# Patient Record
Sex: MALE | Race: WHITE | Employment: OTHER
[De-identification: names, ages, dates, MRNs, and addresses within clinical notes are randomized per-mention and may not be internally consistent; named-entity substitution may affect disease eponyms.]

---

## 2017-02-01 ENCOUNTER — CARE COORDINATION (OUTPATIENT)
Dept: CARE COORDINATION | Facility: CLINIC | Age: 72
End: 2017-02-01

## 2017-02-07 ENCOUNTER — CARE COORDINATION (OUTPATIENT)
Dept: CARE COORDINATION | Facility: CLINIC | Age: 72
End: 2017-02-07

## 2017-03-22 ENCOUNTER — CARE COORDINATION (OUTPATIENT)
Dept: CARE COORDINATION | Age: 72
End: 2017-03-22

## 2017-05-03 ENCOUNTER — CARE COORDINATION (OUTPATIENT)
Dept: CARE COORDINATION | Age: 72
End: 2017-05-03

## 2017-06-20 ENCOUNTER — HOSPITAL ENCOUNTER (OUTPATIENT)
Age: 72
Setting detail: SPECIMEN
Discharge: HOME | End: 2017-06-20
Payer: MEDICARE

## 2017-06-20 DIAGNOSIS — G47.33 OBSTRUCTIVE SLEEP APNEA SYNDROME: ICD-10-CM

## 2017-06-20 DIAGNOSIS — R73.9 HYPERGLYCEMIA: ICD-10-CM

## 2017-06-20 LAB
ABSOLUTE EOS #: 0.4 K/UL (ref 0–0.4)
ABSOLUTE LYMPH #: 1.5 K/UL (ref 1–4.8)
ABSOLUTE MONO #: 0.5 K/UL (ref 0.1–1.2)
ALBUMIN SERPL-MCNC: 4.3 G/DL (ref 3.5–5.2)
ALBUMIN/GLOBULIN RATIO: 1.8 (ref 1–2.5)
ALP BLD-CCNC: 75 U/L (ref 40–129)
ALT SERPL-CCNC: 52 U/L (ref 5–41)
ANION GAP SERPL CALCULATED.3IONS-SCNC: 15 MMOL/L (ref 9–17)
AST SERPL-CCNC: 45 U/L
BASOPHILS # BLD: 1 %
BASOPHILS ABSOLUTE: 0 K/UL (ref 0–0.2)
BILIRUB SERPL-MCNC: 0.86 MG/DL (ref 0.3–1.2)
BUN BLDV-MCNC: 19 MG/DL (ref 8–23)
BUN/CREAT BLD: ABNORMAL (ref 9–20)
CALCIUM SERPL-MCNC: 10 MG/DL (ref 8.6–10.4)
CHLORIDE BLD-SCNC: 98 MMOL/L (ref 98–107)
CO2: 29 MMOL/L (ref 20–31)
CREAT SERPL-MCNC: 1.12 MG/DL (ref 0.7–1.2)
DIFFERENTIAL TYPE: NORMAL
EOSINOPHILS RELATIVE PERCENT: 6 %
GFR AFRICAN AMERICAN: >60 ML/MIN
GFR NON-AFRICAN AMERICAN: >60 ML/MIN
GFR SERPL CREATININE-BSD FRML MDRD: ABNORMAL ML/MIN/{1.73_M2}
GFR SERPL CREATININE-BSD FRML MDRD: ABNORMAL ML/MIN/{1.73_M2}
GLUCOSE BLD-MCNC: 118 MG/DL (ref 70–99)
HCT VFR BLD CALC: 50.6 % (ref 41–53)
HEMOGLOBIN: 16.9 G/DL (ref 13.5–17.5)
LYMPHOCYTES # BLD: 24 %
MCH RBC QN AUTO: 32.5 PG (ref 26–34)
MCHC RBC AUTO-ENTMCNC: 33.3 G/DL (ref 31–37)
MCV RBC AUTO: 97.5 FL (ref 80–100)
MONOCYTES # BLD: 8 %
PDW BLD-RTO: 14.8 % (ref 12.5–15.4)
PLATELET # BLD: 197 K/UL (ref 140–450)
PLATELET ESTIMATE: NORMAL
PMV BLD AUTO: 8.2 FL (ref 6–12)
POTASSIUM SERPL-SCNC: 4.2 MMOL/L (ref 3.7–5.3)
PROSTATE SPECIFIC ANTIGEN: <0.01 UG/L
RBC # BLD: 5.19 M/UL (ref 4.5–5.9)
RBC # BLD: NORMAL 10*6/UL
SEG NEUTROPHILS: 61 %
SEGMENTED NEUTROPHILS ABSOLUTE COUNT: 3.7 K/UL (ref 1.8–7.7)
SODIUM BLD-SCNC: 142 MMOL/L (ref 135–144)
TOTAL PROTEIN: 6.7 G/DL (ref 6.4–8.3)
WBC # BLD: 6.1 K/UL (ref 3.5–11)
WBC # BLD: NORMAL 10*3/UL

## 2017-07-31 ENCOUNTER — HOSPITAL ENCOUNTER (OUTPATIENT)
Age: 72
Setting detail: OUTPATIENT SURGERY
Discharge: HOME | End: 2017-07-31
Attending: SURGERY | Admitting: SURGERY
Payer: MEDICARE

## 2017-07-31 VITALS
HEART RATE: 58 BPM | RESPIRATION RATE: 16 BRPM | HEIGHT: 70 IN | SYSTOLIC BLOOD PRESSURE: 154 MMHG | DIASTOLIC BLOOD PRESSURE: 71 MMHG | WEIGHT: 315 LBS | TEMPERATURE: 97.5 F | OXYGEN SATURATION: 95 % | BODY MASS INDEX: 45.1 KG/M2

## 2017-07-31 PROCEDURE — 99153 MOD SED SAME PHYS/QHP EA: CPT | Performed by: SURGERY

## 2017-07-31 PROCEDURE — 7100000011 HC PHASE II RECOVERY - ADDTL 15 MIN: Performed by: SURGERY

## 2017-07-31 PROCEDURE — 3609027000 HC COLONOSCOPY: Performed by: SURGERY

## 2017-07-31 PROCEDURE — 7100000010 HC PHASE II RECOVERY - FIRST 15 MIN: Performed by: SURGERY

## 2017-07-31 PROCEDURE — 99152 MOD SED SAME PHYS/QHP 5/>YRS: CPT | Performed by: SURGERY

## 2017-07-31 PROCEDURE — 6360000002 HC RX W HCPCS: Performed by: SURGERY

## 2017-07-31 RX ORDER — MIDAZOLAM HYDROCHLORIDE 1 MG/ML
INJECTION INTRAMUSCULAR; INTRAVENOUS PRN
Status: DISCONTINUED | OUTPATIENT
Start: 2017-07-31 | End: 2017-07-31 | Stop reason: HOSPADM

## 2017-07-31 RX ORDER — SODIUM CHLORIDE, SODIUM LACTATE, POTASSIUM CHLORIDE, CALCIUM CHLORIDE 600; 310; 30; 20 MG/100ML; MG/100ML; MG/100ML; MG/100ML
INJECTION, SOLUTION INTRAVENOUS CONTINUOUS
Status: DISCONTINUED | OUTPATIENT
Start: 2017-07-31 | End: 2017-07-31 | Stop reason: HOSPADM

## 2017-07-31 RX ORDER — FENTANYL CITRATE 50 UG/ML
INJECTION, SOLUTION INTRAMUSCULAR; INTRAVENOUS PRN
Status: DISCONTINUED | OUTPATIENT
Start: 2017-07-31 | End: 2017-07-31 | Stop reason: HOSPADM

## 2017-07-31 RX ORDER — ACETAMINOPHEN 325 MG/1
650 TABLET ORAL EVERY 6 HOURS PRN
COMMUNITY

## 2017-07-31 ASSESSMENT — PAIN SCALES - GENERAL
PAINLEVEL_OUTOF10: 0
PAINLEVEL_OUTOF10: 0

## 2017-07-31 ASSESSMENT — PAIN - FUNCTIONAL ASSESSMENT: PAIN_FUNCTIONAL_ASSESSMENT: 0-10

## 2017-08-02 ENCOUNTER — HOSPITAL ENCOUNTER (OUTPATIENT)
Age: 72
Discharge: HOME | End: 2017-08-02
Payer: MEDICARE

## 2017-08-02 ENCOUNTER — HOSPITAL ENCOUNTER (OUTPATIENT)
Dept: GENERAL RADIOLOGY | Age: 72
Discharge: HOME | End: 2017-08-02
Payer: MEDICARE

## 2017-08-02 DIAGNOSIS — M54.16 LUMBAR RADICULOPATHY, ACUTE: ICD-10-CM

## 2017-08-02 DIAGNOSIS — M54.42 ACUTE LEFT-SIDED LOW BACK PAIN WITH LEFT-SIDED SCIATICA: ICD-10-CM

## 2017-08-02 PROCEDURE — 72100 X-RAY EXAM L-S SPINE 2/3 VWS: CPT

## 2017-08-04 ENCOUNTER — HOSPITAL ENCOUNTER (OUTPATIENT)
Dept: GENERAL RADIOLOGY | Age: 72
Discharge: HOME | End: 2017-08-04
Payer: MEDICARE

## 2017-08-04 ENCOUNTER — HOSPITAL ENCOUNTER (OUTPATIENT)
Age: 72
Discharge: HOME | End: 2017-08-04
Payer: MEDICARE

## 2017-08-04 DIAGNOSIS — M54.16 LUMBAR RADICULOPATHY, ACUTE: ICD-10-CM

## 2017-08-04 DIAGNOSIS — M54.42 ACUTE LEFT-SIDED LOW BACK PAIN WITH LEFT-SIDED SCIATICA: ICD-10-CM

## 2017-08-04 PROCEDURE — 73502 X-RAY EXAM HIP UNI 2-3 VIEWS: CPT

## 2017-08-24 ENCOUNTER — OFFICE VISIT (OUTPATIENT)
Dept: ORTHOPEDIC SURGERY | Age: 72
End: 2017-08-24
Payer: MEDICARE

## 2017-08-24 VITALS — HEIGHT: 70 IN | WEIGHT: 300 LBS | BODY MASS INDEX: 42.95 KG/M2

## 2017-08-24 DIAGNOSIS — M25.552 PAIN OF LEFT HIP JOINT: Primary | ICD-10-CM

## 2017-08-24 PROCEDURE — 99213 OFFICE O/P EST LOW 20 MIN: CPT | Performed by: ORTHOPAEDIC SURGERY

## 2017-08-30 RX ORDER — TRAMADOL HYDROCHLORIDE 50 MG/1
50 TABLET ORAL EVERY 6 HOURS PRN
Qty: 40 TABLET | Refills: 0 | Status: SHIPPED | OUTPATIENT
Start: 2017-08-30 | End: 2017-11-07 | Stop reason: SDUPTHER

## 2017-09-07 ENCOUNTER — HOSPITAL ENCOUNTER (OUTPATIENT)
Dept: INTERVENTIONAL RADIOLOGY/VASCULAR | Age: 72
Discharge: HOME | End: 2017-09-07
Payer: MEDICARE

## 2017-09-07 DIAGNOSIS — M25.552 PAIN OF LEFT HIP JOINT: ICD-10-CM

## 2017-09-07 PROCEDURE — 2500000003 HC RX 250 WO HCPCS: Performed by: RADIOLOGY

## 2017-09-07 PROCEDURE — 2500000003 HC RX 250 WO HCPCS: Performed by: ORTHOPAEDIC SURGERY

## 2017-09-07 PROCEDURE — 77002 NEEDLE LOCALIZATION BY XRAY: CPT | Performed by: RADIOLOGY

## 2017-09-07 PROCEDURE — 20610 DRAIN/INJ JOINT/BURSA W/O US: CPT | Performed by: RADIOLOGY

## 2017-09-07 PROCEDURE — 6360000004 HC RX CONTRAST MEDICATION: Performed by: ORTHOPAEDIC SURGERY

## 2017-09-07 PROCEDURE — 6360000002 HC RX W HCPCS: Performed by: ORTHOPAEDIC SURGERY

## 2017-09-07 RX ORDER — LIDOCAINE HYDROCHLORIDE 10 MG/ML
4 INJECTION, SOLUTION EPIDURAL; INFILTRATION; INTRACAUDAL; PERINEURAL ONCE
Status: COMPLETED | OUTPATIENT
Start: 2017-09-07 | End: 2017-09-07

## 2017-09-07 RX ORDER — METHYLPREDNISOLONE ACETATE 80 MG/ML
80 INJECTION, SUSPENSION INTRA-ARTICULAR; INTRALESIONAL; INTRAMUSCULAR; SOFT TISSUE ONCE
Status: COMPLETED | OUTPATIENT
Start: 2017-09-07 | End: 2017-09-07

## 2017-09-07 RX ORDER — BUPIVACAINE HYDROCHLORIDE 5 MG/ML
4 INJECTION, SOLUTION EPIDURAL; INTRACAUDAL ONCE
Status: COMPLETED | OUTPATIENT
Start: 2017-09-07 | End: 2017-09-07

## 2017-09-07 RX ORDER — LIDOCAINE HYDROCHLORIDE 10 MG/ML
INJECTION, SOLUTION INFILTRATION; PERINEURAL
Status: COMPLETED | OUTPATIENT
Start: 2017-09-07 | End: 2017-09-07

## 2017-09-07 RX ADMIN — LIDOCAINE HYDROCHLORIDE 3 ML: 10 INJECTION, SOLUTION INFILTRATION; PERINEURAL at 15:20

## 2017-09-07 RX ADMIN — IOTHALAMATE MEGLUMINE 50 ML: 600 INJECTION INTRAVASCULAR at 15:35

## 2017-09-07 RX ADMIN — BUPIVACAINE HYDROCHLORIDE 20 MG: 5 INJECTION, SOLUTION EPIDURAL; INTRACAUDAL at 15:29

## 2017-09-07 RX ADMIN — METHYLPREDNISOLONE ACETATE 80 MG: 80 INJECTION, SUSPENSION INTRA-ARTICULAR; INTRALESIONAL; INTRAMUSCULAR; SOFT TISSUE at 15:29

## 2017-09-07 RX ADMIN — LIDOCAINE HYDROCHLORIDE 4 ML: 10 INJECTION, SOLUTION EPIDURAL; INFILTRATION; INTRACAUDAL; PERINEURAL at 15:29

## 2017-10-05 ENCOUNTER — OFFICE VISIT (OUTPATIENT)
Dept: ORTHOPEDIC SURGERY | Age: 72
End: 2017-10-05
Payer: MEDICARE

## 2017-10-05 DIAGNOSIS — M54.5 LOW BACK PAIN, UNSPECIFIED BACK PAIN LATERALITY, UNSPECIFIED CHRONICITY, WITH SCIATICA PRESENCE UNSPECIFIED: Primary | ICD-10-CM

## 2017-10-05 PROCEDURE — 99213 OFFICE O/P EST LOW 20 MIN: CPT | Performed by: ORTHOPAEDIC SURGERY

## 2017-10-05 RX ORDER — TRAMADOL HYDROCHLORIDE 50 MG/1
50 TABLET ORAL EVERY 6 HOURS PRN
Qty: 40 TABLET | Refills: 0 | Status: SHIPPED | OUTPATIENT
Start: 2017-10-05 | End: 2017-10-23 | Stop reason: ALTCHOICE

## 2017-10-05 NOTE — PROGRESS NOTES
Laura Cross M.D.            [address] [facility] [address], [address] [facility]             Dept Phone: [phone]             Dept Fax:  [phone] returns today. He had a interventional radiology injection of his left hip on September 7. He did state that for a few weeks he really felt much better. But is really back to where he was before now worse he's having a lot of symptoms however in his low back as well as radiating down his leg. Difficult to determine whether the injection really differentiate whether his hip is a primary problem for his low back. Examination today notes he does have a fair amount of pain on internal X rotation of left hip limited to the groin. He also however is just positive with sciatic/stretching as well as static pressure with a straight leg raise and exacerbation with this. Logrolling itself is not that bad no trochanteric findings a. No new x-rays taken today    Impression  Moderate DJD left hip  Symptoms compatible with lumbar radiculopathy    Plan  I told the patient his wife that I'm not 131% certain that the hip is his primary pain generator and I like to verify that a significant low back pathology. We will schedule for an MRI to lumbar spine and then proceed from there. Review of Systems   Constitutional: Negative. HENT: Negative. Respiratory: Negative. Cardiovascular: Negative. Musculoskeletal: Negative. Neurological: Negative.          Past Medical History:   Diagnosis Date    Cellulitis of lower extremity     right     CHF (congestive heart failure) (HCC)     Chronic acquired lymphedema     History of blood transfusion 2003 7 2006 [facility] SURGERY    History of CVA (cerebrovascular accident) 54    DEFECIT MILD MEMORY AND SPEECH    Hyperlipidemia     Hypertension     Hypothyroid 09/2015    Osteoarthritis     Sleep apnea     C-PAP

## 2017-10-23 ENCOUNTER — HOSPITAL ENCOUNTER (OUTPATIENT)
Dept: MRI IMAGING | Facility: CLINIC | Age: 72
Discharge: HOME | End: 2017-10-23
Payer: MEDICARE

## 2017-10-23 DIAGNOSIS — M54.5 LOW BACK PAIN, UNSPECIFIED BACK PAIN LATERALITY, UNSPECIFIED CHRONICITY, WITH SCIATICA PRESENCE UNSPECIFIED: ICD-10-CM

## 2017-10-23 PROCEDURE — 72148 MRI LUMBAR SPINE W/O DYE: CPT

## 2017-10-24 ENCOUNTER — TELEPHONE (OUTPATIENT)
Dept: ORTHOPEDIC SURGERY | Age: 72
End: 2017-10-24

## 2017-11-17 ENCOUNTER — HOSPITAL ENCOUNTER (OUTPATIENT)
Dept: CARDIAC CATH/INVASIVE PROCEDURES | Age: 72
Discharge: HOME | End: 2017-11-17
Payer: MEDICARE

## 2017-11-17 VITALS
HEART RATE: 73 BPM | HEIGHT: 71 IN | OXYGEN SATURATION: 92 % | SYSTOLIC BLOOD PRESSURE: 98 MMHG | WEIGHT: 305 LBS | DIASTOLIC BLOOD PRESSURE: 56 MMHG | BODY MASS INDEX: 42.7 KG/M2 | RESPIRATION RATE: 15 BRPM | TEMPERATURE: 97.7 F

## 2017-11-17 LAB
GFR NON-AFRICAN AMERICAN: >60 ML/MIN
GFR SERPL CREATININE-BSD FRML MDRD: >60 ML/MIN
GFR SERPL CREATININE-BSD FRML MDRD: NORMAL ML/MIN/{1.73_M2}
GLUCOSE BLD-MCNC: 99 MG/DL (ref 74–100)
LV EF: 55 %
LVEF MODALITY: NORMAL
POC CHLORIDE: 104 MMOL/L (ref 98–107)
POC CREATININE: 1.14 MG/DL (ref 0.51–1.19)
POC HEMATOCRIT: 50 % (ref 41–53)
POC HEMOGLOBIN: 17 G/DL (ref 13.5–17.5)
POC IONIZED CALCIUM: 1.11 MMOL/L (ref 1.15–1.33)
POC POTASSIUM: 5.8 MMOL/L (ref 3.5–4.5)
POC SODIUM: 140 MMOL/L (ref 138–146)
POTASSIUM SERPL-SCNC: 4.8 MMOL/L (ref 3.7–5.3)

## 2017-11-17 PROCEDURE — 92960 CARDIOVERSION ELECTRIC EXT: CPT

## 2017-11-17 PROCEDURE — 82565 ASSAY OF CREATININE: CPT

## 2017-11-17 PROCEDURE — 93325 DOPPLER ECHO COLOR FLOW MAPG: CPT

## 2017-11-17 PROCEDURE — 7100000011 HC PHASE II RECOVERY - ADDTL 15 MIN

## 2017-11-17 PROCEDURE — 6360000002 HC RX W HCPCS

## 2017-11-17 PROCEDURE — 82435 ASSAY OF BLOOD CHLORIDE: CPT

## 2017-11-17 PROCEDURE — 84132 ASSAY OF SERUM POTASSIUM: CPT

## 2017-11-17 PROCEDURE — 85014 HEMATOCRIT: CPT

## 2017-11-17 PROCEDURE — 84295 ASSAY OF SERUM SODIUM: CPT

## 2017-11-17 PROCEDURE — 93312 ECHO TRANSESOPHAGEAL: CPT

## 2017-11-17 PROCEDURE — 82330 ASSAY OF CALCIUM: CPT

## 2017-11-17 PROCEDURE — 82947 ASSAY GLUCOSE BLOOD QUANT: CPT

## 2017-11-17 PROCEDURE — 93320 DOPPLER ECHO COMPLETE: CPT

## 2017-11-17 PROCEDURE — 7100000010 HC PHASE II RECOVERY - FIRST 15 MIN

## 2017-11-17 PROCEDURE — 93005 ELECTROCARDIOGRAM TRACING: CPT

## 2017-11-17 RX ORDER — DILTIAZEM HYDROCHLORIDE 180 MG/1
180 CAPSULE, EXTENDED RELEASE ORAL DAILY
Status: ON HOLD | COMMUNITY
End: 2018-03-17 | Stop reason: HOSPADM

## 2017-11-17 RX ORDER — SODIUM CHLORIDE 9 MG/ML
INJECTION, SOLUTION INTRAVENOUS CONTINUOUS
Status: DISCONTINUED | OUTPATIENT
Start: 2017-11-17 | End: 2017-11-18 | Stop reason: HOSPADM

## 2017-11-17 RX ORDER — SODIUM CHLORIDE 0.9 % (FLUSH) 0.9 %
10 SYRINGE (ML) INJECTION PRN
Status: DISCONTINUED | OUTPATIENT
Start: 2017-11-17 | End: 2017-11-18 | Stop reason: HOSPADM

## 2017-11-17 RX ORDER — TROSPIUM CHLORIDE 20 MG/1
20 TABLET, FILM COATED ORAL 2 TIMES DAILY
COMMUNITY

## 2017-11-17 RX ORDER — SODIUM CHLORIDE 0.9 % (FLUSH) 0.9 %
10 SYRINGE (ML) INJECTION EVERY 12 HOURS SCHEDULED
Status: DISCONTINUED | OUTPATIENT
Start: 2017-11-17 | End: 2017-11-18 | Stop reason: HOSPADM

## 2017-11-17 RX ADMIN — SODIUM CHLORIDE: 9 INJECTION, SOLUTION INTRAVENOUS at 14:50

## 2017-11-17 NOTE — PROGRESS NOTES
Patient admitted, consent signed and questions answered. Patient ready for procedure. Call light to reach with side rails up 2 of 2. Wife at bedside with patient.

## 2017-11-17 NOTE — PROCEDURES
[facility]  CHRISTIANO / Cardioversion procedure Note         Today's Date: 11/17/2017  Primary/Ordering Cardiologist:   Indication:     Operators:  Primary: Cristela Kilgore (attending physician)  Assistant: Yahaira Cuevas (cardiology fellow)    Patient seen and examined. History and Physical reviewed. Labs reviewed. After informed consent was obtained with explanation of the risks and benefits, the patient was brought to Cath lab. All sedation was administered via the Cardiology department. The oropharynx was pre anesthetisized with cetacaine spray. A single intubation effort was required. CHRISTIANO:    Structures:    LA: Normal in size. QUIN: No clot or thrombus identified. RA: Normal in size. RV: Normal in size and systolic function. LV: Normal in size and systolic function. Estimated LVEF: 55%. LV apex: No LV apical thrombus identified. Aorta: Mild atheromatous disease arch. Pericardium: No pericardial effusion. Septum: Bidirectional shunt seen across the interatrial septum with doppler. Bidirectional atrial shunt via injection of agitated saline. Valves:    Mitral Valve: Structurally normal. mild regurgitation is identified. Aortic Valve: The aortic valve is trileaflet and opens adequately. none regurgitiation is identified. Tricuspid valve: Structurally normal. none regurgitation is identified. Pulmonary valve: Normal. No significant regurgitation. No valvular vegetations or thrombus identified. Summary:     1. A CHRISTIANO was performed without complications. 2. LVEF 55%. 3. No thrombus or valvular vegetation identified. CARDIOVERSION:    After an adequate level of sedation was achieved, 200J in biphasic synchronized delivery was administered. conversion to normal sinus rhythm. The patient awoke without complications. A post procedure 12 L ECG was ordered and reviewed. There were no complications encountered. The patient was discussed with Attending.       Janie Bernal,

## 2017-11-17 NOTE — PROGRESS NOTES
Wife spoke with Dr Alexus Delgado via phone and questions answered. Patient with weak gag but able to tolerate fluids well.   EKG completed at bedside

## 2017-11-17 NOTE — H&P
Patient seen and examined. Please refer to the outpatient note by Dr Albino Montejo from 11/13/17. No major changes since last seen. Risks and benefits of the procedure discussed. Informed consent was obtained. Will proceed with the proposed procedure. Will discuss with Dr Albino Montejo.       Rodrigo Officer  Cardiology Fellow

## 2017-11-19 LAB
EKG ATRIAL RATE: 77 BPM
EKG ATRIAL RATE: 80 BPM
EKG P AXIS: 47 DEGREES
EKG P-R INTERVAL: 246 MS
EKG Q-T INTERVAL: 380 MS
EKG Q-T INTERVAL: 408 MS
EKG QRS DURATION: 102 MS
EKG QRS DURATION: 108 MS
EKG QTC CALCULATION (BAZETT): 446 MS
EKG QTC CALCULATION (BAZETT): 461 MS
EKG R AXIS: -17 DEGREES
EKG R AXIS: -2 DEGREES
EKG T AXIS: 25 DEGREES
EKG T AXIS: 6 DEGREES
EKG VENTRICULAR RATE: 77 BPM
EKG VENTRICULAR RATE: 83 BPM

## 2018-02-19 ENCOUNTER — HOSPITAL ENCOUNTER (OUTPATIENT)
Age: 73
Setting detail: SPECIMEN
Discharge: HOME | End: 2018-02-19
Payer: MEDICARE

## 2018-02-20 LAB
PROSTATE SPECIFIC ANTIGEN FREE: <0.1 UG/L
PROSTATE SPECIFIC ANTIGEN PERCENT FREE: NORMAL %
PROSTATE SPECIFIC ANTIGEN: <0.1 UG/L (ref 0–4)

## 2018-03-12 ENCOUNTER — HOSPITAL ENCOUNTER (OUTPATIENT)
Dept: CT IMAGING | Age: 73
Discharge: HOME | End: 2018-03-14
Payer: MEDICARE

## 2018-03-12 DIAGNOSIS — M54.16 LUMBAR RADICULOPATHY: ICD-10-CM

## 2018-03-15 ENCOUNTER — ANESTHESIA EVENT (OUTPATIENT)
Dept: CARDIAC CATH/INVASIVE PROCEDURES | Age: 73
End: 2018-03-15

## 2018-03-15 ENCOUNTER — HOSPITAL ENCOUNTER (OUTPATIENT)
Dept: INTERVENTIONAL RADIOLOGY/VASCULAR | Age: 73
Discharge: HOME | End: 2018-03-17
Payer: MEDICARE

## 2018-03-15 ENCOUNTER — HOSPITAL ENCOUNTER (OUTPATIENT)
Dept: CT IMAGING | Age: 73
Discharge: HOME | End: 2018-03-17
Payer: MEDICARE

## 2018-03-15 VITALS
DIASTOLIC BLOOD PRESSURE: 83 MMHG | OXYGEN SATURATION: 93 % | WEIGHT: 291 LBS | RESPIRATION RATE: 20 BRPM | BODY MASS INDEX: 40.74 KG/M2 | HEART RATE: 67 BPM | TEMPERATURE: 98.4 F | HEIGHT: 71 IN | SYSTOLIC BLOOD PRESSURE: 138 MMHG

## 2018-03-15 DIAGNOSIS — M54.16 LUMBAR RADICULOPATHY: ICD-10-CM

## 2018-03-15 PROCEDURE — 7100000030 HC ASPR PHASE II RECOVERY - FIRST 15 MIN

## 2018-03-15 PROCEDURE — 62304 MYELOGRAPHY LUMBAR INJECTION: CPT | Performed by: RADIOLOGY

## 2018-03-15 PROCEDURE — 72132 CT LUMBAR SPINE W/DYE: CPT

## 2018-03-15 PROCEDURE — 6360000004 HC RX CONTRAST MEDICATION: Performed by: NEUROLOGICAL SURGERY

## 2018-03-15 PROCEDURE — 7100000031 HC ASPR PHASE II RECOVERY - ADDTL 15 MIN

## 2018-03-15 RX ORDER — ACETAMINOPHEN 325 MG/1
650 TABLET ORAL EVERY 4 HOURS PRN
Status: DISCONTINUED | OUTPATIENT
Start: 2018-03-15 | End: 2018-03-18 | Stop reason: HOSPADM

## 2018-03-15 RX ADMIN — IOPAMIDOL 15 ML: 408 INJECTION, SOLUTION INTRATHECAL at 13:16

## 2018-03-15 ASSESSMENT — PAIN - FUNCTIONAL ASSESSMENT: PAIN_FUNCTIONAL_ASSESSMENT: 0-10

## 2018-03-15 ASSESSMENT — PAIN DESCRIPTION - DESCRIPTORS: DESCRIPTORS: ACHING;SHARP

## 2018-03-16 ENCOUNTER — ANESTHESIA (OUTPATIENT)
Dept: CARDIAC CATH/INVASIVE PROCEDURES | Age: 73
End: 2018-03-16

## 2018-03-16 ENCOUNTER — HOSPITAL ENCOUNTER (OUTPATIENT)
Dept: CARDIAC CATH/INVASIVE PROCEDURES | Age: 73
Discharge: HOME | End: 2018-03-17
Attending: INTERNAL MEDICINE | Admitting: INTERNAL MEDICINE
Payer: MEDICARE

## 2018-03-16 VITALS — OXYGEN SATURATION: 97 % | TEMPERATURE: 96.5 F | SYSTOLIC BLOOD PRESSURE: 106 MMHG | DIASTOLIC BLOOD PRESSURE: 69 MMHG

## 2018-03-16 DIAGNOSIS — Z98.890 S/P ABLATION OF ATRIAL FIBRILLATION: ICD-10-CM

## 2018-03-16 DIAGNOSIS — Z86.79 S/P ABLATION OF ATRIAL FIBRILLATION: ICD-10-CM

## 2018-03-16 LAB
ACTIVATED CLOTTING TIME: 213 SEC (ref 79–149)
ACTIVATED CLOTTING TIME: 221 SEC (ref 79–149)
ACTIVATED CLOTTING TIME: 253 SEC (ref 79–149)
ACTIVATED CLOTTING TIME: 310 SEC (ref 79–149)
EKG ATRIAL RATE: 267 BPM
EKG P AXIS: 23 DEGREES
EKG Q-T INTERVAL: 382 MS
EKG QRS DURATION: 90 MS
EKG QTC CALCULATION (BAZETT): 435 MS
EKG R AXIS: -7 DEGREES
EKG T AXIS: -37 DEGREES
EKG VENTRICULAR RATE: 78 BPM
GFR NON-AFRICAN AMERICAN: >60 ML/MIN
GFR SERPL CREATININE-BSD FRML MDRD: >60 ML/MIN
GFR SERPL CREATININE-BSD FRML MDRD: NORMAL ML/MIN/{1.73_M2}
GLUCOSE BLD-MCNC: 104 MG/DL (ref 74–100)
HCT VFR BLD CALC: 46.3 % (ref 40.7–50.3)
HEMOGLOBIN: 14.8 G/DL (ref 13–17)
INR BLD: 1
LV EF: 55 %
LVEF MODALITY: NORMAL
POC CHLORIDE: 107 MMOL/L (ref 98–107)
POC CREATININE: 0.83 MG/DL (ref 0.51–1.19)
POC HEMATOCRIT: 52 % (ref 41–53)
POC HEMOGLOBIN: 17.7 G/DL (ref 13.5–17.5)
POC POTASSIUM: 4.6 MMOL/L (ref 3.5–4.5)
POC SODIUM: 139 MMOL/L (ref 138–146)
PROTHROMBIN TIME: 10.9 SEC (ref 9–12)

## 2018-03-16 PROCEDURE — 2580000003 HC RX 258: Performed by: INTERNAL MEDICINE

## 2018-03-16 PROCEDURE — C1732 CATH, EP, DIAG/ABL, 3D/VECT: HCPCS

## 2018-03-16 PROCEDURE — 2500000003 HC RX 250 WO HCPCS: Performed by: NURSE ANESTHETIST, CERTIFIED REGISTERED

## 2018-03-16 PROCEDURE — 84295 ASSAY OF SERUM SODIUM: CPT

## 2018-03-16 PROCEDURE — 93613 INTRACARDIAC EPHYS 3D MAPG: CPT

## 2018-03-16 PROCEDURE — 85347 COAGULATION TIME ACTIVATED: CPT

## 2018-03-16 PROCEDURE — 85610 PROTHROMBIN TIME: CPT

## 2018-03-16 PROCEDURE — 85018 HEMOGLOBIN: CPT

## 2018-03-16 PROCEDURE — 82947 ASSAY GLUCOSE BLOOD QUANT: CPT

## 2018-03-16 PROCEDURE — 93656 COMPRE EP EVAL ABLTJ ATR FIB: CPT

## 2018-03-16 PROCEDURE — 85014 HEMATOCRIT: CPT

## 2018-03-16 PROCEDURE — 93662 INTRACARDIAC ECG (ICE): CPT

## 2018-03-16 PROCEDURE — 93005 ELECTROCARDIOGRAM TRACING: CPT

## 2018-03-16 PROCEDURE — 7100000011 HC PHASE II RECOVERY - ADDTL 15 MIN

## 2018-03-16 PROCEDURE — 6370000000 HC RX 637 (ALT 250 FOR IP): Performed by: NURSE ANESTHETIST, CERTIFIED REGISTERED

## 2018-03-16 PROCEDURE — 6360000002 HC RX W HCPCS: Performed by: NURSE ANESTHETIST, CERTIFIED REGISTERED

## 2018-03-16 PROCEDURE — C1731 CATH, EP, 20 OR MORE ELEC: HCPCS

## 2018-03-16 PROCEDURE — 6360000002 HC RX W HCPCS: Performed by: INTERNAL MEDICINE

## 2018-03-16 PROCEDURE — 84132 ASSAY OF SERUM POTASSIUM: CPT

## 2018-03-16 PROCEDURE — 2580000003 HC RX 258: Performed by: NURSE ANESTHETIST, CERTIFIED REGISTERED

## 2018-03-16 PROCEDURE — 82435 ASSAY OF BLOOD CHLORIDE: CPT

## 2018-03-16 PROCEDURE — 93325 DOPPLER ECHO COLOR FLOW MAPG: CPT

## 2018-03-16 PROCEDURE — 36415 COLL VENOUS BLD VENIPUNCTURE: CPT

## 2018-03-16 PROCEDURE — 6360000004 HC RX CONTRAST MEDICATION

## 2018-03-16 PROCEDURE — 6360000002 HC RX W HCPCS

## 2018-03-16 PROCEDURE — 3700000001 HC ADD 15 MINUTES (ANESTHESIA)

## 2018-03-16 PROCEDURE — 93655 ICAR CATH ABLTJ DSCRT ARRHYT: CPT

## 2018-03-16 PROCEDURE — 6360000002 HC RX W HCPCS: Performed by: ANESTHESIOLOGY

## 2018-03-16 PROCEDURE — 93312 ECHO TRANSESOPHAGEAL: CPT

## 2018-03-16 PROCEDURE — C1730 CATH, EP, 19 OR FEW ELECT: HCPCS

## 2018-03-16 PROCEDURE — 7100000010 HC PHASE II RECOVERY - FIRST 15 MIN

## 2018-03-16 PROCEDURE — 3700000000 HC ANESTHESIA ATTENDED CARE

## 2018-03-16 PROCEDURE — 6360000002 HC RX W HCPCS: Performed by: SPECIALIST

## 2018-03-16 PROCEDURE — C1769 GUIDE WIRE: HCPCS

## 2018-03-16 PROCEDURE — C1894 INTRO/SHEATH, NON-LASER: HCPCS

## 2018-03-16 PROCEDURE — 6370000000 HC RX 637 (ALT 250 FOR IP): Performed by: INTERNAL MEDICINE

## 2018-03-16 PROCEDURE — 2720000010 HC SURG SUPPLY STERILE

## 2018-03-16 PROCEDURE — 2500000003 HC RX 250 WO HCPCS

## 2018-03-16 PROCEDURE — C1759 CATH, INTRA ECHOCARDIOGRAPHY: HCPCS

## 2018-03-16 PROCEDURE — C1733 CATH, EP, OTHR THAN COOL-TIP: HCPCS

## 2018-03-16 PROCEDURE — 82565 ASSAY OF CREATININE: CPT

## 2018-03-16 RX ORDER — SODIUM CHLORIDE 9 MG/ML
INJECTION, SOLUTION INTRAVENOUS CONTINUOUS PRN
Status: DISCONTINUED | OUTPATIENT
Start: 2018-03-16 | End: 2018-03-16 | Stop reason: SDUPTHER

## 2018-03-16 RX ORDER — TRAMADOL HYDROCHLORIDE 50 MG/1
50 TABLET ORAL EVERY 6 HOURS PRN
Status: DISCONTINUED | OUTPATIENT
Start: 2018-03-16 | End: 2018-03-17 | Stop reason: HOSPADM

## 2018-03-16 RX ORDER — HEPARIN SODIUM 10000 [USP'U]/100ML
INJECTION, SOLUTION INTRAVENOUS CONTINUOUS PRN
Status: DISCONTINUED | OUTPATIENT
Start: 2018-03-16 | End: 2018-03-16 | Stop reason: SDUPTHER

## 2018-03-16 RX ORDER — FUROSEMIDE 40 MG/1
40 TABLET ORAL DAILY
Status: DISCONTINUED | OUTPATIENT
Start: 2018-03-16 | End: 2018-03-17 | Stop reason: HOSPADM

## 2018-03-16 RX ORDER — ALLOPURINOL 300 MG/1
300 TABLET ORAL 2 TIMES DAILY
Status: DISCONTINUED | OUTPATIENT
Start: 2018-03-16 | End: 2018-03-17 | Stop reason: HOSPADM

## 2018-03-16 RX ORDER — DILTIAZEM HYDROCHLORIDE 180 MG/1
180 CAPSULE, COATED, EXTENDED RELEASE ORAL DAILY
Status: DISCONTINUED | OUTPATIENT
Start: 2018-03-16 | End: 2018-03-17

## 2018-03-16 RX ORDER — FENTANYL CITRATE 50 UG/ML
INJECTION, SOLUTION INTRAMUSCULAR; INTRAVENOUS PRN
Status: DISCONTINUED | OUTPATIENT
Start: 2018-03-16 | End: 2018-03-16 | Stop reason: SDUPTHER

## 2018-03-16 RX ORDER — GLYCOPYRROLATE 0.2 MG/ML
INJECTION INTRAMUSCULAR; INTRAVENOUS PRN
Status: DISCONTINUED | OUTPATIENT
Start: 2018-03-16 | End: 2018-03-16 | Stop reason: SDUPTHER

## 2018-03-16 RX ORDER — ACETAMINOPHEN 325 MG/1
650 TABLET ORAL EVERY 4 HOURS PRN
Status: DISCONTINUED | OUTPATIENT
Start: 2018-03-16 | End: 2018-03-17 | Stop reason: HOSPADM

## 2018-03-16 RX ORDER — LISINOPRIL 5 MG/1
5 TABLET ORAL DAILY
Status: DISCONTINUED | OUTPATIENT
Start: 2018-03-16 | End: 2018-03-17 | Stop reason: HOSPADM

## 2018-03-16 RX ORDER — FLUTICASONE PROPIONATE 50 MCG
1 SPRAY, SUSPENSION (ML) NASAL DAILY
Status: DISCONTINUED | OUTPATIENT
Start: 2018-03-16 | End: 2018-03-17 | Stop reason: HOSPADM

## 2018-03-16 RX ORDER — SODIUM CHLORIDE 0.9 % (FLUSH) 0.9 %
10 SYRINGE (ML) INJECTION EVERY 12 HOURS SCHEDULED
Status: DISCONTINUED | OUTPATIENT
Start: 2018-03-16 | End: 2018-03-17 | Stop reason: HOSPADM

## 2018-03-16 RX ORDER — LEVOTHYROXINE SODIUM 0.03 MG/1
25 TABLET ORAL DAILY
Status: DISCONTINUED | OUTPATIENT
Start: 2018-03-16 | End: 2018-03-17 | Stop reason: HOSPADM

## 2018-03-16 RX ORDER — FENTANYL CITRATE 50 UG/ML
25 INJECTION, SOLUTION INTRAMUSCULAR; INTRAVENOUS EVERY 5 MIN PRN
Status: DISCONTINUED | OUTPATIENT
Start: 2018-03-16 | End: 2018-03-16

## 2018-03-16 RX ORDER — SODIUM CHLORIDE 0.9 % (FLUSH) 0.9 %
10 SYRINGE (ML) INJECTION PRN
Status: DISCONTINUED | OUTPATIENT
Start: 2018-03-16 | End: 2018-03-17 | Stop reason: HOSPADM

## 2018-03-16 RX ORDER — ROCURONIUM BROMIDE 10 MG/ML
INJECTION, SOLUTION INTRAVENOUS PRN
Status: DISCONTINUED | OUTPATIENT
Start: 2018-03-16 | End: 2018-03-16 | Stop reason: SDUPTHER

## 2018-03-16 RX ORDER — DEXAMETHASONE SODIUM PHOSPHATE 10 MG/ML
INJECTION INTRAMUSCULAR; INTRAVENOUS PRN
Status: DISCONTINUED | OUTPATIENT
Start: 2018-03-16 | End: 2018-03-16 | Stop reason: SDUPTHER

## 2018-03-16 RX ORDER — ONDANSETRON 2 MG/ML
INJECTION INTRAMUSCULAR; INTRAVENOUS PRN
Status: DISCONTINUED | OUTPATIENT
Start: 2018-03-16 | End: 2018-03-16 | Stop reason: SDUPTHER

## 2018-03-16 RX ORDER — PROPOFOL 10 MG/ML
INJECTION, EMULSION INTRAVENOUS PRN
Status: DISCONTINUED | OUTPATIENT
Start: 2018-03-16 | End: 2018-03-16 | Stop reason: SDUPTHER

## 2018-03-16 RX ORDER — SODIUM CHLORIDE 9 MG/ML
INJECTION, SOLUTION INTRAVENOUS CONTINUOUS
Status: DISCONTINUED | OUTPATIENT
Start: 2018-03-16 | End: 2018-03-17 | Stop reason: HOSPADM

## 2018-03-16 RX ORDER — TROSPIUM CHLORIDE 20 MG/1
20 TABLET, FILM COATED ORAL NIGHTLY
Status: DISCONTINUED | OUTPATIENT
Start: 2018-03-16 | End: 2018-03-17 | Stop reason: HOSPADM

## 2018-03-16 RX ORDER — PROTAMINE SULFATE 10 MG/ML
INJECTION, SOLUTION INTRAVENOUS PRN
Status: DISCONTINUED | OUTPATIENT
Start: 2018-03-16 | End: 2018-03-16 | Stop reason: SDUPTHER

## 2018-03-16 RX ORDER — FENTANYL CITRATE 50 UG/ML
50 INJECTION, SOLUTION INTRAMUSCULAR; INTRAVENOUS EVERY 5 MIN PRN
Status: DISCONTINUED | OUTPATIENT
Start: 2018-03-16 | End: 2018-03-16

## 2018-03-16 RX ORDER — CHOLECALCIFEROL (VITAMIN D3) 125 MCG
1000 CAPSULE ORAL DAILY
Status: DISCONTINUED | OUTPATIENT
Start: 2018-03-16 | End: 2018-03-17 | Stop reason: HOSPADM

## 2018-03-16 RX ORDER — PENTOXIFYLLINE 400 MG/1
800 TABLET, EXTENDED RELEASE ORAL DAILY
Status: DISCONTINUED | OUTPATIENT
Start: 2018-03-16 | End: 2018-03-17 | Stop reason: HOSPADM

## 2018-03-16 RX ORDER — NEOSTIGMINE METHYLSULFATE 5 MG/5 ML
SYRINGE (ML) INTRAVENOUS PRN
Status: DISCONTINUED | OUTPATIENT
Start: 2018-03-16 | End: 2018-03-16 | Stop reason: SDUPTHER

## 2018-03-16 RX ORDER — ACETAMINOPHEN 325 MG/1
650 TABLET ORAL EVERY 6 HOURS PRN
Status: DISCONTINUED | OUTPATIENT
Start: 2018-03-16 | End: 2018-03-17 | Stop reason: HOSPADM

## 2018-03-16 RX ORDER — LIDOCAINE HYDROCHLORIDE 10 MG/ML
INJECTION, SOLUTION INFILTRATION; PERINEURAL PRN
Status: DISCONTINUED | OUTPATIENT
Start: 2018-03-16 | End: 2018-03-16 | Stop reason: SDUPTHER

## 2018-03-16 RX ORDER — HEPARIN SODIUM 1000 [USP'U]/ML
INJECTION, SOLUTION INTRAVENOUS; SUBCUTANEOUS PRN
Status: DISCONTINUED | OUTPATIENT
Start: 2018-03-16 | End: 2018-03-16 | Stop reason: SDUPTHER

## 2018-03-16 RX ADMIN — FLUTICASONE PROPIONATE 1 SPRAY: 50 SPRAY, METERED NASAL at 17:35

## 2018-03-16 RX ADMIN — HEPARIN SODIUM 10000 UNITS: 1000 INJECTION, SOLUTION INTRAVENOUS; SUBCUTANEOUS at 11:24

## 2018-03-16 RX ADMIN — APIXABAN 5 MG: 5 TABLET, FILM COATED ORAL at 20:37

## 2018-03-16 RX ADMIN — HYDROMORPHONE HYDROCHLORIDE 1 MG: 1 INJECTION, SOLUTION INTRAMUSCULAR; INTRAVENOUS; SUBCUTANEOUS at 17:38

## 2018-03-16 RX ADMIN — SODIUM CHLORIDE: 9 INJECTION, SOLUTION INTRAVENOUS at 17:39

## 2018-03-16 RX ADMIN — ONDANSETRON 4 MG: 2 INJECTION INTRAMUSCULAR; INTRAVENOUS at 13:22

## 2018-03-16 RX ADMIN — SODIUM CHLORIDE: 900 INJECTION INTRAVENOUS at 09:21

## 2018-03-16 RX ADMIN — DEXAMETHASONE SODIUM PHOSPHATE 10 MG: 10 INJECTION INTRAMUSCULAR; INTRAVENOUS at 10:36

## 2018-03-16 RX ADMIN — PSYLLIUM HUSK 1 PACKET: 3.4 POWDER ORAL at 20:37

## 2018-03-16 RX ADMIN — METOPROLOL TARTRATE 25 MG: 25 TABLET ORAL at 20:37

## 2018-03-16 RX ADMIN — HEPARIN SODIUM AND DEXTROSE 2000 UNITS/HR: 10000; 5 INJECTION INTRAVENOUS at 11:28

## 2018-03-16 RX ADMIN — HYDROMORPHONE HYDROCHLORIDE 1 MG: 1 INJECTION, SOLUTION INTRAMUSCULAR; INTRAVENOUS; SUBCUTANEOUS at 23:32

## 2018-03-16 RX ADMIN — TROSPIUM CHLORIDE 20 MG: 20 TABLET ORAL at 20:37

## 2018-03-16 RX ADMIN — GLYCOPYRROLATE 0.4 MG: 0.2 INJECTION INTRAMUSCULAR; INTRAVENOUS at 13:22

## 2018-03-16 RX ADMIN — PROTAMINE SULFATE 25 MG: 10 INJECTION, SOLUTION INTRAVENOUS at 13:23

## 2018-03-16 RX ADMIN — LISINOPRIL 5 MG: 5 TABLET ORAL at 17:35

## 2018-03-16 RX ADMIN — FENTANYL CITRATE 50 MCG: 50 INJECTION INTRAMUSCULAR; INTRAVENOUS at 09:24

## 2018-03-16 RX ADMIN — PHENYLEPHRINE HYDROCHLORIDE 50 MCG: 10 INJECTION INTRAVENOUS at 09:52

## 2018-03-16 RX ADMIN — PHENYLEPHRINE HYDROCHLORIDE 50 MCG: 10 INJECTION INTRAVENOUS at 09:48

## 2018-03-16 RX ADMIN — PROTAMINE SULFATE 25 MG: 10 INJECTION, SOLUTION INTRAVENOUS at 13:20

## 2018-03-16 RX ADMIN — SODIUM CHLORIDE: 900 INJECTION INTRAVENOUS at 09:20

## 2018-03-16 RX ADMIN — LEVOTHYROXINE SODIUM 25 MCG: 25 TABLET ORAL at 17:35

## 2018-03-16 RX ADMIN — SODIUM CHLORIDE: 9 INJECTION, SOLUTION INTRAVENOUS at 09:09

## 2018-03-16 RX ADMIN — Medication 1000 MCG: at 17:40

## 2018-03-16 RX ADMIN — LIDOCAINE HYDROCHLORIDE 50 MG: 10 INJECTION, SOLUTION EPIDURAL; INFILTRATION; INTRACAUDAL; PERINEURAL at 09:25

## 2018-03-16 RX ADMIN — FENTANYL CITRATE 50 MCG: 50 INJECTION INTRAMUSCULAR; INTRAVENOUS at 09:25

## 2018-03-16 RX ADMIN — Medication 2 MG: at 13:22

## 2018-03-16 RX ADMIN — LIDOCAINE HYDROCHLORIDE 2 ML: 20 JELLY TOPICAL at 09:25

## 2018-03-16 RX ADMIN — DILTIAZEM HYDROCHLORIDE 180 MG: 180 CAPSULE, COATED, EXTENDED RELEASE ORAL at 17:35

## 2018-03-16 RX ADMIN — FUROSEMIDE 40 MG: 40 TABLET ORAL at 17:35

## 2018-03-16 RX ADMIN — PROPOFOL 500 MG: 10 INJECTION, EMULSION INTRAVENOUS at 09:25

## 2018-03-16 RX ADMIN — PHENYLEPHRINE HYDROCHLORIDE 50 MCG: 10 INJECTION INTRAVENOUS at 09:38

## 2018-03-16 RX ADMIN — SODIUM CHLORIDE: 900 INJECTION INTRAVENOUS at 11:08

## 2018-03-16 RX ADMIN — FENTANYL CITRATE 50 MCG: 50 INJECTION, SOLUTION INTRAMUSCULAR; INTRAVENOUS at 15:02

## 2018-03-16 RX ADMIN — PHENYLEPHRINE HYDROCHLORIDE 50 MCG: 10 INJECTION INTRAVENOUS at 09:34

## 2018-03-16 RX ADMIN — PHENYLEPHRINE HYDROCHLORIDE 50 MCG: 10 INJECTION INTRAVENOUS at 09:55

## 2018-03-16 RX ADMIN — ALLOPURINOL 300 MG: 300 TABLET ORAL at 20:37

## 2018-03-16 RX ADMIN — PHENYLEPHRINE HYDROCHLORIDE 20 MCG/MIN: 10 INJECTION INTRAVENOUS at 10:00

## 2018-03-16 RX ADMIN — HEPARIN SODIUM 3000 UNITS: 1000 INJECTION, SOLUTION INTRAVENOUS; SUBCUTANEOUS at 12:30

## 2018-03-16 RX ADMIN — ROCURONIUM BROMIDE 50 MG: 10 INJECTION INTRAVENOUS at 09:25

## 2018-03-16 RX ADMIN — HEPARIN SODIUM 3000 UNITS: 1000 INJECTION, SOLUTION INTRAVENOUS; SUBCUTANEOUS at 11:44

## 2018-03-16 RX ADMIN — HEPARIN SODIUM 5000 UNITS: 1000 INJECTION, SOLUTION INTRAVENOUS; SUBCUTANEOUS at 12:08

## 2018-03-16 ASSESSMENT — PULMONARY FUNCTION TESTS
PIF_VALUE: 28
PIF_VALUE: 24
PIF_VALUE: 24
PIF_VALUE: 21
PIF_VALUE: 24
PIF_VALUE: 5
PIF_VALUE: 24
PIF_VALUE: 24
PIF_VALUE: 21
PIF_VALUE: 9
PIF_VALUE: 25
PIF_VALUE: 21
PIF_VALUE: 33
PIF_VALUE: 24
PIF_VALUE: 21
PIF_VALUE: 24
PIF_VALUE: 23
PIF_VALUE: 24
PIF_VALUE: 25
PIF_VALUE: 3
PIF_VALUE: 24
PIF_VALUE: 15
PIF_VALUE: 0
PIF_VALUE: 21
PIF_VALUE: 3
PIF_VALUE: 24
PIF_VALUE: 24
PIF_VALUE: 21
PIF_VALUE: 20
PIF_VALUE: 24
PIF_VALUE: 21
PIF_VALUE: 24
PIF_VALUE: 15
PIF_VALUE: 24
PIF_VALUE: 24
PIF_VALUE: 21
PIF_VALUE: 24
PIF_VALUE: 21
PIF_VALUE: 24
PIF_VALUE: 21
PIF_VALUE: 23
PIF_VALUE: 21
PIF_VALUE: 21
PIF_VALUE: 24
PIF_VALUE: 23
PIF_VALUE: 21
PIF_VALUE: 2
PIF_VALUE: 0
PIF_VALUE: 24
PIF_VALUE: 23
PIF_VALUE: 24
PIF_VALUE: 21
PIF_VALUE: 0
PIF_VALUE: 24
PIF_VALUE: 15
PIF_VALUE: 23
PIF_VALUE: 21
PIF_VALUE: 24
PIF_VALUE: 23
PIF_VALUE: 21
PIF_VALUE: 24
PIF_VALUE: 20
PIF_VALUE: 21
PIF_VALUE: 24
PIF_VALUE: 21
PIF_VALUE: 24
PIF_VALUE: 21
PIF_VALUE: 24
PIF_VALUE: 21
PIF_VALUE: 20
PIF_VALUE: 21
PIF_VALUE: 21
PIF_VALUE: 23
PIF_VALUE: 8
PIF_VALUE: 21
PIF_VALUE: 20
PIF_VALUE: 24
PIF_VALUE: 21
PIF_VALUE: 23
PIF_VALUE: 21
PIF_VALUE: 24
PIF_VALUE: 21
PIF_VALUE: 24
PIF_VALUE: 23
PIF_VALUE: 21
PIF_VALUE: 24
PIF_VALUE: 23
PIF_VALUE: 24
PIF_VALUE: 21
PIF_VALUE: 24
PIF_VALUE: 21
PIF_VALUE: 24
PIF_VALUE: 21
PIF_VALUE: 24
PIF_VALUE: 17
PIF_VALUE: 24
PIF_VALUE: 21
PIF_VALUE: 24
PIF_VALUE: 23
PIF_VALUE: 24
PIF_VALUE: 24
PIF_VALUE: 23
PIF_VALUE: 24
PIF_VALUE: 21
PIF_VALUE: 24
PIF_VALUE: 23
PIF_VALUE: 24
PIF_VALUE: 21
PIF_VALUE: 21
PIF_VALUE: 24
PIF_VALUE: 24
PIF_VALUE: 21
PIF_VALUE: 21
PIF_VALUE: 24
PIF_VALUE: 23
PIF_VALUE: 20
PIF_VALUE: 24
PIF_VALUE: 21
PIF_VALUE: 24
PIF_VALUE: 23
PIF_VALUE: 20
PIF_VALUE: 23
PIF_VALUE: 21
PIF_VALUE: 21
PIF_VALUE: 24
PIF_VALUE: 23
PIF_VALUE: 21
PIF_VALUE: 24
PIF_VALUE: 24
PIF_VALUE: 25
PIF_VALUE: 21
PIF_VALUE: 1
PIF_VALUE: 21
PIF_VALUE: 22
PIF_VALUE: 24
PIF_VALUE: 24
PIF_VALUE: 21
PIF_VALUE: 7
PIF_VALUE: 21
PIF_VALUE: 21
PIF_VALUE: 24
PIF_VALUE: 24
PIF_VALUE: 21
PIF_VALUE: 21
PIF_VALUE: 25
PIF_VALUE: 24
PIF_VALUE: 21
PIF_VALUE: 25
PIF_VALUE: 24
PIF_VALUE: 22
PIF_VALUE: 21
PIF_VALUE: 7
PIF_VALUE: 26
PIF_VALUE: 25
PIF_VALUE: 24
PIF_VALUE: 24
PIF_VALUE: 23
PIF_VALUE: 21
PIF_VALUE: 9
PIF_VALUE: 21
PIF_VALUE: 20
PIF_VALUE: 24
PIF_VALUE: 21
PIF_VALUE: 24
PIF_VALUE: 21
PIF_VALUE: 23
PIF_VALUE: 20
PIF_VALUE: 21
PIF_VALUE: 24
PIF_VALUE: 23
PIF_VALUE: 24
PIF_VALUE: 29
PIF_VALUE: 24
PIF_VALUE: 24
PIF_VALUE: 21
PIF_VALUE: 21
PIF_VALUE: 24
PIF_VALUE: 25
PIF_VALUE: 20
PIF_VALUE: 21
PIF_VALUE: 23
PIF_VALUE: 21
PIF_VALUE: 1
PIF_VALUE: 20
PIF_VALUE: 23
PIF_VALUE: 24
PIF_VALUE: 24
PIF_VALUE: 21
PIF_VALUE: 24
PIF_VALUE: 21
PIF_VALUE: 24
PIF_VALUE: 21
PIF_VALUE: 21
PIF_VALUE: 0
PIF_VALUE: 21
PIF_VALUE: 24
PIF_VALUE: 20
PIF_VALUE: 21
PIF_VALUE: 24
PIF_VALUE: 21
PIF_VALUE: 24
PIF_VALUE: 23
PIF_VALUE: 0
PIF_VALUE: 24
PIF_VALUE: 21
PIF_VALUE: 21
PIF_VALUE: 25
PIF_VALUE: 24
PIF_VALUE: 24
PIF_VALUE: 21
PIF_VALUE: 24
PIF_VALUE: 24
PIF_VALUE: 21
PIF_VALUE: 24
PIF_VALUE: 22
PIF_VALUE: 24
PIF_VALUE: 21
PIF_VALUE: 24
PIF_VALUE: 0
PIF_VALUE: 21

## 2018-03-16 ASSESSMENT — PAIN DESCRIPTION - LOCATION: LOCATION: HIP;LEG

## 2018-03-16 ASSESSMENT — PAIN SCALES - GENERAL
PAINLEVEL_OUTOF10: 8
PAINLEVEL_OUTOF10: 7
PAINLEVEL_OUTOF10: 0
PAINLEVEL_OUTOF10: 5

## 2018-03-16 ASSESSMENT — LIFESTYLE VARIABLES: SMOKING_STATUS: 0

## 2018-03-16 ASSESSMENT — PAIN DESCRIPTION - DESCRIPTORS: DESCRIPTORS: ACHING

## 2018-03-16 ASSESSMENT — PAIN DESCRIPTION - PROGRESSION: CLINICAL_PROGRESSION: NOT CHANGED

## 2018-03-16 ASSESSMENT — PAIN DESCRIPTION - ORIENTATION: ORIENTATION: LEFT

## 2018-03-16 ASSESSMENT — PAIN DESCRIPTION - FREQUENCY: FREQUENCY: CONTINUOUS

## 2018-03-16 ASSESSMENT — PAIN DESCRIPTION - PAIN TYPE: TYPE: CHRONIC PAIN

## 2018-03-16 ASSESSMENT — PAIN DESCRIPTION - ONSET: ONSET: ON-GOING

## 2018-03-16 NOTE — PROGRESS NOTES
PHASE 1 RECOVERY COMPLETED. Patient assessment unchanged from before. Bilateral groins with no hematoma or drainage noted. Patient awake and alert times 3.

## 2018-03-16 NOTE — CARE COORDINATION
Case Management Initial Discharge Plan  Kathryn Odonnell,         Readmission Risk              Readmission Risk:        7.5       Age 72 or Greater:  1    Admitted from SNF or Requires Paid or Family Care:  0    Currently has CHF,COPD,ARF,CRI,or is on dialysis:  0    Takes more than 5 Prescription Medications:  4    Takes Digoxin,Insulin,Anticoagulants,Narcotics or ASA/Plavix:  [address] in Past 12 Months:  0    On Disability:  0    Patient Considers own Health:  2.5            Met with:patient  And his spouse to discuss discharge plans. rec'd permission to speak with wife  Information verified: address, contacts, phone number, , insurance Yes  PCP: Rita Warren MD  Date of last visit:  Last Oct    Insurance Provider:  Luisa Beard    Discharge Planning  Current Residence:   private residence     Living Arrangements:    with spouse  Home has  2 stories/ 1 flight stairs to climb  [place] is upstairs, bathroom on both levels  Support Systems:    spouse  Current Services PTA:    noneSupplier:   Patient able to perform ADL's:Assisted  DME used to aid ambulation prior to admission:  Davonna Franc and walker/during admission still on bedrest  Potential Assistance Needed:       Pharmacy: [facility] or [facility]   Potential Assistance Purchasing Medications:     Does patient want to participate in local refill/ meds to beds program?       Patient agreeable to home care: No  [place] of choice provided:  n/a      Type of Home Care Services:     Patient expects to be discharged to:       Prior SNF/Rehab Placement and Facility:   Agreeable to SNF/Rehab: No  [place] of choice provided: n/a   Evaluation: no    Expected Discharge date:     Follow Up Appointment: Best Day/ Time:      Transportation provider:  spouse  Transportation arrangements needed for discharge: No    Discharge Plan:  Return home with family support. Wife states may need a w/c due to back issues.   Has appt with that Dr barber Deras.   Recommended that she discuss a w/c on that visit        Electronically signed by Edward Cadet RN on 3/16/18 at 5:03 PM

## 2018-03-16 NOTE — H&P
Paroxysmal atrial fibrillation      Medical History   1. Morbid obesity. 2. Hypertension. 3. Peripheral vascular disease. 4. Mild renal insufficiency (creatinine 0.88 in April 2016). 5. Dyslipidemia. 6. Diastolic dysfunction on echocardiogram in 2011 with mild to moderate MR, mild TR and mild pulmonary hypertension. 7. Nuclear stress test in October 2015 showed preserved LVEF of 60%, possible inferobasal ischemia versus artifact. There was no wall motion abnormality. 8. Sleep apnea, on CPAP. 9. Prostate cancer, status post prostatectomy. 10. Chest wall cyst, status post removal in June 2016. Social History   Former smoker    Family History   Father History reported - stroke   Mother History reported - heart disease    Allergies   Doxycycline    Medications   Cardizem  mg capsule,extended release [Take 1 Capsule(s) By Mouth Once Daily]   Eliquis 5 mg tablet [Take 1 Tablet(s) By Mouth Two Times a Day]   Lasix 40 mg Tab [1 Tablet(s) By Mouth Once Daily]   Levothyroxine 25 mcg tablet [Take 1 Once Daily]   Lisinopril 5 mg tablet [Take 1 Tablet(s) By Mouth Once Daily]   Multivitamin tablet [Once Daily]   Potassium 99 mg tablet [Take 1 Once Daily]   Probiotic [Take 1 Once Daily]   Tramadol 50 mg tablet [Take 1 Tablet(s) By Mouth Two Times a Day (PRN)]   Allopurinol 300 mg tablet   B12   CoQ-10 100 mg capsule   Ocuvite   Psyllium Husk Fibre Oral Powder   Vitamin D3 1,000 unit tablet  Vital Signs   Ht: 70 inches, Wt: 294 lbs, BMI: 42.18 kg/m2, BSA: 2.57 m2, see chart    Physical Examination     HEENT: AXOX3, PERRL  Neck: Supple no JVD  Heart: Normal S1, S2, no murmur regular rhythm  Chest: CTA bilateral  Abdo: Soft not tender normal BS  LE: No edema , palpable pulses bilateral  Neuro: Grossly no focal deficit    Decision Making   EKG showed evidence of atrial flutter with ventricular rate of 97. Flutter with morphology suggestive of atypical atrial flutter.       Overall Plan   This is a

## 2018-03-16 NOTE — PLAN OF CARE
Problem: Falls - Risk of  Goal: Absence of falls  Outcome: Met This Shift  PT. REMAINS FREE OF FALLS.

## 2018-03-16 NOTE — ANESTHESIA PRE PROCEDURE
levothyroxine (SYNTHROID) 25 MCG tablet TAKE 1 TABLET DAILY 90 tablet 2    CINNAMON PO Take 2 capsules by mouth daily      Probiotic Product (PROBIOTIC ADVANCED PO) Take by mouth      Multiple Vitamins-Minerals (OCUVITE PO) Take 1 tablet by mouth daily      allopurinol (ZYLOPRIM) 300 MG tablet Take 300 mg by mouth 2 times daily      Elastic Bandages & Supports (JOBST RELIEF 30-40MMHG XL) MISC 4 pair knee high open toe. 4 each 0    vitamin B-12 (CYANOCOBALAMIN) 1000 MCG tablet Take 1,000 mcg by mouth daily.  Cholecalciferol (VITAMIN D-3 PO) Take  by mouth daily. 2000 IU daily       Coenzyme Q10 (CO Q 10 PO) Take 1 tablet by mouth daily        Current Facility-Administered Medications   Medication Dose Route Frequency Provider Last Rate Last Dose    0.9 % sodium chloride infusion   Intravenous Continuous Hossameldin ESTHELA Humphries MD         Facility-Administered Medications Ordered in Other Encounters   Medication Dose Route Frequency Provider Last Rate Last Dose    acetaminophen (TYLENOL) tablet 650 mg  650 mg Oral Q4H PRN Vin Casarez MD           Allergies:     Allergies   Allergen Reactions    Doxycycline Rash       Problem List:    Patient Active Problem List   Diagnosis Code    Hypertension I10    Chronic acquired lymphedema I89.0    Sleep apnea G47.30    History of CVA (cerebrovascular accident) Z80.78    Hyperlipidemia E78.5    CHF (congestive heart failure) ([address] I50.9    Osteoarthritis M19.90    Renal insufficiency N28.9    Anemia D64.9    Hyperglycemia R73.9    Edema R60.9    Kidney stone N20.0    Prostate cancer ([facility] C61    Adenocarcinoma of prostate ([facility] C61    Ileus, postoperative ([address] K91.89, K56.7    Acquired hypothyroidism E03.9       Past Medical History:        Diagnosis Date    Anemia     Cellulitis of lower extremity     right     CHF (congestive heart failure) (HCC)     Chronic acquired lymphedema     History of blood transfusion 2003 7 2006 Hrs         Neuro/Psych:   (+) CVA: no interval change,             GI/Hepatic/Renal:   (+) morbid obesity     (-) GERD       Endo/Other:    (+) hypothyroidism::., malignancy/cancer. Abdominal:   (+) obese,         Vascular:   + PVD, aortic or cerebral, . Anesthesia Plan      general     ASA 3     (HTN, AMBER, CHF)  Induction: intravenous. arterial line    Anesthetic plan and risks discussed with patient. Plan discussed with attending.                   Tavares Mims CRNA   3/16/2018

## 2018-03-17 VITALS
DIASTOLIC BLOOD PRESSURE: 67 MMHG | HEIGHT: 70 IN | BODY MASS INDEX: 42 KG/M2 | RESPIRATION RATE: 14 BRPM | HEART RATE: 76 BPM | SYSTOLIC BLOOD PRESSURE: 137 MMHG | OXYGEN SATURATION: 94 % | WEIGHT: 293.4 LBS | TEMPERATURE: 97.7 F

## 2018-03-17 LAB
EKG ATRIAL RATE: 100 BPM
EKG ATRIAL RATE: 102 BPM
EKG P AXIS: 37 DEGREES
EKG P AXIS: 49 DEGREES
EKG Q-T INTERVAL: 398 MS
EKG Q-T INTERVAL: 400 MS
EKG QRS DURATION: 102 MS
EKG QRS DURATION: 110 MS
EKG QTC CALCULATION (BAZETT): 450 MS
EKG QTC CALCULATION (BAZETT): 456 MS
EKG R AXIS: -13 DEGREES
EKG T AXIS: 10 DEGREES
EKG T AXIS: 7 DEGREES
EKG VENTRICULAR RATE: 76 BPM
EKG VENTRICULAR RATE: 79 BPM

## 2018-03-17 PROCEDURE — 94762 N-INVAS EAR/PLS OXIMTRY CONT: CPT

## 2018-03-17 PROCEDURE — 6370000000 HC RX 637 (ALT 250 FOR IP): Performed by: INTERNAL MEDICINE

## 2018-03-17 PROCEDURE — 6360000002 HC RX W HCPCS: Performed by: INTERNAL MEDICINE

## 2018-03-17 PROCEDURE — 2580000003 HC RX 258: Performed by: INTERNAL MEDICINE

## 2018-03-17 PROCEDURE — 93005 ELECTROCARDIOGRAM TRACING: CPT

## 2018-03-17 RX ADMIN — Medication 10 ML: at 08:17

## 2018-03-17 RX ADMIN — LEVOTHYROXINE SODIUM 25 MCG: 25 TABLET ORAL at 08:17

## 2018-03-17 RX ADMIN — APIXABAN 5 MG: 5 TABLET, FILM COATED ORAL at 08:17

## 2018-03-17 RX ADMIN — HYDROMORPHONE HYDROCHLORIDE 1 MG: 1 INJECTION, SOLUTION INTRAMUSCULAR; INTRAVENOUS; SUBCUTANEOUS at 15:37

## 2018-03-17 RX ADMIN — ALLOPURINOL 300 MG: 300 TABLET ORAL at 08:17

## 2018-03-17 RX ADMIN — HYDROMORPHONE HYDROCHLORIDE 1 MG: 1 INJECTION, SOLUTION INTRAMUSCULAR; INTRAVENOUS; SUBCUTANEOUS at 11:52

## 2018-03-17 RX ADMIN — FLUTICASONE PROPIONATE 1 SPRAY: 50 SPRAY, METERED NASAL at 08:17

## 2018-03-17 RX ADMIN — METOPROLOL TARTRATE 25 MG: 25 TABLET ORAL at 08:17

## 2018-03-17 RX ADMIN — FUROSEMIDE 40 MG: 40 TABLET ORAL at 08:17

## 2018-03-17 RX ADMIN — HYDROMORPHONE HYDROCHLORIDE 1 MG: 1 INJECTION, SOLUTION INTRAMUSCULAR; INTRAVENOUS; SUBCUTANEOUS at 04:05

## 2018-03-17 RX ADMIN — LISINOPRIL 5 MG: 5 TABLET ORAL at 11:46

## 2018-03-17 RX ADMIN — HYDROMORPHONE HYDROCHLORIDE 1 MG: 1 INJECTION, SOLUTION INTRAMUSCULAR; INTRAVENOUS; SUBCUTANEOUS at 08:17

## 2018-03-17 RX ADMIN — DILTIAZEM HYDROCHLORIDE 180 MG: 180 CAPSULE, COATED, EXTENDED RELEASE ORAL at 08:17

## 2018-03-17 RX ADMIN — Medication 1000 MCG: at 08:17

## 2018-03-17 ASSESSMENT — PAIN SCALES - GENERAL
PAINLEVEL_OUTOF10: 7
PAINLEVEL_OUTOF10: 6
PAINLEVEL_OUTOF10: 6
PAINLEVEL_OUTOF10: 8
PAINLEVEL_OUTOF10: 8

## 2018-03-17 ASSESSMENT — PAIN DESCRIPTION - PAIN TYPE: TYPE: CHRONIC PAIN

## 2018-03-17 ASSESSMENT — PAIN DESCRIPTION - LOCATION: LOCATION: BACK

## 2018-03-17 NOTE — DISCHARGE INSTR - DIET

## 2018-03-17 NOTE — DISCHARGE SUMMARY
[facility]  Discharge Note                 Name:  Garland Franco  YOB: 1945  Social Security Number:  [id number]  Medical Record Number:  [id number]    Date of Admission:  3/16/2018  Date of Discharge:  3/17/2018    Admitting physician: Carlos Meredith MD    Discharge Attending: Dr. Jerry Law   Primary Care Physician: Jet Perla MD  Consultants: None  Discharge to [address] [place] [address] LIST:  Atrial fibrillation  Atrial flutter   Patient Active Problem List   Diagnosis    Hypertension    Chronic acquired lymphedema    Sleep apnea    History of CVA (cerebrovascular accident)    Hyperlipidemia    [facility]est[facility]) [facility]    Osteoarthritis    Renal insufficiency    Anemia    Hyperglycemia    Edema    Kidney stone    Prostate cancer ([facility]    Adenocarcinoma of prostate ([facility]    Ileus, postoperative ([facility]    Acquired hypothyroidism    S/P ablation of atrial fibrillation         Procedures:  Right sided atrial flutter and Atrial fibrillation ablation with PVI on 3/16/2018    HOSPITAL COURSE :   The patient was admitted for: A flutter and Fib ablation procedure, tolerated well without any complications, in sinus rhythm now, feeling better and denies any active complaints. Started on Eliquis 5 mg BID. Had brief episode of Wenckebach post ablation and Cardizem was stopped. Medications changes recommendation: As per discharge list     Follow Up Plan: 4 weeks as outpatient with Dr Shalonda Amador       Discharge exam:   Jesus [place]:    03/17/18 0703   BP: (!) 144/82   Pulse: 79   Resp: 11   Temp: 97.7 °F (36.5 °C)   SpO2: 95%       Patient is feeling much better, denies any chest pain, dyspnea, orthopnea or palpitations. Neuro: normal  Chest: Clear to ausculation. No wheezing. Cardiac: Cor RRR  Abdomen/groin: soft, non-tender, without masses or organomegaly  Lower extremity edema: none  Groin: no hematoma. No oozing or bleeding.

## 2018-03-18 LAB
EKG ATRIAL RATE: 77 BPM
EKG P AXIS: 6 DEGREES
EKG P-R INTERVAL: 318 MS
EKG Q-T INTERVAL: 366 MS
EKG QRS DURATION: 98 MS
EKG QTC CALCULATION (BAZETT): 414 MS
EKG R AXIS: 1 DEGREES
EKG T AXIS: 30 DEGREES
EKG VENTRICULAR RATE: 77 BPM

## 2018-03-20 ENCOUNTER — TELEPHONE (OUTPATIENT)
Dept: ORTHOPEDIC SURGERY | Age: 73
End: 2018-03-20

## 2018-04-02 ENCOUNTER — HOSPITAL ENCOUNTER (OUTPATIENT)
Dept: MRI IMAGING | Facility: CLINIC | Age: 73
Discharge: HOME | End: 2018-04-04
Payer: MEDICARE

## 2018-04-02 DIAGNOSIS — M54.16 LUMBAR RADICULOPATHY: ICD-10-CM

## 2018-04-02 DIAGNOSIS — M25.552 HIP PAIN, LEFT: ICD-10-CM

## 2018-04-02 PROCEDURE — 73721 MRI JNT OF LWR EXTRE W/O DYE: CPT

## 2018-04-26 ENCOUNTER — OFFICE VISIT (OUTPATIENT)
Dept: ORTHOPEDIC SURGERY | Age: 73
End: 2018-04-26
Payer: MEDICARE

## 2018-04-26 VITALS — WEIGHT: 285 LBS | BODY MASS INDEX: 39.9 KG/M2 | HEIGHT: 71 IN | HEART RATE: 88 BPM | RESPIRATION RATE: 18 BRPM

## 2018-04-26 DIAGNOSIS — M25.552 HIP PAIN, LEFT: Primary | ICD-10-CM

## 2018-04-26 PROCEDURE — 99213 OFFICE O/P EST LOW 20 MIN: CPT | Performed by: ORTHOPAEDIC SURGERY

## 2018-04-26 ASSESSMENT — PROMIS GLOBAL HEALTH SCALE
WHO IS THE PERSON COMPLETING THE PROMIS V1.1 SURVEY?: 0
SUM OF RESPONSES TO QUESTIONS 2, 4, 5, & 10: 10
IN THE PAST 7 DAYS, HOW WOULD YOU RATE YOUR FATIGUE ON AVERAGE [ON A SCALE FROM 1 (NONE) TO 5 (VERY SEVERE)]?: 2
IN GENERAL, WOULD YOU SAY YOUR QUALITY OF LIFE IS...[ON A SCALE OF 1 (POOR) TO 5 (EXCELLENT)]: 1
IN GENERAL, PLEASE RATE HOW WELL YOU CARRY OUT YOUR USUAL SOCIAL ACTIVITIES (INCLUDES ACTIVITIES AT HOME, AT WORK, AND IN YOUR COMMUNITY, AND RESPONSIBILITIES AS A PARENT, CHILD, SPOUSE, EMPLOYEE, FRIEND, ETC) [ON A SCALE OF 1 (POOR) TO 5 (EXCELLENT)]?: 2
IN GENERAL, HOW WOULD YOU RATE YOUR MENTAL HEALTH, INCLUDING YOUR MOOD AND YOUR ABILITY TO THINK [ON A SCALE OF 1 (POOR) TO 5 (EXCELLENT)]?: 2
IN GENERAL, HOW WOULD YOU RATE YOUR PHYSICAL HEALTH [ON A SCALE OF 1 (POOR) TO 5 (EXCELLENT)]?: 1
IN THE PAST 7 DAYS, HOW WOULD YOU RATE YOUR PAIN ON AVERAGE [ON A SCALE FROM 0 (NO PAIN) TO 10 (WORST IMAGINABLE PAIN)]?: 10
IN THE PAST 7 DAYS, HOW OFTEN HAVE YOU BEEN BOTHERED BY EMOTIONAL PROBLEMS, SUCH AS FEELING ANXIOUS, DEPRESSED, OR IRRITABLE [ON A SCALE FROM 1 (NEVER) TO 5 (ALWAYS)]?: 5
HOW IS THE PROMIS V1.1 BEING ADMINISTERED?: 0
IN GENERAL, HOW WOULD YOU RATE YOUR SATISFACTION WITH YOUR SOCIAL ACTIVITIES AND RELATIONSHIPS [ON A SCALE OF 1 (POOR) TO 5 (EXCELLENT)]?: 2
SUM OF RESPONSES TO QUESTIONS 3, 6, 7, & 8: 14
IN GENERAL, WOULD YOU SAY YOUR HEALTH IS...[ON A SCALE OF 1 (POOR) TO 5 (EXCELLENT)]: 1
TO WHAT EXTENT ARE YOU ABLE TO CARRY OUT YOUR EVERYDAY PHYSICAL ACTIVITIES SUCH AS WALKING, CLIMBING STAIRS, CARRYING GROCERIES, OR MOVING A CHAIR [ON A SCALE OF 1 (NOT AT ALL) TO 5 (COMPLETELY)]?: 1

## 2018-04-26 ASSESSMENT — HOOS JR
BENDING TO THE FLOOR TO PICK UP OBJECT: 4
RISING FROM SITTING: 4
HOOS JR RAW SCORE: 24
LYING IN BED (TURNING OVER, MAINTAINING HIP POSITION): 4
SITTING: 4
WALKING ON UNEVEN SURFACE: 4
GOING UP OR DOWN STAIRS: 4

## 2018-04-29 ENCOUNTER — APPOINTMENT (OUTPATIENT)
Dept: CT IMAGING | Age: 73
End: 2018-04-29
Payer: MEDICARE

## 2018-04-29 ENCOUNTER — HOSPITAL ENCOUNTER (EMERGENCY)
Age: 73
Discharge: HOME | End: 2018-04-29
Attending: EMERGENCY MEDICINE
Payer: MEDICARE

## 2018-04-29 VITALS
HEIGHT: 70 IN | HEART RATE: 69 BPM | WEIGHT: 285 LBS | SYSTOLIC BLOOD PRESSURE: 148 MMHG | TEMPERATURE: 97.5 F | OXYGEN SATURATION: 91 % | DIASTOLIC BLOOD PRESSURE: 64 MMHG | RESPIRATION RATE: 16 BRPM | BODY MASS INDEX: 40.8 KG/M2

## 2018-04-29 DIAGNOSIS — N20.0 RIGHT NEPHROLITHIASIS: Primary | ICD-10-CM

## 2018-04-29 LAB
-: ABNORMAL
ABSOLUTE EOS #: 0.3 K/UL (ref 0–0.4)
ABSOLUTE IMMATURE GRANULOCYTE: ABNORMAL K/UL (ref 0–0.3)
ABSOLUTE LYMPH #: 1.5 K/UL (ref 1–4.8)
ABSOLUTE MONO #: 0.6 K/UL (ref 0.1–1.3)
ALBUMIN SERPL-MCNC: 4.1 G/DL (ref 3.5–5.2)
ALBUMIN/GLOBULIN RATIO: ABNORMAL (ref 1–2.5)
ALP BLD-CCNC: 96 U/L (ref 40–129)
ALT SERPL-CCNC: 10 U/L (ref 5–41)
AMORPHOUS: ABNORMAL
ANION GAP SERPL CALCULATED.3IONS-SCNC: 13 MMOL/L (ref 9–17)
AST SERPL-CCNC: 19 U/L
BACTERIA: ABNORMAL
BASOPHILS # BLD: 1 % (ref 0–2)
BASOPHILS ABSOLUTE: 0.1 K/UL (ref 0–0.2)
BILIRUB SERPL-MCNC: 0.45 MG/DL (ref 0.3–1.2)
BILIRUBIN URINE: NEGATIVE
BUN BLDV-MCNC: 24 MG/DL (ref 8–23)
BUN/CREAT BLD: ABNORMAL (ref 9–20)
CALCIUM SERPL-MCNC: 10 MG/DL (ref 8.6–10.4)
CASTS UA: ABNORMAL /LPF
CHLORIDE BLD-SCNC: 99 MMOL/L (ref 98–107)
CO2: 27 MMOL/L (ref 20–31)
COLOR: YELLOW
COMMENT UA: ABNORMAL
CREAT SERPL-MCNC: 1.19 MG/DL (ref 0.7–1.2)
CRYSTALS, UA: ABNORMAL /HPF
DIFFERENTIAL TYPE: ABNORMAL
EOSINOPHILS RELATIVE PERCENT: 3 % (ref 0–4)
EPITHELIAL CELLS UA: ABNORMAL /HPF
GFR AFRICAN AMERICAN: >60 ML/MIN
GFR NON-AFRICAN AMERICAN: 60 ML/MIN
GFR SERPL CREATININE-BSD FRML MDRD: ABNORMAL ML/MIN/{1.73_M2}
GFR SERPL CREATININE-BSD FRML MDRD: ABNORMAL ML/MIN/{1.73_M2}
GLUCOSE BLD-MCNC: 138 MG/DL (ref 70–99)
GLUCOSE URINE: NEGATIVE
HCT VFR BLD CALC: 50.7 % (ref 41–53)
HEMOGLOBIN: 17 G/DL (ref 13.5–17.5)
IMMATURE GRANULOCYTES: ABNORMAL %
KETONES, URINE: NEGATIVE
LACTIC ACID, WHOLE BLOOD: NORMAL MMOL/L (ref 0.7–2.1)
LACTIC ACID: 1.6 MMOL/L (ref 0.5–2.2)
LEUKOCYTE ESTERASE, URINE: NEGATIVE
LIPASE: 29 U/L (ref 13–60)
LYMPHOCYTES # BLD: 16 % (ref 24–44)
MCH RBC QN AUTO: 32.7 PG (ref 26–34)
MCHC RBC AUTO-ENTMCNC: 33.6 G/DL (ref 31–37)
MCV RBC AUTO: 97.2 FL (ref 80–100)
MONOCYTES # BLD: 7 % (ref 1–7)
MUCUS: ABNORMAL
NITRITE, URINE: NEGATIVE
NRBC AUTOMATED: ABNORMAL PER 100 WBC
OTHER OBSERVATIONS UA: ABNORMAL
PDW BLD-RTO: 15.9 % (ref 11.5–14.9)
PH UA: 5.5 (ref 5–8)
PLATELET # BLD: 263 K/UL (ref 150–450)
PLATELET ESTIMATE: ABNORMAL
PMV BLD AUTO: 8 FL (ref 6–12)
POTASSIUM SERPL-SCNC: 4.3 MMOL/L (ref 3.7–5.3)
PROTEIN UA: ABNORMAL
RBC # BLD: 5.22 M/UL (ref 4.5–5.9)
RBC # BLD: ABNORMAL 10*6/UL
RBC UA: ABNORMAL /HPF
RENAL EPITHELIAL, UA: ABNORMAL /HPF
SEG NEUTROPHILS: 73 % (ref 36–66)
SEGMENTED NEUTROPHILS ABSOLUTE COUNT: 7.3 K/UL (ref 1.3–9.1)
SODIUM BLD-SCNC: 139 MMOL/L (ref 135–144)
SPECIFIC GRAVITY UA: 1.02 (ref 1–1.03)
TOTAL PROTEIN: 7 G/DL (ref 6.4–8.3)
TRICHOMONAS: ABNORMAL
TURBIDITY: ABNORMAL
URINE HGB: ABNORMAL
UROBILINOGEN, URINE: NORMAL
WBC # BLD: 9.7 K/UL (ref 3.5–11)
WBC # BLD: ABNORMAL 10*3/UL
WBC UA: ABNORMAL /HPF
YEAST: ABNORMAL

## 2018-04-29 PROCEDURE — 74176 CT ABD & PELVIS W/O CONTRAST: CPT

## 2018-04-29 PROCEDURE — 81001 URINALYSIS AUTO W/SCOPE: CPT

## 2018-04-29 PROCEDURE — 80053 COMPREHEN METABOLIC PANEL: CPT

## 2018-04-29 PROCEDURE — 99284 EMERGENCY DEPT VISIT MOD MDM: CPT

## 2018-04-29 PROCEDURE — 2580000003 HC RX 258: Performed by: EMERGENCY MEDICINE

## 2018-04-29 PROCEDURE — 85025 COMPLETE CBC W/AUTO DIFF WBC: CPT

## 2018-04-29 PROCEDURE — 96375 TX/PRO/DX INJ NEW DRUG ADDON: CPT

## 2018-04-29 PROCEDURE — 96376 TX/PRO/DX INJ SAME DRUG ADON: CPT

## 2018-04-29 PROCEDURE — 83605 ASSAY OF LACTIC ACID: CPT

## 2018-04-29 PROCEDURE — 83690 ASSAY OF LIPASE: CPT

## 2018-04-29 PROCEDURE — 36415 COLL VENOUS BLD VENIPUNCTURE: CPT

## 2018-04-29 PROCEDURE — 6360000002 HC RX W HCPCS: Performed by: EMERGENCY MEDICINE

## 2018-04-29 PROCEDURE — 96374 THER/PROPH/DIAG INJ IV PUSH: CPT

## 2018-04-29 RX ORDER — IBUPROFEN 800 MG/1
800 TABLET ORAL EVERY 8 HOURS PRN
Qty: 30 TABLET | Refills: 0 | Status: SHIPPED | OUTPATIENT
Start: 2018-04-29 | End: 2018-10-15 | Stop reason: ALTCHOICE

## 2018-04-29 RX ORDER — OXYCODONE HYDROCHLORIDE AND ACETAMINOPHEN 5; 325 MG/1; MG/1
1-2 TABLET ORAL EVERY 6 HOURS PRN
Qty: 15 TABLET | Refills: 0 | Status: SHIPPED | OUTPATIENT
Start: 2018-04-29 | End: 2018-05-02

## 2018-04-29 RX ORDER — 0.9 % SODIUM CHLORIDE 0.9 %
1000 INTRAVENOUS SOLUTION INTRAVENOUS ONCE
Status: COMPLETED | OUTPATIENT
Start: 2018-04-29 | End: 2018-04-29

## 2018-04-29 RX ORDER — FENTANYL CITRATE 50 UG/ML
25 INJECTION, SOLUTION INTRAMUSCULAR; INTRAVENOUS ONCE
Status: COMPLETED | OUTPATIENT
Start: 2018-04-29 | End: 2018-04-29

## 2018-04-29 RX ORDER — TAMSULOSIN HYDROCHLORIDE 0.4 MG/1
0.4 CAPSULE ORAL DAILY
Qty: 5 CAPSULE | Refills: 0 | Status: SHIPPED | OUTPATIENT
Start: 2018-04-29 | End: 2018-05-01 | Stop reason: ALTCHOICE

## 2018-04-29 RX ORDER — ONDANSETRON 2 MG/ML
4 INJECTION INTRAMUSCULAR; INTRAVENOUS ONCE
Status: COMPLETED | OUTPATIENT
Start: 2018-04-29 | End: 2018-04-29

## 2018-04-29 RX ADMIN — FENTANYL CITRATE 25 MCG: 50 INJECTION INTRAMUSCULAR; INTRAVENOUS at 01:16

## 2018-04-29 RX ADMIN — SODIUM CHLORIDE 1000 ML: 9 INJECTION, SOLUTION INTRAVENOUS at 01:19

## 2018-04-29 RX ADMIN — HYDROMORPHONE HYDROCHLORIDE 1 MG: 1 INJECTION, SOLUTION INTRAMUSCULAR; INTRAVENOUS; SUBCUTANEOUS at 01:43

## 2018-04-29 RX ADMIN — ONDANSETRON 4 MG: 2 INJECTION INTRAMUSCULAR; INTRAVENOUS at 01:16

## 2018-04-29 RX ADMIN — HYDROMORPHONE HYDROCHLORIDE 1 MG: 1 INJECTION, SOLUTION INTRAMUSCULAR; INTRAVENOUS; SUBCUTANEOUS at 02:27

## 2018-04-29 ASSESSMENT — ENCOUNTER SYMPTOMS
VOMITING: 1
BLURRED VISION: 0
COUGH: 0
CONSTIPATION: 0
EYE DISCHARGE: 0
DIARRHEA: 0
ABDOMINAL PAIN: 1
WHEEZING: 0
NAUSEA: 0
SHORTNESS OF BREATH: 0
SORE THROAT: 0

## 2018-04-29 ASSESSMENT — PAIN SCALES - GENERAL
PAINLEVEL_OUTOF10: 10
PAINLEVEL_OUTOF10: 9
PAINLEVEL_OUTOF10: 10
PAINLEVEL_OUTOF10: 6
PAINLEVEL_OUTOF10: 9

## 2018-04-29 ASSESSMENT — PAIN DESCRIPTION - PAIN TYPE: TYPE: ACUTE PAIN

## 2018-04-29 ASSESSMENT — PAIN DESCRIPTION - ORIENTATION: ORIENTATION: RIGHT

## 2018-04-29 ASSESSMENT — PAIN DESCRIPTION - LOCATION: LOCATION: ABDOMEN

## 2018-04-29 ASSESSMENT — PAIN DESCRIPTION - DESCRIPTORS: DESCRIPTORS: SHOOTING

## 2018-04-29 ASSESSMENT — PAIN DESCRIPTION - FREQUENCY: FREQUENCY: INTERMITTENT

## 2018-05-01 ENCOUNTER — HOSPITAL ENCOUNTER (OUTPATIENT)
Dept: PREADMISSION TESTING | Age: 73
Discharge: HOME | End: 2018-05-05
Payer: MEDICARE

## 2018-05-01 VITALS
TEMPERATURE: 98 F | HEART RATE: 66 BPM | DIASTOLIC BLOOD PRESSURE: 68 MMHG | WEIGHT: 281 LBS | RESPIRATION RATE: 12 BRPM | BODY MASS INDEX: 39.34 KG/M2 | HEIGHT: 71 IN | SYSTOLIC BLOOD PRESSURE: 152 MMHG | OXYGEN SATURATION: 94 %

## 2018-05-01 LAB
-: ABNORMAL
ABO/RH: NORMAL
ABSOLUTE EOS #: 0.3 K/UL (ref 0–0.4)
ABSOLUTE IMMATURE GRANULOCYTE: ABNORMAL K/UL (ref 0–0.3)
ABSOLUTE LYMPH #: 1.7 K/UL (ref 1–4.8)
ABSOLUTE MONO #: 0.6 K/UL (ref 0.1–1.3)
AMORPHOUS: ABNORMAL
ANION GAP SERPL CALCULATED.3IONS-SCNC: 11 MMOL/L (ref 9–17)
ANTIBODY SCREEN: NEGATIVE
ARM BAND NUMBER: NORMAL
BACTERIA: ABNORMAL
BASOPHILS # BLD: 1 % (ref 0–2)
BASOPHILS ABSOLUTE: 0 K/UL (ref 0–0.2)
BILIRUBIN URINE: NEGATIVE
BUN BLDV-MCNC: 32 MG/DL (ref 8–23)
BUN/CREAT BLD: ABNORMAL (ref 9–20)
CALCIUM SERPL-MCNC: 10.2 MG/DL (ref 8.6–10.4)
CASTS UA: ABNORMAL /LPF
CHLORIDE BLD-SCNC: 102 MMOL/L (ref 98–107)
CO2: 30 MMOL/L (ref 20–31)
COLOR: ABNORMAL
COMMENT UA: ABNORMAL
CREAT SERPL-MCNC: 1.72 MG/DL (ref 0.7–1.2)
CRYSTALS, UA: ABNORMAL /HPF
DIFFERENTIAL TYPE: ABNORMAL
EOSINOPHILS RELATIVE PERCENT: 4 % (ref 0–4)
EPITHELIAL CELLS UA: ABNORMAL /HPF
EXPIRATION DATE: NORMAL
GFR AFRICAN AMERICAN: 47 ML/MIN
GFR NON-AFRICAN AMERICAN: 39 ML/MIN
GFR SERPL CREATININE-BSD FRML MDRD: ABNORMAL ML/MIN/{1.73_M2}
GFR SERPL CREATININE-BSD FRML MDRD: ABNORMAL ML/MIN/{1.73_M2}
GLUCOSE BLD-MCNC: 97 MG/DL (ref 70–99)
GLUCOSE URINE: NEGATIVE
HCT VFR BLD CALC: 49.1 % (ref 41–53)
HEMOGLOBIN: 16.2 G/DL (ref 13.5–17.5)
IMMATURE GRANULOCYTES: ABNORMAL %
KETONES, URINE: NEGATIVE
LEUKOCYTE ESTERASE, URINE: ABNORMAL
LYMPHOCYTES # BLD: 24 % (ref 24–44)
MCH RBC QN AUTO: 32.8 PG (ref 26–34)
MCHC RBC AUTO-ENTMCNC: 32.9 G/DL (ref 31–37)
MCV RBC AUTO: 99.7 FL (ref 80–100)
MONOCYTES # BLD: 8 % (ref 1–7)
MUCUS: ABNORMAL
NITRITE, URINE: NEGATIVE
NRBC AUTOMATED: ABNORMAL PER 100 WBC
OTHER OBSERVATIONS UA: ABNORMAL
PDW BLD-RTO: 15.9 % (ref 11.5–14.9)
PH UA: 5 (ref 5–8)
PLATELET # BLD: 255 K/UL (ref 150–450)
PLATELET ESTIMATE: ABNORMAL
PMV BLD AUTO: 8.3 FL (ref 6–12)
POTASSIUM SERPL-SCNC: 4.8 MMOL/L (ref 3.7–5.3)
PROTEIN UA: ABNORMAL
RBC # BLD: 4.92 M/UL (ref 4.5–5.9)
RBC # BLD: ABNORMAL 10*6/UL
RBC UA: ABNORMAL /HPF
RENAL EPITHELIAL, UA: ABNORMAL /HPF
SEG NEUTROPHILS: 63 % (ref 36–66)
SEGMENTED NEUTROPHILS ABSOLUTE COUNT: 4.4 K/UL (ref 1.3–9.1)
SODIUM BLD-SCNC: 143 MMOL/L (ref 135–144)
SPECIFIC GRAVITY UA: 1.02 (ref 1–1.03)
TRICHOMONAS: ABNORMAL
TURBIDITY: ABNORMAL
URINE HGB: ABNORMAL
UROBILINOGEN, URINE: NORMAL
WBC # BLD: 6.9 K/UL (ref 3.5–11)
WBC # BLD: ABNORMAL 10*3/UL
WBC UA: ABNORMAL /HPF
YEAST: ABNORMAL

## 2018-05-01 PROCEDURE — 87086 URINE CULTURE/COLONY COUNT: CPT

## 2018-05-01 PROCEDURE — 36415 COLL VENOUS BLD VENIPUNCTURE: CPT

## 2018-05-01 PROCEDURE — 86901 BLOOD TYPING SEROLOGIC RH(D): CPT

## 2018-05-01 PROCEDURE — 86850 RBC ANTIBODY SCREEN: CPT

## 2018-05-01 PROCEDURE — 80048 BASIC METABOLIC PNL TOTAL CA: CPT

## 2018-05-01 PROCEDURE — 85025 COMPLETE CBC W/AUTO DIFF WBC: CPT

## 2018-05-01 PROCEDURE — 87641 MR-STAPH DNA AMP PROBE: CPT

## 2018-05-01 PROCEDURE — 86900 BLOOD TYPING SEROLOGIC ABO: CPT

## 2018-05-01 PROCEDURE — 81001 URINALYSIS AUTO W/SCOPE: CPT

## 2018-05-02 ENCOUNTER — ANESTHESIA EVENT (OUTPATIENT)
Dept: OPERATING ROOM | Age: 73
DRG: 470 | End: 2018-05-02
Payer: MEDICARE

## 2018-05-02 LAB
CULTURE: NORMAL
CULTURE: NORMAL
Lab: NORMAL
MRSA, DNA, NASAL: NORMAL
SPECIMEN DESCRIPTION: NORMAL
STATUS: NORMAL

## 2018-05-02 RX ORDER — SODIUM CHLORIDE 0.9 % (FLUSH) 0.9 %
10 SYRINGE (ML) INJECTION EVERY 12 HOURS SCHEDULED
Status: CANCELLED | OUTPATIENT
Start: 2018-05-02

## 2018-05-02 RX ORDER — SODIUM CHLORIDE 0.9 % (FLUSH) 0.9 %
10 SYRINGE (ML) INJECTION PRN
Status: CANCELLED | OUTPATIENT
Start: 2018-05-02

## 2018-05-02 RX ORDER — SODIUM CHLORIDE 9 MG/ML
INJECTION, SOLUTION INTRAVENOUS CONTINUOUS
Status: CANCELLED | OUTPATIENT
Start: 2018-05-02

## 2018-05-02 RX ORDER — LIDOCAINE HYDROCHLORIDE 10 MG/ML
1 INJECTION, SOLUTION EPIDURAL; INFILTRATION; INTRACAUDAL; PERINEURAL
Status: CANCELLED | OUTPATIENT
Start: 2018-05-02 | End: 2018-05-02

## 2018-05-08 ENCOUNTER — TELEPHONE (OUTPATIENT)
Dept: ORTHOPEDIC SURGERY | Age: 73
End: 2018-05-08

## 2018-05-08 PROBLEM — M48.061 SPINAL STENOSIS OF LUMBAR REGION: Status: ACTIVE | Noted: 2017-11-15

## 2018-05-08 PROBLEM — M16.12 ARTHRITIS OF LEFT HIP: Status: ACTIVE | Noted: 2018-05-08

## 2018-05-08 PROBLEM — M54.16 LUMBAR RADICULOPATHY: Status: ACTIVE | Noted: 2017-11-15

## 2018-05-08 PROBLEM — Z96.653 PRESENCE OF BILATERAL TOTAL KNEE JOINT PROSTHESES: Status: ACTIVE | Noted: 2018-05-08

## 2018-05-10 ENCOUNTER — HOSPITAL ENCOUNTER (OUTPATIENT)
Age: 73
Setting detail: SPECIMEN
Discharge: HOME | End: 2018-05-10
Payer: MEDICARE

## 2018-05-10 ENCOUNTER — HOSPITAL ENCOUNTER (OUTPATIENT)
Age: 73
Discharge: HOME | End: 2018-05-10
Payer: MEDICARE

## 2018-05-10 DIAGNOSIS — I10 ESSENTIAL HYPERTENSION: ICD-10-CM

## 2018-05-10 LAB
ANION GAP SERPL CALCULATED.3IONS-SCNC: 11 MMOL/L (ref 9–17)
BUN BLDV-MCNC: 23 MG/DL (ref 8–23)
BUN/CREAT BLD: NORMAL (ref 9–20)
CALCIUM SERPL-MCNC: 9.5 MG/DL (ref 8.6–10.4)
CHLORIDE BLD-SCNC: 100 MMOL/L (ref 98–107)
CO2: 25 MMOL/L (ref 20–31)
CREAT SERPL-MCNC: 0.93 MG/DL (ref 0.7–1.2)
GFR AFRICAN AMERICAN: >60 ML/MIN
GFR NON-AFRICAN AMERICAN: >60 ML/MIN
GFR SERPL CREATININE-BSD FRML MDRD: NORMAL ML/MIN/{1.73_M2}
GFR SERPL CREATININE-BSD FRML MDRD: NORMAL ML/MIN/{1.73_M2}
GLUCOSE BLD-MCNC: 88 MG/DL (ref 70–99)
POTASSIUM SERPL-SCNC: 4.7 MMOL/L (ref 3.7–5.3)
SODIUM BLD-SCNC: 136 MMOL/L (ref 135–144)

## 2018-05-10 PROCEDURE — 82360 CALCULUS ASSAY QUANT: CPT

## 2018-05-10 PROCEDURE — 36415 COLL VENOUS BLD VENIPUNCTURE: CPT

## 2018-05-10 PROCEDURE — 88300 SURGICAL PATH GROSS: CPT

## 2018-05-10 PROCEDURE — 80048 BASIC METABOLIC PNL TOTAL CA: CPT

## 2018-05-15 ENCOUNTER — ANESTHESIA (OUTPATIENT)
Dept: OPERATING ROOM | Age: 73
DRG: 470 | End: 2018-05-15
Payer: MEDICARE

## 2018-05-15 ENCOUNTER — HOSPITAL ENCOUNTER (INPATIENT)
Age: 73
LOS: 5 days | Discharge: HOME | DRG: 470 | End: 2018-05-20
Attending: ORTHOPAEDIC SURGERY | Admitting: ORTHOPAEDIC SURGERY
Payer: MEDICARE

## 2018-05-15 ENCOUNTER — APPOINTMENT (OUTPATIENT)
Dept: GENERAL RADIOLOGY | Age: 73
DRG: 470 | End: 2018-05-15
Attending: ORTHOPAEDIC SURGERY
Payer: MEDICARE

## 2018-05-15 VITALS — OXYGEN SATURATION: 95 % | TEMPERATURE: 97 F | DIASTOLIC BLOOD PRESSURE: 65 MMHG | SYSTOLIC BLOOD PRESSURE: 125 MMHG

## 2018-05-15 DIAGNOSIS — M16.12 PRIMARY OSTEOARTHRITIS OF LEFT HIP: Primary | ICD-10-CM

## 2018-05-15 PROCEDURE — 6370000000 HC RX 637 (ALT 250 FOR IP): Performed by: ORTHOPAEDIC SURGERY

## 2018-05-15 PROCEDURE — 3600000004 HC SURGERY LEVEL 4 BASE: Performed by: ORTHOPAEDIC SURGERY

## 2018-05-15 PROCEDURE — 6360000002 HC RX W HCPCS: Performed by: ORTHOPAEDIC SURGERY

## 2018-05-15 PROCEDURE — 73501 X-RAY EXAM HIP UNI 1 VIEW: CPT

## 2018-05-15 PROCEDURE — 6360000002 HC RX W HCPCS: Performed by: ANESTHESIOLOGY

## 2018-05-15 PROCEDURE — G8978 MOBILITY CURRENT STATUS: HCPCS

## 2018-05-15 PROCEDURE — 2580000003 HC RX 258: Performed by: ORTHOPAEDIC SURGERY

## 2018-05-15 PROCEDURE — 6360000002 HC RX W HCPCS: Performed by: NURSE ANESTHETIST, CERTIFIED REGISTERED

## 2018-05-15 PROCEDURE — 3600000014 HC SURGERY LEVEL 4 ADDTL 15MIN: Performed by: ORTHOPAEDIC SURGERY

## 2018-05-15 PROCEDURE — 97530 THERAPEUTIC ACTIVITIES: CPT

## 2018-05-15 PROCEDURE — 3700000001 HC ADD 15 MINUTES (ANESTHESIA): Performed by: ORTHOPAEDIC SURGERY

## 2018-05-15 PROCEDURE — 1200000000 HC SEMI PRIVATE

## 2018-05-15 PROCEDURE — 2580000003 HC RX 258: Performed by: ANESTHESIOLOGY

## 2018-05-15 PROCEDURE — 0SRB01Z REPLACEMENT OF LEFT HIP JOINT WITH METAL SYNTHETIC SUBSTITUTE, OPEN APPROACH: ICD-10-PCS | Performed by: ORTHOPAEDIC SURGERY

## 2018-05-15 PROCEDURE — 2500000003 HC RX 250 WO HCPCS: Performed by: NURSE ANESTHETIST, CERTIFIED REGISTERED

## 2018-05-15 PROCEDURE — 97162 PT EVAL MOD COMPLEX 30 MIN: CPT

## 2018-05-15 PROCEDURE — 2720000010 HC SURG SUPPLY STERILE: Performed by: ORTHOPAEDIC SURGERY

## 2018-05-15 PROCEDURE — 2500000003 HC RX 250 WO HCPCS: Performed by: ORTHOPAEDIC SURGERY

## 2018-05-15 PROCEDURE — 3209999900 FLUORO FOR SURGICAL PROCEDURES

## 2018-05-15 PROCEDURE — C1776 JOINT DEVICE (IMPLANTABLE): HCPCS | Performed by: ORTHOPAEDIC SURGERY

## 2018-05-15 PROCEDURE — 2780000010 HC IMPLANT OTHER: Performed by: ORTHOPAEDIC SURGERY

## 2018-05-15 PROCEDURE — 3700000000 HC ANESTHESIA ATTENDED CARE: Performed by: ORTHOPAEDIC SURGERY

## 2018-05-15 PROCEDURE — 7100000001 HC PACU RECOVERY - ADDTL 15 MIN: Performed by: ORTHOPAEDIC SURGERY

## 2018-05-15 PROCEDURE — 94664 DEMO&/EVAL PT USE INHALER: CPT

## 2018-05-15 PROCEDURE — 7100000000 HC PACU RECOVERY - FIRST 15 MIN: Performed by: ORTHOPAEDIC SURGERY

## 2018-05-15 PROCEDURE — 27130 TOTAL HIP ARTHROPLASTY: CPT | Performed by: ORTHOPAEDIC SURGERY

## 2018-05-15 PROCEDURE — G8979 MOBILITY GOAL STATUS: HCPCS

## 2018-05-15 PROCEDURE — 97535 SELF CARE MNGMENT TRAINING: CPT

## 2018-05-15 PROCEDURE — 97165 OT EVAL LOW COMPLEX 30 MIN: CPT

## 2018-05-15 DEVICE — IMPLANTABLE DEVICE: Type: IMPLANTABLE DEVICE | Site: HIP | Status: FUNCTIONAL

## 2018-05-15 DEVICE — LINER ACET SZ F DIA40MM NEUT HIP XLPE ARCOMXL G7: Type: IMPLANTABLE DEVICE | Site: HIP | Status: FUNCTIONAL

## 2018-05-15 DEVICE — AGENT HEMOSTATIC SURGIFLOW MATRIX KIT W/THROMBIN: Type: IMPLANTABLE DEVICE | Site: HIP | Status: FUNCTIONAL

## 2018-05-15 DEVICE — G7 FINNED 4 HOLE SHELL 56F: Type: IMPLANTABLE DEVICE | Site: HIP | Status: FUNCTIONAL

## 2018-05-15 DEVICE — STEM FEM SZ 14 L113MM NK L39.6MM 133DEG HI OFFSET HIP TI: Type: IMPLANTABLE DEVICE | Site: HIP | Status: FUNCTIONAL

## 2018-05-15 RX ORDER — OXYCODONE HYDROCHLORIDE AND ACETAMINOPHEN 5; 325 MG/1; MG/1
2 TABLET ORAL PRN
Status: DISCONTINUED | OUTPATIENT
Start: 2018-05-15 | End: 2018-05-15 | Stop reason: HOSPADM

## 2018-05-15 RX ORDER — SODIUM CHLORIDE 0.9 % (FLUSH) 0.9 %
10 SYRINGE (ML) INJECTION PRN
Status: DISCONTINUED | OUTPATIENT
Start: 2018-05-15 | End: 2018-05-15 | Stop reason: HOSPADM

## 2018-05-15 RX ORDER — SODIUM CHLORIDE 0.9 % (FLUSH) 0.9 %
10 SYRINGE (ML) INJECTION EVERY 12 HOURS SCHEDULED
Status: DISCONTINUED | OUTPATIENT
Start: 2018-05-15 | End: 2018-05-20 | Stop reason: HOSPADM

## 2018-05-15 RX ORDER — SODIUM CHLORIDE 0.9 % (FLUSH) 0.9 %
10 SYRINGE (ML) INJECTION PRN
Status: DISCONTINUED | OUTPATIENT
Start: 2018-05-15 | End: 2018-05-20 | Stop reason: HOSPADM

## 2018-05-15 RX ORDER — OXYCODONE HYDROCHLORIDE 5 MG/1
10 TABLET ORAL EVERY 4 HOURS PRN
Status: DISCONTINUED | OUTPATIENT
Start: 2018-05-15 | End: 2018-05-20 | Stop reason: HOSPADM

## 2018-05-15 RX ORDER — FENTANYL CITRATE 50 UG/ML
25 INJECTION, SOLUTION INTRAMUSCULAR; INTRAVENOUS EVERY 5 MIN PRN
Status: COMPLETED | OUTPATIENT
Start: 2018-05-15 | End: 2018-05-15

## 2018-05-15 RX ORDER — DIPHENHYDRAMINE HYDROCHLORIDE 50 MG/ML
12.5 INJECTION INTRAMUSCULAR; INTRAVENOUS
Status: DISCONTINUED | OUTPATIENT
Start: 2018-05-15 | End: 2018-05-15 | Stop reason: HOSPADM

## 2018-05-15 RX ORDER — DEXAMETHASONE SODIUM PHOSPHATE 10 MG/ML
10 INJECTION INTRAMUSCULAR; INTRAVENOUS ONCE
Status: COMPLETED | OUTPATIENT
Start: 2018-05-15 | End: 2018-05-15

## 2018-05-15 RX ORDER — MEPERIDINE HYDROCHLORIDE 50 MG/ML
12.5 INJECTION INTRAMUSCULAR; INTRAVENOUS; SUBCUTANEOUS EVERY 5 MIN PRN
Status: DISCONTINUED | OUTPATIENT
Start: 2018-05-15 | End: 2018-05-15 | Stop reason: HOSPADM

## 2018-05-15 RX ORDER — ACETAMINOPHEN 500 MG
1000 TABLET ORAL ONCE
Status: COMPLETED | OUTPATIENT
Start: 2018-05-15 | End: 2018-05-15

## 2018-05-15 RX ORDER — NEOSTIGMINE METHYLSULFATE 5 MG/5 ML
SYRINGE (ML) INTRAVENOUS PRN
Status: DISCONTINUED | OUTPATIENT
Start: 2018-05-15 | End: 2018-05-15 | Stop reason: SDUPTHER

## 2018-05-15 RX ORDER — HYDROMORPHONE HCL 110MG/55ML
PATIENT CONTROLLED ANALGESIA SYRINGE INTRAVENOUS PRN
Status: DISCONTINUED | OUTPATIENT
Start: 2018-05-15 | End: 2018-05-15 | Stop reason: SDUPTHER

## 2018-05-15 RX ORDER — GLYCOPYRROLATE 1 MG/5 ML
SYRINGE (ML) INTRAVENOUS PRN
Status: DISCONTINUED | OUTPATIENT
Start: 2018-05-15 | End: 2018-05-15 | Stop reason: SDUPTHER

## 2018-05-15 RX ORDER — ROCURONIUM BROMIDE 10 MG/ML
INJECTION, SOLUTION INTRAVENOUS PRN
Status: DISCONTINUED | OUTPATIENT
Start: 2018-05-15 | End: 2018-05-15 | Stop reason: SDUPTHER

## 2018-05-15 RX ORDER — ONDANSETRON 2 MG/ML
4 INJECTION INTRAMUSCULAR; INTRAVENOUS EVERY 6 HOURS PRN
Status: DISCONTINUED | OUTPATIENT
Start: 2018-05-15 | End: 2018-05-20 | Stop reason: HOSPADM

## 2018-05-15 RX ORDER — CALCIUM CHLORIDE 100 MG/ML
INJECTION INTRAVENOUS; INTRAVENTRICULAR PRN
Status: DISCONTINUED | OUTPATIENT
Start: 2018-05-15 | End: 2018-05-15 | Stop reason: HOSPADM

## 2018-05-15 RX ORDER — OXYCODONE HYDROCHLORIDE AND ACETAMINOPHEN 5; 325 MG/1; MG/1
1 TABLET ORAL PRN
Status: DISCONTINUED | OUTPATIENT
Start: 2018-05-15 | End: 2018-05-15 | Stop reason: HOSPADM

## 2018-05-15 RX ORDER — LIDOCAINE HYDROCHLORIDE 10 MG/ML
1 INJECTION, SOLUTION EPIDURAL; INFILTRATION; INTRACAUDAL; PERINEURAL
Status: DISCONTINUED | OUTPATIENT
Start: 2018-05-15 | End: 2018-05-15 | Stop reason: HOSPADM

## 2018-05-15 RX ORDER — MORPHINE SULFATE 2 MG/ML
1 INJECTION, SOLUTION INTRAMUSCULAR; INTRAVENOUS EVERY 5 MIN PRN
Status: DISCONTINUED | OUTPATIENT
Start: 2018-05-15 | End: 2018-05-15 | Stop reason: HOSPADM

## 2018-05-15 RX ORDER — PROPOFOL 10 MG/ML
INJECTION, EMULSION INTRAVENOUS PRN
Status: DISCONTINUED | OUTPATIENT
Start: 2018-05-15 | End: 2018-05-15 | Stop reason: SDUPTHER

## 2018-05-15 RX ORDER — ONDANSETRON 2 MG/ML
INJECTION INTRAMUSCULAR; INTRAVENOUS PRN
Status: DISCONTINUED | OUTPATIENT
Start: 2018-05-15 | End: 2018-05-15 | Stop reason: SDUPTHER

## 2018-05-15 RX ORDER — GABAPENTIN 300 MG/1
300 CAPSULE ORAL ONCE
Status: COMPLETED | OUTPATIENT
Start: 2018-05-15 | End: 2018-05-15

## 2018-05-15 RX ORDER — OXYCODONE HYDROCHLORIDE 5 MG/1
5 TABLET ORAL EVERY 4 HOURS PRN
Status: DISCONTINUED | OUTPATIENT
Start: 2018-05-15 | End: 2018-05-20 | Stop reason: HOSPADM

## 2018-05-15 RX ORDER — MIDAZOLAM HYDROCHLORIDE 1 MG/ML
INJECTION INTRAMUSCULAR; INTRAVENOUS PRN
Status: DISCONTINUED | OUTPATIENT
Start: 2018-05-15 | End: 2018-05-15 | Stop reason: SDUPTHER

## 2018-05-15 RX ORDER — FENTANYL CITRATE 50 UG/ML
INJECTION, SOLUTION INTRAMUSCULAR; INTRAVENOUS PRN
Status: DISCONTINUED | OUTPATIENT
Start: 2018-05-15 | End: 2018-05-15 | Stop reason: SDUPTHER

## 2018-05-15 RX ORDER — SODIUM CHLORIDE 9 MG/ML
INJECTION, SOLUTION INTRAVENOUS CONTINUOUS
Status: DISCONTINUED | OUTPATIENT
Start: 2018-05-15 | End: 2018-05-18

## 2018-05-15 RX ORDER — ACETAMINOPHEN 500 MG
1000 TABLET ORAL EVERY 8 HOURS SCHEDULED
Status: COMPLETED | OUTPATIENT
Start: 2018-05-15 | End: 2018-05-16

## 2018-05-15 RX ORDER — GABAPENTIN 600 MG/1
300 TABLET ORAL ONCE
Status: DISCONTINUED | OUTPATIENT
Start: 2018-05-15 | End: 2018-05-15

## 2018-05-15 RX ORDER — SODIUM CHLORIDE, SODIUM LACTATE, POTASSIUM CHLORIDE, CALCIUM CHLORIDE 600; 310; 30; 20 MG/100ML; MG/100ML; MG/100ML; MG/100ML
INJECTION, SOLUTION INTRAVENOUS CONTINUOUS
Status: DISCONTINUED | OUTPATIENT
Start: 2018-05-15 | End: 2018-05-18

## 2018-05-15 RX ORDER — SCOLOPAMINE TRANSDERMAL SYSTEM 1 MG/1
1 PATCH, EXTENDED RELEASE TRANSDERMAL ONCE
Status: COMPLETED | OUTPATIENT
Start: 2018-05-15 | End: 2018-05-18

## 2018-05-15 RX ORDER — PROMETHAZINE HYDROCHLORIDE 25 MG/ML
6.25 INJECTION, SOLUTION INTRAMUSCULAR; INTRAVENOUS
Status: DISCONTINUED | OUTPATIENT
Start: 2018-05-15 | End: 2018-05-15 | Stop reason: HOSPADM

## 2018-05-15 RX ORDER — DEXAMETHASONE SODIUM PHOSPHATE 4 MG/ML
INJECTION, SOLUTION INTRA-ARTICULAR; INTRALESIONAL; INTRAMUSCULAR; INTRAVENOUS; SOFT TISSUE PRN
Status: DISCONTINUED | OUTPATIENT
Start: 2018-05-15 | End: 2018-05-15 | Stop reason: SDUPTHER

## 2018-05-15 RX ORDER — SODIUM CHLORIDE 0.9 % (FLUSH) 0.9 %
10 SYRINGE (ML) INJECTION EVERY 12 HOURS SCHEDULED
Status: DISCONTINUED | OUTPATIENT
Start: 2018-05-15 | End: 2018-05-15 | Stop reason: HOSPADM

## 2018-05-15 RX ORDER — DOCUSATE SODIUM 100 MG/1
100 CAPSULE, LIQUID FILLED ORAL 2 TIMES DAILY
Status: DISCONTINUED | OUTPATIENT
Start: 2018-05-15 | End: 2018-05-20 | Stop reason: HOSPADM

## 2018-05-15 RX ORDER — EPHEDRINE SULFATE/0.9% NACL/PF 50 MG/5 ML
SYRINGE (ML) INTRAVENOUS PRN
Status: DISCONTINUED | OUTPATIENT
Start: 2018-05-15 | End: 2018-05-15 | Stop reason: SDUPTHER

## 2018-05-15 RX ORDER — KETOROLAC TROMETHAMINE 30 MG/ML
15 INJECTION, SOLUTION INTRAMUSCULAR; INTRAVENOUS EVERY 8 HOURS SCHEDULED
Status: COMPLETED | OUTPATIENT
Start: 2018-05-15 | End: 2018-05-17

## 2018-05-15 RX ADMIN — APIXABAN 5 MG: 5 TABLET, FILM COATED ORAL at 21:29

## 2018-05-15 RX ADMIN — PHENYLEPHRINE HYDROCHLORIDE 100 MCG: 10 INJECTION INTRAVENOUS at 10:27

## 2018-05-15 RX ADMIN — Medication 3 G: at 16:13

## 2018-05-15 RX ADMIN — HYDROMORPHONE HYDROCHLORIDE 0.4 MG: 2 INJECTION, SOLUTION INTRAMUSCULAR; INTRAVENOUS; SUBCUTANEOUS at 11:17

## 2018-05-15 RX ADMIN — SODIUM CHLORIDE: 9 INJECTION, SOLUTION INTRAVENOUS at 10:53

## 2018-05-15 RX ADMIN — Medication 3 G: at 09:10

## 2018-05-15 RX ADMIN — ONDANSETRON 4 MG: 2 INJECTION INTRAMUSCULAR; INTRAVENOUS at 10:48

## 2018-05-15 RX ADMIN — ACETAMINOPHEN 1000 MG: 500 TABLET, FILM COATED ORAL at 14:06

## 2018-05-15 RX ADMIN — HYDROMORPHONE HYDROCHLORIDE 0.4 MG: 2 INJECTION, SOLUTION INTRAMUSCULAR; INTRAVENOUS; SUBCUTANEOUS at 11:21

## 2018-05-15 RX ADMIN — Medication 4 MG: at 11:00

## 2018-05-15 RX ADMIN — PHENYLEPHRINE HYDROCHLORIDE 100 MCG: 10 INJECTION INTRAVENOUS at 09:59

## 2018-05-15 RX ADMIN — GABAPENTIN 300 MG: 300 CAPSULE ORAL at 07:48

## 2018-05-15 RX ADMIN — DEXAMETHASONE SODIUM PHOSPHATE 4 MG: 4 INJECTION, SOLUTION INTRAMUSCULAR; INTRAVENOUS at 09:35

## 2018-05-15 RX ADMIN — FENTANYL CITRATE 50 MCG: 50 INJECTION, SOLUTION INTRAMUSCULAR; INTRAVENOUS at 09:02

## 2018-05-15 RX ADMIN — ACETAMINOPHEN 1000 MG: 500 TABLET, FILM COATED ORAL at 21:30

## 2018-05-15 RX ADMIN — DOCUSATE SODIUM 100 MG: 100 CAPSULE, LIQUID FILLED ORAL at 21:30

## 2018-05-15 RX ADMIN — HYDROMORPHONE HYDROCHLORIDE 0.2 MG: 2 INJECTION, SOLUTION INTRAMUSCULAR; INTRAVENOUS; SUBCUTANEOUS at 10:50

## 2018-05-15 RX ADMIN — FENTANYL CITRATE 25 MCG: 50 INJECTION, SOLUTION INTRAMUSCULAR; INTRAVENOUS at 11:38

## 2018-05-15 RX ADMIN — DEXAMETHASONE SODIUM PHOSPHATE 10 MG: 10 INJECTION INTRAMUSCULAR; INTRAVENOUS at 07:45

## 2018-05-15 RX ADMIN — SODIUM CHLORIDE: 9 INJECTION, SOLUTION INTRAVENOUS at 07:45

## 2018-05-15 RX ADMIN — SODIUM CHLORIDE, POTASSIUM CHLORIDE, SODIUM LACTATE AND CALCIUM CHLORIDE: 600; 310; 30; 20 INJECTION, SOLUTION INTRAVENOUS at 18:58

## 2018-05-15 RX ADMIN — FENTANYL CITRATE 50 MCG: 50 INJECTION, SOLUTION INTRAMUSCULAR; INTRAVENOUS at 09:31

## 2018-05-15 RX ADMIN — KETOROLAC TROMETHAMINE 15 MG: 30 INJECTION, SOLUTION INTRAMUSCULAR; INTRAVENOUS at 21:29

## 2018-05-15 RX ADMIN — SODIUM CHLORIDE, POTASSIUM CHLORIDE, SODIUM LACTATE AND CALCIUM CHLORIDE: 600; 310; 30; 20 INJECTION, SOLUTION INTRAVENOUS at 14:07

## 2018-05-15 RX ADMIN — FENTANYL CITRATE 25 MCG: 50 INJECTION, SOLUTION INTRAMUSCULAR; INTRAVENOUS at 12:17

## 2018-05-15 RX ADMIN — Medication 10 MG: at 09:27

## 2018-05-15 RX ADMIN — Medication 5 MG: at 09:17

## 2018-05-15 RX ADMIN — MIDAZOLAM 2 MG: 1 INJECTION INTRAMUSCULAR; INTRAVENOUS at 08:56

## 2018-05-15 RX ADMIN — FENTANYL CITRATE 25 MCG: 50 INJECTION, SOLUTION INTRAMUSCULAR; INTRAVENOUS at 12:06

## 2018-05-15 RX ADMIN — FENTANYL CITRATE 25 MCG: 50 INJECTION, SOLUTION INTRAMUSCULAR; INTRAVENOUS at 11:55

## 2018-05-15 RX ADMIN — ACETAMINOPHEN 1000 MG: 500 TABLET, FILM COATED ORAL at 07:45

## 2018-05-15 RX ADMIN — PROPOFOL 180 MG: 10 INJECTION, EMULSION INTRAVENOUS at 09:02

## 2018-05-15 RX ADMIN — HYDROMORPHONE HYDROCHLORIDE 0.2 MG: 2 INJECTION, SOLUTION INTRAMUSCULAR; INTRAVENOUS; SUBCUTANEOUS at 11:02

## 2018-05-15 RX ADMIN — PHENYLEPHRINE HYDROCHLORIDE 100 MCG: 10 INJECTION INTRAVENOUS at 10:47

## 2018-05-15 RX ADMIN — HYDROMORPHONE HYDROCHLORIDE 0.2 MG: 2 INJECTION, SOLUTION INTRAMUSCULAR; INTRAVENOUS; SUBCUTANEOUS at 10:45

## 2018-05-15 RX ADMIN — Medication 0.6 MG: at 10:59

## 2018-05-15 RX ADMIN — KETOROLAC TROMETHAMINE 15 MG: 30 INJECTION, SOLUTION INTRAMUSCULAR; INTRAVENOUS at 14:06

## 2018-05-15 RX ADMIN — HYDROMORPHONE HYDROCHLORIDE 0.2 MG: 2 INJECTION, SOLUTION INTRAMUSCULAR; INTRAVENOUS; SUBCUTANEOUS at 10:54

## 2018-05-15 RX ADMIN — Medication 1 MG: at 11:03

## 2018-05-15 RX ADMIN — DOCUSATE SODIUM 100 MG: 100 CAPSULE, LIQUID FILLED ORAL at 14:06

## 2018-05-15 RX ADMIN — ROCURONIUM BROMIDE 50 MG: 10 INJECTION INTRAVENOUS at 09:03

## 2018-05-15 ASSESSMENT — PULMONARY FUNCTION TESTS
PIF_VALUE: 1
PIF_VALUE: 2
PIF_VALUE: 22
PIF_VALUE: 18
PIF_VALUE: 22
PIF_VALUE: 20
PIF_VALUE: 23
PIF_VALUE: 26
PIF_VALUE: 20
PIF_VALUE: 22
PIF_VALUE: 24
PIF_VALUE: 15
PIF_VALUE: 23
PIF_VALUE: 22
PIF_VALUE: 23
PIF_VALUE: 13
PIF_VALUE: 20
PIF_VALUE: 18
PIF_VALUE: 23
PIF_VALUE: 28
PIF_VALUE: 1
PIF_VALUE: 29
PIF_VALUE: 22
PIF_VALUE: 25
PIF_VALUE: 3
PIF_VALUE: 0
PIF_VALUE: 27
PIF_VALUE: 30
PIF_VALUE: 22
PIF_VALUE: 26
PIF_VALUE: 19
PIF_VALUE: 4
PIF_VALUE: 21
PIF_VALUE: 1
PIF_VALUE: 27
PIF_VALUE: 26
PIF_VALUE: 12
PIF_VALUE: 20
PIF_VALUE: 21
PIF_VALUE: 25
PIF_VALUE: 25
PIF_VALUE: 18
PIF_VALUE: 20
PIF_VALUE: 0
PIF_VALUE: 2
PIF_VALUE: 21
PIF_VALUE: 20
PIF_VALUE: 22
PIF_VALUE: 20
PIF_VALUE: 22
PIF_VALUE: 0
PIF_VALUE: 22
PIF_VALUE: 22
PIF_VALUE: 20
PIF_VALUE: 26
PIF_VALUE: 20
PIF_VALUE: 22
PIF_VALUE: 22
PIF_VALUE: 23
PIF_VALUE: 26
PIF_VALUE: 24
PIF_VALUE: 29
PIF_VALUE: 22
PIF_VALUE: 26
PIF_VALUE: 23
PIF_VALUE: 21
PIF_VALUE: 23
PIF_VALUE: 22
PIF_VALUE: 23
PIF_VALUE: 15
PIF_VALUE: 24
PIF_VALUE: 19
PIF_VALUE: 24
PIF_VALUE: 23
PIF_VALUE: 22
PIF_VALUE: 2
PIF_VALUE: 22
PIF_VALUE: 19
PIF_VALUE: 22
PIF_VALUE: 21
PIF_VALUE: 21
PIF_VALUE: 27
PIF_VALUE: 20
PIF_VALUE: 21
PIF_VALUE: 21
PIF_VALUE: 0
PIF_VALUE: 20
PIF_VALUE: 3
PIF_VALUE: 33
PIF_VALUE: 24
PIF_VALUE: 23
PIF_VALUE: 1
PIF_VALUE: 24
PIF_VALUE: 26
PIF_VALUE: 22
PIF_VALUE: 0
PIF_VALUE: 20
PIF_VALUE: 19
PIF_VALUE: 22
PIF_VALUE: 23
PIF_VALUE: 22
PIF_VALUE: 21
PIF_VALUE: 25
PIF_VALUE: 24
PIF_VALUE: 26
PIF_VALUE: 23
PIF_VALUE: 22
PIF_VALUE: 27
PIF_VALUE: 23
PIF_VALUE: 15
PIF_VALUE: 21
PIF_VALUE: 27
PIF_VALUE: 20
PIF_VALUE: 21
PIF_VALUE: 2
PIF_VALUE: 18
PIF_VALUE: 24
PIF_VALUE: 21
PIF_VALUE: 21
PIF_VALUE: 26
PIF_VALUE: 0
PIF_VALUE: 21
PIF_VALUE: 23
PIF_VALUE: 20
PIF_VALUE: 22
PIF_VALUE: 15
PIF_VALUE: 23
PIF_VALUE: 22
PIF_VALUE: 20
PIF_VALUE: 22
PIF_VALUE: 3

## 2018-05-15 ASSESSMENT — PAIN DESCRIPTION - ORIENTATION
ORIENTATION: LEFT
ORIENTATION: LEFT;ANTERIOR
ORIENTATION: LEFT

## 2018-05-15 ASSESSMENT — PAIN DESCRIPTION - FREQUENCY
FREQUENCY: CONTINUOUS
FREQUENCY: INTERMITTENT

## 2018-05-15 ASSESSMENT — PAIN DESCRIPTION - PROGRESSION
CLINICAL_PROGRESSION: NOT CHANGED

## 2018-05-15 ASSESSMENT — PAIN DESCRIPTION - DESCRIPTORS
DESCRIPTORS: ACHING
DESCRIPTORS: DISCOMFORT
DESCRIPTORS: BURNING

## 2018-05-15 ASSESSMENT — PAIN DESCRIPTION - PAIN TYPE
TYPE: SURGICAL PAIN

## 2018-05-15 ASSESSMENT — PAIN SCALES - GENERAL
PAINLEVEL_OUTOF10: 8
PAINLEVEL_OUTOF10: 9
PAINLEVEL_OUTOF10: 8
PAINLEVEL_OUTOF10: 10
PAINLEVEL_OUTOF10: 10
PAINLEVEL_OUTOF10: 4
PAINLEVEL_OUTOF10: 9
PAINLEVEL_OUTOF10: 6
PAINLEVEL_OUTOF10: 1
PAINLEVEL_OUTOF10: 5
PAINLEVEL_OUTOF10: 8
PAINLEVEL_OUTOF10: 8
PAINLEVEL_OUTOF10: 0

## 2018-05-15 ASSESSMENT — PAIN DESCRIPTION - LOCATION
LOCATION: HIP

## 2018-05-15 ASSESSMENT — PAIN - FUNCTIONAL ASSESSMENT: PAIN_FUNCTIONAL_ASSESSMENT: 0-10

## 2018-05-16 ENCOUNTER — HOSPITAL ENCOUNTER (OUTPATIENT)
Age: 73
Setting detail: SPECIMEN
Discharge: HOME | End: 2018-05-16
Payer: MEDICARE

## 2018-05-16 LAB
HCT VFR BLD CALC: 36 % (ref 41–53)
HCT VFR BLD CALC: 37.8 % (ref 41–53)
HEMOGLOBIN: 11.7 G/DL (ref 13.5–17.5)
HEMOGLOBIN: 12.3 G/DL (ref 13.5–17.5)

## 2018-05-16 PROCEDURE — 97535 SELF CARE MNGMENT TRAINING: CPT

## 2018-05-16 PROCEDURE — 6360000002 HC RX W HCPCS: Performed by: ORTHOPAEDIC SURGERY

## 2018-05-16 PROCEDURE — 85014 HEMATOCRIT: CPT

## 2018-05-16 PROCEDURE — 97116 GAIT TRAINING THERAPY: CPT

## 2018-05-16 PROCEDURE — 2580000003 HC RX 258: Performed by: ORTHOPAEDIC SURGERY

## 2018-05-16 PROCEDURE — 2580000003 HC RX 258: Performed by: ANESTHESIOLOGY

## 2018-05-16 PROCEDURE — 36415 COLL VENOUS BLD VENIPUNCTURE: CPT

## 2018-05-16 PROCEDURE — 97110 THERAPEUTIC EXERCISES: CPT

## 2018-05-16 PROCEDURE — 1200000000 HC SEMI PRIVATE

## 2018-05-16 PROCEDURE — 6370000000 HC RX 637 (ALT 250 FOR IP): Performed by: ORTHOPAEDIC SURGERY

## 2018-05-16 PROCEDURE — 99024 POSTOP FOLLOW-UP VISIT: CPT | Performed by: ORTHOPAEDIC SURGERY

## 2018-05-16 PROCEDURE — 99232 SBSQ HOSP IP/OBS MODERATE 35: CPT | Performed by: FAMILY MEDICINE

## 2018-05-16 PROCEDURE — 6370000000 HC RX 637 (ALT 250 FOR IP): Performed by: FAMILY MEDICINE

## 2018-05-16 PROCEDURE — 85018 HEMOGLOBIN: CPT

## 2018-05-16 RX ORDER — OXYCODONE HYDROCHLORIDE 10 MG/1
10 TABLET ORAL EVERY 4 HOURS PRN
Qty: 40 TABLET | Refills: 0 | Status: SHIPPED | OUTPATIENT
Start: 2018-05-16 | End: 2018-05-23

## 2018-05-16 RX ORDER — LISINOPRIL 5 MG/1
5 TABLET ORAL DAILY
Status: DISCONTINUED | OUTPATIENT
Start: 2018-05-16 | End: 2018-05-18

## 2018-05-16 RX ADMIN — APIXABAN 5 MG: 5 TABLET, FILM COATED ORAL at 20:47

## 2018-05-16 RX ADMIN — KETOROLAC TROMETHAMINE 15 MG: 30 INJECTION, SOLUTION INTRAMUSCULAR; INTRAVENOUS at 12:46

## 2018-05-16 RX ADMIN — DOCUSATE SODIUM 100 MG: 100 CAPSULE, LIQUID FILLED ORAL at 20:47

## 2018-05-16 RX ADMIN — ACETAMINOPHEN 1000 MG: 500 TABLET, FILM COATED ORAL at 05:36

## 2018-05-16 RX ADMIN — METOPROLOL TARTRATE 25 MG: 25 TABLET ORAL at 12:46

## 2018-05-16 RX ADMIN — OXYCODONE HYDROCHLORIDE 10 MG: 5 TABLET ORAL at 08:05

## 2018-05-16 RX ADMIN — Medication 10 ML: at 08:07

## 2018-05-16 RX ADMIN — ACETAMINOPHEN 1000 MG: 500 TABLET, FILM COATED ORAL at 12:46

## 2018-05-16 RX ADMIN — LISINOPRIL 5 MG: 5 TABLET ORAL at 12:46

## 2018-05-16 RX ADMIN — OXYCODONE HYDROCHLORIDE 10 MG: 5 TABLET ORAL at 00:14

## 2018-05-16 RX ADMIN — KETOROLAC TROMETHAMINE 15 MG: 30 INJECTION, SOLUTION INTRAMUSCULAR; INTRAVENOUS at 05:36

## 2018-05-16 RX ADMIN — Medication 3 G: at 00:10

## 2018-05-16 RX ADMIN — SODIUM CHLORIDE: 9 INJECTION, SOLUTION INTRAVENOUS at 23:47

## 2018-05-16 RX ADMIN — ACETAMINOPHEN 1000 MG: 500 TABLET, FILM COATED ORAL at 20:47

## 2018-05-16 RX ADMIN — DOCUSATE SODIUM 100 MG: 100 CAPSULE, LIQUID FILLED ORAL at 08:05

## 2018-05-16 RX ADMIN — KETOROLAC TROMETHAMINE 15 MG: 30 INJECTION, SOLUTION INTRAMUSCULAR; INTRAVENOUS at 20:47

## 2018-05-16 RX ADMIN — APIXABAN 5 MG: 5 TABLET, FILM COATED ORAL at 08:05

## 2018-05-16 RX ADMIN — OXYCODONE HYDROCHLORIDE 10 MG: 5 TABLET ORAL at 12:46

## 2018-05-16 ASSESSMENT — PAIN SCALES - GENERAL
PAINLEVEL_OUTOF10: 0
PAINLEVEL_OUTOF10: 4
PAINLEVEL_OUTOF10: 3
PAINLEVEL_OUTOF10: 2
PAINLEVEL_OUTOF10: 5
PAINLEVEL_OUTOF10: 1
PAINLEVEL_OUTOF10: 2
PAINLEVEL_OUTOF10: 8
PAINLEVEL_OUTOF10: 2
PAINLEVEL_OUTOF10: 1

## 2018-05-16 ASSESSMENT — PAIN DESCRIPTION - LOCATION
LOCATION_2: BACK
LOCATION: HIP
LOCATION: HIP
LOCATION: BACK
LOCATION: HIP
LOCATION: HIP

## 2018-05-16 ASSESSMENT — PAIN DESCRIPTION - DURATION: DURATION_2: INTERMITTENT

## 2018-05-16 ASSESSMENT — PAIN DESCRIPTION - PAIN TYPE
TYPE: CHRONIC PAIN
TYPE: ACUTE PAIN;SURGICAL PAIN
TYPE_2: ACUTE PAIN
TYPE: SURGICAL PAIN

## 2018-05-16 ASSESSMENT — PAIN DESCRIPTION - DESCRIPTORS
DESCRIPTORS_2: SPASM;DISCOMFORT
DESCRIPTORS: ACHING;DISCOMFORT
DESCRIPTORS: ACHING;DISCOMFORT

## 2018-05-16 ASSESSMENT — PAIN DESCRIPTION - ORIENTATION
ORIENTATION: LEFT
ORIENTATION: LEFT
ORIENTATION_2: UPPER
ORIENTATION: LEFT
ORIENTATION: LEFT

## 2018-05-16 ASSESSMENT — PAIN DESCRIPTION - INTENSITY: RATING_2: 7

## 2018-05-16 ASSESSMENT — PAIN DESCRIPTION - FREQUENCY: FREQUENCY: INTERMITTENT

## 2018-05-16 ASSESSMENT — PAIN DESCRIPTION - PROGRESSION
CLINICAL_PROGRESSION_2: GRADUALLY WORSENING
CLINICAL_PROGRESSION: NOT CHANGED

## 2018-05-17 LAB
ABSOLUTE EOS #: 0.3 K/UL (ref 0–0.4)
ABSOLUTE IMMATURE GRANULOCYTE: ABNORMAL K/UL (ref 0–0.3)
ABSOLUTE LYMPH #: 1.2 K/UL (ref 1–4.8)
ABSOLUTE MONO #: 0.6 K/UL (ref 0.1–1.3)
ANION GAP SERPL CALCULATED.3IONS-SCNC: 10 MMOL/L (ref 9–17)
BASOPHILS # BLD: 0 % (ref 0–2)
BASOPHILS ABSOLUTE: 0 K/UL (ref 0–0.2)
BUN BLDV-MCNC: 46 MG/DL (ref 8–23)
BUN/CREAT BLD: ABNORMAL (ref 9–20)
CALCIUM SERPL-MCNC: 8.5 MG/DL (ref 8.6–10.4)
CHLORIDE BLD-SCNC: 105 MMOL/L (ref 98–107)
CO2: 24 MMOL/L (ref 20–31)
CREAT SERPL-MCNC: 1.33 MG/DL (ref 0.7–1.2)
DIFFERENTIAL TYPE: ABNORMAL
EKG ATRIAL RATE: 104 BPM
EKG P AXIS: 76 DEGREES
EKG P-R INTERVAL: 222 MS
EKG Q-T INTERVAL: 332 MS
EKG QRS DURATION: 92 MS
EKG QTC CALCULATION (BAZETT): 436 MS
EKG R AXIS: -7 DEGREES
EKG T AXIS: 49 DEGREES
EKG VENTRICULAR RATE: 104 BPM
EOSINOPHILS RELATIVE PERCENT: 4 % (ref 0–4)
GFR AFRICAN AMERICAN: >60 ML/MIN
GFR NON-AFRICAN AMERICAN: 52 ML/MIN
GFR SERPL CREATININE-BSD FRML MDRD: ABNORMAL ML/MIN/{1.73_M2}
GFR SERPL CREATININE-BSD FRML MDRD: ABNORMAL ML/MIN/{1.73_M2}
GLUCOSE BLD-MCNC: 129 MG/DL (ref 70–99)
HCT VFR BLD CALC: 33.6 % (ref 41–53)
HEMOGLOBIN: 11 G/DL (ref 13.5–17.5)
IMMATURE GRANULOCYTES: ABNORMAL %
LYMPHOCYTES # BLD: 14 % (ref 24–44)
MCH RBC QN AUTO: 33.4 PG (ref 26–34)
MCHC RBC AUTO-ENTMCNC: 32.8 G/DL (ref 31–37)
MCV RBC AUTO: 101.8 FL (ref 80–100)
MONOCYTES # BLD: 7 % (ref 1–7)
NRBC AUTOMATED: ABNORMAL PER 100 WBC
PDW BLD-RTO: 15.4 % (ref 11.5–14.9)
PLATELET # BLD: 170 K/UL (ref 150–450)
PLATELET ESTIMATE: ABNORMAL
PMV BLD AUTO: 8.2 FL (ref 6–12)
POTASSIUM SERPL-SCNC: 4.4 MMOL/L (ref 3.7–5.3)
RBC # BLD: 3.3 M/UL (ref 4.5–5.9)
RBC # BLD: ABNORMAL 10*6/UL
SEG NEUTROPHILS: 75 % (ref 36–66)
SEGMENTED NEUTROPHILS ABSOLUTE COUNT: 6.5 K/UL (ref 1.3–9.1)
SODIUM BLD-SCNC: 139 MMOL/L (ref 135–144)
SURGICAL PATHOLOGY REPORT: NORMAL
TROPONIN INTERP: NORMAL
TROPONIN INTERP: NORMAL
TROPONIN T: <0.03 NG/ML
TROPONIN T: <0.03 NG/ML
WBC # BLD: 8.5 K/UL (ref 3.5–11)
WBC # BLD: ABNORMAL 10*3/UL

## 2018-05-17 PROCEDURE — 6360000002 HC RX W HCPCS: Performed by: ORTHOPAEDIC SURGERY

## 2018-05-17 PROCEDURE — 80048 BASIC METABOLIC PNL TOTAL CA: CPT

## 2018-05-17 PROCEDURE — 97530 THERAPEUTIC ACTIVITIES: CPT

## 2018-05-17 PROCEDURE — 36415 COLL VENOUS BLD VENIPUNCTURE: CPT

## 2018-05-17 PROCEDURE — 99232 SBSQ HOSP IP/OBS MODERATE 35: CPT | Performed by: FAMILY MEDICINE

## 2018-05-17 PROCEDURE — 85025 COMPLETE CBC W/AUTO DIFF WBC: CPT

## 2018-05-17 PROCEDURE — 97110 THERAPEUTIC EXERCISES: CPT

## 2018-05-17 PROCEDURE — 2580000003 HC RX 258: Performed by: ANESTHESIOLOGY

## 2018-05-17 PROCEDURE — C8923 2D TTE W OR W/O FOL W/CON,CO: HCPCS

## 2018-05-17 PROCEDURE — 6370000000 HC RX 637 (ALT 250 FOR IP): Performed by: ORTHOPAEDIC SURGERY

## 2018-05-17 PROCEDURE — 6370000000 HC RX 637 (ALT 250 FOR IP): Performed by: FAMILY MEDICINE

## 2018-05-17 PROCEDURE — 84484 ASSAY OF TROPONIN QUANT: CPT

## 2018-05-17 PROCEDURE — 6360000004 HC RX CONTRAST MEDICATION: Performed by: INTERNAL MEDICINE

## 2018-05-17 PROCEDURE — 97535 SELF CARE MNGMENT TRAINING: CPT

## 2018-05-17 PROCEDURE — 1200000000 HC SEMI PRIVATE

## 2018-05-17 PROCEDURE — 93005 ELECTROCARDIOGRAM TRACING: CPT

## 2018-05-17 RX ORDER — NITROGLYCERIN 0.4 MG/1
0.4 TABLET SUBLINGUAL ONCE
Status: COMPLETED | OUTPATIENT
Start: 2018-05-17 | End: 2018-05-17

## 2018-05-17 RX ORDER — NITROGLYCERIN 0.4 MG/1
TABLET SUBLINGUAL
Status: DISPENSED
Start: 2018-05-17 | End: 2018-05-17

## 2018-05-17 RX ADMIN — OXYCODONE HYDROCHLORIDE 10 MG: 5 TABLET ORAL at 10:58

## 2018-05-17 RX ADMIN — DOCUSATE SODIUM 100 MG: 100 CAPSULE, LIQUID FILLED ORAL at 20:45

## 2018-05-17 RX ADMIN — APIXABAN 5 MG: 5 TABLET, FILM COATED ORAL at 08:59

## 2018-05-17 RX ADMIN — NITROGLYCERIN 0.4 MG: 0.4 TABLET SUBLINGUAL at 11:13

## 2018-05-17 RX ADMIN — METOPROLOL TARTRATE 25 MG: 25 TABLET ORAL at 20:45

## 2018-05-17 RX ADMIN — DOCUSATE SODIUM 100 MG: 100 CAPSULE, LIQUID FILLED ORAL at 08:59

## 2018-05-17 RX ADMIN — OXYCODONE HYDROCHLORIDE 10 MG: 5 TABLET ORAL at 17:49

## 2018-05-17 RX ADMIN — APIXABAN 5 MG: 5 TABLET, FILM COATED ORAL at 20:45

## 2018-05-17 RX ADMIN — SODIUM CHLORIDE: 9 INJECTION, SOLUTION INTRAVENOUS at 05:41

## 2018-05-17 RX ADMIN — OXYCODONE HYDROCHLORIDE 10 MG: 5 TABLET ORAL at 05:41

## 2018-05-17 RX ADMIN — KETOROLAC TROMETHAMINE 15 MG: 30 INJECTION, SOLUTION INTRAMUSCULAR; INTRAVENOUS at 05:41

## 2018-05-17 RX ADMIN — PERFLUTREN 2.2 MG: 6.52 INJECTION, SUSPENSION INTRAVENOUS at 14:41

## 2018-05-17 ASSESSMENT — PAIN DESCRIPTION - LOCATION
LOCATION: CHEST
LOCATION: HIP
LOCATION: HIP
LOCATION: ARM;FOOT
LOCATION: HIP

## 2018-05-17 ASSESSMENT — PAIN DESCRIPTION - ORIENTATION
ORIENTATION: LEFT
ORIENTATION: RIGHT;LEFT
ORIENTATION: LEFT
ORIENTATION: LEFT

## 2018-05-17 ASSESSMENT — PAIN SCALES - GENERAL
PAINLEVEL_OUTOF10: 4
PAINLEVEL_OUTOF10: 6
PAINLEVEL_OUTOF10: 4
PAINLEVEL_OUTOF10: 4
PAINLEVEL_OUTOF10: 5
PAINLEVEL_OUTOF10: 4
PAINLEVEL_OUTOF10: 5
PAINLEVEL_OUTOF10: 6

## 2018-05-17 ASSESSMENT — PAIN DESCRIPTION - PAIN TYPE
TYPE: ACUTE PAIN
TYPE: SURGICAL PAIN
TYPE: SURGICAL PAIN
TYPE: ACUTE PAIN
TYPE: SURGICAL PAIN

## 2018-05-17 ASSESSMENT — PAIN DESCRIPTION - PROGRESSION: CLINICAL_PROGRESSION: NOT CHANGED

## 2018-05-18 ENCOUNTER — APPOINTMENT (OUTPATIENT)
Dept: NUCLEAR MEDICINE | Age: 73
DRG: 470 | End: 2018-05-18
Attending: ORTHOPAEDIC SURGERY
Payer: MEDICARE

## 2018-05-18 LAB
ANION GAP SERPL CALCULATED.3IONS-SCNC: 13 MMOL/L (ref 9–17)
BUN BLDV-MCNC: 26 MG/DL (ref 8–23)
BUN/CREAT BLD: ABNORMAL (ref 9–20)
CALCIUM SERPL-MCNC: 8.5 MG/DL (ref 8.6–10.4)
CHLORIDE BLD-SCNC: 106 MMOL/L (ref 98–107)
CO2: 22 MMOL/L (ref 20–31)
CREAT SERPL-MCNC: 0.72 MG/DL (ref 0.7–1.2)
GFR AFRICAN AMERICAN: >60 ML/MIN
GFR NON-AFRICAN AMERICAN: >60 ML/MIN
GFR SERPL CREATININE-BSD FRML MDRD: ABNORMAL ML/MIN/{1.73_M2}
GFR SERPL CREATININE-BSD FRML MDRD: ABNORMAL ML/MIN/{1.73_M2}
GLUCOSE BLD-MCNC: 119 MG/DL (ref 70–99)
POTASSIUM SERPL-SCNC: 4.7 MMOL/L (ref 3.7–5.3)
SODIUM BLD-SCNC: 141 MMOL/L (ref 135–144)
TROPONIN INTERP: NORMAL
TROPONIN T: <0.03 NG/ML

## 2018-05-18 PROCEDURE — 2580000003 HC RX 258: Performed by: INTERNAL MEDICINE

## 2018-05-18 PROCEDURE — 78452 HT MUSCLE IMAGE SPECT MULT: CPT

## 2018-05-18 PROCEDURE — 97116 GAIT TRAINING THERAPY: CPT

## 2018-05-18 PROCEDURE — A9500 TC99M SESTAMIBI: HCPCS | Performed by: INTERNAL MEDICINE

## 2018-05-18 PROCEDURE — 97110 THERAPEUTIC EXERCISES: CPT

## 2018-05-18 PROCEDURE — 97530 THERAPEUTIC ACTIVITIES: CPT

## 2018-05-18 PROCEDURE — 6370000000 HC RX 637 (ALT 250 FOR IP): Performed by: FAMILY MEDICINE

## 2018-05-18 PROCEDURE — 6370000000 HC RX 637 (ALT 250 FOR IP): Performed by: INTERNAL MEDICINE

## 2018-05-18 PROCEDURE — 1200000000 HC SEMI PRIVATE

## 2018-05-18 PROCEDURE — 36415 COLL VENOUS BLD VENIPUNCTURE: CPT

## 2018-05-18 PROCEDURE — 3430000000 HC RX DIAGNOSTIC RADIOPHARMACEUTICAL: Performed by: INTERNAL MEDICINE

## 2018-05-18 PROCEDURE — 2580000003 HC RX 258: Performed by: ANESTHESIOLOGY

## 2018-05-18 PROCEDURE — 99232 SBSQ HOSP IP/OBS MODERATE 35: CPT | Performed by: FAMILY MEDICINE

## 2018-05-18 PROCEDURE — 6370000000 HC RX 637 (ALT 250 FOR IP): Performed by: ORTHOPAEDIC SURGERY

## 2018-05-18 PROCEDURE — 84484 ASSAY OF TROPONIN QUANT: CPT

## 2018-05-18 PROCEDURE — 80048 BASIC METABOLIC PNL TOTAL CA: CPT

## 2018-05-18 RX ORDER — SODIUM CHLORIDE 0.9 % (FLUSH) 0.9 %
10 SYRINGE (ML) INJECTION PRN
Status: DISCONTINUED | OUTPATIENT
Start: 2018-05-18 | End: 2018-05-20 | Stop reason: HOSPADM

## 2018-05-18 RX ORDER — METOPROLOL TARTRATE 50 MG/1
50 TABLET, FILM COATED ORAL 2 TIMES DAILY
Status: DISCONTINUED | OUTPATIENT
Start: 2018-05-18 | End: 2018-05-20 | Stop reason: HOSPADM

## 2018-05-18 RX ORDER — LISINOPRIL 5 MG/1
2.5 TABLET ORAL DAILY
Status: DISCONTINUED | OUTPATIENT
Start: 2018-05-19 | End: 2018-05-20 | Stop reason: HOSPADM

## 2018-05-18 RX ADMIN — METOPROLOL TARTRATE 25 MG: 25 TABLET ORAL at 07:55

## 2018-05-18 RX ADMIN — METOPROLOL TARTRATE 25 MG: 25 TABLET ORAL at 11:34

## 2018-05-18 RX ADMIN — SODIUM CHLORIDE: 9 INJECTION, SOLUTION INTRAVENOUS at 00:56

## 2018-05-18 RX ADMIN — APIXABAN 5 MG: 5 TABLET, FILM COATED ORAL at 21:45

## 2018-05-18 RX ADMIN — APIXABAN 5 MG: 5 TABLET, FILM COATED ORAL at 07:55

## 2018-05-18 RX ADMIN — Medication 10 ML: at 11:56

## 2018-05-18 RX ADMIN — DOCUSATE SODIUM 100 MG: 100 CAPSULE, LIQUID FILLED ORAL at 21:44

## 2018-05-18 RX ADMIN — OXYCODONE HYDROCHLORIDE 10 MG: 5 TABLET ORAL at 05:45

## 2018-05-18 RX ADMIN — OXYCODONE HYDROCHLORIDE 10 MG: 5 TABLET ORAL at 14:20

## 2018-05-18 RX ADMIN — OXYCODONE HYDROCHLORIDE 10 MG: 5 TABLET ORAL at 21:44

## 2018-05-18 RX ADMIN — TETRAKIS(2-METHOXYISOBUTYLISOCYANIDE)COPPER(I) TETRAFLUOROBORATE 31 MILLICURIE: 1 INJECTION, POWDER, LYOPHILIZED, FOR SOLUTION INTRAVENOUS at 11:55

## 2018-05-18 RX ADMIN — OXYCODONE HYDROCHLORIDE 10 MG: 5 TABLET ORAL at 09:37

## 2018-05-18 RX ADMIN — METOPROLOL TARTRATE 50 MG: 50 TABLET ORAL at 21:45

## 2018-05-18 RX ADMIN — Medication 10 ML: at 11:58

## 2018-05-18 RX ADMIN — DOCUSATE SODIUM 100 MG: 100 CAPSULE, LIQUID FILLED ORAL at 07:56

## 2018-05-18 ASSESSMENT — PAIN SCALES - GENERAL
PAINLEVEL_OUTOF10: 4
PAINLEVEL_OUTOF10: 2
PAINLEVEL_OUTOF10: 5
PAINLEVEL_OUTOF10: 2
PAINLEVEL_OUTOF10: 2
PAINLEVEL_OUTOF10: 1
PAINLEVEL_OUTOF10: 2
PAINLEVEL_OUTOF10: 6
PAINLEVEL_OUTOF10: 4

## 2018-05-18 ASSESSMENT — PAIN DESCRIPTION - PAIN TYPE
TYPE: SURGICAL PAIN

## 2018-05-18 ASSESSMENT — PAIN DESCRIPTION - LOCATION
LOCATION: HIP

## 2018-05-18 ASSESSMENT — PAIN DESCRIPTION - FREQUENCY: FREQUENCY: INTERMITTENT

## 2018-05-18 ASSESSMENT — PAIN DESCRIPTION - ORIENTATION
ORIENTATION: LEFT

## 2018-05-18 ASSESSMENT — PAIN DESCRIPTION - DESCRIPTORS: DESCRIPTORS: ACHING

## 2018-05-19 LAB
LV EF: 50 %
LVEF MODALITY: NORMAL

## 2018-05-19 PROCEDURE — 1200000000 HC SEMI PRIVATE

## 2018-05-19 PROCEDURE — 3430000000 HC RX DIAGNOSTIC RADIOPHARMACEUTICAL: Performed by: INTERNAL MEDICINE

## 2018-05-19 PROCEDURE — A9500 TC99M SESTAMIBI: HCPCS | Performed by: INTERNAL MEDICINE

## 2018-05-19 PROCEDURE — 6370000000 HC RX 637 (ALT 250 FOR IP): Performed by: INTERNAL MEDICINE

## 2018-05-19 PROCEDURE — 6360000002 HC RX W HCPCS: Performed by: INTERNAL MEDICINE

## 2018-05-19 PROCEDURE — 93017 CV STRESS TEST TRACING ONLY: CPT

## 2018-05-19 PROCEDURE — 6370000000 HC RX 637 (ALT 250 FOR IP): Performed by: ORTHOPAEDIC SURGERY

## 2018-05-19 PROCEDURE — 2580000003 HC RX 258: Performed by: ORTHOPAEDIC SURGERY

## 2018-05-19 PROCEDURE — 97110 THERAPEUTIC EXERCISES: CPT

## 2018-05-19 PROCEDURE — 97530 THERAPEUTIC ACTIVITIES: CPT

## 2018-05-19 PROCEDURE — 2580000003 HC RX 258: Performed by: INTERNAL MEDICINE

## 2018-05-19 RX ADMIN — LISINOPRIL 2.5 MG: 5 TABLET ORAL at 11:08

## 2018-05-19 RX ADMIN — Medication 10 ML: at 11:11

## 2018-05-19 RX ADMIN — REGADENOSON 0.4 MG: 0.08 INJECTION, SOLUTION INTRAVENOUS at 09:28

## 2018-05-19 RX ADMIN — Medication 10 ML: at 09:29

## 2018-05-19 RX ADMIN — TETRAKIS(2-METHOXYISOBUTYLISOCYANIDE)COPPER(I) TETRAFLUOROBORATE 38 MILLICURIE: 1 INJECTION, POWDER, LYOPHILIZED, FOR SOLUTION INTRAVENOUS at 09:29

## 2018-05-19 RX ADMIN — APIXABAN 5 MG: 5 TABLET, FILM COATED ORAL at 19:46

## 2018-05-19 RX ADMIN — METOPROLOL TARTRATE 50 MG: 50 TABLET ORAL at 19:47

## 2018-05-19 RX ADMIN — Medication 10 ML: at 19:47

## 2018-05-19 RX ADMIN — METOPROLOL TARTRATE 50 MG: 50 TABLET ORAL at 11:07

## 2018-05-19 RX ADMIN — DOCUSATE SODIUM 100 MG: 100 CAPSULE, LIQUID FILLED ORAL at 19:47

## 2018-05-19 RX ADMIN — APIXABAN 5 MG: 5 TABLET, FILM COATED ORAL at 11:08

## 2018-05-19 RX ADMIN — OXYCODONE HYDROCHLORIDE 5 MG: 5 TABLET ORAL at 19:47

## 2018-05-19 RX ADMIN — DOCUSATE SODIUM 100 MG: 100 CAPSULE, LIQUID FILLED ORAL at 11:07

## 2018-05-19 ASSESSMENT — PAIN DESCRIPTION - PAIN TYPE
TYPE: SURGICAL PAIN
TYPE: SURGICAL PAIN;ACUTE PAIN
TYPE: SURGICAL PAIN

## 2018-05-19 ASSESSMENT — PAIN DESCRIPTION - ORIENTATION
ORIENTATION: LEFT

## 2018-05-19 ASSESSMENT — PAIN DESCRIPTION - DESCRIPTORS: DESCRIPTORS: ACHING

## 2018-05-19 ASSESSMENT — PAIN SCALES - GENERAL
PAINLEVEL_OUTOF10: 4
PAINLEVEL_OUTOF10: 2
PAINLEVEL_OUTOF10: 5

## 2018-05-19 ASSESSMENT — PAIN DESCRIPTION - LOCATION
LOCATION: HIP

## 2018-05-19 ASSESSMENT — PAIN DESCRIPTION - PROGRESSION: CLINICAL_PROGRESSION: GRADUALLY IMPROVING

## 2018-05-20 VITALS
HEART RATE: 71 BPM | OXYGEN SATURATION: 98 % | WEIGHT: 315 LBS | SYSTOLIC BLOOD PRESSURE: 114 MMHG | RESPIRATION RATE: 18 BRPM | HEIGHT: 70 IN | BODY MASS INDEX: 45.1 KG/M2 | DIASTOLIC BLOOD PRESSURE: 47 MMHG | TEMPERATURE: 98.1 F

## 2018-05-20 PROCEDURE — 6370000000 HC RX 637 (ALT 250 FOR IP): Performed by: INTERNAL MEDICINE

## 2018-05-20 PROCEDURE — 99024 POSTOP FOLLOW-UP VISIT: CPT | Performed by: ORTHOPAEDIC SURGERY

## 2018-05-20 PROCEDURE — 6370000000 HC RX 637 (ALT 250 FOR IP): Performed by: ORTHOPAEDIC SURGERY

## 2018-05-20 PROCEDURE — 2580000003 HC RX 258: Performed by: ORTHOPAEDIC SURGERY

## 2018-05-20 PROCEDURE — 99232 SBSQ HOSP IP/OBS MODERATE 35: CPT | Performed by: FAMILY MEDICINE

## 2018-05-20 PROCEDURE — 97116 GAIT TRAINING THERAPY: CPT

## 2018-05-20 RX ORDER — ASPIRIN 81 MG/1
81 TABLET, CHEWABLE ORAL DAILY
Status: DISCONTINUED | OUTPATIENT
Start: 2018-05-20 | End: 2018-05-20 | Stop reason: HOSPADM

## 2018-05-20 RX ORDER — ATORVASTATIN CALCIUM 40 MG/1
40 TABLET, FILM COATED ORAL NIGHTLY
Status: DISCONTINUED | OUTPATIENT
Start: 2018-05-20 | End: 2018-05-20 | Stop reason: HOSPADM

## 2018-05-20 RX ADMIN — OXYCODONE HYDROCHLORIDE 10 MG: 5 TABLET ORAL at 13:43

## 2018-05-20 RX ADMIN — APIXABAN 5 MG: 5 TABLET, FILM COATED ORAL at 08:11

## 2018-05-20 RX ADMIN — OXYCODONE HYDROCHLORIDE 10 MG: 5 TABLET ORAL at 08:10

## 2018-05-20 RX ADMIN — Medication 10 ML: at 08:13

## 2018-05-20 RX ADMIN — ASPIRIN 81 MG 81 MG: 81 TABLET ORAL at 13:43

## 2018-05-20 RX ADMIN — LISINOPRIL 2.5 MG: 5 TABLET ORAL at 08:11

## 2018-05-20 RX ADMIN — DOCUSATE SODIUM 100 MG: 100 CAPSULE, LIQUID FILLED ORAL at 08:10

## 2018-05-20 RX ADMIN — METOPROLOL TARTRATE 50 MG: 50 TABLET ORAL at 08:11

## 2018-05-20 RX ADMIN — OXYCODONE HYDROCHLORIDE 10 MG: 5 TABLET ORAL at 02:49

## 2018-05-20 ASSESSMENT — PAIN SCALES - GENERAL
PAINLEVEL_OUTOF10: 3
PAINLEVEL_OUTOF10: 6
PAINLEVEL_OUTOF10: 7
PAINLEVEL_OUTOF10: 7
PAINLEVEL_OUTOF10: 8
PAINLEVEL_OUTOF10: 2

## 2018-05-20 ASSESSMENT — PAIN DESCRIPTION - LOCATION
LOCATION: HIP
LOCATION: HIP

## 2018-05-20 ASSESSMENT — PAIN DESCRIPTION - PAIN TYPE
TYPE: SURGICAL PAIN
TYPE: SURGICAL PAIN

## 2018-05-20 ASSESSMENT — PAIN DESCRIPTION - ORIENTATION
ORIENTATION: LEFT
ORIENTATION: LEFT

## 2018-05-22 ENCOUNTER — CARE COORDINATION (OUTPATIENT)
Dept: CASE MANAGEMENT | Age: 73
End: 2018-05-22

## 2018-05-30 ENCOUNTER — OFFICE VISIT (OUTPATIENT)
Dept: ORTHOPEDIC SURGERY | Age: 73
End: 2018-05-30

## 2018-05-30 DIAGNOSIS — Z96.642 HISTORY OF LEFT HIP REPLACEMENT: Primary | ICD-10-CM

## 2018-05-30 PROCEDURE — 99024 POSTOP FOLLOW-UP VISIT: CPT | Performed by: ORTHOPAEDIC SURGERY

## 2018-06-01 LAB — SURGICAL PATHOLOGY REPORT: NORMAL

## 2018-06-15 DIAGNOSIS — M16.12 PRIMARY OSTEOARTHRITIS OF LEFT HIP: Primary | ICD-10-CM

## 2018-06-15 DIAGNOSIS — Z96.653 PRESENCE OF BILATERAL TOTAL KNEE JOINT PROSTHESES: ICD-10-CM

## 2018-06-19 ENCOUNTER — TELEPHONE (OUTPATIENT)
Dept: PHARMACY | Facility: CLINIC | Age: 73
End: 2018-06-19

## 2018-06-20 ENCOUNTER — CARE COORDINATION (OUTPATIENT)
Dept: CASE MANAGEMENT | Age: 73
End: 2018-06-20

## 2018-06-20 ENCOUNTER — TELEPHONE (OUTPATIENT)
Dept: ORTHOPEDIC SURGERY | Age: 73
End: 2018-06-20

## 2018-06-21 ENCOUNTER — CARE COORDINATION (OUTPATIENT)
Dept: CASE MANAGEMENT | Age: 73
End: 2018-06-21

## 2018-06-21 ENCOUNTER — OFFICE VISIT (OUTPATIENT)
Dept: ORTHOPEDIC SURGERY | Age: 73
End: 2018-06-21

## 2018-06-21 DIAGNOSIS — M16.12 PRIMARY OSTEOARTHRITIS OF LEFT HIP: Primary | ICD-10-CM

## 2018-06-21 DIAGNOSIS — Z96.642 H/O TOTAL HIP ARTHROPLASTY, LEFT: Primary | ICD-10-CM

## 2018-06-21 PROCEDURE — 99024 POSTOP FOLLOW-UP VISIT: CPT | Performed by: ORTHOPAEDIC SURGERY

## 2018-06-21 PROCEDURE — 1111F DSCHRG MED/CURRENT MED MERGE: CPT

## 2018-06-21 RX ORDER — CEFDINIR 300 MG/1
300 CAPSULE ORAL 2 TIMES DAILY
Qty: 20 CAPSULE | Refills: 0 | Status: SHIPPED | OUTPATIENT
Start: 2018-06-21 | End: 2018-07-01

## 2018-07-02 ENCOUNTER — TELEPHONE (OUTPATIENT)
Dept: ORTHOPEDIC SURGERY | Age: 73
End: 2018-07-02

## 2018-07-02 DIAGNOSIS — Z96.642 H/O TOTAL HIP ARTHROPLASTY, LEFT: Primary | ICD-10-CM

## 2018-07-02 NOTE — TELEPHONE ENCOUNTER
Occupational Therapy Daily Treatment      Visit Count: 11  Initial: 5/2/2017 Through: 7/28/17  Insurance Information: occupational therapy cap of $1980/$3700 per calendar year  Next Referring Provider Visit: 7/18/17     Referred by: Dr.Steven Duran  Medical Diagnosis (from order):  Left shoulder dislocation with brachial plexopathy with loss of finger/ thumb extension.   Insurance: 1. [employer]  2. [employer]    Date of Onset: new onset; 11/23/16   Diagnosis Precautions: CARDIAC, DIZZINESS  Relevant co-morbidities and medications: None    Relevant Tests: X-Ray     SUBJECTIVE   Pain in back vs. Shoulder. Clothespins exacerbated numbness/ tingling in digits 1-3. Reports going to exercise room daily and walking a mile every other day.   Current Pain: 1/10.    Functional Change: \"I can shuffle cards now.\" \"I can open bottles and cans now.\"     OBJECTIVE   Patient brought in dorsal blocking splint. Therapist recommends use of volar based splint due to     Range of Motion (degrees):Wrist Active Range of Motion  [] All motions within functional/normal limits except those noted.   [x] Only those motions that were assessed are noted.   [] Uninvolved motion within functional/normal limits.    Norm Left Right Left  Left    Date 5/2/17   Initial Initial 5/24/17 6/21/17   Wrist Flexion 80°  50 WFL 50  55   Wrist Extension 70° 50   23  45   Radial Deviation 20°     7  15   Ulnar Deviation 45°     20  35   Forearm Supination 90° 75   85  88   Forearm Pronation 90° 80   90  90   [standard testing positions unless otherwise noted]  Comments:; denotes pain     Range of Motion (degrees): Digit Active Range of Motion  [] All motions within functional/normal limits except those noted.   [x] Only those motions that were assessed are noted.   [] Uninvolved motion within functional/normal limits.    Left Right Left  Left    Date: 5/9/17 Initial Initial  5/24/17 6/21/17   Index Finger          MCP  Was here on 6- and had a small portion of his wound in the hip that had a \"cheesy\" material.     PT called back and stated that the incision is still open and there is a white color to the incision now, not pink any longer? Want to see?   Antibiotic stops tomorrow   [facility] Ext/flex [phone] 87 0/74  -(40)/80   PIP Ext/Flex 0/82 NT -(5)/83 -(5)/88   DIP Ext/Flex 0/37 NT 0/53 0/50   DPC distance          Middle Finger          MCP Ext/Flex -(27)75 0/91 0/ 75 -(30)/85   PIP Ext/Flex -(10)82 0/96 -(10)84  -(5)/88   DIP Ext/Flex 0/27 0/75 0/42  -(5)/37   DPC distance          Ring Finger          MCP Ext/Flex 0/75 0/91 0/ 75 -(22)/80   PIP Ext/Flex -(17)/85 0/100 -(15)/87  -(17)/92   DIP Ext/Flex 0/5 0/66 0/45  0/20   DPC distance          Small Finger          MCP Ext/Flex 0/80 0/94 0/80  -(17)/85   PIP Ext/Flex 0/85 0/87 -(5)/ 88 0/80   DIP Ext/Flex 0/20 0/58 0/ 57 0/30   DPC distance          Thumb          MCP Ext/Flex 52 65   53   IP Ext/Flex 65 65   54   Radial ABDuction 0/10 0/55   0/30   Palmar ABDuction 0/20 0/55   0/32   Tip Opposition Touches  Touches    Touches    Opposition to   Base of 5th digit 3.5 cm difference  Touches    Touches    [standard testing positions unless otherwise noted; Ext=extension, Flex=flexion, SF=small finger, MCP=metacarpophalangeal joint, PIP=proximal interphalangeal joint, DIP=distal interphalangeal joints, DPC=distal palmar crease, IP=interphalangeal joint]   Comments: Right second digit amputated at PIP ; *denotes pain    Coordination: (seconds)    Left Result Right Result Left  Result Left 6/21/17   Date: 5/9/17 Initial    Initial    5/24/17      9 Hole Peg  54.48 Impaired 39.80 Impaired 1 minute 1 second   1 minute one second    Comments:   Norms: 9 hole: Age: 75+: male: left 26.4 +/-4.8, right 22.9 +/-4.0; female: left 24.6 +/-4.3, right 21.5 +/-2.9;     Strength: /Pinch (measured in pounds of force)  [] Gross muscle strength within functional/normal limits except as noted.  [x] Only muscle strength that was assessed are noted.  [] Uninvolved muscle strength within functional/normal limits.    Left Right Left  Left  Right    Date: 5/9/17 Initial Initial 5/24/17 6/20/17 6/21/17    15 41 22  15 20   Lateral Pinch 5 15.5  8 10.5 10   3 Point  Pinch 7 NT 6   8   Tip Pinch 7 NT 7   7   [standard testing positions unless otherwise noted]  Comments: Right second digit amputated at PIP *denotes pain  Norms:  : Age: 70+: male: left 46.7-72, right 53.8-86.7; female: left 31.3-46.5, right 37.9-53.6  Key/lateral pinch: Age: 75+: male: left 19.1+/-3.0, right 20.5+/-4.6; female: left 11.4+/-2.6, right 12.6+/-2.3  Palmar/3 point pinch: Age: 75+: male: left 18.3+/-3.8, right 18.7+/-4.2; female: left 11.5+/-2.6, right 12.0+/-2.6  Tip pinch: Age: 75+: male: left 13.9+/-3.7, right 14.0+/-3.4; female: left 9.3+/-2.4, right 9.6+/-2.8     Treatment     Therapeutic Exercise:   Exercise Repetitions Sets Position/Cues   Clothespins: #2-8    Completed in stance with vertical bar to decrease upper trapezius compensation    Shoulder rung: for both shoulder adduction/ abduction   Completed in stance with vertical bar to decrease upper trapezius compensation    Median nerve glides  5 1 Able to achieve positions 1-3 before symptoms exacerbated    Mr. Infante with 1.5# cuff weight  8 1    Red flex bar: for resisted wrist flexion and extension as well as resisted pronation and supination  10 2    Foam board set at 90 degrees of shoulder flexion (pegs for fine motor coordination)       Comments:        Home Exercise Program:  Exercise: Date issued Date DC Comments   Cane HEP (shoulder flexion, A/AROM for shoulder abduction and external rotation (bandleader), horizontal abduction/ adduction  EVAL       Codman's for passive shoulder ROM, all directions EVAL       Digit abduction/ adduction, thenar composite flexion, passive digit IP flexion, hand pumps, table slides for shoulder flexion stretch, shoulder rolls, thenar IP active flexion blocking, wrist extension stretch in stance on tabletop, isometric wrist extension and flexion, Isometric forearm supination and pronation, scapular retraction with thera-band     [facility]ued [facility] at [facility]. Pt. Reports completing HEP 3x/  daily. Therapy to keep program as all remain an appropriate challenge at time of initial evaluation.    Scapular retraction and depression    Sitting posture awareness  5/9/17      Lateral, 3-point and tip pinch with level 3 sponge  Belso's (all three hand positions)         5/17/17       Putty HEP: Gross hand and wrist movement, gross finger flexion, isolated opposition, gross opposition, thumb flexion, isolated finger flexion, finger adduction, finger abduction, wrist flexion, wrist extension.    5/19/17  Level one putty   5/24/17: discussed strategies to use putty and sensory awareness and how to self progress in home environment with sensory awareness.    Biceps stretch in the doorway  5/19/17     anterior chest wall stretch off of bed  6/09/17     Isolated digit flexion at DIP (digits 2-5).  Finger MP flexion   Isolated MP extension   Knuckle straightening 6/21/17  Encouraged right side to assist due to poor coordination and concentration to task    Median nerve glides  6/27/17       ASSESSMENT   Patient brought in previously issued dorsal blocking splint. Recommend volar based splint given moderate digit flexion contracture. Patient to bring in for therapist to assess splint to ensure appropriate fit. Continued compensatory movement patterns with upper trapezius despite verbal cues and modifying positions to have patient in stance to decrease excess reaching.   Updated goals to suit patient's current level of deficit with hand/ digit weakness, sensation and coordination deficits. Extended plan of care dates to reflect current level of need and slowed progress with treatment.     Pain after treatment: 0/10  Result of above outlined education: Needs reinforcement     Goals:  To be obtained by end of this plan of care:  1. Patient independent with modified and progressed home exercise program. NOT MET, LS 6/21/17  2. Decrease involved left shoulder, elbow, wrist, digit pain to 2/10 pain (at worst) for resumption  of self-cares with affected hand and eliminate need for pain medication use.  3. Patient will increase involved shoulder active range of motion to abduction 110°, flexion 110°, internal rotation 60°, external rotation 80° to aid in dressing. NOT ASSESSED, LS 6/21/17   4. Patient will increase involved shoulder strength to 3+/5 to aid in completion of household tasks for independent living. NOT ASSESSED LS 6/21/17  5. Patient will be able to reach behind back with minimal pain/difficulty to improve function in dressing.   6. Patient will be able to reach over head with minimal pain/difficulty to improve function in cooking, reaching into cupboard, grooming. MET,  6/21/17  7. Patient will demonstrate minimally impaired involved scapulohumeral rhythm to aid in lifting 10# grocery bag from floor to chest height and be able to ambulate 10 feet. NOT ASSESSED, LS 6/21/17   8. Patient will increase involved wrist strength to 4/5 to aid in tolerating 30 minutes of upper extremity activity to cook/eat a meal. NOT ASSESSED,  6/21/17   9. Achieve active fingertip to palm composite flexion for resumption of small object grasp and hold, ½ inch diameter tool use (eating utensil, toothbrush) and wring out wash cloth. MET 6/21/2017   10. Achieve active finger extension sufficient to reach into pants pocket, wipe flat surface or face. NOT MET, LS 6/21/17  11. Achieve pinch / dexterity sufficient to  coins, button, tie shoes, pull zipper, type.NOT MET, LS 6/21/17  12. Achieve  strength of 25 pounds for resumption of light grocery bag lift,  steering wheel, perform light home/yard maintenance tasks.NOT MET, LS 6/21/17  13. Achieve pinch strength of 10 pounds for resumption of functional writing, cap/lid removal. NOT MET, LS 6/21/17  14. Improve coordination speed for resumption of work related tasks, leisure pursuits, playing of musical instrument, writing, typing.NOT MET,  6/21/17    PLAN   · Issue sensory  awareness HEP  · Posture awareness and exercise     Frequency/Duration: 2 times per week for 5 weeks with tapering as the patient progresses  Skilled training and instruction for the following interventions:  Activities of Daily Living/Self Care (62355)  Manual Therapy (69636)  Neuromuscular Re-Education (56509)  Therapeutic Activity (83828)  Therapeutic Exercise (33994)  Electrical Stimulation (51914//74793)   Fluidotherapy/Whirlpool (32212/29119)  Ultrasound/Phonophoresis (60836)     The plan of care and goals were established with the patient who concurs. Patient has been given attendance policy at time of initial evaluation.    THERAPY DAILY BILLING   Primary Insurance: [employer]  Secondary Insurance: [employer]    Evaluation Procedures:  No evaluation codes were used on this date of service    Timed Procedures:  KX modifier applied  Therapeutic Exercise, 40 minutes    Untimed Procedures:  No untimed codes were used on this date of service    Total Treatment Time: 40 minutes    G-Code:  G-Code Score ABN form  reporting not required this treatment session  Modifier based on outcome measure(s)/functional testing/clinical judgement as listed above    Mailed/faxed for Physician Signature: ______________________________ Date: _________ Time: _______

## 2018-07-11 ENCOUNTER — HOSPITAL ENCOUNTER (OUTPATIENT)
Dept: WOUND CARE | Age: 73
Discharge: HOME | End: 2018-07-11
Payer: MEDICARE

## 2018-07-11 ENCOUNTER — HOSPITAL ENCOUNTER (OUTPATIENT)
Age: 73
Setting detail: SPECIMEN
Discharge: HOME | End: 2018-07-11
Payer: MEDICARE

## 2018-07-11 VITALS
RESPIRATION RATE: 18 BRPM | BODY MASS INDEX: 41.37 KG/M2 | HEART RATE: 80 BPM | HEIGHT: 70 IN | WEIGHT: 289 LBS | SYSTOLIC BLOOD PRESSURE: 147 MMHG | DIASTOLIC BLOOD PRESSURE: 83 MMHG | TEMPERATURE: 98.2 F

## 2018-07-11 DIAGNOSIS — T81.89XA NON-HEALING SURGICAL WOUND, INITIAL ENCOUNTER: ICD-10-CM

## 2018-07-11 PROCEDURE — 99202 OFFICE O/P NEW SF 15 MIN: CPT | Performed by: NURSE PRACTITIONER

## 2018-07-11 PROCEDURE — 11042 DBRDMT SUBQ TIS 1ST 20SQCM/<: CPT

## 2018-07-11 PROCEDURE — 87176 TISSUE HOMOGENIZATION CULTR: CPT

## 2018-07-11 PROCEDURE — 99213 OFFICE O/P EST LOW 20 MIN: CPT

## 2018-07-11 PROCEDURE — 87205 SMEAR GRAM STAIN: CPT

## 2018-07-11 PROCEDURE — 87075 CULTR BACTERIA EXCEPT BLOOD: CPT

## 2018-07-11 PROCEDURE — 6370000000 HC RX 637 (ALT 250 FOR IP): Performed by: NURSE PRACTITIONER

## 2018-07-11 PROCEDURE — 11042 DBRDMT SUBQ TIS 1ST 20SQCM/<: CPT | Performed by: NURSE PRACTITIONER

## 2018-07-11 PROCEDURE — 87070 CULTURE OTHR SPECIMN AEROBIC: CPT

## 2018-07-11 RX ORDER — LIDOCAINE HYDROCHLORIDE 40 MG/ML
SOLUTION TOPICAL ONCE
Status: COMPLETED | OUTPATIENT
Start: 2018-07-11 | End: 2018-07-11

## 2018-07-11 RX ADMIN — LIDOCAINE HYDROCHLORIDE 5 ML: 40 SOLUTION TOPICAL at 10:42

## 2018-07-11 NOTE — PROGRESS NOTES
[facility]   Progress Note and Procedure Note      Thomas Oritz  MEDICAL RECORD NUMBER:  [id number]  AGE: 74 y.o. GENDER: male  : 1945  EPISODE DATE:  2018    Subjective:     Chief Complaint   Patient presents with    Wound Check     left anterior hip         HISTORY of PRESENT ILLNESS HPI     Thomas Ortiz is a 74 y.o. male who presents today for wound/ulcer evaluation. History of Wound Context: 74year old male, s/p left total hip ASI on 5/15 by Dr Ifrah Griffin. There is an area at the middle of the incision line that is non healing. He was sent to wound care clinic for further evaluation. He was placed on Omnicef at the last visit with Dr Ifrah Griffin on  and has completed the antibiotics. Wound/Ulcer Pain Timing/Severity: none  Quality of pain: N/A  Severity:  0 / 10   Modifying Factors: None  Associated Signs/Symptoms: drainage    Ulcer Identification:  Ulcer Type: non-healing surgical  Contributing Factors: decreased mobility, obesity and anticoagulation therapy          PAST MEDICAL HISTORY        Diagnosis Date    Anemia     Cellulitis of lower extremity     right     Cerebral artery occlusion with cerebral infarction ([address]         CHF (congestive heart failure) (HCC)     Chronic acquired lymphedema     Hernia, abdominal     History of blood transfusion [facility] SURGERY    History of CVA (cerebrovascular accident) 54    DEFECIT MILD MEMORY AND SPEECH    Hyperlipidemia     Hypertension     Hypothyroid 2015    Ileus, postoperative (HCC)     Mild renal insufficiency     Morbid obesity ([address]     Non-healing wound of lower extremity     HISTORY OF, WAS SEEN IN WOUND CLINIC FOR COUPLE YRS.    Osteoarthritis     Phlebitis     HX. OF     Prolonged emergence from general anesthesia     x1 had to be put back on ventolator, after prostate surgery, had to be hospitalized x12 days.      Prostate CA ([address]     PVD (peripheral vascular disease) ([address]     Sleep apnea     C-PAP NIGHTLY-PT INSTRUCTED TO BRING    SVT (supraventricular tachycardia) (HCC)     Uric acid kidney stone 12/2014    HAD 6 PASSSED ONE ON OWN, OTHER 5 IS BEING MONITORED    Wears glasses        PAST SURGICAL HISTORY    Past Surgical History:   Procedure Laterality Date    ABLATION OF DYSRHYTHMIC FOCUS  03/16/2018    afib ablation    CARDIOVERSION  11/17/2017    COLONOSCOPY  2002, 2007    COLONOSCOPY  07/11/2016    polyp,bx    COLONOSCOPY  07/31/2017    CYST REMOVAL Left 08/05/2016    excision cyst anterior chest    HAMMER TOE SURGERY Bilateral     JOINT REPLACEMENT Bilateral     TKA    KNEE SURGERY Left 1985    PA COLON CA SCRN NOT  [address] IND N/A 7/31/2017    COLONOSCOPY performed by Lauro Muniz MD [facility] 5/15/2018    HIP TOTAL ARTHROPLASTY MINIMALLY INVASIVE ASI WITH GPS performed by Kunal Monroy MD at [address] [place] [address]  10/21/2015    robotic    STUDY POSS ABLATION  4/2/2018         TOTAL KNEE ARTHROPLASTY Bilateral LT-2003, RT-2006    TRANSESOPHAGEAL ECHOCARDIOGRAM  11/17/2017    TUMOR EXCISION      Throat/nose D/T benign tumor    UPPP UVULOPALATOPHARYGOPLASTY  90'S    VARICOSE VEIN SURGERY Bilateral 80's    VASCULAR SURGERY      VEINS STRIPPED X2 TO YULY. LEG. FAMILY HISTORY    Family History   Problem Relation Age of Onset    Diabetes Mother     Hypertension Mother     Heart Attack Mother     Colon Cancer Father     Heart Attack Father     Stroke Father     Psoriasis Brother 54        ALCOHOL RELATED    Dementia Maternal Grandmother     Diabetes Maternal Grandmother     Colon Cancer Paternal Grandmother         ?     Hypertension Paternal Grandfather     Cancer Maternal Grandfather         UTERINE       SOCIAL HISTORY    Social History   Substance Use Topics    Smoking status: Former Smoker     Packs/day: 2.00     Years: 10.00     Quit date: 12/13/1970    Smokeless tobacco: Never Used   [facility] Alcohol use Yes      Comment: 1 beer a day/or 1 glass of wine a day       ALLERGIES    Allergies   Allergen Reactions    Doxycycline Rash       MEDICATIONS    Current Outpatient Prescriptions on File Prior to Encounter   Medication Sig Dispense Refill    ibuprofen (ADVIL;MOTRIN) 800 MG tablet Take 1 tablet by mouth every 8 hours as needed for Pain 30 tablet 0    lisinopril (PRINIVIL;ZESTRIL) 5 MG tablet TAKE 1 TABLET DAILY 90 tablet 2    levothyroxine (SYNTHROID) 25 MCG tablet TAKE 1 TABLET DAILY 90 tablet 2    metoprolol tartrate (LOPRESSOR) 25 MG tablet Take 25 mg by mouth 2 times daily      psyllium (KONSYL) 28.3 % PACK Take 1 packet by mouth nightly      Bisacodyl (DULCOLAX PO) Take 1 tablet by mouth nightly      Aspirin-Acetaminophen-Caffeine (EXCEDRIN PO) Take by mouth      apixaban (ELIQUIS) 5 MG TABS tablet Take 5 mg by mouth 2 times daily      trospium (SANCTURA) 20 MG tablet Take 20 mg by mouth 2 times daily      Potassium 99 MG TABS Take by mouth      furosemide (LASIX) 40 MG tablet TAKE 1 TABLET DAILY 90 tablet 3    fluticasone (FLONASE) 50 MCG/ACT nasal spray 1 spray by Nasal route daily 1 Bottle 3    acetaminophen (TYLENOL) 325 MG tablet Take 650 mg by mouth every 6 hours as needed for Pain      CINNAMON PO Take 2 capsules by mouth daily      Probiotic Product (PROBIOTIC ADVANCED PO) Take by mouth      Multiple Vitamins-Minerals (OCUVITE PO) Take 1 tablet by mouth daily      allopurinol (ZYLOPRIM) 300 MG tablet Take 300 mg by mouth 2 times daily      Elastic Bandages & Supports (JOBST RELIEF 30-40MMHG XL) MISC 4 pair knee high open toe. 4 each 0    vitamin B-12 (CYANOCOBALAMIN) 1000 MCG tablet Take 1,000 mcg by mouth daily.  Cholecalciferol (VITAMIN D-3 PO) Take  by mouth daily. 2000 IU daily       Coenzyme Q10 (CO Q 10 PO) Take 1 tablet by mouth daily        No current facility-administered medications on file prior to encounter.         REVIEW OF SYSTEMS:    CONSTITUTIONAL: CVA (cerebrovascular accident) Z80.78    Hyperlipidemia E78.5    CHF (congestive heart failure) (HCC) I50.9    Osteoarthritis M19.90    Renal insufficiency N28.9    Anemia D64.9    Hyperglycemia R73.9    Edema R60.9    Kidney stone N20.0    Prostate cancer ([address] C61    Adenocarcinoma of prostate ([facility] C61    Ileus, postoperative (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 Performed by: DONALDO Gonzalez CNP    Consent obtained:  Yes    Time out taken:  Yes    Pain Control: Anesthetic  Anesthetic: 4% Lidocaine Liquid Topical       Debridement:Excisional Debridement    Using curette the wound(s)/ulcer(s) was/were sharply debrided down through and including the removal of subcutaneous tissue. Devitalized Tissue Debrided:  fibrin, biofilm, slough and exudate    Pre Debridement Measurements:  Are located in the [facility]  Documentation Flow Sheet    Wound/Ulcer #: 1    Post Debridement Measurements:  Wound/Ulcer Descriptions are Pre Debridement except measurements:    Wound 07/11/18 Other (Comment) Hip Left; Anterior #1 surgical, 5-15-18 (Active)   Wound Image   7/11/2018 10:39 AM   Wound Type Wound 7/11/2018 10:39 AM   Dressing Status Clean;Dry; Intact 7/11/2018 10:39 AM   Dressing Changed Changed/New 7/11/2018 10:39 AM   Wound Cleansed Rinsed/Irrigated with saline 7/11/2018 10:39 AM   Wound Length (cm) 0.3 cm 7/11/2018 11:17 AM   Wound Width (cm) 0.8 cm 7/11/2018 11:17 AM   Wound Depth (cm)  1.0 7/11/2018 11:17 AM   Calculated Wound Size a 24 hour notice. Thank you.)    If you experience any of the following, please call the [address] during business hours:  [phone]    * Increase in Pain  * Temperature over 101  * Increase in drainage from your wound  * Drainage with a foul odor  * Bleeding  * Increase in swelling  * Need for compression bandage changes due to slippage, breakthrough drainage. If you need medical attention outside of the business hours of the [email or web address] please contact your PCP or go to the nearest emergency room.     AVS REVIEWED    YES [x]    Patient Signature:__________________________________________________Date:_______  Electronically signed by Domi Leung RN on 7/11/2018 at 11:18 AM     Electronically signed by DONALDO Atkins CNP on 7/11/2018 at 11:27 AM               Electronically signed by DONALDO Atkins CNP on 7/11/2018 at 12:03 PM

## 2018-07-12 ENCOUNTER — OFFICE VISIT (OUTPATIENT)
Dept: ORTHOPEDIC SURGERY | Age: 73
End: 2018-07-12

## 2018-07-12 DIAGNOSIS — Z96.642 H/O TOTAL HIP ARTHROPLASTY, LEFT: Primary | ICD-10-CM

## 2018-07-12 DIAGNOSIS — S71.002D OPEN WOUND OF LEFT HIP AND THIGH, SUBSEQUENT ENCOUNTER: ICD-10-CM

## 2018-07-12 DIAGNOSIS — S71.102D OPEN WOUND OF LEFT HIP AND THIGH, SUBSEQUENT ENCOUNTER: ICD-10-CM

## 2018-07-12 PROCEDURE — 99024 POSTOP FOLLOW-UP VISIT: CPT | Performed by: ORTHOPAEDIC SURGERY

## 2018-07-12 NOTE — PROGRESS NOTES
Colon Cancer Paternal Grandmother         ?  Hypertension Paternal Grandfather     Cancer Maternal Grandfather         UTERINE         No orders of the defined types were placed in this encounter. 1. H/O total hip arthroplasty, left    2. Open wound of left hip and thigh, subsequent encounter        Geovanni Keyes MD    Please note that this chart was generated using voice recognition Dragon dictation software. Although every effort was made to ensure the accuracy of this automated transcription, some errors in transcription may have occurred.

## 2018-07-14 ENCOUNTER — HOSPITAL ENCOUNTER (INPATIENT)
Age: 73
LOS: 3 days | Discharge: HOME | DRG: 300 | End: 2018-07-17
Attending: EMERGENCY MEDICINE | Admitting: FAMILY MEDICINE
Payer: MEDICARE

## 2018-07-14 ENCOUNTER — APPOINTMENT (OUTPATIENT)
Dept: GENERAL RADIOLOGY | Age: 73
DRG: 300 | End: 2018-07-14
Payer: MEDICARE

## 2018-07-14 DIAGNOSIS — I82.402 ACUTE DEEP VEIN THROMBOSIS (DVT) OF LEFT LOWER EXTREMITY, UNSPECIFIED VEIN (HCC): Primary | ICD-10-CM

## 2018-07-14 PROBLEM — I82.4Y2 DVT, LOWER EXTREMITY, PROXIMAL, ACUTE, LEFT (HCC): Status: ACTIVE | Noted: 2018-07-14

## 2018-07-14 LAB
ABSOLUTE EOS #: 0.1 K/UL (ref 0–0.4)
ABSOLUTE IMMATURE GRANULOCYTE: ABNORMAL K/UL (ref 0–0.3)
ABSOLUTE LYMPH #: 0.7 K/UL (ref 1–4.8)
ABSOLUTE MONO #: 0.6 K/UL (ref 0.1–1.3)
ANION GAP SERPL CALCULATED.3IONS-SCNC: 12 MMOL/L (ref 9–17)
BASOPHILS # BLD: 0 % (ref 0–2)
BASOPHILS ABSOLUTE: 0 K/UL (ref 0–0.2)
BUN BLDV-MCNC: 25 MG/DL (ref 8–23)
BUN/CREAT BLD: ABNORMAL (ref 9–20)
CALCIUM SERPL-MCNC: 9.7 MG/DL (ref 8.6–10.4)
CHLORIDE BLD-SCNC: 101 MMOL/L (ref 98–107)
CO2: 28 MMOL/L (ref 20–31)
CREAT SERPL-MCNC: 1.22 MG/DL (ref 0.7–1.2)
DIFFERENTIAL TYPE: ABNORMAL
EOSINOPHILS RELATIVE PERCENT: 1 % (ref 0–4)
GFR AFRICAN AMERICAN: >60 ML/MIN
GFR NON-AFRICAN AMERICAN: 58 ML/MIN
GFR SERPL CREATININE-BSD FRML MDRD: ABNORMAL ML/MIN/{1.73_M2}
GFR SERPL CREATININE-BSD FRML MDRD: ABNORMAL ML/MIN/{1.73_M2}
GLUCOSE BLD-MCNC: 89 MG/DL (ref 70–99)
HCT VFR BLD CALC: 43.9 % (ref 41–53)
HEMOGLOBIN: 14.4 G/DL (ref 13.5–17.5)
IMMATURE GRANULOCYTES: ABNORMAL %
LACTIC ACID, WHOLE BLOOD: NORMAL MMOL/L (ref 0.7–2.1)
LACTIC ACID: 1.6 MMOL/L (ref 0.5–2.2)
LYMPHOCYTES # BLD: 7 % (ref 24–44)
MCH RBC QN AUTO: 32.4 PG (ref 26–34)
MCHC RBC AUTO-ENTMCNC: 32.8 G/DL (ref 31–37)
MCV RBC AUTO: 98.9 FL (ref 80–100)
MONOCYTES # BLD: 6 % (ref 1–7)
NRBC AUTOMATED: ABNORMAL PER 100 WBC
PARTIAL THROMBOPLASTIN TIME: 26.4 SEC (ref 23–31)
PDW BLD-RTO: 15.4 % (ref 11.5–14.9)
PLATELET # BLD: 234 K/UL (ref 150–450)
PLATELET ESTIMATE: ABNORMAL
PMV BLD AUTO: 7.6 FL (ref 6–12)
POTASSIUM SERPL-SCNC: 4.7 MMOL/L (ref 3.7–5.3)
RBC # BLD: 4.44 M/UL (ref 4.5–5.9)
RBC # BLD: ABNORMAL 10*6/UL
SEG NEUTROPHILS: 86 % (ref 36–66)
SEGMENTED NEUTROPHILS ABSOLUTE COUNT: 9.2 K/UL (ref 1.3–9.1)
SODIUM BLD-SCNC: 141 MMOL/L (ref 135–144)
WBC # BLD: 10.6 K/UL (ref 3.5–11)
WBC # BLD: ABNORMAL 10*3/UL

## 2018-07-14 PROCEDURE — 2580000003 HC RX 258: Performed by: EMERGENCY MEDICINE

## 2018-07-14 PROCEDURE — 6370000000 HC RX 637 (ALT 250 FOR IP): Performed by: EMERGENCY MEDICINE

## 2018-07-14 PROCEDURE — 80048 BASIC METABOLIC PNL TOTAL CA: CPT

## 2018-07-14 PROCEDURE — 2060000000 HC ICU INTERMEDIATE R&B

## 2018-07-14 PROCEDURE — 93971 EXTREMITY STUDY: CPT

## 2018-07-14 PROCEDURE — 85025 COMPLETE CBC W/AUTO DIFF WBC: CPT

## 2018-07-14 PROCEDURE — 85730 THROMBOPLASTIN TIME PARTIAL: CPT

## 2018-07-14 PROCEDURE — 83605 ASSAY OF LACTIC ACID: CPT

## 2018-07-14 PROCEDURE — 73502 X-RAY EXAM HIP UNI 2-3 VIEWS: CPT

## 2018-07-14 PROCEDURE — 2500000003 HC RX 250 WO HCPCS: Performed by: EMERGENCY MEDICINE

## 2018-07-14 PROCEDURE — 99285 EMERGENCY DEPT VISIT HI MDM: CPT

## 2018-07-14 PROCEDURE — 36415 COLL VENOUS BLD VENIPUNCTURE: CPT

## 2018-07-14 PROCEDURE — 6360000002 HC RX W HCPCS: Performed by: EMERGENCY MEDICINE

## 2018-07-14 RX ORDER — ACETAMINOPHEN 500 MG
1000 TABLET ORAL ONCE
Status: COMPLETED | OUTPATIENT
Start: 2018-07-14 | End: 2018-07-14

## 2018-07-14 RX ORDER — SODIUM CHLORIDE 0.9 % (FLUSH) 0.9 %
10 SYRINGE (ML) INJECTION PRN
Status: DISCONTINUED | OUTPATIENT
Start: 2018-07-14 | End: 2018-07-17 | Stop reason: HOSPADM

## 2018-07-14 RX ORDER — LISINOPRIL 5 MG/1
5 TABLET ORAL NIGHTLY
COMMUNITY
End: 2018-12-11 | Stop reason: SDUPTHER

## 2018-07-14 RX ORDER — ACETAMINOPHEN 500 MG
500 TABLET ORAL EVERY 4 HOURS PRN
Status: DISCONTINUED | OUTPATIENT
Start: 2018-07-14 | End: 2018-07-15 | Stop reason: SDUPTHER

## 2018-07-14 RX ORDER — CLINDAMYCIN HYDROCHLORIDE 150 MG/1
300 CAPSULE ORAL 3 TIMES DAILY
Status: DISCONTINUED | OUTPATIENT
Start: 2018-07-15 | End: 2018-07-17 | Stop reason: HOSPADM

## 2018-07-14 RX ORDER — SODIUM CHLORIDE 0.9 % (FLUSH) 0.9 %
10 SYRINGE (ML) INJECTION EVERY 12 HOURS SCHEDULED
Status: DISCONTINUED | OUTPATIENT
Start: 2018-07-14 | End: 2018-07-17 | Stop reason: HOSPADM

## 2018-07-14 RX ORDER — HEPARIN SODIUM 1000 [USP'U]/ML
80 INJECTION, SOLUTION INTRAVENOUS; SUBCUTANEOUS PRN
Status: DISCONTINUED | OUTPATIENT
Start: 2018-07-14 | End: 2018-07-15

## 2018-07-14 RX ORDER — HEPARIN SODIUM 1000 [USP'U]/ML
40 INJECTION, SOLUTION INTRAVENOUS; SUBCUTANEOUS PRN
Status: DISCONTINUED | OUTPATIENT
Start: 2018-07-14 | End: 2018-07-15

## 2018-07-14 RX ORDER — ONDANSETRON 2 MG/ML
4 INJECTION INTRAMUSCULAR; INTRAVENOUS EVERY 6 HOURS PRN
Status: DISCONTINUED | OUTPATIENT
Start: 2018-07-14 | End: 2018-07-17 | Stop reason: HOSPADM

## 2018-07-14 RX ORDER — HEPARIN SODIUM 10000 [USP'U]/100ML
2100 INJECTION, SOLUTION INTRAVENOUS CONTINUOUS
Status: DISCONTINUED | OUTPATIENT
Start: 2018-07-14 | End: 2018-07-15

## 2018-07-14 RX ORDER — 0.9 % SODIUM CHLORIDE 0.9 %
1000 INTRAVENOUS SOLUTION INTRAVENOUS ONCE
Status: COMPLETED | OUTPATIENT
Start: 2018-07-14 | End: 2018-07-15

## 2018-07-14 RX ORDER — HEPARIN SODIUM 1000 [USP'U]/ML
80 INJECTION, SOLUTION INTRAVENOUS; SUBCUTANEOUS ONCE
Status: COMPLETED | OUTPATIENT
Start: 2018-07-14 | End: 2018-07-14

## 2018-07-14 RX ADMIN — CLINDAMYCIN PHOSPHATE 600 MG: 150 INJECTION, SOLUTION, CONCENTRATE INTRAVENOUS at 22:42

## 2018-07-14 RX ADMIN — HEPARIN SODIUM AND DEXTROSE 2100 UNITS/HR: 10000; 5 INJECTION INTRAVENOUS at 22:33

## 2018-07-14 RX ADMIN — HEPARIN SODIUM 10560 UNITS: 1000 INJECTION, SOLUTION INTRAVENOUS; SUBCUTANEOUS at 22:33

## 2018-07-14 RX ADMIN — SODIUM CHLORIDE 1000 ML: 9 INJECTION, SOLUTION INTRAVENOUS at 21:25

## 2018-07-14 RX ADMIN — ACETAMINOPHEN 1000 MG: 500 TABLET, FILM COATED ORAL at 22:55

## 2018-07-14 ASSESSMENT — PAIN DESCRIPTION - FREQUENCY: FREQUENCY: CONTINUOUS

## 2018-07-14 ASSESSMENT — ENCOUNTER SYMPTOMS
SORE THROAT: 0
ABDOMINAL PAIN: 0
COUGH: 0
BLURRED VISION: 0
VOMITING: 0
SHORTNESS OF BREATH: 0
NAUSEA: 0
DIARRHEA: 0
EYE DISCHARGE: 0
WHEEZING: 0
CONSTIPATION: 0

## 2018-07-14 ASSESSMENT — PAIN SCALES - GENERAL
PAINLEVEL_OUTOF10: 5
PAINLEVEL_OUTOF10: 5

## 2018-07-14 ASSESSMENT — PAIN DESCRIPTION - DESCRIPTORS: DESCRIPTORS: ACHING

## 2018-07-15 PROBLEM — I82.402 ACUTE DEEP VEIN THROMBOSIS (DVT) OF LEFT LOWER EXTREMITY (HCC): Status: ACTIVE | Noted: 2018-07-14

## 2018-07-15 LAB
ABSOLUTE EOS #: 0 K/UL (ref 0–0.4)
ABSOLUTE IMMATURE GRANULOCYTE: ABNORMAL K/UL (ref 0–0.3)
ABSOLUTE LYMPH #: 1.1 K/UL (ref 1–4.8)
ABSOLUTE MONO #: 0.6 K/UL (ref 0.1–1.3)
ANION GAP SERPL CALCULATED.3IONS-SCNC: 14 MMOL/L (ref 9–17)
BASOPHILS # BLD: 1 % (ref 0–2)
BASOPHILS ABSOLUTE: 0 K/UL (ref 0–0.2)
BUN BLDV-MCNC: 22 MG/DL (ref 8–23)
BUN/CREAT BLD: ABNORMAL (ref 9–20)
CALCIUM SERPL-MCNC: 9.1 MG/DL (ref 8.6–10.4)
CHLORIDE BLD-SCNC: 102 MMOL/L (ref 98–107)
CO2: 23 MMOL/L (ref 20–31)
CREAT SERPL-MCNC: 1.05 MG/DL (ref 0.7–1.2)
DIFFERENTIAL TYPE: ABNORMAL
EOSINOPHILS RELATIVE PERCENT: 0 % (ref 0–4)
GFR AFRICAN AMERICAN: >60 ML/MIN
GFR NON-AFRICAN AMERICAN: >60 ML/MIN
GFR SERPL CREATININE-BSD FRML MDRD: ABNORMAL ML/MIN/{1.73_M2}
GFR SERPL CREATININE-BSD FRML MDRD: ABNORMAL ML/MIN/{1.73_M2}
GLUCOSE BLD-MCNC: 111 MG/DL (ref 70–99)
HCT VFR BLD CALC: 42 % (ref 41–53)
HEMOGLOBIN: 13.8 G/DL (ref 13.5–17.5)
IMMATURE GRANULOCYTES: ABNORMAL %
LYMPHOCYTES # BLD: 13 % (ref 24–44)
MCH RBC QN AUTO: 32.8 PG (ref 26–34)
MCHC RBC AUTO-ENTMCNC: 32.8 G/DL (ref 31–37)
MCV RBC AUTO: 100.1 FL (ref 80–100)
MONOCYTES # BLD: 7 % (ref 1–7)
NRBC AUTOMATED: ABNORMAL PER 100 WBC
PARTIAL THROMBOPLASTIN TIME: 80.7 SEC (ref 23–31)
PDW BLD-RTO: 15 % (ref 11.5–14.9)
PLATELET # BLD: 184 K/UL (ref 150–450)
PLATELET ESTIMATE: ABNORMAL
PMV BLD AUTO: 8.1 FL (ref 6–12)
POTASSIUM SERPL-SCNC: 4.3 MMOL/L (ref 3.7–5.3)
RBC # BLD: 4.19 M/UL (ref 4.5–5.9)
RBC # BLD: ABNORMAL 10*6/UL
SEG NEUTROPHILS: 79 % (ref 36–66)
SEGMENTED NEUTROPHILS ABSOLUTE COUNT: 7.4 K/UL (ref 1.3–9.1)
SODIUM BLD-SCNC: 139 MMOL/L (ref 135–144)
WBC # BLD: 9.2 K/UL (ref 3.5–11)
WBC # BLD: ABNORMAL 10*3/UL

## 2018-07-15 PROCEDURE — 36415 COLL VENOUS BLD VENIPUNCTURE: CPT

## 2018-07-15 PROCEDURE — 2580000003 HC RX 258: Performed by: FAMILY MEDICINE

## 2018-07-15 PROCEDURE — 85025 COMPLETE CBC W/AUTO DIFF WBC: CPT

## 2018-07-15 PROCEDURE — 6360000002 HC RX W HCPCS: Performed by: EMERGENCY MEDICINE

## 2018-07-15 PROCEDURE — 94660 CPAP INITIATION&MGMT: CPT

## 2018-07-15 PROCEDURE — 85730 THROMBOPLASTIN TIME PARTIAL: CPT

## 2018-07-15 PROCEDURE — 99221 1ST HOSP IP/OBS SF/LOW 40: CPT | Performed by: SURGERY

## 2018-07-15 PROCEDURE — 6370000000 HC RX 637 (ALT 250 FOR IP): Performed by: FAMILY MEDICINE

## 2018-07-15 PROCEDURE — 99222 1ST HOSP IP/OBS MODERATE 55: CPT | Performed by: FAMILY MEDICINE

## 2018-07-15 PROCEDURE — 6370000000 HC RX 637 (ALT 250 FOR IP): Performed by: SURGERY

## 2018-07-15 PROCEDURE — 2060000000 HC ICU INTERMEDIATE R&B

## 2018-07-15 PROCEDURE — 80048 BASIC METABOLIC PNL TOTAL CA: CPT

## 2018-07-15 RX ORDER — LANOLIN ALCOHOL/MO/W.PET/CERES
1000 CREAM (GRAM) TOPICAL DAILY
Status: DISCONTINUED | OUTPATIENT
Start: 2018-07-15 | End: 2018-07-17 | Stop reason: HOSPADM

## 2018-07-15 RX ORDER — FLUTICASONE PROPIONATE 50 MCG
1 SPRAY, SUSPENSION (ML) NASAL DAILY
Status: DISCONTINUED | OUTPATIENT
Start: 2018-07-15 | End: 2018-07-17 | Stop reason: HOSPADM

## 2018-07-15 RX ORDER — LEVOTHYROXINE SODIUM 0.03 MG/1
1 TABLET ORAL DAILY
Status: DISCONTINUED | OUTPATIENT
Start: 2018-07-15 | End: 2018-07-15 | Stop reason: CLARIF

## 2018-07-15 RX ORDER — LISINOPRIL 5 MG/1
5 TABLET ORAL NIGHTLY
Status: DISCONTINUED | OUTPATIENT
Start: 2018-07-15 | End: 2018-07-15 | Stop reason: SDUPTHER

## 2018-07-15 RX ORDER — TROSPIUM CHLORIDE 20 MG/1
20 TABLET, FILM COATED ORAL
Status: DISCONTINUED | OUTPATIENT
Start: 2018-07-15 | End: 2018-07-17 | Stop reason: HOSPADM

## 2018-07-15 RX ORDER — LISINOPRIL 5 MG/1
5 TABLET ORAL NIGHTLY
Status: DISCONTINUED | OUTPATIENT
Start: 2018-07-15 | End: 2018-07-17 | Stop reason: HOSPADM

## 2018-07-15 RX ORDER — ALLOPURINOL 300 MG/1
300 TABLET ORAL 2 TIMES DAILY
Status: DISCONTINUED | OUTPATIENT
Start: 2018-07-15 | End: 2018-07-17 | Stop reason: HOSPADM

## 2018-07-15 RX ORDER — IBUPROFEN 800 MG/1
800 TABLET ORAL EVERY 8 HOURS PRN
Status: DISCONTINUED | OUTPATIENT
Start: 2018-07-15 | End: 2018-07-17 | Stop reason: HOSPADM

## 2018-07-15 RX ORDER — ACETAMINOPHEN 325 MG/1
650 TABLET ORAL EVERY 6 HOURS PRN
Status: DISCONTINUED | OUTPATIENT
Start: 2018-07-15 | End: 2018-07-17 | Stop reason: HOSPADM

## 2018-07-15 RX ORDER — LEVOTHYROXINE SODIUM 0.03 MG/1
25 TABLET ORAL DAILY
Status: DISCONTINUED | OUTPATIENT
Start: 2018-07-15 | End: 2018-07-17 | Stop reason: HOSPADM

## 2018-07-15 RX ORDER — HEPARIN SODIUM 10000 [USP'U]/100ML
15.9 INJECTION, SOLUTION INTRAVENOUS CONTINUOUS
Status: DISCONTINUED | OUTPATIENT
Start: 2018-07-15 | End: 2018-07-15

## 2018-07-15 RX ORDER — LISINOPRIL 5 MG/1
5 TABLET ORAL DAILY
Status: DISCONTINUED | OUTPATIENT
Start: 2018-07-15 | End: 2018-07-17 | Stop reason: HOSPADM

## 2018-07-15 RX ORDER — FUROSEMIDE 40 MG/1
40 TABLET ORAL DAILY
Status: DISCONTINUED | OUTPATIENT
Start: 2018-07-15 | End: 2018-07-17 | Stop reason: HOSPADM

## 2018-07-15 RX ADMIN — LISINOPRIL 5 MG: 5 TABLET ORAL at 00:26

## 2018-07-15 RX ADMIN — LEVOTHYROXINE SODIUM 25 MCG: 25 TABLET ORAL at 09:24

## 2018-07-15 RX ADMIN — CLINDAMYCIN HYDROCHLORIDE 300 MG: 150 CAPSULE ORAL at 14:20

## 2018-07-15 RX ADMIN — Medication 10 ML: at 21:33

## 2018-07-15 RX ADMIN — TROSPIUM CHLORIDE 20 MG: 20 TABLET ORAL at 09:24

## 2018-07-15 RX ADMIN — TROSPIUM CHLORIDE 20 MG: 20 TABLET ORAL at 16:31

## 2018-07-15 RX ADMIN — METOPROLOL TARTRATE 25 MG: 25 TABLET ORAL at 21:23

## 2018-07-15 RX ADMIN — FUROSEMIDE 40 MG: 40 TABLET ORAL at 08:24

## 2018-07-15 RX ADMIN — ALLOPURINOL 300 MG: 300 TABLET ORAL at 08:24

## 2018-07-15 RX ADMIN — ALLOPURINOL 300 MG: 300 TABLET ORAL at 21:24

## 2018-07-15 RX ADMIN — METOPROLOL TARTRATE 25 MG: 25 TABLET ORAL at 08:24

## 2018-07-15 RX ADMIN — CYANOCOBALAMIN TAB 1000 MCG 1000 MCG: 1000 TAB at 08:24

## 2018-07-15 RX ADMIN — APIXABAN 5 MG: 5 TABLET, FILM COATED ORAL at 21:23

## 2018-07-15 RX ADMIN — BISACODYL 10 MG: 5 TABLET, COATED ORAL at 21:23

## 2018-07-15 RX ADMIN — APIXABAN 5 MG: 5 TABLET, FILM COATED ORAL at 14:19

## 2018-07-15 RX ADMIN — CLINDAMYCIN HYDROCHLORIDE 300 MG: 150 CAPSULE ORAL at 21:24

## 2018-07-15 RX ADMIN — HEPARIN SODIUM AND DEXTROSE 15.9 UNITS/KG/HR: 10000; 5 INJECTION INTRAVENOUS at 09:24

## 2018-07-15 RX ADMIN — LISINOPRIL 5 MG: 5 TABLET ORAL at 21:22

## 2018-07-15 RX ADMIN — CLINDAMYCIN HYDROCHLORIDE 300 MG: 150 CAPSULE ORAL at 08:24

## 2018-07-15 RX ADMIN — PSYLLIUM HUSK 1 PACKET: 3.4 POWDER ORAL at 21:23

## 2018-07-15 RX ADMIN — FLUTICASONE PROPIONATE 1 SPRAY: 50 SPRAY, METERED NASAL at 09:24

## 2018-07-15 ASSESSMENT — PAIN DESCRIPTION - PAIN TYPE: TYPE: ACUTE PAIN

## 2018-07-15 ASSESSMENT — PAIN DESCRIPTION - ORIENTATION: ORIENTATION: LEFT

## 2018-07-15 ASSESSMENT — ENCOUNTER SYMPTOMS
ALLERGIC/IMMUNOLOGIC NEGATIVE: 1
COLOR CHANGE: 1
EYES NEGATIVE: 1
BACK PAIN: 1
GASTROINTESTINAL NEGATIVE: 1
CHEST TIGHTNESS: 0
SHORTNESS OF BREATH: 0

## 2018-07-15 ASSESSMENT — PAIN DESCRIPTION - ONSET: ONSET: PROGRESSIVE

## 2018-07-15 ASSESSMENT — PAIN DESCRIPTION - DESCRIPTORS: DESCRIPTORS: ACHING

## 2018-07-15 ASSESSMENT — PAIN DESCRIPTION - LOCATION: LOCATION: LEG

## 2018-07-15 ASSESSMENT — PAIN SCALES - GENERAL
PAINLEVEL_OUTOF10: 5
PAINLEVEL_OUTOF10: 0

## 2018-07-15 ASSESSMENT — PAIN DESCRIPTION - FREQUENCY: FREQUENCY: CONTINUOUS

## 2018-07-15 NOTE — FLOWSHEET NOTE
Patient indicated it's been a rough couple of months since hip replacement. He appreciated visit and prayer. 07/15/18 1050   Encounter Summary   Services provided to: Patient   Referral/Consult From: [facility]   Support System Spouse   Continue Visiting (7-15-18)   Complexity of Encounter Moderate   Length of Encounter 15 minutes   Spiritual Assessment Completed Yes   Routine   Type Initial   Spiritual/Evangelical   Type Spiritual support   Assessment Approachable; Anxious   Intervention Active listening;Explored feelings, thoughts, concerns;Nurtured hope;Prayer;Sustaining presence/ Ministry of presence; Discussed illness/injury and it's impact   Outcome Expressed gratitude;Engaged in conversation;Expressed feelings/needs/concerns

## 2018-07-15 NOTE — PROGRESS NOTES
Patient updated regarding Dr. Allison Schmitz orders. The patient stated that he has JOHAN hose at home, however they are only \"knee high. \" No other questions or concerns at this time. Will continue to monitor closely.

## 2018-07-15 NOTE — ED PROVIDER NOTES
(congestive heart failure) ([facility] [address] Chronic acquired lymphedema; Hernia, abdominal; History of blood transfusion; History of CVA (cerebrovascular accident); Hyperlipidemia; Hypertension; Hypothyroid; Ileus, postoperative ([facility] [address] Mild renal insufficiency; Morbid obesity ([facility] [address] Non-healing wound of lower extremity; Osteoarthritis; Phlebitis; Prolonged emergence from general anesthesia; Prostate CA ([facility] [address] PVD (peripheral vascular disease) ([facility] [address] Sleep apnea; SVT (supraventricular tachycardia) ([facility] [address] Uric acid kidney stone; and Wears glasses. SURGICAL HISTORY      has a past surgical history that includes tumor excision; knee surgery (Left, 1985); Hammer toe surgery (Bilateral); UPPP (90'S); Prostatectomy (10/21/2015); cyst removal (Left, 08/05/2016); Total knee arthroplasty (Bilateral, LT-2003, RT-2006); Varicose vein surgery (Bilateral, 80's); pr colon ca scrn not hi rsk ind (N/A, 7/31/2017); Cardioversion (11/17/2017); transesophageal echocardiogram (11/17/2017); ablation of dysrhythmic focus (03/16/2018); Study possible ablation (4/2/2018); joint replacement (Bilateral); Colonoscopy (2002, 2007); Colonoscopy (07/11/2016); Colonoscopy (07/31/2017); vascular surgery; and pr total hip arthroplasty (Left, 5/15/2018). CURRENT MEDICATIONS       Previous Medications    ACETAMINOPHEN (TYLENOL) 325 MG TABLET    Take 650 mg by mouth every 6 hours as needed for Pain    ALLOPURINOL (ZYLOPRIM) 300 MG TABLET    Take 300 mg by mouth 2 times daily    APIXABAN (ELIQUIS) 5 MG TABS TABLET    Take 5 mg by mouth 2 times daily    ASPIRIN-ACETAMINOPHEN-CAFFEINE (EXCEDRIN PO)    Take by mouth    BISACODYL (DULCOLAX PO)    Take 1 tablet by mouth nightly    CHOLECALCIFEROL (VITAMIN D-3 PO)    Take  by mouth daily. 2000 IU daily     CINNAMON PO    Take 2 capsules by mouth daily    COENZYME Q10 (CO Q 10 PO)    Take 1 tablet by mouth daily     ELASTIC BANDAGES & SUPPORTS (JOBST RELIEF 30-40MMHG XL) MISC    4 pair knee high open toe. FLUTICASONE (FLONASE) 50 MCG/ACT NASAL SPRAY    1 spray by Nasal route daily    FUROSEMIDE (LASIX) 40 MG TABLET    TAKE 1 TABLET DAILY    IBUPROFEN (ADVIL;MOTRIN) 800 MG TABLET    Take 1 tablet by mouth every 8 hours as needed for Pain    LEVOTHYROXINE (SYNTHROID) 25 MCG TABLET    TAKE 1 TABLET DAILY    LISINOPRIL (PRINIVIL;ZESTRIL) 5 MG TABLET    TAKE 1 TABLET DAILY    METOPROLOL TARTRATE (LOPRESSOR) 25 MG TABLET    Take 25 mg by mouth 2 times daily    MULTIPLE VITAMINS-MINERALS (OCUVITE PO)    Take 1 tablet by mouth daily    POTASSIUM 99 MG TABS    Take by mouth    PROBIOTIC PRODUCT (PROBIOTIC ADVANCED PO)    Take by mouth    PSYLLIUM (KONSYL) 28.3 % PACK    Take 1 packet by mouth nightly    TROSPIUM (SANCTURA) 20 MG TABLET    Take 20 mg by mouth 2 times daily    VITAMIN B-12 (CYANOCOBALAMIN) 1000 MCG TABLET    Take 1,000 mcg by mouth daily. ALLERGIES     is allergic to doxycycline. FAMILY HISTORY     indicated that his mother is . He indicated that his father is . He indicated that his sister is alive. He indicated that only one of his two brothers is alive. He indicated that the status of his maternal grandmother is unknown. He indicated that the status of his maternal grandfather is unknown. He indicated that the status of his paternal grandmother is unknown. He indicated that the status of his paternal grandfather is unknown.      family history includes Cancer in his maternal grandfather; Colon Cancer in his father and paternal grandmother; Dementia in his maternal grandmother; Diabetes in his maternal grandmother and mother; Heart Attack in his father and mother; Hypertension in his mother and paternal grandfather; Psoriasis (age of onset: 54) in his brother; Stroke in his father. SOCIAL HISTORY      reports that he quit smoking about 47 years ago. He has a 20.00 pack-year smoking history. He has never used smokeless tobacco. He reports that he drinks alcohol.  He reports that he does not use drugs. PHYSICAL EXAM     INITIAL VITALS:  height is 5' 10\" (1.778 m) and weight is 291 lb (132 kg). His oral temperature is 99.1 °F (37.3 °C). His blood pressure is 148/69 (abnormal) and his pulse is 88. His respiration is 18 and oxygen saturation is 94%. Physical Exam   Constitutional: He is oriented to person, place, and time and well-developed, well-nourished, and in no distress. No distress. HENT:   Head: Normocephalic and atraumatic. Mouth/Throat: Oropharynx is clear and moist.   Eyes: Conjunctivae are normal. Pupils are equal, round, and reactive to light. Neck: Neck supple. Cardiovascular: Normal rate, regular rhythm, normal heart sounds and intact distal pulses. No murmur heard. Pulmonary/Chest: Effort normal and breath sounds normal. No respiratory distress. Abdominal: Soft. Bowel sounds are normal. He exhibits no distension. There is no tenderness. Musculoskeletal: He exhibits no edema or tenderness. Lymphadenopathy:     He has no cervical adenopathy. Neurological: He is alert and oriented to person, place, and time. GCS score is 15. Skin: Skin is warm and dry. No rash noted. There is erythema (Streaking on the lateral aspect of the left leg from below the knee to above the knee). Left hip incision is clean, dry with clean dressing. No surrounding erythema around the incision. Psychiatric: Affect and judgment normal.   Nursing note and vitals reviewed. DIFFERENTIAL DIAGNOSIS/MDM:   57-year-old male presents with left leg erythema and pain. He is afebrile and nontoxic in appearance. Vital signs are within normal limits artery does complain of subjective fevers over the past several days. Suspected DVT versus cellulitis. Lower suspicion for an infection of the hip joint itself as the erythema is a significant distance away from the incision however we'll get an x-ray of the hip.   We'll also get Doppler ultrasound, blood work and REFERRED TO:  Karen Alonzo MD  [address], [address]  [place] [phone]            DISCHARGE MEDICATIONS:  New Prescriptions    No medications on file       (Please note that portions of this note were completed with a voice recognition program.  Efforts were made to edit the dictations but occasionally words are mis-transcribed.)    Jayleen Winn DO  Attending Emergency Physician          Jayleen Winn DO  07/14/18 [id number]

## 2018-07-15 NOTE — FLOWSHEET NOTE
07/15/18 0722   Provider Notification   Reason for Communication New orders   Provider Name Dr. Audrey Be   Provider Notification Physician   Method of Communication Face to face   Response See orders   Notification Time [phone]     Dr. Audrey Be rounding, orders received.

## 2018-07-15 NOTE — CONSULTS
Diabetes Maternal Grandmother     Colon Cancer Paternal Grandmother         ?     Hypertension Paternal Grandfather     Cancer Maternal Grandfather         UTERINE       Allergies:       Doxycycline    Medications:      Current Facility-Administered Medications   Medication Dose Route Frequency Provider Last Rate Last Dose    acetaminophen (TYLENOL) tablet 650 mg  650 mg Oral Q6H PRN Swapnil Campos MD        allopurinol (ZYLOPRIM) tablet 300 mg  300 mg Oral BID Swapnil Campos MD   300 mg at 07/15/18 0824    bisacodyl (DULCOLAX) EC tablet 10 mg  10 mg Oral Nightly Swapnil Campos MD        fluticasone Soheila Essie) 50 MCG/ACT nasal spray 1 spray  1 spray Nasal Daily Swapnil Campos MD   1 spray at 07/15/18 0710    furosemide (LASIX) tablet 40 mg  40 mg Oral Daily Swapnil Campos MD   40 mg at 07/15/18 0645    ibuprofen (ADVIL;MOTRIN) tablet 800 mg  800 mg Oral Q8H PRN Swapnil Campos MD        lisinopril (PRINIVIL;ZESTRIL) tablet 5 mg  5 mg Oral Daily Swapinl Campos MD        lisinopril (PRINIVIL;ZESTRIL) tablet 5 mg  5 mg Oral Nightly Swapnil Campos MD        metoprolol tartrate (LOPRESSOR) tablet 25 mg  25 mg Oral BID Swapnil Campos MD   25 mg at 07/15/18 0614    trospium (SANCTURA) tablet 20 mg  20 mg Oral BID AC Swapnil Campos MD   20 mg at 07/15/18 2044    vitamin B-12 (CYANOCOBALAMIN) tablet 1,000 mcg  1,000 mcg Oral Daily Swapnil Campos MD   1,000 mcg at 07/15/18 3044    levothyroxine (SYNTHROID) tablet 25 mcg  25 mcg Oral Daily Swapnil Campos MD   25 mcg at 07/15/18 0924    psyllium (METAMUCIL) 58.12 % packet 1 packet  1 packet Oral Nightly Swapnil Campos MD        heparin (porcine) injection 10,560 Units  80 Units/kg Intravenous PRN Concord Blacksmith, DO        heparin (porcine) injection 5,280 Units  40 Units/kg Intravenous PRN Kwaku Blacksmith, DO        sodium chloride flush 0.9 % injection 10 mL

## 2018-07-15 NOTE — PLAN OF CARE
Problem: Falls - Risk of:  Goal: Will remain free from falls  Will remain free from falls   Outcome: Met This Shift  No falls this shift. Problem: Breathing Pattern - Ineffective:  Goal: Ability to achieve and maintain a regular respiratory rate will improve  Ability to achieve and maintain a regular respiratory rate will improve   Outcome: Ongoing      Problem: Pain:  Goal: Pain level will decrease  Pain level will decrease   Outcome: Ongoing      Problem: Venous Thromboembolism:  Goal: Will show no signs or symptoms of venous thromboembolism  Will show no signs or symptoms of venous thromboembolism   Outcome: Ongoing      Problem: Risk for Impaired Skin Integrity  Goal: Tissue integrity - skin and mucous membranes  Structural intactness and normal physiological function of skin and  mucous membranes.    Outcome: Met This Shift

## 2018-07-15 NOTE — PROGRESS NOTES
Pt transferred to [facility]87 on cart from ER. No complications. Pt hooked on monitor, A/O to the room, educated on call light and to not get up out of bed. Call light within reach. Page out to Dr. Tad Dominguez

## 2018-07-15 NOTE — ED NOTES
Pt presents to ED c/o fever/chills and red/hot area to left thigh. Pt reports his symptoms started at 1500 today. Pt states he has felt cold all day and his leg has been hot and red. Pt states he had a hip replacement on the left hip 2 months ago. Pt denies any complications. Pt is A&O x4, PWD, eupneic, and no distress noted.       Charline Mkceon RN  07/15/18 [id number]

## 2018-07-15 NOTE — PROGRESS NOTES
Progress Note  7/15/2018 7:29 AM  Subjective:   Admit Date: 7/14/2018  PCP: Madeline Ferrell MD  Interval History: pt sleeping soundly. No new issues per nursing. Developed DVT in spite of eliquis 5 mg bid. Swelling and pain in leg. Diet: DIET GENERAL;  Medications:   Scheduled Meds:   lisinopril  5 mg Oral Nightly    allopurinol  300 mg Oral BID    bisacodyl  10 mg Oral Nightly    fluticasone  1 spray Nasal Daily    furosemide  40 mg Oral Daily    levothyroxine  1 tablet Oral Daily    lisinopril  5 mg Oral Daily    lisinopril  5 mg Oral Nightly    metoprolol tartrate  25 mg Oral BID    psyllium  1 packet Oral Nightly    trospium  20 mg Oral BID AC    vitamin B-12  1,000 mcg Oral Daily    sodium chloride flush  10 mL Intravenous 2 times per day    clindamycin  300 mg Oral TID     Continuous Infusions:   heparin (porcine)       CBC:   Recent Labs      07/14/18   2123  07/15/18   0454   WBC  10.6  9.2   HGB  14.4  13.8   PLT  234  184     BMP:    Recent Labs      07/14/18   2123  07/15/18   0454   NA  141  139   K  4.7  4.3   CL  101  102   CO2  28  23   BUN  25*  22   CREATININE  1.22*  1.05   GLUCOSE  89  111*     Hepatic: No results for input(s): AST, ALT, ALB, BILITOT, ALKPHOS in the last 72 hours. Troponin: No results for input(s): TROPONINI in the last 72 hours. BNP: No results for input(s): BNP in the last 72 hours. Lipids: No results for input(s): CHOL, HDL in the last 72 hours. Invalid input(s): LDLCALCU  INR: No results for input(s): INR in the last 72 hours.     Objective:   Vitals: BP (!) 118/59   Pulse 88   Temp 100.6 °F (38.1 °C) (Axillary)   Resp 16   Ht 5' 10\" (1.778 m)   Wt 291 lb (132 kg)   SpO2 94%   BMI 41.75 kg/m²   General appearance: alert and cooperative with exam  Neck: supple, symmetrical, trachea midline  Lungs: clear to auscultation bilaterally  Heart: regular rate and rhythm, S1, S2 normal, no murmur, click, rub or gallop  Abdomen: soft, non-tender; bowel sounds normal; no masses,  no organomegaly  Extremities: extremities normal, atraumatic, no cyanosis or edema  Neurologic: Mental status: Alert, oriented, thought content appropriate    Assessment and Plan:   1. DVT- on Heparin drip. Will consult vascular for opinion on treatment  2. Recent hip replacement-   3.  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

## 2018-07-15 NOTE — PLAN OF CARE
Problem: Falls - Risk of:  Goal: Will remain free from falls  Will remain free from falls   Outcome: Met This Shift  The patient remained free from falls this shift. Standby assistance to the bathroom with this writer several times; patient is very steady on his feet. No numbness or tingling in bilateral lower extremities. Problem: Breathing Pattern - Ineffective:  Goal: Ability to achieve and maintain a regular respiratory rate will improve  Ability to achieve and maintain a regular respiratory rate will improve   Outcome: Ongoing  The patient has slight dyspnea upon exertion, but d/t obesity rather than lung impairment. Lung sounds clear in uppers and decreased in lowers. Problem: Pain:  Goal: Pain level will decrease  Pain level will decrease   Outcome: Met This Shift  The patient has had no complaints of pain throughout the entire shift. Redness in upper left leg seems to have dissipated. Will continue to monitor closely. Problem: Venous Thromboembolism:  Goal: Will show no signs or symptoms of venous thromboembolism  Will show no signs or symptoms of venous thromboembolism   Outcome: Ongoing  Per Dr. Vinod Wright the patient's DVT is \"old\" and therefore he is fine with the patient continuing on oral Eliquis at 5 mg b.i.d., discontinuing heparin gtt, and implementing thigh-high JOHAN hose prior to discharge. Will continue to monitor closely. Problem: Risk for Impaired Skin Integrity  Goal: Tissue integrity - skin and mucous membranes  Structural intactness and normal physiological function of skin and  mucous membranes. Outcome: Ongoing  The patient's skin remains dry and intact. Assist patient with turning Q2 hours and PRN.

## 2018-07-16 LAB
ABSOLUTE EOS #: 0.3 K/UL (ref 0–0.4)
ABSOLUTE IMMATURE GRANULOCYTE: ABNORMAL K/UL (ref 0–0.3)
ABSOLUTE LYMPH #: 1 K/UL (ref 1–4.8)
ABSOLUTE MONO #: 0.5 K/UL (ref 0.1–1.3)
ANION GAP SERPL CALCULATED.3IONS-SCNC: 12 MMOL/L (ref 9–17)
BASOPHILS # BLD: 1 % (ref 0–2)
BASOPHILS ABSOLUTE: 0 K/UL (ref 0–0.2)
BUN BLDV-MCNC: 25 MG/DL (ref 8–23)
BUN/CREAT BLD: ABNORMAL (ref 9–20)
CALCIUM SERPL-MCNC: 8.8 MG/DL (ref 8.6–10.4)
CHLORIDE BLD-SCNC: 102 MMOL/L (ref 98–107)
CO2: 24 MMOL/L (ref 20–31)
CREAT SERPL-MCNC: 0.93 MG/DL (ref 0.7–1.2)
CULTURE: ABNORMAL
DIFFERENTIAL TYPE: ABNORMAL
DIRECT EXAM: ABNORMAL
DIRECT EXAM: ABNORMAL
EOSINOPHILS RELATIVE PERCENT: 6 % (ref 0–4)
GFR AFRICAN AMERICAN: >60 ML/MIN
GFR NON-AFRICAN AMERICAN: >60 ML/MIN
GFR SERPL CREATININE-BSD FRML MDRD: ABNORMAL ML/MIN/{1.73_M2}
GFR SERPL CREATININE-BSD FRML MDRD: ABNORMAL ML/MIN/{1.73_M2}
GLUCOSE BLD-MCNC: 116 MG/DL (ref 70–99)
HCT VFR BLD CALC: 38.8 % (ref 41–53)
HEMOGLOBIN: 12.7 G/DL (ref 13.5–17.5)
IMMATURE GRANULOCYTES: ABNORMAL %
LYMPHOCYTES # BLD: 20 % (ref 24–44)
Lab: ABNORMAL
MCH RBC QN AUTO: 32.3 PG (ref 26–34)
MCHC RBC AUTO-ENTMCNC: 32.7 G/DL (ref 31–37)
MCV RBC AUTO: 98.8 FL (ref 80–100)
MONOCYTES # BLD: 11 % (ref 1–7)
NRBC AUTOMATED: ABNORMAL PER 100 WBC
PDW BLD-RTO: 15.2 % (ref 11.5–14.9)
PLATELET # BLD: 180 K/UL (ref 150–450)
PLATELET ESTIMATE: ABNORMAL
PMV BLD AUTO: 7.6 FL (ref 6–12)
POTASSIUM SERPL-SCNC: 4.2 MMOL/L (ref 3.7–5.3)
RBC # BLD: 3.93 M/UL (ref 4.5–5.9)
RBC # BLD: ABNORMAL 10*6/UL
SEG NEUTROPHILS: 62 % (ref 36–66)
SEGMENTED NEUTROPHILS ABSOLUTE COUNT: 3 K/UL (ref 1.3–9.1)
SODIUM BLD-SCNC: 138 MMOL/L (ref 135–144)
SPECIMEN DESCRIPTION: ABNORMAL
STATUS: ABNORMAL
WBC # BLD: 4.8 K/UL (ref 3.5–11)
WBC # BLD: ABNORMAL 10*3/UL

## 2018-07-16 PROCEDURE — 36415 COLL VENOUS BLD VENIPUNCTURE: CPT

## 2018-07-16 PROCEDURE — 99232 SBSQ HOSP IP/OBS MODERATE 35: CPT | Performed by: FAMILY MEDICINE

## 2018-07-16 PROCEDURE — 6360000002 HC RX W HCPCS: Performed by: FAMILY MEDICINE

## 2018-07-16 PROCEDURE — 85025 COMPLETE CBC W/AUTO DIFF WBC: CPT

## 2018-07-16 PROCEDURE — 6370000000 HC RX 637 (ALT 250 FOR IP): Performed by: SURGERY

## 2018-07-16 PROCEDURE — 2060000000 HC ICU INTERMEDIATE R&B

## 2018-07-16 PROCEDURE — 2580000003 HC RX 258: Performed by: FAMILY MEDICINE

## 2018-07-16 PROCEDURE — 6370000000 HC RX 637 (ALT 250 FOR IP): Performed by: FAMILY MEDICINE

## 2018-07-16 PROCEDURE — 87040 BLOOD CULTURE FOR BACTERIA: CPT

## 2018-07-16 PROCEDURE — 80048 BASIC METABOLIC PNL TOTAL CA: CPT

## 2018-07-16 RX ADMIN — BISACODYL 10 MG: 5 TABLET, COATED ORAL at 21:05

## 2018-07-16 RX ADMIN — ALLOPURINOL 300 MG: 300 TABLET ORAL at 09:42

## 2018-07-16 RX ADMIN — APIXABAN 5 MG: 5 TABLET, FILM COATED ORAL at 09:41

## 2018-07-16 RX ADMIN — METOPROLOL TARTRATE 25 MG: 25 TABLET ORAL at 09:42

## 2018-07-16 RX ADMIN — APIXABAN 5 MG: 5 TABLET, FILM COATED ORAL at 21:04

## 2018-07-16 RX ADMIN — PSYLLIUM HUSK 1 PACKET: 3.4 POWDER ORAL at 21:04

## 2018-07-16 RX ADMIN — LISINOPRIL 5 MG: 5 TABLET ORAL at 21:05

## 2018-07-16 RX ADMIN — FUROSEMIDE 40 MG: 40 TABLET ORAL at 09:42

## 2018-07-16 RX ADMIN — TROSPIUM CHLORIDE 20 MG: 20 TABLET ORAL at 07:30

## 2018-07-16 RX ADMIN — CLINDAMYCIN HYDROCHLORIDE 300 MG: 150 CAPSULE ORAL at 09:42

## 2018-07-16 RX ADMIN — VANCOMYCIN HYDROCHLORIDE 1250 MG: 10 INJECTION, POWDER, LYOPHILIZED, FOR SOLUTION INTRAVENOUS at 21:17

## 2018-07-16 RX ADMIN — TROSPIUM CHLORIDE 20 MG: 20 TABLET ORAL at 16:38

## 2018-07-16 RX ADMIN — CLINDAMYCIN HYDROCHLORIDE 300 MG: 150 CAPSULE ORAL at 21:05

## 2018-07-16 RX ADMIN — CLINDAMYCIN HYDROCHLORIDE 300 MG: 150 CAPSULE ORAL at 16:32

## 2018-07-16 RX ADMIN — FLUTICASONE PROPIONATE 1 SPRAY: 50 SPRAY, METERED NASAL at 09:41

## 2018-07-16 RX ADMIN — Medication 10 ML: at 09:41

## 2018-07-16 RX ADMIN — LEVOTHYROXINE SODIUM 25 MCG: 25 TABLET ORAL at 07:30

## 2018-07-16 RX ADMIN — IBUPROFEN 800 MG: 800 TABLET ORAL at 10:38

## 2018-07-16 RX ADMIN — Medication 10 ML: at 21:17

## 2018-07-16 RX ADMIN — CYANOCOBALAMIN TAB 1000 MCG 1000 MCG: 1000 TAB at 09:42

## 2018-07-16 RX ADMIN — ALLOPURINOL 300 MG: 300 TABLET ORAL at 21:05

## 2018-07-16 RX ADMIN — METOPROLOL TARTRATE 25 MG: 25 TABLET ORAL at 21:05

## 2018-07-16 RX ADMIN — VANCOMYCIN HYDROCHLORIDE 1250 MG: 10 INJECTION, POWDER, LYOPHILIZED, FOR SOLUTION INTRAVENOUS at 09:46

## 2018-07-16 ASSESSMENT — PAIN SCALES - GENERAL: PAINLEVEL_OUTOF10: 2

## 2018-07-16 NOTE — PROGRESS NOTES
non-tender; bowel sounds normal; no masses,  no organomegaly  Extremities: chronic edema and color changes of calves, left thigh with eryth near groin, slightly warmer than rest of leg  Neurologic: Mental status: Alert, oriented, thought content appropriate    Assessment and Plan:   1. Cellulitis/ phlebitis-   2. DVT   3. Superficial clot- greater saphenous    Continue clindamycin. Will give a couple of doses of Vanco.  Continue Eliquis. Stockings today. F/u with vascular for mayur doppler in 3 months.  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

## 2018-07-16 NOTE — PROGRESS NOTES
5' 10.5\" (1.791 m)   Wt 294 lb 8.6 oz (133.6 kg)   SpO2 96%   BMI 41.66 kg/m²     LABS:  WBC:    Lab Results   Component Value Date    WBC 4.8 07/16/2018     H/H:    Lab Results   Component Value Date    HGB 12.7 07/16/2018    HCT 38.8 07/16/2018     PTT:    Lab Results   Component Value Date    APTT 80.7 07/15/2018   [APTT}  PT/INR:    Lab Results   Component Value Date    PROTIME 10.9 03/16/2018    INR 1.0 03/16/2018     HgBA1c:    Lab Results   Component Value Date    LABA1C 5.2 04/13/2017       Assessment   Myles Risk Score: Myles Scale Score: 20    Patient Active Problem List   Diagnosis Code    Hypertension I10    Chronic acquired lymphedema I89.0    Sleep apnea G47.30    History of CVA (cerebrovascular accident) Z80.78    Hyperlipidemia E78.5    CHF (congestive heart failure) (HCC) I50.9    Osteoarthritis M19.90    Renal insufficiency N28.9    Anemia D64.9    Hyperglycemia R73.9    Edema R60.9    Kidney stone N20.0    Prostate cancer ([address] C61    Adenocarcinoma of prostate ([address] C61    Ileus, postoperative ([address] K91.89, K56.7    Acquired hypothyroidism E03.9    S/P ablation of atrial fibrillation Z98.890, Z86.79    Arthritis of left hip M16.12    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 Z96.659    Lumbar radiculopathy M54.16    Spinal stenosis of lumbar region M48.061    Primary osteoarthritis of left hip M16.12    Surgical wound, non healing T81.89XA    Acute deep vein thrombosis (DVT) of left lower extremity ([address] I82.402       Measurements:  Wound 07/11/18 Other (Comment) Hip Left; Anterior #1 surgical, 5-15-18 (Active)   Wound Image   7/11/2018 10:39 AM   Wound Type Wound 7/16/2018  4:28 PM   Wound Other 7/15/2018  8:12 AM   Dressing Status Clean;Dry; Intact 7/16/2018  3:00 PM   Dressing Changed Changed/New 7/16/2018  4:28 PM   Dressing/Treatment Dry dressing 7/16/2018  4:28 PM   Wound Cleansed Rinsed/Irrigated with saline 7/16/2018  4:28 PM   Dressing Change Due

## 2018-07-16 NOTE — PLAN OF CARE
Problem: Falls - Risk of:  Goal: Will remain free from falls  Will remain free from falls   Outcome: Ongoing  No falls noted    Problem: Breathing Pattern - Ineffective:  Goal: Ability to achieve and maintain a regular respiratory rate will improve  Ability to achieve and maintain a regular respiratory rate will improve   Outcome: Ongoing      Problem: Pain:  Goal: Pain level will decrease  Pain level will decrease   Outcome: Ongoing  No pain at this time. 0/10 pain scale    Problem: Risk for Impaired Skin Integrity  Goal: Tissue integrity - skin and mucous membranes  Structural intactness and normal physiological function of skin and  mucous membranes. Outcome: Ongoing  Redness noted to LLE. Afebrile this shift. Blood cultures were taken today.

## 2018-07-16 NOTE — H&P
HISTORY and [address]         NAME:  Balbir Anna  MRN: [id number]   YOB: 1945   Date: 7/16/2018   Age: 74 y.o. Gender: male       COMPLAINT AND PRESENT HISTORY:      Balbir Anna is 74 y.o.,  male, here for leg problems. Patient comes in with erythema and swelling to his left leg, that he has noticed for approximately 4 days ago. Patient reports that his upper thigh has had numbness and warmth with the erythema. Pt denies any pain. Pt complained of chills and vomiting. No  Swelling in the calf. No sob, cough or chest pain. Pt has a hx of  Left total hip done,  2 months ago with some healing issues. Pt has a wound to the left hip that was being seen by wound management. Wound is healing and no drainage is reported currently. Pt has a hx of venous insuffiencey and has had x2  saphenous vein ablations in the past.     Venous doppler of the left leg done and revealing chronic superficial venous thrombosis identified in the great saphenous   vein. Patient was previously on anticoagulant ( Eliquist) and wearing antiembolism stockings for recent hip surgery. Vascular consulted and recommended to stay on Eliquist, ambulate with Luke hose and repeat duplex in 3 months. No current fever or chills. No bone or joint pains.  No deformities      DIAGNOSTIC RESULTS   Radiology:     [facility]   Vascular Lower Extremities DVT Study Procedure      Patient Name     Micaela Osborne       Date of Study             07/14/2018                     MOLINA MCCLAIN      Date of Birth     1945   Gender                    Male      Age               74 year(s)   Race                            Room Number       2087      Corporate ID #    [id number]      Patient Acct #   [de-identified]      MR #              [id number]       Sonographer               Hilario Ross      Accession #       [id number]    Interpreting Physician   Susie Eduardo      Referring Nurse                Referring Physician       ER MD *   Practitioner     Procedure  Type of Study:      Veins: Lower Extremities DVT Study, Venous Scan Lower Left.     Indications for Study:Pain.     Patient Status:Out Patient. Technical Quality:Limited visualization.   Limitation reason:Body habitus and swelling.      Conclusions      Summary      RIGHT:   The common femoral vein is patent without evidence of thrombosis      LEFT:   Age indeterminate deep vein thrombosis of the left leg involving the   popliteal veins.   Chronic superficial venous thrombosis identified in the great saphenous   vein.   The small saphenous vein was not visualized    Labs:  CBC:   Recent Labs      07/14/18   2123  07/15/18   0454  07/16/18   0608   WBC  10.6  9.2  4.8   HGB  14.4  13.8  12.7*   PLT  234  184  180     BMP:    Recent Labs      07/14/18   2123  07/15/18   0454  07/16/18   0608   NA  141  139  138   K  4.7  4.3  4.2   CL  101  102  102   CO2  28  23  24   BUN  25*  22  25*   CREATININE  1.22*  1.05  0.93   GLUCOSE  89  111*  116*       U/A:  Lab Results   Component Value Date    COLORU RED 05/01/2018    WBCUA 5 TO 10 05/01/2018    RBCUA TOO NUMEROUS TO COUNT 05/01/2018    MUCUS 1+ 05/01/2018    BACTERIA MODERATE 05/01/2018    SPECGRAV 1.021 05/01/2018    LEUKOCYTESUR SMALL 05/01/2018    GLUCOSEU NEGATIVE 05/01/2018    AMORPHOUS 1+ 05/01/2018       PAST MEDICAL HISTORY     Past Medical History:   Diagnosis Date    Anemia     Cellulitis of lower extremity     right     Cerebral artery occlusion with [facility] infarction ([facility]     1996    CHF (congestive heart failure) ([facility]     Chronic acquired lymphedema     Hernia, abdominal     History of blood transfusion 2003 7 2006 [facility] SURGERY    History of CVA (cerebrovascular accident) 54    DEFECIT MILD MEMORY AND SPEECH    Hyperlipidemia     Hypertension     Hypothyroid 09/2015    Ileus, postoperative (HCC)     Mild renal insufficiency     Morbid obesity Attack Mother     Colon Cancer Father     Heart Attack Father     Stroke Father     Psoriasis Brother 54        ALCOHOL RELATED    Dementia Maternal Grandmother     Diabetes Maternal Grandmother     Colon Cancer Paternal Grandmother         ?  Hypertension Paternal Grandfather     Cancer Maternal Grandfather         UTERINE       SOCIAL HISTORY       Social History     Social History    Marital status:      Spouse name: N/A    Number of children: N/A    Years of education: N/A     Social History Main Topics    Smoking status: Former Smoker     Packs/day: 2.00     Years: 10.00     Quit date: 12/13/1970    Smokeless tobacco: Never Used    Alcohol use Yes      Comment: 1 beer a day/or 1 glass of wine a day    Drug use: No    Sexual activity: Not Asked     Other Topics Concern    None     Social History Narrative    None           REVIEW OF SYSTEMS      Allergies   Allergen Reactions    Doxycycline Rash       No current facility-administered medications on file prior to encounter. Current Outpatient Prescriptions on File Prior to Encounter   Medication Sig Dispense Refill    lisinopril (PRINIVIL;ZESTRIL) 5 MG tablet TAKE 1 TABLET DAILY 90 tablet 2    levothyroxine (SYNTHROID) 25 MCG tablet TAKE 1 TABLET DAILY 90 tablet 2    metoprolol tartrate (LOPRESSOR) 25 MG tablet Take 25 mg by mouth 2 times daily      apixaban (ELIQUIS) 5 MG TABS tablet Take 5 mg by mouth 2 times daily      trospium (SANCTURA) 20 MG tablet Take 20 mg by mouth 2 times daily      Potassium 99 MG TABS Take by mouth      furosemide (LASIX) 40 MG tablet TAKE 1 TABLET DAILY 90 tablet 3    fluticasone (FLONASE) 50 MCG/ACT nasal spray 1 spray by Nasal route daily 1 Bottle 3    Multiple Vitamins-Minerals (OCUVITE PO) Take 1 tablet by mouth daily      allopurinol (ZYLOPRIM) 300 MG tablet Take 300 mg by mouth 2 times daily      vitamin B-12 (CYANOCOBALAMIN) 1000 MCG tablet Take 1,000 mcg by mouth daily.         Cholecalciferol (VITAMIN D-3 PO) Take  by mouth daily. 2000 IU daily       Coenzyme Q10 (CO Q 10 PO) Take 1 tablet by mouth daily       ibuprofen (ADVIL;MOTRIN) 800 MG tablet Take 1 tablet by mouth every 8 hours as needed for Pain 30 tablet 0    psyllium (KONSYL) 28.3 % PACK Take 1 packet by mouth nightly      Bisacodyl (DULCOLAX PO) Take 1 tablet by mouth nightly      Aspirin-Acetaminophen-Caffeine (EXCEDRIN PO) Take by mouth      acetaminophen (TYLENOL) 325 MG tablet Take 650 mg by mouth every 6 hours as needed for Pain      CINNAMON PO Take 2 capsules by mouth daily      Probiotic Product (PROBIOTIC ADVANCED PO) Take by mouth      Elastic Bandages & Supports (JOBST RELIEF 30-40MMHG XL) MISC 4 pair knee high open toe. 4 each 0                      General health:  Fairly good. No fever or chills. Skin:  No itching, redness or rash. Head, eyes, ears, nose, throat:  No headache, epistaxis, rhinorrhea hearing loss or sore throat. Neck:  No pain, stiffness or masses. Cardiovascular/Respiratory system:  No chest pain, palpitation, shortness of breath, coughing or expectoration. Gastrointestinal tract: No abdominal pain, nausea, vomiting, diarrhea or constipation. Genitourinary:  No burning on micturition. No hesitancy, urgency, frequency or discoloration of urine. Locomotor: Percell Brakeman See HPI. Neuropsychiatric:  No referable complaints. GENERAL PHYSICAL EXAM:         Vitals: /67   Pulse 66   Temp 97 °F (36.1 °C) (Oral)   Resp 17   Ht 5' 10.5\" (1.791 m)   Wt 294 lb 8.6 oz (133.6 kg)   SpO2 96%   BMI 41.66 kg/m²  Body mass index is 41.66 kg/m². GENERAL APPEARANCE:  Zhou Valles is 74 y.o.,  male, moderately obese, nourished, conscious, alert. Does not appear to be distress or pain at this time. SKIN:  Warm, dry, no cyanosis or jaundice. Vascular changes noted to ernesto LE. Erythema to left upper thigh.     HEAD:

## 2018-07-17 VITALS
TEMPERATURE: 97.9 F | WEIGHT: 294.53 LBS | HEIGHT: 71 IN | OXYGEN SATURATION: 95 % | DIASTOLIC BLOOD PRESSURE: 55 MMHG | RESPIRATION RATE: 12 BRPM | SYSTOLIC BLOOD PRESSURE: 126 MMHG | BODY MASS INDEX: 41.23 KG/M2 | HEART RATE: 66 BPM

## 2018-07-17 PROBLEM — L03.116 CELLULITIS OF LEFT LOWER EXTREMITY: Status: ACTIVE | Noted: 2018-07-17

## 2018-07-17 LAB
ABSOLUTE EOS #: 0.4 K/UL (ref 0–0.4)
ABSOLUTE IMMATURE GRANULOCYTE: ABNORMAL K/UL (ref 0–0.3)
ABSOLUTE LYMPH #: 1.2 K/UL (ref 1–4.8)
ABSOLUTE MONO #: 0.5 K/UL (ref 0.1–1.3)
ANION GAP SERPL CALCULATED.3IONS-SCNC: 12 MMOL/L (ref 9–17)
BASOPHILS # BLD: 1 % (ref 0–2)
BASOPHILS ABSOLUTE: 0 K/UL (ref 0–0.2)
BUN BLDV-MCNC: 27 MG/DL (ref 8–23)
BUN/CREAT BLD: ABNORMAL (ref 9–20)
CALCIUM SERPL-MCNC: 9.4 MG/DL (ref 8.6–10.4)
CHLORIDE BLD-SCNC: 101 MMOL/L (ref 98–107)
CO2: 27 MMOL/L (ref 20–31)
CREAT SERPL-MCNC: 0.99 MG/DL (ref 0.7–1.2)
DIFFERENTIAL TYPE: ABNORMAL
EOSINOPHILS RELATIVE PERCENT: 8 % (ref 0–4)
GFR AFRICAN AMERICAN: >60 ML/MIN
GFR NON-AFRICAN AMERICAN: >60 ML/MIN
GFR SERPL CREATININE-BSD FRML MDRD: ABNORMAL ML/MIN/{1.73_M2}
GFR SERPL CREATININE-BSD FRML MDRD: ABNORMAL ML/MIN/{1.73_M2}
GLUCOSE BLD-MCNC: 118 MG/DL (ref 70–99)
HCT VFR BLD CALC: 40.1 % (ref 41–53)
HEMOGLOBIN: 13.1 G/DL (ref 13.5–17.5)
IMMATURE GRANULOCYTES: ABNORMAL %
LYMPHOCYTES # BLD: 25 % (ref 24–44)
MCH RBC QN AUTO: 32.1 PG (ref 26–34)
MCHC RBC AUTO-ENTMCNC: 32.8 G/DL (ref 31–37)
MCV RBC AUTO: 98 FL (ref 80–100)
MONOCYTES # BLD: 9 % (ref 1–7)
NRBC AUTOMATED: ABNORMAL PER 100 WBC
PDW BLD-RTO: 15 % (ref 11.5–14.9)
PLATELET # BLD: 212 K/UL (ref 150–450)
PLATELET ESTIMATE: ABNORMAL
PMV BLD AUTO: 7.6 FL (ref 6–12)
POTASSIUM SERPL-SCNC: 4.5 MMOL/L (ref 3.7–5.3)
RBC # BLD: 4.09 M/UL (ref 4.5–5.9)
RBC # BLD: ABNORMAL 10*6/UL
SEG NEUTROPHILS: 57 % (ref 36–66)
SEGMENTED NEUTROPHILS ABSOLUTE COUNT: 2.8 K/UL (ref 1.3–9.1)
SODIUM BLD-SCNC: 140 MMOL/L (ref 135–144)
WBC # BLD: 4.9 K/UL (ref 3.5–11)
WBC # BLD: ABNORMAL 10*3/UL

## 2018-07-17 PROCEDURE — 6370000000 HC RX 637 (ALT 250 FOR IP): Performed by: FAMILY MEDICINE

## 2018-07-17 PROCEDURE — 85025 COMPLETE CBC W/AUTO DIFF WBC: CPT

## 2018-07-17 PROCEDURE — 2580000003 HC RX 258: Performed by: FAMILY MEDICINE

## 2018-07-17 PROCEDURE — 6370000000 HC RX 637 (ALT 250 FOR IP): Performed by: SURGERY

## 2018-07-17 PROCEDURE — 36415 COLL VENOUS BLD VENIPUNCTURE: CPT

## 2018-07-17 PROCEDURE — 6360000002 HC RX W HCPCS: Performed by: FAMILY MEDICINE

## 2018-07-17 PROCEDURE — 80048 BASIC METABOLIC PNL TOTAL CA: CPT

## 2018-07-17 PROCEDURE — 99239 HOSP IP/OBS DSCHRG MGMT >30: CPT | Performed by: FAMILY MEDICINE

## 2018-07-17 RX ORDER — CLINDAMYCIN HYDROCHLORIDE 300 MG/1
300 CAPSULE ORAL 3 TIMES DAILY
Qty: 30 CAPSULE | Refills: 0 | Status: SHIPPED | OUTPATIENT
Start: 2018-07-17 | End: 2018-07-27

## 2018-07-17 RX ADMIN — METOPROLOL TARTRATE 25 MG: 25 TABLET ORAL at 09:27

## 2018-07-17 RX ADMIN — VANCOMYCIN HYDROCHLORIDE 1250 MG: 10 INJECTION, POWDER, LYOPHILIZED, FOR SOLUTION INTRAVENOUS at 09:26

## 2018-07-17 RX ADMIN — TROSPIUM CHLORIDE 20 MG: 20 TABLET ORAL at 09:26

## 2018-07-17 RX ADMIN — APIXABAN 5 MG: 5 TABLET, FILM COATED ORAL at 09:26

## 2018-07-17 RX ADMIN — FLUTICASONE PROPIONATE 1 SPRAY: 50 SPRAY, METERED NASAL at 09:26

## 2018-07-17 RX ADMIN — CLINDAMYCIN HYDROCHLORIDE 300 MG: 150 CAPSULE ORAL at 14:09

## 2018-07-17 RX ADMIN — LEVOTHYROXINE SODIUM 25 MCG: 25 TABLET ORAL at 06:24

## 2018-07-17 RX ADMIN — CLINDAMYCIN HYDROCHLORIDE 300 MG: 150 CAPSULE ORAL at 09:26

## 2018-07-17 RX ADMIN — IBUPROFEN 800 MG: 800 TABLET ORAL at 09:27

## 2018-07-17 RX ADMIN — FUROSEMIDE 40 MG: 40 TABLET ORAL at 09:27

## 2018-07-17 RX ADMIN — Medication 10 ML: at 09:26

## 2018-07-17 RX ADMIN — ALLOPURINOL 300 MG: 300 TABLET ORAL at 09:27

## 2018-07-17 RX ADMIN — CYANOCOBALAMIN TAB 1000 MCG 1000 MCG: 1000 TAB at 09:27

## 2018-07-17 ASSESSMENT — PAIN SCALES - GENERAL: PAINLEVEL_OUTOF10: 6

## 2018-07-17 NOTE — PLAN OF CARE
Problem: Risk for Impaired Skin Integrity  Goal: Tissue integrity - skin and mucous membranes  Structural intactness and normal physiological function of skin and  mucous membranes. Outcome: Ongoing  Skin intact. Able to turn and reposition self in bed.

## 2018-07-17 NOTE — PLAN OF CARE
Problem: Falls - Risk of:  Goal: Will remain free from falls  Will remain free from falls   Outcome: Ongoing  Patient alert and oriented. Up per self with steady gait. Instructed to call nurse if assistance needed.

## 2018-07-17 NOTE — CARE COORDINATION
ONGOING DISCHARGE PLAN:    Discharge plan for the patient is home with spouse and to resume outpatient physical therapy. The patient was already on eliquis prior to admission. Active orders for IV vanco and oral clindamycin. Will continue to follow for additional discharge needs.     Electronically signed by Boogie Verma RN on 7/16/2018 at 3:00 PM
Reviewed new antibiotic prescription for patient at discharge. Information added to discharge instructions    - reviewed possible and common side effects. Especially monitoring for diarrhea due to cleocin   antibiotic use. -reviewed directions for when to take antibiotic, and dietary restrictions. - emphasized importance of completing antibiotic therapy. - reviewed when to call physician. Cleocin called in to [facility] [address] in [address] [place] [address].
Independent  Ability handle personal hygiene needs (bathing/dressing/grooming): Independent  Ability to manage medications: Independent  Ability to prepare food:  Independent  Ability to maintain home (clean home, laundry): Independent  Ability to do shopping:  Independent  Ability to manage finances: Independent  Hearing and Vision  Visual Impairment:  Visual impairment (Glasses/contacts)  Care Transitions Interventions     Other Services:  Completed (Comment: [id number])          Follow Up  Future Appointments  Date Time Provider [facility]   7/18/2018 10:15 AM DO NISREEN Scott WND CAR [place]   8/22/2018 2:10 PM Kennth Mcbrides, MD [facility]   9/20/2018 1:20 PM [facility]       Health Maintenance  There are no preventive care reminders to display for this patient.     Mira Mcgrath RN

## 2018-07-17 NOTE — PROGRESS NOTES
[facility] Nurse  Consult Note       NAME:  Amanda Zelaya  MEDICAL RECORD NUMBER:  [id number]  AGE: 74 y.o. GENDER: male  : 1945  TODAY'S DATE:  2018    Subjective   Reason for WOCN Evaluation and Assessment: 74year old male, s/p left total hip ASI on 5/15 by Dr Kathy Mata. There is an area at the middle of the incision line that is non healing. He was sent to wound care clinic for further evaluation. He was placed on Omnicef at the last visit with Dr Kathy Mata on  and has completed the antibiotics. Using silver alginate for wound care with healing noted. Dressing plan changed to fibracol yesterday. Amanda Zelaya is a 74 y.o. male referred by:   [] Physician  [] Nursing  [] Other:     Wound Identification:  Wound Type: non-healing surgical  Contributing Factors: decreased mobility, obesity and anticoagulation therapy    Current Wound Care Treatment:   fibracol to wound, cover with dry dressing    Patient Goal of Care:  [x] Wound Healing  [] Odor Control  [] Palliative Care  [] Pain Control   [] Other:         PAST MEDICAL HISTORY        Diagnosis Date    Anemia     Cellulitis of lower extremity     right     [facility]         CHF (congestive heart failure) (HCC)     Chronic acquired lymphedema     Hernia, abdominal     History of blood transfusion [facility] SURGERY    History of CVA (cerebrovascular accident) 54    DEFECIT MILD MEMORY AND SPEECH    Hyperlipidemia     Hypertension     Hypothyroid 2015    Ileus, postoperative [facility]     Mild renal insufficiency     Morbid obesity ([facility]     Non-healing wound of lower extremity     HISTORY OF, WAS SEEN IN WOUND CLINIC FOR COUPLE YRS.    Osteoarthritis     Phlebitis     HX. OF     Prolonged emergence from general anesthesia     x1 had to be put back on ventolator, after prostate surgery, had to be hospitalized x12 days.      date: 12/13/1970    Smokeless tobacco: Never Used    Alcohol use Yes      Comment: 1 beer a day/or 1 glass of wine a day       ALLERGIES    Allergies   Allergen Reactions    Doxycycline Rash       MEDICATIONS    No current facility-administered medications on file prior to encounter. Current Outpatient Prescriptions on File Prior to Encounter   Medication Sig Dispense Refill    lisinopril (PRINIVIL;ZESTRIL) 5 MG tablet TAKE 1 TABLET DAILY 90 tablet 2    levothyroxine (SYNTHROID) 25 MCG tablet TAKE 1 TABLET DAILY 90 tablet 2    metoprolol tartrate (LOPRESSOR) 25 MG tablet Take 25 mg by mouth 2 times daily      apixaban (ELIQUIS) 5 MG TABS tablet Take 5 mg by mouth 2 times daily      trospium (SANCTURA) 20 MG tablet Take 20 mg by mouth 2 times daily      Potassium 99 MG TABS Take by mouth      furosemide (LASIX) 40 MG tablet TAKE 1 TABLET DAILY 90 tablet 3    fluticasone (FLONASE) 50 MCG/ACT nasal spray 1 spray by Nasal route daily 1 Bottle 3    Multiple Vitamins-Minerals (OCUVITE PO) Take 1 tablet by mouth daily      allopurinol (ZYLOPRIM) 300 MG tablet Take 300 mg by mouth 2 times daily      vitamin B-12 (CYANOCOBALAMIN) 1000 MCG tablet Take 1,000 mcg by mouth daily.  Cholecalciferol (VITAMIN D-3 PO) Take  by mouth daily.  2000 IU daily       Coenzyme Q10 (CO Q 10 PO) Take 1 tablet by mouth daily       ibuprofen (ADVIL;MOTRIN) 800 MG tablet Take 1 tablet by mouth every 8 hours as needed for Pain 30 tablet 0    psyllium (KONSYL) 28.3 % PACK Take 1 packet by mouth nightly      Bisacodyl (DULCOLAX PO) Take 1 tablet by mouth nightly      Aspirin-Acetaminophen-Caffeine (EXCEDRIN PO) Take by mouth      acetaminophen (TYLENOL) 325 MG tablet Take 650 mg by mouth every 6 hours as needed for Pain      CINNAMON PO Take 2 capsules by mouth daily      Probiotic Product (PROBIOTIC ADVANCED PO) Take by mouth      Elastic Bandages & Supports (JOBST RELIEF 30-40MMHG XL) MISC 4 pair knee high open Type Wound 7/17/2018  3:34 PM   Wound Other 7/16/2018  8:00 PM   Dressing Status Clean;Dry; Intact 7/17/2018  9:27 AM   Dressing Changed Changed/New 7/17/2018  3:34 PM   Dressing/Treatment Dry dressing 7/17/2018  3:34 PM   Wound Cleansed Rinsed/Irrigated with saline 7/16/2018  4:28 PM   Dressing Change Due 07/18/18 7/17/2018  3:34 PM   Wound Length (cm) 0.2 cm 7/16/2018  4:28 PM   Wound Width (cm) 0.2 cm 7/16/2018  4:28 PM   Wound Depth (cm)  0.3 7/16/2018  4:28 PM   Calculated Wound Size (cm^2) (l*w) 0.04 cm^2 7/16/2018  4:28 PM   Change in Wound Size % (l*w) 75 7/16/2018  4:28 PM   Wound Assessment Clean;Pink 7/17/2018  3:34 PM   Drainage Amount Scant 7/17/2018  3:34 PM   Drainage Description Serous 7/17/2018  3:34 PM   Odor None 7/17/2018  3:34 PM   Margins Attached edges 7/11/2018 10:39 AM   Naatsha-wound Assessment Intact 7/16/2018  4:28 PM   Yellow%Wound Bed 100 7/11/2018 10:39 AM   Number of days: 6       Incision 05/15/18 Hip Left (Active)   Number of days: 63       Response to treatment:  Well tolerated by patient. Plan   Plan of Care: Wound 07/11/18 Other (Comment) Hip Left; Anterior #1 surgical, 5-15-18-Dressing/Treatment: Dry dressing (fibracol into wound bed)    Specialty Bed Required : No   [] Low Air Loss   [] Pressure Redistribution  [] Fluid Immersion  [] Bariatric  [] Total Pressure Relief  [] Other:     Current Diet: DIET GENERAL;  Dietician consult:  No    Discharge Plan:  Placement for patient upon discharge: independent living    Patient appropriate for Outpatient [address]: Yes- in 1 week- on follow up instructions    Referrals:  []   [] [facility]  [] Supplies  [] Other    Patient/Caregiver Teaching:  Level of patient/caregiver understanding able to:   [] Indicates understanding       [] Needs reinforcement  [] Unsuccessful      [x] Verbal Understanding  [] Demonstrated understanding       [] No evidence of learning  [] Refused teaching         [] N/A Electronically signed by DONALDO Toribio - CNP, CWOCN on 7/17/2018 at 3:37 PM

## 2018-07-18 ENCOUNTER — CARE COORDINATION (OUTPATIENT)
Dept: CASE MANAGEMENT | Age: 73
End: 2018-07-18

## 2018-07-18 DIAGNOSIS — L03.116 CELLULITIS OF LEFT LOWER EXTREMITY: Primary | ICD-10-CM

## 2018-07-18 PROCEDURE — 1111F DSCHRG MED/CURRENT MED MERGE: CPT

## 2018-07-18 NOTE — CARE COORDINATION
[facility] Transitions Initial Follow Up Call    Call within 2 business days of discharge: Yes    Patient: Sharyle Busman Patient : 1945   MRN: [id number]  Reason for Admission: There are no discharge diagnoses documented for the most recent discharge. Discharge Date: 18 RARS: Readmission Risk Score: 12     Spoke with: [address]: [address]    Non-face-to-face services provided:  Obtained and reviewed discharge summary and/or continuity of care documents  Writer spoke to patient, he stated he is well, no needs or concerns, has all his medications, reviewed discharge instructions and medications, 1111F order completed, refused writer's offer to schedule TCM visit with pcp stated he would do himself, stated he starts back at [facility] outpatient therapy on Monday, agreed to care transitions, will follow//JU  Care Transitions 24 Hour Call    Schedule Follow Up Appointment with PCP:  Declined  Do you have any ongoing symptoms?:  No  Do you have a copy of your discharge instructions?:  Yes  Do you have all of your prescriptions and are they filled?:  Yes  Have you been contacted by a [facility] Pharmacist?:  No  Have you scheduled your follow up appointment?:  No (Comment: refused writer's offer to assist in scheduling TCM visit, patient will schedule per self)  Were you discharged with any Home Care or Post Acute Services:  Yes  Post Acute Services: Other (Comment: [facility]  outpatient therapy)  Patient DME:  Themarcio Alvarado cane, Walker, Shower chair, Other  Other Patient DME:  grab bars, reacher  Do you have support at home?:  Partner/Spouse/SO  Do you feel like you have everything you need to keep you well at home?:  Yes  Care Transitions Interventions     Other Services:  Completed (Comment:  [id number] [facility] [address])          Follow Up  Future Appointments  Date Time Provider [place]   2018 2:00 PM DONALDO Landry - CNP [facility] WND [facility] [place] [place]   2018 2:10 PM Shalini Carrera MD [facility]

## 2018-07-19 DIAGNOSIS — I82.492 ACUTE DEEP VEIN THROMBOSIS (DVT) OF OTHER SPECIFIED VEIN OF LEFT LOWER EXTREMITY (HCC): Primary | ICD-10-CM

## 2018-07-22 LAB
CULTURE: NORMAL
CULTURE: NORMAL
Lab: NORMAL
Lab: NORMAL
SPECIMEN DESCRIPTION: NORMAL
SPECIMEN DESCRIPTION: NORMAL
STATUS: NORMAL
STATUS: NORMAL

## 2018-07-25 ENCOUNTER — HOSPITAL ENCOUNTER (OUTPATIENT)
Dept: WOUND CARE | Age: 73
Discharge: HOME | End: 2018-07-25
Payer: MEDICARE

## 2018-08-29 ENCOUNTER — OFFICE VISIT (OUTPATIENT)
Dept: ORTHOPEDIC SURGERY | Age: 73
End: 2018-08-29
Payer: MEDICARE

## 2018-08-29 DIAGNOSIS — Z96.642 H/O TOTAL HIP ARTHROPLASTY, LEFT: Primary | ICD-10-CM

## 2018-08-29 PROCEDURE — 99212 OFFICE O/P EST SF 10 MIN: CPT | Performed by: ORTHOPAEDIC SURGERY

## 2018-08-29 NOTE — PROGRESS NOTES
after prostate surgery, had to be hospitalized x12 days.  [facility] CA [facility]     PVD (peripheral vascular disease) ([facility]     Sleep apnea     C-PAP NIGHTLY-PT INSTRUCTED TO BRING    SVT (supraventricular tachycardia) (HCC)     Uric acid kidney stone 12/2014    HAD 6 PASSSED ONE ON OWN, OTHER 5 IS BEING MONITORED    Wears glasses      Past Surgical History:   Procedure Laterality Date    ABLATION OF DYSRHYTHMIC FOCUS  03/16/2018    afib ablation    CARDIOVERSION  11/17/2017    COLONOSCOPY  2002, 2007    COLONOSCOPY  07/11/2016    polyp,bx    COLONOSCOPY  07/31/2017    CYST REMOVAL Left 08/05/2016    excision cyst anterior chest    HAMMER TOE SURGERY Bilateral     JOINT REPLACEMENT Bilateral     TKA    KNEE SURGERY Left 1985    UT COLON CA SCRN NOT  [address] IND N/A 7/31/2017    COLONOSCOPY performed by Lincoln Shook MD at [facility] Left 5/15/2018    HIP TOTAL ARTHROPLASTY MINIMALLY INVASIVE ASI WITH GPS performed by Geovanni Keyes MD at [address] [place] [address]  10/21/2015    robotic    STUDY POSS ABLATION  4/2/2018         TOTAL KNEE ARTHROPLASTY Bilateral LT-2003, RT-2006    TRANSESOPHAGEAL ECHOCARDIOGRAM  11/17/2017    TUMOR EXCISION      Throat/nose D/T benign tumor    UPPP UVULOPALATOPHARYGOPLASTY  90'S    VARICOSE VEIN SURGERY Bilateral 80's    VASCULAR SURGERY      VEINS STRIPPED X2 TO YULY. LEG. Family History   Problem Relation Age of Onset    Diabetes Mother     Hypertension Mother     Heart Attack Mother     Colon Cancer Father     Heart Attack Father     Stroke Father     Psoriasis Brother 54        ALCOHOL RELATED    Dementia Maternal Grandmother     Diabetes Maternal Grandmother     Colon Cancer Paternal Grandmother         ?  Hypertension Paternal Grandfather     Cancer Maternal Grandfather         UTERINE         No orders of the defined types were placed in this encounter.       1. H/O total hip arthroplasty, left        Ijeoma Wright

## 2018-09-18 ENCOUNTER — HOSPITAL ENCOUNTER (OUTPATIENT)
Age: 73
Setting detail: SPECIMEN
Discharge: HOME | End: 2018-09-18
Payer: MEDICARE

## 2018-09-19 LAB — PROSTATE SPECIFIC ANTIGEN: <0.01 UG/L

## 2018-10-01 ENCOUNTER — HOSPITAL ENCOUNTER (OUTPATIENT)
Dept: VASCULAR LAB | Age: 73
Discharge: HOME | End: 2018-10-01
Payer: MEDICARE

## 2018-10-01 DIAGNOSIS — I82.492 ACUTE DEEP VEIN THROMBOSIS (DVT) OF OTHER SPECIFIED VEIN OF LEFT LOWER EXTREMITY (HCC): ICD-10-CM

## 2018-10-01 PROCEDURE — 93970 EXTREMITY STUDY: CPT

## 2018-10-15 ENCOUNTER — INITIAL CONSULT (OUTPATIENT)
Dept: VASCULAR SURGERY | Age: 73
End: 2018-10-15
Payer: MEDICARE

## 2018-10-15 VITALS
BODY MASS INDEX: 42.7 KG/M2 | DIASTOLIC BLOOD PRESSURE: 78 MMHG | WEIGHT: 305 LBS | HEIGHT: 71 IN | SYSTOLIC BLOOD PRESSURE: 136 MMHG | RESPIRATION RATE: 18 BRPM

## 2018-10-15 DIAGNOSIS — I87.099 CHRONIC VENOUS HYPERTENSION DUE TO DEEP VEIN THROMBOSIS (DVT): Primary | ICD-10-CM

## 2018-10-15 PROCEDURE — 99214 OFFICE O/P EST MOD 30 MIN: CPT | Performed by: SURGERY

## 2018-10-15 ASSESSMENT — ENCOUNTER SYMPTOMS
COLOR CHANGE: 1
ABDOMINAL PAIN: 0
BACK PAIN: 1
ALLERGIC/IMMUNOLOGIC NEGATIVE: 1
SHORTNESS OF BREATH: 0

## 2018-10-15 NOTE — PROGRESS NOTES
tablet Take 5 mg by mouth nightly      levothyroxine (SYNTHROID) 25 MCG tablet TAKE 1 TABLET DAILY 90 tablet 2    metoprolol tartrate (LOPRESSOR) 25 MG tablet Take 25 mg by mouth 2 times daily      psyllium (KONSYL) 28.3 % PACK Take 1 packet by mouth nightly      Bisacodyl (DULCOLAX PO) Take 1 tablet by mouth nightly      Aspirin-Acetaminophen-Caffeine (EXCEDRIN PO) Take by mouth      apixaban (ELIQUIS) 5 MG TABS tablet Take 5 mg by mouth 2 times daily      trospium (SANCTURA) 20 MG tablet Take 20 mg by mouth 2 times daily      Potassium 99 MG TABS Take by mouth      fluticasone (FLONASE) 50 MCG/ACT nasal spray 1 spray by Nasal route daily 1 Bottle 3    acetaminophen (TYLENOL) 325 MG tablet Take 650 mg by mouth every 6 hours as needed for Pain      CINNAMON PO Take 2 capsules by mouth daily      Probiotic Product (PROBIOTIC ADVANCED PO) Take by mouth      Multiple Vitamins-Minerals (OCUVITE PO) Take 1 tablet by mouth daily      allopurinol (ZYLOPRIM) 300 MG tablet Take 300 mg by mouth 2 times daily      Elastic Bandages & Supports (JOBST RELIEF 30-40MMHG XL) MISC 4 pair knee high open toe. 4 each 0    Cholecalciferol (VITAMIN D-3 PO) Take  by mouth daily. 2000 IU daily       Coenzyme Q10 (CO Q 10 PO) Take 1 tablet by mouth daily        No current facility-administered medications for this visit. Social History:     Tobacco:    reports that he quit smoking about 47 years ago. He has a 20.00 pack-year smoking history. He has never used smokeless tobacco.  Alcohol:      reports that he drinks alcohol. Drug Use:  reports that he does not use drugs. Occupation:  retired    Review of Systems:     Review of Systems   Constitutional: Negative for chills and fever. HENT: Negative. Eyes: Negative for visual disturbance. Respiratory: Negative for shortness of breath. Cardiovascular: Positive for leg swelling. Negative for chest pain. Gastrointestinal: Negative for abdominal pain. Endocrine: Negative. Genitourinary: Negative. Musculoskeletal: Positive for arthralgias, back pain and gait problem. Skin: Positive for color change. Allergic/Immunologic: Negative. Neurological: Negative for weakness and numbness. Hematological: Negative. Psychiatric/Behavioral: Negative. Physical Exam:     Vitals:  /78 (Site: Right Upper Arm, Position: Sitting, Cuff Size: Large Adult)   Resp 18   Ht 5' 11\" (1.803 m) Comment: per pt  Wt (!) 305 lb (138.3 kg)   BMI 42.54 kg/m²     Physical Exam   Constitutional: He is oriented to person, place, and time. He appears well-developed and well-nourished. Eyes: Conjunctivae and EOM are normal.   Neck: Carotid bruit is not present. Cardiovascular: Normal rate, regular rhythm and normal heart sounds. Pulses:       Radial pulses are 2+ on the right side, and 2+ on the left side. Dorsalis pedis pulses are 2+ on the right side, and 2+ on the left side. Posterior tibial pulses are 1+ on the right side, and 1+ on the left side. Pulmonary/Chest: Effort normal. No respiratory distress. Abdominal: Soft. Normal appearance. Feet:   Right Foot:   Skin Integrity: Negative for ulcer or skin breakdown. Left Foot:   Skin Integrity: Negative for ulcer or skin breakdown. Lymphadenopathy:   Moderate swelling bilateral lower extremities   Neurological: He is alert and oriented to person, place, and time. He has normal strength. No sensory deficit. GCS eye subscore is 4. GCS verbal subscore is 5. GCS motor subscore is 6. Skin: Skin is warm and intact. Capillary refill takes less than 2 seconds. Hemosiderin deposits bilateral shin   Reticular and spider veins throughout feet and ankles  Scarring from prior ulcer over right shin/ankle   Psychiatric: He has a normal mood and affect.  His speech is normal and behavior is normal.       Imaging/Labs:     Venous duplex from 10/1/18 shows chronic left popliteal DVT    Assessment

## 2018-12-04 ENCOUNTER — HOSPITAL ENCOUNTER (OUTPATIENT)
Age: 73
Setting detail: SPECIMEN
Discharge: HOME | End: 2018-12-04
Payer: MEDICARE

## 2018-12-04 DIAGNOSIS — E03.9 ACQUIRED HYPOTHYROIDISM: ICD-10-CM

## 2018-12-04 DIAGNOSIS — I10 ESSENTIAL HYPERTENSION: ICD-10-CM

## 2018-12-04 DIAGNOSIS — E78.5 HYPERLIPIDEMIA, UNSPECIFIED HYPERLIPIDEMIA TYPE: ICD-10-CM

## 2018-12-04 DIAGNOSIS — M79.10 MYALGIA: ICD-10-CM

## 2018-12-04 LAB
ALBUMIN SERPL-MCNC: 4.6 G/DL (ref 3.5–5.2)
ALBUMIN/GLOBULIN RATIO: 2 (ref 1–2.5)
ALP BLD-CCNC: 89 U/L (ref 40–129)
ALT SERPL-CCNC: 18 U/L (ref 5–41)
AST SERPL-CCNC: 23 U/L
BILIRUB SERPL-MCNC: 0.6 MG/DL (ref 0.3–1.2)
BILIRUBIN DIRECT: 0.14 MG/DL
BILIRUBIN, INDIRECT: 0.46 MG/DL (ref 0–1)
CHOLESTEROL/HDL RATIO: 4.3
CHOLESTEROL: 158 MG/DL
GLOBULIN: NORMAL G/DL (ref 1.5–3.8)
HDLC SERPL-MCNC: 37 MG/DL
LDL CHOLESTEROL: 72 MG/DL (ref 0–130)
TOTAL PROTEIN: 6.9 G/DL (ref 6.4–8.3)
TRIGL SERPL-MCNC: 244 MG/DL
TSH SERPL DL<=0.05 MIU/L-ACNC: 3.6 MIU/L (ref 0.3–5)
VLDLC SERPL CALC-MCNC: ABNORMAL MG/DL (ref 1–30)

## 2018-12-12 RX ORDER — LISINOPRIL 5 MG/1
5 TABLET ORAL NIGHTLY
Qty: 90 TABLET | Refills: 2 | Status: SHIPPED | OUTPATIENT
Start: 2018-12-12 | End: 2019-09-09 | Stop reason: SDUPTHER

## 2018-12-12 RX ORDER — LEVOTHYROXINE SODIUM 0.03 MG/1
TABLET ORAL
Qty: 90 TABLET | Refills: 2 | Status: SHIPPED | OUTPATIENT
Start: 2018-12-12 | End: 2019-09-09 | Stop reason: SDUPTHER

## 2018-12-21 ENCOUNTER — CARE COORDINATION (OUTPATIENT)
Dept: CARE COORDINATION | Age: 73
End: 2018-12-21

## 2018-12-21 ENCOUNTER — OFFICE VISIT (OUTPATIENT)
Dept: PRIMARY CARE CLINIC | Age: 73
End: 2018-12-21
Payer: MEDICARE

## 2018-12-21 VITALS
DIASTOLIC BLOOD PRESSURE: 72 MMHG | BODY MASS INDEX: 44.74 KG/M2 | OXYGEN SATURATION: 95 % | SYSTOLIC BLOOD PRESSURE: 132 MMHG | WEIGHT: 315 LBS | HEART RATE: 60 BPM

## 2018-12-21 DIAGNOSIS — G47.33 OBSTRUCTIVE SLEEP APNEA SYNDROME: ICD-10-CM

## 2018-12-21 DIAGNOSIS — I50.32 CHRONIC DIASTOLIC CONGESTIVE HEART FAILURE (HCC): Primary | Chronic | ICD-10-CM

## 2018-12-21 DIAGNOSIS — E03.9 ACQUIRED HYPOTHYROIDISM: ICD-10-CM

## 2018-12-21 DIAGNOSIS — I10 ESSENTIAL HYPERTENSION: ICD-10-CM

## 2018-12-21 PROCEDURE — 99214 OFFICE O/P EST MOD 30 MIN: CPT | Performed by: FAMILY MEDICINE

## 2018-12-21 ASSESSMENT — ENCOUNTER SYMPTOMS
COUGH: 0
EYE DISCHARGE: 0
SHORTNESS OF BREATH: 0
DIARRHEA: 0
ABDOMINAL PAIN: 0
EYE REDNESS: 0
VOMITING: 0
NAUSEA: 0
SORE THROAT: 0
WHEEZING: 0
RHINORRHEA: 0

## 2018-12-21 ASSESSMENT — PATIENT HEALTH QUESTIONNAIRE - PHQ9
2. FEELING DOWN, DEPRESSED OR HOPELESS: 0
SUM OF ALL RESPONSES TO PHQ QUESTIONS 1-9: 1
SUM OF ALL RESPONSES TO PHQ QUESTIONS 1-9: 0
SUM OF ALL RESPONSES TO PHQ QUESTIONS 1-9: 0
SUM OF ALL RESPONSES TO PHQ9 QUESTIONS 1 & 2: 0
1. LITTLE INTEREST OR PLEASURE IN DOING THINGS: 0

## 2018-12-21 NOTE — PROGRESS NOTES
(SYNTHROID) 25 MCG tablet TAKE 1 TABLET DAILY 90 tablet 2    atorvastatin (LIPITOR) 10 MG tablet Take 1 tablet by mouth daily 90 tablet 3    furosemide (LASIX) 40 MG tablet TAKE 1 TABLET DAILY 90 tablet 3    metoprolol tartrate (LOPRESSOR) 25 MG tablet Take 25 mg by mouth 2 times daily      Aspirin-Acetaminophen-Caffeine (EXCEDRIN PO) Take by mouth      apixaban (ELIQUIS) 5 MG TABS tablet Take 5 mg by mouth 2 times daily      Potassium 99 MG TABS Take by mouth      fluticasone (FLONASE) 50 MCG/ACT nasal spray 1 spray by Nasal route daily 1 Bottle 3    acetaminophen (TYLENOL) 325 MG tablet Take 650 mg by mouth every 6 hours as needed for Pain      CINNAMON PO Take 2 capsules by mouth daily      Probiotic Product (PROBIOTIC ADVANCED PO) Take by mouth      Multiple Vitamins-Minerals (OCUVITE PO) Take 1 tablet by mouth daily      allopurinol (ZYLOPRIM) 300 MG tablet Take 300 mg by mouth 2 times daily      Elastic Bandages & Supports (JOBST RELIEF 30-40MMHG XL) MISC 4 pair knee high open toe. 4 each 0    Cholecalciferol (VITAMIN D-3 PO) Take  by mouth daily. 2000 IU daily       Coenzyme Q10 (CO Q 10 PO) Take 1 tablet by mouth daily       psyllium (KONSYL) 28.3 % PACK Take 1 packet by mouth nightly      Bisacodyl (DULCOLAX PO) Take 1 tablet by mouth nightly      trospium (SANCTURA) 20 MG tablet Take 20 mg by mouth 2 times daily       No current facility-administered medications for this visit.       Allergies   Allergen Reactions    Doxycycline Rash       Health Maintenance   Topic Date Due    DTaP/Tdap/Td vaccine (1 - Tdap) 01/03/1962    Shingles Vaccine (1 of 2 - 2 Dose Series) 01/03/1993    Flu vaccine (1) 09/01/2018    Potassium monitoring  07/17/2019    Creatinine monitoring  07/17/2019    TSH testing  12/04/2019    Lipid screen  12/04/2023    Colon cancer screen colonoscopy  07/31/2027    Pneumococcal low/med risk  Completed    AAA screen  Completed       Subjective:      Review of

## 2018-12-26 ENCOUNTER — OFFICE VISIT (OUTPATIENT)
Dept: PRIMARY CARE CLINIC | Age: 73
End: 2018-12-26
Payer: MEDICARE

## 2018-12-26 ENCOUNTER — CARE COORDINATION (OUTPATIENT)
Dept: CARE COORDINATION | Age: 73
End: 2018-12-26

## 2018-12-26 VITALS
WEIGHT: 315 LBS | DIASTOLIC BLOOD PRESSURE: 82 MMHG | OXYGEN SATURATION: 97 % | BODY MASS INDEX: 45.1 KG/M2 | HEIGHT: 70 IN | HEART RATE: 69 BPM | SYSTOLIC BLOOD PRESSURE: 144 MMHG

## 2018-12-26 DIAGNOSIS — Z00.00 ROUTINE GENERAL MEDICAL EXAMINATION AT A HEALTH CARE FACILITY: ICD-10-CM

## 2018-12-26 DIAGNOSIS — Z00.00 MEDICARE ANNUAL WELLNESS VISIT, SUBSEQUENT: Primary | ICD-10-CM

## 2018-12-26 PROBLEM — T81.89XA SURGICAL WOUND, NON HEALING: Status: RESOLVED | Noted: 2018-07-11 | Resolved: 2018-12-26

## 2018-12-26 PROBLEM — I82.402 ACUTE DEEP VEIN THROMBOSIS (DVT) OF LEFT LOWER EXTREMITY (HCC): Status: RESOLVED | Noted: 2018-07-14 | Resolved: 2018-12-26

## 2018-12-26 PROBLEM — L03.116 CELLULITIS OF LEFT LOWER EXTREMITY: Status: RESOLVED | Noted: 2018-07-17 | Resolved: 2018-12-26

## 2018-12-26 PROCEDURE — G0438 PPPS, INITIAL VISIT: HCPCS | Performed by: FAMILY MEDICINE

## 2018-12-26 ASSESSMENT — PATIENT HEALTH QUESTIONNAIRE - PHQ9
SUM OF ALL RESPONSES TO PHQ QUESTIONS 1-9: 0
1. LITTLE INTEREST OR PLEASURE IN DOING THINGS: 0
SUM OF ALL RESPONSES TO PHQ QUESTIONS 1-9: 0
2. FEELING DOWN, DEPRESSED OR HOPELESS: 0
SUM OF ALL RESPONSES TO PHQ9 QUESTIONS 1 & 2: 0

## 2018-12-26 ASSESSMENT — LIFESTYLE VARIABLES
HOW OFTEN DURING THE LAST YEAR HAVE YOU BEEN UNABLE TO REMEMBER WHAT HAPPENED THE NIGHT BEFORE BECAUSE YOU HAD BEEN DRINKING: 0
HOW OFTEN DURING THE LAST YEAR HAVE YOU HAD A FEELING OF GUILT OR REMORSE AFTER DRINKING: 0
HOW OFTEN DURING THE LAST YEAR HAVE YOU NEEDED AN ALCOHOLIC DRINK FIRST THING IN THE MORNING TO GET YOURSELF GOING AFTER A NIGHT OF HEAVY DRINKING: 0
HOW OFTEN DO YOU HAVE SIX OR MORE DRINKS ON ONE OCCASION: 0
HOW MANY STANDARD DRINKS CONTAINING ALCOHOL DO YOU HAVE ON A TYPICAL DAY: 0
AUDIT-C TOTAL SCORE: 4
HAS A RELATIVE, FRIEND, DOCTOR, OR ANOTHER HEALTH PROFESSIONAL EXPRESSED CONCERN ABOUT YOUR DRINKING OR SUGGESTED YOU CUT DOWN: 0
HOW OFTEN DO YOU HAVE A DRINK CONTAINING ALCOHOL: 4
HOW OFTEN DURING THE LAST YEAR HAVE YOU FOUND THAT YOU WERE NOT ABLE TO STOP DRINKING ONCE YOU HAD STARTED: 0
HAVE YOU OR SOMEONE ELSE BEEN INJURED AS A RESULT OF YOUR DRINKING: 0

## 2018-12-26 NOTE — PROGRESS NOTES
Medicare Annual Wellness Visit  Name: Yue Vallecillo Date: 2018   MRN: [id number] Sex: Male   Age: 74 y.o. Ethnicity: Non-/Non    : 1945 Race: Cammie Hernandez is here for Medicare AWV (Pt here for medicare wellness exam. Patient had a colonscopy in 2016. )    Screenings for behavioral, psychosocial and functional/safety risks, and cognitive dysfunction are all negative except as indicated below. These results, as well as other patient data from the [address] form, are documented in Flowsheets linked to this Encounter. Allergies   Allergen Reactions    Doxycycline Rash       Prior to Visit Medications    Medication Sig Taking?  Authorizing Provider   lisinopril (PRINIVIL;ZESTRIL) 5 MG tablet Take 1 tablet by mouth nightly Yes Iram Plan, MD   levothyroxine (SYNTHROID) 25 MCG tablet TAKE 1 TABLET DAILY Yes Riam Plan, MD   atorvastatin (LIPITOR) 10 MG tablet Take 1 tablet by mouth daily Yes Iram Plan, MD   furosemide (LASIX) 40 MG tablet TAKE 1 TABLET DAILY Yes Iram Plan, MD   metoprolol tartrate (LOPRESSOR) 25 MG tablet Take 25 mg by mouth 2 times daily Yes Historical Provider, MD   psyllium (KONSYL) 28.3 % PACK Take 1 packet by mouth nightly Yes Historical Provider, MD   Bisacodyl (DULCOLAX PO) Take 1 tablet by mouth nightly Yes Historical Provider, MD   Aspirin-Acetaminophen-Caffeine (EXCEDRIN PO) Take by mouth Yes Historical Provider, MD   apixaban (ELIQUIS) 5 MG TABS tablet Take 5 mg by mouth 2 times daily Yes Historical Provider, MD   trospium (SANCTURA) 20 MG tablet Take 20 mg by mouth 2 times daily Yes Historical Provider, MD   Potassium 99 MG TABS Take by mouth Yes Historical Provider, MD   fluticasone (FLONASE) 50 MCG/ACT nasal spray 1 spray by Nasal route daily Yes Iram Plan, MD   acetaminophen (TYLENOL) 325 MG tablet Take 650 mg by mouth every 6 hours as needed for Pain Yes Historical

## 2019-01-21 ENCOUNTER — CARE COORDINATION (OUTPATIENT)
Dept: CARE COORDINATION | Age: 74
End: 2019-01-21

## 2019-01-29 ENCOUNTER — CARE COORDINATION (OUTPATIENT)
Dept: CARE COORDINATION | Age: 74
End: 2019-01-29

## 2019-02-28 ENCOUNTER — CARE COORDINATION (OUTPATIENT)
Dept: CARE COORDINATION | Age: 74
End: 2019-02-28

## 2019-03-28 ENCOUNTER — OFFICE VISIT (OUTPATIENT)
Dept: PRIMARY CARE CLINIC | Age: 74
End: 2019-03-28
Payer: MEDICARE

## 2019-03-28 VITALS
SYSTOLIC BLOOD PRESSURE: 124 MMHG | BODY MASS INDEX: 47.41 KG/M2 | HEART RATE: 59 BPM | WEIGHT: 315 LBS | OXYGEN SATURATION: 95 % | DIASTOLIC BLOOD PRESSURE: 82 MMHG

## 2019-03-28 DIAGNOSIS — M54.16 LUMBAR RADICULOPATHY: ICD-10-CM

## 2019-03-28 DIAGNOSIS — Z98.890 S/P ABLATION OF ATRIAL FIBRILLATION: ICD-10-CM

## 2019-03-28 DIAGNOSIS — E03.9 ACQUIRED HYPOTHYROIDISM: ICD-10-CM

## 2019-03-28 DIAGNOSIS — I10 ESSENTIAL HYPERTENSION: ICD-10-CM

## 2019-03-28 DIAGNOSIS — Z86.79 S/P ABLATION OF ATRIAL FIBRILLATION: ICD-10-CM

## 2019-03-28 DIAGNOSIS — I50.32 CHRONIC DIASTOLIC CONGESTIVE HEART FAILURE (HCC): Primary | Chronic | ICD-10-CM

## 2019-03-28 DIAGNOSIS — Z86.73 HISTORY OF CVA (CEREBROVASCULAR ACCIDENT): ICD-10-CM

## 2019-03-28 PROCEDURE — 99213 OFFICE O/P EST LOW 20 MIN: CPT | Performed by: FAMILY MEDICINE

## 2019-03-28 ASSESSMENT — ENCOUNTER SYMPTOMS
SORE THROAT: 0
SHORTNESS OF BREATH: 0
COUGH: 0
WHEEZING: 0
ABDOMINAL PAIN: 0
DIARRHEA: 0
NAUSEA: 0
VOMITING: 0
RHINORRHEA: 0
EYE REDNESS: 0
EYE DISCHARGE: 0

## 2019-04-04 ENCOUNTER — CARE COORDINATION (OUTPATIENT)
Dept: CARE COORDINATION | Age: 74
End: 2019-04-04

## 2019-04-04 NOTE — CARE COORDINATION
Ambulatory Care Coordination Note  4/4/2019  CM Risk Score: 9  [place] Mortality Risk Score: 20    ACC: Shantel Berrios, ESTEFANIA    Summary Note: CHF is controlled, no leg swelling or weight gain. He has been walking more, has some left leg discomfort, feels like a pulled muscle in back of leg. Will try to get out and exercise more, be more active. Admits he is sedentary in the winter, watches a lot of TV. He saw cardiologist recently, no medications changed, sees Dr. Tuan Ray in a few weeks. Difficulty getting his CPAP supplies from [facility] [address], has to call them several times before can get them. He cleans his tubing and mask daily so it stays good longer. He sees Dr. Katelyn Torres once a year for follow up, last appt in October. Encouraged him to call that office if can't get supplies. CC Plan:   -All medications reviewed, continuing with CHF education review. Follow in one month, will assess for CC graduation.   Congestive Heart Failure Assessment    Are you currently restricting fluids?:  No Restriction  Do you understand a low sodium diet?:  Yes  Do you understand how to read food labels?:  Yes  How many restaurant meals do you eat per week?:  0  Do you salt your food before tasting it?:  No         Symptoms:   CHF associated dyspnea on exertion: Pos      Symptom course:  stable  Weight trend:  stable  Salt intake watch compared to last visit:  stable      and   General Assessment    Do you have any symptoms that are causing concern?:  Yes  Progression since Onset:  Intermittent - Waxing/Waning  Reported Symptoms:  Pain (Comment: left leg)               Care Coordination Interventions    Program Enrollment:  Complex Care  Referral from Primary Care Provider:  No  Suggested Interventions and Community Resources  Medi Set or Pill Pack:  Declined  Registered Dietician:  Declined         Goals Addressed                 This Visit's Progress     Conditions and Symptoms   Improving     I will schedule office mouth daily   Yes Historical Provider, MD   Probiotic Product (PROBIOTIC ADVANCED PO) Take by mouth   Yes Historical Provider, MD   Multiple Vitamins-Minerals (OCUVITE PO) Take 1 tablet by mouth daily   Yes Historical Provider, MD   allopurinol (ZYLOPRIM) 300 MG tablet Take 300 mg by mouth 2 times daily   Yes Historical Provider, MD   Cholecalciferol (VITAMIN D-3 PO) Take  by mouth daily.  2000 IU daily    Yes Historical Provider, MD   Coenzyme Q10 (CO Q 10 PO) Take 1 tablet by mouth daily    Yes Historical Provider, MD   Elastic Bandages & Supports (JOBST RELIEF 30-40MMHG XL) MISC 4 pair knee high open toe. 6/18/14   Mary Slade MD       Future Appointments   Date Time Provider Woodrow Johnsoni   6/10/2019  1:00 PM Rozina Pretty MD [facility]   9/26/2019  2:15 PM MD MINA Jones

## 2019-04-08 ENCOUNTER — HOSPITAL ENCOUNTER (OUTPATIENT)
Age: 74
Setting detail: SPECIMEN
Discharge: HOME | End: 2019-04-08
Payer: MEDICARE

## 2019-04-08 DIAGNOSIS — Z86.73 HISTORY OF CVA (CEREBROVASCULAR ACCIDENT): ICD-10-CM

## 2019-04-08 DIAGNOSIS — E03.9 ACQUIRED HYPOTHYROIDISM: ICD-10-CM

## 2019-04-08 DIAGNOSIS — I10 ESSENTIAL HYPERTENSION: ICD-10-CM

## 2019-04-08 LAB
ABSOLUTE EOS #: 0.34 K/UL (ref 0–0.44)
ABSOLUTE IMMATURE GRANULOCYTE: 0.03 K/UL (ref 0–0.3)
ABSOLUTE LYMPH #: 1.5 K/UL (ref 1.1–3.7)
ABSOLUTE MONO #: 0.49 K/UL (ref 0.1–1.2)
ALBUMIN SERPL-MCNC: 4.3 G/DL (ref 3.5–5.2)
ALBUMIN/GLOBULIN RATIO: 1.6 (ref 1–2.5)
ALP BLD-CCNC: 74 U/L (ref 40–129)
ALT SERPL-CCNC: 16 U/L (ref 5–41)
ANION GAP SERPL CALCULATED.3IONS-SCNC: 13 MMOL/L (ref 9–17)
AST SERPL-CCNC: 20 U/L
BASOPHILS # BLD: 1 % (ref 0–2)
BASOPHILS ABSOLUTE: 0.04 K/UL (ref 0–0.2)
BILIRUB SERPL-MCNC: 0.5 MG/DL (ref 0.3–1.2)
BUN BLDV-MCNC: 24 MG/DL (ref 8–23)
BUN/CREAT BLD: ABNORMAL (ref 9–20)
CALCIUM SERPL-MCNC: 10.2 MG/DL (ref 8.6–10.4)
CHLORIDE BLD-SCNC: 104 MMOL/L (ref 98–107)
CHOLESTEROL/HDL RATIO: 4
CHOLESTEROL: 143 MG/DL
CO2: 28 MMOL/L (ref 20–31)
CREAT SERPL-MCNC: 1.06 MG/DL (ref 0.7–1.2)
DIFFERENTIAL TYPE: ABNORMAL
EOSINOPHILS RELATIVE PERCENT: 5 % (ref 1–4)
GFR AFRICAN AMERICAN: >60 ML/MIN
GFR NON-AFRICAN AMERICAN: >60 ML/MIN
GFR SERPL CREATININE-BSD FRML MDRD: ABNORMAL ML/MIN/{1.73_M2}
GFR SERPL CREATININE-BSD FRML MDRD: ABNORMAL ML/MIN/{1.73_M2}
GLUCOSE BLD-MCNC: 103 MG/DL (ref 70–99)
HCT VFR BLD CALC: 50.7 % (ref 40.7–50.3)
HDLC SERPL-MCNC: 36 MG/DL
HEMOGLOBIN: 15.6 G/DL (ref 13–17)
IMMATURE GRANULOCYTES: 0 %
LDL CHOLESTEROL: 67 MG/DL (ref 0–130)
LYMPHOCYTES # BLD: 22 % (ref 24–43)
MCH RBC QN AUTO: 32.4 PG (ref 25.2–33.5)
MCHC RBC AUTO-ENTMCNC: 30.8 G/DL (ref 28.4–34.8)
MCV RBC AUTO: 105.2 FL (ref 82.6–102.9)
MONOCYTES # BLD: 7 % (ref 3–12)
NRBC AUTOMATED: 0 PER 100 WBC
PDW BLD-RTO: 14.8 % (ref 11.8–14.4)
PLATELET # BLD: 182 K/UL (ref 138–453)
PLATELET ESTIMATE: ABNORMAL
PMV BLD AUTO: 10.2 FL (ref 8.1–13.5)
POTASSIUM SERPL-SCNC: 5.2 MMOL/L (ref 3.7–5.3)
PROSTATE SPECIFIC ANTIGEN: <0.01 UG/L
RBC # BLD: 4.82 M/UL (ref 4.21–5.77)
RBC # BLD: ABNORMAL 10*6/UL
SEG NEUTROPHILS: 65 % (ref 36–65)
SEGMENTED NEUTROPHILS ABSOLUTE COUNT: 4.32 K/UL (ref 1.5–8.1)
SODIUM BLD-SCNC: 145 MMOL/L (ref 135–144)
TOTAL PROTEIN: 7 G/DL (ref 6.4–8.3)
TRIGL SERPL-MCNC: 198 MG/DL
TSH SERPL DL<=0.05 MIU/L-ACNC: 2.6 MIU/L (ref 0.3–5)
VLDLC SERPL CALC-MCNC: ABNORMAL MG/DL (ref 1–30)
WBC # BLD: 6.7 K/UL (ref 3.5–11.3)
WBC # BLD: ABNORMAL 10*3/UL

## 2019-04-21 ASSESSMENT — HOOS JR
BENDING TO THE FLOOR TO PICK UP OBJECT: 0
SITTING: 0
WALKING ON UNEVEN SURFACE: 2
GOING UP OR DOWN STAIRS: 2
RISING FROM SITTING: 0
RISING FROM SITTING: 2
LYING IN BED (TURNING OVER, MAINTAINING HIP POSITION): 0
GOING UP OR DOWN STAIRS: 0
WALKING ON UNEVEN SURFACE: 0

## 2019-04-21 ASSESSMENT — PROMIS GLOBAL HEALTH SCALE
IN THE PAST 7 DAYS, HOW WOULD YOU RATE YOUR FATIGUE ON AVERAGE [ON A SCALE FROM 1 (NONE) TO 5 (VERY SEVERE)]?: 4
IN GENERAL, WOULD YOU SAY YOUR HEALTH IS...[ON A SCALE OF 1 (POOR) TO 5 (EXCELLENT)]: 2
IN GENERAL, HOW WOULD YOU RATE YOUR SATISFACTION WITH YOUR SOCIAL ACTIVITIES AND RELATIONSHIPS [ON A SCALE OF 1 (POOR) TO 5 (EXCELLENT)]?: 3
WHO IS THE PERSON COMPLETING THE PROMIS V1.1 SURVEY?: 0
IN GENERAL, HOW WOULD YOU RATE YOUR PHYSICAL HEALTH [ON A SCALE OF 1 (POOR) TO 5 (EXCELLENT)]?: 2
IN THE PAST 7 DAYS, HOW OFTEN HAVE YOU BEEN BOTHERED BY EMOTIONAL PROBLEMS, SUCH AS FEELING ANXIOUS, DEPRESSED, OR IRRITABLE [ON A SCALE FROM 1 (NEVER) TO 5 (ALWAYS)]?: 1
TO WHAT EXTENT ARE YOU ABLE TO CARRY OUT YOUR EVERYDAY PHYSICAL ACTIVITIES SUCH AS WALKING, CLIMBING STAIRS, CARRYING GROCERIES, OR MOVING A CHAIR [ON A SCALE OF 1 (NOT AT ALL) TO 5 (COMPLETELY)]?: 3
IN GENERAL, HOW WOULD YOU RATE YOUR MENTAL HEALTH, INCLUDING YOUR MOOD AND YOUR ABILITY TO THINK [ON A SCALE OF 1 (POOR) TO 5 (EXCELLENT)]?: 2
IN GENERAL, PLEASE RATE HOW WELL YOU CARRY OUT YOUR USUAL SOCIAL ACTIVITIES (INCLUDES ACTIVITIES AT HOME, AT WORK, AND IN YOUR COMMUNITY, AND RESPONSIBILITIES AS A PARENT, CHILD, SPOUSE, EMPLOYEE, FRIEND, ETC) [ON A SCALE OF 1 (POOR) TO 5 (EXCELLENT)]?: 2
IN THE PAST 7 DAYS, HOW WOULD YOU RATE YOUR PAIN ON AVERAGE [ON A SCALE FROM 0 (NO PAIN) TO 10 (WORST IMAGINABLE PAIN)]?: 3
IN GENERAL, WOULD YOU SAY YOUR QUALITY OF LIFE IS...[ON A SCALE OF 1 (POOR) TO 5 (EXCELLENT)]: 3
HOW IS THE PROMIS V1.1 BEING ADMINISTERED?: 2

## 2019-05-06 ENCOUNTER — CARE COORDINATION (OUTPATIENT)
Dept: CARE COORDINATION | Age: 74
End: 2019-05-06

## 2019-05-06 NOTE — CARE COORDINATION
Ambulatory Care Coordination Note  5/6/2019  CM Risk Score: 9  [place] Mortality Risk Score: 20    ACC: Neel Mahmood, RN    Summary Note: His breathing has been stable. Gets tired easily, he thinks partly from metoprolol. No worsening leg swelling. He has had arm and hand cramping for a few days but has been cutting down a dead tree. Last lytes WNL. Encouraged to keep water intake up since on Lasix, do some stretching exercises and be more active throughout the week instead of doing a lot of activity on one day. Encouraged him to call office and make appt if symptoms don't get better. Had nausea and diarrhea last week but feels ok now. CC Plan:   -Follow in one month to complete CHF education, assess for graduation from care coordination. Congestive Heart Failure Assessment    Are you currently restricting fluids?:  No Restriction  Do you understand a low sodium diet?:  Yes  Do you understand how to read food labels?:  Yes  How many restaurant meals do you eat per week?:  0  Do you salt your food before tasting it?:  No         Symptoms:   CHF associated dyspnea on exertion: Pos, CHF associated fatigue: Pos      Symptom course:  stable  Weight trend:  stable  Salt intake watch compared to last visit:  stable      and   General Assessment    Do you have any symptoms that are causing concern?:  Yes  Progression since Onset:  Intermittent - Waxing/Waning  Reported Symptoms:  Other (Comment: muscle cramps in hands and arms)               Care Coordination Interventions    Program Enrollment:  Complex Care  Referral from Primary Care Provider:  No  Suggested Interventions and Community Resources  Medi Set or Pill Pack:  Declined  Registered Dietician:  Declined         Goals Addressed                 This Visit's Progress     Conditions and Symptoms   On track     I will schedule office visits, as directed by my provider. I will keep my appointment or reschedule if I have to cancel.   I will notify my provider of 1 tablet by mouth daily    Historical Provider, MD   allopurinol (ZYLOPRIM) 300 MG tablet Take 300 mg by mouth 2 times daily    Historical Provider, MD   Elastic Bandages & Supports (JOBST RELIEF 30-40MMHG XL) MISC 4 pair knee high open toe. 6/18/14   Steven Tong MD   Cholecalciferol (VITAMIN D-3 PO) Take  by mouth daily.  2000 IU daily     Historical Provider, MD   Coenzyme Q10 (CO Q 10 PO) Take 1 tablet by mouth daily     Historical Provider, MD       Future Appointments   Date Time Provider Woodrow Hall   6/10/2019  1:00 PM Jina Davis MD [facility]   9/26/2019  2:15 PM [facility]

## 2019-06-05 ENCOUNTER — CARE COORDINATION (OUTPATIENT)
Dept: CARE COORDINATION | Age: 74
End: 2019-06-05

## 2019-06-05 NOTE — CARE COORDINATION
Per wife, he is still sleeping. I will call back later in the afternoon.     Future Appointments   Date Time Provider Woodrow Hall   6/10/2019  1:00 PM [facility]   9/26/2019  2:15 PM Nahid Moreno MD [facility] [address] [facility]

## 2019-06-06 ENCOUNTER — CARE COORDINATION (OUTPATIENT)
Dept: CARE COORDINATION | Age: 74
End: 2019-06-06

## 2019-06-06 NOTE — CARE COORDINATION
Left VM message asking patient to call me back at [phone] for care coordination call. Will call again next week.     Future Appointments   Date Time Provider Woodrow Hall   6/10/2019  1:00 PM Dori Bernard MD [facility]   9/26/2019  2:15 PM Antoine Diaz MD [facility] [address] [facility]

## 2019-06-10 ENCOUNTER — OFFICE VISIT (OUTPATIENT)
Dept: ORTHOPEDIC SURGERY | Age: 74
End: 2019-06-10
Payer: MEDICARE

## 2019-06-10 DIAGNOSIS — M25.551 RIGHT HIP PAIN: ICD-10-CM

## 2019-06-10 DIAGNOSIS — Z96.642 HISTORY OF TOTAL LEFT HIP REPLACEMENT: Primary | ICD-10-CM

## 2019-06-10 PROCEDURE — 99213 OFFICE O/P EST LOW 20 MIN: CPT | Performed by: ORTHOPAEDIC SURGERY

## 2019-06-12 ENCOUNTER — CARE COORDINATION (OUTPATIENT)
Dept: CARE COORDINATION | Age: 74
End: 2019-06-12

## 2019-06-12 NOTE — CARE COORDINATION
Left VM asking patient to call me back at [phone] for care coordination call. Will attempt to call again next week.     Future Appointments   Date Time Provider Woodrow Isabel   6/26/2019  1:00 PM [facility] IR  [facility] Radiolog   9/11/2019  2:10 PM Ember Coffman MD [facility]   9/26/2019  2:15 PM Cristy Bolton MD [facility]

## 2019-06-12 NOTE — PROGRESS NOTES
Jay Real M.D.            [address] [place] [address]., [address] [facility], [address] [facility]             Dept Phone: [phone]             Dept Fax:  [phone]  She returns today he is status post left total hip arthroplasty. He is doing very well with this. This was done in May 2018. His right hip is starting to bother him    Physical examination notes there is no pain whatsoever when I motion his hip in flexion internal and external rotation. He does have some moderate discomfort in the right side although not severe. No limitation however. Leg lengths are essentially equal although difficult to assess to the patient size as well as knee deformities    XR taken today:  Xr Pelvis (1-2 Views)    Result Date: 6/11/2019  X-rays taken today including AP of the pelvis. Patient is previous left total hip arthroplasty. He has moderate to early significant DJD of the right hip. No evidence for avascular necrosis. Impression  Status post left total hip doing well 1 year  DJD right hip mild to moderate    Plan  Patient be scheduled for have interventional radiology inject his right hip. We will see him back here otherwise in 6 months or call the problems prior to that time    Review of Systems   Constitutional: Negative. HENT: Negative. Respiratory: Negative. Cardiovascular: Negative. Neurological: Negative. Musculoskeletal:   No chief complaint on file.           Current Outpatient Medications:     lisinopril (PRINIVIL;ZESTRIL) 5 MG tablet, Take 1 tablet by mouth nightly, Disp: 90 tablet, Rfl: 2    levothyroxine (SYNTHROID) 25 MCG tablet, TAKE 1 TABLET DAILY, Disp: 90 tablet, Rfl: 2    atorvastatin (LIPITOR) 10 MG tablet, Take 1 tablet by mouth daily, Disp: 90 tablet, Rfl: 3    furosemide (LASIX) 40 MG tablet, TAKE 1 TABLET DAILY, Disp: 90 tablet, Rfl: 3    metoprolol tartrate (LOPRESSOR) 25 MG tablet, Take 25 mg by mouth 2 times daily, Disp: , Rfl:     psyllium (KONSYL) 28.3 % PACK, Take 1 packet by mouth nightly, Disp: , Rfl:     Bisacodyl (DULCOLAX PO), Take 1 tablet by mouth as needed , Disp: , Rfl:     Aspirin-Acetaminophen-Caffeine (EXCEDRIN PO), Take by mouth, Disp: , Rfl:     apixaban (ELIQUIS) 5 MG TABS tablet, Take 5 mg by mouth 2 times daily, Disp: , Rfl:     trospium (SANCTURA) 20 MG tablet, Take 20 mg by mouth 2 times daily Prescribed by Dr. Saurabh Carrillo, Disp: , Rfl:     Potassium 99 MG TABS, Take by mouth, Disp: , Rfl:     fluticasone (FLONASE) 50 MCG/ACT nasal spray, 1 spray by Nasal route daily, Disp: 1 Bottle, Rfl: 3    acetaminophen (TYLENOL) 325 MG tablet, Take 650 mg by mouth every 6 hours as needed for Pain, Disp: , Rfl:     CINNAMON PO, Take 2 capsules by mouth daily, Disp: , Rfl:     Probiotic Product (PROBIOTIC ADVANCED PO), Take by mouth, Disp: , Rfl:     Multiple Vitamins-Minerals (OCUVITE PO), Take 1 tablet by mouth daily, Disp: , Rfl:     allopurinol (ZYLOPRIM) 300 MG tablet, Take 300 mg by mouth 2 times daily, Disp: , Rfl:     Elastic Bandages & Supports (JOBST RELIEF 30-40MMHG XL) MISC, 4 pair knee high open toe., Disp: 4 each, Rfl: 0    Cholecalciferol (VITAMIN D-3 PO), Take  by mouth daily.  2000 IU daily , Disp: , Rfl:     Coenzyme Q10 (CO Q 10 PO), Take 1 tablet by mouth daily , Disp: , Rfl:     Allergies   Allergen Reactions    Doxycycline Rash       Social History     Socioeconomic History    Marital status:      Spouse name: Not on file    Number of children: Not on file    Years of education: Not on file    Highest education level: Not on file   Occupational History    Not on file   Social Needs    Financial resource strain: Not on file    Food insecurity:     Worry: Not on file     Inability: Not on file    Transportation needs:     Medical: Not on file     Non-medical: Not on file   Tobacco Use    Smoking status: Former Smoker     Packs/day: 2.00     Years: 10.00     Pack years: 20.00     Last attempt to quit: 1970     Years since quittin.5    Smokeless tobacco: Never Used   Substance and Sexual Activity    Alcohol use: Yes     Comment: 1 beer a day/or 1 glass of wine a day    Drug use: No    Sexual activity: Not on file   Lifestyle    Physical activity:     Days per week: Not on file     Minutes per session: Not on file    Stress: Not on file   Relationships    Social connections:     Talks on phone: Not on file     Gets together: Not on file     Attends [employer] service: Not on file     Active member of club or organization: Not on file     Attends meetings of clubs or organizations: Not on file     Relationship status: Not on file    Intimate partner violence:     Fear of current or ex partner: Not on file     Emotionally abused: Not on file     Physically abused: Not on file     Forced sexual activity: Not on file   Other Topics Concern    Not on file   Social History Narrative    Not on file       Past Medical History:   Diagnosis Date    Anemia     Cellulitis of lower extremity     right     [facility] artery [facility]         CHF (congestive heart failure) [facility]     Chronic acquired lymphedema     Hernia, abdominal     History of blood transfusion 2003    [facility] History of CVA (cerebrovascular accident) 54    DEFECIT MILD MEMORY AND SPEECH    Hyperlipidemia     Hypertension     Hypothyroid 2015    Ileus, postoperative ([address]     Mild renal insufficiency     Morbid obesity ([address]     Non-healing wound of lower extremity     HISTORY OF, WAS SEEN IN WOUND CLINIC FOR COUPLE YRS.    Osteoarthritis     Phlebitis     HX. OF     Prolonged emergence from general anesthesia     x1 had to be put back on ventolator, after prostate surgery, had to be hospitalized x12 days.      Prostate CA ([address]     PVD (peripheral vascular disease) (HCC)     Sleep apnea     C-PAP NIGHTLY-PT INSTRUCTED TO BRING    SVT (supraventricular tachycardia) (HCC)     Uric acid kidney stone 12/2014    HAD 6 PASSSED ONE ON OWN, OTHER 5 IS BEING MONITORED    Wears glasses      Past Surgical History:   Procedure Laterality Date    ABLATION OF DYSRHYTHMIC FOCUS  03/16/2018    afib ablation    CARDIOVERSION  11/17/2017    COLONOSCOPY  2002, 2007    COLONOSCOPY  07/11/2016    polyp,bx    COLONOSCOPY  07/31/2017    CYST REMOVAL Left 08/05/2016    excision cyst anterior chest    HAMMER TOE SURGERY Bilateral     KNEE SURGERY Left 1985    [place] COLON CA SCRN NOT  [address] IND N/A 7/31/2017    COLONOSCOPY performed by Mica Meléndez MD at [facility] 5/15/2018    HIP TOTAL ARTHROPLASTY MINIMALLY INVASIVE ASI WITH GPS performed by Andre Posada MD at [address] [place] [address]  10/21/2015    robotic    STUDY POSS ABLATION  4/2/2018         TOTAL KNEE ARTHROPLASTY Bilateral LT-2003, RT-2006    TRANSESOPHAGEAL ECHOCARDIOGRAM  11/17/2017    TUMOR EXCISION      Throat/nose D/T benign tumor    UPPP UVULOPALATOPHARYGOPLASTY  90'S    VARICOSE VEIN SURGERY Bilateral 80's    VASCULAR SURGERY      VEINS STRIPPED X2 TO YULY. LEG. Family History   Problem Relation Age of Onset    Diabetes Mother     Hypertension Mother     Heart Attack Mother     Colon Cancer Father     Heart Attack Father     Stroke Father     Cirrhosis Brother 54        ALCOHOL RELATED    Dementia Maternal Grandmother     Diabetes Maternal Grandmother     Colon Cancer Paternal Grandmother         ?     Hypertension Paternal Grandfather     Cancer Maternal Grandfather         UTERINE           Orders Placed This Encounter   Procedures    XR PELVIS (1-2 VIEWS)     Standing Status:   Future     Number of Occurrences:   1     Standing Expiration Date:   6/10/2020    IR INJ ARTHROGRAM HIP RIGHT     INTRA ARTICULAR INJECTION PREFERRED PROTOCOL       FOR  __right hip________   INJECTION:      4 cc Xylocaine, 1% plain  4 cc Marcaine, 0.5%  1 cc Depomedrol 80 mgm/cc OR          Celestone 6 mgm/cc     Standing Status:   Future     Standing Expiration Date:   6/10/2020     Scheduling Instructions:      [facility]     Order Specific Question:   Reason for exam:     Answer:   rgiht hip  pain       1. History of total left hip replacement    2. Right hip pain            Kaden Ashraf MD    Please note that this chart was generated using voice recognition Dragon dictation software. Although every effort was made to ensure the accuracy of this automated transcription, some errors in transcription may have occurred.

## 2019-06-19 ENCOUNTER — CARE COORDINATION (OUTPATIENT)
Dept: CARE COORDINATION | Age: 74
End: 2019-06-19

## 2019-06-19 NOTE — CARE COORDINATION
Ambulatory Care Coordination Note  6/19/2019  CM Risk Score: 5  [place] Mortality Risk Score: 12    ACC: Susan Griffin RN    Summary Note: CHF symptoms stable. No leg swelling or worse dyspnea. Having more right hip pain, seeing Dr. Rodrigo Miller, will probably get injections. Has lost a couple of pounds lately, been busy at home and more active. Tires easily, has to take frequent breaks. Wearing his CPAP at HS. Denied any needs or concerns. Graduated from care coordination as meets criteria. Gave him my contact phone number for any future needs or concerns. Congestive Heart Failure Assessment    Are you currently restricting fluids?:  No Restriction  Do you understand a low sodium diet?:  Yes  Do you understand how to read food labels?:  Yes  How many restaurant meals do you eat per week?:  0  Do you salt your food before tasting it?:  No         Symptoms:   CHF associated dyspnea on exertion: Pos      Symptom course:  stable  Weight trend:  stable  Salt intake watch compared to last visit:  stable      and   General Assessment    Do you have any symptoms that are causing concern?:  Yes  Progression since Onset:  Intermittent - Waxing/Waning  Reported Symptoms:  Pain (Comment: hip pain)               Care Coordination Interventions    Program Enrollment:  Complex Care  Referral from Primary Care Provider:  No  Suggested Interventions and Community Resources  Medi Set or Pill Pack:  Declined  Registered Dietician:  Declined         Goals Addressed                 This Visit's Progress     Conditions and Symptoms   On track     I will schedule office visits, as directed by my provider. I will keep my appointment or reschedule if I have to cancel. I will notify my provider of any barriers to my plan of care. I will follow my Zone Management tool to seek urgent or emergent care. I will notify my provider of any symptoms that indicate a worsening of my condition.  CHF    Barriers: lack of motivation  Plan for overcoming my barriers: Education, [facility] calls  Confidence: 7/10  Anticipated Goal Completion Date: 4/21/2019              Prior to Admission medications    Medication Sig Start Date End Date Taking? Authorizing Provider   lisinopril (PRINIVIL;ZESTRIL) 5 MG tablet Take 1 tablet by mouth nightly 12/12/18   Nilam Duenas MD   levothyroxine (SYNTHROID) 25 MCG tablet TAKE 1 TABLET DAILY 12/12/18   Nilam Duenas MD   atorvastatin (LIPITOR) 10 MG tablet Take 1 tablet by mouth daily 9/20/18   Nilam Duenas MD   furosemide (LASIX) 40 MG tablet TAKE 1 TABLET DAILY 9/5/18   Nilam Duenas MD   metoprolol tartrate (LOPRESSOR) 25 MG tablet Take 25 mg by mouth 2 times daily    Historical Provider, MD   psyllium (KONSYL) 28.3 % PACK Take 1 packet by mouth nightly    Historical Provider, MD   Bisacodyl (DULCOLAX PO) Take 1 tablet by mouth as needed     Historical Provider, MD   Aspirin-Acetaminophen-Caffeine (EXCEDRIN PO) Take by mouth    Historical Provider, MD   apixaban (ELIQUIS) 5 MG TABS tablet Take 5 mg by mouth 2 times daily    Historical Provider, MD   trospium (SANCTURA) 20 MG tablet Take 20 mg by mouth 2 times daily Prescribed by Dr. Surjit Monroe Provider, MD   Potassium 99 MG TABS Take by mouth    Historical Provider, MD   fluticasone (FLONASE) 50 MCG/ACT nasal spray 1 spray by Nasal route daily 8/2/17   Nilam Duenas MD   acetaminophen (TYLENOL) 325 MG tablet Take 650 mg by mouth every 6 hours as needed for Pain    Historical Provider, MD   CINNAMON PO Take 2 capsules by mouth daily    Historical Provider, MD   Probiotic Product (PROBIOTIC ADVANCED PO) Take by mouth    Historical Provider, MD   Multiple Vitamins-Minerals (OCUVITE PO) Take 1 tablet by mouth daily    Historical Provider, MD   allopurinol (ZYLOPRIM) 300 MG tablet Take 300 mg by mouth 2 times daily    Historical Provider, MD   Elastic Bandages & Supports (JOBST RELIEF 30-40MMHG XL) MISC 4 pair knee high open toe. 6/18/14   Mary Slade MD   Cholecalciferol (VITAMIN D-3 PO) Take  by mouth daily.  2000 IU daily     Historical Provider, MD   Coenzyme Q10 (CO Q 10 PO) Take 1 tablet by mouth daily     Historical Provider, MD       Future Appointments   Date Time Provider Woodrow Isabel   6/28/2019  3:00 PM [facility] IR  [facility] SPECIAL [facility] Radiolog   9/11/2019  2:10 PM Rozina Pretty MD [facility]   9/26/2019  2:15 PM Elvira Davis MD [facility] [address] [facility]

## 2019-06-21 RX ORDER — BUPIVACAINE HYDROCHLORIDE 5 MG/ML
4 INJECTION, SOLUTION EPIDURAL; INTRACAUDAL ONCE
Status: CANCELLED | OUTPATIENT
Start: 2019-06-28

## 2019-06-21 RX ORDER — METHYLPREDNISOLONE ACETATE 40 MG/ML
80 INJECTION, SUSPENSION INTRA-ARTICULAR; INTRALESIONAL; INTRAMUSCULAR; SOFT TISSUE ONCE
Status: CANCELLED | OUTPATIENT
Start: 2019-06-28

## 2019-06-21 RX ORDER — LIDOCAINE HYDROCHLORIDE 10 MG/ML
4 INJECTION, SOLUTION EPIDURAL; INFILTRATION; INTRACAUDAL; PERINEURAL ONCE
Status: CANCELLED | OUTPATIENT
Start: 2019-06-28

## 2019-06-24 ENCOUNTER — HOSPITAL ENCOUNTER (OUTPATIENT)
Age: 74
Setting detail: SPECIMEN
Discharge: HOME | End: 2019-06-24
Payer: MEDICARE

## 2019-06-24 LAB
T3 TOTAL: 96 NG/DL (ref 80–200)
T4 TOTAL: 7.1 UG/DL (ref 4.5–12)
TSH SERPL DL<=0.05 MIU/L-ACNC: 2.91 MIU/L (ref 0.3–5)

## 2019-06-28 ENCOUNTER — HOSPITAL ENCOUNTER (OUTPATIENT)
Dept: INTERVENTIONAL RADIOLOGY/VASCULAR | Age: 74
Discharge: HOME | End: 2019-06-30
Payer: MEDICARE

## 2019-06-28 DIAGNOSIS — M25.551 RIGHT HIP PAIN: ICD-10-CM

## 2019-06-28 PROCEDURE — 2500000003 HC RX 250 WO HCPCS: Performed by: ORTHOPAEDIC SURGERY

## 2019-06-28 PROCEDURE — 2709999900 IR INJ ARTHROGRAM HIP RIGHT

## 2019-06-28 PROCEDURE — 6360000002 HC RX W HCPCS: Performed by: ORTHOPAEDIC SURGERY

## 2019-06-28 PROCEDURE — 20610 DRAIN/INJ JOINT/BURSA W/O US: CPT | Performed by: RADIOLOGY

## 2019-06-28 PROCEDURE — 6360000004 HC RX CONTRAST MEDICATION: Performed by: ORTHOPAEDIC SURGERY

## 2019-06-28 PROCEDURE — 77002 NEEDLE LOCALIZATION BY XRAY: CPT | Performed by: RADIOLOGY

## 2019-06-28 RX ORDER — METHYLPREDNISOLONE ACETATE 80 MG/ML
80 INJECTION, SUSPENSION INTRA-ARTICULAR; INTRALESIONAL; INTRAMUSCULAR; SOFT TISSUE ONCE
Status: COMPLETED | OUTPATIENT
Start: 2019-06-28 | End: 2019-06-28

## 2019-06-28 RX ORDER — LIDOCAINE HYDROCHLORIDE 10 MG/ML
4 INJECTION, SOLUTION EPIDURAL; INFILTRATION; INTRACAUDAL; PERINEURAL ONCE
Status: COMPLETED | OUTPATIENT
Start: 2019-06-28 | End: 2019-06-28

## 2019-06-28 RX ORDER — BUPIVACAINE HYDROCHLORIDE 5 MG/ML
4 INJECTION, SOLUTION EPIDURAL; INTRACAUDAL ONCE
Status: COMPLETED | OUTPATIENT
Start: 2019-06-28 | End: 2019-06-28

## 2019-06-28 RX ADMIN — METHYLPREDNISOLONE ACETATE 80 MG: 80 INJECTION, SUSPENSION INTRA-ARTICULAR; INTRALESIONAL; INTRAMUSCULAR; SOFT TISSUE at 14:46

## 2019-06-28 RX ADMIN — IOHEXOL 50 ML: 240 INJECTION, SOLUTION INTRATHECAL; INTRAVASCULAR; INTRAVENOUS; ORAL at 14:57

## 2019-06-28 RX ADMIN — LIDOCAINE HYDROCHLORIDE 4 ML: 10 INJECTION, SOLUTION EPIDURAL; INFILTRATION; INTRACAUDAL; PERINEURAL at 14:47

## 2019-06-28 RX ADMIN — BUPIVACAINE HYDROCHLORIDE 20 MG: 5 INJECTION, SOLUTION EPIDURAL; INTRACAUDAL; PERINEURAL at 14:46

## 2019-06-28 ASSESSMENT — PAIN SCALES - GENERAL: PAINLEVEL_OUTOF10: 0

## 2019-07-12 DIAGNOSIS — M25.511 ACUTE PAIN OF RIGHT SHOULDER: Primary | ICD-10-CM

## 2019-07-16 ENCOUNTER — OFFICE VISIT (OUTPATIENT)
Dept: ORTHOPEDIC SURGERY | Age: 74
End: 2019-07-16
Payer: MEDICARE

## 2019-07-16 DIAGNOSIS — G89.29 CHRONIC PAIN OF BOTH SHOULDERS: Primary | ICD-10-CM

## 2019-07-16 DIAGNOSIS — M19.012 OSTEOARTHRITIS OF LEFT GLENOHUMERAL JOINT: ICD-10-CM

## 2019-07-16 DIAGNOSIS — M19.011 OSTEOARTHRITIS OF RIGHT GLENOHUMERAL JOINT: ICD-10-CM

## 2019-07-16 DIAGNOSIS — M25.511 CHRONIC PAIN OF BOTH SHOULDERS: Primary | ICD-10-CM

## 2019-07-16 DIAGNOSIS — M25.512 CHRONIC PAIN OF BOTH SHOULDERS: Primary | ICD-10-CM

## 2019-07-16 PROCEDURE — 99213 OFFICE O/P EST LOW 20 MIN: CPT | Performed by: ORTHOPAEDIC SURGERY

## 2019-07-20 PROBLEM — M19.011 OSTEOARTHRITIS OF RIGHT GLENOHUMERAL JOINT: Status: ACTIVE | Noted: 2019-07-20

## 2019-07-20 PROBLEM — M19.012 OSTEOARTHRITIS OF LEFT GLENOHUMERAL JOINT: Status: ACTIVE | Noted: 2019-07-20

## 2019-07-20 NOTE — PROGRESS NOTES
Orthopedic Shoulder Encounter Note     Chief complaint: Bilateral shoulder pain    HPI: López Garcia is a 66 y.o.  right-hand dominant male who presents for evaluation of both his shoulders. They've been hurting for about a year now without precipitating trauma or injury. They hurt equally. He has pain when he raises his arms and with activity. It interrupts his sleep. He reports stiffness but denies any weakness. He's currently taking excedrin for pain. Previous treatment:    NSAIDs: None    Physical Therapy:  No    Injections: None    Surgeries: None    Review of Systems:     Constitution: no fever or chills   Pain level: 8/10  Musculoskeletal: As noted in the HPI   Neurologic: no neurologic symptoms    Past Medical History  Raquel Villasenor  has a past medical history of Anemia, Cellulitis of lower extremity, Cerebral artery occlusion with cerebral infarction ([address] CHF (congestive heart failure) ([address] Chronic acquired lymphedema, Hernia, abdominal, History of blood transfusion, History of CVA (cerebrovascular accident), Hyperlipidemia, Hypertension, Hypothyroid, Ileus, postoperative ([address] Mild renal insufficiency, Morbid obesity ([address] Non-healing wound of lower extremity, Osteoarthritis, Phlebitis, Prolonged emergence from general anesthesia, Prostate CA ([address] PVD (peripheral vascular disease) ([address] Sleep apnea, SVT (supraventricular tachycardia) ([address] Uric acid kidney stone, and Wears glasses. Past Surgical History  Raquel Villasenor  has a past surgical history that includes tumor excision; knee surgery (Left, 1985); Hammer toe surgery (Bilateral); UPPP (90'S); Prostatectomy (10/21/2015); cyst removal (Left, 08/05/2016); Total knee arthroplasty (Bilateral, LT-2003, RT-2006); Varicose vein surgery (Bilateral, 80's); pr colon ca scrn not hi rsk ind (N/A, 7/31/2017); Cardioversion (11/17/2017); transesophageal echocardiogram (11/17/2017); ablation of dysrhythmic focus (03/16/2018);  Study possible ablation (4/2/2018); Colonoscopy (2002, 2007); Colonoscopy (07/11/2016); Colonoscopy (07/31/2017); vascular surgery; and pr total hip arthroplasty (Left, 5/15/2018). Current Medications  Current Outpatient Medications   Medication Sig Dispense Refill    lisinopril (PRINIVIL;ZESTRIL) 5 MG tablet Take 1 tablet by mouth nightly 90 tablet 2    levothyroxine (SYNTHROID) 25 MCG tablet TAKE 1 TABLET DAILY 90 tablet 2    atorvastatin (LIPITOR) 10 MG tablet Take 1 tablet by mouth daily 90 tablet 3    furosemide (LASIX) 40 MG tablet TAKE 1 TABLET DAILY 90 tablet 3    metoprolol tartrate (LOPRESSOR) 25 MG tablet Take 25 mg by mouth 2 times daily      psyllium (KONSYL) 28.3 % PACK Take 1 packet by mouth nightly      Bisacodyl (DULCOLAX PO) Take 1 tablet by mouth as needed       Aspirin-Acetaminophen-Caffeine (EXCEDRIN PO) Take by mouth      apixaban (ELIQUIS) 5 MG TABS tablet Take 5 mg by mouth 2 times daily      trospium (SANCTURA) 20 MG tablet Take 20 mg by mouth 2 times daily Prescribed by Dr. Aly Wright Potassium 99 MG TABS Take by mouth      fluticasone (FLONASE) 50 MCG/ACT nasal spray 1 spray by Nasal route daily 1 Bottle 3    acetaminophen (TYLENOL) 325 MG tablet Take 650 mg by mouth every 6 hours as needed for Pain      CINNAMON PO Take 2 capsules by mouth daily      Probiotic Product (PROBIOTIC ADVANCED PO) Take by mouth      Multiple Vitamins-Minerals (OCUVITE PO) Take 1 tablet by mouth daily      allopurinol (ZYLOPRIM) 300 MG tablet Take 300 mg by mouth 2 times daily      Elastic Bandages & Supports (JOBST RELIEF 30-40MMHG XL) MISC 4 pair knee high open toe. 4 each 0    Cholecalciferol (VITAMIN D-3 PO) Take  by mouth daily. 2000 IU daily       Coenzyme Q10 (CO Q 10 PO) Take 1 tablet by mouth daily        No current facility-administered medications for this visit. Allergies  Allergies have been reviewed. Corrie Babin is allergic to doxycycline.     Social History  Corrie Babin  reports that he quit smoking about 48 years ago. He has a 20.00 pack-year smoking history. He has never used smokeless tobacco. He reports that he drinks alcohol. He reports that he does not use drugs. Family History  Taqueria's family history includes Cancer in his maternal grandfather; Cirrhosis (age of onset: 54) in his brother; Emery Cleverly in his father and paternal grandmother; Dementia in his maternal grandmother; Diabetes in his maternal grandmother and mother; Heart Attack in his father and mother; Hypertension in his mother and paternal grandfather; Stroke in his father. Physical Exam:     There were no vitals taken for this visit.    General Appearance: alert, well appearing, and in no distress  Mental Status: alert, oriented to person, place, and time  Gait: normal    Shoulder:    Skin: warm and dry, no rash or erythema; no swelling or obvious muscular atrophy  Vasculature: 2+ radial pulses bilaterally  Neuro: Sensation grossly intact to light touch diffusely  Tenderness: Tender to palpation anterior shoulders    ROM: (Degrees)    Right   A P   Left   A P    Elevation  80 90   Elevation  85 105  Abduction  70 80   Abduction  70  115  ER   30 30   ER   15 15  IR   Buttock   IR   Buttock   90 abd/ER      90 abd/ER     90 abd/IR      90 abd/IR     Crepitation  Yes    Crepitation Yes  Dyskenesia  No    Dyskenesia No      Muscle strength:    Right       Left    Deltoid   5    Deltoid   5  Supraspinatus  5    Supraspinatus  5  ER   5    ER   5  IR   5    IR   5    Special tests    Right   Rotator Cuff    Left    y   Painful arc    y   y   Pain with ER    y    y   Neer's     y    y   Hawkin's    y    n   Drop Arm    n  n   Lift off/Belly Press   n  n   ER Lag    n          TRISR [facility] Joint  n   AC tenderness   n  y   Cross-chest adduction  y       Labrum/biceps    y (equivocal)  [place]'s    y (equivocal)   y   Biceps sheer    y      y   Speed's/Yergason's   y    y   Tenderness Biceps Groove  y    n   Renato's    n         Instability  n   Ant

## 2019-07-30 ENCOUNTER — OFFICE VISIT (OUTPATIENT)
Dept: ORTHOPEDIC SURGERY | Age: 74
End: 2019-07-30
Payer: MEDICARE

## 2019-07-30 DIAGNOSIS — M19.012 OSTEOARTHRITIS OF LEFT GLENOHUMERAL JOINT: Primary | ICD-10-CM

## 2019-07-30 PROCEDURE — 20610 DRAIN/INJ JOINT/BURSA W/O US: CPT | Performed by: ORTHOPAEDIC SURGERY

## 2019-07-30 RX ORDER — LIDOCAINE HYDROCHLORIDE 10 MG/ML
5 INJECTION, SOLUTION INFILTRATION; PERINEURAL ONCE
Status: COMPLETED | OUTPATIENT
Start: 2019-07-30 | End: 2019-07-30

## 2019-07-30 RX ORDER — TRIAMCINOLONE ACETONIDE 40 MG/ML
40 INJECTION, SUSPENSION INTRA-ARTICULAR; INTRAMUSCULAR ONCE
Status: COMPLETED | OUTPATIENT
Start: 2019-07-30 | End: 2019-07-30

## 2019-07-30 RX ADMIN — LIDOCAINE HYDROCHLORIDE 5 ML: 10 INJECTION, SOLUTION INFILTRATION; PERINEURAL at 13:23

## 2019-07-30 RX ADMIN — TRIAMCINOLONE ACETONIDE 40 MG: 40 INJECTION, SUSPENSION INTRA-ARTICULAR; INTRAMUSCULAR at 13:24

## 2019-08-22 ENCOUNTER — ANESTHESIA EVENT (OUTPATIENT)
Dept: CARDIAC CATH/INVASIVE PROCEDURES | Age: 74
End: 2019-08-22
Payer: MEDICARE

## 2019-08-22 ENCOUNTER — ANESTHESIA (OUTPATIENT)
Dept: CARDIAC CATH/INVASIVE PROCEDURES | Age: 74
End: 2019-08-22
Payer: MEDICARE

## 2019-08-22 ENCOUNTER — HOSPITAL ENCOUNTER (OUTPATIENT)
Dept: CARDIAC CATH/INVASIVE PROCEDURES | Age: 74
Discharge: HOME | End: 2019-08-23
Attending: INTERNAL MEDICINE | Admitting: INTERNAL MEDICINE
Payer: MEDICARE

## 2019-08-22 VITALS — OXYGEN SATURATION: 97 % | TEMPERATURE: 96 F | SYSTOLIC BLOOD PRESSURE: 122 MMHG | DIASTOLIC BLOOD PRESSURE: 90 MMHG

## 2019-08-22 DIAGNOSIS — Z86.79 S/P ABLATION OF ATRIAL FIBRILLATION: ICD-10-CM

## 2019-08-22 DIAGNOSIS — Z98.890 S/P ABLATION OF ATRIAL FIBRILLATION: ICD-10-CM

## 2019-08-22 LAB
ACTIVATED CLOTTING TIME: 219 SEC (ref 79–149)
ACTIVATED CLOTTING TIME: 259 SEC (ref 79–149)
ACTIVATED CLOTTING TIME: 308 SEC (ref 79–149)
ACTIVATED CLOTTING TIME: 312 SEC (ref 79–149)
ACTIVATED CLOTTING TIME: 344 SEC (ref 79–149)
ACTIVATED CLOTTING TIME: 352 SEC (ref 79–149)
ACTIVATED CLOTTING TIME: 352 SEC (ref 79–149)
ACTIVATED CLOTTING TIME: 384 SEC (ref 79–149)
GFR NON-AFRICAN AMERICAN: 42 ML/MIN
GFR SERPL CREATININE-BSD FRML MDRD: 50 ML/MIN
GFR SERPL CREATININE-BSD FRML MDRD: ABNORMAL ML/MIN/{1.73_M2}
GLUCOSE BLD-MCNC: 129 MG/DL (ref 74–100)
LV EF: 55 %
LVEF MODALITY: NORMAL
POC CHLORIDE: 104 MMOL/L (ref 98–107)
POC CREATININE: 1.62 MG/DL (ref 0.51–1.19)
POC HEMATOCRIT: 57 % (ref 41–53)
POC HEMOGLOBIN: 19.3 G/DL (ref 13.5–17.5)
POC POTASSIUM: 4.5 MMOL/L (ref 3.5–4.5)
POC SODIUM: 141 MMOL/L (ref 138–146)

## 2019-08-22 PROCEDURE — C1769 GUIDE WIRE: HCPCS

## 2019-08-22 PROCEDURE — 2580000003 HC RX 258: Performed by: INTERNAL MEDICINE

## 2019-08-22 PROCEDURE — 6360000004 HC RX CONTRAST MEDICATION

## 2019-08-22 PROCEDURE — 2709999900 HC NON-CHARGEABLE SUPPLY

## 2019-08-22 PROCEDURE — 6370000000 HC RX 637 (ALT 250 FOR IP): Performed by: STUDENT IN AN ORGANIZED HEALTH CARE EDUCATION/TRAINING PROGRAM

## 2019-08-22 PROCEDURE — 84132 ASSAY OF SERUM POTASSIUM: CPT

## 2019-08-22 PROCEDURE — 2720000010 HC SURG SUPPLY STERILE

## 2019-08-22 PROCEDURE — 93613 INTRACARDIAC EPHYS 3D MAPG: CPT | Performed by: INTERNAL MEDICINE

## 2019-08-22 PROCEDURE — 85014 HEMATOCRIT: CPT

## 2019-08-22 PROCEDURE — C1894 INTRO/SHEATH, NON-LASER: HCPCS

## 2019-08-22 PROCEDURE — 93312 ECHO TRANSESOPHAGEAL: CPT

## 2019-08-22 PROCEDURE — 85347 COAGULATION TIME ACTIVATED: CPT

## 2019-08-22 PROCEDURE — C1730 CATH, EP, 19 OR FEW ELECT: HCPCS

## 2019-08-22 PROCEDURE — 93662 INTRACARDIAC ECG (ICE): CPT | Performed by: INTERNAL MEDICINE

## 2019-08-22 PROCEDURE — 2580000003 HC RX 258: Performed by: NURSE ANESTHETIST, CERTIFIED REGISTERED

## 2019-08-22 PROCEDURE — C1732 CATH, EP, DIAG/ABL, 3D/VECT: HCPCS

## 2019-08-22 PROCEDURE — 2500000003 HC RX 250 WO HCPCS: Performed by: NURSE ANESTHETIST, CERTIFIED REGISTERED

## 2019-08-22 PROCEDURE — 93005 ELECTROCARDIOGRAM TRACING: CPT | Performed by: INTERNAL MEDICINE

## 2019-08-22 PROCEDURE — 3700000001 HC ADD 15 MINUTES (ANESTHESIA)

## 2019-08-22 PROCEDURE — 6370000000 HC RX 637 (ALT 250 FOR IP): Performed by: INTERNAL MEDICINE

## 2019-08-22 PROCEDURE — 7100000010 HC PHASE II RECOVERY - FIRST 15 MIN

## 2019-08-22 PROCEDURE — 93656 COMPRE EP EVAL ABLTJ ATR FIB: CPT | Performed by: INTERNAL MEDICINE

## 2019-08-22 PROCEDURE — 6360000002 HC RX W HCPCS: Performed by: NURSE ANESTHETIST, CERTIFIED REGISTERED

## 2019-08-22 PROCEDURE — 6360000002 HC RX W HCPCS

## 2019-08-22 PROCEDURE — 82435 ASSAY OF BLOOD CHLORIDE: CPT

## 2019-08-22 PROCEDURE — 82565 ASSAY OF CREATININE: CPT

## 2019-08-22 PROCEDURE — 7100000011 HC PHASE II RECOVERY - ADDTL 15 MIN

## 2019-08-22 PROCEDURE — 93325 DOPPLER ECHO COLOR FLOW MAPG: CPT

## 2019-08-22 PROCEDURE — 93315 ECHO TRANSESOPHAGEAL: CPT | Performed by: INTERNAL MEDICINE

## 2019-08-22 PROCEDURE — C1731 CATH, EP, 20 OR MORE ELEC: HCPCS

## 2019-08-22 PROCEDURE — 93655 ICAR CATH ABLTJ DSCRT ARRHYT: CPT | Performed by: INTERNAL MEDICINE

## 2019-08-22 PROCEDURE — 3700000000 HC ANESTHESIA ATTENDED CARE

## 2019-08-22 PROCEDURE — C1759 CATH, INTRA ECHOCARDIOGRAPHY: HCPCS

## 2019-08-22 PROCEDURE — 2500000003 HC RX 250 WO HCPCS

## 2019-08-22 PROCEDURE — 84295 ASSAY OF SERUM SODIUM: CPT

## 2019-08-22 PROCEDURE — 93325 DOPPLER ECHO COLOR FLOW MAPG: CPT | Performed by: INTERNAL MEDICINE

## 2019-08-22 PROCEDURE — 82947 ASSAY GLUCOSE BLOOD QUANT: CPT

## 2019-08-22 RX ORDER — ONDANSETRON 2 MG/ML
INJECTION INTRAMUSCULAR; INTRAVENOUS PRN
Status: DISCONTINUED | OUTPATIENT
Start: 2019-08-22 | End: 2019-08-22 | Stop reason: SDUPTHER

## 2019-08-22 RX ORDER — PROTAMINE SULFATE 10 MG/ML
INJECTION, SOLUTION INTRAVENOUS PRN
Status: DISCONTINUED | OUTPATIENT
Start: 2019-08-22 | End: 2019-08-22 | Stop reason: SDUPTHER

## 2019-08-22 RX ORDER — FLUTICASONE PROPIONATE 50 MCG
1 SPRAY, SUSPENSION (ML) NASAL DAILY
Status: DISCONTINUED | OUTPATIENT
Start: 2019-08-23 | End: 2019-08-23 | Stop reason: HOSPADM

## 2019-08-22 RX ORDER — GLYCOPYRROLATE 1 MG/5 ML
SYRINGE (ML) INTRAVENOUS PRN
Status: DISCONTINUED | OUTPATIENT
Start: 2019-08-22 | End: 2019-08-22 | Stop reason: SDUPTHER

## 2019-08-22 RX ORDER — HEPARIN SODIUM 10000 [USP'U]/100ML
INJECTION, SOLUTION INTRAVENOUS CONTINUOUS PRN
Status: DISCONTINUED | OUTPATIENT
Start: 2019-08-22 | End: 2019-08-22 | Stop reason: SDUPTHER

## 2019-08-22 RX ORDER — NEOSTIGMINE METHYLSULFATE 5 MG/5 ML
SYRINGE (ML) INTRAVENOUS PRN
Status: DISCONTINUED | OUTPATIENT
Start: 2019-08-22 | End: 2019-08-22 | Stop reason: SDUPTHER

## 2019-08-22 RX ORDER — OXYCODONE HYDROCHLORIDE AND ACETAMINOPHEN 5; 325 MG/1; MG/1
1 TABLET ORAL EVERY 4 HOURS PRN
Status: DISCONTINUED | OUTPATIENT
Start: 2019-08-22 | End: 2019-08-23 | Stop reason: HOSPADM

## 2019-08-22 RX ORDER — ACETAMINOPHEN 325 MG/1
650 TABLET ORAL EVERY 4 HOURS PRN
Status: DISCONTINUED | OUTPATIENT
Start: 2019-08-22 | End: 2019-08-23 | Stop reason: HOSPADM

## 2019-08-22 RX ORDER — HEPARIN SODIUM 1000 [USP'U]/ML
INJECTION, SOLUTION INTRAVENOUS; SUBCUTANEOUS PRN
Status: DISCONTINUED | OUTPATIENT
Start: 2019-08-22 | End: 2019-08-22 | Stop reason: SDUPTHER

## 2019-08-22 RX ORDER — ATORVASTATIN CALCIUM 10 MG/1
10 TABLET, FILM COATED ORAL DAILY
Status: DISCONTINUED | OUTPATIENT
Start: 2019-08-23 | End: 2019-08-23 | Stop reason: HOSPADM

## 2019-08-22 RX ORDER — LEVOTHYROXINE SODIUM 0.03 MG/1
12.5 TABLET ORAL DAILY
Status: DISCONTINUED | OUTPATIENT
Start: 2019-08-23 | End: 2019-08-23 | Stop reason: HOSPADM

## 2019-08-22 RX ORDER — SODIUM CHLORIDE 0.9 % (FLUSH) 0.9 %
10 SYRINGE (ML) INJECTION PRN
Status: DISCONTINUED | OUTPATIENT
Start: 2019-08-22 | End: 2019-08-23 | Stop reason: HOSPADM

## 2019-08-22 RX ORDER — PHENYLEPHRINE HCL IN 0.9% NACL 0.5 MG/5ML
SYRINGE (ML) INTRAVENOUS PRN
Status: DISCONTINUED | OUTPATIENT
Start: 2019-08-22 | End: 2019-08-22 | Stop reason: SDUPTHER

## 2019-08-22 RX ORDER — TROSPIUM CHLORIDE 20 MG/1
20 TABLET, FILM COATED ORAL
Status: DISCONTINUED | OUTPATIENT
Start: 2019-08-22 | End: 2019-08-23 | Stop reason: HOSPADM

## 2019-08-22 RX ORDER — FENTANYL CITRATE 50 UG/ML
INJECTION, SOLUTION INTRAMUSCULAR; INTRAVENOUS PRN
Status: DISCONTINUED | OUTPATIENT
Start: 2019-08-22 | End: 2019-08-22 | Stop reason: SDUPTHER

## 2019-08-22 RX ORDER — SODIUM CHLORIDE 9 MG/ML
INJECTION, SOLUTION INTRAVENOUS CONTINUOUS
Status: DISCONTINUED | OUTPATIENT
Start: 2019-08-22 | End: 2019-08-23 | Stop reason: HOSPADM

## 2019-08-22 RX ORDER — LIDOCAINE HYDROCHLORIDE 10 MG/ML
INJECTION, SOLUTION EPIDURAL; INFILTRATION; INTRACAUDAL; PERINEURAL PRN
Status: DISCONTINUED | OUTPATIENT
Start: 2019-08-22 | End: 2019-08-22 | Stop reason: SDUPTHER

## 2019-08-22 RX ORDER — ALLOPURINOL 300 MG/1
300 TABLET ORAL 2 TIMES DAILY
Status: DISCONTINUED | OUTPATIENT
Start: 2019-08-22 | End: 2019-08-23 | Stop reason: HOSPADM

## 2019-08-22 RX ORDER — SODIUM CHLORIDE, SODIUM LACTATE, POTASSIUM CHLORIDE, CALCIUM CHLORIDE 600; 310; 30; 20 MG/100ML; MG/100ML; MG/100ML; MG/100ML
INJECTION, SOLUTION INTRAVENOUS CONTINUOUS PRN
Status: DISCONTINUED | OUTPATIENT
Start: 2019-08-22 | End: 2019-08-22 | Stop reason: SDUPTHER

## 2019-08-22 RX ORDER — LISINOPRIL 5 MG/1
5 TABLET ORAL NIGHTLY
Status: DISCONTINUED | OUTPATIENT
Start: 2019-08-22 | End: 2019-08-23 | Stop reason: HOSPADM

## 2019-08-22 RX ORDER — SODIUM CHLORIDE 0.9 % (FLUSH) 0.9 %
10 SYRINGE (ML) INJECTION EVERY 12 HOURS SCHEDULED
Status: DISCONTINUED | OUTPATIENT
Start: 2019-08-22 | End: 2019-08-23 | Stop reason: HOSPADM

## 2019-08-22 RX ORDER — ROCURONIUM BROMIDE 10 MG/ML
INJECTION, SOLUTION INTRAVENOUS PRN
Status: DISCONTINUED | OUTPATIENT
Start: 2019-08-22 | End: 2019-08-22 | Stop reason: SDUPTHER

## 2019-08-22 RX ORDER — PROPOFOL 10 MG/ML
INJECTION, EMULSION INTRAVENOUS PRN
Status: DISCONTINUED | OUTPATIENT
Start: 2019-08-22 | End: 2019-08-22 | Stop reason: SDUPTHER

## 2019-08-22 RX ADMIN — Medication 0.2 MG: at 13:44

## 2019-08-22 RX ADMIN — Medication 2 MG: at 13:44

## 2019-08-22 RX ADMIN — Medication 200 MCG: at 09:22

## 2019-08-22 RX ADMIN — ONDANSETRON 4 MG: 2 INJECTION, SOLUTION INTRAMUSCULAR; INTRAVENOUS at 13:44

## 2019-08-22 RX ADMIN — HEPARIN SODIUM 5000 UNITS: 1000 INJECTION INTRAVENOUS; SUBCUTANEOUS at 10:33

## 2019-08-22 RX ADMIN — Medication 0.2 MG: at 09:12

## 2019-08-22 RX ADMIN — Medication 200 MCG: at 09:19

## 2019-08-22 RX ADMIN — SODIUM CHLORIDE: 9 INJECTION, SOLUTION INTRAVENOUS at 18:16

## 2019-08-22 RX ADMIN — HEPARIN SODIUM 12000 UNITS: 1000 INJECTION INTRAVENOUS; SUBCUTANEOUS at 10:04

## 2019-08-22 RX ADMIN — OXYCODONE HYDROCHLORIDE AND ACETAMINOPHEN 1 TABLET: 5; 325 TABLET ORAL at 23:59

## 2019-08-22 RX ADMIN — APIXABAN 5 MG: 5 TABLET, FILM COATED ORAL at 21:24

## 2019-08-22 RX ADMIN — TROSPIUM CHLORIDE 20 MG: 20 TABLET, FILM COATED ORAL at 18:55

## 2019-08-22 RX ADMIN — SODIUM CHLORIDE: 9 INJECTION, SOLUTION INTRAVENOUS at 08:59

## 2019-08-22 RX ADMIN — Medication 100 MCG: at 09:36

## 2019-08-22 RX ADMIN — Medication 200 MCG: at 09:15

## 2019-08-22 RX ADMIN — ROCURONIUM BROMIDE 50 MG: 10 INJECTION INTRAVENOUS at 09:08

## 2019-08-22 RX ADMIN — LISINOPRIL 5 MG: 5 TABLET ORAL at 21:23

## 2019-08-22 RX ADMIN — Medication 200 MCG: at 09:12

## 2019-08-22 RX ADMIN — SODIUM CHLORIDE, POTASSIUM CHLORIDE, SODIUM LACTATE AND CALCIUM CHLORIDE: 600; 310; 30; 20 INJECTION, SOLUTION INTRAVENOUS at 09:14

## 2019-08-22 RX ADMIN — HEPARIN SODIUM AND DEXTROSE 2000 UNITS/HR: 10000; 5 INJECTION INTRAVENOUS at 10:04

## 2019-08-22 RX ADMIN — PROTAMINE SULFATE 25 MG: 10 INJECTION, SOLUTION INTRAVENOUS at 13:42

## 2019-08-22 RX ADMIN — OXYCODONE HYDROCHLORIDE AND ACETAMINOPHEN 1 TABLET: 5; 325 TABLET ORAL at 19:56

## 2019-08-22 RX ADMIN — FENTANYL CITRATE 100 MCG: 50 INJECTION INTRAMUSCULAR; INTRAVENOUS at 09:08

## 2019-08-22 RX ADMIN — PROPOFOL 150 MG: 10 INJECTION, EMULSION INTRAVENOUS at 09:08

## 2019-08-22 RX ADMIN — SODIUM CHLORIDE, PRESERVATIVE FREE 10 ML: 5 INJECTION INTRAVENOUS at 22:15

## 2019-08-22 RX ADMIN — ACETAMINOPHEN 650 MG: 325 TABLET ORAL at 18:54

## 2019-08-22 RX ADMIN — Medication 0.2 MG: at 09:16

## 2019-08-22 RX ADMIN — METOPROLOL TARTRATE 25 MG: 25 TABLET ORAL at 21:24

## 2019-08-22 RX ADMIN — HEPARIN SODIUM 1000 UNITS: 1000 INJECTION INTRAVENOUS; SUBCUTANEOUS at 12:39

## 2019-08-22 RX ADMIN — PHENYLEPHRINE HYDROCHLORIDE 125 MCG/MIN: 10 INJECTION INTRAVENOUS at 09:19

## 2019-08-22 RX ADMIN — SODIUM CHLORIDE: 9 INJECTION, SOLUTION INTRAVENOUS at 09:42

## 2019-08-22 RX ADMIN — ALLOPURINOL 300 MG: 300 TABLET ORAL at 21:24

## 2019-08-22 RX ADMIN — PROTAMINE SULFATE 25 MG: 10 INJECTION, SOLUTION INTRAVENOUS at 13:44

## 2019-08-22 RX ADMIN — HEPARIN SODIUM 5000 UNITS: 1000 INJECTION INTRAVENOUS; SUBCUTANEOUS at 10:15

## 2019-08-22 RX ADMIN — Medication 100 MCG: at 13:08

## 2019-08-22 RX ADMIN — LIDOCAINE HYDROCHLORIDE 50 MG: 10 INJECTION, SOLUTION EPIDURAL; INFILTRATION; INTRACAUDAL at 09:08

## 2019-08-22 ASSESSMENT — PULMONARY FUNCTION TESTS
PIF_VALUE: 26
PIF_VALUE: 27
PIF_VALUE: 26
PIF_VALUE: 26
PIF_VALUE: 27
PIF_VALUE: 26
PIF_VALUE: 22
PIF_VALUE: 26
PIF_VALUE: 23
PIF_VALUE: 27
PIF_VALUE: 26
PIF_VALUE: 27
PIF_VALUE: 26
PIF_VALUE: 27
PIF_VALUE: 1
PIF_VALUE: 26
PIF_VALUE: 26
PIF_VALUE: 1
PIF_VALUE: 27
PIF_VALUE: 9
PIF_VALUE: 26
PIF_VALUE: 1
PIF_VALUE: 30
PIF_VALUE: 26
PIF_VALUE: 27
PIF_VALUE: 25
PIF_VALUE: 26
PIF_VALUE: 24
PIF_VALUE: 26
PIF_VALUE: 26
PIF_VALUE: 28
PIF_VALUE: 26
PIF_VALUE: 27
PIF_VALUE: 27
PIF_VALUE: 26
PIF_VALUE: 26
PIF_VALUE: 27
PIF_VALUE: 26
PIF_VALUE: 27
PIF_VALUE: 26
PIF_VALUE: 27
PIF_VALUE: 26
PIF_VALUE: 27
PIF_VALUE: 27
PIF_VALUE: 26
PIF_VALUE: 23
PIF_VALUE: 27
PIF_VALUE: 26
PIF_VALUE: 26
PIF_VALUE: 27
PIF_VALUE: 19
PIF_VALUE: 26
PIF_VALUE: 19
PIF_VALUE: 26
PIF_VALUE: 3
PIF_VALUE: 26
PIF_VALUE: 27
PIF_VALUE: 27
PIF_VALUE: 26
PIF_VALUE: 24
PIF_VALUE: 26
PIF_VALUE: 26
PIF_VALUE: 30
PIF_VALUE: 26
PIF_VALUE: 27
PIF_VALUE: 26
PIF_VALUE: 27
PIF_VALUE: 27
PIF_VALUE: 26
PIF_VALUE: 27
PIF_VALUE: 26
PIF_VALUE: 27
PIF_VALUE: 26
PIF_VALUE: 28
PIF_VALUE: 26
PIF_VALUE: 27
PIF_VALUE: 27
PIF_VALUE: 26
PIF_VALUE: 27
PIF_VALUE: 3
PIF_VALUE: 26
PIF_VALUE: 26
PIF_VALUE: 19
PIF_VALUE: 26
PIF_VALUE: 26
PIF_VALUE: 1
PIF_VALUE: 26
PIF_VALUE: 3
PIF_VALUE: 26
PIF_VALUE: 26
PIF_VALUE: 1
PIF_VALUE: 26
PIF_VALUE: 26
PIF_VALUE: 23
PIF_VALUE: 27
PIF_VALUE: 26
PIF_VALUE: 1
PIF_VALUE: 27
PIF_VALUE: 27
PIF_VALUE: 26
PIF_VALUE: 1
PIF_VALUE: 26
PIF_VALUE: 26
PIF_VALUE: 27
PIF_VALUE: 26
PIF_VALUE: 23
PIF_VALUE: 26
PIF_VALUE: 27
PIF_VALUE: 26
PIF_VALUE: 27
PIF_VALUE: 26
PIF_VALUE: 27
PIF_VALUE: 27
PIF_VALUE: 26
PIF_VALUE: 26
PIF_VALUE: 27
PIF_VALUE: 26
PIF_VALUE: 27
PIF_VALUE: 24
PIF_VALUE: 26
PIF_VALUE: 1
PIF_VALUE: 26
PIF_VALUE: 19
PIF_VALUE: 26
PIF_VALUE: 1
PIF_VALUE: 26
PIF_VALUE: 27
PIF_VALUE: 6
PIF_VALUE: 26
PIF_VALUE: 3
PIF_VALUE: 27
PIF_VALUE: 26
PIF_VALUE: 26
PIF_VALUE: 27
PIF_VALUE: 27
PIF_VALUE: 26
PIF_VALUE: 24
PIF_VALUE: 26
PIF_VALUE: 23
PIF_VALUE: 27
PIF_VALUE: 26
PIF_VALUE: 27
PIF_VALUE: 26
PIF_VALUE: 27
PIF_VALUE: 26
PIF_VALUE: 26
PIF_VALUE: 27
PIF_VALUE: 23
PIF_VALUE: 26
PIF_VALUE: 27
PIF_VALUE: 26
PIF_VALUE: 2
PIF_VALUE: 26
PIF_VALUE: 27
PIF_VALUE: 26
PIF_VALUE: 2
PIF_VALUE: 26
PIF_VALUE: 27
PIF_VALUE: 4
PIF_VALUE: 27
PIF_VALUE: 26
PIF_VALUE: 27
PIF_VALUE: 23
PIF_VALUE: 26
PIF_VALUE: 15
PIF_VALUE: 26
PIF_VALUE: 27
PIF_VALUE: 26
PIF_VALUE: 27
PIF_VALUE: 26
PIF_VALUE: 27
PIF_VALUE: 26
PIF_VALUE: 27
PIF_VALUE: 26
PIF_VALUE: 27
PIF_VALUE: 26
PIF_VALUE: 1
PIF_VALUE: 27
PIF_VALUE: 26
PIF_VALUE: 24
PIF_VALUE: 26
PIF_VALUE: 26
PIF_VALUE: 27
PIF_VALUE: 26
PIF_VALUE: 27
PIF_VALUE: 1
PIF_VALUE: 26
PIF_VALUE: 24
PIF_VALUE: 26

## 2019-08-22 ASSESSMENT — PAIN SCALES - GENERAL
PAINLEVEL_OUTOF10: 10
PAINLEVEL_OUTOF10: 10
PAINLEVEL_OUTOF10: 8
PAINLEVEL_OUTOF10: 6

## 2019-08-22 ASSESSMENT — PAIN DESCRIPTION - LOCATION
LOCATION: LEG
LOCATION: HIP

## 2019-08-22 ASSESSMENT — PAIN DESCRIPTION - ORIENTATION
ORIENTATION: RIGHT
ORIENTATION: RIGHT

## 2019-08-22 ASSESSMENT — PAIN DESCRIPTION - PAIN TYPE: TYPE: ACUTE PAIN

## 2019-08-22 NOTE — BRIEF OP NOTE
Brief Postoperative Note  ______________________________________________________________    Patient: Stacie Jones  YOB: 1945  MRN: [id number]  Date of Procedure: 8/22/2019    Pre-Op Diagnosis: Afib/Aflutter     Post-Op Diagnosis: Same       Procedure: AFlutter/Afib ablation     Anesthesia: GA     Estimated Blood Loss: 20 CC     Complications: None     Procedure summary:  Under GA the patient underwent AFlutter and Afib ablation, four veins were identified. Three venous sheaths were placed on the right femoral vein. A figure 8 suture was placed in the right groin post-procedure. Single trans-septal technique was used. Two flutters were ablated, sylvester-mitral and roof, PVI of the right and left wad redone due to reconnection of the LSPV and RIPV.       Plan:  -- Admit to CCU or step down for overnight stay  -- Remove figure 8 sutures in 4 hours after sheath were pulled  -- Start Eliquis after suture removal  -- Start protonix to 40 mg qd     Tylor Ponce MD  Date: 8/22/2019  Time: 5:18 PM

## 2019-08-22 NOTE — H&P
[facility]  Pre- Procedure History and Physical/Update          Patient Name:  Oz Bolden  MRN:    [id number]  YOB: 1945  Date of evaluation:  8/22/2019       Please refer to the consult note / H&P completed by Dr. Jorge Rodriguez on 8/12/19 in the medical record and note that:       [x] I have examined the patient and reviewed the H&P/Consult and there are no changes to be made to the assessment or plan. [] I have examined the patient and reviewed the H&P/Consult and have noted the following changes:        Past Medical History:   Diagnosis Date    Anemia     Cellulitis of lower extremity     right     Cerebral artery occlusion with cerebral infarction ([address]     1996    CHF (congestive heart failure) (HCC)     Chronic acquired lymphedema     Hernia, abdominal     History of blood transfusion 2003 7 2006 [facility]    History of CVA (cerebrovascular accident) 54    DEFECIT MILD MEMORY AND SPEECH    Hyperlipidemia     Hypertension     Hypothyroid 09/2015    Ileus, postoperative (HCC)     Mild renal insufficiency     Morbid obesity ([address]     Non-healing wound of lower extremity     HISTORY OF, WAS SEEN IN WOUND CLINIC FOR COUPLE YRS.    Osteoarthritis     Phlebitis     HX. OF     Prolonged emergence from general anesthesia     x1 had to be put back on ventolator, after prostate surgery, had to be hospitalized x12 days.      Prostate CA ([address]     PVD (peripheral vascular disease) ([address]     Sleep apnea     C-PAP NIGHTLY-PT INSTRUCTED TO BRING    SVT (supraventricular tachycardia) (HCC)     Uric acid kidney stone 12/2014    HAD 6 PASSSED ONE ON OWN, OTHER 5 IS BEING MONITORED    Wears glasses        Past Surgical History:   Procedure Laterality Date    ABLATION OF DYSRHYTHMIC FOCUS  03/16/2018    afib ablation    ABLATION OF DYSRHYTHMIC FOCUS  08/22/2019    ATRIAL FIB  /  DR Armando Clinton    CARDIOVERSION  11/17/2017    COLONOSCOPY  2002, 2007

## 2019-08-22 NOTE — PLAN OF CARE
Problem: Pain:  Goal: Pain level will decrease  Description  Pain level will decrease  Outcome: Ongoing  Goal: Control of acute pain  Description  Control of acute pain  Outcome: Ongoing  Goal: Control of chronic pain  Description  Control of chronic pain  Outcome: Ongoing    Electronically signed by Nilda Gaston RN on 8/22/2019 at 5:53 PM

## 2019-08-22 NOTE — PROGRESS NOTES
Patient admitted, consent signed and questions answered. Patient ready for procedure. Call light to reach with side rails up 2 of 2. Bilat groin hair and chest hairs clipped. family at bedside with patient. History and physical needing update.

## 2019-08-23 VITALS
SYSTOLIC BLOOD PRESSURE: 118 MMHG | HEIGHT: 71 IN | TEMPERATURE: 98 F | OXYGEN SATURATION: 94 % | HEART RATE: 75 BPM | BODY MASS INDEX: 42.84 KG/M2 | WEIGHT: 306 LBS | DIASTOLIC BLOOD PRESSURE: 62 MMHG | RESPIRATION RATE: 14 BRPM

## 2019-08-23 LAB
EKG ATRIAL RATE: 111 BPM
EKG Q-T INTERVAL: 378 MS
EKG QRS DURATION: 100 MS
EKG QTC CALCULATION (BAZETT): 475 MS
EKG R AXIS: -23 DEGREES
EKG T AXIS: 6 DEGREES
EKG VENTRICULAR RATE: 95 BPM

## 2019-08-23 PROCEDURE — 93010 ELECTROCARDIOGRAM REPORT: CPT | Performed by: INTERNAL MEDICINE

## 2019-08-23 PROCEDURE — 94760 N-INVAS EAR/PLS OXIMETRY 1: CPT

## 2019-08-23 PROCEDURE — 6370000000 HC RX 637 (ALT 250 FOR IP): Performed by: STUDENT IN AN ORGANIZED HEALTH CARE EDUCATION/TRAINING PROGRAM

## 2019-08-23 PROCEDURE — 2580000003 HC RX 258: Performed by: INTERNAL MEDICINE

## 2019-08-23 PROCEDURE — 6370000000 HC RX 637 (ALT 250 FOR IP): Performed by: INTERNAL MEDICINE

## 2019-08-23 RX ORDER — PANTOPRAZOLE SODIUM 40 MG/1
40 TABLET, DELAYED RELEASE ORAL
Qty: 90 TABLET | Refills: 1 | Status: SHIPPED | OUTPATIENT
Start: 2019-08-23

## 2019-08-23 RX ORDER — FENTANYL CITRATE 50 UG/ML
25 INJECTION, SOLUTION INTRAMUSCULAR; INTRAVENOUS ONCE
Status: DISCONTINUED | OUTPATIENT
Start: 2019-08-23 | End: 2019-08-23 | Stop reason: HOSPADM

## 2019-08-23 RX ADMIN — FLUTICASONE PROPIONATE 1 SPRAY: 50 SPRAY, METERED NASAL at 09:01

## 2019-08-23 RX ADMIN — LEVOTHYROXINE SODIUM 12.5 MCG: 25 TABLET ORAL at 09:01

## 2019-08-23 RX ADMIN — SODIUM CHLORIDE, PRESERVATIVE FREE 10 ML: 5 INJECTION INTRAVENOUS at 09:01

## 2019-08-23 RX ADMIN — ALLOPURINOL 300 MG: 300 TABLET ORAL at 09:01

## 2019-08-23 RX ADMIN — OXYCODONE HYDROCHLORIDE AND ACETAMINOPHEN 1 TABLET: 5; 325 TABLET ORAL at 09:01

## 2019-08-23 RX ADMIN — TROSPIUM CHLORIDE 20 MG: 20 TABLET, FILM COATED ORAL at 09:00

## 2019-08-23 RX ADMIN — METOPROLOL TARTRATE 25 MG: 25 TABLET ORAL at 09:00

## 2019-08-23 RX ADMIN — OXYCODONE HYDROCHLORIDE AND ACETAMINOPHEN 1 TABLET: 5; 325 TABLET ORAL at 04:50

## 2019-08-23 RX ADMIN — ATORVASTATIN CALCIUM 10 MG: 10 TABLET, FILM COATED ORAL at 09:01

## 2019-08-23 RX ADMIN — APIXABAN 5 MG: 5 TABLET, FILM COATED ORAL at 09:01

## 2019-08-23 ASSESSMENT — PAIN SCALES - GENERAL
PAINLEVEL_OUTOF10: 5
PAINLEVEL_OUTOF10: 2
PAINLEVEL_OUTOF10: 5
PAINLEVEL_OUTOF10: 5

## 2019-08-23 ASSESSMENT — PAIN DESCRIPTION - ORIENTATION
ORIENTATION: RIGHT;OTHER (COMMENT)
ORIENTATION: RIGHT;OTHER (COMMENT)

## 2019-08-23 ASSESSMENT — PAIN DESCRIPTION - LOCATION
LOCATION: HIP;GROIN
LOCATION: HIP;GROIN

## 2019-08-23 ASSESSMENT — PAIN DESCRIPTION - PAIN TYPE
TYPE: ACUTE PAIN
TYPE: ACUTE PAIN

## 2019-08-23 NOTE — PROCEDURES
Heparin bolus and continuous IV infusion were given prior transseptal puncture/ catheterization. The sheath in the right femoral vein was exchanged over a wire. A transseptal access was performed using a SL0/Agilis sheath [employer] needle assembly. Once the first transseptal access was performed, a multi-electrode catheter was advanced through the long sheath and positioned in the left superior pulmonary vein. The heparin infusion rate was adjusted to maintain an ACT between 350-400 sec throughout the procedure. 7. Electrophysiologic testing was performed. Measurements of basic intervals were obtained. Stimuli were delivered at coronary sinus sites. 8. 3-D electro-anatomic mapping. The pulmonary veins and left atrium sites were mapped, making use of Carto device. 9. RF ablation was performed. Follow-up testing was performed 30 minutes post ablation. 10. Sheaths were sutured in place. The site was compressed. Adequate hemostasis was obtained. Vascular access and catheter properties:  Entry site  Locations  Catheter    R femoral vein Mid RA Intra-cardiac Echo Accuson 8F   R femoral vein Coronary sinus SJM Decapolar CS catheter 6F   R femoral vein Trans-septal Multielectrode BS PentaRay Catheter D curve   R femoral vein Trans-sept alablation catheter ThermoCool Smart Touch BiDirectional D-F     Administered medications: The procedure was performed under GA. ·   0.9% Normal Saline. ·   OXYGEN, Nasal cannula. Contrast 10 cc   ·   Bupivicaine, subcutaneously       Arrhythmia:     ·    the patient was in atrial flutter at the start of the procedure, the flutter cycle length was noted to be 200 millisecond. Entrainment mapping clearly revealed a left-sided atrial flutter. Transseptal access to the left atrium was done as described above. The left atrium was mapped using the PentaRay catheter.   Activation mapping was suggestive of a roof dependent atrial flutter this was confirmed by entrainment from both anterior and posterior wall. Following completion of the ablation line, the flutter cycle length has lengthened to 270 millisecond with minimal change on the CS activation sequence. This CS activation sequence was distal to proximal.  Pacing on both the lateral and septal mitral isthmus was suggestive of a sylvester mitral atrial flutter. Activation mapping was again done which showed typical propagation of clockwise sylvester mitral flutter. Ablation was then it started at the mitral isthmus starting from the left inferior pulmonary vein. The sylvester mitral flutter terminated with the 2nd ablation lesion in the mitral isthmus area. The ablation line was then carried all way down to the mitral isthmus. Mitral isthmus block was confirmed with differential pacing. The pulmonary veins were then checked there was evidence of reconnection of the right pulmonary vein. A WACA was then done around the right and left pulmonary vein along with ablation of the satish. Entrance block was confirmed in the right and left  pulmonary veins. The esophageal temperature was monitored closely during ablation. Rapid atrial pacing induced no flutter down to  ms. Following ablation and EP study was performed, WY= 300 ms, RR= 750 ms, ZZJ=104 ms, HG=246 ms:  AH= 144 ms, HV=56 ms. AVNERP/FPERP= 600/410 ms, SPERP 600/400 ms,  PXPCM353 ms. There is evidence of dual AV node physiology. No arrhythmia is inducible. Lesions:  Radiofrequency lesion summary:   Multiple applications of variable duration were delivered. Complications:  No complications. Discharge and follow-up:  The patient left the EP laboratory in stable condition. The patient will remain in the hospital overnight for observation and rest, with anticipated discharge on the following day. The patient has been instructed accordingly.     Summary:  1) Successful ablation of a roof dependent atrial flutter  2) Successful ablation

## 2019-08-26 ENCOUNTER — TELEPHONE (OUTPATIENT)
Dept: PRIMARY CARE CLINIC | Age: 74
End: 2019-08-26

## 2019-08-26 NOTE — TELEPHONE ENCOUNTER
[facility] Transitions Initial Follow Up Call    Outreach made within 2 business days of discharge: Yes    Patient: Jac White Patient : 1945   MRN: [id number]  Reason for Admission: There are no discharge diagnoses documented for the most recent discharge. Discharge Date: 19       Spoke with: MOLINA    Discharge department/facility: [facility] Interactive Patient Contact:  Was patient able to fill all prescriptions: Yes  Was patient instructed to bring all medications to the follow-up visit: Yes  Is patient taking all medications as directed in the discharge summary? Yes  Does patient understand their discharge instructions: Yes  Does patient have questions or concerns that need addressed prior to 7-14 day follow up office visit: no    PATIENT HAS A APPT ON . HE DID NOT WANT TO SCHEDULE ANOTHER ONE.      Scheduled appointment with PCP within 7-14 days    Follow Up  Future Appointments   Date Time Provider Woodrow Hall   2019  2:10 PM Zack Castro MD [facility]   2019  2:15 PM MD MINA Corado  Aileen Chow [address] [place] [address]

## 2019-09-09 RX ORDER — LISINOPRIL 5 MG/1
TABLET ORAL
Qty: 90 TABLET | Refills: 3 | Status: SHIPPED | OUTPATIENT
Start: 2019-09-09 | End: 2020-09-03

## 2019-09-09 RX ORDER — LEVOTHYROXINE SODIUM 0.03 MG/1
TABLET ORAL
Qty: 90 TABLET | Refills: 3 | Status: SHIPPED | OUTPATIENT
Start: 2019-09-09 | End: 2020-09-03

## 2019-09-11 ENCOUNTER — OFFICE VISIT (OUTPATIENT)
Dept: ORTHOPEDIC SURGERY | Age: 74
End: 2019-09-11
Payer: MEDICARE

## 2019-09-11 VITALS — HEIGHT: 71 IN | BODY MASS INDEX: 42.98 KG/M2 | WEIGHT: 307 LBS

## 2019-09-11 DIAGNOSIS — M25.551 RIGHT HIP PAIN: Primary | ICD-10-CM

## 2019-09-11 PROCEDURE — 99213 OFFICE O/P EST LOW 20 MIN: CPT | Performed by: ORTHOPAEDIC SURGERY

## 2019-09-11 RX ORDER — VIT C/B6/B5/MAGNESIUM/HERB 173 50-5-6-5MG
1000 CAPSULE ORAL DAILY
COMMUNITY
End: 2022-04-04

## 2019-09-11 RX ORDER — TRAMADOL HYDROCHLORIDE 50 MG/1
50-100 TABLET ORAL EVERY 6 HOURS PRN
Qty: 40 TABLET | Refills: 0 | Status: SHIPPED | OUTPATIENT
Start: 2019-09-11 | End: 2019-09-26 | Stop reason: SDUPTHER

## 2019-09-11 NOTE — PROGRESS NOTES
Betty Pacheco M.D.            [address] [facility], [address] [facility]           Dept Phone: [phone]           Dept Fax:  [address], [place]          Dept Phone: [phone]           Dept Fax:  [phone] returns today. He status post left total hip. He says his left hip has no pain whatsoever. His right hip is become quite painful for him. He had injections to his right hip in the past which really helped him out. Patient has other significant medical comorbidities as well as significant lumbar disease as well. Examination patient's left hip notes no pain whatsoever on internal or external rotation. Examination the right hip shows he has some mild pain with internal and external rotation certainly nothing too severe. No true trochanteric findings like appreciate he does have a mild as half inch leg length discrepancy left greater than right. XR taken today:  Xr Pelvis (1-2 Views)    Result Date: 9/11/2019  X-rays taken today reviewed by me show standing AP of the pelvis. This is somewhat obscured by overlying soft tissue. He is status post left total hip. Components appear to be in excellent position without interval change. Patient does have some moderate degenerative joint disease of the right hip but certainly nothing anywhere near what his left hip was prior to surgery. Pression  Status post left total hip  Mild to moderate DJD right hip but nowhere near severe as his left hip was prior to his initial total hip arthroplasty on the left    Plan  I discussed with the patient as well as wife that his hip is nowhere near as bad and I think as long as he gets relief with his right interventional radiology injection he should continue with this. We will get him set up for this accordingly.

## 2019-09-13 ENCOUNTER — TELEPHONE (OUTPATIENT)
Dept: ORTHOPEDIC SURGERY | Age: 74
End: 2019-09-13

## 2019-09-20 RX ORDER — LIDOCAINE HYDROCHLORIDE 10 MG/ML
4 INJECTION, SOLUTION EPIDURAL; INFILTRATION; INTRACAUDAL; PERINEURAL ONCE
Status: CANCELLED | OUTPATIENT
Start: 2019-09-24

## 2019-09-20 RX ORDER — METHYLPREDNISOLONE ACETATE 80 MG/ML
80 INJECTION, SUSPENSION INTRA-ARTICULAR; INTRALESIONAL; INTRAMUSCULAR; SOFT TISSUE ONCE
Status: CANCELLED | OUTPATIENT
Start: 2019-09-20

## 2019-09-20 RX ORDER — BUPIVACAINE HYDROCHLORIDE 5 MG/ML
4 INJECTION, SOLUTION EPIDURAL; INTRACAUDAL ONCE
Status: CANCELLED | OUTPATIENT
Start: 2019-09-24

## 2019-09-24 ENCOUNTER — HOSPITAL ENCOUNTER (OUTPATIENT)
Dept: INTERVENTIONAL RADIOLOGY/VASCULAR | Age: 74
Discharge: HOME | End: 2019-09-26
Payer: MEDICARE

## 2019-09-24 DIAGNOSIS — M25.551 RIGHT HIP PAIN: ICD-10-CM

## 2019-09-24 PROCEDURE — 2500000003 HC RX 250 WO HCPCS: Performed by: ORTHOPAEDIC SURGERY

## 2019-09-24 PROCEDURE — 77002 NEEDLE LOCALIZATION BY XRAY: CPT | Performed by: RADIOLOGY

## 2019-09-24 PROCEDURE — 20610 DRAIN/INJ JOINT/BURSA W/O US: CPT | Performed by: RADIOLOGY

## 2019-09-24 PROCEDURE — 6360000002 HC RX W HCPCS: Performed by: ORTHOPAEDIC SURGERY

## 2019-09-24 PROCEDURE — 2500000003 HC RX 250 WO HCPCS: Performed by: RADIOLOGY

## 2019-09-24 PROCEDURE — 6360000004 HC RX CONTRAST MEDICATION: Performed by: RADIOLOGY

## 2019-09-24 PROCEDURE — 2709999900 IR ARTHR/ASP/INJ MAJOR JT/BURSA RIGHT WO US

## 2019-09-24 RX ORDER — BUPIVACAINE HYDROCHLORIDE 5 MG/ML
4 INJECTION, SOLUTION EPIDURAL; INTRACAUDAL ONCE
Status: COMPLETED | OUTPATIENT
Start: 2019-09-24 | End: 2019-09-24

## 2019-09-24 RX ORDER — LIDOCAINE HYDROCHLORIDE 10 MG/ML
4 INJECTION, SOLUTION EPIDURAL; INFILTRATION; INTRACAUDAL; PERINEURAL ONCE
Status: COMPLETED | OUTPATIENT
Start: 2019-09-24 | End: 2019-09-24

## 2019-09-24 RX ORDER — METHYLPREDNISOLONE ACETATE 80 MG/ML
80 INJECTION, SUSPENSION INTRA-ARTICULAR; INTRALESIONAL; INTRAMUSCULAR; SOFT TISSUE ONCE
Status: COMPLETED | OUTPATIENT
Start: 2019-09-24 | End: 2019-09-24

## 2019-09-24 RX ORDER — LIDOCAINE HYDROCHLORIDE 10 MG/ML
INJECTION, SOLUTION INFILTRATION; PERINEURAL
Status: COMPLETED | OUTPATIENT
Start: 2019-09-24 | End: 2019-09-24

## 2019-09-24 RX ADMIN — BUPIVACAINE HYDROCHLORIDE 20 MG: 5 INJECTION, SOLUTION EPIDURAL; INTRACAUDAL; PERINEURAL at 14:20

## 2019-09-24 RX ADMIN — METHYLPREDNISOLONE ACETATE 80 MG: 80 INJECTION, SUSPENSION INTRA-ARTICULAR; INTRALESIONAL; INTRAMUSCULAR; SOFT TISSUE at 14:20

## 2019-09-24 RX ADMIN — LIDOCAINE HYDROCHLORIDE 5 ML: 10 INJECTION, SOLUTION INFILTRATION; PERINEURAL at 14:18

## 2019-09-24 RX ADMIN — LIDOCAINE HYDROCHLORIDE 4 ML: 10 INJECTION, SOLUTION EPIDURAL; INFILTRATION; INTRACAUDAL; PERINEURAL at 14:20

## 2019-09-24 RX ADMIN — IOHEXOL 10 ML: 240 INJECTION, SOLUTION INTRATHECAL; INTRAVASCULAR; INTRAVENOUS; ORAL at 14:12

## 2019-09-24 ASSESSMENT — PAIN DESCRIPTION - PAIN TYPE: TYPE: CHRONIC PAIN

## 2019-09-24 ASSESSMENT — PAIN SCALES - GENERAL
PAINLEVEL_OUTOF10: 3
PAINLEVEL_OUTOF10: 3

## 2019-09-24 ASSESSMENT — PAIN DESCRIPTION - LOCATION: LOCATION: HIP

## 2019-09-24 ASSESSMENT — PAIN DESCRIPTION - ORIENTATION: ORIENTATION: RIGHT

## 2019-09-26 ENCOUNTER — OFFICE VISIT (OUTPATIENT)
Dept: PRIMARY CARE CLINIC | Age: 74
End: 2019-09-26
Payer: MEDICARE

## 2019-09-26 VITALS
HEART RATE: 58 BPM | WEIGHT: 313.2 LBS | DIASTOLIC BLOOD PRESSURE: 84 MMHG | BODY MASS INDEX: 44.3 KG/M2 | SYSTOLIC BLOOD PRESSURE: 132 MMHG | OXYGEN SATURATION: 96 %

## 2019-09-26 DIAGNOSIS — I50.32 CHRONIC DIASTOLIC CONGESTIVE HEART FAILURE (HCC): Primary | Chronic | ICD-10-CM

## 2019-09-26 DIAGNOSIS — M25.551 RIGHT HIP PAIN: ICD-10-CM

## 2019-09-26 DIAGNOSIS — E66.01 MORBID OBESITY WITH BMI OF 40.0-44.9, ADULT (HCC): ICD-10-CM

## 2019-09-26 PROCEDURE — 99213 OFFICE O/P EST LOW 20 MIN: CPT | Performed by: FAMILY MEDICINE

## 2019-09-26 RX ORDER — TRAMADOL HYDROCHLORIDE 50 MG/1
50-100 TABLET ORAL EVERY 6 HOURS PRN
Qty: 100 TABLET | Refills: 0 | Status: SHIPPED | OUTPATIENT
Start: 2019-09-26 | End: 2019-10-24 | Stop reason: SDUPTHER

## 2019-09-26 ASSESSMENT — PATIENT HEALTH QUESTIONNAIRE - PHQ9
SUM OF ALL RESPONSES TO PHQ QUESTIONS 1-9: 0
1. LITTLE INTEREST OR PLEASURE IN DOING THINGS: 0
SUM OF ALL RESPONSES TO PHQ9 QUESTIONS 1 & 2: 0
SUM OF ALL RESPONSES TO PHQ QUESTIONS 1-9: 0
2. FEELING DOWN, DEPRESSED OR HOPELESS: 0

## 2019-09-26 ASSESSMENT — ENCOUNTER SYMPTOMS
COUGH: 0
SHORTNESS OF BREATH: 1

## 2019-09-26 NOTE — PROGRESS NOTES
[address] [facility] PRIMARY CARE  [address] [address] [facility] [address]  Dept: 69 Rosette Pepper is a 66 y.o. male who presents today for his medical conditions/complaintsas noted below. Chief Complaint   Patient presents with    6 Month Follow-Up     Medication check. Pt wants to discuss getting a refill of tramadol. HPI:     HPI-pt having trouble losing weight and has to before they will do his surgery. Needs refill on tramadol    LDL Cholesterol (mg/dL)   Date Value   04/08/2019 67   12/04/2018 72   04/12/2016 116       (goal LDL is <100)   AST (U/L)   Date Value   04/08/2019 20     ALT (U/L)   Date Value   04/08/2019 16     BUN (mg/dL)   Date Value   04/08/2019 24 (H)     BP Readings from Last 3 Encounters:   09/26/19 132/84   08/23/19 118/62   08/22/19 (!) 122/90          (goal 120/80)    Past Medical History:   Diagnosis Date    Anemia     Cellulitis of lower extremity     right     Cerebral artery occlusion with cerebral infarction ([address]     1996    CHF (congestive heart failure) ([address]     Chronic acquired lymphedema     Hernia, abdominal     [facility] 2003 7 [facility]    History of CVA (cerebrovascular accident) 54    DEFECIT MILD MEMORY AND SPEECH    Hyperlipidemia     Hypertension     Hypothyroid 09/2015    Ileus, postoperative (HCC)     Mild renal insufficiency     Morbid obesity ([address]     Non-healing wound of lower extremity     HISTORY OF, WAS SEEN IN WOUND CLINIC FOR COUPLE YRS.    Osteoarthritis     Phlebitis     HX. OF     Prolonged emergence from general anesthesia     x1 had to be put back on ventolator, after prostate surgery, had to be hospitalized x12 days.      Prostate CA ([address]     PVD (peripheral vascular disease) (HCC)     Sleep apnea     C-PAP NIGHTLY-PT INSTRUCTED TO BRING    SVT (supraventricular tachycardia) (HCC)     Uric acid kidney stone 12/2014    HAD Current Outpatient Medications   Medication Sig Dispense Refill    traMADol (ULTRAM) 50 MG tablet Take 1-2 tablets by mouth every 6 hours as needed for Pain. 100 tablet 0    Turmeric 500 MG CAPS Take 1,000 mg by mouth daily      lisinopril (PRINIVIL;ZESTRIL) 5 MG tablet TAKE 1 TABLET NIGHTLY 90 tablet 3    levothyroxine (SYNTHROID) 25 MCG tablet TAKE 1 TABLET DAILY 90 tablet 3    furosemide (LASIX) 40 MG tablet TAKE 1 TABLET DAILY 90 tablet 3    atorvastatin (LIPITOR) 10 MG tablet TAKE 1 TABLET DAILY 90 tablet 3    pantoprazole (PROTONIX) 40 MG tablet Take 1 tablet by mouth every morning (before breakfast) 90 tablet 1    metoprolol tartrate (LOPRESSOR) 25 MG tablet Take 25 mg by mouth 2 times daily      psyllium (KONSYL) 28.3 % PACK Take 1 packet by mouth nightly      Aspirin-Acetaminophen-Caffeine (EXCEDRIN PO) Take by mouth Indications: PRN       apixaban (ELIQUIS) 5 MG TABS tablet Take 5 mg by mouth 2 times daily      trospium (SANCTURA) 20 MG tablet Take 20 mg by mouth 2 times daily Prescribed by Dr. Will Champion Potassium 99 MG TABS Take by mouth      fluticasone (FLONASE) 50 MCG/ACT nasal spray 1 spray by Nasal route daily 1 Bottle 3    acetaminophen (TYLENOL) 325 MG tablet Take 650 mg by mouth every 6 hours as needed for Pain      CINNAMON PO Take 2 capsules by mouth daily      Probiotic Product (PROBIOTIC ADVANCED PO) Take by mouth      Multiple Vitamins-Minerals (OCUVITE PO) Take 1 tablet by mouth daily      allopurinol (ZYLOPRIM) 300 MG tablet Take 300 mg by mouth 2 times daily      Elastic Bandages & Supports (JOBST RELIEF 30-40MMHG XL) MISC 4 pair knee high open toe. 4 each 0    Cholecalciferol (VITAMIN D-3 PO) Take  by mouth daily. 2000 IU daily       Coenzyme Q10 (CO Q 10 PO) Take 1 tablet by mouth daily       Bisacodyl (DULCOLAX PO) Take 1 tablet by mouth as needed        No current facility-administered medications for this visit.       Allergies   Allergen Reactions   

## 2019-10-24 DIAGNOSIS — M25.551 RIGHT HIP PAIN: ICD-10-CM

## 2019-10-24 RX ORDER — TRAMADOL HYDROCHLORIDE 50 MG/1
50-100 TABLET ORAL EVERY 6 HOURS PRN
Qty: 100 TABLET | Refills: 0 | Status: SHIPPED | OUTPATIENT
Start: 2019-10-24 | End: 2019-11-04 | Stop reason: SDUPTHER

## 2019-11-04 DIAGNOSIS — M25.551 RIGHT HIP PAIN: ICD-10-CM

## 2019-11-04 RX ORDER — TRAMADOL HYDROCHLORIDE 50 MG/1
50-100 TABLET ORAL EVERY 6 HOURS PRN
Qty: 100 TABLET | Refills: 0 | Status: SHIPPED | OUTPATIENT
Start: 2019-11-04 | End: 2019-12-27 | Stop reason: SDUPTHER

## 2019-11-13 ENCOUNTER — HOSPITAL ENCOUNTER (OUTPATIENT)
Age: 74
Setting detail: SPECIMEN
Discharge: HOME | End: 2019-11-13
Payer: MEDICARE

## 2019-11-13 LAB — PROSTATE SPECIFIC ANTIGEN: <0.01 UG/L

## 2019-12-02 ENCOUNTER — HOSPITAL ENCOUNTER (OUTPATIENT)
Age: 74
Discharge: HOME | End: 2019-12-02
Payer: MEDICARE

## 2019-12-02 LAB
ANION GAP SERPL CALCULATED.3IONS-SCNC: 13 MMOL/L (ref 9–17)
BUN BLDV-MCNC: 23 MG/DL (ref 8–23)
BUN/CREAT BLD: ABNORMAL (ref 9–20)
CALCIUM SERPL-MCNC: 9.7 MG/DL (ref 8.6–10.4)
CHLORIDE BLD-SCNC: 104 MMOL/L (ref 98–107)
CO2: 30 MMOL/L (ref 20–31)
CREAT SERPL-MCNC: 1.53 MG/DL (ref 0.7–1.2)
GFR AFRICAN AMERICAN: 54 ML/MIN
GFR NON-AFRICAN AMERICAN: 44 ML/MIN
GFR SERPL CREATININE-BSD FRML MDRD: ABNORMAL ML/MIN/{1.73_M2}
GFR SERPL CREATININE-BSD FRML MDRD: ABNORMAL ML/MIN/{1.73_M2}
GLUCOSE BLD-MCNC: 79 MG/DL (ref 70–99)
POTASSIUM SERPL-SCNC: 4.2 MMOL/L (ref 3.7–5.3)
SODIUM BLD-SCNC: 147 MMOL/L (ref 135–144)

## 2019-12-02 PROCEDURE — 36415 COLL VENOUS BLD VENIPUNCTURE: CPT

## 2019-12-02 PROCEDURE — 80048 BASIC METABOLIC PNL TOTAL CA: CPT

## 2019-12-26 DIAGNOSIS — M25.551 RIGHT HIP PAIN: ICD-10-CM

## 2019-12-27 ENCOUNTER — OFFICE VISIT (OUTPATIENT)
Dept: PRIMARY CARE CLINIC | Age: 74
End: 2019-12-27
Payer: MEDICARE

## 2019-12-27 VITALS
SYSTOLIC BLOOD PRESSURE: 130 MMHG | HEART RATE: 62 BPM | BODY MASS INDEX: 42.66 KG/M2 | WEIGHT: 298 LBS | HEIGHT: 70 IN | DIASTOLIC BLOOD PRESSURE: 86 MMHG | OXYGEN SATURATION: 94 %

## 2019-12-27 DIAGNOSIS — Z00.00 ROUTINE GENERAL MEDICAL EXAMINATION AT A HEALTH CARE FACILITY: Primary | ICD-10-CM

## 2019-12-27 DIAGNOSIS — N18.30 CHRONIC KIDNEY DISEASE, STAGE 3 (MODERATE): ICD-10-CM

## 2019-12-27 PROCEDURE — G0439 PPPS, SUBSEQ VISIT: HCPCS | Performed by: FAMILY MEDICINE

## 2019-12-27 RX ORDER — TRAMADOL HYDROCHLORIDE 50 MG/1
50-100 TABLET ORAL EVERY 6 HOURS PRN
Qty: 100 TABLET | Refills: 0 | Status: SHIPPED | OUTPATIENT
Start: 2019-12-27 | End: 2020-01-06 | Stop reason: SDUPTHER

## 2019-12-27 SDOH — HEALTH STABILITY: MENTAL HEALTH: HOW OFTEN DO YOU HAVE A DRINK CONTAINING ALCOHOL?: 4 OR MORE TIMES A WEEK

## 2019-12-27 ASSESSMENT — PATIENT HEALTH QUESTIONNAIRE - PHQ9
SUM OF ALL RESPONSES TO PHQ QUESTIONS 1-9: 0
SUM OF ALL RESPONSES TO PHQ QUESTIONS 1-9: 0

## 2020-01-06 ENCOUNTER — OFFICE VISIT (OUTPATIENT)
Dept: PRIMARY CARE CLINIC | Age: 75
End: 2020-01-06
Payer: MEDICARE

## 2020-01-06 VITALS
OXYGEN SATURATION: 93 % | BODY MASS INDEX: 42.64 KG/M2 | DIASTOLIC BLOOD PRESSURE: 88 MMHG | WEIGHT: 297.2 LBS | SYSTOLIC BLOOD PRESSURE: 132 MMHG | HEART RATE: 60 BPM

## 2020-01-06 PROCEDURE — 99213 OFFICE O/P EST LOW 20 MIN: CPT | Performed by: FAMILY MEDICINE

## 2020-01-06 RX ORDER — TRAMADOL HYDROCHLORIDE 50 MG/1
50-100 TABLET ORAL EVERY 6 HOURS PRN
Qty: 360 TABLET | Refills: 0 | Status: SHIPPED | OUTPATIENT
Start: 2020-01-06 | End: 2020-03-20 | Stop reason: SDUPTHER

## 2020-01-06 ASSESSMENT — PATIENT HEALTH QUESTIONNAIRE - PHQ9
SUM OF ALL RESPONSES TO PHQ9 QUESTIONS 1 & 2: 0
2. FEELING DOWN, DEPRESSED OR HOPELESS: 0
SUM OF ALL RESPONSES TO PHQ QUESTIONS 1-9: 0
SUM OF ALL RESPONSES TO PHQ QUESTIONS 1-9: 0
1. LITTLE INTEREST OR PLEASURE IN DOING THINGS: 0

## 2020-01-06 ASSESSMENT — ENCOUNTER SYMPTOMS
COUGH: 0
DIARRHEA: 1

## 2020-01-06 NOTE — PROGRESS NOTES
[address] [facility] PRIMARY CARE  [address] [facility] [address]  Dept: 69 Rosette Pepper is a 66 y.o. male who presents today for his medical conditions/complaintsas noted below. Paco Samuel is c/o of   Chief Complaint   Patient presents with    Medication Check    Medication Refill     Pending       HPI:     HPI  Was having diarrhea and is better now. Not sure if change in his meds- not taking stool softener at all now. Can't take the excedrin due to the kidney doctor and he has been having to take more tramadol. Hemoglobin A1C (%)   Date Value   04/13/2017 5.2   09/25/2013 5.8   06/18/2012 6.2 (H)             ( goal A1C is < 7)   No results found for: LABMICR  LDL Cholesterol (mg/dL)   Date Value   04/08/2019 67   12/04/2018 72   04/12/2016 116       (goal LDL is <100)   AST (U/L)   Date Value   04/08/2019 20     ALT (U/L)   Date Value   04/08/2019 16     BUN (mg/dL)   Date Value   12/02/2019 23     BP Readings from Last 3 Encounters:   01/06/20 132/88   12/27/19 130/86   09/26/19 132/84          (goal 140/90)    Past Medical History:   Diagnosis Date    Anemia     Cellulitis of lower extremity     right     Cerebral artery occlusion with cerebral infarction ([address]     1996    CHF (congestive heart failure) (HCC)     Chronic acquired lymphedema     Hernia, abdominal     History of blood transfusion 2003 7 2006 [address] [facility] SUR[facility]    History of CVA (cerebrovascular accident) 54    DEFECIT MILD MEMORY AND SPEECH    Hyperlipidemia     Hypertension     Hypothyroid 09/2015    Ileus, postoperative (HCC)     Mild renal insufficiency     Morbid obesity ([address]     Non-healing wound of lower extremity     HISTORY OF, WAS SEEN IN WOUND CLINIC FOR COUPLE YRS.    Osteoarthritis     Phlebitis     HX.  OF     Prolonged emergence from general anesthesia     x1 had to be put back on ventolator, after prostate surgery, had to be  Probiotic Product (PROBIOTIC ADVANCED PO) Take by mouth      Multiple Vitamins-Minerals (OCUVITE PO) Take 1 tablet by mouth daily      allopurinol (ZYLOPRIM) 300 MG tablet Take 300 mg by mouth 2 times daily      Elastic Bandages & Supports (JOBST RELIEF 30-40MMHG XL) MISC 4 pair knee high open toe. 4 each 0    Cholecalciferol (VITAMIN D-3 PO) Take  by mouth daily. 2000 IU daily       Coenzyme Q10 (CO Q 10 PO) Take 1 tablet by mouth daily       Aspirin-Acetaminophen-Caffeine (EXCEDRIN PO) Take by mouth Indications: PRN        No current facility-administered medications for this visit. Allergies   Allergen Reactions    Doxycycline Rash       Health Maintenance   Topic Date Due    DTaP/Tdap/Td vaccine (1 - Tdap) 01/03/1956    Shingles Vaccine (1 of 2) 01/03/1993    Lipid screen  04/08/2020    TSH testing  06/24/2020    Potassium monitoring  12/02/2020    Creatinine monitoring  12/02/2020    Annual Wellness Visit (AWV)  12/26/2020    Flu vaccine  Completed    Pneumococcal 65+ years Vaccine  Completed       Subjective:     Review of Systems   Constitutional: Positive for chills. Negative for fever. HENT: Negative for congestion. Respiratory: Negative for cough. Cardiovascular: Negative for chest pain and palpitations. Gastrointestinal: Positive for diarrhea. Musculoskeletal: Positive for arthralgias. Neurological: Negative for dizziness. Objective:     /88   Pulse 60   Wt 297 lb 3.2 oz (134.8 kg)   SpO2 93%   BMI 42.64 kg/m²   Physical Exam  Vitals signs and nursing note reviewed. Constitutional:       General: He is not in acute distress. Appearance: He is well-developed. HENT:      Head: Normocephalic and atraumatic. Eyes:      General: No scleral icterus. Right eye: No discharge. Left eye: No discharge. Conjunctiva/sclera: Conjunctivae normal.      Pupils: Pupils are equal, round, and reactive to light.    Neck:      Thyroid: No thyromegaly. Trachea: No tracheal deviation. Cardiovascular:      Rate and Rhythm: Normal rate and regular rhythm. Heart sounds: Normal heart sounds. Comments: No carotid bruits  Pulmonary:      Effort: Pulmonary effort is normal. No respiratory distress. Breath sounds: Normal breath sounds. No wheezing. Lymphadenopathy:      Cervical: No cervical adenopathy. Skin:     General: Skin is warm. Findings: No rash. Neurological:      Mental Status: He is alert and oriented to person, place, and time. Psychiatric:         Behavior: Behavior normal.         Thought Content: Thought content normal.         Assessment:       Diagnosis Orders   1. Essential hypertension     2. Right hip pain  traMADol (ULTRAM) 50 MG tablet   3. Spinal stenosis of lumbar region with neurogenic claudication     4. Chronic diastolic congestive heart failure ([address]     5. Prostate cancer [facility])          Plan:      Return in about 3 months (around 4/6/2020) for medication f/u. No orders of the defined types were placed in this encounter. Orders Placed This Encounter   Medications    traMADol (ULTRAM) 50 MG tablet     Sig: Take 1-2 tablets by mouth every 6 hours as needed for Pain. Dispense:  360 tablet     Refill:  0     Reduce doses taken as pain becomes manageable       Patient given educationalmaterials - see patient instructions. Discussed use, benefit, and side effectsof prescribed medications. All patient questions answered. Pt voiced understanding. Reviewed health maintenance. Instructed to continue current medications, diet andexercise. Patient agreed with treatment plan. Follow up as directed.      Electronicallysigned by Bradley Minor MD on 1/6/2020 at 2:49 PM

## 2020-01-15 ENCOUNTER — HOSPITAL ENCOUNTER (OUTPATIENT)
Age: 75
Discharge: HOME | End: 2020-01-15
Payer: MEDICARE

## 2020-01-15 ENCOUNTER — HOSPITAL ENCOUNTER (OUTPATIENT)
Dept: ULTRASOUND IMAGING | Age: 75
Discharge: HOME | End: 2020-01-17
Payer: MEDICARE

## 2020-01-15 LAB
-: ABNORMAL
AMORPHOUS: ABNORMAL
ANION GAP SERPL CALCULATED.3IONS-SCNC: 13 MMOL/L (ref 9–17)
BACTERIA: ABNORMAL
BILIRUBIN URINE: ABNORMAL
BUN BLDV-MCNC: 27 MG/DL (ref 8–23)
BUN/CREAT BLD: ABNORMAL (ref 9–20)
CALCIUM SERPL-MCNC: 10 MG/DL (ref 8.6–10.4)
CASTS UA: ABNORMAL /LPF
CHLORIDE BLD-SCNC: 93 MMOL/L (ref 98–107)
CO2: 33 MMOL/L (ref 20–31)
COLOR: YELLOW
COMMENT UA: ABNORMAL
CREAT SERPL-MCNC: 1.29 MG/DL (ref 0.7–1.2)
CREATININE URINE: 219.3 MG/DL (ref 39–259)
CRYSTALS, UA: ABNORMAL /HPF
EPITHELIAL CELLS UA: ABNORMAL /HPF
GFR AFRICAN AMERICAN: >60 ML/MIN
GFR NON-AFRICAN AMERICAN: 54 ML/MIN
GFR SERPL CREATININE-BSD FRML MDRD: ABNORMAL ML/MIN/{1.73_M2}
GFR SERPL CREATININE-BSD FRML MDRD: ABNORMAL ML/MIN/{1.73_M2}
GLUCOSE BLD-MCNC: 87 MG/DL (ref 70–99)
GLUCOSE URINE: NEGATIVE
KETONES, URINE: NEGATIVE
LEUKOCYTE ESTERASE, URINE: NEGATIVE
MUCUS: ABNORMAL
NITRITE, URINE: NEGATIVE
OTHER OBSERVATIONS UA: ABNORMAL
PH UA: 5 (ref 5–8)
POTASSIUM SERPL-SCNC: 3.8 MMOL/L (ref 3.7–5.3)
PROTEIN UA: NEGATIVE
RBC UA: ABNORMAL /HPF
RENAL EPITHELIAL, UA: ABNORMAL /HPF
SODIUM BLD-SCNC: 139 MMOL/L (ref 135–144)
SPECIFIC GRAVITY UA: 1.02 (ref 1–1.03)
TOTAL PROTEIN, URINE: 23 MG/DL
TRICHOMONAS: ABNORMAL
TURBIDITY: CLEAR
URINE HGB: NEGATIVE
UROBILINOGEN, URINE: NORMAL
WBC UA: ABNORMAL /HPF
YEAST: ABNORMAL

## 2020-01-15 PROCEDURE — 80048 BASIC METABOLIC PNL TOTAL CA: CPT

## 2020-01-15 PROCEDURE — 76770 US EXAM ABDO BACK WALL COMP: CPT

## 2020-01-15 PROCEDURE — 81001 URINALYSIS AUTO W/SCOPE: CPT

## 2020-01-15 PROCEDURE — 84156 ASSAY OF PROTEIN URINE: CPT

## 2020-01-15 PROCEDURE — 36415 COLL VENOUS BLD VENIPUNCTURE: CPT

## 2020-01-15 PROCEDURE — 82570 ASSAY OF URINE CREATININE: CPT

## 2020-01-30 ENCOUNTER — HOSPITAL ENCOUNTER (OUTPATIENT)
Age: 75
Setting detail: SPECIMEN
Discharge: HOME | End: 2020-01-30
Payer: MEDICARE

## 2020-01-31 LAB
ALBUMIN SERPL-MCNC: 4.3 G/DL (ref 3.5–5.2)
ALBUMIN/GLOBULIN RATIO: 2 (ref 1–2.5)
ALP BLD-CCNC: 65 U/L (ref 40–129)
ALT SERPL-CCNC: 12 U/L (ref 5–41)
AST SERPL-CCNC: 16 U/L
BILIRUB SERPL-MCNC: 0.63 MG/DL (ref 0.3–1.2)
BILIRUBIN DIRECT: 0.17 MG/DL
BILIRUBIN, INDIRECT: 0.46 MG/DL (ref 0–1)
GLOBULIN: NORMAL G/DL (ref 1.5–3.8)
TOTAL PROTEIN: 6.4 G/DL (ref 6.4–8.3)
URIC ACID: 2.7 MG/DL (ref 3.4–7)

## 2020-02-12 ENCOUNTER — OFFICE VISIT (OUTPATIENT)
Dept: ORTHOPEDIC SURGERY | Age: 75
End: 2020-02-12
Payer: MEDICARE

## 2020-02-12 PROCEDURE — 20610 DRAIN/INJ JOINT/BURSA W/O US: CPT | Performed by: ORTHOPAEDIC SURGERY

## 2020-02-12 PROCEDURE — 99213 OFFICE O/P EST LOW 20 MIN: CPT | Performed by: ORTHOPAEDIC SURGERY

## 2020-02-12 RX ORDER — BUPIVACAINE HYDROCHLORIDE 5 MG/ML
2 INJECTION, SOLUTION PERINEURAL ONCE
Status: COMPLETED | OUTPATIENT
Start: 2020-02-12 | End: 2020-02-12

## 2020-02-12 RX ORDER — BETAMETHASONE SODIUM PHOSPHATE AND BETAMETHASONE ACETATE 3; 3 MG/ML; MG/ML
12 INJECTION, SUSPENSION INTRA-ARTICULAR; INTRALESIONAL; INTRAMUSCULAR; SOFT TISSUE ONCE
Status: COMPLETED | OUTPATIENT
Start: 2020-02-12 | End: 2020-02-12

## 2020-02-12 RX ORDER — LIDOCAINE HYDROCHLORIDE 10 MG/ML
2 INJECTION, SOLUTION EPIDURAL; INFILTRATION; INTRACAUDAL; PERINEURAL ONCE
Status: COMPLETED | OUTPATIENT
Start: 2020-02-12 | End: 2020-02-12

## 2020-02-12 RX ADMIN — BETAMETHASONE SODIUM PHOSPHATE AND BETAMETHASONE ACETATE 12 MG: 3; 3 INJECTION, SUSPENSION INTRA-ARTICULAR; INTRALESIONAL; INTRAMUSCULAR; SOFT TISSUE at 16:20

## 2020-02-12 RX ADMIN — BUPIVACAINE HYDROCHLORIDE 10 MG: 5 INJECTION, SOLUTION PERINEURAL at 16:21

## 2020-02-12 RX ADMIN — LIDOCAINE HYDROCHLORIDE 2 ML: 10 INJECTION, SOLUTION EPIDURAL; INFILTRATION; INTRACAUDAL; PERINEURAL at 16:21

## 2020-02-12 NOTE — PROGRESS NOTES
Chandni Michael M.D.            [address] [place] [address], [address] [facility], [address] [facility]           Dept Phone: [phone]           Dept Fax:  [address]           [employer]          Dept Phone: [phone]           Dept Fax:  [phone]      Chief Compliant:  Chief Complaint   Patient presents with    Follow-up     right hip         History of Present Illness:  Stephanie Sarkar returns today. This is a 66 y.o. male who presents to the clinic today for follow up of right hip. He status post left total hip. He had injections to his right hip in the past which really helped him out with his last in September 2019. He reports that is last one day. Patient has other significant medical comorbidities as well as significant lumbar disease as well. He has pain over the greater trochanter and pain down his IT band. After sitting in a car for a long period of time he has pain radiating down his right leg. Review of Systems   Constitutional: Negative for fever, chills, sweats, recent illness, or recent injury. Neurological: Negative for headaches, numbness, or weakness. Integumentary: Negative for rash, itching, ecchymosis, abrasions, or laceration. Musculoskeletal: Positive for Follow-up (right hip )       Physical Exam:  Constitutional: Patient is oriented to person, place, and time. Patient appears well-developed and well nourished. HENT: Negative otherwise noted  Head: Normocephalic and Atraumatic  Nose: Normal  Eyes: Conjunctivae and EOM are normal  Neck: Normal range of motion Neck supple. Respiratory/Cardio: Effort normal. No respiratory distress. Musculoskeletal:  Right greater trochanter tender. ROM intact. Neurological: Patient is alert and oriented to person, place, and time. Normal strenght. No sensory deficit.   Skin: Skin is warm and

## 2020-03-20 RX ORDER — TRAMADOL HYDROCHLORIDE 50 MG/1
50-100 TABLET ORAL EVERY 6 HOURS PRN
Qty: 360 TABLET | Refills: 0 | Status: SHIPPED | OUTPATIENT
Start: 2020-03-20 | End: 2020-06-23 | Stop reason: SDUPTHER

## 2020-03-26 PROBLEM — Z85.46 HISTORY OF PROSTATE CANCER: Status: ACTIVE | Noted: 2020-03-26

## 2020-06-03 ENCOUNTER — OFFICE VISIT (OUTPATIENT)
Dept: PRIMARY CARE CLINIC | Age: 75
End: 2020-06-03
Payer: MEDICARE

## 2020-06-03 VITALS
HEART RATE: 76 BPM | DIASTOLIC BLOOD PRESSURE: 80 MMHG | SYSTOLIC BLOOD PRESSURE: 142 MMHG | TEMPERATURE: 97.8 F | WEIGHT: 310.2 LBS | BODY MASS INDEX: 43.88 KG/M2

## 2020-06-03 PROBLEM — I48.92 ATRIAL FLUTTER (HCC): Status: ACTIVE | Noted: 2020-06-03

## 2020-06-03 PROBLEM — E66.01 MORBID OBESITY WITH BMI OF 40.0-44.9, ADULT (HCC): Status: ACTIVE | Noted: 2020-06-03

## 2020-06-03 PROBLEM — L98.492 NON-PRESSURE CHRONIC ULCER OF SKIN OF OTHER SITES WITH FAT LAYER EXPOSED (HCC): Status: ACTIVE | Noted: 2020-06-03

## 2020-06-03 PROCEDURE — 99213 OFFICE O/P EST LOW 20 MIN: CPT | Performed by: FAMILY MEDICINE

## 2020-06-03 RX ORDER — ALLOPURINOL 300 MG/1
300 TABLET ORAL 2 TIMES DAILY
Qty: 180 TABLET | Refills: 3 | Status: SHIPPED | OUTPATIENT
Start: 2020-06-03 | End: 2021-06-02

## 2020-06-03 RX ORDER — ATORVASTATIN CALCIUM 10 MG/1
10 TABLET, FILM COATED ORAL DAILY
Qty: 90 TABLET | Refills: 3 | Status: SHIPPED | OUTPATIENT
Start: 2020-06-03 | End: 2021-08-02

## 2020-06-03 ASSESSMENT — ENCOUNTER SYMPTOMS
RHINORRHEA: 0
WHEEZING: 0
NAUSEA: 0
EYE DISCHARGE: 0
COUGH: 0
VOMITING: 0
ABDOMINAL PAIN: 0
EYE REDNESS: 0
SORE THROAT: 0
DIARRHEA: 0
SHORTNESS OF BREATH: 0

## 2020-06-03 NOTE — PROGRESS NOTES
[address] [facility] PRIMARY CARE  [address] [address]  [facility] [address]  Dept: 69 Rosette Pepper is a 66 y.o. male who presents today for his medical conditions/complaintsas noted below. Chief Complaint   Patient presents with    Medication Check    Hypertension     Patient doesn't check bp at home    Medication Refill     Pending        HPI:     HPI-pt supposed to have cath and then shoulder surgery but Covid is a concern. LDL Cholesterol (mg/dL)   Date Value   04/08/2019 67   12/04/2018 72   04/12/2016 116       (goal LDL is <100)   AST (U/L)   Date Value   01/30/2020 16     ALT (U/L)   Date Value   01/30/2020 12     BUN (mg/dL)   Date Value   01/15/2020 27 (H)     BP Readings from Last 3 Encounters:   06/03/20 (!) 142/80   03/05/20 (!) 140/80   01/08/20 128/78          (goal 120/80)    Past Medical History:   Diagnosis Date    Adenocarcinoma of prostate ([address] 10/30/2015    Anemia     Cellulitis of lower extremity     right     Cerebral artery occlusion with cerebral infarction ([address]     1996    CHF (congestive heart failure) (HCC)     Chronic acquired lymphedema     Hernia, abdominal     History of blood transfusion 2003 7 2006 [address] [facility]GERY    History of CVA (cerebrovascular accident) 54    DEFECIT MILD MEMORY AND SPEECH    Hyperlipidemia     Hypertension     Hypothyroid 09/2015    Ileus, postoperative (HCC)     Mild renal insufficiency     Morbid obesity ([address]     Non-healing wound of lower extremity     HISTORY OF, WAS SEEN IN WOUND CLINIC FOR COUPLE YRS.    Osteoarthritis     Phlebitis     HX. OF     Prolonged emergence from general anesthesia     x1 had to be put back on ventolator, after prostate surgery, had to be hospitalized x12 days.      Prostate CA ([address]     Prostate cancer ([address] 10/21/2015    PVD (peripheral vascular disease) ([address]     Sleep apnea     C-PAP NIGHTLY-PT INSTRUCTED TO BRING    SVT Cardiovascular:      Rate and Rhythm: Normal rate and regular rhythm. Heart sounds: Normal heart sounds. Comments: No carotid bruits  Pulmonary:      Effort: Pulmonary effort is normal. No respiratory distress. Breath sounds: Normal breath sounds. No wheezing. Lymphadenopathy:      Cervical: No cervical adenopathy. Skin:     General: Skin is warm. Findings: No rash. Neurological:      Mental Status: He is alert and oriented to person, place, and time. Psychiatric:         Behavior: Behavior normal.         Thought Content: Thought content normal.         Assessment:       Diagnosis Orders   1. Non-pressure chronic ulcer of skin of other sites with fat layer exposed ([facility]     2. Atrial flutter, unspecified type ([address]     3. Morbid obesity with BMI of 40.0-44.9, adult ([facility]          Plan:    pt advised if he wants to have shoulder done then should call and have nephrology send clearance to cardiology for cath. Return in about 3 months (around 9/3/2020) for medication f/u. No orders of the defined types were placed in this encounter. Orders Placed This Encounter   Medications    allopurinol (ZYLOPRIM) 300 MG tablet     Sig: Take 1 tablet by mouth 2 times daily     Dispense:  180 tablet     Refill:  3    atorvastatin (LIPITOR) 10 MG tablet     Sig: Take 1 tablet by mouth daily     Dispense:  90 tablet     Refill:  3    Elastic Bandages & Supports (JOBST FOR MEN KNEE HIGH/LG) MISC     Sig: Use daily     Dispense:  2 each     Refill:  1       Patient given educationalmaterials - see patient instructions. Discussed use, benefit, and side effectsof prescribed medications. All patient questions answered. Pt voiced understanding. Reviewed health maintenance. Instructed to continue current medications, diet andexercise. Patient agreed with treatment plan. Follow up as directed.      Electronicallysigned by Barbara Pennington MD on 6/3/2020 at 2:22 PM

## 2020-06-23 RX ORDER — TRAMADOL HYDROCHLORIDE 50 MG/1
50-100 TABLET ORAL EVERY 6 HOURS PRN
Qty: 360 TABLET | Refills: 0 | Status: SHIPPED | OUTPATIENT
Start: 2020-06-23 | End: 2020-08-26 | Stop reason: SDUPTHER

## 2020-07-01 ENCOUNTER — HOSPITAL ENCOUNTER (OUTPATIENT)
Age: 75
Setting detail: SPECIMEN
Discharge: HOME | End: 2020-07-01
Payer: MEDICARE

## 2020-07-01 LAB — PROSTATE SPECIFIC ANTIGEN: <0.01 UG/L

## 2020-07-06 ENCOUNTER — HOSPITAL ENCOUNTER (OUTPATIENT)
Age: 75
Discharge: HOME | End: 2020-07-06
Payer: MEDICARE

## 2020-07-06 LAB
BUN BLDV-MCNC: 31 MG/DL (ref 8–23)
CREAT SERPL-MCNC: 1.3 MG/DL (ref 0.7–1.2)
GFR AFRICAN AMERICAN: >60 ML/MIN
GFR NON-AFRICAN AMERICAN: 54 ML/MIN
GFR SERPL CREATININE-BSD FRML MDRD: ABNORMAL ML/MIN/{1.73_M2}
GFR SERPL CREATININE-BSD FRML MDRD: ABNORMAL ML/MIN/{1.73_M2}

## 2020-07-06 PROCEDURE — 36415 COLL VENOUS BLD VENIPUNCTURE: CPT

## 2020-07-06 PROCEDURE — 82565 ASSAY OF CREATININE: CPT

## 2020-07-06 PROCEDURE — 84520 ASSAY OF UREA NITROGEN: CPT

## 2020-07-27 ENCOUNTER — HOSPITAL ENCOUNTER (OUTPATIENT)
Dept: PREADMISSION TESTING | Age: 75
Discharge: HOME | End: 2020-07-31
Payer: MEDICARE

## 2020-07-27 PROCEDURE — U0003 INFECTIOUS AGENT DETECTION BY NUCLEIC ACID (DNA OR RNA); SEVERE ACUTE RESPIRATORY SYNDROME CORONAVIRUS 2 (SARS-COV-2) (CORONAVIRUS DISEASE [COVID-19]), AMPLIFIED PROBE TECHNIQUE, MAKING USE OF HIGH THROUGHPUT TECHNOLOGIES AS DESCRIBED BY CMS-2020-01-R: HCPCS

## 2020-07-29 LAB — SARS-COV-2, NAA: NOT DETECTED

## 2020-07-30 ENCOUNTER — HOSPITAL ENCOUNTER (OUTPATIENT)
Dept: CARDIAC CATH/INVASIVE PROCEDURES | Age: 75
Discharge: HOME | End: 2020-07-30
Payer: MEDICARE

## 2020-07-30 VITALS
DIASTOLIC BLOOD PRESSURE: 73 MMHG | HEART RATE: 72 BPM | RESPIRATION RATE: 18 BRPM | TEMPERATURE: 96.9 F | SYSTOLIC BLOOD PRESSURE: 141 MMHG | HEIGHT: 70 IN | WEIGHT: 297 LBS | BODY MASS INDEX: 42.52 KG/M2 | OXYGEN SATURATION: 98 %

## 2020-07-30 LAB
BUN BLDV-MCNC: 30 MG/DL (ref 8–23)
GFR NON-AFRICAN AMERICAN: 55 ML/MIN
GFR SERPL CREATININE-BSD FRML MDRD: >60 ML/MIN
GFR SERPL CREATININE-BSD FRML MDRD: ABNORMAL ML/MIN/{1.73_M2}
GLUCOSE BLD-MCNC: 116 MG/DL (ref 74–100)
PLATELET # BLD: 183 K/UL (ref 138–453)
POC CHLORIDE: 102 MMOL/L (ref 98–107)
POC CREATININE: 1.26 MG/DL (ref 0.51–1.19)
POC HEMATOCRIT: 46 % (ref 41–53)
POC HEMOGLOBIN: 15.8 G/DL (ref 13.5–17.5)
POC POTASSIUM: 4.2 MMOL/L (ref 3.5–4.5)
POC SODIUM: 142 MMOL/L (ref 138–146)

## 2020-07-30 PROCEDURE — 7100000000 HC PACU RECOVERY - FIRST 15 MIN

## 2020-07-30 PROCEDURE — 82947 ASSAY GLUCOSE BLOOD QUANT: CPT

## 2020-07-30 PROCEDURE — 6360000002 HC RX W HCPCS

## 2020-07-30 PROCEDURE — 85014 HEMATOCRIT: CPT

## 2020-07-30 PROCEDURE — C1725 CATH, TRANSLUMIN NON-LASER: HCPCS

## 2020-07-30 PROCEDURE — 93454 CORONARY ARTERY ANGIO S&I: CPT | Performed by: INTERNAL MEDICINE

## 2020-07-30 PROCEDURE — 93005 ELECTROCARDIOGRAM TRACING: CPT | Performed by: INTERNAL MEDICINE

## 2020-07-30 PROCEDURE — 93458 L HRT ARTERY/VENTRICLE ANGIO: CPT | Performed by: INTERNAL MEDICINE

## 2020-07-30 PROCEDURE — 6360000004 HC RX CONTRAST MEDICATION

## 2020-07-30 PROCEDURE — 82435 ASSAY OF BLOOD CHLORIDE: CPT

## 2020-07-30 PROCEDURE — 84520 ASSAY OF UREA NITROGEN: CPT

## 2020-07-30 PROCEDURE — 7100000001 HC PACU RECOVERY - ADDTL 15 MIN

## 2020-07-30 PROCEDURE — C1894 INTRO/SHEATH, NON-LASER: HCPCS

## 2020-07-30 PROCEDURE — C1760 CLOSURE DEV, VASC: HCPCS

## 2020-07-30 PROCEDURE — 84132 ASSAY OF SERUM POTASSIUM: CPT

## 2020-07-30 PROCEDURE — 2500000003 HC RX 250 WO HCPCS

## 2020-07-30 PROCEDURE — 85049 AUTOMATED PLATELET COUNT: CPT

## 2020-07-30 PROCEDURE — 2709999900 HC NON-CHARGEABLE SUPPLY

## 2020-07-30 PROCEDURE — 82565 ASSAY OF CREATININE: CPT

## 2020-07-30 PROCEDURE — C1769 GUIDE WIRE: HCPCS

## 2020-07-30 PROCEDURE — 84295 ASSAY OF SERUM SODIUM: CPT

## 2020-07-30 RX ORDER — SODIUM CHLORIDE 0.9 % (FLUSH) 0.9 %
10 SYRINGE (ML) INJECTION EVERY 12 HOURS SCHEDULED
Status: DISCONTINUED | OUTPATIENT
Start: 2020-07-30 | End: 2020-07-31 | Stop reason: HOSPADM

## 2020-07-30 RX ORDER — SODIUM CHLORIDE 9 MG/ML
INJECTION, SOLUTION INTRAVENOUS CONTINUOUS
Status: DISCONTINUED | OUTPATIENT
Start: 2020-07-30 | End: 2020-07-31 | Stop reason: HOSPADM

## 2020-07-30 RX ORDER — SODIUM CHLORIDE 0.9 % (FLUSH) 0.9 %
10 SYRINGE (ML) INJECTION PRN
Status: DISCONTINUED | OUTPATIENT
Start: 2020-07-30 | End: 2020-07-31 | Stop reason: HOSPADM

## 2020-07-30 RX ORDER — ACETAMINOPHEN 325 MG/1
650 TABLET ORAL EVERY 4 HOURS PRN
Status: DISCONTINUED | OUTPATIENT
Start: 2020-07-30 | End: 2020-07-31 | Stop reason: HOSPADM

## 2020-07-30 RX ADMIN — SODIUM CHLORIDE: 9 INJECTION, SOLUTION INTRAVENOUS at 08:59

## 2020-07-30 ASSESSMENT — PAIN SCALES - GENERAL: PAINLEVEL_OUTOF10: 3

## 2020-07-30 ASSESSMENT — PAIN DESCRIPTION - DESCRIPTORS: DESCRIPTORS: SHARP

## 2020-07-30 ASSESSMENT — PAIN DESCRIPTION - FREQUENCY: FREQUENCY: CONTINUOUS

## 2020-07-30 ASSESSMENT — PAIN DESCRIPTION - LOCATION: LOCATION: SHOULDER

## 2020-07-30 ASSESSMENT — PAIN DESCRIPTION - ORIENTATION: ORIENTATION: RIGHT;LEFT

## 2020-07-30 ASSESSMENT — PAIN DESCRIPTION - ONSET: ONSET: ON-GOING

## 2020-07-30 ASSESSMENT — PAIN DESCRIPTION - PROGRESSION: CLINICAL_PROGRESSION: NOT CHANGED

## 2020-07-30 NOTE — PROGRESS NOTES
Remainder of air removed from TR band in  2 mL increments until all air removed. No bleeding or hematoma noted. Pressure dressing and armboard applied, radial pulse palpable. Ambulated to bathroom and in halls. Gait steady. . Right groin puncture site and right pedal pulse assessment unchanged.

## 2020-07-30 NOTE — H&P
[facility]  Pre- Procedure History and Physical/Update          Patient Name:  Ad Day  MRN:    [id number]  YOB: 1945  Date of evaluation:  7/30/2020       Please refer to the consult note / H&P completed by Sonam Otero NP  on 7/13/20 in the medical record and note that:       [] I have examined the patient and reviewed the H&P/Consult and there are no changes to be made to the assessment or plan. [x] I have examined the patient and reviewed the H&P/Consult and have noted the following changes:        Past Medical History:   Diagnosis Date    Adenocarcinoma of prostate ([address] 10/30/2015    Anemia     Cellulitis of lower extremity     right     Cerebral artery occlusion with cerebral infarction ([address]     1996    CHF (congestive heart failure) ([address]     Chronic acquired lymphedema     Hernia, abdominal     History of blood transfusion 2003 7 2006 [facility] SURGERY    History of CVA (cerebrovascular accident) 54    DEFECIT MILD MEMORY AND SPEECH    Hyperlipidemia     Hypertension     Hypothyroid 09/2015    Ileus, postoperative ([address]     Mild renal insufficiency     Morbid obesity ([address]     Non-healing wound of lower extremity     HISTORY OF, WAS SEEN IN WOUND CLINIC FOR COUPLE YRS.    Osteoarthritis     Phlebitis     HX. OF     Prolonged emergence from general anesthesia     x1 had to be put back on ventolator, after prostate surgery, had to be hospitalized x12 days.      Prostate CA ([address]     Prostate cancer ([address] 10/21/2015    PVD (peripheral vascular disease) ([address]     Sleep apnea     C-PAP NIGHTLY-PT INSTRUCTED TO BRING    SVT (supraventricular tachycardia) (HCC)     Uric acid kidney stone 12/2014    HAD 6 PASSSED ONE ON OWN, OTHER 5 IS BEING MONITORED    Wears glasses        Past Surgical History:   Procedure Laterality Date    ABLATION OF DYSRHYTHMIC FOCUS  03/16/2018    afib ablation    ABLATION OF DYSRHYTHMIC FOCUS levothyroxine (SYNTHROID) 25 MCG tablet TAKE 1 TABLET DAILY 9/9/19   Kan Park MD   furosemide (LASIX) 40 MG tablet TAKE 1 TABLET DAILY 9/4/19   Kan Park MD   pantoprazole (PROTONIX) 40 MG tablet Take 1 tablet by mouth every morning (before breakfast) 8/23/19   Cindia Fothergill, APRN - CNP   metoprolol tartrate (LOPRESSOR) 25 MG tablet Take 25 mg by mouth 2 times daily    Historical Provider, MD   psyllium (KONSYL) 28.3 % PACK Take 1 packet by mouth nightly    Historical Provider, MD   Bisacodyl (DULCOLAX PO) Take 1 tablet by mouth as needed     Historical Provider, MD   apixaban (ELIQUIS) 5 MG TABS tablet Take 5 mg by mouth 2 times daily    Historical Provider, MD   trospium (SANCTURA) 20 MG tablet Take 20 mg by mouth 2 times daily Prescribed by Dr. Marquez Ham Provider, MD   Potassium 99 MG TABS Take by mouth    Historical Provider, MD   fluticasone (FLONASE) 50 MCG/ACT nasal spray 1 spray by Nasal route daily 8/2/17   Kan Park MD   acetaminophen (TYLENOL) 325 MG tablet Take 650 mg by mouth every 6 hours as needed for Pain    Historical Provider, MD   CINNAMON PO Take 2 capsules by mouth daily    Historical Provider, MD   Probiotic Product (PROBIOTIC ADVANCED PO) Take by mouth    Historical Provider, MD   Multiple Vitamins-Minerals (OCUVITE PO) Take 1 tablet by mouth daily    Historical Provider, MD   Elastic Bandages & Supports (JOBST RELIEF 30-40MMHG XL) MISC 4 pair knee high open toe. 6/18/14   Bhargavi Parada MD   Cholecalciferol (VITAMIN D-3 PO) Take  by mouth daily. 2000 IU daily     Historical Provider, MD   Coenzyme Q10 (CO Q 10 PO) Take 1 tablet by mouth daily     Historical Provider, MD       Allergies   Allergen Reactions    Doxycycline Rash         REVIEW OF SYSTEMS:     A detailed review of system was performed as already noted and is otherwise as above. PHYSICAL EXAM:     There were no vitals filed for this visit.      Constitutional and General Appearance: alert, cooperative, no distress and appears stated age  [de-identified]: PERRL, no cervical lymphadenopathy. No masses palpable. Normal oral mucosa  Respiratory:  · Normal excursion and expansion without use of accessory muscles  · Resp Auscultation: Good respiratory effort. No for increased work of breathing. On auscultation: clear to auscultation bilaterally  Cardiovascular:  · The apical impulse is not displaced  · Heart tones are crisp and normal. regular S1 and S2.  · Jugular venous pulsation Normal  · The carotid upstroke is normal in amplitude and contour without delay or bruit  · Peripheral pulses are symmetrical and full  Abdomen:  · No masses or tenderness  · Bowel sounds present  Extremities:  ·  No Cyanosis or Clubbing  ·  Lower extremity edema: No  · Skin: Warm and dry  Neurological:  · Alert and oriented. · Moves all extremities well  · No abnormalities of mood, affect, memory, mentation, or behavior are noted      Active Problems:    * No active hospital problems. *  Resolved Problems:    * No resolved hospital problems. *      Assessment:  1. Abnormal stress test in 2018 and now needs surgical clearance for shoulder surgery -  2. PAfib on Eliquis  3. CKD stage III. 4.   Morbid obesity BMI > 45.   5.    PVD  6. A flutter ablation 8/22/19  7. Hx of chronic DVT left popliteal vein  8. Nuclear stress 5/2018: mild reversible inferior wall ischemia, EF 50%  9. HTN     Plan:  1. Proceed with planned cardiac cath. 2. Further orders to follow. The risks, benefits, and alternatives of the prcoedure were discussed in detail with the patient. The patient agrees to proceed with the procedure, verbalizes understanding and signed consent. Paco Rivera MD  Fellow, Cardiovascular Diseases    [address] [facility]        Attending Physician Statement  I have discussed the case of Kieran Brush including pertinent history and exam findings with the resident.  I have seen and examined the patient and the key elements of the encounter have been performed by me. I agree with the assessment, plan and orders as documented by the resident With changes made to the note.      Electronically signed by Charlotte Padilla MD on 7/30/2020 at 3:10 PM.    [facility]      [phone]

## 2020-07-30 NOTE — PROGRESS NOTES
Patient admitted, consent signed, all questions answered. Pt ready for procedure. Call light to reach with side rails up 2 of 2. Bilat groin areas clipped. Wife at bedside with patient.

## 2020-07-30 NOTE — PROGRESS NOTES
All discharge instructions reviewed, questions answered, paper signed and given copy. Patient discharged per wheelchair with wife and belongings.

## 2020-07-30 NOTE — OP NOTE
[facility]        Date:   7/30/2020  Patient name:  Brent Aldridge  Date of admission:  7/30/2020  8:00 AM  MRN:   [id number]  YOB: 1945    CARDIAC CATHETERIZATION    Operators:  Rosemary Leyden, MD    CV Fellow: Dannie Rodarte M.D. Procedure performed:       [x] Left Heart Catheterization. [] Graft Angiography. [x] Left Ventriculography. [] Right Heart Catheterization. [x] Coronary Angiography. [] Aortic Valve Studies. [] PCI:      [] Other:       Pre Procedure Conscious Sedation Data:    ASA Class:    [] I [] II [x] III [] IV    Mallampati Class:  [] I [] II [x] III [] IV      Indication:  [] STEMI      [x] + Stress test  [] ACS      [] + EKG Changes  [] Non Q MI       [] Significant Risk Factors  [] Recurrent Angina             [] Diabetes Mellitus    [] New LBBB      [] Uncontrolled HTN. [] CHF / Low EF changes     [] Abnormal CTA / Ca Score  [] Other:     Procedure:  Access:  [x] Femoral artery  [x] Radial  artery       [x] Right   [] Left    Procedure: After informed consent was obtained with explanation of the risks and benefits, patient was brought to the cath lab. The access area was prepped and draped in sterile fashion. 1% lidocaine was used for local block. The artery was cannulated with 6  Fr sheath with brisk arterial blood return. The side port was frequently flushed and aspirated with normal saline. Estimated blood loss: 10 ml    Findings:    LMCA: Normal 0% stenosis.     LAD: Normal 0% stenosis.     LCx: Normal 0% stenosis.     RCA: Normal 0% stenosis.      Coronary Tree      Dominance: Right     Ventriculography Findings:  LV was not done.       Conclusions:  Normal coronaries. LV was not done. Recommendations:  Medical therapy as needed. Risk factor modification.          History and Risk Factors    [x] Hypertension     [] Family history of CAD  [] Hyperlipidemia     [] Cerebrovascular Disease   [] Prior MI       [] Peripheral Vascular disease   [] Prior PCI              [] Diabetes Mellitus    [] Left Main PCI. [] Currently on Dialysis. [] Prior CABG. [] Currently smoker. [] Cardiac Arrest outside of healthcare facility. [] Yes    [x] No        Witnessed     [] Yes   [] No     Arrest after arrival of EMS  [] Yes   [] No     [] Cardiac Arrest at other Facility. [] Yes   [x] No    Pre-Procedure Information. Heart Failure       [] Yes    [x] No        Class  [] I      [] II  [] III    [] IV. New Diagnosis    [] Yes  [] No    HF Type      [] Systolic   [] Diastolic          [] Unknown. Diagnostic Test:   EKG       [x] Normal   [] Abnormal    New antiarrhythmia medications:    [] Yes   [] No   New onset atrial fibrillation / Flutter     [] Yes   [] No   ECG Abnormalities:      [] V. Fib   [] Daniella V. Tach           [] NS V. T   [] New LBBB           [] T. Inv  []  ST dev > 0.5 mm         [] PVC's freq  [] PVC's infrequent    Stress Test Performed:      [x] Yes    [] No     Type:     [] Stress Echo   [] Exercise Stress Test (no imaging)      [] Stress Nuclear  [x] Stress Imaging     Results   [] Negative   [x] Positive        [] Indeterminate  [] Unavailable     If Positive/ Risk / Extent of Ischemia:       [] Low  [x] Intermediate         [] High  [] Unavailable      Cardiac CTA Performed:     [] Yes    [x] No      Results   [] CAD   [] Non obstructive CAD      [] No CAD   [] Uncertain      [] Unknown   [] Structural Disease. Pre Procedure Medications:   [x] Yes    [] No         [] ASA  [x] Beta Blockers      [] Nitrate  [] Ca Channel Blockers      [] Ranolazine  [x] Statin       [] Plavix/Others antiplatelets          Red Galeas MD  Fellow, [id number] [facility]            Attending Physician Statement  I have discussed the case of Marina Reddy including pertinent history and exam findings with the resident.  I have seen and examined the patient and the key elements of the

## 2020-07-30 NOTE — PROGRESS NOTES
Received post procedure to [facility] to room 5. Assessment obtained. Restrictions reviewed with patient. Post procedure pathway initiated. Rt site soft , band aid dry and intact. No hematoma noted. Family at side. Patient without complaints. Head of bed flat with Rt leg straight. Rt Wrist puncture site has TR band (12ml). No drng, ecchymosis or hematoma.

## 2020-08-01 LAB
EKG ATRIAL RATE: 70 BPM
EKG P AXIS: 52 DEGREES
EKG P-R INTERVAL: 222 MS
EKG Q-T INTERVAL: 422 MS
EKG QRS DURATION: 102 MS
EKG QTC CALCULATION (BAZETT): 455 MS
EKG R AXIS: -25 DEGREES
EKG T AXIS: 30 DEGREES
EKG VENTRICULAR RATE: 70 BPM

## 2020-08-06 ENCOUNTER — OFFICE VISIT (OUTPATIENT)
Dept: ORTHOPEDIC SURGERY | Age: 75
End: 2020-08-06
Payer: MEDICARE

## 2020-08-06 VITALS
BODY MASS INDEX: 43.09 KG/M2 | HEART RATE: 60 BPM | RESPIRATION RATE: 93 BRPM | WEIGHT: 307.8 LBS | DIASTOLIC BLOOD PRESSURE: 62 MMHG | SYSTOLIC BLOOD PRESSURE: 105 MMHG | HEIGHT: 71 IN

## 2020-08-06 PROCEDURE — 20610 DRAIN/INJ JOINT/BURSA W/O US: CPT | Performed by: PHYSICIAN ASSISTANT

## 2020-08-06 PROCEDURE — 99213 OFFICE O/P EST LOW 20 MIN: CPT | Performed by: PHYSICIAN ASSISTANT

## 2020-08-06 RX ORDER — BUPIVACAINE HYDROCHLORIDE 2.5 MG/ML
4 INJECTION, SOLUTION INFILTRATION; PERINEURAL ONCE
Status: DISCONTINUED | OUTPATIENT
Start: 2020-08-06 | End: 2022-04-18

## 2020-08-06 RX ORDER — BETAMETHASONE SODIUM PHOSPHATE AND BETAMETHASONE ACETATE 3; 3 MG/ML; MG/ML
2 INJECTION, SUSPENSION INTRA-ARTICULAR; INTRALESIONAL; INTRAMUSCULAR; SOFT TISSUE ONCE
Status: DISCONTINUED | OUTPATIENT
Start: 2020-08-06 | End: 2022-04-18

## 2020-08-06 RX ORDER — LIDOCAINE HYDROCHLORIDE 10 MG/ML
4 INJECTION, SOLUTION INFILTRATION; PERINEURAL ONCE
Status: COMPLETED | OUTPATIENT
Start: 2020-08-06 | End: 2020-08-06

## 2020-08-06 RX ORDER — BETAMETHASONE SODIUM PHOSPHATE AND BETAMETHASONE ACETATE 3; 3 MG/ML; MG/ML
12 INJECTION, SUSPENSION INTRA-ARTICULAR; INTRALESIONAL; INTRAMUSCULAR; SOFT TISSUE ONCE
Status: COMPLETED | OUTPATIENT
Start: 2020-08-06 | End: 2020-08-06

## 2020-08-06 RX ADMIN — LIDOCAINE HYDROCHLORIDE 4 ML: 10 INJECTION, SOLUTION INFILTRATION; PERINEURAL at 15:32

## 2020-08-06 RX ADMIN — BETAMETHASONE SODIUM PHOSPHATE AND BETAMETHASONE ACETATE 12 MG: 3; 3 INJECTION, SUSPENSION INTRA-ARTICULAR; INTRALESIONAL; INTRAMUSCULAR; SOFT TISSUE at 15:31

## 2020-08-06 ASSESSMENT — ENCOUNTER SYMPTOMS
COLOR CHANGE: 0
VOMITING: 0
NAUSEA: 0

## 2020-08-06 NOTE — PROGRESS NOTES
Patient ID: Chikis Madsen is a 66 y.o. male. Chief Complaint   Patient presents with    Follow-up     Right hip pain, req inj. HPI  Mr. Tyra James is a 23-year-old gentleman returns today for reevaluation of right hip pain. He has a history of trochanteric bursitis on his right side has seen Dr. Elsie Robert several times in the past including on 2/12/2020 where he underwent a right-sided trochanteric bursal injection. He states these do provide significant relief of his pain and would like a repeat injection today. Pain is in the same spot most severe over the lateral aspect of the right hip and is aggravated by extended periods of walking, stairs as well as laying on this right side. Patient denies any recent injury or trauma that would have aggravated this. Past Medical History:   Diagnosis Date    Adenocarcinoma of prostate ([address] 10/30/2015    Anemia     Cellulitis of lower extremity     right     Cerebral artery occlusion with cerebral infarction ([address]     1996    CHF (congestive heart failure) (HCC)     Chronic acquired lymphedema     Hernia, abdominal     History of blood transfusion 2003 7 2006 [address] SURGERY    History of CVA (cerebrovascular accident) 54    DEFECIT MILD MEMORY AND SPEECH    Hyperlipidemia     Hypertension     Hypothyroid 09/2015    Ileus, postoperative (HCC)     Mild renal insufficiency     Morbid obesity ([address]     Non-healing wound of lower extremity     HISTORY OF, WAS SEEN IN WOUND CLINIC FOR COUPLE YRS.    Osteoarthritis     Phlebitis     HX. OF     Prolonged emergence from general anesthesia     x1 had to be put back on ventolator, after prostate surgery, had to be hospitalized x12 days.      Prostate CA ([address]     Prostate cancer ([address] 10/21/2015    PVD (peripheral vascular disease) ([address]     Sleep apnea     C-PAP NIGHTLY-PT INSTRUCTED TO BRING    SVT (supraventricular tachycardia) (HCC)     Uric acid kidney stone 12/2014 HAD 6 PASSSED ONE ON OWN, OTHER 5 IS BEING MONITORED    Wears glasses      Past Surgical History:   Procedure Laterality Date    ABLATION OF DYSRHYTHMIC FOCUS  2018    afib ablation    ABLATION OF DYSRHYTHMIC FOCUS  2019    ATRIAL FIB  /  DR Teagan Livingston    CARDIAC CATHETERIZATION  2020    Dr. Kan Dunn, [address]  2017    COLONOSCOPY  ,     COLONOSCOPY  2016    polyp,bx    COLONOSCOPY  2017    CYST REMOVAL Left 2016    excision cyst anterior chest    HAMMER TOE SURGERY Bilateral     KNEE SURGERY Left 1985    repair of torn ligaments    [place] COLON CA SCRN NOT HI RSK IND N/A 2017    COLONOSCOPY performed by Dieudonne Chandler MD at [facility] 5/15/2018    HIP TOTAL ARTHROPLASTY MINIMALLY INVASIVE ASI WITH GPS performed by Costa Keller MD at [address] [place] [address]  10/21/2015    robotic    STUDY POSS ABLATION  2018         TOTAL KNEE ARTHROPLASTY Bilateral LT-, RT-    TRANSESOPHAGEAL ECHOCARDIOGRAM  2017    TRANSESOPHAGEAL ECHOCARDIOGRAM  2019    TUMOR EXCISION      Throat/nose D/T benign tumor    UPPP UVULOPALATOPHARYGOPLASTY  90'S    VARICOSE VEIN SURGERY Bilateral 80's    VASCULAR SURGERY      VEINS STRIPPED X2 TO YULY. LEG.      Family History   Problem Relation Age of Onset    Diabetes Mother     Hypertension Mother     Heart Attack Mother     Colon Cancer Father     Heart Attack Father     Stroke Father     Cirrhosis Brother 54        ALCOHOL RELATED    Dementia Maternal Grandmother     Diabetes Maternal Grandmother     Cancer Paternal Grandmother         uterine    Hypertension Paternal Grandfather      Social History     Occupational History    Not on file   Tobacco Use    Smoking status: Former Smoker     Packs/day: 2.00     Years: 10.00     Pack years: 20.00     Last attempt to quit: 1970     Years since quittin.6    Smokeless tobacco: see him back my clinic on an as needed basis but he was encouraged to return or call earlier with questions and/or concerns. Procedure: right trochanteric bursal injection  An informed verbal consent for the procedure was obtained and risks including, but not limited to: allergy to medications, injection, bleeding, stiffness of joint, recurrence of symptoms, loss of function, swelling, drainage, irrigation, need for surgery and pseudo-septic inflammation, were explained to the patient. Also, discussed was the potential for further injections, irrigation and debridement and surgery. Alternate means of treatment have also been discussed with the patient. Under sterile conditions the right greater trochanter is prepped with Betadine and alcohol. Using a 25-gauge needle 4 cc of 1% lidocaine without epinephrine is injected superficially. Subsequently using a 21-gauge needle 2 cc of 6mg/ml Celestone is injected into the right trochanteric bursa. A bandage is applied to the injection site. Patient tolerated procedure well. Orders Placed This Encounter   Procedures    20610 - DRAIN/INJECT LARGE JOINT BURSA        Nav Garvey      Please note that this chart was generated using voicerecognition Dragon dictation software. Although every effort was made to ensurethe accuracy of this automated transcription, some errors in transcription may haveoccurred.

## 2020-08-24 ENCOUNTER — HOSPITAL ENCOUNTER (OUTPATIENT)
Age: 75
Discharge: HOME | End: 2020-08-24
Payer: MEDICARE

## 2020-08-24 LAB
ALBUMIN SERPL-MCNC: 4 G/DL (ref 3.5–5.2)
ANION GAP SERPL CALCULATED.3IONS-SCNC: 12 MMOL/L (ref 9–17)
BUN BLDV-MCNC: 25 MG/DL (ref 8–23)
BUN/CREAT BLD: ABNORMAL (ref 9–20)
CALCIUM SERPL-MCNC: 9.9 MG/DL (ref 8.6–10.4)
CHLORIDE BLD-SCNC: 100 MMOL/L (ref 98–107)
CO2: 30 MMOL/L (ref 20–31)
CREAT SERPL-MCNC: 1.28 MG/DL (ref 0.7–1.2)
CREATININE URINE: 92.3 MG/DL (ref 39–259)
GFR AFRICAN AMERICAN: >60 ML/MIN
GFR NON-AFRICAN AMERICAN: 55 ML/MIN
GFR SERPL CREATININE-BSD FRML MDRD: ABNORMAL ML/MIN/{1.73_M2}
GFR SERPL CREATININE-BSD FRML MDRD: ABNORMAL ML/MIN/{1.73_M2}
GLUCOSE BLD-MCNC: 97 MG/DL (ref 70–99)
HCT VFR BLD CALC: 52.6 % (ref 40.7–50.3)
HEMOGLOBIN: 16.2 G/DL (ref 13–17)
MCH RBC QN AUTO: 32.7 PG (ref 25.2–33.5)
MCHC RBC AUTO-ENTMCNC: 30.8 G/DL (ref 28.4–34.8)
MCV RBC AUTO: 106 FL (ref 82.6–102.9)
NRBC AUTOMATED: 0 PER 100 WBC
PDW BLD-RTO: 14.5 % (ref 11.8–14.4)
PHOSPHORUS: 2.9 MG/DL (ref 2.5–4.5)
PLATELET # BLD: 171 K/UL (ref 138–453)
PMV BLD AUTO: 10.2 FL (ref 8.1–13.5)
POTASSIUM SERPL-SCNC: 5.7 MMOL/L (ref 3.7–5.3)
PTH INTACT: 91.82 PG/ML (ref 15–65)
RBC # BLD: 4.96 M/UL (ref 4.21–5.77)
SODIUM BLD-SCNC: 142 MMOL/L (ref 135–144)
TOTAL PROTEIN, URINE: 9 MG/DL
URIC ACID: 3.7 MG/DL (ref 3.4–7)
WBC # BLD: 6 K/UL (ref 3.5–11.3)

## 2020-08-24 PROCEDURE — 83970 ASSAY OF PARATHORMONE: CPT

## 2020-08-24 PROCEDURE — 82570 ASSAY OF URINE CREATININE: CPT

## 2020-08-24 PROCEDURE — 36415 COLL VENOUS BLD VENIPUNCTURE: CPT

## 2020-08-24 PROCEDURE — 80048 BASIC METABOLIC PNL TOTAL CA: CPT

## 2020-08-24 PROCEDURE — 82040 ASSAY OF SERUM ALBUMIN: CPT

## 2020-08-24 PROCEDURE — 84550 ASSAY OF BLOOD/URIC ACID: CPT

## 2020-08-24 PROCEDURE — 84156 ASSAY OF PROTEIN URINE: CPT

## 2020-08-24 PROCEDURE — 82306 VITAMIN D 25 HYDROXY: CPT

## 2020-08-24 PROCEDURE — 84100 ASSAY OF PHOSPHORUS: CPT

## 2020-08-24 PROCEDURE — 85027 COMPLETE CBC AUTOMATED: CPT

## 2020-08-25 LAB — VITAMIN D 25-HYDROXY: 44 NG/ML (ref 30–100)

## 2020-08-26 RX ORDER — TRAMADOL HYDROCHLORIDE 50 MG/1
50-100 TABLET ORAL EVERY 6 HOURS PRN
Qty: 360 TABLET | Refills: 0 | Status: SHIPPED | OUTPATIENT
Start: 2020-08-26 | End: 2020-12-07 | Stop reason: SDUPTHER

## 2020-08-26 RX ORDER — FUROSEMIDE 40 MG/1
40 TABLET ORAL DAILY
Qty: 90 TABLET | Refills: 3 | Status: SHIPPED | OUTPATIENT
Start: 2020-08-26 | End: 2022-08-11 | Stop reason: SDUPTHER

## 2020-08-31 ENCOUNTER — HOSPITAL ENCOUNTER (OUTPATIENT)
Age: 75
Discharge: HOME | End: 2020-08-31
Payer: MEDICARE

## 2020-08-31 LAB — POTASSIUM SERPL-SCNC: 4.2 MMOL/L (ref 3.7–5.3)

## 2020-08-31 PROCEDURE — 36415 COLL VENOUS BLD VENIPUNCTURE: CPT

## 2020-08-31 PROCEDURE — 84132 ASSAY OF SERUM POTASSIUM: CPT

## 2020-09-03 RX ORDER — LISINOPRIL 5 MG/1
TABLET ORAL
Qty: 90 TABLET | Refills: 3 | Status: SHIPPED | OUTPATIENT
Start: 2020-09-03 | End: 2021-08-06

## 2020-09-03 RX ORDER — LEVOTHYROXINE SODIUM 0.03 MG/1
TABLET ORAL
Qty: 90 TABLET | Refills: 3 | Status: SHIPPED | OUTPATIENT
Start: 2020-09-03 | End: 2021-08-06

## 2020-09-09 ENCOUNTER — OFFICE VISIT (OUTPATIENT)
Dept: PRIMARY CARE CLINIC | Age: 75
End: 2020-09-09
Payer: MEDICARE

## 2020-09-09 VITALS
TEMPERATURE: 97.2 F | SYSTOLIC BLOOD PRESSURE: 120 MMHG | HEIGHT: 70 IN | HEART RATE: 62 BPM | WEIGHT: 302.8 LBS | BODY MASS INDEX: 43.35 KG/M2 | OXYGEN SATURATION: 95 % | DIASTOLIC BLOOD PRESSURE: 80 MMHG

## 2020-09-09 PROCEDURE — 99213 OFFICE O/P EST LOW 20 MIN: CPT | Performed by: FAMILY MEDICINE

## 2020-09-09 ASSESSMENT — ENCOUNTER SYMPTOMS
ABDOMINAL PAIN: 0
COUGH: 0
SORE THROAT: 0
VOMITING: 0
WHEEZING: 0
RHINORRHEA: 0
EYE REDNESS: 0
DIARRHEA: 0
SHORTNESS OF BREATH: 1
EYE DISCHARGE: 0
NAUSEA: 0

## 2020-09-09 NOTE — PROGRESS NOTES
[facility])     Prostate cancer ([facility] 10/21/2015    PVD (peripheral vascular disease) ([facility]     Sleep apnea     C-PAP NIGHTLY-PT INSTRUCTED TO BRING    SVT (supraventricular tachycardia) (HCC)     Uric acid kidney stone 12/2014    HAD 6 PASSSED ONE ON OWN, OTHER 5 IS BEING MONITORED    Wears glasses       Past Surgical History:   Procedure Laterality Date    ABLATION OF DYSRHYTHMIC FOCUS  03/16/2018    afib ablation    ABLATION OF DYSRHYTHMIC FOCUS  08/22/2019    ATRIAL FIB  /  [address] [place] [address]  07/30/2020    Dr. Ashley Brownlee, [address]  11/17/2017    COLONOSCOPY  2002, 2007    COLONOSCOPY  07/11/2016    polyp,bx    COLONOSCOPY  07/31/2017    CYST REMOVAL Left 08/05/2016    excision cyst anterior chest    HAMMER TOE SURGERY Bilateral     KNEE SURGERY Left 1985    repair of torn ligaments    [place] COLON CA SCRN NOT HI RSK IND N/A 7/31/2017    COLONOSCOPY performed by Ngozi Hackett MD at [facility] 5/15/2018    HIP TOTAL ARTHROPLASTY MINIMALLY INVASIVE ASI WITH GPS performed by Amanda Rooney MD at [address] [place] [address]  10/21/2015    robotic    STUDY POSS ABLATION  4/2/2018         TOTAL KNEE ARTHROPLASTY Bilateral LT-2003, RT-2006    TRANSESOPHAGEAL ECHOCARDIOGRAM  11/17/2017    TRANSESOPHAGEAL ECHOCARDIOGRAM  08/22/2019    TUMOR EXCISION      Throat/nose D/T benign tumor    UPPP UVULOPALATOPHARYGOPLASTY  90'S    VARICOSE VEIN SURGERY Bilateral 80's    VASCULAR SURGERY      VEINS STRIPPED X2 TO YULY. LEG.        Family History   Problem Relation Age of Onset    Diabetes Mother     Hypertension Mother     Heart Attack Mother     Colon Cancer Father     Heart Attack Father     Stroke Father     Cirrhosis Brother 54        ALCOHOL RELATED    Dementia Maternal Grandmother     Diabetes Maternal Grandmother     Cancer Paternal Grandmother         uterine    Hypertension Paternal Grandfather        Social History     Tobacco Use    Smoking status: Former Smoker     Packs/day: 2.00     Years: 10.00     Pack years: 20.00     Last attempt to quit: 1970     Years since quittin.7    Smokeless tobacco: Never Used   Substance Use Topics    Alcohol use: Yes     Frequency: 4 or more times a week     Comment: 1-2 beer a day      Current Outpatient Medications   Medication Sig Dispense Refill    lisinopril (PRINIVIL;ZESTRIL) 5 MG tablet TAKE 1 TABLET NIGHTLY 90 tablet 3    levothyroxine (SYNTHROID) 25 MCG tablet TAKE 1 TABLET DAILY 90 tablet 3    traMADol (ULTRAM) 50 MG tablet Take 1-2 tablets by mouth every 6 hours as needed for Pain. 360 tablet 0    furosemide (LASIX) 40 MG tablet Take 1 tablet by mouth daily 90 tablet 3    allopurinol (ZYLOPRIM) 300 MG tablet Take 1 tablet by mouth 2 times daily 180 tablet 3    atorvastatin (LIPITOR) 10 MG tablet Take 1 tablet by mouth daily 90 tablet 3    Elastic Bandages & Supports (JOBST FOR MEN KNEE HIGH/LG) MISC Use daily 2 each 1    Turmeric 500 MG CAPS Take 1,000 mg by mouth daily      pantoprazole (PROTONIX) 40 MG tablet Take 1 tablet by mouth every morning (before breakfast) 90 tablet 1    metoprolol tartrate (LOPRESSOR) 25 MG tablet Take 25 mg by mouth 2 times daily      psyllium (KONSYL) 28.3 % PACK Take 1 packet by mouth nightly      apixaban (ELIQUIS) 5 MG TABS tablet Take 5 mg by mouth 2 times daily      trospium (SANCTURA) 20 MG tablet Take 20 mg by mouth 2 times daily Prescribed by Dr. Lisa Calhoun      fluticasone (FLONASE) 50 MCG/ACT nasal spray 1 spray by Nasal route daily 1 Bottle 3    acetaminophen (TYLENOL) 325 MG tablet Take 650 mg by mouth every 6 hours as needed for Pain      CINNAMON PO Take 2 capsules by mouth daily      Probiotic Product (PROBIOTIC ADVANCED PO) Take by mouth      Multiple Vitamins-Minerals (OCUVITE PO) Take 1 tablet by mouth daily      Elastic Bandages & Supports (JOBST RELIEF 30-40MMHG XL) MISC 4 pair knee high open toe.  4 each 0    Cholecalciferol (VITAMIN D-3 PO) Take  by mouth daily. 2000 IU daily       Coenzyme Q10 (CO Q 10 PO) Take 1 tablet by mouth daily       Bisacodyl (DULCOLAX PO) Take 1 tablet by mouth as needed       Potassium 99 MG TABS Take by mouth       Current Facility-Administered Medications   Medication Dose Route Frequency Provider Last Rate Last Dose    betamethasone acetate-betamethasone sodium phosphate (CELESTONE) injection 1.98 mg  1.98 mg Intramuscular Once Nav Muhammad        bupivacaine (MARCAINE) 0.25 % injection 10 mg  4 mL Intradermal Once Nav Muhammad         Allergies   Allergen Reactions    Adhesive Tape Itching and Rash    Doxycycline Rash       Health Maintenance   Topic Date Due    DTaP/Tdap/Td vaccine (1 - Tdap) 01/03/1962    Shingles Vaccine (1 of 2) 01/03/1993    Lipid screen  04/08/2020    TSH testing  06/24/2020    Flu vaccine (1) 09/09/2021 (Originally 9/1/2020)    Annual Wellness Visit (AWV)  12/27/2020    PSA counseling  07/01/2021    Creatinine monitoring  08/24/2021    Potassium monitoring  08/31/2021    Pneumococcal 65+ years Vaccine  Completed    Hepatitis A vaccine  Aged Out    Hepatitis B vaccine  Aged Out    Hib vaccine  Aged Out    Meningococcal (ACWY) vaccine  Aged Out       Subjective:      Review of Systems   Constitutional: Negative for chills and fever. HENT: Negative for rhinorrhea and sore throat. Eyes: Negative for discharge and redness. Respiratory: Positive for shortness of breath (when going up steps). Negative for cough and wheezing. Cardiovascular: Negative for chest pain and palpitations. Gastrointestinal: Negative for abdominal pain, diarrhea, nausea and vomiting. Genitourinary: Negative for dysuria and frequency. Musculoskeletal: Positive for arthralgias. Negative for myalgias. Neurological: Negative for dizziness, light-headedness and headaches. Psychiatric/Behavioral: Negative for sleep disturbance.        Objective:     BP 120/80   Pulse 62   Temp 97.2 °F (36.2 °C)   Ht 5' 10\" (1.778 m)   Wt (!) 302 lb 12.8 oz (137.3 kg)   SpO2 95%   BMI 43.45 kg/m²   Physical Exam  Vitals signs and nursing note reviewed. Constitutional:       General: He is not in acute distress. Appearance: He is well-developed. He is not ill-appearing. HENT:      Head: Normocephalic and atraumatic. Right Ear: External ear normal.      Left Ear: External ear normal.   Eyes:      General: No scleral icterus. Right eye: No discharge. Left eye: No discharge. Conjunctiva/sclera: Conjunctivae normal.      Pupils: Pupils are equal, round, and reactive to light. Neck:      Thyroid: No thyromegaly. Trachea: No tracheal deviation. Cardiovascular:      Rate and Rhythm: Normal rate and regular rhythm. Heart sounds: Normal heart sounds. Pulmonary:      Effort: Pulmonary effort is normal. No respiratory distress. Breath sounds: Normal breath sounds. No wheezing. Lymphadenopathy:      Cervical: No cervical adenopathy. Skin:     General: Skin is warm. Findings: No rash. Neurological:      Mental Status: He is alert and oriented to person, place, and time. Psychiatric:         Mood and Affect: Mood normal.         Behavior: Behavior normal.         Thought Content: Thought content normal.         Assessment:       Diagnosis Orders   1. Osteoarthritis of left glenohumeral joint     2. Osteoarthritis of right glenohumeral joint     3. Renal insufficiency     4. Essential hypertension     5. Chronic diastolic congestive heart failure ([address]     6. Chronic acquired lymphedema     7. Iron deficiency anemia, unspecified iron deficiency anemia type          Plan:      Return in about 3 months (around 12/9/2020) for HTN f/u. No orders of the defined types were placed in this encounter. No orders of the defined types were placed in this encounter. Patient given educationalmaterials - see patient instructions. Discussed use, benefit, and side effectsof prescribed medications. All patient questions answered. Pt voiced understanding. Reviewed health maintenance. Instructed to continue current medications, diet andexercise. Patient agreed with treatment plan. Follow up as directed.      Electronicallysigned by Mekhi Teixeira MD on 9/9/2020 at 2:32 PM

## 2020-10-19 ENCOUNTER — HOSPITAL ENCOUNTER (OUTPATIENT)
Age: 75
Discharge: HOME | End: 2020-10-19
Payer: MEDICARE

## 2020-10-19 LAB
ANION GAP SERPL CALCULATED.3IONS-SCNC: 12 MMOL/L (ref 9–17)
BUN BLDV-MCNC: 24 MG/DL (ref 8–23)
BUN/CREAT BLD: ABNORMAL (ref 9–20)
CALCIUM SERPL-MCNC: 10 MG/DL (ref 8.6–10.4)
CHLORIDE BLD-SCNC: 99 MMOL/L (ref 98–107)
CO2: 29 MMOL/L (ref 20–31)
CREAT SERPL-MCNC: 1.17 MG/DL (ref 0.7–1.2)
GFR AFRICAN AMERICAN: >60 ML/MIN
GFR NON-AFRICAN AMERICAN: >60 ML/MIN
GFR SERPL CREATININE-BSD FRML MDRD: ABNORMAL ML/MIN/{1.73_M2}
GFR SERPL CREATININE-BSD FRML MDRD: ABNORMAL ML/MIN/{1.73_M2}
GLUCOSE BLD-MCNC: 110 MG/DL (ref 70–99)
POTASSIUM SERPL-SCNC: 4.5 MMOL/L (ref 3.7–5.3)
SODIUM BLD-SCNC: 140 MMOL/L (ref 135–144)

## 2020-10-19 PROCEDURE — 80048 BASIC METABOLIC PNL TOTAL CA: CPT

## 2020-10-19 PROCEDURE — 36415 COLL VENOUS BLD VENIPUNCTURE: CPT

## 2020-12-02 NOTE — TELEPHONE ENCOUNTER
LOV 09/09/20-  Last rx 08/26/20-  [facility]- Listed       Pt is asking to allow this to be taken 5x daily if approved please change the RX.

## 2020-12-07 RX ORDER — TRAMADOL HYDROCHLORIDE 50 MG/1
50-100 TABLET ORAL EVERY 6 HOURS PRN
Qty: 360 TABLET | Refills: 0 | Status: SHIPPED | OUTPATIENT
Start: 2020-12-07 | End: 2020-12-09 | Stop reason: SDUPTHER

## 2020-12-07 NOTE — TELEPHONE ENCOUNTER
Spoke with the pt and he states that 1 tablet every 6 hours and then sometimes a extra one at night. Pt requesting to have this 5x per day. Please advise.

## 2020-12-09 RX ORDER — TRAMADOL HYDROCHLORIDE 50 MG/1
50-100 TABLET ORAL EVERY 6 HOURS PRN
Qty: 450 TABLET | Refills: 0 | Status: SHIPPED | OUTPATIENT
Start: 2020-12-09 | End: 2020-12-30 | Stop reason: SDUPTHER

## 2020-12-09 NOTE — TELEPHONE ENCOUNTER
Pt states that he would need 450 Quantity, since taking 5 per day. Asking if you would send it in to [address] listed.  (Tramadol)

## 2020-12-28 ENCOUNTER — HOSPITAL ENCOUNTER (OUTPATIENT)
Age: 75
Setting detail: SPECIMEN
Discharge: HOME | End: 2020-12-28
Payer: MEDICARE

## 2020-12-28 ENCOUNTER — OFFICE VISIT (OUTPATIENT)
Dept: PRIMARY CARE CLINIC | Age: 75
End: 2020-12-28
Payer: MEDICARE

## 2020-12-28 VITALS
OXYGEN SATURATION: 96 % | BODY MASS INDEX: 43.78 KG/M2 | HEIGHT: 70 IN | DIASTOLIC BLOOD PRESSURE: 70 MMHG | TEMPERATURE: 97.3 F | SYSTOLIC BLOOD PRESSURE: 128 MMHG | HEART RATE: 57 BPM | WEIGHT: 305.8 LBS

## 2020-12-28 PROBLEM — Z98.890 S/P ABLATION OF ATRIAL FIBRILLATION: Status: RESOLVED | Noted: 2018-03-16 | Resolved: 2020-12-28

## 2020-12-28 PROBLEM — Z96.653 PRESENCE OF BILATERAL TOTAL KNEE JOINT PROSTHESES: Status: RESOLVED | Noted: 2018-05-08 | Resolved: 2020-12-28

## 2020-12-28 PROBLEM — Z86.79 S/P ABLATION OF ATRIAL FIBRILLATION: Status: RESOLVED | Noted: 2018-03-16 | Resolved: 2020-12-28

## 2020-12-28 LAB
ANION GAP SERPL CALCULATED.3IONS-SCNC: 10 MMOL/L (ref 9–17)
BUN BLDV-MCNC: 21 MG/DL (ref 8–23)
BUN/CREAT BLD: NORMAL (ref 9–20)
CALCIUM SERPL-MCNC: 9.8 MG/DL (ref 8.6–10.4)
CHLORIDE BLD-SCNC: 101 MMOL/L (ref 98–107)
CO2: 31 MMOL/L (ref 20–31)
CREAT SERPL-MCNC: 1.11 MG/DL (ref 0.7–1.2)
GFR AFRICAN AMERICAN: >60 ML/MIN
GFR NON-AFRICAN AMERICAN: >60 ML/MIN
GFR SERPL CREATININE-BSD FRML MDRD: NORMAL ML/MIN/{1.73_M2}
GFR SERPL CREATININE-BSD FRML MDRD: NORMAL ML/MIN/{1.73_M2}
GLUCOSE BLD-MCNC: 99 MG/DL (ref 70–99)
POTASSIUM SERPL-SCNC: 5 MMOL/L (ref 3.7–5.3)
SODIUM BLD-SCNC: 142 MMOL/L (ref 135–144)
TSH SERPL DL<=0.05 MIU/L-ACNC: 2.84 MIU/L (ref 0.3–5)

## 2020-12-28 PROCEDURE — G0439 PPPS, SUBSEQ VISIT: HCPCS | Performed by: FAMILY MEDICINE

## 2020-12-28 ASSESSMENT — PATIENT HEALTH QUESTIONNAIRE - PHQ9
SUM OF ALL RESPONSES TO PHQ QUESTIONS 1-9: 0
1. LITTLE INTEREST OR PLEASURE IN DOING THINGS: 0
SUM OF ALL RESPONSES TO PHQ9 QUESTIONS 1 & 2: 0
SUM OF ALL RESPONSES TO PHQ QUESTIONS 1-9: 0
2. FEELING DOWN, DEPRESSED OR HOPELESS: 0
SUM OF ALL RESPONSES TO PHQ QUESTIONS 1-9: 0

## 2020-12-28 NOTE — PROGRESS NOTES
Medicare Annual Wellness Visit  Name: Judith Galindo Date: 2020   MRN: [id number] Sex: Male   Age: 66 y.o. Ethnicity: Non-/Non    : 1945 Race: Jennifer Russian is here for Medicare AWV (Patient was given the words apple, angel and watch to remember. He was given the time of 2:45 to draw. )    Screenings for behavioral, psychosocial and functional/safety risks, and cognitive dysfunction are all negative except as indicated below. These results, as well as other patient data from the [address] form, are documented in Flowsheets linked to this Encounter. Allergies   Allergen Reactions    Adhesive Tape Itching and Rash    Doxycycline Rash         Prior to Visit Medications    Medication Sig Taking? Authorizing Provider   traMADol (ULTRAM) 50 MG tablet Take 1-2 tablets by mouth every 6 hours as needed for Pain.  Yes Pebbles Art MD   lisinopril (PRINIVIL;ZESTRIL) 5 MG tablet TAKE 1 TABLET NIGHTLY Yes Pebbles Art MD   levothyroxine (SYNTHROID) 25 MCG tablet TAKE 1 TABLET DAILY Yes Pebbles Art MD   furosemide (LASIX) 40 MG tablet Take 1 tablet by mouth daily Yes Pebbles Art MD   allopurinol (ZYLOPRIM) 300 MG tablet Take 1 tablet by mouth 2 times daily Yes Pebbles Art MD   atorvastatin (LIPITOR) 10 MG tablet Take 1 tablet by mouth daily Yes Pebbles Art MD   Elastic Bandages & Supports (JOBST FOR MEN KNEE HIGH/LG) MISC Use daily Yes Pebbles Art MD   Turmeric 500 MG CAPS Take 1,000 mg by mouth daily Yes Historical Provider, MD   pantoprazole (PROTONIX) 40 MG tablet Take 1 tablet by mouth every morning (before breakfast) Yes Shaun Paul APRN - CNP   metoprolol tartrate (LOPRESSOR) 25 MG tablet Take 25 mg by mouth 2 times daily Yes Historical Provider, MD   psyllium (KONSYL) 28.3 % PACK Take 1 packet by mouth nightly Yes Historical Provider, MD Bisacodyl (DULCOLAX PO) Take 1 tablet by mouth as needed  Yes Historical Provider, MD   apixaban (ELIQUIS) 5 MG TABS tablet Take 5 mg by mouth 2 times daily Yes Historical Provider, MD   trospium (SANCTURA) 20 MG tablet Take 20 mg by mouth 2 times daily Prescribed by Dr. Domi Prince Yes Historical Provider, MD   Potassium 99 MG TABS Take by mouth Yes Historical Provider, MD   fluticasone (FLONASE) 50 MCG/ACT nasal spray 1 spray by Nasal route daily Yes Sarita Corcoran MD   acetaminophen (TYLENOL) 325 MG tablet Take 650 mg by mouth every 6 hours as needed for Pain Yes Historical Provider, MD   CINNAMON PO Take 2 capsules by mouth daily Yes Historical Provider, MD   Probiotic Product (PROBIOTIC ADVANCED PO) Take by mouth Yes Historical Provider, MD   Multiple Vitamins-Minerals (OCUVITE PO) Take 1 tablet by mouth daily Yes Historical Provider, MD   Elastic Bandages & Supports (JOBST RELIEF 30-40MMHG XL) MISC 4 pair knee high open toe. Yes Ximena Price MD   Cholecalciferol (VITAMIN D-3 PO) Take  by mouth daily. 2000 IU daily  Yes Historical Provider, MD   Coenzyme Q10 (CO Q 10 PO) Take 1 tablet by mouth daily  Yes Historical Provider, MD         Past Medical History:   Diagnosis Date    Adenocarcinoma of prostate ([facility] 10/30/2015    Anemia     Cellulitis of lower extremity     right     Cerebral artery occlusion with cerebral infarction ([facility]     1996    CHF (congestive heart failure) (HCC)     Chronic acquired lymphedema     Hernia, abdominal     History of blood transfusion 2003 7 2006 [facility] SURGERY    History of CVA (cerebrovascular accident) 54    DEFECIT MILD MEMORY AND SPEECH    Hyperlipidemia     Hypertension     Hypothyroid 09/2015    Ileus, postoperative (HCC)     Mild renal insufficiency     Morbid obesity ([facility]     Non-healing wound of lower extremity     HISTORY OF, WAS SEEN IN WOUND CLINIC FOR COUPLE YRS.    Osteoarthritis     Phlebitis     HX.  OF  Prolonged emergence from general anesthesia     x1 had to be put back on ventolator, after prostate surgery, had to be hospitalized x12 days.      Prostate CA ([facility]     Prostate cancer ([facility] 10/21/2015    PVD (peripheral vascular disease) ([facility]     Sleep apnea     C-PAP NIGHTLY-PT INSTRUCTED TO BRING    SVT (supraventricular tachycardia) (HCC)     Uric acid kidney stone 12/2014    HAD 6 PASSSED ONE ON OWN, OTHER 5 IS BEING MONITORED    Wears glasses        Past Surgical History:   Procedure Laterality Date    ABLATION OF DYSRHYTHMIC FOCUS  03/16/2018    afib ablation    ABLATION OF DYSRHYTHMIC FOCUS  08/22/2019    ATRIAL FIB  /  [address] [facility]  07/30/2020    Dr. Dilan Camarillo, [address] [facility]  11/17/2017    COLONOSCOPY  2002, 2007    COLONOSCOPY  07/11/2016    polyp,bx    CYST REMOVAL Left 08/05/2016    excision cyst anterior chest    HAMMER TOE SURGERY Bilateral     KNEE SURGERY Left 1985    repair of torn ligaments    [facility] COLON CA SCRN NOT HI RSK IND N/A 07/31/2017    COL[facility] 05/15/2018    HIP TOTAL ARTHROPLASTY MINIMALLY INVASIVE ASI WITH GPS performed by Christian Moss MD at [address] [place] [address]  10/21/2015    robotic    STUDY POSS ABLATION  04/02/2018         TOTAL KNEE ARTHROPLASTY Bilateral LT-2003, RT-2006    TRANSESOPHAGEAL ECHOCARDIOGRAM  11/17/2017    TRANSESOPHAGEAL ECHOCARDIOGRAM  08/22/2019    TUMOR EXCISION      Throat/nose D/T benign tumor    UPPP UVULOPALATOPHARYGOPLASTY  90'S    VARICOSE VEIN SURGERY Bilateral [de-identified]    x2         Family History   Problem Relation Age of Onset    Diabetes Mother     Hypertension Mother     Heart Attack Mother     Colon Cancer Father     Heart Attack Father     Stroke Father     Cirrhosis Brother 54        ALCOHOL RELATED    Dementia Maternal Grandmother     Diabetes Maternal Grandmother  Cancer Paternal Grandmother         uterine    Hypertension Paternal Grandfather        CareTeam (Including outside providers/suppliers regularly involved in providing care):   Patient Care Team:  Soledad Michael MD as PCP - General  Soledad iMchael MD as PCP - [facility]    Wt Readings from Last 3 Encounters:   12/28/20 (!) 305 lb 12.8 oz (138.7 kg)   09/09/20 (!) 302 lb 12.8 oz (137.3 kg)   09/03/20 (!) 310 lb (140.6 kg)     Vitals:    12/28/20 1042   BP: (!) 142/84   Pulse: 57   Temp: 97.3 °F (36.3 °C)   SpO2: 96%   Weight: (!) 305 lb 12.8 oz (138.7 kg)   Height: 5' 10\" (1.778 m)     Body mass index is 43.88 kg/m². Based upon direct observation of the patient, evaluation of cognition reveals sometimes has issues with remembering but usually will come to him. Physical Exam  Vitals signs and nursing note reviewed. Constitutional:       General: He is not in acute distress. Appearance: He is well-developed. He is not ill-appearing. HENT:      Head: Normocephalic and atraumatic. Right Ear: External ear normal.      Left Ear: External ear normal.   Eyes:      General: No scleral icterus. Right eye: No discharge. Left eye: No discharge. Conjunctiva/sclera: Conjunctivae normal.      Pupils: Pupils are equal, round, and reactive to light. Neck:      Thyroid: No thyromegaly. Trachea: No tracheal deviation. Cardiovascular:      Rate and Rhythm: Normal rate and regular rhythm. Heart sounds: Normal heart sounds. Pulmonary:      Effort: Pulmonary effort is normal. No respiratory distress. Breath sounds: Normal breath sounds. No wheezing. Lymphadenopathy:      Cervical: No cervical adenopathy. Skin:     General: Skin is warm. Findings: No rash. Neurological:      Mental Status: He is alert and oriented to person, place, and time.    Psychiatric:         Mood and Affect: Mood normal.         Behavior: Behavior normal. Thought Content: Thought content normal.         Patient's complete Health Risk Assessment and screening values have been reviewed and are found in Flowsheets. The following problems were reviewed today and where indicated follow up appointments were made and/or referrals ordered. Positive Risk Factor Screenings with Interventions:            General Health and ACP:  General  In general, how would you say your health is?: Fair  In the past 7 days, have you experienced any of the following? New or Increased Pain, New or Increased Fatigue, Loneliness, Social Isolation, Stress or Anger?: None of These  Do you get the social and emotional support that you need?: Yes  Do you have a Living Will?: Yes  Advance Directives     Power of [facility] Will ACP-Advance Directive ACP-Power of     Not on File Not on File Not on File Not on File      General Health Risk Interventions:  · wife is [facility].   · Wants full code unless long term.      Health Habits/Nutrition:  Health Habits/Nutrition  Do you exercise for at least 20 minutes 2-3 times per week?: (!) No  Have you lost any weight without trying in the past 3 months?: No  Do you eat fewer than 2 meals per day?: (!) Yes  Have you seen a dentist within the past year?: Yes  Body mass index: (!) 43.87  Health Habits/Nutrition Interventions:  · apples or some fruit other than the 1 meal.         Personalized Preventive Plan   Current Health Maintenance Status  Immunization History   Administered Date(s) Administered    Influenza Vaccine, unspecified formulation 10/09/2015, 10/02/2018    Influenza, High Dose (Fluzone 65 yrs and older) 10/02/2017, 10/02/2019, 10/01/2020    Pneumococcal Conjugate 13-valent ([id number]) 10/13/2014    Pneumococcal Polysaccharide ([id number]) 10/13/2015        Health Maintenance   Topic Date Due    DTaP/Tdap/Td vaccine (1 - Tdap) 01/03/1962    Shingles Vaccine (1 of 2) 01/03/1993    Annual Wellness Visit (AWV)  05/29/2019  Lipid screen  04/08/2020    TSH testing  06/24/2020    PSA counseling  07/01/2021    Potassium monitoring  10/19/2021    Creatinine monitoring  10/19/2021    Flu vaccine  Completed    Pneumococcal 65+ years Vaccine  Completed    Hepatitis C screen  Completed    Hepatitis A vaccine  Aged Out    Hepatitis B vaccine  Aged Out    Hib vaccine  Aged Out    Meningococcal (ACWY) vaccine  Aged Out     Recommendations for [employer] Due: see orders and patient instructions/AVS.  . Recommended screening schedule for the next 5-10 years is provided to the patient in written form: see Patient Instructions/AVS.    There are no diagnoses linked to this encounter.

## 2020-12-28 NOTE — PATIENT INSTRUCTIONS
Check with insurance about coverage for Shingrix (shingles vaccine). Check with insurance about coverage for Adacel or Boostrix (Tetanus and whooping cough vaccine). Personalized Preventive Plan for Deric Hernandez - 12/28/2020  Medicare offers a range of preventive health benefits. Some of the tests and screenings are paid in full while other may be subject to a deductible, co-insurance, and/or copay. Some of these benefits include a comprehensive review of your medical history including lifestyle, illnesses that may run in your family, and various assessments and screenings as appropriate. After reviewing your medical record and screening and assessments performed today your provider may have ordered immunizations, labs, imaging, and/or referrals for you. A list of these orders (if applicable) as well as your Preventive Care list are included within your After Visit Summary for your review. Other Preventive Recommendations:    · A preventive eye exam performed by an eye specialist is recommended every 1-2 years to screen for glaucoma; cataracts, macular degeneration, and other eye disorders. · A preventive dental visit is recommended every 6 months. · Try to get at least 150 minutes of exercise per week or 10,000 steps per day on a pedometer . · Order or download the FREE \"Exercise & Physical Activity: Your Everyday Guide\" from [employer]. Call 6-297.539.6989 or search [employer] online. · You need 5067-0221 mg of calcium and 2647-0474 IU of vitamin D per day. It is possible to meet your calcium requirement with diet alone, but a vitamin D supplement is usually necessary to meet this goal.  · When exposed to the sun, use a sunscreen that protects against both UVA and UVB radiation with an SPF of 30 or greater. Reapply every 2 to 3 hours or after sweating, drying off with a towel, or swimming. · Always wear a seat belt when traveling in a car. Always wear a helmet when riding a bicycle or motorcycle.

## 2020-12-30 RX ORDER — TRAMADOL HYDROCHLORIDE 50 MG/1
50-100 TABLET ORAL EVERY 6 HOURS PRN
Qty: 100 TABLET | Refills: 0 | Status: SHIPPED | OUTPATIENT
Start: 2020-12-30 | End: 2021-03-05 | Stop reason: SDUPTHER

## 2020-12-30 NOTE — TELEPHONE ENCOUNTER
Pt asking for short supply until his rx arrives [facility]. Rica Salazar in [address] [facility] listed.

## 2021-01-11 NOTE — TELEPHONE ENCOUNTER
Ashley Liz with [facility] calling and states that the order for the traMADol (ULTRAM) 50 MG tablet was not filled in the 14 day time period and she needs to have a verbal to have this filled. When she out me on hold for the pharmacist the line was disconnected.

## 2021-03-05 DIAGNOSIS — M25.551 RIGHT HIP PAIN: ICD-10-CM

## 2021-03-05 RX ORDER — TRAMADOL HYDROCHLORIDE 50 MG/1
50-100 TABLET ORAL EVERY 6 HOURS PRN
Qty: 360 TABLET | Refills: 0 | Status: SHIPPED | OUTPATIENT
Start: 2021-03-05 | End: 2021-05-14 | Stop reason: SDUPTHER

## 2021-03-29 ENCOUNTER — OFFICE VISIT (OUTPATIENT)
Dept: PRIMARY CARE CLINIC | Age: 76
End: 2021-03-29
Payer: MEDICARE

## 2021-03-29 VITALS
DIASTOLIC BLOOD PRESSURE: 68 MMHG | WEIGHT: 296.8 LBS | OXYGEN SATURATION: 94 % | SYSTOLIC BLOOD PRESSURE: 132 MMHG | TEMPERATURE: 97.8 F | HEART RATE: 58 BPM | BODY MASS INDEX: 42.59 KG/M2

## 2021-03-29 DIAGNOSIS — I48.92 ATRIAL FLUTTER, UNSPECIFIED TYPE (HCC): ICD-10-CM

## 2021-03-29 DIAGNOSIS — I50.32 CHRONIC DIASTOLIC CONGESTIVE HEART FAILURE (HCC): Chronic | ICD-10-CM

## 2021-03-29 DIAGNOSIS — M19.011 OSTEOARTHRITIS OF RIGHT GLENOHUMERAL JOINT: Primary | ICD-10-CM

## 2021-03-29 DIAGNOSIS — M19.012 OSTEOARTHRITIS OF LEFT GLENOHUMERAL JOINT: ICD-10-CM

## 2021-03-29 DIAGNOSIS — N25.81 SECONDARY HYPERPARATHYROIDISM OF RENAL ORIGIN (HCC): ICD-10-CM

## 2021-03-29 DIAGNOSIS — E66.01 MORBID OBESITY WITH BMI OF 40.0-44.9, ADULT (HCC): ICD-10-CM

## 2021-03-29 DIAGNOSIS — I10 ESSENTIAL HYPERTENSION: ICD-10-CM

## 2021-03-29 PROBLEM — L98.492 NON-PRESSURE CHRONIC ULCER OF SKIN OF OTHER SITES WITH FAT LAYER EXPOSED (HCC): Status: RESOLVED | Noted: 2020-06-03 | Resolved: 2021-03-29

## 2021-03-29 PROCEDURE — 99214 OFFICE O/P EST MOD 30 MIN: CPT | Performed by: FAMILY MEDICINE

## 2021-03-29 ASSESSMENT — PATIENT HEALTH QUESTIONNAIRE - PHQ9
2. FEELING DOWN, DEPRESSED OR HOPELESS: 0
SUM OF ALL RESPONSES TO PHQ QUESTIONS 1-9: 0

## 2021-03-29 ASSESSMENT — ENCOUNTER SYMPTOMS
ABDOMINAL PAIN: 0
DIARRHEA: 0
NAUSEA: 0
SORE THROAT: 0
VOMITING: 0
EYE DISCHARGE: 0
SHORTNESS OF BREATH: 0
RHINORRHEA: 0
COUGH: 0
EYE REDNESS: 0
WHEEZING: 0

## 2021-03-29 NOTE — PROGRESS NOTES
[facility]  [address] [facility] [address]  Dept: 69 Rosette Pepper is a 64 y.o. male Established patient, who presents today for his medical conditions/complaints as noted below. Chief Complaint   Patient presents with    Hypertension     Patient doesn't check blood pressure at home     Medication Check       HPI:     HPI- pt doing okay. Is looking at getting shoulder done but wife concerned about not qualifying for rehab because not admission to hospital.  Long discussion with her about what qualifies, no room at home for hospital bed, no lift chair, can't get up steps. Dr. Roslyn Mosquera increased lasix, but didn't give him a rx.   .      Reviewed prior notes None  Reviewed previous Labs    LDL Cholesterol (mg/dL)   Date Value   04/08/2019 67   12/04/2018 72   04/12/2016 116       (goal LDL is <100)   AST (U/L)   Date Value   01/30/2020 16     ALT (U/L)   Date Value   01/30/2020 12     BUN (mg/dL)   Date Value   12/28/2020 21     Hemoglobin A1C (%)   Date Value   04/13/2017 5.2     TSH (mIU/L)   Date Value   12/28/2020 2.84     BP Readings from Last 3 Encounters:   03/29/21 132/68   12/28/20 128/70   09/09/20 120/80          (goal 120/80)    Past Medical History:   Diagnosis Date    Adenocarcinoma of prostate ([address] 10/30/2015    Anemia     Cellulitis of lower extremity     right     Cerebral artery occlusion with cerebral infarction ([address]     1996    CHF (congestive heart failure) (HCC)     Chronic acquired lymphedema     Hernia, abdominal     History of blood transfusion 2003 7 2006 [facility] [facility] SURGERY    History of CVA (cerebrovascular accident) 54    DEFECIT MILD MEMORY AND SPEECH    Hyperlipidemia     Hypertension     Hypothyroid 09/2015    Ileus, postoperative (HCC)     Mild renal insufficiency     Morbid obesity ([address]     Non-healing wound of lower extremity     HISTORY OF, WAS SEEN IN WOUND CLINIC FOR COUPLE YRS.    Osteoarthritis     Phlebitis     HX. OF     Prolonged emergence from general anesthesia     x1 had to be put back on ventolator, after prostate surgery, had to be hospitalized x12 days.      [facility]     Prostate cancer ([facility] 10/21/2015    PVD (peripheral vascular disease) ([facility]     Sleep apnea     C-PAP NIGHTLY-PT INSTRUCTED TO BRING    SVT (supraventricular tachycardia) (HCC)     Uric acid kidney stone 12/2014    HAD 6 PASSSED ONE ON OWN, OTHER 5 IS BEING MONITORED    Wears glasses       Past Surgical History:   Procedure Laterality Date    ABLATION OF DYSRHYTHMIC FOCUS  03/16/2018    afib ablation    ABLATION OF DYSRHYTHMIC FOCUS  08/22/2019    ATRIAL FIB  /  [facility]  07/30/2020    Dr. Shelbi Lazcano, [address]  11/17/2017    COLONOSCOPY  2002, 2007    COLONOSCOPY  07/11/2016    polyp,bx    CYST REMOVAL Left 08/05/2016    excision cyst anterior chest    HAMMER TOE SURGERY Bilateral     KNEE SURGERY Left 1985    repair of torn ligaments    [facility] COLON CA SCRN NOT HI RSK IND N/A 07/31/2017    COLONOSCOPY performed by Carlita Segura MD at [facility] 05/15/2018    HIP TOTAL ARTHROPLASTY MINIMALLY INVASIVE ASI WITH GPS performed by Victoria Martinez MD at [address] [place] [address]  10/21/2015    robotic    STUDY POSS ABLATION  04/02/2018         TOTAL KNEE ARTHROPLASTY Bilateral LT-2003, RT-2006    TRANSESOPHAGEAL ECHOCARDIOGRAM  11/17/2017    TRANSESOPHAGEAL ECHOCARDIOGRAM  08/22/2019    TUMOR EXCISION      Throat/nose D/T benign tumor    UPPP UVULOPALATOPHARYGOPLASTY  90'S    VARICOSE VEIN SURGERY Bilateral [de-identified]    x2       Family History   Problem Relation Age of Onset    Diabetes Mother     Hypertension Mother     Heart Attack Mother     Colon Cancer Father     Heart Attack Father     Stroke Father     Cirrhosis Brother 54        ALCOHOL RELATED    Dementia Maternal Grandmother     Diabetes Maternal Grandmother     Cancer Paternal Grandmother         uterine    Hypertension Paternal Grandfather        Social History     Tobacco Use    Smoking status: Former Smoker     Packs/day: 2.00     Years: 10.00     Pack years: 20.00     Quit date: 1970     Years since quittin.3    Smokeless tobacco: Never Used   Substance Use Topics    Alcohol use: Yes     Frequency: 4 or more times a week     Comment: 1-2 beer a day      Current Outpatient Medications   Medication Sig Dispense Refill    zinc 50 MG CAPS Take 50 mg by mouth daily 30 capsule 3    traMADol (ULTRAM) 50 MG tablet Take 1-2 tablets by mouth every 6 hours as needed for Pain.  360 tablet 0    lisinopril (PRINIVIL;ZESTRIL) 5 MG tablet TAKE 1 TABLET NIGHTLY 90 tablet 3    levothyroxine (SYNTHROID) 25 MCG tablet TAKE 1 TABLET DAILY 90 tablet 3    furosemide (LASIX) 40 MG tablet Take 1 tablet by mouth daily 90 tablet 3    allopurinol (ZYLOPRIM) 300 MG tablet Take 1 tablet by mouth 2 times daily 180 tablet 3    atorvastatin (LIPITOR) 10 MG tablet Take 1 tablet by mouth daily 90 tablet 3    Elastic Bandages & Supports (JOBST FOR MEN KNEE HIGH/LG) MISC Use daily 2 each 1    Turmeric 500 MG CAPS Take 1,000 mg by mouth daily      pantoprazole (PROTONIX) 40 MG tablet Take 1 tablet by mouth every morning (before breakfast) 90 tablet 1    metoprolol tartrate (LOPRESSOR) 25 MG tablet Take 25 mg by mouth 2 times daily      psyllium (KONSYL) 28.3 % PACK Take 1 packet by mouth nightly      Bisacodyl (DULCOLAX PO) Take 1 tablet by mouth as needed       apixaban (ELIQUIS) 5 MG TABS tablet Take 5 mg by mouth 2 times daily      trospium (SANCTURA) 20 MG tablet Take 20 mg by mouth 2 times daily Prescribed by Dr. Apolinar Shook      fluticasone (FLONASE) 50 MCG/ACT nasal spray 1 spray by Nasal route daily 1 Bottle 3    acetaminophen (TYLENOL) 325 MG tablet Take 650 mg by mouth every 6 hours as needed for Pain      CINNAMON PO Take 2 capsules by mouth daily      Probiotic Product (PROBIOTIC ADVANCED PO) Take by mouth      Multiple Vitamins-Minerals (OCUVITE PO) Take 1 tablet by mouth daily      Elastic Bandages & Supports (JOBST RELIEF 30-40MMHG XL) MISC 4 pair knee high open toe. 4 each 0    Cholecalciferol (VITAMIN D-3 PO) Take  by mouth daily. 2000 IU daily       Coenzyme Q10 (CO Q 10 PO) Take 1 tablet by mouth daily       Potassium 99 MG TABS Take by mouth       Current Facility-Administered Medications   Medication Dose Route Frequency Provider Last Rate Last Admin    betamethasone acetate-betamethasone sodium phosphate (CELESTONE) injection 1.98 mg  1.98 mg Intramuscular Once Marnell Kayser, [place]        bupivacaine (MARCAINE) 0.25 % injection 10 mg  4 mL Intradermal Once Marnell Kayser, [place]         Allergies   Allergen Reactions    Adhesive Tape Itching and Rash    Doxycycline Rash       Health Maintenance   Topic Date Due    DTaP/Tdap/Td vaccine (1 - Tdap) Never done    Shingles Vaccine (1 of 2) Never done    Lipid screen  04/08/2020    PSA counseling  07/01/2021    TSH testing  12/28/2021    Potassium monitoring  12/28/2021    Creatinine monitoring  12/28/2021    Annual Wellness Visit (AWV)  12/29/2021    Flu vaccine  Completed    Pneumococcal 65+ years Vaccine  Completed    COVID-19 Vaccine  Completed    Hepatitis C screen  Completed    Hepatitis A vaccine  Aged Out    Hepatitis B vaccine  Aged Out    Hib vaccine  Aged Out    Meningococcal (ACWY) vaccine  Aged Out       Subjective:      Review of Systems   Constitutional: Negative for chills and fever. HENT: Negative for rhinorrhea and sore throat. Eyes: Negative for discharge and redness. Respiratory: Negative for cough, shortness of breath and wheezing. Cardiovascular: Negative for chest pain and palpitations. Gastrointestinal: Negative for abdominal pain, diarrhea, nausea and vomiting. Genitourinary: Negative for dysuria and frequency.    Musculoskeletal: Negative for arthralgias and myalgias. Neurological: Negative for dizziness, light-headedness and headaches. Psychiatric/Behavioral: Negative for sleep disturbance. Objective:     /68   Pulse 58   Temp 97.8 °F (36.6 °C)   Wt 296 lb 12.8 oz (134.6 kg)   SpO2 94%   BMI 42.59 kg/m²   Physical Exam  Vitals signs and nursing note reviewed. Constitutional:       General: He is not in acute distress. Appearance: He is well-developed. He is not ill-appearing. HENT:      Head: Normocephalic and atraumatic. Right Ear: External ear normal.      Left Ear: External ear normal.   Eyes:      General: No scleral icterus. Right eye: No discharge. Left eye: No discharge. Conjunctiva/sclera: Conjunctivae normal.      Pupils: Pupils are equal, round, and reactive to light. Neck:      Thyroid: No thyromegaly. Trachea: No tracheal deviation. Cardiovascular:      Rate and Rhythm: Normal rate and regular rhythm. Heart sounds: Normal heart sounds. Pulmonary:      Effort: Pulmonary effort is normal. No respiratory distress. Breath sounds: Normal breath sounds. No wheezing. Lymphadenopathy:      Cervical: No cervical adenopathy. Skin:     General: Skin is warm. Findings: No rash. Neurological:      Mental Status: He is alert and oriented to person, place, and time. Psychiatric:         Mood and Affect: Mood normal.         Behavior: Behavior normal.         Thought Content: Thought content normal.         Assessment:       Diagnosis Orders   1. Osteoarthritis of right glenohumeral joint     2. Osteoarthritis of left glenohumeral joint     3. Morbid obesity with BMI of 40.0-44.9, adult ([facility]     4. Essential hypertension     5. Chronic diastolic congestive heart failure ([address]     6. Atrial flutter, unspecified type ([address]     7. Secondary hyperparathyroidism of renal origin [facility])          Plan:    follows with cardiology.   Kidney doctor says he does not need to see him anymore    Return in about 3 months (around 6/29/2021) for HTN f/u. No orders of the defined types were placed in this encounter. Orders Placed This Encounter   Medications    zinc 50 MG CAPS     Sig: Take 50 mg by mouth daily     Dispense:  30 capsule     Refill:  3       Patient given educational materials - see patient instructions. Discussed use, benefit, and side effects of prescribed medications. All patient questions answered. Pt voiced understanding. Reviewed health maintenance. Instructed to continue current medications, diet and exercise. Patient agreed with treatment plan. Follow up as directed.      Electronically signed by Albino Dee MD on 3/29/2021 at 1:30 PM

## 2021-04-07 ENCOUNTER — TELEPHONE (OUTPATIENT)
Dept: PRIMARY CARE CLINIC | Age: 76
End: 2021-04-07

## 2021-05-14 DIAGNOSIS — M25.551 RIGHT HIP PAIN: ICD-10-CM

## 2021-05-14 RX ORDER — TRAMADOL HYDROCHLORIDE 50 MG/1
50-100 TABLET ORAL EVERY 6 HOURS PRN
Qty: 360 TABLET | Refills: 0 | Status: SHIPPED | OUTPATIENT
Start: 2021-05-14 | End: 2021-07-16 | Stop reason: SDUPTHER

## 2021-06-02 RX ORDER — ALLOPURINOL 300 MG/1
TABLET ORAL
Qty: 180 TABLET | Refills: 3 | Status: SHIPPED | OUTPATIENT
Start: 2021-06-02 | End: 2022-05-24

## 2021-07-12 ENCOUNTER — HOSPITAL ENCOUNTER (OUTPATIENT)
Age: 76
Discharge: HOME | End: 2021-07-12
Payer: MEDICARE

## 2021-07-12 PROCEDURE — 84153 ASSAY OF PSA TOTAL: CPT

## 2021-07-12 PROCEDURE — 36415 COLL VENOUS BLD VENIPUNCTURE: CPT

## 2021-07-13 ENCOUNTER — TELEPHONE (OUTPATIENT)
Dept: PRIMARY CARE CLINIC | Age: 76
End: 2021-07-13

## 2021-07-13 LAB
AVERAGE GLUCOSE: 121
CHOLESTEROL, TOTAL: 164 MG/DL
CHOLESTEROL/HDL RATIO: 4.8
HBA1C MFR BLD: 5.8 %
HDLC SERPL-MCNC: 34 MG/DL (ref 35–70)
LDL CHOLESTEROL CALCULATED: 73 MG/DL (ref 0–160)
NONHDLC SERPL-MCNC: ABNORMAL MG/DL
PROSTATE SPECIFIC ANTIGEN: <0.02 UG/L
SARS-COV-2: NORMAL
TRIGL SERPL-MCNC: 284 MG/DL
VLDLC SERPL CALC-MCNC: 57 MG/DL

## 2021-07-13 NOTE — TELEPHONE ENCOUNTER
----- Message from Rajendraleon Bradly sent at 7/13/2021 11:01 AM EDT -----  Subject: Appointment Request    Reason for Call: Routine Pre-Op    QUESTIONS  Type of Appointment? Established Patient  Reason for appointment request? No appointments available during search  Additional Information for Provider? Monalisa from [address] [place] [address] is   requesting a pre-op appt for patient and it needs to be after July 19th   and before august 2nd. No appt available in that time frame. Please   advise. Thanks  ---------------------------------------------------------------------------  --------------  Erick ROE  What is the best way for the office to contact you? OK to leave message on   voicemail  Preferred Call Back Phone Number? [phone]  ---------------------------------------------------------------------------  --------------  SCRIPT ANSWERS  Relationship to Patient? Third Party  Representative Name? Monalisa  Appointment reason? Symptomatic  Select script based on patient symptoms? Adult Pre-Op  (Is the patient requesting to see the provider for a procedure?)? Yes  Do you have questions for your provider that need to be answered prior to   scheduling your pre-op appointment? No  Have you been diagnosed with, awaiting test results for, or told that you   are suspected of having COVID-19 (Coronavirus)? (If patient has tested   negative or was tested as a requirement for work, school, or travel and   not based on symptoms, answer no)? No  Do you currently have flu-like symptoms including fever or chills, cough,   shortness of breath, difficulty breathing, or new loss of taste or smell? No  Have you had close contact with someone with COVID-19 in the last 14 days? No  (Service Expert  click yes below to proceed with [employer] As Usual   Scheduling)?  Yes

## 2021-07-14 NOTE — TELEPHONE ENCOUNTER
Routing to Alberto Shaikh so she can see were she can get him in at.  Patient is scheduled 8/2/21 but it needs to be before that per the message

## 2021-07-16 ENCOUNTER — OFFICE VISIT (OUTPATIENT)
Dept: PRIMARY CARE CLINIC | Age: 76
End: 2021-07-16
Payer: MEDICARE

## 2021-07-16 VITALS
DIASTOLIC BLOOD PRESSURE: 82 MMHG | BODY MASS INDEX: 43.1 KG/M2 | SYSTOLIC BLOOD PRESSURE: 134 MMHG | WEIGHT: 300.4 LBS | HEART RATE: 51 BPM | OXYGEN SATURATION: 98 %

## 2021-07-16 DIAGNOSIS — G89.29 CHRONIC RIGHT SHOULDER PAIN: ICD-10-CM

## 2021-07-16 DIAGNOSIS — M25.511 CHRONIC RIGHT SHOULDER PAIN: ICD-10-CM

## 2021-07-16 DIAGNOSIS — I50.32 CHRONIC DIASTOLIC CONGESTIVE HEART FAILURE (HCC): Primary | ICD-10-CM

## 2021-07-16 DIAGNOSIS — M25.551 RIGHT HIP PAIN: ICD-10-CM

## 2021-07-16 PROCEDURE — 99213 OFFICE O/P EST LOW 20 MIN: CPT | Performed by: FAMILY MEDICINE

## 2021-07-16 RX ORDER — TRAMADOL HYDROCHLORIDE 50 MG/1
100 TABLET ORAL EVERY 6 HOURS PRN
Qty: 360 TABLET | Refills: 0 | Status: SHIPPED | OUTPATIENT
Start: 2021-07-16 | End: 2021-09-03 | Stop reason: SDUPTHER

## 2021-07-16 SDOH — ECONOMIC STABILITY: FOOD INSECURITY: WITHIN THE PAST 12 MONTHS, THE FOOD YOU BOUGHT JUST DIDN'T LAST AND YOU DIDN'T HAVE MONEY TO GET MORE.: NEVER TRUE

## 2021-07-16 SDOH — ECONOMIC STABILITY: FOOD INSECURITY: WITHIN THE PAST 12 MONTHS, YOU WORRIED THAT YOUR FOOD WOULD RUN OUT BEFORE YOU GOT MONEY TO BUY MORE.: NEVER TRUE

## 2021-07-16 ASSESSMENT — ENCOUNTER SYMPTOMS
COUGH: 0
EYE DISCHARGE: 0
WHEEZING: 0
SHORTNESS OF BREATH: 1
RHINORRHEA: 0
VOMITING: 0
ABDOMINAL PAIN: 0
EYE REDNESS: 0
DIARRHEA: 0
NAUSEA: 0
SORE THROAT: 0

## 2021-07-16 ASSESSMENT — SOCIAL DETERMINANTS OF HEALTH (SDOH)
HOW HARD IS IT FOR YOU TO PAY FOR THE VERY BASICS LIKE FOOD, HOUSING, MEDICAL CARE, AND HEATING?: NOT HARD AT ALL
HOW HARD IS IT FOR YOU TO PAY FOR THE VERY BASICS LIKE FOOD, HOUSING, MEDICAL CARE, AND HEATING?: NOT HARD AT ALL

## 2021-07-16 NOTE — PROGRESS NOTES
[facility] PRIMARY CARE  [address] [facility] [address]  Dept: 69 Rosette Pepper is a 64 y.o. male Established patient, who presents today for his medical conditions/complaints as noted below. Chief Complaint   Patient presents with    Other     surgery clearance needed for total right shoulder arthroplast [facility] 8/2 with Dr. Noemi Robles; doing pre-op testing July 19       HPI:     HPI- here for surgery clearance. Has had to take tramadol 6 per day due to the pain. Needs new Rx. Reviewed prior notes Orthopedics  Reviewed previous Labs    LDL Cholesterol (mg/dL)   Date Value   04/08/2019 67   12/04/2018 72   04/12/2016 116       (goal LDL is <100)   AST (U/L)   Date Value   01/30/2020 16     ALT (U/L)   Date Value   01/30/2020 12     BUN (mg/dL)   Date Value   12/28/2020 21     Hemoglobin A1C (%)   Date Value   04/13/2017 5.2     TSH (mIU/L)   Date Value   12/28/2020 2.84     BP Readings from Last 3 Encounters:   07/16/21 134/82   03/29/21 132/68   12/28/20 128/70          (goal 120/80)    Past Medical History:   Diagnosis Date    Adenocarcinoma of prostate ([address] 10/30/2015    Anemia     Cellulitis of lower extremity     right     Cerebral artery occlusion with cerebral infarction ([address]     1996    CHF (congestive heart failure) (HCC)     Chronic acquired lymphedema     Hernia, abdominal     History of blood transfusion 2003 7 2006 [facility] SURGERY    History of CVA (cerebrovascular accident) 54    DEFECIT MILD MEMORY AND SPEECH    Hyperlipidemia     Hypertension     Hypothyroid 09/2015    Ileus, postoperative (HCC)     Mild renal insufficiency     Morbid obesity ([address]     Non-healing wound of lower extremity     HISTORY OF, WAS SEEN IN WOUND CLINIC FOR COUPLE YRS.    Osteoarthritis     Phlebitis     HX.  OF     Prolonged emergence from general anesthesia     x1 had to be put back on ventolator, after prostate surgery, had to be hospitalized x12 days.      [facility]     Prostate cancer ([facility] 10/21/2015    PVD (peripheral vascular disease) ([facility]     Sleep apnea     C-PAP NIGHTLY-PT INSTRUCTED TO BRING    SVT (supraventricular tachycardia) (HCC)     Uric acid kidney stone 12/2014    HAD 6 PASSSED ONE ON OWN, OTHER 5 IS BEING MONITORED    Wears glasses       Past Surgical History:   Procedure Laterality Date    ABLATION OF DYSRHYTHMIC FOCUS  03/16/2018    afib ablation    ABLATION OF DYSRHYTHMIC FOCUS  08/22/2019    ATRIAL FIB  /  DR [facility]  07/30/2020    Dr. Mekhi Membreno, [address]  11/17/2017    COLONOSCOPY  2002, 2007    COLONOSCOPY  07/11/2016    polyp,bx    CYST REMOVAL Left 08/05/2016    excision cyst anterior chest    HAMMER TOE SURGERY Bilateral     KNEE SURGERY Left 1985    repair of torn ligaments    CT COLON CA SCRN NOT HI RSK IND N/A 07/31/2017    COLONOSCOPY performed by Dereck Bergeron MD at [facility] 05/15/2018    HIP TOTAL ARTHROPLASTY MINIMALLY INVASIVE ASI WITH GPS performed by Waldemar Gloria MD at [address] [place] [address]  10/21/2015    robotic    STUDY POSS ABLATION  04/02/2018         TOTAL KNEE ARTHROPLASTY Bilateral LT-2003, RT-2006    TRANSESOPHAGEAL ECHOCARDIOGRAM  11/17/2017    TRANSESOPHAGEAL ECHOCARDIOGRAM  08/22/2019    TUMOR EXCISION      Throat/nose D/T benign tumor    UPPP UVULOPALATOPHARYGOPLASTY  90'S    VARICOSE VEIN SURGERY Bilateral [de-identified]    x2       Family History   Problem Relation Age of Onset    Diabetes Mother     Hypertension Mother     Heart Attack Mother     Colon Cancer Father     Heart Attack Father     Stroke Father     Cirrhosis Brother 54        ALCOHOL RELATED    Dementia Maternal Grandmother     Diabetes Maternal Grandmother     Cancer Paternal Grandmother         uterine    Hypertension Paternal Grandfather        Social History     Tobacco Use  Smoking status: Former Smoker     Packs/day: 2.00     Years: 10.00     Pack years: 20.00     Quit date: 1970     Years since quittin.6    Smokeless tobacco: Never Used   Substance Use Topics    Alcohol use: Yes     Comment: 1-2 beer a day      Current Outpatient Medications   Medication Sig Dispense Refill    traMADol (ULTRAM) 50 MG tablet Take 2 tablets by mouth every 6 hours as needed for Pain for up to 60 days.  360 tablet 0    allopurinol (ZYLOPRIM) 300 MG tablet TAKE 1 TABLET TWICE A  tablet 3    zinc 50 MG CAPS Take 50 mg by mouth daily 30 capsule 3    lisinopril (PRINIVIL;ZESTRIL) 5 MG tablet TAKE 1 TABLET NIGHTLY 90 tablet 3    levothyroxine (SYNTHROID) 25 MCG tablet TAKE 1 TABLET DAILY 90 tablet 3    furosemide (LASIX) 40 MG tablet Take 1 tablet by mouth daily 90 tablet 3    atorvastatin (LIPITOR) 10 MG tablet Take 1 tablet by mouth daily 90 tablet 3    Elastic Bandages & Supports (JOBST FOR MEN KNEE HIGH/LG) MISC Use daily 2 each 1    pantoprazole (PROTONIX) 40 MG tablet Take 1 tablet by mouth every morning (before breakfast) 90 tablet 1    metoprolol tartrate (LOPRESSOR) 25 MG tablet Take 25 mg by mouth 2 times daily      psyllium (KONSYL) 28.3 % PACK Take 1 packet by mouth nightly      Bisacodyl (DULCOLAX PO) Take 1 tablet by mouth as needed       apixaban (ELIQUIS) 5 MG TABS tablet Take 5 mg by mouth 2 times daily      trospium (SANCTURA) 20 MG tablet Take 20 mg by mouth 2 times daily Prescribed by Dr. Trejo Spar Potassium 99 MG TABS Take by mouth      fluticasone (FLONASE) 50 MCG/ACT nasal spray 1 spray by Nasal route daily 1 Bottle 3    acetaminophen (TYLENOL) 325 MG tablet Take 650 mg by mouth every 6 hours as needed for Pain      CINNAMON PO Take 2 capsules by mouth daily      Probiotic Product (PROBIOTIC ADVANCED PO) Take by mouth      Multiple Vitamins-Minerals (OCUVITE PO) Take 1 tablet by mouth daily      Elastic Bandages & Supports (JOBST RELIEF 30-40MMHG XL) MISC 4 pair knee high open toe. 4 each 0    Cholecalciferol (VITAMIN D-3 PO) Take  by mouth daily. 2000 IU daily       Coenzyme Q10 (CO Q 10 PO) Take 1 tablet by mouth daily       Turmeric 500 MG CAPS Take 1,000 mg by mouth daily (Patient not taking: Reported on 7/16/2021)       Current Facility-Administered Medications   Medication Dose Route Frequency Provider Last Rate Last Admin    betamethasone acetate-betamethasone sodium phosphate (CELESTONE) injection 1.98 mg  1.98 mg Intramuscular Once Magali Hockey, [place]        bupivacaine (MARCAINE) 0.25 % injection 10 mg  4 mL Intradermal Once Magali Hockey, [place]         Allergies   Allergen Reactions    Adhesive Tape Itching and Rash    Doxycycline Rash       Health Maintenance   Topic Date Due    DTaP/Tdap/Td vaccine (1 - Tdap) Never done    Shingles Vaccine (1 of 2) Never done    Lipid screen  04/08/2020    Flu vaccine (1) 09/01/2021    TSH testing  12/28/2021    Potassium monitoring  12/28/2021    Creatinine monitoring  12/28/2021    Annual Wellness Visit (AWV)  12/29/2021    PSA counseling  07/12/2022    Pneumococcal 65+ years Vaccine  Completed    COVID-19 Vaccine  Completed    Hepatitis C screen  Completed    Hepatitis A vaccine  Aged Out    Hepatitis B vaccine  Aged Out    Hib vaccine  Aged Out    Meningococcal (ACWY) vaccine  Aged Out       Subjective:      Review of Systems   Constitutional: Negative for chills and fever. HENT: Negative for rhinorrhea and sore throat. Eyes: Negative for discharge and redness. Respiratory: Positive for shortness of breath (if up and down the steps. ). Negative for cough and wheezing. Cardiovascular: Negative for chest pain and palpitations. Gastrointestinal: Negative for abdominal pain, diarrhea, nausea and vomiting. Genitourinary: Negative for dysuria, frequency and hematuria. Musculoskeletal: Negative for arthralgias and myalgias.    Neurological: Negative for dizziness, light-headedness and headaches. Psychiatric/Behavioral: Positive for sleep disturbance (pain in shoulder). Objective:     /82   Pulse 51   Wt (!) 300 lb 6.4 oz (136.3 kg)   SpO2 98%   BMI 43.10 kg/m²   Physical Exam  Vitals and nursing note reviewed. Constitutional:       General: He is not in acute distress. Appearance: He is well-developed. He is not ill-appearing. HENT:      Head: Normocephalic and atraumatic. Right Ear: External ear normal.      Left Ear: External ear normal.   Eyes:      General: No scleral icterus. Right eye: No discharge. Left eye: No discharge. Conjunctiva/sclera: Conjunctivae normal.      Pupils: Pupils are equal, round, and reactive to light. Neck:      Thyroid: No thyromegaly. Trachea: No tracheal deviation. Cardiovascular:      Rate and Rhythm: Normal rate and regular rhythm. Heart sounds: Normal heart sounds. Pulmonary:      Effort: Pulmonary effort is normal. No respiratory distress. Breath sounds: Normal breath sounds. No wheezing. Lymphadenopathy:      Cervical: No cervical adenopathy. Skin:     General: Skin is warm. Findings: No rash. Neurological:      Mental Status: He is alert and oriented to person, place, and time. Psychiatric:         Mood and Affect: Mood normal.         Behavior: Behavior normal.         Thought Content: Thought content normal.         Assessment/Plan:   1. Chronic diastolic congestive heart failure (HCC)  -     Handicap placard  2. Right hip pain  -     traMADol (ULTRAM) 50 MG tablet; Take 2 tablets by mouth every 6 hours as needed for Pain for up to 60 days. , Disp-360 tablet, R-0Normal  3. Chronic right shoulder pain  Assessment & Plan:   Uncontrolled, continue current medications and surgery scheduled       Return in about 3 months (around 10/16/2021).     Orders Placed This Encounter   Procedures    Handicap placard     Scheduling Instructions:      Exp:  7-16-25 Dx:  CHF     Orders Placed This Encounter   Medications    traMADol (ULTRAM) 50 MG tablet     Sig: Take 2 tablets by mouth every 6 hours as needed for Pain for up to 60 days. Dispense:  360 tablet     Refill:  0     Reduce doses taken as pain becomes manageable       Patient given educational materials - see patient instructions. Discussed use, benefit, and side effects of prescribed medications. All patient questions answered. Pt voiced understanding. Reviewed health maintenance. Instructed to continue current medications, diet and exercise. Patient agreed with treatment plan. Follow up as directed.      Electronically signed by Mick Baltazar MD on 7/16/2021 at 3:39 PM

## 2021-08-02 RX ORDER — ATORVASTATIN CALCIUM 10 MG/1
TABLET, FILM COATED ORAL
Qty: 90 TABLET | Refills: 3 | Status: SHIPPED | OUTPATIENT
Start: 2021-08-02 | End: 2022-07-27

## 2021-08-06 RX ORDER — LEVOTHYROXINE SODIUM 0.03 MG/1
TABLET ORAL
Qty: 90 TABLET | Refills: 3 | Status: SHIPPED | OUTPATIENT
Start: 2021-08-06 | End: 2022-08-01

## 2021-08-06 RX ORDER — LISINOPRIL 5 MG/1
TABLET ORAL
Qty: 90 TABLET | Refills: 3 | Status: SHIPPED | OUTPATIENT
Start: 2021-08-06 | End: 2022-08-01

## 2021-08-18 ENCOUNTER — TELEPHONE (OUTPATIENT)
Dept: PRIMARY CARE CLINIC | Age: 76
End: 2021-08-18

## 2021-08-18 NOTE — TELEPHONE ENCOUNTER
----- Message from Vola Koyanagi sent at 8/17/2021  5:19 PM EDT -----  Subject: Message to Provider    QUESTIONS  Information for Provider? Pt needs a discharge order, he was told he can   go home by ortho dr. Mauricio Perez want it faxed or it can be verbal [phone].  ---------------------------------------------------------------------------  --------------  CALL BACK INFO  What is the best way for the office to contact you? OK to leave message on   voicemail  Preferred Call Back Phone Number? [phone]  ---------------------------------------------------------------------------  --------------  SCRIPT ANSWERS  Relationship to Patient? Third Party  Representative Name?  [employer]

## 2021-08-18 NOTE — TELEPHONE ENCOUNTER
Pt was there on respite in [place] so he can discharge home whenever he wants.   Spoke to Curtis Fernandez and she said she didn't think we needed that

## 2021-09-03 DIAGNOSIS — M25.551 RIGHT HIP PAIN: ICD-10-CM

## 2021-09-03 RX ORDER — TRAMADOL HYDROCHLORIDE 50 MG/1
100 TABLET ORAL EVERY 6 HOURS PRN
Qty: 360 TABLET | Refills: 0 | Status: SHIPPED | OUTPATIENT
Start: 2021-09-03 | End: 2021-11-10 | Stop reason: SDUPTHER

## 2021-09-09 LAB
CHOLESTEROL, TOTAL: 162 MG/DL
CHOLESTEROL/HDL RATIO: 5.1
HDLC SERPL-MCNC: 32 MG/DL (ref 35–70)
LDL CHOLESTEROL CALCULATED: 76 MG/DL (ref 0–160)
NONHDLC SERPL-MCNC: ABNORMAL MG/DL
TRIGL SERPL-MCNC: 273 MG/DL
VLDLC SERPL CALC-MCNC: 55 MG/DL

## 2021-10-12 ENCOUNTER — HOSPITAL ENCOUNTER (OUTPATIENT)
Age: 76
Discharge: HOME | End: 2021-10-12
Payer: MEDICARE

## 2021-10-12 PROCEDURE — 36415 COLL VENOUS BLD VENIPUNCTURE: CPT

## 2021-10-12 PROCEDURE — 80048 BASIC METABOLIC PNL TOTAL CA: CPT

## 2021-10-13 LAB
ANION GAP SERPL CALCULATED.3IONS-SCNC: 12 MMOL/L (ref 9–17)
BUN BLDV-MCNC: 25 MG/DL (ref 8–23)
BUN/CREAT BLD: ABNORMAL (ref 9–20)
CALCIUM SERPL-MCNC: 9.6 MG/DL (ref 8.6–10.4)
CHLORIDE BLD-SCNC: 102 MMOL/L (ref 98–107)
CO2: 26 MMOL/L (ref 20–31)
CREAT SERPL-MCNC: 1.13 MG/DL (ref 0.7–1.2)
GFR AFRICAN AMERICAN: >60 ML/MIN
GFR NON-AFRICAN AMERICAN: >60 ML/MIN
GFR SERPL CREATININE-BSD FRML MDRD: ABNORMAL ML/MIN/{1.73_M2}
GFR SERPL CREATININE-BSD FRML MDRD: ABNORMAL ML/MIN/{1.73_M2}
GLUCOSE BLD-MCNC: 98 MG/DL (ref 70–99)
POTASSIUM SERPL-SCNC: 4.8 MMOL/L (ref 3.7–5.3)
SODIUM BLD-SCNC: 140 MMOL/L (ref 135–144)

## 2021-10-21 ENCOUNTER — OFFICE VISIT (OUTPATIENT)
Dept: PRIMARY CARE CLINIC | Age: 76
End: 2021-10-21
Payer: MEDICARE

## 2021-10-21 VITALS
DIASTOLIC BLOOD PRESSURE: 70 MMHG | HEART RATE: 63 BPM | SYSTOLIC BLOOD PRESSURE: 134 MMHG | OXYGEN SATURATION: 94 % | WEIGHT: 302.4 LBS | BODY MASS INDEX: 43.39 KG/M2

## 2021-10-21 DIAGNOSIS — I10 PRIMARY HYPERTENSION: ICD-10-CM

## 2021-10-21 DIAGNOSIS — I50.32 CHRONIC DIASTOLIC CONGESTIVE HEART FAILURE (HCC): Chronic | ICD-10-CM

## 2021-10-21 DIAGNOSIS — I48.92 ATRIAL FLUTTER, UNSPECIFIED TYPE (HCC): ICD-10-CM

## 2021-10-21 DIAGNOSIS — M19.012 OSTEOARTHRITIS OF LEFT GLENOHUMERAL JOINT: ICD-10-CM

## 2021-10-21 PROCEDURE — 99213 OFFICE O/P EST LOW 20 MIN: CPT | Performed by: FAMILY MEDICINE

## 2021-10-21 ASSESSMENT — ENCOUNTER SYMPTOMS
SHORTNESS OF BREATH: 0
WHEEZING: 0
ABDOMINAL PAIN: 0
NAUSEA: 0
DIARRHEA: 0
SORE THROAT: 0
COUGH: 0
VOMITING: 0
EYE REDNESS: 0
RHINORRHEA: 0
EYE DISCHARGE: 0

## 2021-10-21 NOTE — PROGRESS NOTES
[address] [facility] PRIMARY CARE  [address] [place] [id number]  Dept: 69 Rosette Pepper is a 64 y.o. male Established patient, who presents today for his medical conditions/complaints as noted below. Chief Complaint   Patient presents with    Other     Patient is having left total shoulder on November 1 with Dr Presley Carlos. HPI:     HPI- pt here for pre op clearance. Had appt with cardiology and they did EKG and cleared him for his shoulder. Also had labs in Sept.   Had right arm done Sept and is now having left. Reviewed prior notes None  Reviewed previous Labs and Imaging    LDL Cholesterol (mg/dL)   Date Value   04/08/2019 67   12/04/2018 72   04/12/2016 116       (goal LDL is <100)   AST (U/L)   Date Value   01/30/2020 16     ALT (U/L)   Date Value   01/30/2020 12     BUN (mg/dL)   Date Value   10/12/2021 25 (H)     Hemoglobin A1C (%)   Date Value   04/13/2017 5.2     TSH (mIU/L)   Date Value   12/28/2020 2.84     BP Readings from Last 3 Encounters:   10/21/21 134/70   07/16/21 134/82   03/29/21 132/68          (goal 120/80)    Past Medical History:   Diagnosis Date    Adenocarcinoma of prostate ([address] 10/30/2015    Anemia     Cellulitis of lower extremity     right     Cerebral artery occlusion with cerebral infarction ([address]     1996    CHF (congestive heart failure) (HCC)     Chronic acquired lymphedema     Hernia, abdominal     History of blood transfusion 2003 7 2006 [facility] [facility] SURGERY    History of CVA (cerebrovascular accident) 54    DEFECIT MILD MEMORY AND SPEECH    Hyperlipidemia     Hypertension     Hypothyroid 09/2015    Ileus, postoperative (HCC)     Mild renal insufficiency     Morbid obesity ([address]     Non-healing wound of lower extremity     HISTORY OF, WAS SEEN IN WOUND CLINIC FOR COUPLE YRS.    Osteoarthritis     Phlebitis     HX.  OF     Prolonged emergence from general anesthesia     x1 had to be put back on ventolator, after prostate surgery, had to be hospitalized x12 days.      [facility]     Prostate cancer ([facility] 10/21/2015    PVD (peripheral vascular disease) ([facility]     Sleep apnea     C-PAP NIGHTLY-PT INSTRUCTED TO BRING    SVT (supraventricular tachycardia) (HCC)     Uric acid kidney stone 12/2014    HAD 6 PASSSED ONE ON OWN, OTHER 5 IS BEING MONITORED    Wears glasses       Past Surgical History:   Procedure Laterality Date    ABLATION OF DYSRHYTHMIC FOCUS  03/16/2018    afib ablation    ABLATION OF DYSRHYTHMIC FOCUS  08/22/2019    ATRIAL FIB  /  DR [facility]  07/30/2020    Dr. Therese Rodriguez, [address]  11/17/2017    COLONOSCOPY  2002, 2007    COLONOSCOPY  07/11/2016    polyp,bx    CYST REMOVAL Left 08/05/2016    excision cyst anterior chest    HAMMER TOE SURGERY Bilateral     KNEE SURGERY Left 1985    repair of torn ligaments    [place] COLON [place] SCRN NOT HI RSK IND N/A 07/31/2017    COLONOSCOPY performed by Jorje Peters MD at [facility] 05/15/2018    HIP TOTAL ARTHROPLASTY MINIMALLY INVASIVE ASI WITH GPS performed by Joe Peters MD at [address] [place] [address]  10/21/2015    robotic    STUDY POSS ABLATION  04/02/2018         TOTAL KNEE ARTHROPLASTY Bilateral LT-2003, RT-2006    TRANSESOPHAGEAL ECHOCARDIOGRAM  11/17/2017    TRANSESOPHAGEAL ECHOCARDIOGRAM  08/22/2019    TUMOR EXCISION      Throat/nose D/T benign tumor    UPPP UVULOPALATOPHARYGOPLASTY  90'S    VARICOSE VEIN SURGERY Bilateral [de-identified]    x2       Family History   Problem Relation Age of Onset    Diabetes Mother     Hypertension Mother     Heart Attack Mother     Colon Cancer Father     Heart Attack Father     Stroke Father     Cirrhosis Brother 54        ALCOHOL RELATED    Dementia Maternal Grandmother     Diabetes Maternal Grandmother     Cancer Paternal Grandmother         uterine    Hypertension Paternal Grandfather Take 1 tablet by mouth daily      Elastic Bandages & Supports (JOBST RELIEF 30-40MMHG XL) MISC 4 pair knee high open toe. 4 each 0    Cholecalciferol (VITAMIN D-3 PO) Take  by mouth daily. 2000 IU daily       Coenzyme Q10 (CO Q 10 PO) Take 1 tablet by mouth daily       Turmeric 500 MG CAPS Take 1,000 mg by mouth daily (Patient not taking: Reported on 7/16/2021)      Potassium 99 MG TABS Take by mouth (Patient not taking: Reported on 10/21/2021)       Current Facility-Administered Medications   Medication Dose Route Frequency Provider Last Rate Last Admin    betamethasone acetate-betamethasone sodium phosphate (CELESTONE) injection 1.98 mg  1.98 mg IntraMUSCular Once Lorrine Antonella Echolsma        bupivacaine (MARCAINE) 0.25 % injection 10 mg  4 mL IntraDERmal Once [facility]         Allergies   Allergen Reactions    Adhesive Tape Itching and Rash    Doxycycline Rash       Health Maintenance   Topic Date Due    DTaP/Tdap/Td vaccine (1 - Tdap) Never done    Shingles Vaccine (1 of 2) Never done    Lipid screen  04/08/2020    TSH testing  12/28/2021    Annual Wellness Visit (AWV)  12/29/2021    PSA counseling  07/12/2022    Potassium monitoring  10/12/2022    Creatinine monitoring  10/12/2022    Flu vaccine  Completed    Pneumococcal 65+ years Vaccine  Completed    COVID-19 Vaccine  Completed    Hepatitis C screen  Completed    Hepatitis A vaccine  Aged Out    Hepatitis B vaccine  Aged Out    Hib vaccine  Aged Out    Meningococcal (ACWY) vaccine  Aged Out       Subjective:      Review of Systems   Constitutional: Negative for chills and fever. HENT: Negative for congestion, rhinorrhea and sore throat. Eyes: Negative for discharge and redness. Respiratory: Negative for cough, shortness of breath and wheezing. Cardiovascular: Negative for chest pain and palpitations. Gastrointestinal: Negative for abdominal pain, diarrhea, nausea and vomiting.    Genitourinary: Negative for dysuria and Plan:   Monitored by specialist- no acute findings meriting change in the plan       Return in about 3 months (around 1/21/2022). No orders of the defined types were placed in this encounter. No orders of the defined types were placed in this encounter. Patient given educational materials - see patient instructions. Discussed use, benefit, and side effects of prescribed medications. All patient questions answered. Pt voiced understanding. Reviewed health maintenance. Instructed to continue current medications, diet and exercise. Patient agreed with treatment plan. Follow up as directed.      Electronically signed by Jerre Epley, MD on 10/21/2021 at 2:56 PM

## 2021-11-09 DIAGNOSIS — M25.551 RIGHT HIP PAIN: ICD-10-CM

## 2021-11-10 RX ORDER — TRAMADOL HYDROCHLORIDE 50 MG/1
100 TABLET ORAL EVERY 6 HOURS PRN
Qty: 360 TABLET | Refills: 0 | Status: SHIPPED | OUTPATIENT
Start: 2021-11-10 | End: 2022-01-13 | Stop reason: SDUPTHER

## 2021-12-02 ENCOUNTER — TELEPHONE (OUTPATIENT)
Dept: PRIMARY CARE CLINIC | Age: 76
End: 2021-12-02

## 2021-12-02 NOTE — TELEPHONE ENCOUNTER
----- Message from Jeremy Chacon sent at 11/29/2021 10:30 AM EST -----  Subject: Appointment Request    Reason for Call: Routine Medicare AWV    QUESTIONS  Type of Appointment? Established Patient  Reason for appointment request? No appointments available during search  Additional Information for Provider? Pt would like to schedule an AWV but   there are no appt available   ---------------------------------------------------------------------------  --------------  CALL BACK INFO  What is the best way for the office to contact you? OK to leave message on   voicemail  Preferred Call Back Phone Number? [phone]  ---------------------------------------------------------------------------  --------------  SCRIPT ANSWERS  Relationship to Patient? Other  Representative Name? Rui Machuca   Additional information verified (besides Name and Date of Birth)? Address  (If the patient has Medicare as their primary insurance coverage ask this   question) Are you requesting a Medicare Annual Wellness Visit? Yes   (Is the patient requesting a pap smear with their physical exam?)? No  (Is the patient requesting their annual physical and does not need PAP or   AWV per above?)? No  Have you been diagnosed with, awaiting test results for, or told that you   are suspected of having COVID-19 (Coronavirus)? (If patient has tested   negative or was tested as a requirement for work, school, or travel and   not based on symptoms, answer no)? No  Within the past two weeks have you developed any of the following symptoms   (answer no if symptoms have been present longer than 2 weeks or began   more than 2 weeks ago)? Fever or Chills, Cough, Shortness of breath or   difficulty breathing, Loss of taste or smell, Sore throat, Nasal   congestion, Sneezing or runny nose, Fatigue or generalized body aches   (answer no if pain is specific to a body part e.g. back pain), Diarrhea,   Headache?  Yes

## 2021-12-29 ENCOUNTER — OFFICE VISIT (OUTPATIENT)
Dept: PRIMARY CARE CLINIC | Age: 76
End: 2021-12-29
Payer: MEDICARE

## 2021-12-29 VITALS
BODY MASS INDEX: 44.15 KG/M2 | SYSTOLIC BLOOD PRESSURE: 136 MMHG | WEIGHT: 308.4 LBS | OXYGEN SATURATION: 97 % | DIASTOLIC BLOOD PRESSURE: 78 MMHG | HEIGHT: 70 IN | HEART RATE: 71 BPM

## 2021-12-29 DIAGNOSIS — I48.92 ATRIAL FLUTTER, UNSPECIFIED TYPE (HCC): ICD-10-CM

## 2021-12-29 DIAGNOSIS — N18.31 STAGE 3A CHRONIC KIDNEY DISEASE (HCC): Primary | ICD-10-CM

## 2021-12-29 DIAGNOSIS — N25.81 SECONDARY HYPERPARATHYROIDISM OF RENAL ORIGIN (HCC): ICD-10-CM

## 2021-12-29 DIAGNOSIS — I50.32 CHRONIC DIASTOLIC CONGESTIVE HEART FAILURE (HCC): Chronic | ICD-10-CM

## 2021-12-29 DIAGNOSIS — Z00.00 ROUTINE GENERAL MEDICAL EXAMINATION AT A HEALTH CARE FACILITY: ICD-10-CM

## 2021-12-29 PROCEDURE — G0439 PPPS, SUBSEQ VISIT: HCPCS | Performed by: PHYSICIAN ASSISTANT

## 2021-12-29 ASSESSMENT — PATIENT HEALTH QUESTIONNAIRE - PHQ9
2. FEELING DOWN, DEPRESSED OR HOPELESS: 1
SUM OF ALL RESPONSES TO PHQ9 QUESTIONS 1 & 2: 2
1. LITTLE INTEREST OR PLEASURE IN DOING THINGS: 1
SUM OF ALL RESPONSES TO PHQ QUESTIONS 1-9: 2

## 2021-12-29 NOTE — PATIENT INSTRUCTIONS
Personalized Preventive Plan for Juanjo Parra - 12/29/2021  Medicare offers a range of preventive health benefits. Some of the tests and screenings are paid in full while other may be subject to a deductible, co-insurance, and/or copay. Some of these benefits include a comprehensive review of your medical history including lifestyle, illnesses that may run in your family, and various assessments and screenings as appropriate. After reviewing your medical record and screening and assessments performed today your provider may have ordered immunizations, labs, imaging, and/or referrals for you. A list of these orders (if applicable) as well as your Preventive Care list are included within your After Visit Summary for your review. Other Preventive Recommendations:    · A preventive eye exam performed by an eye specialist is recommended every 1-2 years to screen for glaucoma; cataracts, macular degeneration, and other eye disorders. · A preventive dental visit is recommended every 6 months. · Try to get at least 150 minutes of exercise per week or 10,000 steps per day on a pedometer . · Order or download the FREE \"Exercise & Physical Activity: Your Everyday Guide\" from [employer]. Call 6-771.761.6245 or search [employer] online. · You need 5342-2506 mg of calcium and 0430-0181 IU of vitamin D per day. It is possible to meet your calcium requirement with diet alone, but a vitamin D supplement is usually necessary to meet this goal.  · When exposed to the sun, use a sunscreen that protects against both UVA and UVB radiation with an SPF of 30 or greater. Reapply every 2 to 3 hours or after sweating, drying off with a towel, or swimming. · Always wear a seat belt when traveling in a car. Always wear a helmet when riding a bicycle or motorcycle.

## 2021-12-29 NOTE — PROGRESS NOTES
Medicare Annual Wellness Visit  Name: Venice Rooney Date: 2021   MRN: [id number] Sex: Male   Age: 64 y.o. Ethnicity: Non- / Non    : 1945 Race: White (non-)      Vale Schilder is here for Medicare AWV    Screenings for behavioral, psychosocial and functional/safety risks, and cognitive dysfunction are all negative except as indicated below. These results, as well as other patient data from the [address] form, are documented in Flowsheets linked to this Encounter. Has had shoulders replaced and going through therapy. Allergies   Allergen Reactions    Adhesive Tape Itching and Rash    Doxycycline Rash         Prior to Visit Medications    Medication Sig Taking? Authorizing Provider   traMADol (ULTRAM) 50 MG tablet Take 2 tablets by mouth every 6 hours as needed for Pain for up to 60 days.  Yes Rose Weiss MD   lisinopril (PRINIVIL;ZESTRIL) 5 MG tablet TAKE 1 TABLET NIGHTLY Yes Rose Weiss MD   levothyroxine (SYNTHROID) 25 MCG tablet TAKE 1 TABLET DAILY Yes Rose Weiss MD   atorvastatin (LIPITOR) 10 MG tablet TAKE 1 TABLET DAILY Yes Rose Weiss MD   allopurinol (ZYLOPRIM) 300 MG tablet TAKE 1 TABLET TWICE A DAY Yes Rose Weiss MD   zinc 50 MG CAPS Take 50 mg by mouth daily Yes Rose Weiss MD   furosemide (LASIX) 40 MG tablet Take 1 tablet by mouth daily  Patient taking differently: Take 40 mg by mouth daily Patient is taking 1.5 tab daily (60mg) Yes Rose Weiss MD   Elastic Bandages & Supports (JOBST FOR MEN KNEE HIGH/LG) MISC Use daily Yes Rose Weiss MD   pantoprazole (PROTONIX) 40 MG tablet Take 1 tablet by mouth every morning (before breakfast) Yes DONALDO Ruano - CNP   metoprolol tartrate (LOPRESSOR) 25 MG tablet Take 25 mg by mouth 2 times daily Yes Historical Provider, MD   psyllium (KONSYL) 28.3 % PACK Take 1 packet by mouth nightly Yes Historical Provider, MD   Bisacodyl (DULCOLAX PO) Take 1 tablet by mouth as needed  Yes Historical Provider, MD   apixaban (ELIQUIS) 5 MG TABS tablet Take 5 mg by mouth 2 times daily Yes Historical Provider, MD   trospium (SANCTURA) 20 MG tablet Take 20 mg by mouth 2 times daily Prescribed by Dr. India Cleary Yes Historical Provider, MD   fluticasone (FLONASE) 50 MCG/ACT nasal spray 1 spray by Nasal route daily Yes Susu Cameron MD   acetaminophen (TYLENOL) 325 MG tablet Take 650 mg by mouth every 6 hours as needed for Pain Yes Historical Provider, MD   CINNAMON PO Take 2 capsules by mouth daily Yes Historical Provider, MD   Probiotic Product (PROBIOTIC ADVANCED PO) Take by mouth Yes Historical Provider, MD   Multiple Vitamins-Minerals (OCUVITE PO) Take 1 tablet by mouth daily Yes Historical Provider, MD   Elastic Bandages & Supports (JOBST RELIEF 30-40MMHG XL) MISC 4 pair knee high open toe. Yes Kristine Morales MD   Cholecalciferol (VITAMIN D-3 PO) Take  by mouth daily.  2000 IU daily  Yes Historical Provider, MD   Coenzyme Q10 (CO Q 10 PO) Take 1 tablet by mouth daily  Yes Historical Provider, MD   Turmeric 500 MG CAPS Take 1,000 mg by mouth daily  Patient not taking: Reported on 7/16/2021  Historical Provider, MD   Potassium 99 MG TABS Take by mouth  Patient not taking: Reported on 10/21/2021  Historical Provider, MD         Past Medical History:   Diagnosis Date    Adenocarcinoma of prostate [facility] 10/30/2015    Anemia     Cellulitis of lower extremity     right     Cerebral artery occlusion with cerebral infarction ([facility] [address] [facility]     1996    CHF (congestive heart failure) ([facility] [facility]     Chronic acquired lymphedema     Hernia, abdominal     History of blood transfusion 2003 7 2006 [address] [facility] SURGERY    History of CVA (cerebrovascular accident) [address]    DEFECIT MILD MEMORY AND SPEECH    Hyperlipidemia     Hypertension     Hypothyroid 09/2015    Ileus, postoperative (HCC)     Mild renal insufficiency     Morbid obesity ([facility]     Non-healing wound of lower extremity     HISTORY OF, WAS SEEN IN WOUND CLINIC FOR COUPLE YRS.    Osteoarthritis     Phlebitis     HX. OF     Prolonged emergence from general anesthesia     x1 had to be put back on ventolator, after prostate surgery, had to be hospitalized x12 days.      Prostate CA ([facility]     Prostate cancer ([facility] [address] [facility] 10/21/2015    PVD (peripheral vascular disease) ([facility]     Sleep apnea     C-PAP NIGHTLY-PT INSTRUCTED TO BRING    SVT (supraventricular tachycardia) (HCC)     Uric acid kidney stone 12/2014    HAD 6 PASSSED ONE ON OWN, OTHER 5 IS BEING MONITORED    Wears glasses        Past Surgical History:   Procedure Laterality Date    ABLATION OF DYSRHYTHMIC FOCUS  03/16/2018    afib ablation    ABLATION OF DYSRHYTHMIC FOCUS  08/22/2019    ATRIAL FIB  /  [facility] [address] [facility]  07/30/2020    Dr. Yony Roland, [address]  11/17/2017    COLONOSCOPY  2002, 2007    COLONOSCOPY  07/11/2016    polyp,bx    CYST REMOVAL Left 08/05/2016    excision cyst anterior chest    HAMMER TOE SURGERY Bilateral     KNEE SURGERY Left 1985    repair of torn ligaments    MA COLON CA SCRN NOT HI RSK IND N/A 07/31/2017    COLONOSCOPY performed by Tiffanie Landry MD at [facility] 05/15/2018    HIP TOTAL ARTHROPLASTY MINIMALLY INVASIVE ASI WITH GPS performed by Jennifer David MD at [address] [place] [address]  10/21/2015    robotic    STUDY POSS ABLATION  04/02/2018         TOTAL KNEE ARTHROPLASTY Bilateral LT-2003, RT-2006    TRANSESOPHAGEAL ECHOCARDIOGRAM  11/17/2017    TRANSESOPHAGEAL ECHOCARDIOGRAM  08/22/2019    TUMOR EXCISION      Throat/nose D/T benign tumor    UPPP UVULOPALATOPHARYGOPLASTY  90'S    VARICOSE VEIN SURGERY Bilateral [de-identified]    x2         Family History   Problem Relation Age of Onset    Diabetes Mother     Hypertension Mother     Heart Attack Mother     Colon Cancer Father     Heart Attack Father  Stroke Father     Cirrhosis Brother 54        ALCOHOL RELATED    Dementia Maternal Grandmother     Diabetes Maternal Grandmother     Cancer Paternal Grandmother         uterine    Hypertension Paternal Grandfather        CareTeam (Including outside providers/suppliers regularly involved in providing care):   Patient Care Team:  Alex Quinn MD as PCP - General  Alex Quinn MD as PCP - [facility]    Wt Readings from Last 3 Encounters:   12/29/21 (!) 308 lb 6.4 oz (139.9 kg)   10/21/21 (!) 302 lb 6.4 oz (137.2 kg)   07/16/21 (!) 300 lb 6.4 oz (136.3 kg)     Vitals:    12/29/21 1413   BP: 136/78   Pulse: 71   SpO2: 97%   Weight: (!) 308 lb 6.4 oz (139.9 kg)   Height: 5' 10\" (1.778 m)     Body mass index is 44.25 kg/m². Based upon direct observation of the patient, evaluation of cognition reveals recent and remote memory intact.     General Appearance: alert and oriented to person, place and time, well developed and well- nourished, in no acute distress  Skin: warm and dry, no rash or erythema  Head: normocephalic and atraumatic  Eyes: pupils equal, round, and reactive to light, extraocular eye movements intact, conjunctivae normal  ENT: tympanic membrane, external ear and ear canal normal bilaterally, nose without deformity, nasal mucosa and turbinates normal without polyps  Neck: supple and non-tender without mass, no thyromegaly or thyroid nodules, no cervical lymphadenopathy  Pulmonary/Chest: clear to auscultation bilaterally- no wheezes, rales or rhonchi, normal air movement, no respiratory distress  Cardiovascular: normal rate, regular rhythm, normal S1 and S2, no murmurs, rubs, clicks, or gallops, distal pulses intact, no carotid bruits  Abdomen: soft, non-tender, non-distended, normal bowel sounds, no masses or organomegaly  Extremities: no cyanosis, clubbing or edema  Musculoskeletal: normal range of motion, no joint swelling, deformity or tenderness  Neurologic: reflexes normal and symmetric, no cranial nerve deficit, gait, coordination and speech normal    Patient's complete Health Risk Assessment and screening values have been reviewed and are found in Flowsheets. The following problems were reviewed today and where indicated follow up appointments were made and/or referrals ordered. Positive Risk Factor Screenings with Interventions:             Opioid Risk: (Low risk score <55)  Opioid risk score: 10    Patient is low risk for opioid use disorder or overdose. Click here to Document Controlled Substance Monitoring. Last PDMP Ashlyn Sanders as Reviewed:  Review User Review Instant Review Result   MARSHALL SOTO 12/29/2021  2:26 PM Reviewed PDMP [1]           General Health and ACP:  General  In general, how would you say your health is?: Good  In the past 7 days, have you experienced any of the following?  New or Increased Pain, New or Increased Fatigue, Loneliness, Social Isolation, Stress or Anger?: None of These  Do you get the social and emotional support that you need?: Yes  Do you have a Living Will?: Yes  Advance Directives     Power of [facility] Will ACP-Advance Directive ACP-Power of     Not on File Not on File Not on File Not on File      General Health Risk Interventions:  · no concerns    Health Habits/Nutrition:  Health Habits/Nutrition  Do you exercise for at least 20 minutes 2-3 times per week?: (!) No  Have you lost any weight without trying in the past 3 months?: No  Do you eat only one meal per day?: (!) Yes  Have you seen the dentist within the past year?: Yes  Body mass index: (!) 44.25  Health Habits/Nutrition Interventions:  · Nutritional issues:  educational materials for healthy, well-balanced diet provided    Hearing/Vision:  No exam data present  Hearing/Vision  Do you or your family notice any trouble with your hearing that hasn't been managed with hearing aids?: No  Do you have difficulty driving, watching TV, or doing any of your daily activities because of your eyesight?: No  Have you had an eye exam within the past year?: (!) No  Hearing/Vision Interventions:  · Vision concerns:  patient encouraged to make appointment with his/her eye specialist        Personalized Preventive Plan   Current Health Maintenance Status  Immunization History   Administered Date(s) Administered    COVID-19, John Casas, Primary or Immunocompromised, PF, 100mcg/0.5mL 02/25/2021, 03/25/2021, 12/21/2021    Influenza Vaccine, unspecified formulation 10/09/2015, 10/02/2018    Influenza Virus Vaccine 10/02/2018    Influenza, High Dose (Fluzone 65 yrs and older) 10/02/2017, 10/02/2019, 10/01/2020, 10/05/2021    Pneumococcal Conjugate 13-valent ([id number]) 10/13/2014    Pneumococcal Polysaccharide ([id number]) 10/13/2015        Health Maintenance   Topic Date Due    DTaP/Tdap/Td vaccine (1 - Tdap) Never done    Shingles Vaccine (1 of 2) Never done    TSH testing  12/28/2021    Annual Wellness Visit (AWV)  12/29/2021    PSA counseling  07/12/2022    Lipid screen  09/09/2022    Potassium monitoring  10/12/2022    Creatinine monitoring  10/12/2022    Flu vaccine  Completed    Pneumococcal 65+ years Vaccine  Completed    COVID-19 Vaccine  Completed    Hepatitis C screen  Completed    Hepatitis A vaccine  Aged Out    Hepatitis B vaccine  Aged Out    Hib vaccine  Aged Out    Meningococcal (ACWY) vaccine  Aged Out     Recommendations for Pict Due: see orders and patient instructions/AVS.  . Recommended screening schedule for the next 5-10 years is provided to the patient in written form: see Patient Katerina More was seen today for medicare awv.     Diagnoses and all orders for this visit:    Stage 3a chronic kidney disease ([address]    Routine general medical examination at a health care facility    Chronic diastolic congestive heart failure (HCC)    Atrial flutter, unspecified type ([address]    Secondary hyperparathyroidism of renal origin [facility])              The patien's kidneys are doing much better since not needing any more NSAIDS with new shoulders.

## 2022-01-13 DIAGNOSIS — M25.551 RIGHT HIP PAIN: ICD-10-CM

## 2022-01-13 RX ORDER — TRAMADOL HYDROCHLORIDE 50 MG/1
100 TABLET ORAL EVERY 6 HOURS PRN
Qty: 360 TABLET | Refills: 0 | Status: SHIPPED | OUTPATIENT
Start: 2022-01-13 | End: 2022-04-01 | Stop reason: SDUPTHER

## 2022-01-13 NOTE — TELEPHONE ENCOUNTER
----- Message from Dacia Bonilla sent at 1/13/2022  2:43 PM EST -----  Subject: Refill Request    QUESTIONS  Name of Medication? traMADol (ULTRAM) 50 MG tablet  Patient-reported dosage and instructions? 1 TABLET BY MOUTH EVERY 4 HRS AS   NEEDED FOR PAIN  How many days do you have left? 3  Preferred Pharmacy? [address]  phone number (if available)? [phone]  Additional Information for Provider? Requesting 60 day supply   ---------------------------------------------------------------------------  --------------  CALL BACK INFO  What is the best way for the office to contact you? OK to leave message on   voicemail  Preferred Call Back Phone Number?  [phone]

## 2022-04-01 ENCOUNTER — OFFICE VISIT (OUTPATIENT)
Dept: PRIMARY CARE CLINIC | Age: 77
End: 2022-04-01
Payer: MEDICARE

## 2022-04-01 VITALS
DIASTOLIC BLOOD PRESSURE: 62 MMHG | BODY MASS INDEX: 44.14 KG/M2 | HEART RATE: 62 BPM | WEIGHT: 307.6 LBS | OXYGEN SATURATION: 97 % | SYSTOLIC BLOOD PRESSURE: 124 MMHG

## 2022-04-01 DIAGNOSIS — Z23 NEED FOR VACCINATION: ICD-10-CM

## 2022-04-01 DIAGNOSIS — N25.81 SECONDARY HYPERPARATHYROIDISM OF RENAL ORIGIN (HCC): ICD-10-CM

## 2022-04-01 DIAGNOSIS — G89.29 CHRONIC RIGHT SHOULDER PAIN: Primary | ICD-10-CM

## 2022-04-01 DIAGNOSIS — R60.0 LOCALIZED EDEMA: ICD-10-CM

## 2022-04-01 DIAGNOSIS — M25.511 CHRONIC RIGHT SHOULDER PAIN: Primary | ICD-10-CM

## 2022-04-01 DIAGNOSIS — I50.32 CHRONIC DIASTOLIC CONGESTIVE HEART FAILURE (HCC): ICD-10-CM

## 2022-04-01 DIAGNOSIS — I48.92 ATRIAL FLUTTER, UNSPECIFIED TYPE (HCC): ICD-10-CM

## 2022-04-01 DIAGNOSIS — M25.512 CHRONIC LEFT SHOULDER PAIN: ICD-10-CM

## 2022-04-01 DIAGNOSIS — M25.551 RIGHT HIP PAIN: ICD-10-CM

## 2022-04-01 DIAGNOSIS — E66.01 OBESITY, CLASS III, BMI 40-49.9 (MORBID OBESITY) (HCC): ICD-10-CM

## 2022-04-01 DIAGNOSIS — N18.31 STAGE 3A CHRONIC KIDNEY DISEASE (HCC): ICD-10-CM

## 2022-04-01 DIAGNOSIS — G89.29 CHRONIC LEFT SHOULDER PAIN: ICD-10-CM

## 2022-04-01 PROCEDURE — 99213 OFFICE O/P EST LOW 20 MIN: CPT | Performed by: FAMILY MEDICINE

## 2022-04-01 RX ORDER — TRAMADOL HYDROCHLORIDE 50 MG/1
100 TABLET ORAL EVERY 6 HOURS PRN
Qty: 180 TABLET | Refills: 0 | Status: SHIPPED | OUTPATIENT
Start: 2022-04-01 | End: 2022-05-13 | Stop reason: SDUPTHER

## 2022-04-01 RX ORDER — PHENOL 1.4 %
1 AEROSOL, SPRAY (ML) MUCOUS MEMBRANE DAILY
COMMUNITY

## 2022-04-01 RX ORDER — TRAMADOL HYDROCHLORIDE 50 MG/1
100 TABLET ORAL EVERY 6 HOURS PRN
Qty: 360 TABLET | Refills: 0 | Status: CANCELLED | OUTPATIENT
Start: 2022-04-01 | End: 2022-05-31

## 2022-04-01 ASSESSMENT — ENCOUNTER SYMPTOMS
COUGH: 0
SHORTNESS OF BREATH: 0
SORE THROAT: 0

## 2022-04-01 NOTE — PROGRESS NOTES
[facility] PRIMARY CARE  [address] [facility] [address]  Dept: 69 Rosette Pepper is a 76 y.o. male Established patient, who presents today for his medical conditions/complaints as noted below. Chief Complaint   Patient presents with    Medication Check    Hypertension     Patient has not been checking B/P at home       HPI:     HPI- pt c/o flu like symptoms on and off for the past month. Some body aches. Hip and mostly shoulders- worse at night- keeps him up at night grazyna left side. Having a lot of edema of legs. Pt is agreeable to use compression socks.       Reviewed prior notes None  Reviewed previous Labs    LDL Cholesterol (mg/dL)   Date Value   04/08/2019 67   12/04/2018 72   04/12/2016 116     LDL Calculated (mg/dL)   Date Value   09/09/2021 76   07/13/2021 73       (goal LDL is <100)   AST (U/L)   Date Value   01/30/2020 16     ALT (U/L)   Date Value   01/30/2020 12     BUN (mg/dL)   Date Value   10/12/2021 25 (H)     Hemoglobin A1C (%)   Date Value   07/13/2021 5.8     TSH (mIU/L)   Date Value   12/28/2020 2.84     BP Readings from Last 3 Encounters:   04/01/22 124/62   12/29/21 136/78   10/21/21 134/70          (goal 120/80)    Past Medical History:   Diagnosis Date    Adenocarcinoma of prostate ([address] 10/30/2015    Anemia     Cellulitis of lower extremity     right     Cerebral artery occlusion with cerebral infarction ([address]     1996    CHF (congestive heart failure) (HCC)     Chronic acquired lymphedema     Hernia, abdominal     History of blood transfusion 2003 7 2006 [address] [facility] [place]    History of CVA (cerebrovascular accident) [address]    DEFECIT MILD MEMORY AND SPEECH    Hyperlipidemia     Hypertension     Hypothyroid 09/2015    Ileus, postoperative (HCC)     Mild renal insufficiency     Morbid obesity ([address]     Non-healing wound of lower extremity     HISTORY OF, WAS SEEN IN WOUND CLINIC FOR COUPLE YRS.    Osteoarthritis     Phlebitis     HX. OF     Prolonged emergence from general anesthesia     x1 had to be put back on ventolator, after prostate surgery, had to be hospitalized x12 days.      [facility]     Prostate cancer ([facility] 10/21/2015    PVD (peripheral vascular disease) ([facility]     Sleep apnea     C-PAP NIGHTLY-PT INSTRUCTED TO BRING    SVT (supraventricular tachycardia) (HCC)     Uric acid kidney stone 12/2014    HAD 6 PASSSED ONE ON OWN, OTHER 5 IS BEING MONITORED    Wears glasses       Past Surgical History:   Procedure Laterality Date    ABLATION OF DYSRHYTHMIC FOCUS  03/16/2018    afib ablation    ABLATION OF DYSRHYTHMIC FOCUS  08/22/2019    ATRIAL FIB  /  DR [facility]  07/30/2020    Dr. [facility], [address]  11/17/2017    COLONOSCOPY  2002, 2007    COLONOSCOPY  07/11/2016    polyp,bx    CYST REMOVAL Left 08/05/2016    excision cyst anterior chest    HAMMER TOE SURGERY Bilateral     KNEE SURGERY Left 1985    repair of torn ligaments    NM COLON CA SCRN NOT HI RSK IND N/A 07/31/2017    COLONOSCOPY performed by Christen Alexander MD at [facility] 05/15/2018    HIP TOTAL ARTHROPLASTY MINIMALLY INVASIVE ASI WITH GPS performed by Flakita Castaneda MD at [address] [place] [address]  10/21/2015    robotic    STUDY POSS ABLATION  04/02/2018         TOTAL KNEE ARTHROPLASTY Bilateral LT-2003, RT-2006    TRANSESOPHAGEAL ECHOCARDIOGRAM  11/17/2017    TRANSESOPHAGEAL ECHOCARDIOGRAM  08/22/2019    TUMOR EXCISION      Throat/nose D/T benign tumor    UPPP UVULOPALATOPHARYGOPLASTY  90'S    VARICOSE VEIN SURGERY Bilateral [de-identified]    x2       Family History   Problem Relation Age of Onset    Diabetes Mother     Hypertension Mother     Heart Attack Mother     Colon Cancer Father     Heart Attack Father     Stroke Father     Cirrhosis Brother 54        ALCOHOL RELATED    Dementia Maternal Grandmother     Diabetes Maternal Grandmother     Cancer Paternal Grandmother         uterine    Hypertension Paternal Grandfather        Social History     Tobacco Use    Smoking status: Former Smoker     Packs/day: 2.00     Years: 10.00     Pack years: 20.00     Quit date: 1970     Years since quittin.3    Smokeless tobacco: Never Used   Substance Use Topics    Alcohol use: Yes     Comment: 1-2 beer a day      Current Outpatient Medications   Medication Sig Dispense Refill    Melatonin 10 MG TABS Take by mouth      traMADol (ULTRAM) 50 MG tablet Take 2 tablets by mouth every 6 hours as needed for Pain for up to 30 days.  180 tablet 0    Elastic Bandages & Supports (JOBST KNEE HIGH COMPRESSION SM) MISC 1 each by Does not apply route daily Knee high, open toe- 20-30 mm Hg- open toe, size Lg 4 each 0    lisinopril (PRINIVIL;ZESTRIL) 5 MG tablet TAKE 1 TABLET NIGHTLY 90 tablet 3    levothyroxine (SYNTHROID) 25 MCG tablet TAKE 1 TABLET DAILY 90 tablet 3    atorvastatin (LIPITOR) 10 MG tablet TAKE 1 TABLET DAILY 90 tablet 3    allopurinol (ZYLOPRIM) 300 MG tablet TAKE 1 TABLET TWICE A  tablet 3    zinc 50 MG CAPS Take 50 mg by mouth daily 30 capsule 3    furosemide (LASIX) 40 MG tablet Take 1 tablet by mouth daily (Patient taking differently: Take 40 mg by mouth daily Patient is taking 1.5 tab daily (60mg)) 90 tablet 3    Elastic Bandages & Supports (JOBST FOR MEN KNEE HIGH/LG) MISC Use daily 2 each 1    pantoprazole (PROTONIX) 40 MG tablet Take 1 tablet by mouth every morning (before breakfast) 90 tablet 1    metoprolol tartrate (LOPRESSOR) 25 MG tablet Take 25 mg by mouth 2 times daily      psyllium (KONSYL) 28.3 % PACK Take 1 packet by mouth nightly      Bisacodyl (DULCOLAX PO) Take 1 tablet by mouth as needed       apixaban (ELIQUIS) 5 MG TABS tablet Take 5 mg by mouth 2 times daily      trospium (SANCTURA) 20 MG tablet Take 20 mg by mouth 2 times daily Prescribed by Dr. Alee Oneill      fluticasone (FLONASE) 50 MCG/ACT nasal spray 1 spray by Nasal route daily 1 Bottle 3    acetaminophen (TYLENOL) 325 MG tablet Take 650 mg by mouth every 6 hours as needed for Pain      CINNAMON PO Take 2 capsules by mouth daily      Probiotic Product (PROBIOTIC ADVANCED PO) Take by mouth      Multiple Vitamins-Minerals (OCUVITE PO) Take 1 tablet by mouth daily      Elastic Bandages & Supports (JOBST RELIEF 30-40MMHG XL) MISC 4 pair knee high open toe. 4 each 0    Cholecalciferol (VITAMIN D-3 PO) Take  by mouth daily. 2000 IU daily       Coenzyme Q10 (CO Q 10 PO) Take 1 tablet by mouth daily       Potassium 99 MG TABS Take by mouth (Patient not taking: Reported on 10/21/2021)       Current Facility-Administered Medications   Medication Dose Route Frequency Provider Last Rate Last Admin    betamethasone acetate-betamethasone sodium phosphate (CELESTONE) injection 1.98 mg  1.98 mg IntraMUSCular Once Nav Evans        bupivacaine (MARCAINE) 0.25 % injection 10 mg  4 mL IntraDERmal Once Nav Evans         Allergies   Allergen Reactions    Adhesive Tape Itching and Rash    Doxycycline Rash       Health Maintenance   Topic Date Due    Shingles Vaccine (1 of 2) Never done    TSH testing  12/28/2021    PSA counseling  07/12/2022    Lipid screen  09/09/2022    Potassium monitoring  10/12/2022    Creatinine monitoring  10/12/2022    Depression Screen  12/29/2022    Annual Wellness Visit (AWV)  12/30/2022    DTaP/Tdap/Td vaccine (2 - Td or Tdap) 04/01/2032    Flu vaccine  Completed    Pneumococcal 65+ years Vaccine  Completed    COVID-19 Vaccine  Completed    Hepatitis C screen  Completed    Hepatitis A vaccine  Aged Out    Hepatitis B vaccine  Aged Out    Hib vaccine  Aged Out    Meningococcal (ACWY) vaccine  Aged Out       Subjective:      Review of Systems   Constitutional: Positive for chills. Negative for fever. HENT: Positive for congestion (sinus) and postnasal drip.  Negative for ear pain and sore throat. Respiratory: Negative for cough and shortness of breath. Cardiovascular: Positive for leg swelling. Negative for chest pain and palpitations. Neurological: Negative for dizziness and light-headedness. Objective:     /62   Pulse 62   Wt (!) 307 lb 9.6 oz (139.5 kg)   SpO2 97%   BMI 44.14 kg/m²   Physical Exam  Vitals and nursing note reviewed. Constitutional:       General: He is not in acute distress. Appearance: He is well-developed. He is not ill-appearing. HENT:      Head: Normocephalic and atraumatic. Right Ear: External ear normal.      Left Ear: External ear normal.   Eyes:      General: No scleral icterus. Right eye: No discharge. Left eye: No discharge. Conjunctiva/sclera: Conjunctivae normal.      Pupils: Pupils are equal, round, and reactive to light. Neck:      Thyroid: No thyromegaly. Trachea: No tracheal deviation. Cardiovascular:      Rate and Rhythm: Normal rate and regular rhythm. Heart sounds: Normal heart sounds. Pulmonary:      Effort: Pulmonary effort is normal. No respiratory distress. Breath sounds: Normal breath sounds. No wheezing. Musculoskeletal:      Right lower leg: Edema present. Left lower leg: Edema present. Lymphadenopathy:      Cervical: No cervical adenopathy. Skin:     General: Skin is warm. Findings: No rash. Neurological:      Mental Status: He is alert and oriented to person, place, and time. Psychiatric:         Mood and Affect: Mood normal.         Behavior: Behavior normal.         Thought Content: Thought content normal.         Assessment/Plan:   1. Chronic right shoulder pain  -     External Referral To Physical Therapy  2. Right hip pain  -     traMADol (ULTRAM) 50 MG tablet; Take 2 tablets by mouth every 6 hours as needed for Pain for up to 30 days. , Disp-180 tablet, R-0Normal  3.  Need for vaccination  -     Tetanus-Diphth-Acell Pertussis ([address]) injection 0.5 mL; 0.5 mL, IntraMUSCular, ONCE, 1 dose, On 22 at 1345  4. Chronic diastolic congestive heart failure ([facility]  Assessment & Plan:   Asymptomatic, continue current medications    5. Atrial flutter, unspecified type [facility])  Assessment & Plan:   Asymptomatic, continue current medications    6. Secondary hyperparathyroidism of renal origin [facility])  Assessment & Plan:   Monitored by specialist- no acute findings meriting change in the plan    7. Stage 3a chronic kidney disease ([facility]  Assessment & Plan:   Well-controlled, continue current medications    8. Chronic left shoulder pain  -     External Referral To Physical Therapy  9. Obesity, Class III, BMI 40-49.9 (morbid obesity) ([facility]  10. Localized edema  Assessment & Plan:   Rx for Jobst stockings/ support socks       Return for medication f/u. Orders Placed This Encounter   Procedures    External Referral To Physical Therapy     Referral Priority:   Routine     Referral Type:   Eval and Treat     Referral Reason:   Specialty Services Required     Requested Specialty:   Physical Therapy     Number of Visits Requested:   1     Orders Placed This Encounter   Medications    Tetanus-Diphth-Acell Pertussis (BOOSTRIX) injection 0.5 mL    traMADol (ULTRAM) 50 MG tablet     Sig: Take 2 tablets by mouth every 6 hours as needed for Pain for up to 30 days. Dispense:  180 tablet     Refill:  0     Reduce doses taken as pain becomes manageable    Elastic Bandages & Supports (JOBST KNEE HIGH COMPRESSION SM) MISC     Si each by Does not apply route daily Knee high, open toe- 20-30 mm Hg- open toe, size Lg     Dispense:  4 each     Refill:  0       Patient given educational materials - see patient instructions. Discussed use, benefit, and side effects of prescribed medications. All patient questions answered. Pt voiced understanding. Reviewed health maintenance. Instructed to continue current medications, diet and exercise.   Patient agreed with treatment plan. Follow up as directed.      Electronically signed by Kwesi Main MD on 4/18/2022 at 1:02 PM

## 2022-04-18 ENCOUNTER — HOSPITAL ENCOUNTER (OUTPATIENT)
Dept: PREADMISSION TESTING | Age: 77
Discharge: HOME | End: 2022-04-22

## 2022-04-18 ENCOUNTER — TELEPHONE (OUTPATIENT)
Dept: PRIMARY CARE CLINIC | Age: 77
End: 2022-04-18

## 2022-04-18 VITALS — HEIGHT: 71 IN | WEIGHT: 298 LBS | BODY MASS INDEX: 41.72 KG/M2

## 2022-04-18 PROBLEM — R60.0 LOCALIZED EDEMA: Status: ACTIVE | Noted: 2022-04-18

## 2022-04-18 NOTE — TELEPHONE ENCOUNTER
[facility] medical called asking for clinic notes stating med necessity for compression stockings. The last note 4/1/22 does not mention need and benefit etc, please addend. Fax to [phone] I asked where this company is located and she states that his wife uses them for supplies and wants to use them for pt's stockings.

## 2022-04-18 NOTE — PROGRESS NOTES
Pre-op Instructions For Out-Patient Endoscopy Surgery    Medication Instructions:  · Please stop herbs and any supplements now (includes vitamins and minerals). · Please contact your surgeon and prescribing physician for pre-op instructions for any blood thinners. · If you have inhalers/aerosol treatments at home, please use them the morning of your surgery and bring the inhalers with you to the hospital.    · Please take the following medications the morning of your surgery with a sip of water:  Lisinopril, Metoprolol, Synthroid. Surgery Instructions:  1. After midnight before surgery:  Do not eat or drink anything, including water, mints, gum, and hard candy. You may brush your teeth without swallowing. No smoking, chewing tobacco, or street drugs. 2. Please shower or bathe before surgery. 3. Please do not wear any cologne, lotion, powder, jewelry, piercings, perfume, makeup, nail polish, hair accessories, or hair spray on the day of surgery. Wear loose comfortable clothing. 4. Leave your valuables at home. Bring a storage case for any glasses/contacts. 5. An adult who is responsible for you MUST drive you home and should be with you for the first 24 hours after surgery. The Day of Surgery:  · Arrive at [facility] AT [facility] Surgery Entrance at the time directed by your surgeon and check in at the desk. · If you have a living will or healthcare power of , please bring a copy. · You will be taken to the pre-op holding area where you will be prepared for surgery. A physical assessment will be performed by a nurse practitioner or house officer. Your IV will be started and you will meet your anesthesiologist.    · When you go to surgery, your family will be directed to the surgical waiting room, where the doctor should speak with them after your surgery.     · After surgery, you will be taken to the recovery room then when you are awake and stable you will go to the short stay unit for preparation to be discharged. Instructions read to [facility] and understanding verbalized.

## 2022-04-29 ENCOUNTER — ANESTHESIA EVENT (OUTPATIENT)
Dept: ENDOSCOPY | Age: 77
End: 2022-04-29
Payer: MEDICARE

## 2022-05-02 ENCOUNTER — ANESTHESIA (OUTPATIENT)
Dept: ENDOSCOPY | Age: 77
End: 2022-05-02
Payer: MEDICARE

## 2022-05-02 ENCOUNTER — HOSPITAL ENCOUNTER (OUTPATIENT)
Age: 77
Setting detail: OBSERVATION
Discharge: HOSPICE | End: 2022-05-04
Attending: SURGERY | Admitting: SURGERY
Payer: MEDICARE

## 2022-05-02 VITALS — SYSTOLIC BLOOD PRESSURE: 142 MMHG | DIASTOLIC BLOOD PRESSURE: 69 MMHG | TEMPERATURE: 96.8 F | OXYGEN SATURATION: 100 %

## 2022-05-02 PROBLEM — K52.9 COLITIS: Status: ACTIVE | Noted: 2022-05-02

## 2022-05-02 LAB
CREAT SERPL-MCNC: 1.22 MG/DL (ref 0.7–1.2)
GFR AFRICAN AMERICAN: >60 ML/MIN
GFR NON-AFRICAN AMERICAN: 57 ML/MIN
GFR SERPL CREATININE-BSD FRML MDRD: ABNORMAL ML/MIN/{1.73_M2}

## 2022-05-02 PROCEDURE — 2580000003 HC RX 258: Performed by: SURGERY

## 2022-05-02 PROCEDURE — G0378 HOSPITAL OBSERVATION PER HR: HCPCS

## 2022-05-02 PROCEDURE — 3700000001 HC ADD 15 MINUTES (ANESTHESIA): Performed by: SURGERY

## 2022-05-02 PROCEDURE — 7100000001 HC PACU RECOVERY - ADDTL 15 MIN: Performed by: SURGERY

## 2022-05-02 PROCEDURE — 2500000003 HC RX 250 WO HCPCS: Performed by: NURSE ANESTHETIST, CERTIFIED REGISTERED

## 2022-05-02 PROCEDURE — 3609010300 HC COLONOSCOPY W/BIOPSY SINGLE/MULTIPLE: Performed by: SURGERY

## 2022-05-02 PROCEDURE — 2500000003 HC RX 250 WO HCPCS: Performed by: SURGERY

## 2022-05-02 PROCEDURE — 2720000010 HC SURG SUPPLY STERILE: Performed by: SURGERY

## 2022-05-02 PROCEDURE — A4216 STERILE WATER/SALINE, 10 ML: HCPCS | Performed by: SURGERY

## 2022-05-02 PROCEDURE — 82565 ASSAY OF CREATININE: CPT

## 2022-05-02 PROCEDURE — 36415 COLL VENOUS BLD VENIPUNCTURE: CPT

## 2022-05-02 PROCEDURE — 88305 TISSUE EXAM BY PATHOLOGIST: CPT

## 2022-05-02 PROCEDURE — 7100000000 HC PACU RECOVERY - FIRST 15 MIN: Performed by: SURGERY

## 2022-05-02 PROCEDURE — 6360000002 HC RX W HCPCS: Performed by: SURGERY

## 2022-05-02 PROCEDURE — 3700000000 HC ANESTHESIA ATTENDED CARE: Performed by: SURGERY

## 2022-05-02 PROCEDURE — 2709999900 HC NON-CHARGEABLE SUPPLY: Performed by: SURGERY

## 2022-05-02 PROCEDURE — 2580000003 HC RX 258: Performed by: ANESTHESIOLOGY

## 2022-05-02 PROCEDURE — 6360000002 HC RX W HCPCS: Performed by: NURSE ANESTHETIST, CERTIFIED REGISTERED

## 2022-05-02 PROCEDURE — 99213 OFFICE O/P EST LOW 20 MIN: CPT | Performed by: INTERNAL MEDICINE

## 2022-05-02 RX ORDER — ONDANSETRON 2 MG/ML
4 INJECTION INTRAMUSCULAR; INTRAVENOUS EVERY 6 HOURS PRN
Status: DISCONTINUED | OUTPATIENT
Start: 2022-05-02 | End: 2022-05-04 | Stop reason: HOSPADM

## 2022-05-02 RX ORDER — LIDOCAINE HYDROCHLORIDE 10 MG/ML
1 INJECTION, SOLUTION EPIDURAL; INFILTRATION; INTRACAUDAL; PERINEURAL
Status: DISCONTINUED | OUTPATIENT
Start: 2022-05-02 | End: 2022-05-02 | Stop reason: HOSPADM

## 2022-05-02 RX ORDER — LANOLIN ALCOHOL/MO/W.PET/CERES
3 CREAM (GRAM) TOPICAL NIGHTLY
Status: DISCONTINUED | OUTPATIENT
Start: 2022-05-02 | End: 2022-05-04 | Stop reason: HOSPADM

## 2022-05-02 RX ORDER — LIDOCAINE HYDROCHLORIDE 20 MG/ML
INJECTION, SOLUTION EPIDURAL; INFILTRATION; INTRACAUDAL; PERINEURAL PRN
Status: DISCONTINUED | OUTPATIENT
Start: 2022-05-02 | End: 2022-05-02 | Stop reason: SDUPTHER

## 2022-05-02 RX ORDER — SODIUM CHLORIDE 9 MG/ML
INJECTION, SOLUTION INTRAVENOUS PRN
Status: DISCONTINUED | OUTPATIENT
Start: 2022-05-02 | End: 2022-05-04 | Stop reason: HOSPADM

## 2022-05-02 RX ORDER — ONDANSETRON 2 MG/ML
INJECTION INTRAMUSCULAR; INTRAVENOUS PRN
Status: DISCONTINUED | OUTPATIENT
Start: 2022-05-02 | End: 2022-05-02 | Stop reason: SDUPTHER

## 2022-05-02 RX ORDER — SODIUM CHLORIDE 9 MG/ML
INJECTION, SOLUTION INTRAVENOUS PRN
Status: DISCONTINUED | OUTPATIENT
Start: 2022-05-02 | End: 2022-05-02 | Stop reason: HOSPADM

## 2022-05-02 RX ORDER — FENTANYL CITRATE 50 UG/ML
100 INJECTION, SOLUTION INTRAMUSCULAR; INTRAVENOUS
Status: DISCONTINUED | OUTPATIENT
Start: 2022-05-02 | End: 2022-05-04 | Stop reason: HOSPADM

## 2022-05-02 RX ORDER — SODIUM CHLORIDE 0.9 % (FLUSH) 0.9 %
5-40 SYRINGE (ML) INJECTION EVERY 12 HOURS SCHEDULED
Status: DISCONTINUED | OUTPATIENT
Start: 2022-05-02 | End: 2022-05-02 | Stop reason: HOSPADM

## 2022-05-02 RX ORDER — SODIUM CHLORIDE 9 MG/ML
INJECTION, SOLUTION INTRAVENOUS CONTINUOUS
Status: DISCONTINUED | OUTPATIENT
Start: 2022-05-02 | End: 2022-05-04

## 2022-05-02 RX ORDER — PHENYLEPHRINE HYDROCHLORIDE 10 MG/ML
INJECTION INTRAVENOUS PRN
Status: DISCONTINUED | OUTPATIENT
Start: 2022-05-02 | End: 2022-05-02 | Stop reason: SDUPTHER

## 2022-05-02 RX ORDER — TROSPIUM CHLORIDE 20 MG/1
20 TABLET, FILM COATED ORAL 2 TIMES DAILY
Status: DISCONTINUED | OUTPATIENT
Start: 2022-05-02 | End: 2022-05-04 | Stop reason: HOSPADM

## 2022-05-02 RX ORDER — LEVOTHYROXINE SODIUM 0.03 MG/1
25 TABLET ORAL DAILY
Status: DISCONTINUED | OUTPATIENT
Start: 2022-05-02 | End: 2022-05-04 | Stop reason: HOSPADM

## 2022-05-02 RX ORDER — FENTANYL CITRATE 50 UG/ML
50 INJECTION, SOLUTION INTRAMUSCULAR; INTRAVENOUS
Status: DISCONTINUED | OUTPATIENT
Start: 2022-05-02 | End: 2022-05-04 | Stop reason: HOSPADM

## 2022-05-02 RX ORDER — MAGNESIUM SULFATE 1 G/100ML
1000 INJECTION INTRAVENOUS PRN
Status: DISCONTINUED | OUTPATIENT
Start: 2022-05-02 | End: 2022-05-04 | Stop reason: HOSPADM

## 2022-05-02 RX ORDER — SODIUM CHLORIDE 0.9 % (FLUSH) 0.9 %
5-40 SYRINGE (ML) INJECTION EVERY 12 HOURS SCHEDULED
Status: DISCONTINUED | OUTPATIENT
Start: 2022-05-02 | End: 2022-05-04 | Stop reason: HOSPADM

## 2022-05-02 RX ORDER — LISINOPRIL 5 MG/1
5 TABLET ORAL DAILY
Status: DISCONTINUED | OUTPATIENT
Start: 2022-05-02 | End: 2022-05-02

## 2022-05-02 RX ORDER — LISINOPRIL 5 MG/1
5 TABLET ORAL NIGHTLY
Status: DISCONTINUED | OUTPATIENT
Start: 2022-05-02 | End: 2022-05-04 | Stop reason: HOSPADM

## 2022-05-02 RX ORDER — SODIUM CHLORIDE 0.9 % (FLUSH) 0.9 %
5-40 SYRINGE (ML) INJECTION PRN
Status: DISCONTINUED | OUTPATIENT
Start: 2022-05-02 | End: 2022-05-02 | Stop reason: HOSPADM

## 2022-05-02 RX ORDER — ALLOPURINOL 300 MG/1
300 TABLET ORAL 2 TIMES DAILY
Status: DISCONTINUED | OUTPATIENT
Start: 2022-05-02 | End: 2022-05-04 | Stop reason: HOSPADM

## 2022-05-02 RX ORDER — ACETAMINOPHEN 325 MG/1
650 TABLET ORAL EVERY 6 HOURS PRN
Status: DISCONTINUED | OUTPATIENT
Start: 2022-05-02 | End: 2022-05-04 | Stop reason: HOSPADM

## 2022-05-02 RX ORDER — VITAMIN B COMPLEX
1000 TABLET ORAL DAILY
Status: DISCONTINUED | OUTPATIENT
Start: 2022-05-02 | End: 2022-05-04 | Stop reason: HOSPADM

## 2022-05-02 RX ORDER — SODIUM CHLORIDE, SODIUM LACTATE, POTASSIUM CHLORIDE, CALCIUM CHLORIDE 600; 310; 30; 20 MG/100ML; MG/100ML; MG/100ML; MG/100ML
INJECTION, SOLUTION INTRAVENOUS CONTINUOUS
Status: DISCONTINUED | OUTPATIENT
Start: 2022-05-02 | End: 2022-05-02 | Stop reason: HOSPADM

## 2022-05-02 RX ORDER — EPHEDRINE SULFATE/0.9% NACL/PF 50 MG/5 ML
SYRINGE (ML) INTRAVENOUS PRN
Status: DISCONTINUED | OUTPATIENT
Start: 2022-05-02 | End: 2022-05-02 | Stop reason: SDUPTHER

## 2022-05-02 RX ORDER — ATORVASTATIN CALCIUM 10 MG/1
10 TABLET, FILM COATED ORAL DAILY
Status: DISCONTINUED | OUTPATIENT
Start: 2022-05-02 | End: 2022-05-04 | Stop reason: HOSPADM

## 2022-05-02 RX ORDER — PROPOFOL 10 MG/ML
INJECTION, EMULSION INTRAVENOUS PRN
Status: DISCONTINUED | OUTPATIENT
Start: 2022-05-02 | End: 2022-05-02 | Stop reason: SDUPTHER

## 2022-05-02 RX ORDER — DEXAMETHASONE SODIUM PHOSPHATE 4 MG/ML
INJECTION, SOLUTION INTRA-ARTICULAR; INTRALESIONAL; INTRAMUSCULAR; INTRAVENOUS; SOFT TISSUE PRN
Status: DISCONTINUED | OUTPATIENT
Start: 2022-05-02 | End: 2022-05-02 | Stop reason: SDUPTHER

## 2022-05-02 RX ORDER — SODIUM CHLORIDE 0.9 % (FLUSH) 0.9 %
5-40 SYRINGE (ML) INJECTION PRN
Status: DISCONTINUED | OUTPATIENT
Start: 2022-05-02 | End: 2022-05-04 | Stop reason: HOSPADM

## 2022-05-02 RX ORDER — ACETAMINOPHEN 325 MG/1
650 TABLET ORAL EVERY 4 HOURS PRN
Status: DISCONTINUED | OUTPATIENT
Start: 2022-05-02 | End: 2022-05-04 | Stop reason: HOSPADM

## 2022-05-02 RX ORDER — LANOLIN ALCOHOL/MO/W.PET/CERES
3 CREAM (GRAM) TOPICAL DAILY
Status: DISCONTINUED | OUTPATIENT
Start: 2022-05-02 | End: 2022-05-02

## 2022-05-02 RX ORDER — POTASSIUM CHLORIDE 7.45 MG/ML
10 INJECTION INTRAVENOUS PRN
Status: DISCONTINUED | OUTPATIENT
Start: 2022-05-02 | End: 2022-05-04 | Stop reason: HOSPADM

## 2022-05-02 RX ADMIN — PROPOFOL 50 MG: 10 INJECTION, EMULSION INTRAVENOUS at 11:03

## 2022-05-02 RX ADMIN — PHENYLEPHRINE HYDROCHLORIDE 200 MCG: 10 INJECTION INTRAVENOUS at 11:44

## 2022-05-02 RX ADMIN — Medication 15 MG: at 11:10

## 2022-05-02 RX ADMIN — ONDANSETRON 4 MG: 2 INJECTION INTRAMUSCULAR; INTRAVENOUS at 11:09

## 2022-05-02 RX ADMIN — PIPERACILLIN AND TAZOBACTAM 3375 MG: 3; .375 INJECTION, POWDER, FOR SOLUTION INTRAVENOUS at 22:07

## 2022-05-02 RX ADMIN — PHENYLEPHRINE HYDROCHLORIDE 200 MCG: 10 INJECTION INTRAVENOUS at 11:23

## 2022-05-02 RX ADMIN — PHENYLEPHRINE HYDROCHLORIDE 200 MCG: 10 INJECTION INTRAVENOUS at 11:48

## 2022-05-02 RX ADMIN — PHENYLEPHRINE HYDROCHLORIDE 200 MCG: 10 INJECTION INTRAVENOUS at 11:30

## 2022-05-02 RX ADMIN — SODIUM CHLORIDE, POTASSIUM CHLORIDE, SODIUM LACTATE AND CALCIUM CHLORIDE: 600; 310; 30; 20 INJECTION, SOLUTION INTRAVENOUS at 09:43

## 2022-05-02 RX ADMIN — LIDOCAINE HYDROCHLORIDE 80 MG: 20 INJECTION, SOLUTION EPIDURAL; INFILTRATION; INTRACAUDAL; PERINEURAL at 11:02

## 2022-05-02 RX ADMIN — SODIUM CHLORIDE: 9 INJECTION, SOLUTION INTRAVENOUS at 22:10

## 2022-05-02 RX ADMIN — PROPOFOL 150 MG: 10 INJECTION, EMULSION INTRAVENOUS at 11:02

## 2022-05-02 RX ADMIN — FAMOTIDINE 20 MG: 10 INJECTION, SOLUTION INTRAVENOUS at 21:59

## 2022-05-02 RX ADMIN — FAMOTIDINE 20 MG: 10 INJECTION, SOLUTION INTRAVENOUS at 14:39

## 2022-05-02 RX ADMIN — SODIUM CHLORIDE: 9 INJECTION, SOLUTION INTRAVENOUS at 14:00

## 2022-05-02 RX ADMIN — PHENYLEPHRINE HYDROCHLORIDE 200 MCG: 10 INJECTION INTRAVENOUS at 11:19

## 2022-05-02 RX ADMIN — ALLOPURINOL 300 MG: 300 TABLET ORAL at 21:44

## 2022-05-02 RX ADMIN — Medication 15 MG: at 11:06

## 2022-05-02 RX ADMIN — LISINOPRIL 5 MG: 5 TABLET ORAL at 21:53

## 2022-05-02 RX ADMIN — TROSPIUM CHLORIDE 20 MG: 20 TABLET, FILM COATED ORAL at 21:45

## 2022-05-02 RX ADMIN — Medication 20 MG: at 11:14

## 2022-05-02 RX ADMIN — PHENYLEPHRINE HYDROCHLORIDE 200 MCG: 10 INJECTION INTRAVENOUS at 11:38

## 2022-05-02 RX ADMIN — DEXAMETHASONE SODIUM PHOSPHATE 4 MG: 4 INJECTION, SOLUTION INTRAMUSCULAR; INTRAVENOUS at 11:07

## 2022-05-02 RX ADMIN — PIPERACILLIN AND TAZOBACTAM 4500 MG: 4; .5 INJECTION, POWDER, LYOPHILIZED, FOR SOLUTION INTRAVENOUS; PARENTERAL at 14:31

## 2022-05-02 ASSESSMENT — PULMONARY FUNCTION TESTS
PIF_VALUE: 19
PIF_VALUE: 21
PIF_VALUE: 19
PIF_VALUE: 21
PIF_VALUE: 21
PIF_VALUE: 0
PIF_VALUE: 21
PIF_VALUE: 21
PIF_VALUE: 0
PIF_VALUE: 21
PIF_VALUE: 4
PIF_VALUE: 0
PIF_VALUE: 21
PIF_VALUE: 19
PIF_VALUE: 0
PIF_VALUE: 21
PIF_VALUE: 19
PIF_VALUE: 21
PIF_VALUE: 19
PIF_VALUE: 19
PIF_VALUE: 1
PIF_VALUE: 21
PIF_VALUE: 0
PIF_VALUE: 21
PIF_VALUE: 21
PIF_VALUE: 20
PIF_VALUE: 21
PIF_VALUE: 1
PIF_VALUE: 1
PIF_VALUE: 21
PIF_VALUE: 19
PIF_VALUE: 19
PIF_VALUE: 21
PIF_VALUE: 1
PIF_VALUE: 17
PIF_VALUE: 21
PIF_VALUE: 1
PIF_VALUE: 21
PIF_VALUE: 21
PIF_VALUE: 19
PIF_VALUE: 21
PIF_VALUE: 19
PIF_VALUE: 0
PIF_VALUE: 21
PIF_VALUE: 1
PIF_VALUE: 21
PIF_VALUE: 22

## 2022-05-02 ASSESSMENT — ENCOUNTER SYMPTOMS
SHORTNESS OF BREATH: 1
GASTROINTESTINAL NEGATIVE: 1

## 2022-05-02 ASSESSMENT — PAIN - FUNCTIONAL ASSESSMENT: PAIN_FUNCTIONAL_ASSESSMENT: NONE - DENIES PAIN

## 2022-05-02 NOTE — ANESTHESIA POSTPROCEDURE EVALUATION
POST- ANESTHESIA EVALUATION       Pt Name: Moe Goodwin  MRN: [id number]  YOB: 1945  Date of evaluation: 5/2/2022  Time:  2:25 PM      BP (!) 141/69   Pulse 74   Temp 97.4 °F (36.3 °C) (Oral)   Resp 18   Ht 5' 10.5\" (1.791 m)   Wt 298 lb (135.2 kg)   SpO2 92%   BMI 42.15 kg/m²      Consciousness Level  Awake  Cardiopulmonary Status  Stable  Pain Adequately Treated YES  Nausea / Vomiting  NO  Adequate Hydration  YES  Anesthesia Related Complications NONE      Electronically signed by Ambrosio Harvey MD on 5/2/2022 at 2:25 PM       Department of Anesthesiology  Postprocedure Note    Patient: Moe Goodwin  MRN: [id number]  YOB: 1945  Date of evaluation: 5/2/2022  Time:  2:25 PM     Procedure Summary     Date: 05/02/22 Room / Location: [address] [place] [address] 04 / 250 [facility] ENDO    Anesthesia Start: 8905 Anesthesia Stop: 5450    Procedure: COLONOSCOPY WITH RANDOM COLON BIOPSIES AND CLIPPING AT DISTAL TRANSVERSE COLON (N/A Anus) Diagnosis:       (FAMILY HISTORY OF COLON CANCER)      (PT VACCINATED)    Surgeons: Dora Jarrett MD Responsible Provider: Ambrosio Harvey MD    Anesthesia Type: general ASA Status: 3          Anesthesia Type: general    Flo Phase I: Flo Score: 10    Flo Phase II:      Last vitals: Reviewed and per EMR flowsheets.        Anesthesia Post Evaluation

## 2022-05-02 NOTE — CONSULTS
[facility]    CONSULTATION    / FOLLOW UP VISIT       Date:   5/2/2022  Patient name:  Marcell Fitzgerald  Date of admission:  5/2/2022  8:41 AM  MRN:   [id number]  Account:  [de-identified]  YOB: 1945  PCP:    Kyra Aguirre MD  Room:   2121/2121-01  Code Status:    Full Code    Physician Requesting Consult: Cherelle Cuevas MD    History of Present Illness:      C/C ;  Medical comorbidity management     REASON FOR CONSULT;  Medical comorbidity and medication management ;                                                 *Principal Problem:    Colitis  Resolved Problems:    * No resolved hospital problems. *           HPI;    ADMITTED POST PROCEDURE , COLONOSCOPY FOR DIARRHEA 6 WEEKS HX    COLONOSCOPY SHOWS  Patient was seen in the recovery room. Findings discussed with patient's wife. Patient is having diarrhea for the last 6 weeks. Patient was given antibiotics for his dental treatment couple weeks ago. Diarrhea started before the antibiotic. Colonoscopy revealed extremely friable inflamed edematous mucosa. Bleeds easily requiring multiple resolution clips   ON ADMISSION   Patient has no hx of Colon Polyps. Pt has hx of Diverticulosis. Patient has positive FH of Colon Cancer in father. Patient reports changes in bowel habits . He has alternative between constipation and diarrhea started one year ago, he was on pain medication for his shoulder. Pt states once he start to take Probiotic  his BM is okay now. No GI /Rectal bleeding, experiencing red/ black/ BRBPR stools. Patient has no  history of abd. Pain, no nausea or vomiting, no abdominal bloating or weight loss. Patient denies any Dysphagia. Pt has hx of GERD and he is on Protonix.         Past Medical History:   Diagnosis Date    Adenocarcinoma of prostate ([facility] 10/30/2015    Anemia     Cellulitis of lower extremity     right     Cerebral artery occlusion [facility])         CHF (congestive heart failure) [facility]     Chronic acquired lymphedema     Clavicle fracture     in high school    Hernia, abdominal     [facility] of blood transfusion [facility]    History of CVA (cerebrovascular accident) 56    DEFECIT MILD MEMORY AND SPEECH    Hyperlipidemia     Hypertension     Hypothyroid 2015    Ileus, postoperative (HCC)     Mild renal insufficiency     Morbid obesity ([address]     Non-healing wound of lower extremity     HISTORY OF, WAS SEEN IN WOUND CLINIC FOR COUPLE YRS.    Osteoarthritis     Phlebitis     HX. OF     Prolonged emergence from general anesthesia     x1 had to be put back on ventolator, after prostate surgery, had to be hospitalized x12 days.  Prostate CA ([facility]     Prostate cancer ([facility] 10/21/2015    PVD (peripheral vascular disease) (HCC)     Sleep apnea     C-PAP NIGHTLY-PT INSTRUCTED TO BRING    SVT (supraventricular tachycardia) (HCC)     Uric acid kidney stone 2014    HAD 6 PASSSED ONE ON OWN, OTHER 5 IS BEING MONITORED    Wears glasses     Wrist fracture rt,     Social History     Tobacco Use    Smoking status: Former Smoker     Packs/day: 2.00     Years: 10.00     Pack years: 20.00     Quit date: 1970     Years since quittin.4    Smokeless tobacco: Never Used   Vaping Use    Vaping Use: Never used   Substance Use Topics    Alcohol use: Yes     Comment: 1-2 beer a day    Drug use: No      Social History:     Tobacco:    reports that he quit smoking about 51 years ago. He has a 20.00 pack-year smoking history. He has never used smokeless tobacco.  Alcohol:      reports current alcohol use. Drug Use:  reports no history of drug use.     Review of Systems:     POSITIVE AND NEGATIVES AS DESCRIBED IN HISTORY OF PRESENT ILLNESS ;  IN ADDITION ;  Review of Systems          All other systems negative                Physical Exam:     Physical Exam   Vitals:    22 1230 05/02/22 1240 05/02/22 1250 05/02/22 1300   BP: (!) 131/56 119/64 127/65 (!) 141/69   Pulse: 75 76 74 74   Resp: 11 15 11 18   Temp:   98 °F (36.7 °C) 97.4 °F (36.3 °C)   TempSrc:    Oral   SpO2: (!) 87% 90% 95% 92%   Weight:       Height:                       Body mass index is 42.15 kg/m². General Appearance:   -, CO-OPERATIVE ,                                                        Pulmonary/Chest:        Clear to auscultation bilaterally . No wheezes, rales or rhonchi . Cardiovascular:            Normal rate, regular rhythm,                                          No murmur or  Gallop . Abdomen:                       Soft, non-tender                                                                                    Extremities:                    No Edema . Neuromuskuloskeletal    .. Pamalee Cedrick[facility] ;                 No focal motor deficit ,                 No focal sensory deficit ,    Musculo-skeletal ;                  No  gait abnormality                  No significant joint abnormality,                   Psych:   ---                     Data:     Significant last 24 hr data reviewed ;   Vitals:    05/02/22 1230 05/02/22 1240 05/02/22 1250 05/02/22 1300   BP: (!) 131/56 119/64 127/65 (!) 141/69   Pulse: 75 76 74 74   Resp: 11 15 11 18   Temp:   98 °F (36.7 °C) 97.4 °F (36.3 °C)   TempSrc:    Oral   SpO2: (!) 87% 90% 95% 92%   Weight:       Height:          No results found for this or any previous visit (from the past 24 hour(s)). No results for input(s): POCGLU in the last 72 hours. No results found. Radiology:         Medications: Allergies:     Allergies   Allergen Reactions    Adhesive Tape Itching and Rash    Doxycycline Rash       Current Meds:   Scheduled Meds:    sodium chloride flush  5-40 mL IntraVENous 2 times per day    piperacillin-tazobactam (ZOSYN) 3375 mg in dextrose 5% IVPB extended infusion (mini-bag)  3,375 mg IntraVENous Q8H    famotidine (PEPCID) injection  20 mg IntraVENous BID    piperacillin-tazobactam  4,500 mg IntraVENous Once     Continuous Infusions:    sodium chloride 125 mL/hr at 05/02/22 1400    sodium chloride       PRN Meds: sodium chloride flush, sodium chloride, potassium chloride, magnesium sulfate, ondansetron, fentanNYL, fentanNYL, acetaminophen        Assessment :       Assessment Dx  Principal Problem:    Colitis  Resolved Problems:    * No resolved hospital problems. *              Plan:     MOST LIKELY INFLAMMATORY BOWEL DISEASE . WILL OBSERVE    HOME MEDS RECONCILED . HOLD ELIQUIS       Medications: Allergies: Allergies   Allergen Reactions    Adhesive Tape Itching and Rash    Doxycycline Rash       Current Meds:   Scheduled Meds:    sodium chloride flush  5-40 mL IntraVENous 2 times per day    piperacillin-tazobactam (ZOSYN) 3375 mg in dextrose 5% IVPB extended infusion (mini-bag)  3,375 mg IntraVENous Q8H    famotidine (PEPCID) injection  20 mg IntraVENous BID    piperacillin-tazobactam  4,500 mg IntraVENous Once    allopurinol  300 mg Oral BID    atorvastatin  1 tablet Oral Daily    vitamin D-3  1,000 Units Oral Daily    levothyroxine  1 tablet Oral Daily    lisinopril  5 mg Oral Daily    Melatonin  1 tablet Oral Daily    trospium  20 mg Oral BID     Continuous Infusions:    sodium chloride 125 mL/hr at 05/02/22 1400    sodium chloride       PRN Meds: sodium chloride flush, sodium chloride, potassium chloride, magnesium sulfate, ondansetron, fentanNYL, fentanNYL, acetaminophen, acetaminophen       Thanks for consulting us . Will monitor vitals and clinical course , and  Optimize therapy  as needed . Gregory Gtz MD    Copy sent to Dr. Az Gonzalez MD    Pleasenote that this chart was generated using voice recognition Dragon dictation software.   Although every effort was made to ensure the accuracy of this automated transcription, some errors in transcription may have occurred.

## 2022-05-02 NOTE — PROGRESS NOTES
Patient was seen in the recovery room. Findings discussed with patient's wife. Patient is having diarrhea for the last 6 weeks. Patient was given antibiotics for his dental treatment couple weeks ago. Diarrhea started before the antibiotic. Colonoscopy revealed extremely friable inflamed edematous mucosa. Bleeds easily requiring multiple resolution clips. At this point given his history and the colonoscopy findings after discussion with patient's wife I decided to admit the patient overnight for observation. Discussed with patient in the recovery room who understands and agrees with the current management plan. Orders placed in the computer.

## 2022-05-02 NOTE — ANESTHESIA PRE PROCEDURE
Department of Anesthesiology  Preprocedure Note       Name:  Kip Cortez   Age:  76 y.o.  :  1945                                          MRN:  [id number]         Date:  2022      Surgeon: Vannesa De Paz):  Pascual Jonas MD    Procedure: Procedure(s):  COLONOSCOPY DIAGNOSTIC    Medications prior to admission:   Prior to Admission medications    Medication Sig Start Date End Date Taking?  Authorizing Provider   Melatonin 10 MG TABS Take 1 tablet by mouth daily     Historical Provider, MD   lisinopril (PRINIVIL;ZESTRIL) 5 MG tablet TAKE 1 TABLET NIGHTLY 21   Odell Adam MD   levothyroxine (SYNTHROID) 25 MCG tablet TAKE 1 TABLET DAILY 21   Odell Adam MD   atorvastatin (LIPITOR) 10 MG tablet TAKE 1 TABLET DAILY 21   Odell Adam MD   allopurinol (ZYLOPRIM) 300 MG tablet TAKE 1 TABLET TWICE A DAY  Patient taking differently: 300 mg 2 times daily  21   Odell Adam MD   zinc 50 MG CAPS Take 50 mg by mouth daily 3/29/21   Odell Adam MD   furosemide (LASIX) 40 MG tablet Take 1 tablet by mouth daily  Patient taking differently: Take 40 mg by mouth daily Patient is taking 1.5 tab daily (60mg) 20   Odell Adam MD   pantoprazole (PROTONIX) 40 MG tablet Take 1 tablet by mouth every morning (before breakfast) 19   Cassy Blacno, APRN - CNP   metoprolol tartrate (LOPRESSOR) 25 MG tablet Take 25 mg by mouth 2 times daily Takes 1 1/2 tablets 2xdaily    Historical Provider, MD   Bisacodyl (DULCOLAX PO) Take 1 tablet by mouth as needed     Historical Provider, MD   apixaban (ELIQUIS) 5 MG TABS tablet Take 5 mg by mouth 2 times daily    Historical Provider, MD   trospium (SANCTURA) 20 MG tablet Take 20 mg by mouth 2 times daily Prescribed by Dr. Celina Lubin Provider, MD   fluticasone [facility]) 50 MCG/ACT nasal spray 1 spray by Nasal route daily 17   Odell Adam MD   acetaminophen (TYLENOL) 325 MG tablet Take 650 mg by mouth every 6 hours as needed for Pain    Historical Provider, MD   CINNAMON PO Take 2 capsules by mouth daily    Historical Provider, MD   Probiotic Product (PROBIOTIC ADVANCED PO) Take by mouth    Historical Provider, MD   Multiple Vitamins-Minerals (OCUVITE PO) Take 1 tablet by mouth daily    Historical Provider, MD   Cholecalciferol (VITAMIN D-3 PO) Take  by mouth daily. 2000 IU daily     Historical Provider, MD   Coenzyme Q10 (CO Q 10 PO) Take 1 tablet by mouth daily     Historical Provider, MD       Current medications:    Current Facility-Administered Medications   Medication Dose Route Frequency Provider Last Rate Last Admin    lidocaine PF 1 % injection 1 mL  1 mL IntraDERmal Once PRN Patria Muniz MD        lactated ringers infusion   IntraVENous Continuous Patria BlMD bg        sodium chloride flush 0.9 % injection 5-40 mL  5-40 mL IntraVENous 2 times per day Patria Muniz MD        sodium chloride flush 0.9 % injection 5-40 mL  5-40 mL IntraVENous PRN Patria Muniz MD        0.9 % sodium chloride infusion   IntraVENous PRN Patria Muniz MD           Allergies:     Allergies   Allergen Reactions    Adhesive Tape Itching and Rash    Doxycycline Rash       Problem List:    Patient Active Problem List   Diagnosis Code    Hypertension I10    Chronic acquired lymphedema I89.0    Sleep apnea G47.30    History of CVA (cerebrovascular accident) Z80.78    Hyperlipidemia E78.5    Chronic diastolic congestive heart failure (HCC) I50.32    Osteoarthritis M19.90    Renal insufficiency N28.9    Anemia D64.9    Hyperglycemia R73.9    Acquired hypothyroidism E03.9    Arthritis of left hip M16.12    Lumbar radiculopathy M54.16    Spinal stenosis of lumbar region M48.061    Primary osteoarthritis of left hip M16.12    Class 3 obesity due to excess calories with serious comorbidity and body mass index (BMI) of 40.0 to 44.9 in adult [id number]    Osteoarthritis of right glenohumeral joint M19.011    Osteoarthritis of left glenohumeral joint M19.012    History of prostate cancer Z85.46    Atrial flutter (HCC) I48.92    Morbid obesity with BMI of 40.0-44.9, adult (HCC) E66.01, Z68.41    Secondary hyperparathyroidism of renal origin ([facility] N25.81    Chronic right shoulder pain M25.511, G89.29    Stage 3a chronic kidney disease (HCC) N18.31    Localized edema R60.0       Past Medical History:        Diagnosis Date    Adenocarcinoma of prostate ([address] 10/30/2015    Anemia     Cellulitis of lower extremity     right     Cerebral artery occlusion with cerebral infarction ([address]     1996    CHF (congestive heart failure) (HCC)     Chronic acquired lymphedema     Clavicle fracture     in high school    Hernia, abdominal     History of blood transfusion 2003 7 2006    [address]    History of CVA (cerebrovascular accident) 54    DEFECIT MILD MEMORY AND SPEECH    Hyperlipidemia     Hypertension     Hypothyroid 09/2015    Ileus, postoperative (HCC)     Mild renal insufficiency     Morbid obesity (HCC)     Non-healing wound of lower extremity     HISTORY OF, WAS SEEN IN WOUND CLINIC FOR COUPLE YRS.    Osteoarthritis     Phlebitis     HX. OF     Prolonged emergence from general anesthesia     x1 had to be put back on ventolator, after prostate surgery, had to be hospitalized x12 days.      Prostate CA ([facility]     Prostate cancer ([facility] 10/21/2015    PVD (peripheral vascular disease) ([facility]     Sleep apnea     C-PAP NIGHTLY-PT INSTRUCTED TO BRING    SVT (supraventricular tachycardia) (HCC)     Uric acid kidney stone 12/2014    HAD 6 PASSSED ONE ON OWN, OTHER 5 IS BEING MONITORED    Wears glasses     Wrist fracture rt,       Past Surgical History:        Procedure Laterality Date    ABLATION OF DYSRHYTHMIC FOCUS  03/16/2018    afib ablation    ABLATION OF DYSRHYTHMIC FOCUS  08/22/2019    ATRIAL FIB  /   [facility]    Dr. Janna Tom, [address]  2017    COLONOSCOPY  ,     COLONOSCOPY  2016    polyp,bx    CYST REMOVAL Left 2016    excision cyst anterior chest    HAMMER TOE SURGERY Bilateral     JOINT REPLACEMENT      KNEE SURGERY Left     repair of torn ligaments    [place] COLON CA SCRN NOT HI RSK IND N/A 2017    COLONOSCOPY performed by Samson Scott MD at [facility] 05/15/2018    HIP TOTAL ARTHROPLASTY MINIMALLY INVASIVE ASI WITH GPS performed by Cameron Fuchs MD at [address] [place] [address]  10/21/2015    robotic    SHOULDER ARTHROPLASTY Bilateral     STUDY POSS ABLATION  2018         TONSILLECTOMY      TOTAL KNEE ARTHROPLASTY Bilateral LT-, RT-    TRANSESOPHAGEAL ECHOCARDIOGRAM  2017    TRANSESOPHAGEAL ECHOCARDIOGRAM  2019    TUMOR EXCISION      Throat/nose D/T benign tumor    UPPP UVULOPALATOPHARYGOPLASTY  90'S    VARICOSE VEIN SURGERY Bilateral 80's    x2       Social History:    Social History     Tobacco Use    Smoking status: Former Smoker     Packs/day: 2.00     Years: 10.00     Pack years: 20.00     Quit date: 1970     Years since quittin.4    Smokeless tobacco: Never Used   Substance Use Topics    Alcohol use: Yes     Comment: 1-2 beer a day                                Counseling given: Not Answered      Vital Signs (Current): There were no vitals filed for this visit.                                            BP Readings from Last 3 Encounters:   22 124/62   21 136/78   10/21/21 134/70       NPO Status:                                                                                 BMI:   Wt Readings from Last 3 Encounters:   22 298 lb (135.2 kg)   22 (!) 307 lb 9.6 oz (139.5 kg)   21 (!) 308 lb 6.4 oz (139.9 kg)     There is no height or weight on file to calculate BMI.    CBC:   Lab Results   Component Value Date    WBC 6.0 08/24/2020    RBC 4.96 08/24/2020    RBC 4.74 03/08/2012    HGB 16.2 08/24/2020    HCT 52.6 08/24/2020    .0 08/24/2020    RDW 14.5 08/24/2020     08/24/2020     03/08/2012       CMP:   Lab Results   Component Value Date     10/12/2021    K 4.8 10/12/2021     10/12/2021    CO2 26 10/12/2021    BUN 25 10/12/2021    CREATININE 1.13 10/12/2021    GFRAA >60 10/12/2021    LABGLOM >60 10/12/2021    GLUCOSE 98 10/12/2021    GLUCOSE 100 04/19/2012    PROT 6.4 01/30/2020    PROT 7.4 01/28/2013    CALCIUM 9.6 10/12/2021    BILITOT 0.63 01/30/2020    ALKPHOS 65 01/30/2020    AST 16 01/30/2020    ALT 12 01/30/2020       POC Tests: No results for input(s): POCGLU, POCNA, POCK, POCCL, POCBUN, POCHEMO, POCHCT in the last 72 hours.     Coags:   Lab Results   Component Value Date    PROTIME 10.9 03/16/2018    INR 1.0 03/16/2018    APTT 80.7 07/15/2018       HCG (If Applicable): No results found for: PREGTESTUR, PREGSERUM, HCG, HCGQUANT     ABGs:   Lab Results   Component Value Date    PHART 7.281 10/21/2015    PO2ART 88.3 10/21/2015    RJO8IZH 61.6 10/21/2015    HUX6TSR 28.1 10/21/2015    V4SEELYX 96.1 10/21/2015        Type & Screen (If Applicable):  No results found for: LABABO, [address]    Drug/Infectious Status (If Applicable):  Lab Results   Component Value Date    HEPCAB NONREACTIVE 01/28/2013       COVID-19 Screening (If Applicable):   Lab Results   Component Value Date    COVID19 Not Detected 07/27/2020           Anesthesia Evaluation  Patient summary reviewed and Nursing notes reviewed history of anesthetic complications:   Airway: Mallampati: III  TM distance: >3 FB   Neck ROM: full  Mouth opening: > = 3 FB Dental: normal exam         Pulmonary: breath sounds clear to auscultation  (+) sleep apnea: on CPAP,                             Cardiovascular:    (+) hypertension: no interval change, CHF: diastolic,       ECG reviewed  Rhythm: regular  Rate: normal  Echocardiogram reviewed ROS comment: Echo: 2019  Normal left ventricular chamber dimension and function. Estimated left ventricular ejection fraction 55 %  PFO with left to right shunt     Neuro/Psych:   (+) CVA: residual symptoms, neuromuscular disease:,              ROS comment: DEFECIT MILD MEMORY AND SPEECH GI/Hepatic/Renal:   (+) bowel prep, morbid obesity          Endo/Other:    (+) hypothyroidism: arthritis: OA and no interval change. , . Abdominal:   (+) obese,           Vascular: negative vascular ROS. Other Findings:           Anesthesia Plan      general     ASA 3       Induction: intravenous. Anesthetic plan and risks discussed with patient. Plan discussed with CRNA.                   Ari Cordova MD   5/2/2022

## 2022-05-02 NOTE — H&P
HISTORY and [facility] [address]       NAME:  Abdoul Esparza  MRN: [id number]   YOB: 1945   Date: 5/2/2022   Age: 76 y.o. Gender: male       COMPLAINT AND PRESENT HISTORY:     Abdoul Esparza is 76 y.o.,   male, having a Screening Colonoscopy. Last colonoscopy done five years ago. Pre diagnosis: FAMILY HISTORY OF COLON CANCER  HPI:  Patient has no hx of Colon Polyps. Pt has hx of Diverticulosis. Patient has positive FH of Colon Cancer in father. Patient reports changes in bowel habits . He has alternative between constipation and diarrhea started one year ago, he was on pain medication for his shoulder. Pt states once he start to take Probiotic  his BM is okay now. No GI /Rectal bleeding, experiencing red/ black/ BRBPR stools. Patient has no  history of abd. Pain, no nausea or vomiting, no abdominal bloating or weight loss. Patient denies any Dysphagia. Pt has hx of GERD and he is on Protonix. Review of additional significant medical hx:  CHF, HLD, HTN, SVT  Current medication r/t condition: LISINOPRIL , LIPITOR, LASIX, LOPRESSOR,     BP Readings from Last 3 Encounters:   04/01/22 124/62   12/29/21 136/78   10/21/21 134/70     Cardiac Catheterization 7/30/2020  Angiographic Findings   Cardiac Arteries and Lesion Findings  LMCA: Normal 0% stenosis. LAD: Normal 0% stenosis. LCx: Normal 0% stenosis. RCA: Normal 0% stenosis.    Coronary Vito   Dominance: Right  Ventriculography Findings:  LV was not done.   Procedure Data  Procedure Start Time: 07/30/2020 10:35. Procedure End Time: 07/30/2020  11:18.       EKG 12 Lead  Narrative & Impression  Sinus rhythm with 1st degree A-V block  Inferior infarct (cited on or before 16-MAR-2018)  Abnormal ECG  When compared with ECG of 22-AUG-2019 07:48,  Previous ECG has undetermined rhythm, needs review      CVA 1996 HE HAS DEFECIT MILD MEMORY AND SPEECH  CURRENT MEDICATION R/T CONDITION : ELIQUIS   SLEEP APNEA HYPOTHYROID   Current medication r/t condition:SYNTHROID     NPO status: pt NPO since the past midnight  Medications taken TODAY (with sip of water):none  Anticoagulation status: Eliquis, stopped since last Firday   Prep fully completed: YES. Pt reports his BM is clear liquid   Denies personal hx of MRSA infection. Denies any personal or family hx of previous complications w/anesthesia. PT HAS HS OF NH LONGED EMERGENCE FORM GENERAL ANESTHESIA      PAST MEDICAL HISTORY     Past Medical History:   Diagnosis Date    Adenocarcinoma of prostate ([facility] 10/30/2015    Anemia     Cellulitis of lower extremity     right     Cerebral artery occlusion with cerebral infarction ([facility]     1996    CHF [facility]congestive heart failure) [facility]     Chronic acquired lymphedema     Clavicle fracture     in high school    Hernia, abdominal     History of blood transfusion 2003 7 2006 [facility] SUR[facility]    History of CVA (cerebrovascular accident) 54    DEFECIT MILD MEMORY AND SPEECH    Hyperlipidemia     Hypertension     Hypothyroid 09/2015    Ileus, postoperative ([facility])     Mild renal insufficiency     Morbid obesity ([facility]     Non-healing wound of lower extremity     HISTORY OF, WAS SEEN IN WOUND CLINIC FOR COUPLE YRS.    Osteoarthritis     Phlebitis     HX. OF     Prolonged emergence from general anesthesia     x1 had to be put back on ventolator, after prostate surgery, had to be hospitalized x12 days.      Prostate CA ([facility]     Prostate cancer ([facility] 10/21/2015    PVD (peripheral vascular disease) ([facility])     Sleep apnea     C-PAP NIGHTLY-PT INSTRUCTED TO BRING    SVT (supraventricular tachycardia) ([facility])     Uric acid kidney stone 12/2014    HAD 6 PASSSED ONE ON OWN, OTHER 5 IS BEING MONITORED    Wears glasses     Wrist fracture rt,       SURGICAL HISTORY       Past Surgical History:   Procedure Laterality Date    ABLATION OF DYSRHYTHMIC FOCUS  03/16/2018    afib ablation    ABLATION OF DYSRHYTHMIC FOCUS  2019    ATRIAL FIB  /  DR Angelo Farr    CARDIAC CATHETERIZATION  2020    [facility] Bel[facility]    CARDIOVERSION  2017    COLONOSCOPY  ,     COLONOSCOPY  2016    polyp,bx    CYST REMOVAL Left 2016    excision cyst anterior chest    HAMMER TOE SURGERY Bilateral     JOINT REPLACEMENT      KNEE SURGERY Left 1985    repair of torn ligaments    MO COLON CA SCRN NOT  [address] IND N/A 2017    COLONOSCOPY performed by Immanuel Dc MD at [facility] 05/15/2018    HIP TOTAL ARTHROPLASTY MINIMALLY INVASIVE ASI WITH GPS performed by Gema Main MD at [address] [place] [address]  10/21/2015    robotic    SHOULDER ARTHROPLASTY Bilateral     STUDY POSS ABLATION  2018         TONSILLECTOMY      TOTAL KNEE ARTHROPLASTY Bilateral LT-, RT-    TRANSESOPHAGEAL ECHOCARDIOGRAM  2017    TRANSESOPHAGEAL ECHOCARDIOGRAM  2019    TUMOR EXCISION      Throat/nose D/T benign tumor    UPPP UVULOPALATOPHARYGOPLASTY  90'S    VARICOSE VEIN SURGERY Bilateral [de-identified]    x2       FAMILY HISTORY       Family History   Problem Relation Age of Onset    Diabetes Mother     Hypertension Mother     Heart Attack Mother     Colon Cancer Father     Heart Attack Father     Stroke Father     Cirrhosis Brother 54        ALCOHOL RELATED    Dementia Maternal Grandmother     Diabetes Maternal Grandmother     Cancer Paternal Grandmother         uterine    Hypertension Paternal Grandfather        SOCIAL HISTORY       Social History     Socioeconomic History    Marital status:      Spouse name: Not on file    Number of children: Not on file    Years of education: Not on file    Highest education level: Not on file   Occupational History    Not on file   Tobacco Use    Smoking status: Former Smoker     Packs/day: 2.00     Years: 10.00     Pack years: 20.00     Quit date: 1970     Years since quittin.4  Smokeless tobacco: Never Used   Vaping Use    Vaping Use: Never used   Substance and Sexual Activity    Alcohol use: Yes     Comment: 1-2 beer a day    Drug use: No    Sexual activity: Not on file   Other Topics Concern    Not on file   Social History Narrative    Not on file     Social Determinants of Health     Financial Resource Strain: Low Risk     Difficulty of Paying Living Expenses: Not hard at all   Food Insecurity: No Food Insecurity    Worried About Running Out of Food in the Last Year: Never true    [address] in the Last Year: Never true   Transportation Needs:     Lack of Transportation (Medical): Not on file    Lack of Transportation (Non-Medical): Not on file   Physical Activity:     Days of Exercise per Week: Not on file    Minutes of Exercise per Session: Not on file   Stress:     Feeling of Stress : Not on file   Social Connections:     Frequency of Communication with Friends and Family: Not on file    Frequency of Social Gatherings with Friends and Family: Not on file    Attends [employer]ism Services: Not on file    Active Member of [address] [place] [address] or Organizations: Not on file    Attends Club or Organization Meetings: Not on file    Marital Status: Not on file   Intimate Partner Violence:     Fear of Current or Ex-Partner: Not on file    Emotionally Abused: Not on file    Physically Abused: Not on file    Sexually Abused: Not on file   Housing Stability:     Unable to Pay for Housing in the Last Year: Not on file    Number of [place] in the Last Year: Not on file    Unstable Housing in the Last Year: Not on file           REVIEW OF SYSTEMS      Allergies   Allergen Reactions    Adhesive Tape Itching and Rash    Doxycycline Rash       No current facility-administered medications on file prior to encounter.      Current Outpatient Medications on File Prior to Encounter   Medication Sig Dispense Refill    lisinopril (PRINIVIL;ZESTRIL) 5 MG tablet TAKE 1 TABLET NIGHTLY 90 tablet 3    levothyroxine (SYNTHROID) 25 MCG tablet TAKE 1 TABLET DAILY 90 tablet 3    atorvastatin (LIPITOR) 10 MG tablet TAKE 1 TABLET DAILY 90 tablet 3    allopurinol (ZYLOPRIM) 300 MG tablet TAKE 1 TABLET TWICE A DAY (Patient taking differently: 300 mg 2 times daily ) 180 tablet 3    zinc 50 MG CAPS Take 50 mg by mouth daily 30 capsule 3    furosemide (LASIX) 40 MG tablet Take 1 tablet by mouth daily (Patient taking differently: Take 40 mg by mouth daily Patient is taking 1.5 tab daily (60mg)) 90 tablet 3    pantoprazole (PROTONIX) 40 MG tablet Take 1 tablet by mouth every morning (before breakfast) 90 tablet 1    metoprolol tartrate (LOPRESSOR) 25 MG tablet Take 25 mg by mouth 2 times daily Takes 1 1/2 tablets 2xdaily      Bisacodyl (DULCOLAX PO) Take 1 tablet by mouth as needed       apixaban (ELIQUIS) 5 MG TABS tablet Take 5 mg by mouth 2 times daily      trospium (SANCTURA) 20 MG tablet Take 20 mg by mouth 2 times daily Prescribed by Dr. Peyton Rosa      fluticasone (FLONASE) 50 MCG/ACT nasal spray 1 spray by Nasal route daily 1 Bottle 3    acetaminophen (TYLENOL) 325 MG tablet Take 650 mg by mouth every 6 hours as needed for Pain      CINNAMON PO Take 2 capsules by mouth daily      Probiotic Product (PROBIOTIC ADVANCED PO) Take by mouth      Multiple Vitamins-Minerals (OCUVITE PO) Take 1 tablet by mouth daily      Cholecalciferol (VITAMIN D-3 PO) Take  by mouth daily. 2000 IU daily       Coenzyme Q10 (CO Q 10 PO) Take 1 tablet by mouth daily          Review of Systems   Constitutional: Negative. HENT: Negative. Eyes: Positive for visual disturbance. Eye glasses   Respiratory: Positive for shortness of breath. With activity    Cardiovascular: Negative. Gastrointestinal: Negative. Genitourinary: Negative. Musculoskeletal: Positive for arthralgias. Bilateral shoulder pain and left hip    Skin: Negative. Neurological: Negative. Hematological: Negative. Psychiatric/Behavioral: Negative. GENERAL PHYSICAL EXAM     Vitals: see nursing flow sheet for vital signs     GENERAL APPEARANCE:   Es Dasilva is 76 y.o.,  male, severely obese, nourished, conscious, alert. Does not appear to be distress or pain at this time. Physical Exam  Constitutional:       Appearance: He is obese. HENT:      Head: Normocephalic. Right Ear: External ear normal.      Left Ear: External ear normal.      Nose: Nose normal.      Mouth/Throat:      Mouth: Mucous membranes are moist.   Eyes:      General:         Right eye: No discharge. Left eye: No discharge. Cardiovascular:      Rate and Rhythm: Normal rate and regular rhythm. Pulses: Normal pulses. Radial pulses are 2+ on the right side and 2+ on the left side. Dorsalis pedis pulses are 2+ on the right side and 2+ on the left side. Posterior tibial pulses are 2+ on the right side and 2+ on the left side. Heart sounds: Normal heart sounds. No murmur heard. Pulmonary:      Effort: Pulmonary effort is normal. No respiratory distress. Breath sounds: Normal breath sounds. No wheezing or rales. Abdominal:      General: Bowel sounds are normal. There is no distension. Palpations: Abdomen is soft. Tenderness: There is no abdominal tenderness. Hernia: A hernia is present. Comments: The Hernia is non tender, reducible, expansile on coughing. Musculoskeletal:         General: Normal range of motion. Cervical back: Normal range of motion and neck supple. Right lower leg: No edema. Left lower leg: No edema. Skin:     General: Skin is warm and dry. Findings: No bruising or erythema. Neurological:      General: No focal deficit present. Mental Status: He is alert and oriented to person, place, and time.       Gait: Gait normal.   Psychiatric:         Mood and Affect: Mood normal. Behavior: Behavior normal.       PROVISIONAL DIAGNOSES / SURGERY:    FAMILY HISTORY OF COLON CANCER  COLONOSCOPY DIAGNOSTIC  Patient Active Problem List    Diagnosis Date Noted    Localized edema 04/18/2022    Stage 3a chronic kidney disease ([facility] 12/29/2021    Chronic right shoulder pain 07/16/2021    Secondary hyperparathyroidism of renal origin ([facility] 03/29/2021    Atrial flutter ([facility] 06/03/2020    Morbid obesity with BMI of 40.0-44.9, adult ([facility] 06/03/2020    History of prostate cancer 03/26/2020    Osteoarthritis of right glenohumeral joint 07/20/2019    Osteoarthritis of left glenohumeral joint 07/20/2019    Class 3 obesity due to excess calories with serious comorbidity and body mass index (BMI) of 40.0 to 44.9 in adult 07/17/2018    Primary osteoarthritis of left hip 05/15/2018    Arthritis of left hip 05/08/2018    Lumbar radiculopathy 11/15/2017    Spinal stenosis of lumbar region 11/15/2017    Acquired hypothyroidism 07/07/2016    Renal insufficiency 12/20/2011    Anemia 12/20/2011    Hyperglycemia 12/20/2011    Hypertension     Chronic acquired lymphedema     Sleep apnea     History of CVA (cerebrovascular accident)     Hyperlipidemia     Chronic diastolic congestive heart failure ([facility]     Osteoarthritis            DONALDO Westbrook CNP on 5/2/2022 at 8:50 AM

## 2022-05-02 NOTE — OP NOTE
Operative Note      Patient: Hunter Powell  YOB: 1945  MRN: [id number]    Date of Procedure: 5/2/2022    PROCEDURE NOTE    DATE OF PROCEDURE: 5/2/2022    SURGEON: Juan Munroe MD    ASSISTANT: None    PREOPERATIVE DIAGNOSIS: Change in bowel habits. Family history of colon cancer. POSTOPERATIVE DIAGNOSIS: Sigmoid diverticulosis. Very friable granular inflamed edematous colonic mucosa. Multiple biopsies obtained resolution clip application performed. Suboptimal prep. OPERATION: Total colonoscopy to cecum with intubation of terminal ileum. Multiple biopsies. Multiple resolution clip applications. ANESTHESIA: General    ESTIMATED BLOOD LOSS: None    COMPLICATIONS: None     SPECIMENS:  Was Obtained: Random colon biopsies. HISTORY: The patient is a 76y.o. year old male with history of above preop diagnosis. I recommended colonoscopy with possible biopsy or polypectomy and I explained the risk, benefits, expected outcome, and alternatives to the procedure. Risks included but are not limited to bleeding, infection, respiratory distress, hypotension, and perforation of the colon and possibility of missing a lesion. The patient understands and is in agreement. PROCEDURE: The patient was given IV conscious sedation. The patient's SPO2 remained above 90% throughout the procedure. Digital rectal exam was normal.  The colonoscope was inserted through the anus into the rectum and advanced under direct vision to the cecum without difficulty. Terminal ileum was examined for approximately 2 inches. The prep was Suboptimal.      Findings:  Terminal ileum: normal    Cecum/Ascending colon: Very friable granular colonic mucosa. Transverse colon: Very friable granular colonic mucosa. Biopsies obtained. Multiple resolution clip applications performed. Descending/Sigmoid colon: Sigmoid diverticulosis. Somewhat granular friable mucosa.   Biopsies obtained resolution clip application performed. Rectum/Anus: examined in normal and retroflexed positions and was suboptimal prep. Grossly unremarkable. Withdrawal Time was (minutes): More than 30 minutes. The colon was decompressed. While withdrawing the scope the above findings were verified and the scope was removed. The patient tolerated the procedure and conscious sedation without unusual events. In the recovery room patient was examined and remains hemodynamically stable. Discharge home when criteria met. Recommendations/Plan:   1. F/U Biopsies  2. F/U In Office as instructed  3. Discussed with the family  4. High fiber diet   5. Precautions to avoid constipation  6. Warning signs and symptoms discussed with the family. If anything changes in terms of abdominal pain distention fever chills bleeding they were asked to contact my office or go to the nearest emergency room. Outpatient follow-up in the office to discuss pathology results. Depending on the biopsy results I will recommend further testing including CT scan, repeat colonoscopy, possible antibiotics.     Electronically signed by Moris Momin MD  on 5/2/2022 at 11:06 AM

## 2022-05-03 LAB
ABSOLUTE EOS #: 0 K/UL (ref 0–0.4)
ABSOLUTE LYMPH #: 0.7 K/UL (ref 1–4.8)
ABSOLUTE MONO #: 0.2 K/UL (ref 0.1–1.3)
ANION GAP SERPL CALCULATED.3IONS-SCNC: 9 MMOL/L (ref 9–17)
BASOPHILS # BLD: 0 % (ref 0–2)
BASOPHILS ABSOLUTE: 0 K/UL (ref 0–0.2)
BUN BLDV-MCNC: 19 MG/DL (ref 8–23)
C DIFF AG + TOXIN: NEGATIVE
CALCIUM SERPL-MCNC: 9.4 MG/DL (ref 8.6–10.4)
CHLORIDE BLD-SCNC: 100 MMOL/L (ref 98–107)
CO2: 30 MMOL/L (ref 20–31)
CREAT SERPL-MCNC: 1.2 MG/DL (ref 0.7–1.2)
EOSINOPHILS RELATIVE PERCENT: 0 % (ref 0–4)
GFR AFRICAN AMERICAN: >60 ML/MIN
GFR NON-AFRICAN AMERICAN: 58 ML/MIN
GFR SERPL CREATININE-BSD FRML MDRD: ABNORMAL ML/MIN/{1.73_M2}
GLUCOSE BLD-MCNC: 136 MG/DL (ref 70–99)
HCT VFR BLD CALC: 46.2 % (ref 41–53)
HEMOGLOBIN: 15.2 G/DL (ref 13.5–17.5)
LYMPHOCYTES # BLD: 13 % (ref 24–44)
MCH RBC QN AUTO: 32.3 PG (ref 26–34)
MCHC RBC AUTO-ENTMCNC: 32.9 G/DL (ref 31–37)
MCV RBC AUTO: 98.1 FL (ref 80–100)
MONOCYTES # BLD: 4 % (ref 1–7)
PDW BLD-RTO: 16.9 % (ref 11.5–14.9)
PLATELET # BLD: 190 K/UL (ref 150–450)
PMV BLD AUTO: 7.5 FL (ref 6–12)
POTASSIUM SERPL-SCNC: 4.5 MMOL/L (ref 3.7–5.3)
RBC # BLD: 4.71 M/UL (ref 4.5–5.9)
SEG NEUTROPHILS: 83 % (ref 36–66)
SEGMENTED NEUTROPHILS ABSOLUTE COUNT: 4.7 K/UL (ref 1.3–9.1)
SODIUM BLD-SCNC: 139 MMOL/L (ref 135–144)
SPECIMEN DESCRIPTION: NORMAL
WBC # BLD: 5.7 K/UL (ref 3.5–11)

## 2022-05-03 PROCEDURE — 87324 CLOSTRIDIUM AG IA: CPT

## 2022-05-03 PROCEDURE — 2500000003 HC RX 250 WO HCPCS: Performed by: SURGERY

## 2022-05-03 PROCEDURE — G0378 HOSPITAL OBSERVATION PER HR: HCPCS

## 2022-05-03 PROCEDURE — 6370000000 HC RX 637 (ALT 250 FOR IP): Performed by: INTERNAL MEDICINE

## 2022-05-03 PROCEDURE — A4216 STERILE WATER/SALINE, 10 ML: HCPCS | Performed by: SURGERY

## 2022-05-03 PROCEDURE — 85025 COMPLETE CBC W/AUTO DIFF WBC: CPT

## 2022-05-03 PROCEDURE — 96376 TX/PRO/DX INJ SAME DRUG ADON: CPT

## 2022-05-03 PROCEDURE — 96366 THER/PROPH/DIAG IV INF ADDON: CPT

## 2022-05-03 PROCEDURE — 99225 PR SBSQ OBSERVATION CARE/DAY 25 MINUTES: CPT | Performed by: INTERNAL MEDICINE

## 2022-05-03 PROCEDURE — 36415 COLL VENOUS BLD VENIPUNCTURE: CPT

## 2022-05-03 PROCEDURE — 96365 THER/PROPH/DIAG IV INF INIT: CPT

## 2022-05-03 PROCEDURE — 87506 IADNA-DNA/RNA PROBE TQ 6-11: CPT

## 2022-05-03 PROCEDURE — 2580000003 HC RX 258: Performed by: SURGERY

## 2022-05-03 PROCEDURE — 87449 NOS EACH ORGANISM AG IA: CPT

## 2022-05-03 PROCEDURE — 96375 TX/PRO/DX INJ NEW DRUG ADDON: CPT

## 2022-05-03 PROCEDURE — 6360000002 HC RX W HCPCS: Performed by: SURGERY

## 2022-05-03 PROCEDURE — 80048 BASIC METABOLIC PNL TOTAL CA: CPT

## 2022-05-03 RX ADMIN — ALLOPURINOL 300 MG: 300 TABLET ORAL at 09:53

## 2022-05-03 RX ADMIN — SODIUM CHLORIDE: 9 INJECTION, SOLUTION INTRAVENOUS at 11:00

## 2022-05-03 RX ADMIN — PIPERACILLIN AND TAZOBACTAM 3375 MG: 3; .375 INJECTION, POWDER, FOR SOLUTION INTRAVENOUS at 05:20

## 2022-05-03 RX ADMIN — ACETAMINOPHEN 650 MG: 325 TABLET ORAL at 06:07

## 2022-05-03 RX ADMIN — PIPERACILLIN AND TAZOBACTAM 3375 MG: 3; .375 INJECTION, POWDER, FOR SOLUTION INTRAVENOUS at 12:40

## 2022-05-03 RX ADMIN — LISINOPRIL 5 MG: 5 TABLET ORAL at 20:33

## 2022-05-03 RX ADMIN — TROSPIUM CHLORIDE 20 MG: 20 TABLET, FILM COATED ORAL at 09:53

## 2022-05-03 RX ADMIN — FAMOTIDINE 20 MG: 10 INJECTION, SOLUTION INTRAVENOUS at 20:33

## 2022-05-03 RX ADMIN — PIPERACILLIN AND TAZOBACTAM 3375 MG: 3; .375 INJECTION, POWDER, FOR SOLUTION INTRAVENOUS at 20:34

## 2022-05-03 RX ADMIN — Medication 3 MG: at 20:33

## 2022-05-03 RX ADMIN — FAMOTIDINE 20 MG: 10 INJECTION, SOLUTION INTRAVENOUS at 09:53

## 2022-05-03 RX ADMIN — ALLOPURINOL 300 MG: 300 TABLET ORAL at 20:32

## 2022-05-03 RX ADMIN — LEVOTHYROXINE SODIUM 25 MCG: 0.03 TABLET ORAL at 06:14

## 2022-05-03 RX ADMIN — TROSPIUM CHLORIDE 20 MG: 20 TABLET, FILM COATED ORAL at 20:32

## 2022-05-03 RX ADMIN — ATORVASTATIN CALCIUM 10 MG: 10 TABLET, FILM COATED ORAL at 09:53

## 2022-05-03 ASSESSMENT — PAIN - FUNCTIONAL ASSESSMENT: PAIN_FUNCTIONAL_ASSESSMENT: ACTIVITIES ARE NOT PREVENTED

## 2022-05-03 ASSESSMENT — PAIN SCALES - GENERAL: PAINLEVEL_OUTOF10: 4

## 2022-05-03 ASSESSMENT — PAIN DESCRIPTION - ORIENTATION: ORIENTATION: OTHER (COMMENT)

## 2022-05-03 ASSESSMENT — PAIN DESCRIPTION - LOCATION: LOCATION: HEAD

## 2022-05-03 ASSESSMENT — PAIN DESCRIPTION - DESCRIPTORS: DESCRIPTORS: THROBBING

## 2022-05-03 NOTE — PLAN OF CARE
Problem: ABCDS Injury Assessment  Goal: Absence of physical injury  Outcome: Progressing     Problem: Safety - Adult  Goal: Free from fall injury  Outcome: Progressing

## 2022-05-03 NOTE — CARE COORDINATION
CASE MANAGEMENT NOTE:    Admission Date:  5/2/2022 Zelalem Fierro is a 76 y.o.  male    Admitted for : Colitis [K52.9]    Met with:  Patient    PCP:  [address] [place] [address] [place]:  [employer] Medicare      Is patient alert and oriented at time of discussion:  Yes    Current Residence/ Living Arrangements:  independently at home w/Wife            Current Services PTA:  Yes, Outpt PT in [place] for his shoulder    Does patient go to outpatient dialysis: No  If yes, location and chair time: NA    Is patient agreeable to VNS: No    Freedom of choice provided:  No    List of [address] provided: No    VNS chosen:  No, Denies the need    DME:  Walker, 2 Canes, Lymphopress boots, GB    Home Oxygen: No    Nebulizer: No    CPAP/BIPAP: Yes,CPAP    Supplier: [employer]    Potential Assistance Needed: No    SNF needed: No    Freedom of choice and list provided: NA    Pharmacy:  [facility] in [address] [place] [address]       Is patient currently receiving oral anticoagulation therapy? Yes,Naz PTA    Is the Patient an [facility] with Readmission Risk Score greater than 14%? No  If yes, pt needs a follow up appointment made within 7 days. Family Members/Caregivers that pt would like involved in their care:    Yes    If yes, list name here:  Wife, Rose Machuca    Transportation Provider:  Patient             Discharge Plan:  5/3/22 [employer] Pt. Lives in 2 story home w/ Wife. DME- Lymphopress boots, walker, 2 canes, GB. CPAP-OE Montes, POD #1, Colonoscopy w/ clips. HGB today 15.2, Full liquids. Denies VNS, Eliquis PTA, Orange header NA, Has F/U w/ Dr. Lester Hernández.  Denies needs, will follow//KB                 Electronically signed by: Amy Perez RN on 5/3/2022 at 10:23 AM

## 2022-05-03 NOTE — PROGRESS NOTES
[facility]                 PATIENT NAME: Alycia Elizalde     TODAY'S DATE: 5/3/2022, 11:55 AM    SUBJECTIVE:    Pt seen and examined. Afebrile, VSS. No leukocytosis, hemoglobin stable. S/p colonoscopy. Patient states he is feeling well. Denies abdominal pain. Passing flatus. Tolerating clear liquids, no N/V. Stool samples still need to be collected and sent. OBJECTIVE:   VITALS:  BP (!) 140/69   Pulse 79   Temp 97.7 °F (36.5 °C) (Oral)   Resp 18   Ht 5' 10.5\" (1.791 m)   Wt (!) 303 lb 3.2 oz (137.5 kg)   SpO2 93%   BMI 42.89 kg/m²      INTAKE/OUTPUT:      Intake/Output Summary (Last 24 hours) at 5/3/2022 1155  Last data filed at 5/3/2022 0521  Gross per 24 hour   Intake 150 ml   Output 1550 ml   Net -1400 ml                 CONSTITUTIONAL:  awake and alert. No acute distress  HEART:   RRR  LUNGS:   CTA  ABDOMEN:   Abdomen soft, non-tender, non-distended  EXTREMITIES:   No pedal edema    Data:  CBC:   Lab Results   Component Value Date    WBC 5.7 05/03/2022    RBC 4.71 05/03/2022    RBC 4.74 03/08/2012    HGB 15.2 05/03/2022    HCT 46.2 05/03/2022    MCV 98.1 05/03/2022    MCH 32.3 05/03/2022    MCHC 32.9 05/03/2022    RDW 16.9 05/03/2022     05/03/2022     03/08/2012    MPV 7.5 05/03/2022     BMP:    Lab Results   Component Value Date     05/03/2022    K 4.5 05/03/2022     05/03/2022    CO2 30 05/03/2022    BUN 19 05/03/2022    LABALBU 4.0 08/24/2020    LABALBU 4.2 12/15/2011    CREATININE 1.20 05/03/2022    CALCIUM 9.4 05/03/2022    GFRAA >60 05/03/2022    LABGLOM 58 05/03/2022    GLUCOSE 136 05/03/2022    GLUCOSE 100 04/19/2012       Radiology Review:  No new images to review       ASSESSMENT     Principal Problem:    Colitis  Resolved Problems:    * No resolved hospital problems. *      Plan  1. Full liquid diet  2. Stool cultures  3. Surgically stable  4. Continue medical management   5. Patient was seen and examined. No pain. Tolerating full liquids. Moved his bowels 3 times. No blood in the stool. Blood work reviewed. Advance to soft diet tomorrow. C. difficile negative. Rest of the stool cultures pending. Continue medical management. Hopefully discharge to home tomorrow on oral antibiotics. Patient and the family updated.       Electronically signed by Audrey Nettles PA-C  [address] [address]

## 2022-05-03 NOTE — ACP (ADVANCE CARE PLANNING)
Advance Care Planning     Advance Care Planning Activator (Inpatient)  Conversation Note      Date of ACP Conversation: 5/3/2022     Conversation Conducted with: Patient with Decision Making Capacity    ACP Activator: Hedy Hopson RN      Health Care Decision Maker:     Current Designated Health Care Decision Maker:     Primary Decision Maker: Christal Hector [facility] - [phone]      Care Preferences    Ventilation: \"If you were in your present state of health and suddenly became very ill and were unable to breathe on your own, what would your preference be about the use of a ventilator (breathing machine) if it were available to you? \"      Would the patient desire the use of ventilator (breathing machine)?: yes    \"If your health worsens and it becomes clear that your chance of recovery is unlikely, what would your preference be about the use of a ventilator (breathing machine) if it were available to you? \"     Would the patient desire the use of ventilator (breathing machine)?: No      Resuscitation  \"CPR works best to restart the heart when there is a sudden event, like a heart attack, in someone who is otherwise healthy. Unfortunately, CPR does not typically restart the heart for people who have serious health conditions or who are very sick. \"    \"In the event your heart stopped as a result of an underlying serious health condition, would you want attempts to be made to restart your heart (answer \"yes\" for attempt to resuscitate) or would you prefer a natural death (answer \"no\" for do not attempt to resuscitate)? \" yes       [] Yes   [] No   Educated Patient / Tanner Leblanc regarding differences between Advance Directives and portable DNR orders.     Length of ACP Conversation in minutes:      Conversation Outcomes:  [x] ACP discussion completed  [] Existing advance directive reviewed with patient; no changes to patient's previously recorded wishes  [] New Advance Directive completed  [] Portable Do Not Rescitate prepared for Provider review and signature  [] POLST/POST/MOLST/MOST prepared for Provider review and signature      Follow-up plan:    [] Schedule follow-up conversation to continue planning  [] Referred individual to Provider for additional questions/concerns   [] Advised patient/agent/surrogate to review completed ACP document and update if needed with changes in condition, patient preferences or care setting    [x] This note routed to one or more involved healthcare providers

## 2022-05-03 NOTE — CONSULTS
[facility]    CONSULTATION    / FOLLOW UP VISIT       Date:   5/3/2022  Patient name:  Hunter Powell  Date of admission:  5/2/2022  8:41 AM  MRN:   [id number]  Account:  [de-identified]  YOB: 1945  PCP:    Yobani Renee MD  Room:   [facility]/2121-  Code Status:    Full Code    Physician Requesting Consult: Juan Munroe MD    History of Present Illness:      C/C ;  Medical comorbidity management     REASON FOR CONSULT;  Medical comorbidity and medication management ;                                                 *Principal Problem:    Colitis  Resolved Problems:    * No resolved hospital problems. *           HPI;    ADMITTED POST PROCEDURE , COLONOSCOPY FOR DIARRHEA 6 WEEKS HX    COLONOSCOPY SHOWS  Patient was seen in the recovery room. Findings discussed with patient's wife. Patient is having diarrhea for the last 6 weeks. Patient was given antibiotics for his dental treatment couple weeks ago. Diarrhea started before the antibiotic. Colonoscopy revealed extremely friable inflamed edematous mucosa. Bleeds easily requiring multiple resolution clips   ON ADMISSION   Patient has no hx of Colon Polyps. Pt has hx of Diverticulosis. Patient has positive FH of Colon Cancer in father. Patient reports changes in bowel habits . He has alternative between constipation and diarrhea started one year ago, he was on pain medication for his shoulder. Pt states once he start to take Probiotic  his BM is okay now. No GI /Rectal bleeding, experiencing red/ black/ BRBPR stools. Patient has no  history of abd. Pain, no nausea or vomiting, no abdominal bloating or weight loss. Patient denies any Dysphagia. Pt has hx of GERD and he is on Protonix.         Past Medical History:   Diagnosis Date    Adenocarcinoma of prostate ([facility] [address] 10/30/2015    Anemia     Cellulitis of lower extremity     right     Cerebral artery occlusion with cerebral infarction [facility])         [facility]F [facility]congestive heart failure) [address] [address]     Chronic acquired lymphedema     Clavicle fracture     in high school    Hernia, abdominal     History of blood transfusion 2003 Gov. .. [facility] SURGERY    History of CVA (cerebrovascular accident) 54    DEFECIT MILD MEMORY AND SPEECH    Hyperlipidemia     Hypertension     Hypothyroid 2015    Ileus, postoperative (HCC)     Mild renal insufficiency     Morbid obesity ([address]     Non-healing wound of lower extremity     HISTORY OF, WAS SEEN IN WOUND CLINIC FOR COUPLE YRS.    Osteoarthritis     Phlebitis     HX. OF     Prolonged emergence from general anesthesia     x1 had to be put back on ventolator, after prostate surgery, had to be hospitalized x12 days.  Prostate CA ([facility] [address]     Prostate cancer ([facility] [address] 10/21/2015    PVD (peripheral vascular disease) (HCC)     Sleep apnea     C-PAP NIGHTLY-PT INSTRUCTED TO BRING    SVT (supraventricular tachycardia) (HCC)     Uric acid kidney stone 2014    HAD 6 PASSSED ONE ON OWN, OTHER 5 IS BEING MONITORED    Wears glasses     Wrist fracture rt,     Social History     Tobacco Use    Smoking status: Former Smoker     Packs/day: 2.00     Years: 10.00     Pack years: 20.00     Quit date: 1970     Years since quittin.4    Smokeless tobacco: Never Used   Vaping Use    Vaping Use: Never used   Substance Use Topics    Alcohol use: Yes     Comment: 1-2 beer a day    Drug use: No      Social History:     Tobacco:    reports that he quit smoking about 51 years ago. He has a 20.00 pack-year smoking history. He has never used smokeless tobacco.  Alcohol:      reports current alcohol use. Drug Use:  reports no history of drug use.     Review of Systems:     POSITIVE AND NEGATIVES AS DESCRIBED IN HISTORY OF PRESENT ILLNESS ;  IN ADDITION ;  Review of Systems          All other systems negative                Physical Exam:     Physical Exam   Vitals:    22 2345 05/03/22 0200 05/03/22 0730 05/03/22 1230   BP: (!) 142/65  (!) 140/69 (!) 146/72   Pulse: 90  79 86   Resp: 20  18 20   Temp: 97.5 °F (36.4 °C)  97.7 °F (36.5 °C) 97.2 °F (36.2 °C)   TempSrc:   Oral Oral   SpO2: 93%  93% 93%   Weight:  (!) 303 lb 3.2 oz (137.5 kg)     Height:                       Body mass index is 42.89 kg/m². General Appearance:   -, CO-OPERATIVE ,                                                        Pulmonary/Chest:        Clear to auscultation bilaterally . No wheezes, rales or rhonchi . Cardiovascular:            Normal rate, regular rhythm,                                          No murmur or  Gallop . Abdomen:                       Soft, non-tender                                                                                    Extremities:                    No Edema . Neuromuskuloskeletal    .. [facility] ;                 No focal motor deficit ,                 No focal sensory deficit ,    Musculo-skeletal ;                  No  gait abnormality                  No significant joint abnormality,                   Psych:   ---                     Data:     Significant last 24 hr data reviewed ;   Vitals:    05/02/22 2345 05/03/22 0200 05/03/22 0730 05/03/22 1230   BP: (!) 142/65  (!) 140/69 (!) 146/72   Pulse: 90  79 86   Resp: 20  18 20   Temp: 97.5 °F (36.4 °C)  97.7 °F (36.5 °C) 97.2 °F (36.2 °C)   TempSrc:   Oral Oral   SpO2: 93%  93% 93%   Weight:  (!) 303 lb 3.2 oz (137.5 kg)     Height:          Recent Results (from the past 24 hour(s))   Creatinine    Collection Time: 05/02/22  4:07 PM   Result Value Ref Range    CREATININE 1.22 (H) 0.70 - 1.20 mg/dL    GFR Non-African American 57 (L) >60 mL/min    GFR African American >60 >60 mL/min    GFR Comment         Basic Metabolic Panel    Collection Time: 05/03/22  5:24 AM   Result Value Ref Range    Glucose 136 (H) 70 - 99 mg/dL BUN 19 8 - 23 mg/dL    CREATININE 1.20 0.70 - 1.20 mg/dL    Calcium 9.4 8.6 - 10.4 mg/dL    Sodium 139 135 - 144 mmol/L    Potassium 4.5 3.7 - 5.3 mmol/L    Chloride 100 98 - 107 mmol/L    CO2 30 20 - 31 mmol/L    Anion Gap 9 9 - 17 mmol/L    GFR Non-African American 58 (L) >60 mL/min    GFR African American >60 >60 mL/min    GFR Comment         CBC with Auto Differential    Collection Time: 05/03/22  5:24 AM   Result Value Ref Range    WBC 5.7 3.5 - 11.0 k/uL    RBC 4.71 4.5 - 5.9 m/uL    Hemoglobin 15.2 13.5 - 17.5 g/dL    Hematocrit 46.2 41 - 53 %    MCV 98.1 80 - 100 fL    MCH 32.3 26 - 34 pg    MCHC 32.9 31 - 37 g/dL    RDW 16.9 (H) 11.5 - 14.9 %    Platelets 584 793 - 528 k/uL    MPV 7.5 6.0 - 12.0 fL    Seg Neutrophils 83 (H) 36 - 66 %    Lymphocytes 13 (L) 24 - 44 %    Monocytes 4 1 - 7 %    Eosinophils % 0 0 - 4 %    Basophils 0 0 - 2 %    Segs Absolute 4.70 1.3 - 9.1 k/uL    Absolute Lymph # 0.70 (L) 1.0 - 4.8 k/uL    Absolute Mono # 0.20 0.1 - 1.3 k/uL    Absolute Eos # 0.00 0.0 - 0.4 k/uL    Basophils Absolute 0.00 0.0 - 0.2 k/uL   C DIFF TOXIN/ANTIGEN    Collection Time: 05/03/22  2:45 PM    Specimen: Stool   Result Value Ref Range    Specimen Description . FECES     C DIFF AG + TOXIN NEGATIVE NEGATIVE     No results for input(s): POCGLU in the last 72 hours. No results found. Radiology:         Medications: Allergies:     Allergies   Allergen Reactions    Adhesive Tape Itching and Rash    Doxycycline Rash       Current Meds:   Scheduled Meds:    sodium chloride flush  5-40 mL IntraVENous 2 times per day    piperacillin-tazobactam (ZOSYN) 3375 mg in dextrose 5% IVPB extended infusion (mini-bag)  3,375 mg IntraVENous Q8H    famotidine (PEPCID) injection  20 mg IntraVENous BID    allopurinol  300 mg Oral BID    atorvastatin  10 mg Oral Daily    Vitamin D  1,000 Units Oral Daily    levothyroxine  25 mcg Oral Daily    trospium  20 mg Oral BID    melatonin  3 mg Oral Nightly    lisinopril  5 mg Oral Nightly     Continuous Infusions:    sodium chloride 125 mL/hr at 05/03/22 1100    sodium chloride       PRN Meds: sodium chloride flush, sodium chloride, potassium chloride, magnesium sulfate, ondansetron, fentanNYL, fentanNYL, acetaminophen, acetaminophen        Assessment :       Assessment Dx  Principal Problem:    Colitis  Resolved Problems:    * No resolved hospital problems. *              Plan:     MOST LIKELY INFLAMMATORY BOWEL DISEASE . WILL OBSERVE    HOME MEDS RECONCILED . HOLD ELIQUIS       Medications: Allergies: Allergies   Allergen Reactions    Adhesive Tape Itching and Rash    Doxycycline Rash       Current Meds:   Scheduled Meds:    sodium chloride flush  5-40 mL IntraVENous 2 times per day    piperacillin-tazobactam (ZOSYN) 3375 mg in dextrose 5% IVPB extended infusion (mini-bag)  3,375 mg IntraVENous Q8H    famotidine (PEPCID) injection  20 mg IntraVENous BID    allopurinol  300 mg Oral BID    atorvastatin  10 mg Oral Daily    Vitamin D  1,000 Units Oral Daily    levothyroxine  25 mcg Oral Daily    trospium  20 mg Oral BID    melatonin  3 mg Oral Nightly    lisinopril  5 mg Oral Nightly     Continuous Infusions:    sodium chloride 125 mL/hr at 05/03/22 1100    sodium chloride       PRN Meds: sodium chloride flush, sodium chloride, potassium chloride, magnesium sulfate, ondansetron, fentanNYL, fentanNYL, acetaminophen, acetaminophen       5/3/22    Colitis    Continue rx        Thanks for consulting us . Will monitor vitals and clinical course , and  Optimize therapy  as needed . Jen Blanco MD    Copy sent to Dr. Denise Ye MD    Pleasenote that this chart was generated using voice recognition Dragon dictation software. Although every effort was made to ensure the accuracy of this automated transcription, some errors in transcription may have occurred.

## 2022-05-04 VITALS
WEIGHT: 303.5 LBS | DIASTOLIC BLOOD PRESSURE: 56 MMHG | HEIGHT: 71 IN | HEART RATE: 77 BPM | RESPIRATION RATE: 18 BRPM | BODY MASS INDEX: 42.49 KG/M2 | OXYGEN SATURATION: 95 % | SYSTOLIC BLOOD PRESSURE: 120 MMHG | TEMPERATURE: 98.1 F

## 2022-05-04 LAB
ABSOLUTE EOS #: 0.1 K/UL (ref 0–0.4)
ABSOLUTE LYMPH #: 1.3 K/UL (ref 1–4.8)
ABSOLUTE MONO #: 0.4 K/UL (ref 0.1–1.3)
ANION GAP SERPL CALCULATED.3IONS-SCNC: 8 MMOL/L (ref 9–17)
BASOPHILS # BLD: 1 % (ref 0–2)
BASOPHILS ABSOLUTE: 0 K/UL (ref 0–0.2)
BUN BLDV-MCNC: 12 MG/DL (ref 8–23)
CALCIUM SERPL-MCNC: 9.4 MG/DL (ref 8.6–10.4)
CAMPYLOBACTER PCR: NORMAL
CHLORIDE BLD-SCNC: 105 MMOL/L (ref 98–107)
CO2: 28 MMOL/L (ref 20–31)
CREAT SERPL-MCNC: 1.09 MG/DL (ref 0.7–1.2)
E COLI ENTEROTOXIGENIC PCR: NORMAL
EOSINOPHILS RELATIVE PERCENT: 1 % (ref 0–4)
GFR AFRICAN AMERICAN: >60 ML/MIN
GFR NON-AFRICAN AMERICAN: >60 ML/MIN
GFR SERPL CREATININE-BSD FRML MDRD: ABNORMAL ML/MIN/{1.73_M2}
GLUCOSE BLD-MCNC: 119 MG/DL (ref 70–99)
HCT VFR BLD CALC: 44.6 % (ref 41–53)
HEMOGLOBIN: 14.8 G/DL (ref 13.5–17.5)
LYMPHOCYTES # BLD: 23 % (ref 24–44)
MCH RBC QN AUTO: 32.5 PG (ref 26–34)
MCHC RBC AUTO-ENTMCNC: 33.1 G/DL (ref 31–37)
MCV RBC AUTO: 98 FL (ref 80–100)
MONOCYTES # BLD: 7 % (ref 1–7)
PDW BLD-RTO: 17 % (ref 11.5–14.9)
PLATELET # BLD: 169 K/UL (ref 150–450)
PLESIOMONAS SHIGELLOIDES PCR: NORMAL
PMV BLD AUTO: 7.4 FL (ref 6–12)
POTASSIUM SERPL-SCNC: 4.2 MMOL/L (ref 3.7–5.3)
RBC # BLD: 4.55 M/UL (ref 4.5–5.9)
SALMONELLA PCR: NORMAL
SEG NEUTROPHILS: 68 % (ref 36–66)
SEGMENTED NEUTROPHILS ABSOLUTE COUNT: 4 K/UL (ref 1.3–9.1)
SHIGATOXIN GENE PCR: NORMAL
SHIGELLA SP PCR: NORMAL
SODIUM BLD-SCNC: 141 MMOL/L (ref 135–144)
SPECIMEN DESCRIPTION: NORMAL
SURGICAL PATHOLOGY REPORT: NORMAL
VIBRIO PCR: NORMAL
WBC # BLD: 5.7 K/UL (ref 3.5–11)
YERSINIA ENTEROCOLITICA PCR: NORMAL

## 2022-05-04 PROCEDURE — G0378 HOSPITAL OBSERVATION PER HR: HCPCS

## 2022-05-04 PROCEDURE — 6360000002 HC RX W HCPCS: Performed by: SURGERY

## 2022-05-04 PROCEDURE — 96376 TX/PRO/DX INJ SAME DRUG ADON: CPT

## 2022-05-04 PROCEDURE — A4216 STERILE WATER/SALINE, 10 ML: HCPCS | Performed by: SURGERY

## 2022-05-04 PROCEDURE — 99226 PR SBSQ OBSERVATION CARE/DAY 35 MINUTES: CPT | Performed by: INTERNAL MEDICINE

## 2022-05-04 PROCEDURE — 99239 HOSP IP/OBS DSCHRG MGMT >30: CPT | Performed by: PHYSICIAN ASSISTANT

## 2022-05-04 PROCEDURE — 36415 COLL VENOUS BLD VENIPUNCTURE: CPT

## 2022-05-04 PROCEDURE — 2500000003 HC RX 250 WO HCPCS: Performed by: SURGERY

## 2022-05-04 PROCEDURE — 80048 BASIC METABOLIC PNL TOTAL CA: CPT

## 2022-05-04 PROCEDURE — 2580000003 HC RX 258: Performed by: SURGERY

## 2022-05-04 PROCEDURE — 96366 THER/PROPH/DIAG IV INF ADDON: CPT

## 2022-05-04 PROCEDURE — 85025 COMPLETE CBC W/AUTO DIFF WBC: CPT

## 2022-05-04 RX ORDER — PANTOPRAZOLE SODIUM 40 MG/1
40 TABLET, DELAYED RELEASE ORAL
Status: DISCONTINUED | OUTPATIENT
Start: 2022-05-05 | End: 2022-05-04 | Stop reason: HOSPADM

## 2022-05-04 RX ORDER — CIPROFLOXACIN 500 MG/1
500 TABLET, FILM COATED ORAL 2 TIMES DAILY
Qty: 20 TABLET | Refills: 0 | Status: SHIPPED | OUTPATIENT
Start: 2022-05-04 | End: 2022-05-14

## 2022-05-04 RX ORDER — FUROSEMIDE 40 MG/1
40 TABLET ORAL DAILY
Status: DISCONTINUED | OUTPATIENT
Start: 2022-05-04 | End: 2022-05-04 | Stop reason: HOSPADM

## 2022-05-04 RX ORDER — METRONIDAZOLE 500 MG/1
500 TABLET ORAL 3 TIMES DAILY
Qty: 30 TABLET | Refills: 0 | Status: SHIPPED | OUTPATIENT
Start: 2022-05-04 | End: 2022-05-14

## 2022-05-04 RX ADMIN — ALLOPURINOL 300 MG: 300 TABLET ORAL at 10:30

## 2022-05-04 RX ADMIN — FAMOTIDINE 20 MG: 10 INJECTION, SOLUTION INTRAVENOUS at 10:29

## 2022-05-04 RX ADMIN — LEVOTHYROXINE SODIUM 25 MCG: 0.03 TABLET ORAL at 06:21

## 2022-05-04 RX ADMIN — Medication 37.5 MG: at 14:06

## 2022-05-04 RX ADMIN — PIPERACILLIN AND TAZOBACTAM 3375 MG: 3; .375 INJECTION, POWDER, FOR SOLUTION INTRAVENOUS at 12:35

## 2022-05-04 RX ADMIN — PIPERACILLIN AND TAZOBACTAM 3375 MG: 3; .375 INJECTION, POWDER, FOR SOLUTION INTRAVENOUS at 04:14

## 2022-05-04 RX ADMIN — TROSPIUM CHLORIDE 20 MG: 20 TABLET, FILM COATED ORAL at 10:30

## 2022-05-04 RX ADMIN — ATORVASTATIN CALCIUM 10 MG: 10 TABLET, FILM COATED ORAL at 10:30

## 2022-05-04 NOTE — PLAN OF CARE
Problem: Discharge Planning  Goal: Discharge to home or other facility with appropriate resources  5/3/2022 2341 by Lawrence Morillo RN  Outcome: Progressing  Flowsheets (Taken 5/3/2022 1930)  Discharge to home or other facility with appropriate resources: Identify barriers to discharge with patient and caregiver     Problem: ABCDS Injury Assessment  Goal: Absence of physical injury  5/3/2022 2341 by Lawrence Morillo RN  Outcome: Progressing  Flowsheets (Taken 5/3/2022 2327)  Absence of Physical Injury: Implement safety measures based on patient assessment     Problem: Safety - Adult  Goal: Free from fall injury  5/3/2022 2341 by Lawrence Morillo RN  Outcome: Progressing  Flowsheets (Taken 5/3/2022 2327)  Free From Fall Injury: Instruct family/caregiver on patient safety     Problem: Chronic Conditions and Co-morbidities  Goal: Patient's chronic conditions and co-morbidity symptoms are monitored and maintained or improved  5/3/2022 2341 by Lawrence Morillo RN  Outcome: Progressing  Flowsheets (Taken 5/3/2022 1930)  Care Plan - Patient's Chronic Conditions and Co-Morbidity Symptoms are Monitored and Maintained or Improved: Monitor and assess patient's chronic conditions and comorbid symptoms for stability, deterioration, or improvement

## 2022-05-04 NOTE — PROGRESS NOTES
[facility] [address]                 PATIENT NAME: Kar Mitchell     TODAY'S DATE: 5/4/2022, 9:45 AM    SUBJECTIVE:    Pt seen and examined. Afebrile, VSS. No leukocytosis, hemoglobin stable. Patient is doing well. Denies abdominal pain. Bowels are moving; still having loose stools. Tolerating full liquids, no N/V.      OBJECTIVE:   VITALS:  BP (!) 164/89   Pulse 73   Temp 97.7 °F (36.5 °C) (Oral)   Resp 18   Ht 5' 10.5\" (1.791 m)   Wt (!) 303 lb 8 oz (137.7 kg)   SpO2 95%   BMI 42.93 kg/m²      INTAKE/OUTPUT:      Intake/Output Summary (Last 24 hours) at 5/4/2022 0945  Last data filed at 5/3/2022 1324  Gross per 24 hour   Intake --   Output 350 ml   Net -350 ml                 CONSTITUTIONAL:  awake and alert. No acute distress  HEART:   RRR  LUNGS:   CTA  ABDOMEN:   Abdomen soft, non-tender, non-distended  EXTREMITIES:   No pedal edema    Data:  CBC:   Lab Results   Component Value Date    WBC 5.7 05/04/2022    RBC 4.55 05/04/2022    RBC 4.74 03/08/2012    HGB 14.8 05/04/2022    HCT 44.6 05/04/2022    MCV 98.0 05/04/2022    MCH 32.5 05/04/2022    MCHC 33.1 05/04/2022    RDW 17.0 05/04/2022     05/04/2022     03/08/2012    MPV 7.4 05/04/2022     BMP:    Lab Results   Component Value Date     05/04/2022    K 4.2 05/04/2022     05/04/2022    CO2 28 05/04/2022    BUN 12 05/04/2022    LABALBU 4.0 08/24/2020    LABALBU 4.2 12/15/2011    CREATININE 1.09 05/04/2022    CALCIUM 9.4 05/04/2022    GFRAA >60 05/04/2022    LABGLOM >60 05/04/2022    GLUCOSE 119 05/04/2022    GLUCOSE 100 04/19/2012       Radiology Review:  No new images to review       ASSESSMENT     Principal Problem:    Colitis  Resolved Problems:    * No resolved hospital problems. *      Plan  1. Low fiber diet  2. Okay to restart Eliquis  3. If patient tolerates diet advancement for lunch, surgically stable for discharge to home  4. Discharge instructions discussed at length   5.  Prescriptions called in to pharmacy   6. Patient to follow up in office as outpatient   7.  Continue medical management       Electronically signed by Itzel Espinoza PA-C  [id number] [address]

## 2022-05-04 NOTE — CONSULTS
[facility]    CONSULTATION    / FOLLOW UP VISIT       Date:   5/4/2022  Patient name:  Benjamin Connell  Date of admission:  5/2/2022  8:41 AM  MRN:   [id number]  Account:  [de-identified]  YOB: 1945  PCP:    Janis Duong MD  Room:   2121/2121-01  Code Status:    Full Code    Physician Requesting Consult: Tim Newton MD    History of Present Illness:      C/C ;  Medical comorbidity management     REASON FOR CONSULT;  Medical comorbidity and medication management ;                                                 *Principal Problem:    Colitis  Resolved Problems:    * No resolved hospital problems. *           HPI;    ADMITTED POST PROCEDURE , COLONOSCOPY FOR DIARRHEA 6 WEEKS HX    COLONOSCOPY SHOWS  Patient was seen in the recovery room. Findings discussed with patient's wife. Patient is having diarrhea for the last 6 weeks. Patient was given antibiotics for his dental treatment couple weeks ago. Diarrhea started before the antibiotic. Colonoscopy revealed extremely friable inflamed edematous mucosa. Bleeds easily requiring multiple resolution clips   ON ADMISSION   Patient has no hx of Colon Polyps. Pt has hx of Diverticulosis. Patient has positive FH of Colon Cancer in father. Patient reports changes in bowel habits . He has alternative between constipation and diarrhea started one year ago, he was on pain medication for his shoulder. Pt states once he start to take Probiotic  his BM is okay now. No GI /Rectal bleeding, experiencing red/ black/ BRBPR stools. Patient has no  history of abd. Pain, no nausea or vomiting, no abdominal bloating or weight loss. Patient denies any Dysphagia. Pt has hx of GERD and he is on Protonix.         Past Medical History:   Diagnosis Date    Adenocarcinoma of prostate ([facility] [address] 10/30/2015    Anemia     Cellulitis of lower extremity     right     Cerebral artery occlusion with cerebral infarction [facility])         CHF (congestive heart failure) ([facility] [address]     Chronic acquired lymphedema     Clavicle fracture     in high school    Hernia, abdominal     History of blood transfusion 2003 Gov. .. [facility] SURGERY    History of CVA (cerebrovascular accident) 54    DEFECIT MILD MEMORY AND SPEECH    Hyperlipidemia     Hypertension     Hypothyroid 2015    Ileus, postoperative (HCC)     Mild renal insufficiency     Morbid obesity ([address]     Non-healing wound of lower extremity     HISTORY OF, WAS SEEN IN WOUND CLINIC FOR COUPLE YRS.    Osteoarthritis     Phlebitis     HX. OF     Prolonged emergence from general anesthesia     x1 had to be put back on ventolator, after prostate surgery, had to be hospitalized x12 days.  Prostate CA ([facility] [address]     Prostate cancer ([facility] [address] 10/21/2015    PVD (peripheral vascular disease) (HCC)     Sleep apnea     C-PAP NIGHTLY-PT INSTRUCTED TO BRING    SVT (supraventricular tachycardia) (HCC)     Uric acid kidney stone 2014    HAD 6 PASSSED ONE ON OWN, OTHER 5 IS BEING MONITORED    Wears glasses     Wrist fracture rt,     Social History     Tobacco Use    Smoking status: Former Smoker     Packs/day: 2.00     Years: 10.00     Pack years: 20.00     Quit date: 1970     Years since quittin.4    Smokeless tobacco: Never Used   Vaping Use    Vaping Use: Never used   Substance Use Topics    Alcohol use: Yes     Comment: 1-2 beer a day    Drug use: No      Social History:     Tobacco:    reports that he quit smoking about 51 years ago. He has a 20.00 pack-year smoking history. He has never used smokeless tobacco.  Alcohol:      reports current alcohol use. Drug Use:  reports no history of drug use.     Review of Systems:     POSITIVE AND NEGATIVES AS DESCRIBED IN HISTORY OF PRESENT ILLNESS ;  IN ADDITION ;  Review of Systems          All other systems negative                Physical Exam:     Physical Exam   Vitals:    22 1915 05/03/22 2330 05/04/22 0315 05/04/22 0845   BP: (!) 133/58 (!) 147/79  (!) 164/89   Pulse: 81 82  73   Resp: 18 18  18   Temp: 97.8 °F (36.6 °C) 98.2 °F (36.8 °C)  97.7 °F (36.5 °C)   TempSrc: Oral Oral  Oral   SpO2: 93% 92%  95%   Weight:   (!) 303 lb 8 oz (137.7 kg)    Height:                       Body mass index is 42.93 kg/m². General Appearance:   -, CO-OPERATIVE ,                                                        Pulmonary/Chest:        Clear to auscultation bilaterally . No wheezes, rales or rhonchi . Cardiovascular:            Normal rate, regular rhythm,                                          No murmur or  Gallop . Abdomen:                       Soft, non-tender                                                                                    Extremities:                    No Edema . Neuromuskuloskeletal    .. [facility] ;                 No focal motor deficit ,                 No focal sensory deficit ,    Musculo-skeletal ;                  No  gait abnormality                  No significant joint abnormality,                   Psych:   ---                     Data:     Significant last 24 hr data reviewed ;   Vitals:    05/03/22 1915 05/03/22 2330 05/04/22 0315 05/04/22 0845   BP: (!) 133/58 (!) 147/79  (!) 164/89   Pulse: 81 82  73   Resp: 18 18  18   Temp: 97.8 °F (36.6 °C) 98.2 °F (36.8 °C)  97.7 °F (36.5 °C)   TempSrc: Oral Oral  Oral   SpO2: 93% 92%  95%   Weight:   (!) 303 lb 8 oz (137.7 kg)    Height:          Recent Results (from the past 24 hour(s))   C DIFF TOXIN/ANTIGEN    Collection Time: 05/03/22  2:45 PM    Specimen: Stool   Result Value Ref Range    Specimen Description . FECES     C DIFF AG + TOXIN NEGATIVE NEGATIVE   CBC with Auto Differential    Collection Time: 05/04/22  4:46 AM   Result Value Ref Range    WBC 5.7 3.5 - 11.0 k/uL    RBC 4.55 4.5 - 5.9 m/uL    Hemoglobin 14.8 13.5 - 17.5 g/dL    Hematocrit 44.6 41 - 53 %    MCV 98.0 80 - 100 fL    MCH 32.5 26 - 34 pg    MCHC 33.1 31 - 37 g/dL    RDW 17.0 (H) 11.5 - 14.9 %    Platelets 065 748 - 449 k/uL    MPV 7.4 6.0 - 12.0 fL    Seg Neutrophils 68 (H) 36 - 66 %    Lymphocytes 23 (L) 24 - 44 %    Monocytes 7 1 - 7 %    Eosinophils % 1 0 - 4 %    Basophils 1 0 - 2 %    Segs Absolute 4.00 1.3 - 9.1 k/uL    Absolute Lymph # 1.30 1.0 - 4.8 k/uL    Absolute Mono # 0.40 0.1 - 1.3 k/uL    Absolute Eos # 0.10 0.0 - 0.4 k/uL    Basophils Absolute 0.00 0.0 - 0.2 k/uL   Basic Metabolic Panel    Collection Time: 05/04/22  4:46 AM   Result Value Ref Range    Glucose 119 (H) 70 - 99 mg/dL    BUN 12 8 - 23 mg/dL    CREATININE 1.09 0.70 - 1.20 mg/dL    Calcium 9.4 8.6 - 10.4 mg/dL    Sodium 141 135 - 144 mmol/L    Potassium 4.2 3.7 - 5.3 mmol/L    Chloride 105 98 - 107 mmol/L    CO2 28 20 - 31 mmol/L    Anion Gap 8 (L) 9 - 17 mmol/L    GFR Non-African American >60 >60 mL/min    GFR African American >60 >60 mL/min    GFR Comment           No results for input(s): POCGLU in the last 72 hours. No results found. Radiology:         Medications: Allergies:     Allergies   Allergen Reactions    Adhesive Tape Itching and Rash    Doxycycline Rash       Current Meds:   Scheduled Meds:    sodium chloride flush  5-40 mL IntraVENous 2 times per day    piperacillin-tazobactam (ZOSYN) 3375 mg in dextrose 5% IVPB extended infusion (mini-bag)  3,375 mg IntraVENous Q8H    famotidine (PEPCID) injection  20 mg IntraVENous BID    allopurinol  300 mg Oral BID    atorvastatin  10 mg Oral Daily    Vitamin D  1,000 Units Oral Daily    levothyroxine  25 mcg Oral Daily    trospium  20 mg Oral BID    melatonin  3 mg Oral Nightly    lisinopril  5 mg Oral Nightly     Continuous Infusions:    sodium chloride 125 mL/hr at 05/03/22 1100    sodium chloride       PRN Meds: sodium chloride flush, sodium chloride, potassium chloride, magnesium sulfate, ondansetron, fentanNYL, fentanNYL, acetaminophen, acetaminophen        Assessment :       Assessment Dx  Principal Problem:    Colitis  Resolved Problems:    * No resolved hospital problems. *              Plan:     MOST LIKELY INFLAMMATORY BOWEL DISEASE . WILL OBSERVE    HOME MEDS RECONCILED . HOLD ELIQUIS       Medications: Allergies: Allergies   Allergen Reactions    Adhesive Tape Itching and Rash    Doxycycline Rash       Current Meds:   Scheduled Meds:    sodium chloride flush  5-40 mL IntraVENous 2 times per day    piperacillin-tazobactam (ZOSYN) 3375 mg in dextrose 5% IVPB extended infusion (mini-bag)  3,375 mg IntraVENous Q8H    famotidine (PEPCID) injection  20 mg IntraVENous BID    allopurinol  300 mg Oral BID    atorvastatin  10 mg Oral Daily    Vitamin D  1,000 Units Oral Daily    levothyroxine  25 mcg Oral Daily    trospium  20 mg Oral BID    melatonin  3 mg Oral Nightly    lisinopril  5 mg Oral Nightly     Continuous Infusions:    sodium chloride 125 mL/hr at 05/03/22 1100    sodium chloride       PRN Meds: sodium chloride flush, sodium chloride, potassium chloride, magnesium sulfate, ondansetron, fentanNYL, fentanNYL, acetaminophen, acetaminophen       5/3/22    Colitis    Continue rx        5/4/22    Home meds reconciled . Metoprolol . protonix , lasix , eliquis resumed      Medical status stable . Ok to discharge if ok with surgery . Thanks for consulting us . Will monitor vitals and clinical course , and  Optimize therapy  as needed . Jayleen Rahman MD    Copy sent to Dr. Lakshmi Garcia MD    Pleasenote that this chart was generated using voice recognition Dragon dictation software. Although every effort was made to ensure the accuracy of this automated transcription, some errors in transcription may have occurred.

## 2022-05-04 NOTE — DISCHARGE SUMMARY
Physician Discharge Summary     Date of admission: 5/2/2022    Discharge date: 5/4/2022    Admission Diagnosis: Colitis [K52.9]    Discharge Diagnosis: same    Brief Hospitalization Details:  Dayton Ruvalcaba is a 76 y.o. male who was admitted for the management of Colitis. S/p outpatient diagnostic colonoscopy for prolonged diarrhea. Colonoscopy revealed extremely friable, inflamed, and edematous mucosa throughout majority of colon. Tissues bled easily, requiring multiple resolution clips. Patient was admitted following procedure for observation. Patient had no leukocytosis or fevers. No abdominal pain, distension, or N/V. Diet was slowly and gradually advanced. Patient is surgically stable for discharge to home. Discharge instructions discussed at length. Prescriptions called in to pharmacy. Patient to follow up in office as outpatient.      Current Discharge Medication List      START taking these medications    Details   ciprofloxacin (CIPRO) 500 MG tablet Take 1 tablet by mouth 2 times daily for 10 days  Qty: 20 tablet, Refills: 0      metroNIDAZOLE (FLAGYL) 500 MG tablet Take 1 tablet by mouth 3 times daily for 10 days  Qty: 30 tablet, Refills: 0         CONTINUE these medications which have NOT CHANGED    Details   Melatonin 10 MG TABS Take 1 tablet by mouth daily       lisinopril (PRINIVIL;ZESTRIL) 5 MG tablet TAKE 1 TABLET NIGHTLY  Qty: 90 tablet, Refills: 3      levothyroxine (SYNTHROID) 25 MCG tablet TAKE 1 TABLET DAILY  Qty: 90 tablet, Refills: 3      atorvastatin (LIPITOR) 10 MG tablet TAKE 1 TABLET DAILY  Qty: 90 tablet, Refills: 3      allopurinol (ZYLOPRIM) 300 MG tablet TAKE 1 TABLET TWICE A DAY  Qty: 180 tablet, Refills: 3      zinc 50 MG CAPS Take 50 mg by mouth daily  Qty: 30 capsule, Refills: 3      furosemide (LASIX) 40 MG tablet Take 1 tablet by mouth daily  Qty: 90 tablet, Refills: 3      pantoprazole (PROTONIX) 40 MG tablet Take 1 tablet by mouth every morning (before breakfast)  Qty: 90 tablet, Refills: 1      metoprolol tartrate (LOPRESSOR) 25 MG tablet Take 25 mg by mouth 2 times daily Takes 1 1/2 tablets 2xdaily      Bisacodyl (DULCOLAX PO) Take 1 tablet by mouth as needed       apixaban (ELIQUIS) 5 MG TABS tablet Take 5 mg by mouth 2 times daily      trospium (SANCTURA) 20 MG tablet Take 20 mg by mouth 2 times daily Prescribed by Dr. Luke Jo      acetaminophen (TYLENOL) 325 MG tablet Take 650 mg by mouth every 6 hours as needed for Pain      CINNAMON PO Take 2 capsules by mouth daily      Probiotic Product (PROBIOTIC ADVANCED PO) Take by mouth      Multiple Vitamins-Minerals (OCUVITE PO) Take 1 tablet by mouth daily      Cholecalciferol (VITAMIN D-3 PO) Take  by mouth daily.  2000 IU daily       Coenzyme Q10 (CO Q 10 PO) Take 1 tablet by mouth daily          STOP taking these medications       fluticasone (FLONASE) 50 MCG/ACT nasal spray Comments:   Reason for Stopping:               Condition at Discharge: good    Electronically signed by KEIKO Owen on 5/4/2022 at 9:47 AM

## 2022-05-05 ENCOUNTER — TELEPHONE (OUTPATIENT)
Dept: PRIMARY CARE CLINIC | Age: 77
End: 2022-05-05

## 2022-05-05 ENCOUNTER — CARE COORDINATION (OUTPATIENT)
Dept: CASE MANAGEMENT | Age: 77
End: 2022-05-05

## 2022-05-05 DIAGNOSIS — K52.9 COLITIS: Primary | ICD-10-CM

## 2022-05-05 PROCEDURE — 1111F DSCHRG MED/CURRENT MED MERGE: CPT | Performed by: FAMILY MEDICINE

## 2022-05-05 NOTE — CARE COORDINATION
[facility] Transitions Initial Follow Up Call    Call within 2 business days of discharge: Yes    Patient: Saloni Marie Patient : 1945   MRN: [id number]  Reason for Admission: Colitis  Discharge Date: 22 RARS: No data recorded    Last Discharge [facility]       Complaint Diagnosis Description Type Department Provider    22   Admission (Discharged) Lupe Arcos MD           Spoke with: Spouse    Facility: [facility]    Non-face-to-face services provided:  Scheduled appointment with Specialist- with Dr. Elkin Allen and reviewed discharge summary and/or continuity of care documents     Spoke with patient's wife who said patient was doing ok today. She said he denies any abdominal pain, bloating, s/s of bleeding, f/c, n/v or other symptoms. He had a colonoscopy with several clips placed and was kept overnight to monitor. He is doing ok today, wife states he generally sleeps till 5 pm and stays up most the night. He is tolerating po fluids and food. Medications reviewed, [address]  done. Discussed SE of ATB and to avoid any alcohol or alcohol based products while on the Flagyl. He was encouraged to continue taking his probiotic which she said he has not taken for a while. Offered assistance to schedule PCP follow up, wife declined, states she will call and schedule today. They deny any needs or concerns. Transitions of Care Initial Call    Was this an external facility discharge? No Discharge Facility: [facility]    Challenges to be reviewed by the provider   Additional needs identified to be addressed with provider: No  none             Method of communication with provider : none      Advance Care Planning:   Does patient have an Advance Directive: not on file; education provided. Was this a readmission?  Yes  Patient stated reason for admission: planned colonoscopy    Patients top risk factors for readmission: medical condition-Colitis    Care Transition Nurse (CTN) contacted the family by telephone to perform post hospital discharge assessment. Verified name and  with family as identifiers. Provided introduction to self, and explanation of the CTN role. CTN reviewed discharge instructions, medical action plan and red flags with family who verbalized understanding. Family given an opportunity to ask questions and does not have any further questions or concerns at this time. Were discharge instructions available to patient? Yes. Reviewed appropriate site of care based on symptoms and resources available to patient including: PCP  Specialist. The family agrees to contact the PCP office for questions related to their healthcare. Medication reconciliation was performed with family, who verbalizes understanding of administration of home medications. Advised obtaining a 90-day supply of all daily and as-needed medications. Covid Risk Education     Educated patient about risk for severe COVID-19 due to risk factors according to CDC guidelines. CTN reviewed discharge instructions, medical action plan and red flag symptoms with the family who verbalized understanding. Discussed COVID vaccination status: Yes. Education provided on COVID-19 vaccination as appropriate. Discussed exposure protocols and quarantine with CDC Guidelines. Family was given an opportunity to verbalize any questions and concerns and agrees to contact CTN or health care provider for questions related to their healthcare. Reviewed and educated family on any new and changed medications related to discharge diagnosis. Was patient discharged with a pulse oximeter? No     CTN provided contact information. Plan for follow-up call in 5-7 days based on severity of symptoms and risk factors.   Plan for next call: check on follow up appointment, check for any GI symptoms          Care Transitions 24 Hour Call    Schedule Follow Up Appointment with PCP: Completed  Do you have a copy of your discharge instructions?: Yes  Do you have all of your prescriptions and are they filled?: Yes  Have you been contacted by a [facility] [address]?: No  Have you scheduled your follow up appointment?: Yes  How are you going to get to your appointment?: Car - family or friend to transport  Post Acute Services: Other (Comment: [facility] outpatient therapy)  Patient DME: Carlos Manuel Gutierrez, Shower chair, Other  Other Patient DME: grab bars, reacher  Patient Home Equipment: CPAP  Do you have support at home?: Partner/Spouse/SO  Do you feel like you have everything you need to keep you well at home?: No  Are you an active caregiver in your home?: No  Care Transitions Interventions     Other Services: Completed (Comment: [id number] [facility] [address])          Follow Up  No future appointments.     David Woodall RN

## 2022-05-05 NOTE — TELEPHONE ENCOUNTER
[facility] Transitions Initial Follow Up Call    Outreach made within 2 business days of discharge: Yes    Patient: Dayton Ruvalcaba Patient : 1945   MRN: [id number]  Reason for Admission: There are no discharge diagnoses documented for the most recent discharge. Discharge Date: 22       Spoke with: Lenka Miles    Discharge department/facility: [facility] Interactive Patient Contact:  Was patient able to fill all prescriptions: Yes  Was patient instructed to bring all medications to the follow-up visit: Yes  Is patient taking all medications as directed in the discharge summary?  Yes  Does patient understand their discharge instructions: Yes  Does patient have questions or concerns that need addressed prior to 7-14 day follow up office visit: no    Scheduled appointment with PCP within 7-14 days    Follow Up  Future Appointments   Date Time Provider [place]   2022  3:30 PM Soledad Michael MD [address]

## 2022-05-05 NOTE — TELEPHONE ENCOUNTER
----- Message from April Shweta sent at 5/5/2022 10:27 AM EDT -----  Subject: Appointment Request    Reason for Call: Routine Hospital Follow Up    QUESTIONS  Type of Appointment? Established Patient  Reason for appointment request? Available appointments did not meet   patient need  Additional Information for Provider? Patient's wife Jamila Jacob called to   schedule hospital follow up for patient. Patient was discharged yesterday,   admitted after a colonoscopy on Monday for colitis. Patient was told to   follow up within 7 days, next available for patient's physician was on   5/26. Please call wife back with potential opening with patient's   physician in the next 7 days, thank you  ---------------------------------------------------------------------------  --------------  CALL BACK INFO  What is the best way for the office to contact you? OK to leave message on   voicemail  Preferred Call Back Phone Number? [phone]  ---------------------------------------------------------------------------  --------------  SCRIPT ANSWERS  Relationship to Patient? Other  Representative Name? Yana Shields   Additional information verified (besides Name and Date of Birth)? Phone   Number  (Patient requests to see provider urgently. )? No  (Has the patient been discharged from the hospital within 2 business days   AND does not have a Telephone Encounter  Follow Up From [address] [place] [address]   documented in [employer]?)? No  Do you have any questions for your primary care provider that need to be   answered prior to your appointment? (Use RN Triage if question pertains to   anything on the red flag list)? No  (Patient needs follow up visit after hospital discharge) Book first   available appointment within 7 days OF DISCHARGE, if no appt, proceed to   book the next available time slot within 14 days OF DISCHARGE AND Send   Message to Provider. [facility] Follow Up appointment cannot be booked   beyond 14 Days and should result in a Message to Provider. ?  Yes Have you been diagnosed with, awaiting test results for, or told that you   are suspected of having COVID-19 (Coronavirus)? (If patient has tested   negative or was tested as a requirement for work, school, or travel and   not based on symptoms, answer no)? No  Within the past 10 days have you developed any of the following symptoms   (answer no if symptoms have been present longer than 10 days or began   more than 10 days ago)? Fever or Chills, Cough, Shortness of breath or   difficulty breathing, Loss of taste or smell, Sore throat, Nasal   congestion, Sneezing or runny nose, Fatigue or generalized body aches   (answer no if pain is specific to a body part e.g. back pain), Diarrhea,   Headache? No  Have you had close contact with someone with COVID-19 in the last 7 days? No  (Service Expert  click yes below to proceed with [facility] As Usual   Scheduling)?  Yes

## 2022-05-06 NOTE — PROGRESS NOTES
Discharge instructions/ medications reviewed with patient and wife. All questions answered. Patient and belongings wheeled down to car.

## 2022-05-13 ENCOUNTER — OFFICE VISIT (OUTPATIENT)
Dept: PRIMARY CARE CLINIC | Age: 77
End: 2022-05-13
Payer: MEDICARE

## 2022-05-13 VITALS
OXYGEN SATURATION: 95 % | WEIGHT: 301.4 LBS | DIASTOLIC BLOOD PRESSURE: 60 MMHG | BODY MASS INDEX: 42.64 KG/M2 | SYSTOLIC BLOOD PRESSURE: 138 MMHG | HEART RATE: 69 BPM

## 2022-05-13 DIAGNOSIS — E03.9 ACQUIRED HYPOTHYROIDISM: ICD-10-CM

## 2022-05-13 DIAGNOSIS — I10 PRIMARY HYPERTENSION: ICD-10-CM

## 2022-05-13 DIAGNOSIS — Z09 HOSPITAL DISCHARGE FOLLOW-UP: Primary | ICD-10-CM

## 2022-05-13 DIAGNOSIS — M25.551 RIGHT HIP PAIN: ICD-10-CM

## 2022-05-13 PROCEDURE — 99213 OFFICE O/P EST LOW 20 MIN: CPT | Performed by: FAMILY MEDICINE

## 2022-05-13 PROCEDURE — 1111F DSCHRG MED/CURRENT MED MERGE: CPT | Performed by: FAMILY MEDICINE

## 2022-05-13 RX ORDER — TRAMADOL HYDROCHLORIDE 50 MG/1
100 TABLET ORAL EVERY 6 HOURS PRN
Qty: 360 TABLET | Refills: 0 | Status: SHIPPED | OUTPATIENT
Start: 2022-05-13 | End: 2022-08-11 | Stop reason: SDUPTHER

## 2022-05-13 NOTE — PROGRESS NOTES
Post-Discharge Transitional Care  Follow Up      Alycia Elizalde   YOB: 1945    Date of Office Visit:  5/13/2022  Date of Hospital Admission: 5/2/22  Date of Hospital Discharge: 5/4/22  Risk of hospital readmission (high >=14%. Medium >=10%) :No data recorded    Care management risk score Rising risk (score 2-5) and Complex Care (Scores >=6): 6     Non face to face  following discharge, date last encounter closed (first attempt may have been earlier): 5/5/2022 11:44 AM    Call initiated 2 business days of discharge: Yes    ASSESSMENT/PLAN:   Right hip pain  -     traMADol (ULTRAM) 50 MG tablet; Take 2 tablets by mouth every 6 hours as needed for Pain for up to 30 days. , Disp-360 tablet, R-0Normal      Medical Decision Making: moderate complexity  Return in about 3 months (around 8/13/2022), or if symptoms worsen or fail to improve. On this date 5/13/2022 I have spent 25 minutes reviewing previous notes, test results and face to face with the patient discussing the diagnosis and importance of compliance with the treatment plan as well as documenting on the day of the visit. Subjective:   HPI:  Follow up of Hospital problems/diagnosis(es): Pt had colonoscopy and they found severe colitis and he had a lot of bleeding,and was admitted. Had been having diarrhea for about 7 weeks before scope. Biopsies showed lymphocytic colitis. F/u with Dr. Nico Payan next week. Almost done with abx. Still having diarrhea but not as much as he did. Feels like he is more SOB and just wrung out. Not sure if it is due to abx or the diarrhea. Inpatient course: Discharge summary reviewed- see chart. Interval history/Current status: as above, taking probiotic.       Patient Active Problem List   Diagnosis    Hypertension    Chronic acquired lymphedema    Sleep apnea    History of CVA (cerebrovascular accident)    Hyperlipidemia    Chronic diastolic congestive [facility] failure [facility] [address]    Osteoarthritis    Renal insufficiency    Anemia    Hyperglycemia    Acquired hypothyroidism    Arthritis of left hip    Lumbar radiculopathy    Spinal stenosis of lumbar region    Primary osteoarthritis of left hip    Class 3 obesity due to excess calories with serious comorbidity and body mass index (BMI) of 40.0 to 44.9 in adult    Osteoarthritis of right glenohumeral joint    Osteoarthritis of left glenohumeral joint    History of prostate cancer    Atrial flutter ([facility] [address]    Morbid obesity with BMI of 40.0-44.9, adult ([facility] [address]    Secondary hyperparathyroidism of renal origin ([facility] [address]    Chronic right shoulder pain    Stage 3a chronic kidney disease ([facility] [address]    Localized edema    Colitis       Medications listed as ordered at the time of discharge from hospital     Medication List          Accurate as of May 13, 2022  4:12 PM. If you have any questions, ask your nurse or doctor. CHANGE how you take these medications    allopurinol 300 MG tablet  Commonly known as: ZYLOPRIM  TAKE 1 TABLET TWICE A DAY  What changed: See the new instructions.      furosemide 40 MG tablet  Commonly known as: LASIX  Take 1 tablet by mouth daily  What changed: additional instructions        CONTINUE taking these medications    acetaminophen 325 MG tablet  Commonly known as: TYLENOL     atorvastatin 10 MG tablet  Commonly known as: LIPITOR  TAKE 1 TABLET DAILY     CINNAMON PO     ciprofloxacin 500 MG tablet  Commonly known as: CIPRO  Take 1 tablet by mouth 2 times daily for 10 days     CO Q 10 PO     DULCOLAX PO     Eliquis 5 MG Tabs tablet  Generic drug: apixaban     levothyroxine 25 MCG tablet  Commonly known as: SYNTHROID  TAKE 1 TABLET DAILY     lisinopril 5 MG tablet  Commonly known as: PRINIVIL;ZESTRIL  TAKE 1 TABLET NIGHTLY     Melatonin 10 MG Tabs     Metoprolol Tartrate 37.5 MG Tabs     metroNIDAZOLE 500 MG tablet  Commonly known as: FLAGYL  Take 1 tablet by mouth 3 times daily for 10 days     OCUVITE PO     pantoprazole 40 MG tablet  Commonly known as: PROTONIX  Take 1 tablet by mouth every morning (before breakfast)     PROBIOTIC ADVANCED PO     traMADol 50 MG tablet  Commonly known as: Ultram  Take 2 tablets by mouth every 6 hours as needed for Pain for up to 30 days. trospium 20 MG tablet  Commonly known as: SANCTURA     VITAMIN D-3 PO     zinc 50 MG Caps  Take 50 mg by mouth daily           Where to Get Your Medications      These medications were sent to [address]  [place] [address]    Phone: [phone]   · traMADol 50 MG tablet           Medications marked \"taking\" at this time  Outpatient Medications Marked as Taking for the 5/13/22 encounter (Office Visit) with Danica Ocampo MD   Medication Sig Dispense Refill    traMADol (ULTRAM) 50 MG tablet Take 2 tablets by mouth every 6 hours as needed for Pain for up to 30 days.  360 tablet 0    ciprofloxacin (CIPRO) 500 MG tablet Take 1 tablet by mouth 2 times daily for 10 days 20 tablet 0    metroNIDAZOLE (FLAGYL) 500 MG tablet Take 1 tablet by mouth 3 times daily for 10 days 30 tablet 0    Melatonin 10 MG TABS Take 1 tablet by mouth daily       lisinopril (PRINIVIL;ZESTRIL) 5 MG tablet TAKE 1 TABLET NIGHTLY 90 tablet 3    levothyroxine (SYNTHROID) 25 MCG tablet TAKE 1 TABLET DAILY 90 tablet 3    atorvastatin (LIPITOR) 10 MG tablet TAKE 1 TABLET DAILY 90 tablet 3    allopurinol (ZYLOPRIM) 300 MG tablet TAKE 1 TABLET TWICE A DAY (Patient taking differently: 300 mg 2 times daily ) 180 tablet 3    zinc 50 MG CAPS Take 50 mg by mouth daily 30 capsule 3    furosemide (LASIX) 40 MG tablet Take 1 tablet by mouth daily (Patient taking differently: Take 40 mg by mouth daily Patient is taking 1.5 tab daily (60mg)) 90 tablet 3    pantoprazole (PROTONIX) 40 MG tablet Take 1 tablet by mouth every morning (before breakfast) 90 tablet 1    Metoprolol Tartrate 37.5 MG TABS Take 37.5 mg by mouth 2 times daily Takes 1 1/2 tablets 2xdaily      Bisacodyl (DULCOLAX PO) Take 1 tablet by mouth as needed       apixaban (ELIQUIS) 5 MG TABS tablet Take 5 mg by mouth 2 times daily      trospium (SANCTURA) 20 MG tablet Take 20 mg by mouth 2 times daily Prescribed by Dr. Maya Gary acetaminophen (TYLENOL) 325 MG tablet Take 650 mg by mouth every 6 hours as needed for Pain      CINNAMON PO Take 2 capsules by mouth daily      Probiotic Product (PROBIOTIC ADVANCED PO) Take by mouth      Multiple Vitamins-Minerals (OCUVITE PO) Take 1 tablet by mouth daily      Cholecalciferol (VITAMIN D-3 PO) Take  by mouth daily. 2000 IU daily       Coenzyme Q10 (CO Q 10 PO) Take 1 tablet by mouth daily           Medications patient taking as of now reconciled against medications ordered at time of hospital discharge: Yes    A comprehensive review of systems was negative except for what was noted in the HPI. Objective:    /60   Pulse 69   Wt (!) 301 lb 6.4 oz (136.7 kg)   SpO2 95%   BMI 42.64 kg/m²   Physical Exam  Vitals reviewed. Constitutional:       Appearance: Normal appearance. HENT:      Head: Normocephalic and atraumatic. Eyes:      General:         Right eye: No discharge. Left eye: No discharge. Conjunctiva/sclera: Conjunctivae normal.   Pulmonary:      Effort: Pulmonary effort is normal. No respiratory distress. Skin:     Coloration: Skin is not jaundiced. Neurological:      General: No focal deficit present. Mental Status: He is alert and oriented to person, place, and time. Psychiatric:         Mood and Affect: Mood normal.         Thought Content: Thought content normal.         Judgment: Judgment normal.           An electronic signature was used to authenticate this note.   --Sarita Corcoran MD

## 2022-05-19 ENCOUNTER — HOSPITAL ENCOUNTER (OUTPATIENT)
Age: 77
Setting detail: SPECIMEN
Discharge: HOME | End: 2022-05-19

## 2022-05-19 DIAGNOSIS — E03.9 ACQUIRED HYPOTHYROIDISM: ICD-10-CM

## 2022-05-19 DIAGNOSIS — I10 PRIMARY HYPERTENSION: ICD-10-CM

## 2022-05-19 LAB
ABSOLUTE EOS #: 0.47 K/UL (ref 0–0.44)
ABSOLUTE IMMATURE GRANULOCYTE: <0.03 K/UL (ref 0–0.3)
ABSOLUTE LYMPH #: 1.5 K/UL (ref 1.1–3.7)
ABSOLUTE MONO #: 0.46 K/UL (ref 0.1–1.2)
ALBUMIN SERPL-MCNC: 4.3 G/DL (ref 3.5–5.2)
ALBUMIN/GLOBULIN RATIO: 1.7 (ref 1–2.5)
ALP BLD-CCNC: 64 U/L (ref 40–129)
ALT SERPL-CCNC: 22 U/L (ref 5–41)
ANION GAP SERPL CALCULATED.3IONS-SCNC: 11 MMOL/L (ref 9–17)
AST SERPL-CCNC: 27 U/L
BASOPHILS # BLD: 1 % (ref 0–2)
BASOPHILS ABSOLUTE: 0.04 K/UL (ref 0–0.2)
BILIRUB SERPL-MCNC: 0.64 MG/DL (ref 0.3–1.2)
BUN BLDV-MCNC: 20 MG/DL (ref 8–23)
CALCIUM SERPL-MCNC: 10.2 MG/DL (ref 8.6–10.4)
CHLORIDE BLD-SCNC: 101 MMOL/L (ref 98–107)
CO2: 30 MMOL/L (ref 20–31)
CREAT SERPL-MCNC: 0.96 MG/DL (ref 0.7–1.2)
EOSINOPHILS RELATIVE PERCENT: 7 % (ref 1–4)
GFR AFRICAN AMERICAN: >60 ML/MIN
GFR NON-AFRICAN AMERICAN: >60 ML/MIN
GFR SERPL CREATININE-BSD FRML MDRD: ABNORMAL ML/MIN/{1.73_M2}
GLUCOSE BLD-MCNC: 127 MG/DL (ref 70–99)
HCT VFR BLD CALC: 51.3 % (ref 40.7–50.3)
HEMOGLOBIN: 16.6 G/DL (ref 13–17)
IMMATURE GRANULOCYTES: 0 %
LYMPHOCYTES # BLD: 21 % (ref 24–43)
MAGNESIUM: 1.8 MG/DL (ref 1.6–2.6)
MCH RBC QN AUTO: 32.9 PG (ref 25.2–33.5)
MCHC RBC AUTO-ENTMCNC: 32.4 G/DL (ref 28.4–34.8)
MCV RBC AUTO: 101.8 FL (ref 82.6–102.9)
MONOCYTES # BLD: 7 % (ref 3–12)
NRBC AUTOMATED: 0 PER 100 WBC
PDW BLD-RTO: 15.3 % (ref 11.8–14.4)
PLATELET # BLD: 184 K/UL (ref 138–453)
PMV BLD AUTO: 10.4 FL (ref 8.1–13.5)
POTASSIUM SERPL-SCNC: 3.8 MMOL/L (ref 3.7–5.3)
RBC # BLD: 5.04 M/UL (ref 4.21–5.77)
RBC # BLD: ABNORMAL 10*6/UL
SEG NEUTROPHILS: 64 % (ref 36–65)
SEGMENTED NEUTROPHILS ABSOLUTE COUNT: 4.53 K/UL (ref 1.5–8.1)
SODIUM BLD-SCNC: 142 MMOL/L (ref 135–144)
TOTAL PROTEIN: 6.8 G/DL (ref 6.4–8.3)
TSH SERPL DL<=0.05 MIU/L-ACNC: 1.56 UIU/ML (ref 0.3–5)
WBC # BLD: 7 K/UL (ref 3.5–11.3)

## 2022-05-23 ENCOUNTER — TELEPHONE (OUTPATIENT)
Dept: GASTROENTEROLOGY | Age: 77
End: 2022-05-23

## 2022-05-23 NOTE — TELEPHONE ENCOUNTER
Called and have the patient scheduled for 6-27-22 2:45 pm at the [facility] office.

## 2022-05-23 NOTE — TELEPHONE ENCOUNTER
Per Dr Nandini Becerra, he spoke with Dr Vicente Peterson. Pt has colitis. Treatment is reviewed between the doctors.   Pt needs NP appt with [facility] for next available (appt can be scheduled after dr's time off if needed)

## 2022-05-25 RX ORDER — ALLOPURINOL 300 MG/1
TABLET ORAL
Qty: 180 TABLET | Refills: 3 | Status: SHIPPED | OUTPATIENT
Start: 2022-05-25

## 2022-05-26 ENCOUNTER — HOSPITAL ENCOUNTER (OUTPATIENT)
Dept: CT IMAGING | Age: 77
Discharge: HOME | End: 2022-05-28
Payer: MEDICARE

## 2022-05-26 DIAGNOSIS — K43.2 INCISIONAL HERNIA, WITHOUT OBSTRUCTION OR GANGRENE: ICD-10-CM

## 2022-05-26 PROCEDURE — 2580000003 HC RX 258: Performed by: SURGERY

## 2022-05-26 PROCEDURE — 74177 CT ABD & PELVIS W/CONTRAST: CPT

## 2022-05-26 PROCEDURE — 6360000004 HC RX CONTRAST MEDICATION: Performed by: SURGERY

## 2022-05-26 RX ORDER — SODIUM CHLORIDE 0.9 % (FLUSH) 0.9 %
10 SYRINGE (ML) INJECTION PRN
Status: DISCONTINUED | OUTPATIENT
Start: 2022-05-26 | End: 2022-05-29 | Stop reason: HOSPADM

## 2022-05-26 RX ORDER — 0.9 % SODIUM CHLORIDE 0.9 %
80 INTRAVENOUS SOLUTION INTRAVENOUS ONCE
Status: COMPLETED | OUTPATIENT
Start: 2022-05-26 | End: 2022-05-26

## 2022-05-26 RX ADMIN — SODIUM CHLORIDE 80 ML: 9 INJECTION, SOLUTION INTRAVENOUS at 16:57

## 2022-05-26 RX ADMIN — SODIUM CHLORIDE, PRESERVATIVE FREE 10 ML: 5 INJECTION INTRAVENOUS at 16:56

## 2022-05-26 RX ADMIN — IOHEXOL 50 ML: 240 INJECTION, SOLUTION INTRATHECAL; INTRAVASCULAR; INTRAVENOUS; ORAL at 16:56

## 2022-05-26 RX ADMIN — IOPAMIDOL 75 ML: 755 INJECTION, SOLUTION INTRAVENOUS at 16:56

## 2022-06-27 ENCOUNTER — HOSPITAL ENCOUNTER (OUTPATIENT)
Age: 77
Setting detail: SPECIMEN
Discharge: HOME | End: 2022-06-27

## 2022-06-27 ENCOUNTER — OFFICE VISIT (OUTPATIENT)
Dept: GASTROENTEROLOGY | Age: 77
End: 2022-06-27
Payer: MEDICARE

## 2022-06-27 VITALS
WEIGHT: 299 LBS | TEMPERATURE: 97.3 F | SYSTOLIC BLOOD PRESSURE: 141 MMHG | BODY MASS INDEX: 42.3 KG/M2 | DIASTOLIC BLOOD PRESSURE: 70 MMHG | HEART RATE: 58 BPM

## 2022-06-27 DIAGNOSIS — K52.9 COLITIS: ICD-10-CM

## 2022-06-27 DIAGNOSIS — K52.9 COLITIS: Primary | ICD-10-CM

## 2022-06-27 PROCEDURE — 99204 OFFICE O/P NEW MOD 45 MIN: CPT | Performed by: INTERNAL MEDICINE

## 2022-06-27 PROCEDURE — 1123F ACP DISCUSS/DSCN MKR DOCD: CPT | Performed by: INTERNAL MEDICINE

## 2022-06-27 RX ORDER — POLYETHYLENE GLYCOL 3350 17 G/17G
POWDER, FOR SOLUTION ORAL
Qty: 238 G | Refills: 0 | Status: SHIPPED | OUTPATIENT
Start: 2022-06-27 | End: 2022-09-22 | Stop reason: ALTCHOICE

## 2022-06-27 RX ORDER — BUDESONIDE 3 MG/1
CAPSULE, COATED PELLETS ORAL
COMMUNITY
Start: 2022-05-24

## 2022-06-27 RX ORDER — BISACODYL 5 MG
TABLET, DELAYED RELEASE (ENTERIC COATED) ORAL
Qty: 4 TABLET | Refills: 0 | Status: SHIPPED | OUTPATIENT
Start: 2022-06-27 | End: 2022-09-22 | Stop reason: ALTCHOICE

## 2022-06-27 RX ORDER — MULTIVIT WITH MINERALS/LUTEIN
250 TABLET ORAL DAILY
COMMUNITY

## 2022-06-27 ASSESSMENT — ENCOUNTER SYMPTOMS
DIARRHEA: 1
ANAL BLEEDING: 0
COUGH: 0
TROUBLE SWALLOWING: 0
CONSTIPATION: 0
VOMITING: 0
WHEEZING: 0
ABDOMINAL PAIN: 1
NAUSEA: 0
SHORTNESS OF BREATH: 0
RECTAL PAIN: 0
BLOOD IN STOOL: 0
CHOKING: 0
ABDOMINAL DISTENTION: 0

## 2022-06-27 NOTE — PROGRESS NOTES
Reason for Referral:   No referring provider defined for this encounter. Chief Complaint   Patient presents with    Diarrhea     Patient is new, referred for colitis by Dr Millie Mera. Patient states he is still having loose bowel movements. HISTORY OF PRESENT ILLNESS: Tillman Cogan is a 76 y.o. male , referred for evaluation of*diarrhea  Here for the first time'  Patient referred to us from surgery, colonoscopy was done by my colleague and he describes some colitis and took biopsy and it came back as lymphocytic colitis on the phone we discussed the case and we decided to start him on budesonide, the patient said his much better but still have some diarrhea right now he is down to 2 bowel movements which are not liquidy anymore, no black stool no bleeding, he is   on elliquis   f hx colon ca   Diarrhea , started on Budesonide , per Dr Millie Mera after talking to me on the phone as bx showing LC    but Dr Millie Mera is describing inflammation endoscopically also :?? Which suspicious fro IBD   Normal CBC /LFTs       Ct scan 5/26/22:     Impression   1.  No appreciable interval change in a left paramedian ventral hernia   containing fat without strangulation.       2.  Nonobstructing renal calculi.  Multiple renal cysts.       3.  Sigmoid diverticulosis without acute diverticulitis.       4.  Mildly enlarged inguinal lymph nodes.       5.  No hydronephrosis or obstructing renal calculi.  No gallstones,   appendicitis or small bowel obstruction.       6.  Atherosclerotic disease.                     Past Medical,Family, and Social History reviewed and does contribute to the patient presentingcondition. Patient's PMH/PSH,SH,PSYCH Hx, MEDs, ALLERGIES, and ROS were all reviewed and updated in the appropriate sections.     PAST MEDICAL HISTORY:  Past Medical History:   Diagnosis Date    Adenocarcinoma of prostate ([address] [address] 10/30/2015    Anemia     Cellulitis of lower extremity     right     Cerebral artery occlusion with cerebral infarction [facility])     1996    CHF [facility]congestive heart failure) [facility]     Chronic acquired lymphedema     Clavicle fracture     in high school    Hernia, abdominal     History of blood transfusion 2003 7 2006 [address] SURGERY    History of CVA (cerebrovascular accident) 54    DEFECIT MILD MEMORY AND SPEECH    Hyperlipidemia     Hypertension     Hypothyroid 09/2015    Ileus, postoperative (HCC)     Mild renal insufficiency     Morbid obesity ([address]     Non-healing wound of lower extremity     HISTORY OF, WAS SEEN IN WOUND CLINIC FOR COUPLE YRS.    Osteoarthritis     Phlebitis     HX. OF     Prolonged emergence from general anesthesia     x1 had to be put back on ventolator, after prostate surgery, had to be hospitalized x12 days.      Prostate CA ([facility]     Prostate cancer ([facility] 10/21/2015    PVD (peripheral vascular disease) ([facility]     Sleep apnea     C-PAP NIGHTLY-PT INSTRUCTED TO BRING    SVT (supraventricular tachycardia) (HCC)     Uric acid kidney stone 12/2014    HAD 6 PASSSED ONE ON OWN, OTHER 5 IS BEING MONITORED    Wears glasses     Wrist fracture rt,       Past Surgical History:   Procedure Laterality Date    ABLATION OF DYSRHYTHMIC FOCUS  03/16/2018    afib ablation    ABLATION OF DYSRHYTHMIC FOCUS  08/22/2019    ATRIAL FIB  /  DR [address] [facility]  07/30/2020    Dr. Iris Baca, [facility]  11/17/2017    COLONOSCOPY  2002, 2007    COLONOSCOPY  07/11/2016    polyp,bx    COLONOSCOPY N/A 5/2/2022    COLONOSCOPY WITH RANDOM COLON BIOPSIES AND CLIPPING AT DISTAL TRANSVERSE COLON performed by Tim Newton MD at [address] [facility] Left 08/05/2016    excision cyst anterior chest    HAMMER TOE SURGERY Bilateral     JOINT REPLACEMENT      KNEE SURGERY Left 1985    repair of torn ligaments    IA COLON CA SCRN NOT  [address] IND N/A 07/31/2017    COLONOSCOPY performed by Tim Newton MD at [address] TOTAL HIP ARTHROPLASTY Left 05/15/2018    HIP TOTAL ARTHROPLASTY MINIMALLY INVASIVE ASI WITH GPS performed by Denice Adames MD at [address] [place] [address]  10/21/2015    robotic    SHOULDER ARTHROPLASTY Bilateral     STUDY POSS ABLATION  04/02/2018         TONSILLECTOMY      TOTAL KNEE ARTHROPLASTY Bilateral LT-2003, RT-2006    TRANSESOPHAGEAL ECHOCARDIOGRAM  11/17/2017    TRANSESOPHAGEAL ECHOCARDIOGRAM  08/22/2019    TUMOR EXCISION      Throat/nose D/T benign tumor    UPPP UVULOPALATOPHARYGOPLASTY  90'S    VARICOSE VEIN SURGERY Bilateral 80's    x2       CURRENT MEDICATIONS:    Current Outpatient Medications:     Ascorbic Acid (VITAMIN C) 250 MG tablet, Take 250 mg by mouth daily, Disp: , Rfl:     budesonide (ENTOCORT EC) 3 MG extended release capsule, TAKE 3 CAPSULES BY MOUTH EVERY DAY, Disp: , Rfl:     allopurinol (ZYLOPRIM) 300 MG tablet, TAKE 1 TABLET TWICE A DAY, Disp: 180 tablet, Rfl: 3    Melatonin 10 MG TABS, Take 1 tablet by mouth daily , Disp: , Rfl:     lisinopril (PRINIVIL;ZESTRIL) 5 MG tablet, TAKE 1 TABLET NIGHTLY, Disp: 90 tablet, Rfl: 3    levothyroxine (SYNTHROID) 25 MCG tablet, TAKE 1 TABLET DAILY, Disp: 90 tablet, Rfl: 3    atorvastatin (LIPITOR) 10 MG tablet, TAKE 1 TABLET DAILY, Disp: 90 tablet, Rfl: 3    zinc 50 MG CAPS, Take 50 mg by mouth daily, Disp: 30 capsule, Rfl: 3    furosemide (LASIX) 40 MG tablet, Take 1 tablet by mouth daily (Patient taking differently: Take 40 mg by mouth daily Patient is taking 1.5 tab daily (60mg)), Disp: 90 tablet, Rfl: 3    pantoprazole (PROTONIX) 40 MG tablet, Take 1 tablet by mouth every morning (before breakfast), Disp: 90 tablet, Rfl: 1    Metoprolol Tartrate 37.5 MG TABS, Take 37.5 mg by mouth 2 times daily Takes 1 1/2 tablets 2xdaily, Disp: , Rfl:     Bisacodyl (DULCOLAX PO), Take 1 tablet by mouth as needed , Disp: , Rfl:     apixaban (ELIQUIS) 5 MG TABS tablet, Take 5 mg by mouth 2 times daily, Disp: , Rfl:     trospium Expenses: Not hard at all   Food Insecurity: No Food Insecurity    Worried About [facility] in the Last Year: Never true    Debi of Food in the Last Year: Never true   Transportation Needs:     Lack of Transportation (Medical): Not on file    Lack of Transportation (Non-Medical): Not on file   Physical Activity:     Days of Exercise per Week: Not on file    Minutes of Exercise per Session: Not on file   Stress:     Feeling of Stress : Not on file   Social Connections:     Frequency of Communication with Friends and Family: Not on file    Frequency of Social Gatherings with Friends and Family: Not on file    Attends [employer] Services: Not on file    Active Member of [address] [place] [address] or Organizations: Not on file    Attends Club or Organization Meetings: Not on file    Marital Status: Not on file   Intimate Partner Violence:     Fear of Current or Ex-Partner: Not on file    Emotionally Abused: Not on file    Physically Abused: Not on file    Sexually Abused: Not on file   Housing Stability:     Unable to Pay for Housing in the Last Year: Not on file    Number of [place] in the Last Year: Not on file    Unstable Housing in the Last Year: Not on file       REVIEW OF SYSTEMS: A 12-point review of systemswas obtained and pertinent positives and negatives were enumerated above in the history of present illness. All other reviewed systems / symptoms were negative. Review of Systems   Constitutional: Positive for appetite change (increase) and fatigue. Negative for unexpected weight change. HENT: Negative for trouble swallowing. Eyes: Positive for visual disturbance (glaases). Respiratory: Negative for cough, choking, shortness of breath and wheezing. Cardiovascular: Negative for chest pain, palpitations and leg swelling. Gastrointestinal: Positive for abdominal pain (gas) and diarrhea.  Negative for abdominal distention, anal bleeding, blood in stool, constipation, nausea, rectal pain and vomiting. Genitourinary: Negative for difficulty urinating. Allergic/Immunologic: Negative for environmental allergies and food allergies. Neurological: Negative for dizziness, weakness, light-headedness, numbness and headaches. Hematological: Bruises/bleeds easily. Psychiatric/Behavioral: Positive for sleep disturbance. The patient is nervous/anxious. LABORATORY DATA: Reviewed  Lab Results   Component Value Date    WBC 7.0 05/19/2022    HGB 16.6 05/19/2022    HCT 51.3 (H) 05/19/2022    .8 05/19/2022     05/19/2022     05/19/2022    K 3.8 05/19/2022     05/19/2022    CO2 30 05/19/2022    BUN 20 05/19/2022    CREATININE 0.96 05/19/2022    LABPROT 7.0 06/18/2012    LABALBU 4.3 05/19/2022    BILITOT 0.64 05/19/2022    ALKPHOS 64 05/19/2022    AST 27 05/19/2022    ALT 22 05/19/2022    INR 1.0 03/16/2018         Lab Results   Component Value Date    RBC 5.04 05/19/2022    HGB 16.6 05/19/2022    .8 05/19/2022    MCH 32.9 05/19/2022    MCHC 32.4 05/19/2022    RDW 15.3 (H) 05/19/2022    MPV 10.4 05/19/2022    BASOPCT 1 05/19/2022    LYMPHSABS 1.50 05/19/2022    MONOSABS 0.46 05/19/2022    NEUTROABS 4.53 05/19/2022    EOSABS 0.47 (H) 05/19/2022    BASOSABS 0.04 05/19/2022         DIAGNOSTIC TESTING:     No results found. BP (!) 141/70   Pulse 58   Temp 97.3 °F (36.3 °C)   Wt 299 lb (135.6 kg)   BMI 42.30 kg/m²     PHYSICAL EXAMINATION: Vital signs reviewed per the nursing documentation. Body mass index is 42.3 kg/m². Physical Exam  Vitals and nursing note reviewed. Constitutional:       General: He is not in acute distress. Appearance: He is well-developed. He is not diaphoretic. HENT:      Head: Normocephalic and atraumatic. Eyes:      General: No scleral icterus. Pupils: Pupils are equal, round, and reactive to light. Neck:      Thyroid: No thyromegaly. Vascular: No JVD. Trachea: No tracheal deviation.    Cardiovascular: Rate and Rhythm: Normal rate and regular rhythm. Heart sounds: Normal heart sounds. No murmur heard. Pulmonary:      Effort: Pulmonary effort is normal. No respiratory distress. Breath sounds: Normal breath sounds. No wheezing. Abdominal:      General: Bowel sounds are normal. There is no distension. Palpations: Abdomen is soft. There is no mass. Tenderness: There is no abdominal tenderness. There is no guarding or rebound. Musculoskeletal:         General: No tenderness. Normal range of motion. Cervical back: Normal range of motion and neck supple. Skin:     General: Skin is warm. Coloration: Skin is not pale. Findings: No erythema or rash. Comments: He is not diaphoretic   Neurological:      Mental Status: He is alert and oriented to person, place, and time. Deep Tendon Reflexes: Reflexes are normal and symmetric. Psychiatric:         Behavior: Behavior normal.         Thought Content: Thought content normal.         Judgment: Judgment normal.         IMPRESSION: Mr. Karyle Schilder is a 76 y.o. male with      Diagnosis Orders   1. Colitis  COLONOSCOPY W/ OR W/O BIOPSY    IBD SEROLOGY 7     We will repeat the colonoscopy to clarify the inflammation seen on endoscopy to make sure this patient does not have IBD and only he has lymphocytic colitis as the biopsies showing  Meanwhile we will continue with the budesonide  And I told him to use 1 Imodium on top of that daily and to rehydrate and will take it from there        Diet/life style/natural hx /complication of the dx were all explained in details   Past medical, past surgical, social history, psychiatric history, medications or allergies, all reviewed and  updated        Thank you for allowing me to participate in the care of Mr. Karyle Schilder. For any further questions please do not hesitate to contact me. I have reviewed and agree with the MA/RN ROS. Note is dictated utilizing voice recognition software. Unfortunately this leads to occasional typographical errors. Please contact our office if you have any questions.     Saundra Armas MD  [facility]  O: #[phone]

## 2022-07-01 LAB — IBD PANEL: NORMAL

## 2022-07-07 ENCOUNTER — TELEPHONE (OUTPATIENT)
Dept: GASTROENTEROLOGY | Age: 77
End: 2022-07-07

## 2022-07-07 NOTE — TELEPHONE ENCOUNTER
Writer rec'd clearance from PCP to hold eliquis x 1 day one start 8/8/22 and resume after procedure, writer called pt to notify him of instructions, pt voices understanding.

## 2022-07-18 ENCOUNTER — HOSPITAL ENCOUNTER (OUTPATIENT)
Age: 77
Discharge: HOME | End: 2022-07-18
Payer: MEDICARE

## 2022-07-18 PROCEDURE — 36415 COLL VENOUS BLD VENIPUNCTURE: CPT

## 2022-07-18 PROCEDURE — G0103 PSA SCREENING: HCPCS

## 2022-07-19 LAB — PROSTATE SPECIFIC ANTIGEN: <0.02 NG/ML

## 2022-07-26 ENCOUNTER — HOSPITAL ENCOUNTER (OUTPATIENT)
Dept: PREADMISSION TESTING | Age: 77
Discharge: HOME | End: 2022-07-26

## 2022-07-27 ENCOUNTER — TELEPHONE (OUTPATIENT)
Dept: GASTROENTEROLOGY | Age: 77
End: 2022-07-27

## 2022-07-27 RX ORDER — ATORVASTATIN CALCIUM 10 MG/1
TABLET, FILM COATED ORAL
Qty: 90 TABLET | Refills: 3 | Status: SHIPPED | OUTPATIENT
Start: 2022-07-27

## 2022-07-27 NOTE — TELEPHONE ENCOUNTER
Writer called pt to get PAT r/s, pt did not answer, Kate Singh asking pt to return office call to r/s PAT.

## 2022-07-28 ENCOUNTER — HOSPITAL ENCOUNTER (OUTPATIENT)
Dept: PREADMISSION TESTING | Age: 77
Discharge: HOME | End: 2022-08-01

## 2022-07-28 VITALS — WEIGHT: 292 LBS | BODY MASS INDEX: 40.88 KG/M2 | HEIGHT: 71 IN

## 2022-07-28 NOTE — PROGRESS NOTES

## 2022-07-28 NOTE — TELEPHONE ENCOUNTER
Writer spoke with pt regarding PAT call, PAT call was r/s for [email or web address], pt voices understanding.

## 2022-08-01 RX ORDER — LISINOPRIL 5 MG/1
TABLET ORAL
Qty: 90 TABLET | Refills: 3 | Status: SHIPPED | OUTPATIENT
Start: 2022-08-01

## 2022-08-01 RX ORDER — LEVOTHYROXINE SODIUM 0.03 MG/1
TABLET ORAL
Qty: 90 TABLET | Refills: 3 | Status: SHIPPED | OUTPATIENT
Start: 2022-08-01

## 2022-08-08 ENCOUNTER — ANESTHESIA EVENT (OUTPATIENT)
Dept: ENDOSCOPY | Age: 77
End: 2022-08-08
Payer: MEDICARE

## 2022-08-09 ENCOUNTER — ANESTHESIA (OUTPATIENT)
Dept: ENDOSCOPY | Age: 77
End: 2022-08-09
Payer: MEDICARE

## 2022-08-09 ENCOUNTER — HOSPITAL ENCOUNTER (OUTPATIENT)
Age: 77
Setting detail: OUTPATIENT SURGERY
Discharge: HOME | End: 2022-08-09
Attending: INTERNAL MEDICINE | Admitting: INTERNAL MEDICINE
Payer: MEDICARE

## 2022-08-09 VITALS
WEIGHT: 292 LBS | HEIGHT: 71 IN | BODY MASS INDEX: 40.88 KG/M2 | SYSTOLIC BLOOD PRESSURE: 161 MMHG | OXYGEN SATURATION: 94 % | TEMPERATURE: 98 F | HEART RATE: 69 BPM | DIASTOLIC BLOOD PRESSURE: 88 MMHG | RESPIRATION RATE: 13 BRPM

## 2022-08-09 DIAGNOSIS — K52.9 COLITIS: ICD-10-CM

## 2022-08-09 PROCEDURE — 45380 COLONOSCOPY AND BIOPSY: CPT | Performed by: INTERNAL MEDICINE

## 2022-08-09 PROCEDURE — 2709999900 HC NON-CHARGEABLE SUPPLY: Performed by: INTERNAL MEDICINE

## 2022-08-09 PROCEDURE — 6360000002 HC RX W HCPCS: Performed by: NURSE ANESTHETIST, CERTIFIED REGISTERED

## 2022-08-09 PROCEDURE — 3609010600 HC COLONOSCOPY POLYPECTOMY SNARE/COLD BIOPSY: Performed by: INTERNAL MEDICINE

## 2022-08-09 PROCEDURE — 7100000011 HC PHASE II RECOVERY - ADDTL 15 MIN: Performed by: INTERNAL MEDICINE

## 2022-08-09 PROCEDURE — 45385 COLONOSCOPY W/LESION REMOVAL: CPT | Performed by: INTERNAL MEDICINE

## 2022-08-09 PROCEDURE — 2580000003 HC RX 258: Performed by: ANESTHESIOLOGY

## 2022-08-09 PROCEDURE — 7100000010 HC PHASE II RECOVERY - FIRST 15 MIN: Performed by: INTERNAL MEDICINE

## 2022-08-09 PROCEDURE — 88305 TISSUE EXAM BY PATHOLOGIST: CPT

## 2022-08-09 PROCEDURE — 3700000001 HC ADD 15 MINUTES (ANESTHESIA): Performed by: INTERNAL MEDICINE

## 2022-08-09 PROCEDURE — 3700000000 HC ANESTHESIA ATTENDED CARE: Performed by: INTERNAL MEDICINE

## 2022-08-09 RX ORDER — SODIUM CHLORIDE 0.9 % (FLUSH) 0.9 %
5-40 SYRINGE (ML) INJECTION EVERY 12 HOURS SCHEDULED
Status: DISCONTINUED | OUTPATIENT
Start: 2022-08-09 | End: 2022-08-09 | Stop reason: HOSPADM

## 2022-08-09 RX ORDER — SODIUM CHLORIDE 9 MG/ML
INJECTION, SOLUTION INTRAVENOUS PRN
Status: DISCONTINUED | OUTPATIENT
Start: 2022-08-09 | End: 2022-08-09 | Stop reason: HOSPADM

## 2022-08-09 RX ORDER — SODIUM CHLORIDE 0.9 % (FLUSH) 0.9 %
5-40 SYRINGE (ML) INJECTION PRN
Status: DISCONTINUED | OUTPATIENT
Start: 2022-08-09 | End: 2022-08-09 | Stop reason: HOSPADM

## 2022-08-09 RX ORDER — LIDOCAINE HYDROCHLORIDE 10 MG/ML
1 INJECTION, SOLUTION EPIDURAL; INFILTRATION; INTRACAUDAL; PERINEURAL
Status: DISCONTINUED | OUTPATIENT
Start: 2022-08-09 | End: 2022-08-09 | Stop reason: HOSPADM

## 2022-08-09 RX ORDER — SODIUM CHLORIDE 9 MG/ML
INJECTION, SOLUTION INTRAVENOUS CONTINUOUS
Status: DISCONTINUED | OUTPATIENT
Start: 2022-08-09 | End: 2022-08-09 | Stop reason: HOSPADM

## 2022-08-09 RX ORDER — PROPOFOL 10 MG/ML
INJECTION, EMULSION INTRAVENOUS CONTINUOUS PRN
Status: DISCONTINUED | OUTPATIENT
Start: 2022-08-09 | End: 2022-08-09 | Stop reason: SDUPTHER

## 2022-08-09 RX ORDER — PROPOFOL 10 MG/ML
INJECTION, EMULSION INTRAVENOUS PRN
Status: DISCONTINUED | OUTPATIENT
Start: 2022-08-09 | End: 2022-08-09 | Stop reason: SDUPTHER

## 2022-08-09 RX ADMIN — SODIUM CHLORIDE: 9 INJECTION, SOLUTION INTRAVENOUS at 10:53

## 2022-08-09 RX ADMIN — PROPOFOL 50 MG: 10 INJECTION, EMULSION INTRAVENOUS at 11:11

## 2022-08-09 RX ADMIN — PROPOFOL 50 MG: 10 INJECTION, EMULSION INTRAVENOUS at 11:07

## 2022-08-09 RX ADMIN — PROPOFOL 125 MCG/KG/MIN: 10 INJECTION, EMULSION INTRAVENOUS at 11:07

## 2022-08-09 ASSESSMENT — PAIN - FUNCTIONAL ASSESSMENT: PAIN_FUNCTIONAL_ASSESSMENT: 0-10

## 2022-08-09 NOTE — ANESTHESIA POSTPROCEDURE EVALUATION
Department of Anesthesiology  Postprocedure Note    Patient: Deniz Moreno  MRN: [id number]  YOB: 1945  Date of evaluation: 8/9/2022      Procedure Summary     Date: 08/09/22 Room / Location: [address] [place] [address] ENDO 04 / [facility] ENDO    Anesthesia Start: 3904 Anesthesia Stop: 5717    Procedure: COLONOSCOPY POLYPECTOMY SNARE/COLD BIOPSY (Rectum) Diagnosis:       Colitis      (Colitis [K52.9])    Surgeons: Bennie Dao MD Responsible Provider: Celena Guillermo MD    Anesthesia Type: general ASA Status: 3          Anesthesia Type: No value filed.     Flo Phase I:      Flo Phase II: Flo Score: 10      Anesthesia Post Evaluation    Comments: POST- ANESTHESIA EVALUATION       Pt Name: Deniz Moreno  MRN: [id number]  YOB: 1945  Date of evaluation: 8/9/2022  Time:  1:38 PM      BP (!) 161/88   Pulse 69   Temp 98 °F (36.7 °C)   Resp 13   Ht 5' 10.5\" (1.791 m)   Wt 292 lb (132.5 kg)   SpO2 94%   BMI 41.31 kg/m²      Consciousness Level  Awake  Cardiopulmonary Status  Stable  Pain Adequately Treated YES  Nausea / Vomiting  NO  Adequate Hydration  YES  Anesthesia Related Complications NONE      Electronically signed by Celena Guillermo MD on 8/9/2022 at 1:38 PM

## 2022-08-09 NOTE — OP NOTE
PROCEDURE NOTE    DATE OF PROCEDURE: 8/9/2022    SURGEON: Vikram Conner MD  Facility : [facility]  ASSISTANT: None  Anesthesia: MAC  PREOPERATIVE DIAGNOSIS:   Questionable IBD this is to confirm before we start treatment    POSTOPERATIVE DIAGNOSIS: as described below    OPERATION: Total colonoscopy     ANESTHESIA: Moderate Sedation    ESTIMATED BLOOD LOSS: less than 50     COMPLICATIONS: None. SPECIMENS:  Was Obtained:     3 polyps 2 from the transverse colon removed with a cold snare the larger was 8 mm  1 from  The sigmoid removed with cold forceps        HISTORY: The patient is a 76y.o. year old male with history of above preop diagnosis. I recommended colonoscopy with possible biopsy or polypectomy and I explained the risk, benefits, expected outcome, and alternatives to the procedure. Risks included but are not limited to bleeding, infection, respiratory distress, hypotension, and perforation of the colon and possibility of missing a lesion. The patient understands and is in agreement. The patient was counseled at length about the risks of chata Covid-19 during their perioperative period and any recovery window from their procedure. The patient was made aware that chata Covid-19  may worsen their prognosis for recovering from their procedure  and lend to a higher morbidity and/or mortality risk. All material risks, benefits, and reasonable alternatives including postponing the procedure were discussed. The patient does wish to proceed with the procedure at this time. PROCEDURE: The patient was given IV conscious sedation. The patient's SPO2 remained above 90% throughout the procedure. The colonoscope was inserted per rectum and advanced under direct vision to the cecum without difficulty. Post sedation note : The patient's SPO2 remained above 90% throughout the procedure. the vital signs remained stable , and no immediate complication form the procedure noted, patient will be ready for d/c when criteria is met . The prep was poor. Findings:  3 polyps 2 from the transverse colon removed with a cold snare the larger was 8 mm  1 from  The sigmoid removed with cold forceps    Poor preparation    Diverticulosis    Hemorrhoids  No colitis seen anywhere in this colon all the way to the cecum  No ileitis in the last 10 cm of the ileum    Withdrawal Time was (minutes): 10    The colon was decompressed and the scope was removed. The patient tolerated the procedure well.      Recommendations/Plan:   Lifestyle and dietary modifications as discussed  F/U Biopsies  F/U In OfficeYes  Discussed with the family  Repeat colonoscopy [id number]    Electronically signed by Army Rachell MD  on 8/9/2022 at 11:35 AM

## 2022-08-09 NOTE — DISCHARGE INSTRUCTIONS
Sedation or General Anesthesia, Adult  Care After  Refer to this sheet in the next 24 hours. These instructions provide you with information on caring for yourself after your procedure. Your caregiver may also give you more specific instructions. Your treatment has been planned according to current medical practices, but problems sometimes occur. Call your caregiver if you have any problems or questions after your procedure. HOME CARE INSTRUCTIONS   Do not participate in any activities that require you to be alert or coordinated. Do not:  Drive. Swim. Ride a bicycle. Operate heavy machinery. Dontae Slicker. Use power tools. Climb ladders. Work at [employer]. Take a bath. Do not drink alcohol. Do not make any important decisions or sign legal documents. Stay with an adult. The first meal following your procedure should be light and small. Avoid solid foods if you feel sick to your stomach (nauseous) or if you throw up (vomit). Drink enough fluids to keep your urine clear or pale yellow. Only take your usual medicines or new medicines if your caregiver approves them. Only take over-the-counter or prescription medicines for pain, discomfort, or fever as directed by your caregiver. Keep all follow-up appointments as directed by your caregiver. SEEK IMMEDIATE MEDICAL CARE IF:   You are not feeling normal or behaving normally after 24 hours. You have persistent nausea and vomiting. You are unable to drink fluids or eat food. You have difficulty urinating. You have difficulty breathing or speaking. You have blue or gray skin. There is difficulty waking or you cannot be woken up. You have heavy bleeding, redness, or a lot of swelling where the sedative or anesthesia entered your skin (intravenous site). You have a rash. MAKE SURE YOU:  Understand these instructions. Will watch your condition. Will get help right away if you are not doing well or get worse.   Document Released: 12/18/2006 Document Revised: 06/18/2013 Document Reviewed: 04/17/2013  ExitCare® Patient Information ©2013 [employer]. Colonoscopy: What to Expect at [facility]  After you have a colonoscopy, you will stay at the clinic for 1 to 2 hours until the medicines wear off. Then you can go home, but you will need to arrange for a ride. Your doctor will tell you when you can eat and do your other usual activities. Your doctor will talk to you about when you will need your next colonoscopy. The results of your test and your risk for colorectal cancer will help your doctor decide how often you need to be checked. After the test, you may be bloated or have gas pains. You may need to pass gas. If a biopsy was done or a polyp was removed, you may have streaks of blood in your stool (feces) for a few days. This care sheet gives you a general idea about how long it will take for you to recover. But each person recovers at a different pace. Follow the steps below to get better as quickly as possible. How can you care for yourself at home? Activity  Rest as much as you need to after you go home. You should be able to go back to your usual activities the day after the test.  Diet  Follow your doctors directions for eating. Drink plenty of fluids (unless your doctor has told you not to) to replace the fluids that were lost during the colon prep. Do not drink alcohol. Medicines  If polyps were removed or a biopsy was done during the test, your doctor may tell you not to take aspirin or other anti-inflammatory medicines, such as ibuprofen (Advil, Motrin) and naproxen (Aleve), for a few days. Other instructions  For your safety, you should not drive or operate machinery until the medicine effects are gone and you can think clearly. Your doctor may tell you not to drive or operate machinery until the day after your test.  Do not sign legal documents or make major decisions until the medicine effects are gone and you can think clearly. to remove a polyp. It is done for cancer prevention. In rare cases, larger polyps can cause troublesome symptoms, such as rectal bleeding, abdominal pain, and bowel irregularities. A polyp removal will relieve these symptoms. Possible Complications   Complications are rare, but no procedure is completely free of risk. If you are planning to have a polypectomy, your doctor will review a list of possible complications, which may include:   Damage to the colon wall   Bleeding   Infection   Adverse reaction to the sedative   Factors that may increase the risk of complications include:   Type, size, and location of the polyp   Patient factors, such as blood-clotting disorders, substance abuse, or other diseases (eg, obesity , diabetes )   What to Expect   Prior to Procedure    Your doctor will likely do the following:   Physical exam and health history   Review of medicines   Test your stool for hidden blood (called \"occult blood\")   X-rays an exam that uses small amounts of radiation to make a picture of the inside of the body   Barium enema x-ray exam that uses contrast to help better see the colon   Diagnostic colonoscopy or sigmoidoscopyexamination of the inside of the intestine with an endoscope   Your colon must be completely cleaned before the procedure. Any stool left in the intestine will block the view. This preparation may start several days before the procedure. Follow your doctor's instructions, which may include any of the following cleansing methods:   Enemas fluid introduced into the rectum to stimulate a bowel movement   Laxativesmedicines that cause you to have soft bowel movements   A clear-liquid diet   Oral cathartic medicinesa large container of fluid to drink, which stimulates a bowel movement   Leading up to your procedure:   Talk to your doctor about your medicines.  You may be asked to stop taking some medicines up to one week before the procedure, like:   Anti-inflammatory drugs (eg, aspirin)   Blood thinners, like clopidogrel (Plavix) or warfarin (Coumadin)   Iron supplements or vitamins containing iron. The night before, eat a light meal. Do not eat or drink anything after midnight. Wear comfortable clothing. If you have diabetes, ask your doctor if you need to adjust your insulin dose. Arrange for a ride home after the procedure. Anesthesia    You will receive a sedative. This will help you relax. You will be drowsy but awake. Description of the Procedure    You will be asked to lie on your side or on your back. A scope, a long flexible tube with a camera on the end, will be inserted through the anus. It will be slowly pushed through the rectum to the colon. The scope will also add air to open the colon. Using the scope, the doctor will locate the polyp. The polyp will be snipped off with a wire snare from the scope. In some cases, the polyp may be destroyed with an electric current. The electric current is also used to close the wound and stop bleeding. The polyps will then be removed for lab testing. When the doctor is finished, the scope will be slowly removed. For larger polyps, a laparoscopic surgical procedure may be needed. Special surgical tools will be inserted through small incisions in the abdomen. The tools will be used to locate and remove the polyp. How Long Will It Take? 30-60 minutes   Will It Hurt? The special cleaning solution, laxatives, and/or enemas often cause discomfort. During and following the procedure, there is little or no pain. You may feel pressure, bloating, and/or cramping because of the air passed into the colon. This discomfort will go away with the passing of gas. Your doctor may prescribe pain medicine. If not, you can take non-prescription pain relievers for discomfort. Post-procedure Care   At the [facility]    The polyps will be sent to a lab for testing. At Home    Expect a complete recovery within two weeks.  To ensure a smooth recovery, be sure to follow your doctor's instructions , which may include: The sedative will make you drowsy. Do not drive, operate machinery, or make important decisions the day of the procedure. Return to your normal diet the same or next day. Avoid tea, coffee, cola drinks, alcohol, and spicy foods for at least 2-3 days following surgery. These can irritate the digestive system. To speed healing, resume normal activities as soon as you feel able. Most people feel well enough by the next day. Ask your doctor when you can participate in any rigorous exercise. Ask your doctor about when it is safe to shower, bathe, or soak in water. You will be scheduled for a follow-up colonoscopy in the future. It will be important to check for recurrence of polyps. Your doctor will discuss the results with you either the day of surgery or the following day. Call Your Doctor   After arriving home, contact your doctor if any of the following occurs:   Signs of infection, including fever and chills   Redness, swelling, increasing pain, excessive bleeding, or discharge from the rectum (Up to cup of blood per day can be expected for up to 3-4 days following your polypectomy.)   Black, tarry stools   Severe abdominal pain   Hard, swollen abdomen   Inability to pass gas or stool   Cough , shortness of breath, chest pain, or severe nausea or vomiting   New, unexplained symptoms   In case of emergency,  CALL 911  . Last Reviewed: December 2010 Santi Carrion MD   Updated: 4/7/2011                  PATIENT INSTRUCTIONS  DIVERTICULOSIS    FOLLOW-UP:  Please make an appointment with your physician as directed. Call your physician immediately if you have any fevers greater than 102.5,  increasing abdominal pain, GI bleeding (from the colon or rectum),or nausea/vomiting.       CAUSE:  Some people may have congenital diverticulosis, but most people develop diverticulosis around or after age 36 due to a low-fiber, high-fat diet, along with inadequate fluid intake. This is very prevalent in the industrialized world where we eat a lot of processed, low fiber foods. Diverticuli develop due to firm stool and high pressures in the colon, along with secondary spasm, which causes outpouchings to occus where the small arteries penetrate the wall of the colon to feed the internal lining. These occur most commonly on the left side and lower portions of the colon. Diverticuli can then cause bleeding from the arteries at these sites of weakness when they rupture or the diveritculi can get blocked with stool and debris and become obstructed, causing diverticulosis, which is due to an infection. When these are infected, diverticulitis, it is often treated with antibiotics and bowel rest, but when severe, recurrent, or if rupture of the colon occurs, it may require surgery. Following appropriate dietary changes and taking the proper precautions is therefore very important. DIET:  You should increase your dietary fiber intake and take a fiber supplement twice a day. Make sure that you are taking a supplement that is just fiber and is not a laxative, which should be noted on the package. Starting a fiber supplement may cause increased gas, more frequent bowel movements, and distension at first but this should improve after a couple of weeks. Try to eat whole wheat breads and pasta, more fruits and vegetables, along with brown rice and plenty of fluids. Avoid small undigestible food items that could get stuck in these outpouchings, such as unpopped popcorn kernals, whole corn, small undigestible seeds, etc..    ACTIVITY:  Exercise is also a great way to prevent constipation and is encouraged. It may also help prevent progression of your diverticulosis. Always make sure you take in plenty of fluids when exercising. MEDICATIONS:  Take an over-the-counter fiber supplement as noted above twice daily.   If your symptoms don't improve with fiber and dietary changes alone your physician may also recommend psyllium or methylcellulose as well. If your physician has placed you on an antibiotic it is critical that you take the full course of these, even if your symptoms have improved, and that you not miss any doses. QUESTIONS:  Please feel free to call your physician or the hospital  if you have any questions, and they will be glad to assist you. If you have further questions it may also be helpful to meet with a dietitician. High-Fiber Diet     What Is Fiber? Dietary fiber is a form of carbohydrate found in plants that cannot be digested by humans. All plants contain fiber, including fruits, vegetables, grains, and legumes. Fiber is often classified into two categories: soluble and insoluble. Soluble fiber draws water into the bowel and can help slow digestion. Examples of foods that are high in soluble fiber include oatmeal, oat bran, barley, legumes (eg, beans and peas), apples, and strawberries. Insoluble fiber speeds digestion and can add bulk to the stool. Examples of foods that are high in insoluble fiber include whole-wheat products, wheat bran, cauliflower, green beans, and potatoes. Why Follow a High-Fiber Diet? A high-fiber diet is often recommended to prevent and treat constipation , hemorrhoids , diverticulitis , and irritable bowel syndrome . Eating a high-fiber diet can also help improve your cholesterol levels, lower your risk of coronary heart disease , reduce your risk of type 2 diabetes , and lower your weight. For people with type 1 or 2 diabetes, a high-fiber diet can also help stabilize blood sugar levels. How Much Fiber Should I Eat? A high-fiber diet should contain  20-35 grams  of fiber a day. This is actually the amount recommended for the general adult population; however, most Americans eat only 15 grams of fiber per day.    Digestion of Fiber   Eating a higher fiber diet than usual can take some getting used to by your body's digestive system. To avoid the side effects of sudden increases in dietary fiber (eg, gas, cramping, bloating, and diarrhea), increase fiber gradually and be sure to drink plenty of fluids every day. Tips for Increasing Fiber Intake   Whenever possible, choose whole grains over refined grains (eg, brown rice instead of white rice, whole-wheat bread instead of white bread). Include a variety of grains in your diet, such as wheat, rye, barley, oats, quinoa, and bulgur. Eat more vegetarian-based meals. Here are some ideas: black bean burgers, eggplant lasagna, and veggie tofu stir-ryan. Choose high-fiber snacks, such as fruits, popcorn, whole-grain crackers, and nuts. Make whole-grain cereal or whole-grain toast part of your daily breakfast regime. When eating out, whether ordering a sandwich or dinner, ask for extra vegetables. When baking, replace part of the white flour with whole-wheat flour. Whole-wheat flour is particularly easy to incorporate into a recipe. High-Fiber Diet Eating Guide   Food Category   Foods Recommended   Notes   Grains   Whole-grain breads, muffins, bagels, or janine bread Rye bread Whole-wheat crackers or crisp breads Whole-grain or bran cereals Oatmeal, oat bran, or grits Wheat germ Whole-wheat pasta and brown rice   Read the ingredients list on food labels. Look for products that list \"whole\" as the first ingredient (eg, whole-wheat, whole oats). Choose cereals with at least 2 grams of fiber per serving.    Vegetables   All vegetables, especially asparagus, bean sprouts, broccoli, [place] sprouts, cabbage, carrots, cauliflower, celery, corn, greens, green beans, green pepper, onions, peas, potatoes (with skin), snow peas, spinach, squash, sweet potatoes, tomatoes, zucchini   For maximum fiber intake, eat the peels of fruits and vegetablesjust be sure to wash them well first.   Fruits   All fruits, especially apples, berries, grapefruits, mangoes, nectarines, oranges, peaches, pears, dried fruits (figs, dates, prunes, raisins)   Choose raw fruits and vegetables over juice, cooked, or cannedraw fruit has more fiber. Dried fruit is also a good source of fiber. Milk   With the exception of yogurt containing inulin (a type of fiber), dairy foods provide little fiber. Add more fiber by topping your yogurt or cottage cheese with fresh fruit, whole grain or bran cereals, nuts, or seeds. Meats and Beans   All beans and peas, especially Garbanzo beans, kidney beans, lentils, lima beans, split peas, and belcher beans All nuts and seeds, especially almonds, peanuts, [place] nuts, cashews, peanut butter, walnuts, sesame and sunflower seeds All meat, poultry, fish, and eggs   Increase fiber in meat dishes by adding belcher beans, kidney beans, black-eyed peas, bran, or oatmeal. If you are following a low-fat diet, use nuts and seeds only in moderation. Fats and Oils   All in moderation   Fats and oils do not provide fiber   Snacks, Sweets, and Condiments   Fruit Nuts Popcorn, whole-wheat pretzels, or trail mix made with dried fruits, nuts, and seeds Cakes, breads, and cookies made with oatmeal or whole-wheat flour   Most snack foods do not provide much fiber. Choose snacks with at least 2 grams of fiber per serving.      Last Reviewed: March 2011 Lukas Winn MS, MPH, RD   Updated: 3/29/2011

## 2022-08-09 NOTE — H&P
HISTORY and [address] [address]       NAME:  Jermaine Martines  MRN: [id number]   YOB: 1945   Date: 8/9/2022   Age: 76 y.o. Gender: male       COMPLAINT AND PRESENT HISTORY:   Jermaine Martines is 76 y.o.,   male, having a Diagnostic Colonoscopy, for hx of: Colitis      Prior Colonoscopy was done approx a couple months ago. Pt states that his colon was very inflamed and was possibly clamped. Patient has hx of Colon Polyps. Pt also has hx of Diverticulosis. Patient has positive FH of Colon Cancer in Father. Patient reports no changes in bowel habits. No difficulty in bowel movements. Pt denies any GI /Rectal bleeding, Denies any melena stools. Patient denies any nausea / vomiting. No diarrhea or constipation. No abdominal pains or cramping. No heartburn or dysphagia. no changes in the color, caliber or  consistency of the stools. Any significant  medical conditions:   CVA-no residual,   CHF, HTN, HLD, THYROID, PVD, SVT, AMBER-wears CPAP, ANEMIA. S/p hx double ablation of heart, and cardioversion. Admits to SOB. Denies current chest pain, No recent URI, fever or chills. Any anticoagulants or blood thinners: Eliquis (stopped on Sunday)    Patient has been NPO since midnight. No blood thinners in the past  5-7 days. Patient took his thyroid and lopressor this am.    Patient states has taken all bowel prep with clear outcome. Patient denies any personal or family problems with anesthesia.     PAST MEDICAL HISTORY     Past Medical History:   Diagnosis Date    Adenocarcinoma of prostate ([address] 10/30/2015    Anemia     Cellulitis of lower extremity     right     Cerebral artery occlusion with cerebral infarction ([address]     1996    CHF (congestive heart failure) ([address]     Chronic acquired lymphedema     Clavicle fracture     in high school    Hernia, abdominal     History of blood transfusion 2003 7 2006 [address] [facility] SURGERY    History of CVA (cerebrovascular accident) 54    DEFECIT MILD MEMORY AND SPEECH    Hyperlipidemia     Hypertension     Hypothyroid 09/2015    Ileus, postoperative (HCC)     Mild renal insufficiency     Morbid obesity ([facility]     Non-healing wound of lower extremity     HISTORY OF, WAS SEEN IN WOUND CLINIC FOR COUPLE YRS. Osteoarthritis     Phlebitis     HX. OF     Prolonged emergence from general anesthesia     x1 had to be put back on ventolator, after prostate surgery, had to be hospitalized x12 days.      Prostate CA [facility])     Prostate cancer ([facility] 10/21/2015    PVD (peripheral vascular disease) ([facility]     Sleep apnea     C-PAP NIGHTLY-PT INSTRUCTED TO BRING    SVT (supraventricular tachycardia) (HCC)     Uric acid kidney stone 12/2014    HAD 6 PASSSED ONE ON OWN, OTHER 5 IS BEING MONITORED    Wears glasses     Wrist fracture rt,       SURGICAL HISTORY       Past Surgical History:   Procedure Laterality Date    ABLATION OF DYSRHYTHMIC FOCUS  03/16/2018    afib ablation    ABLATION OF DYSRHYTHMIC FOCUS  08/22/2019    ATRIAL FIB  /  DR [address] [facility]  07/30/2020    Dr. Annalee Chau, [address] [place] [address]  11/17/2017    COLONOSCOPY  2002, 2007    COLONOSCOPY  07/11/2016    polyp,bx    COLONOSCOPY N/A 5/2/2022    COLONOSCOPY WITH RANDOM COLON BIOPSIES AND CLIPPING AT DISTAL TRANSVERSE COLON performed by Latoya Coe MD at [address] Left 08/05/2016    excision cyst anterior chest    HAMMER TOE SURGERY Bilateral     JOINT REPLACEMENT      KNEE SURGERY Left 1985    repair of torn ligaments    [place] COLON CA SCRN NOT  W [address] IND N/A 07/31/2017    COLONOSCOPY performed by Latoya Coe MD at [address] [facility] 05/15/2018    HIP TOTAL ARTHROPLASTY MINIMALLY INVASIVE ASI WITH GPS performed by Pepe Be MD at [address]  10/21/2015    robotic    SHOULDER ARTHROPLASTY Bilateral     STUDY POSS ABLATION  04/02/2018         TONSILLECTOMY      TOTAL KNEE ARTHROPLASTY Bilateral LT-2003, RT-2006    TRANSESOPHAGEAL ECHOCARDIOGRAM  2017    TRANSESOPHAGEAL ECHOCARDIOGRAM  2019    TUMOR EXCISION      Throat/nose D/T benign tumor    UPPP UVULOPALATOPHARYGOPLASTY  90'S    VARICOSE VEIN SURGERY Bilateral [de-identified]    x2       FAMILY HISTORY       Family History   Problem Relation Age of Onset    Diabetes Mother     Hypertension Mother     Heart Attack Mother     Colon Cancer Father     Heart Attack Father     Stroke Father     Cirrhosis Brother 54        ALCOHOL RELATED    Dementia Maternal Grandmother     Diabetes Maternal Grandmother     Cancer Paternal Grandmother         uterine    Hypertension Paternal Grandfather        SOCIAL HISTORY       Social History     Socioeconomic History    Marital status:    Tobacco Use    Smoking status: Former     Packs/day: 2.00     Years: 10.00     Pack years: 20.00     Types: Cigarettes     Quit date: 1970     Years since quittin.6    Smokeless tobacco: Never   Vaping Use    Vaping Use: Never used   Substance and Sexual Activity    Alcohol use: Yes     Comment: 1-2 beer a day    Drug use: No           REVIEW OF SYSTEMS      Allergies   Allergen Reactions    Adhesive Tape Itching and Rash    Doxycycline Rash       No current facility-administered medications on file prior to encounter.      Current Outpatient Medications on File Prior to Encounter   Medication Sig Dispense Refill    budesonide (ENTOCORT EC) 3 MG extended release capsule TAKE 3 CAPSULES BY MOUTH EVERY DAY      Ascorbic Acid (VITAMIN C) 250 MG tablet Take 250 mg by mouth daily      polyethylene glycol (GLYCOLAX) 17 GM/SCOOP powder Please follow instructions given to you by provider 238 g 0    bisacodyl (BISACODYL) 5 MG EC tablet Please follow instructions given by provider 4 tablet 0    magnesium citrate solution Take 296 mLs by mouth once for 1 dose 296 mL 0    allopurinol (ZYLOPRIM) 300 MG tablet TAKE 1 TABLET TWICE A  tablet 3    Melatonin 10 MG TABS Take 1 tablet by mouth daily       zinc 50 MG CAPS Take 50 mg by mouth daily 30 capsule 3    furosemide (LASIX) 40 MG tablet Take 1 tablet by mouth daily (Patient taking differently: Take 40 mg by mouth daily Patient is taking 1.5 tab daily (60mg)) 90 tablet 3    pantoprazole (PROTONIX) 40 MG tablet Take 1 tablet by mouth every morning (before breakfast) 90 tablet 1    Metoprolol Tartrate 37.5 MG TABS Take 37.5 mg by mouth 2 times daily Takes 1 1/2 tablets 2xdaily      Bisacodyl (DULCOLAX PO) Take 1 tablet by mouth as needed       apixaban (ELIQUIS) 5 MG TABS tablet Take 5 mg by mouth 2 times daily      trospium (SANCTURA) 20 MG tablet Take 20 mg by mouth 2 times daily Prescribed by Dr. Leonila Salomon      acetaminophen (TYLENOL) 325 MG tablet Take 650 mg by mouth every 6 hours as needed for Pain      CINNAMON PO Take 2 capsules by mouth daily      Probiotic Product (PROBIOTIC ADVANCED PO) Take by mouth      Multiple Vitamins-Minerals (OCUVITE PO) Take 1 tablet by mouth daily      Cholecalciferol (VITAMIN D-3 PO) Take  by mouth daily. 2000 IU daily       Coenzyme Q10 (CO Q 10 PO) Take 1 tablet by mouth daily          Negative except for what is mentioned in the HPI. GENERAL PHYSICAL EXAM     Vitals :   See vital signs in RN flow sheet. GENERAL APPEARANCE:   Joan Hernández is 76 y.o., male, moderately obese, nourished, conscious, alert. Does not appear to be distress or pain at this time. SKIN:  Warm, dry, no cyanosis or jaundice. HEAD:  Normocephalic, atraumatic, no swelling or tenderness. EYES:  Pupils equal, reactive to light. EARS:  No discharge, no marked hearing loss. NOSE:  No rhinorrhea, epistaxis or septal deformity. THROAT:  Not congested. No ulceration bleeding or discharge.                   NECK:  No stiffness, trachea central.  No palpable masses or L.N.                 CHEST:  Symmetrical and equal on expansion. HEART:  RRR . No audible murmurs or gallops. LUNGS:  Equal on expansion, normal breath sounds. No adventitious sounds. ABDOMEN:  Obese. Soft on palpation. No dysphagia, No localized tenderness. No guarding or rigidity. Large hernia noted ventral.               LYMPHATICS:  No palpable cervical lymphadenopathy. LOCOMOTOR, BACK AND SPINE:  No tenderness or deformities. EXTREMITIES:  Symmetrical, no pretibial edema. No discoloration or ulcerations. NEUROLOGIC:  The patient is conscious, alert, oriented,Cranial nerve II-XII intact, taste and smell were not examined. No apparent focal sensory or motor deficits.              PROVISIONAL DIAGNOSES / SURGERY:      COLONOSCOPY DIAGNOSTIC    Colitis     Patient Active Problem List    Diagnosis Date Noted    Colitis 05/02/2022    Localized edema 04/18/2022    Stage 3a chronic kidney disease ([address] 12/29/2021    Chronic right shoulder pain 07/16/2021    Secondary hyperparathyroidism of renal origin ([address] 03/29/2021    Atrial flutter ([address] 06/03/2020    Morbid obesity with BMI of 40.0-44.9, adult ([address] 06/03/2020    History of prostate cancer 03/26/2020    Osteoarthritis of right glenohumeral joint 07/20/2019    Osteoarthritis of left glenohumeral joint 07/20/2019    Class 3 obesity due to excess calories with serious comorbidity and body mass index (BMI) of 40.0 to 44.9 in adult 07/17/2018    Primary osteoarthritis of left hip 05/15/2018    Arthritis of left hip 05/08/2018    Lumbar radiculopathy 11/15/2017    Spinal stenosis of lumbar region 11/15/2017    Acquired hypothyroidism 07/07/2016    Renal insufficiency 12/20/2011    Anemia 12/20/2011    Hyperglycemia 12/20/2011    Hypertension     Chronic acquired lymphedema     Sleep apnea     History of CVA (cerebrovascular accident)     Hyperlipidemia     Chronic diastolic congestive heart failure ([address]     Osteoarthritis            RADHA SARAVIA Sascha Couch CNP on 8/9/2022 at 10:14 AM

## 2022-08-09 NOTE — ANESTHESIA PRE PROCEDURE
Department of Anesthesiology  Preprocedure Note       Name:  Claudeen Southern   Age:  76 y.o.  :  1945                                          MRN:  [id number]         Date:  2022      Surgeon: Derrick Jackman):  Jen Burns MD    Procedure: Procedure(s):  COLONOSCOPY DIAGNOSTIC    Medications prior to admission:   Prior to Admission medications    Medication Sig Start Date End Date Taking?  Authorizing Provider   levothyroxine (SYNTHROID) 25 MCG tablet TAKE 1 TABLET DAILY 22   Leslie Gonsales MD   lisinopril (PRINIVIL;ZESTRIL) 5 MG tablet TAKE 1 TABLET NIGHTLY 22   Leslie Gonsales MD   atorvastatin (LIPITOR) 10 MG tablet TAKE 1 TABLET DAILY 22   Leslie Gonsales MD   budesonide (ENTOCORT EC) 3 MG extended release capsule TAKE 3 CAPSULES BY MOUTH EVERY DAY 22   Historical Provider, MD   Ascorbic Acid (VITAMIN C) 250 MG tablet Take 250 mg by mouth daily    Historical Provider, MD   polyethylene glycol (GLYCOLAX) 17 GM/SCOOP powder Please follow instructions given to you by provider 22   Jen Burns MD   bisacodyl (BISACODYL) 5 MG EC tablet Please follow instructions given by provider 22   Jen Burns MD   magnesium citrate solution Take 296 mLs by mouth once for 1 dose 22  Isha Osorio MD   allopurinol (ZYLOPRIM) 300 MG tablet TAKE 1 TABLET TWICE A DAY 22   Leslie Gonsales MD   Melatonin 10 MG TABS Take 1 tablet by mouth daily     Historical Provider, MD   zinc 50 MG CAPS Take 50 mg by mouth daily 3/29/21   Leslie Gonsales MD   furosemide (LASIX) 40 MG tablet Take 1 tablet by mouth daily  Patient taking differently: Take 40 mg by mouth daily Patient is taking 1.5 tab daily (60mg) 20   Leslie Gonsales MD   pantoprazole (PROTONIX) 40 MG tablet Take 1 tablet by mouth every morning (before breakfast) 19   DONALDO Salgado - CNP   Metoprolol Tartrate 37.5 MG TABS Take 37.5 mg by mouth 2 times daily Takes 1 / tablets 2xdaily    Historical Provider, MD   Bisacodyl (DULCOLAX PO) Take 1 tablet by mouth as needed     Historical Provider, MD   apixaban (ELIQUIS) 5 MG TABS tablet Take 5 mg by mouth 2 times daily    Historical Provider, MD   trospium (SANCTURA) 20 MG tablet Take 20 mg by mouth 2 times daily Prescribed by Dr. Dionisio Bernabe Provider, MD   acetaminophen (TYLENOL) 325 MG tablet Take 650 mg by mouth every 6 hours as needed for Pain    Historical Provider, MD   CINNAMON PO Take 2 capsules by mouth daily    Historical Provider, MD   Probiotic Product (PROBIOTIC ADVANCED PO) Take by mouth    Historical Provider, MD   Multiple Vitamins-Minerals (OCUVITE PO) Take 1 tablet by mouth daily    Historical Provider, MD   Cholecalciferol (VITAMIN D-3 PO) Take  by mouth daily. 2000 IU daily     Historical Provider, MD   Coenzyme Q10 (CO Q 10 PO) Take 1 tablet by mouth daily     Historical Provider, MD       Current medications:    No current facility-administered medications for this visit. No current outpatient medications on file. Facility-Administered Medications Ordered in Other Visits   Medication Dose Route Frequency Provider Last Rate Last Admin    lidocaine PF 1 % injection 1 mL  1 mL IntraDERmal Once PRN Quirino Morris MD        0.9 % sodium chloride infusion   IntraVENous Continuous Quirino Morris MD        sodium chloride flush 0.9 % injection 5-40 mL  5-40 mL IntraVENous 2 times per day Quirino Morris MD        sodium chloride flush 0.9 % injection 5-40 mL  5-40 mL IntraVENous PRN Quirino Morris MD        0.9 % sodium chloride infusion   IntraVENous PRN Quirino Morris MD           Allergies:     Allergies   Allergen Reactions    Adhesive Tape Itching and Rash    Doxycycline Rash       Problem List:    Patient Active Problem List   Diagnosis Code    Hypertension I10    Chronic acquired lymphedema I89.0    Sleep apnea G47.30    History of CVA (cerebrovascular accident) Z80.78    Hyperlipidemia E78.5    Chronic diastolic congestive heart failure (HCC) I50.32    Osteoarthritis M19.90    Renal insufficiency N28.9    Anemia D64.9    Hyperglycemia R73.9    Acquired hypothyroidism E03.9    Arthritis of left hip M16.12    Lumbar radiculopathy M54.16    Spinal stenosis of lumbar region M48.061    Primary osteoarthritis of left hip M16.12    Class 3 obesity due to excess calories with serious comorbidity and body mass index (BMI) of 40.0 to 44.9 in adult [id number]    Osteoarthritis of right glenohumeral joint M19.011    Osteoarthritis of left glenohumeral joint M19.012    History of prostate cancer Z85.46    Atrial flutter (HCC) I48.92    Morbid obesity with BMI of 40.0-44.9, adult (HCC) E66.01, Z68.41    Secondary hyperparathyroidism of renal origin ([facility] N25.81    Chronic right shoulder pain M25.511, G89.29    Stage 3a chronic kidney disease (HCC) N18.31    Localized edema R60.0    Colitis K52.9       Past Medical History:        Diagnosis Date    Adenocarcinoma of prostate ([facility] 10/30/2015    Anemia     Cellulitis of lower extremity     right     Cerebral artery occlusion with cerebral infarction ([facility]     1996    CHF (congestive heart failure) ([facility])     Chronic acquired lymphedema     Clavicle fracture     in high school    Hernia, abdominal     History of blood transfusion 2003 7 2006 [address] [facility] SURGERY    History of CVA (cerebrovascular accident) 54    DEFECIT MILD MEMORY AND SPEECH    Hyperlipidemia     Hypertension     Hypothyroid 09/2015    Ileus, postoperative ([facility])     Mild renal insufficiency     Morbid obesity (HCC)     Non-healing wound of lower extremity     HISTORY OF, WAS SEEN IN WOUND CLINIC FOR COUPLE YRS.    Osteoarthritis     Phlebitis     HX. OF     Prolonged emergence from general anesthesia     x1 had to be put back on ventolator, after prostate surgery, had to be hospitalized x12 days.      Prostate CA ([facility]     Prostate cancer ([facility] 10/21/2015    PVD (peripheral vascular disease) [facility]     Sleep apnea     C-PAP NIGHTLY-PT INSTRUCTED TO BRING    SVT (supraventricular tachycardia) (HCC)     Uric acid kidney stone 2014    HAD 6 PASSSED ONE ON OWN, OTHER 5 IS BEING MONITORED    Wears glasses     Wrist fracture rt,       Past Surgical History:        Procedure Laterality Date    ABLATION OF DYSRHYTHMIC FOCUS  2018    afib ablation    ABLATION OF DYSRHYTHMIC FOCUS  2019    ATRIAL FIB  /  DR [facility]  2020    Dr. Yony Roland, [address] [facility]  2017    COLONOSCOPY  ,     COLONOSCOPY  2016    polyp,bx    COLONOSCOPY N/A 2022    COLONOSCOPY WITH RANDOM COLON BIOPSIES AND CLIPPING AT DISTAL TRANSVERSE COLON performed by Tiffanie Landry MD at [address] [facility] 2016    excision cyst anterior chest    HAMMER TOE SURGERY Bilateral     JOINT REPLACEMENT      KNEE SURGERY Left     repair of torn ligaments    [place] COLON CA SCRN NOT  W [facility] IND N/A 2017    COLONOSCOPY performed by Tiffanie Landry MD at [facility] 05/15/2018    HIP TOTAL ARTHROPLASTY MINIMALLY INVASIVE ASI WITH GPS performed by Jennifer David MD at [address] [place] [address]  10/21/2015    robotic    SHOULDER ARTHROPLASTY Bilateral     STUDY POSS ABLATION  2018         TONSILLECTOMY      TOTAL KNEE ARTHROPLASTY Bilateral LT-, RT-    TRANSESOPHAGEAL ECHOCARDIOGRAM  2017    TRANSESOPHAGEAL ECHOCARDIOGRAM  2019    TUMOR EXCISION      Throat/nose D/T benign tumor    UPPP UVULOPALATOPHARYGOPLASTY  90'S    VARICOSE VEIN SURGERY Bilateral 80's    x2       Social History:    Social History     Tobacco Use    Smoking status: Former     Packs/day: 2.00     Years: 10.00     Pack years: 20.00     Types: Cigarettes     Quit date: 1970     Years since quittin.6    Smokeless tobacco: Never Substance Use Topics    Alcohol use: Yes     Comment: 1-2 beer a day                                Counseling given: Not Answered      Vital Signs (Current): There were no vitals filed for this visit. BP Readings from Last 3 Encounters:   06/27/22 (!) 141/70   05/13/22 138/60   05/04/22 (!) 120/56       NPO Status:                                                                                 BMI:   Wt Readings from Last 3 Encounters:   07/28/22 292 lb (132.5 kg)   06/27/22 299 lb (135.6 kg)   05/13/22 (!) 301 lb 6.4 oz (136.7 kg)     There is no height or weight on file to calculate BMI.    CBC:   Lab Results   Component Value Date/Time    WBC 7.0 05/19/2022 02:52 PM    RBC 5.04 05/19/2022 02:52 PM    RBC 4.74 03/08/2012 06:24 PM    HGB 16.6 05/19/2022 02:52 PM    HCT 51.3 05/19/2022 02:52 PM    .8 05/19/2022 02:52 PM    RDW 15.3 05/19/2022 02:52 PM     05/19/2022 02:52 PM     03/08/2012 06:24 PM       CMP:   Lab Results   Component Value Date/Time     05/19/2022 02:52 PM    K 3.8 05/19/2022 02:52 PM     05/19/2022 02:52 PM    CO2 30 05/19/2022 02:52 PM    BUN 20 05/19/2022 02:52 PM    CREATININE 0.96 05/19/2022 02:52 PM    GFRAA >60 05/19/2022 02:52 PM    LABGLOM >60 05/19/2022 02:52 PM    GLUCOSE 127 05/19/2022 02:52 PM    GLUCOSE 100 04/19/2012 02:20 PM    PROT 6.8 05/19/2022 02:52 PM    PROT 7.4 01/28/2013 11:53 AM    CALCIUM 10.2 05/19/2022 02:52 PM    BILITOT 0.64 05/19/2022 02:52 PM    ALKPHOS 64 05/19/2022 02:52 PM    AST 27 05/19/2022 02:52 PM    ALT 22 05/19/2022 02:52 PM       POC Tests: No results for input(s): POCGLU, POCNA, POCK, POCCL, POCBUN, POCHEMO, POCHCT in the last 72 hours.     Coags:   Lab Results   Component Value Date/Time    PROTIME 10.9 03/16/2018 07:11 PM    INR 1.0 03/16/2018 07:11 PM    APTT 80.7 07/15/2018 04:54 AM       HCG (If Applicable): No results found for: PREGTESTUR, PREGSERUM, HCG, HCGQUANT ABGs:   Lab Results   Component Value Date/Time    PHART 7.281 10/21/2015 11:20 AM    PO2ART 88.3 10/21/2015 11:20 AM    VHF2SOS 61.6 10/21/2015 11:20 AM    OZC6UJC 28.1 10/21/2015 11:20 AM    V5AOSSJK 96.1 10/21/2015 11:20 AM        Type & Screen (If Applicable):  No results found for: LABABO, LABRH    Drug/Infectious Status (If Applicable):  Lab Results   Component Value Date/Time    HEPCAB NONREACTIVE 01/28/2013 11:53 AM       COVID-19 Screening (If Applicable):   Lab Results   Component Value Date/Time    COVID19 Not Detected 07/27/2020 09:48 PM           Anesthesia Evaluation  Patient summary reviewed and Nursing notes reviewed history of anesthetic complications (required post op vent after prostatectomy): Airway: Mallampati: III  TM distance: >3 FB   Neck ROM: full  Mouth opening: > = 3 FB   Dental: normal exam         Pulmonary: breath sounds clear to auscultation  (+) sleep apnea: on CPAP,                             Cardiovascular:    (+) hypertension: no interval change, dysrhythmias: atrial fibrillation, CHF: diastolic, hyperlipidemia      ECG reviewed  Rhythm: regular  Rate: normal  Echocardiogram reviewed               ROS comment: Echo: 2019  Normal left ventricular chamber dimension and function. Estimated left ventricular ejection fraction 55 %  PFO with left to right shunt     Neuro/Psych:   (+) CVA: residual symptoms, neuromuscular disease:,              ROS comment: DEFECIT MILD MEMORY AND SPEECH GI/Hepatic/Renal:   (+) bowel prep, morbid obesity          Endo/Other:    (+) hypothyroidism, blood dyscrasia: anticoagulation therapy, arthritis: OA and no interval change. , malignancy/cancer (prostate). Abdominal:   (+) obese,           Vascular:   + PVD, aortic or cerebral, . Other Findings:             Anesthesia Plan      general     ASA 3     ([place]iva)  Induction: intravenous. MIPS: Prophylactic antiemetics administered.   Anesthetic plan and risks discussed with patient. Plan discussed with CRNA.                     Nav Richardson MD   8/9/2022

## 2022-08-10 LAB — SURGICAL PATHOLOGY REPORT: NORMAL

## 2022-08-11 ENCOUNTER — OFFICE VISIT (OUTPATIENT)
Dept: PRIMARY CARE CLINIC | Age: 77
End: 2022-08-11
Payer: MEDICARE

## 2022-08-11 VITALS
DIASTOLIC BLOOD PRESSURE: 66 MMHG | BODY MASS INDEX: 42.95 KG/M2 | SYSTOLIC BLOOD PRESSURE: 124 MMHG | OXYGEN SATURATION: 96 % | WEIGHT: 303.6 LBS | HEART RATE: 76 BPM

## 2022-08-11 DIAGNOSIS — I10 PRIMARY HYPERTENSION: Primary | ICD-10-CM

## 2022-08-11 DIAGNOSIS — E03.9 ACQUIRED HYPOTHYROIDISM: ICD-10-CM

## 2022-08-11 DIAGNOSIS — M25.551 RIGHT HIP PAIN: ICD-10-CM

## 2022-08-11 PROCEDURE — 99213 OFFICE O/P EST LOW 20 MIN: CPT | Performed by: FAMILY MEDICINE

## 2022-08-11 PROCEDURE — 1123F ACP DISCUSS/DSCN MKR DOCD: CPT | Performed by: FAMILY MEDICINE

## 2022-08-11 RX ORDER — FUROSEMIDE 40 MG/1
40 TABLET ORAL DAILY
Qty: 135 TABLET | Refills: 3
Start: 2022-08-11

## 2022-08-11 RX ORDER — TRAMADOL HYDROCHLORIDE 50 MG/1
100 TABLET ORAL EVERY 6 HOURS PRN
Qty: 360 TABLET | Refills: 0 | Status: SHIPPED | OUTPATIENT
Start: 2022-08-11 | End: 2022-09-10

## 2022-08-11 SDOH — ECONOMIC STABILITY: FOOD INSECURITY: WITHIN THE PAST 12 MONTHS, YOU WORRIED THAT YOUR FOOD WOULD RUN OUT BEFORE YOU GOT MONEY TO BUY MORE.: NEVER TRUE

## 2022-08-11 SDOH — ECONOMIC STABILITY: FOOD INSECURITY: WITHIN THE PAST 12 MONTHS, THE FOOD YOU BOUGHT JUST DIDN'T LAST AND YOU DIDN'T HAVE MONEY TO GET MORE.: NEVER TRUE

## 2022-08-11 ASSESSMENT — PATIENT HEALTH QUESTIONNAIRE - PHQ9
SUM OF ALL RESPONSES TO PHQ QUESTIONS 1-9: 0
SUM OF ALL RESPONSES TO PHQ9 QUESTIONS 1 & 2: 0
SUM OF ALL RESPONSES TO PHQ QUESTIONS 1-9: 0
2. FEELING DOWN, DEPRESSED OR HOPELESS: 0
1. LITTLE INTEREST OR PLEASURE IN DOING THINGS: 0

## 2022-08-11 ASSESSMENT — ENCOUNTER SYMPTOMS
EYE REDNESS: 0
SORE THROAT: 0
COUGH: 0
DIARRHEA: 0
RHINORRHEA: 0
ABDOMINAL PAIN: 0
EYE DISCHARGE: 0
VOMITING: 0
SHORTNESS OF BREATH: 0
WHEEZING: 0
NAUSEA: 0

## 2022-08-11 ASSESSMENT — SOCIAL DETERMINANTS OF HEALTH (SDOH): HOW HARD IS IT FOR YOU TO PAY FOR THE VERY BASICS LIKE FOOD, HOUSING, MEDICAL CARE, AND HEATING?: NOT HARD AT ALL

## 2022-08-11 NOTE — PROGRESS NOTES
[facility] PRIMARY CARE  [address] [place] [id number]  Dept: 69 Rosette Pepper is a 76 y.o. male Established patient, who presents today for his medical conditions/complaints as noted below. Chief Complaint   Patient presents with    Hypertension     Patient doesn't check B/P at home     Hypothyroidism    Hyperlipidemia       HPI:     HPI- pt had colonoscopy done the other day- had polyps removed- but results okay. Continues therapy exercises- NuStep and some weights. Still having some hip pain- back by where his back pocket would be. Continues with the tramadol.      Reviewed prior notes:  GI    Reviewed previous:  Labs and Imaging    LDL Cholesterol (mg/dL)   Date Value   04/08/2019 67   12/04/2018 72   04/12/2016 116     LDL Calculated (mg/dL)   Date Value   09/09/2021 76   07/13/2021 73       (goal LDL is <100)   AST (U/L)   Date Value   05/19/2022 27     ALT (U/L)   Date Value   05/19/2022 22     BUN (mg/dL)   Date Value   05/19/2022 20     Hemoglobin A1C (%)   Date Value   07/13/2021 5.8     TSH (uIU/mL)   Date Value   05/19/2022 1.56     BP Readings from Last 3 Encounters:   08/11/22 124/66   08/09/22 (!) 161/88   06/27/22 (!) 141/70          (goal 120/80)    Past Medical History:   Diagnosis Date    Adenocarcinoma of prostate ([address] 10/30/2015    Anemia     Cellulitis of lower extremity     right     Cerebral artery occlusion with cerebral infarction ([address]     1996    CHF (congestive heart failure) ([address]     Chronic acquired lymphedema     Clavicle fracture     in high school    Hernia, abdominal     History of blood transfusion 2003 7 2006    [address]OTR[address]SFUSION [address]    History of CVA (cerebrovascular accident) 54    DEFECIT MILD MEMORY AND SPEECH    Hyperlipidemia     Hypertension     Hypothyroid 09/2015    Ileus, postoperative (HCC)     Mild renal insufficiency     Morbid obesity (HCC)     Non-healing wound of lower extremity     HISTORY OF, WAS SEEN IN WOUND CLINIC FOR COUPLE YRS. Osteoarthritis     Phlebitis     HX. OF     Prolonged emergence from general anesthesia     x1 had to be put back on ventolator, after prostate surgery, had to be hospitalized x12 days.      Prostate CA [facility])     Prostate cancer ([address] 10/21/2015    PVD (peripheral vascular disease) ([address]     Sleep apnea     C-PAP NIGHTLY-PT INSTRUCTED TO BRING    SVT (supraventricular tachycardia) (HCC)     Uric acid kidney stone 12/2014    HAD 6 PASSSED ONE ON OWN, OTHER 5 IS BEING MONITORED    Wears glasses     Wrist fracture rt,      Past Surgical History:   Procedure Laterality Date    ABLATION OF DYSRHYTHMIC FOCUS  03/16/2018    afib ablation    ABLATION OF DYSRHYTHMIC FOCUS  08/22/2019    ATRIAL FIB  /  DR [facility]  07/30/2020    Dr. Jenae Dorman, [address] [place] [address]  11/17/2017    COLONOSCOPY  2002, 2007    COLONOSCOPY  07/11/2016    polyp,bx    COLONOSCOPY N/A 5/2/2022    COLONOSCOPY WITH RANDOM COLON BIOPSIES AND CLIPPING AT DISTAL TRANSVERSE COLON performed by Estela Greene MD at [address] N/A 8/9/2022    COLONOSCOPY POLYPECTOMY SNARE/COLD BIOPSY performed by Bebo Mares MD at [address] Left 08/05/2016    excision cyst anterior chest    HAMMER TOE SURGERY Bilateral     JOINT REPLACEMENT      KNEE SURGERY Left 1985    repair of torn ligaments    MD COLON CA SCRN NOT  [address] [place] [address] IND N/A 07/31/2017    COLONOSCOPY performed by Estela Greene MD at [address] [facility] 05/15/2018    HIP TOTAL ARTHROPLASTY MINIMALLY INVASIVE ASI WITH GPS performed by Joanie Kennedy MD at [facility]  10/21/2015    robotic    SHOULDER ARTHROPLASTY Bilateral     STUDY POSS ABLATION  04/02/2018         TONSILLECTOMY      TOTAL KNEE ARTHROPLASTY Bilateral LT-2003, RT-2006    TRANSESOPHAGEAL ECHOCARDIOGRAM  11/17/2017    TRANSESOPHAGEAL ECHOCARDIOGRAM  08/22/2019    TUMOR EXCISION Throat/nose D/T benign tumor    UPPP UVULOPALATOPHARYGOPLASTY  90'S    VARICOSE VEIN SURGERY Bilateral [de-identified]    x2       Family History   Problem Relation Age of Onset    Diabetes Mother     Hypertension Mother     Heart Attack Mother     Colon Cancer Father     Heart Attack Father     Stroke Father     Cirrhosis Brother 54        ALCOHOL RELATED    Dementia Maternal Grandmother     Diabetes Maternal Grandmother     Cancer Paternal Grandmother         uterine    Hypertension Paternal Grandfather        Social History     Tobacco Use    Smoking status: Former     Packs/day: 2.00     Years: 10.00     Pack years: 20.00     Types: Cigarettes     Quit date: 1970     Years since quittin.6    Smokeless tobacco: Never   Substance Use Topics    Alcohol use: Yes     Comment: 1-2 beer a day      Current Outpatient Medications   Medication Sig Dispense Refill    furosemide (LASIX) 40 MG tablet Take 1 tablet by mouth in the morning. Patient is taking 1.5 tab daily (60mg). 135 tablet 3    traMADol (ULTRAM) 50 MG tablet Take 2 tablets by mouth every 6 hours as needed for Pain for up to 30 days.  360 tablet 0    levothyroxine (SYNTHROID) 25 MCG tablet TAKE 1 TABLET DAILY 90 tablet 3    lisinopril (PRINIVIL;ZESTRIL) 5 MG tablet TAKE 1 TABLET NIGHTLY 90 tablet 3    atorvastatin (LIPITOR) 10 MG tablet TAKE 1 TABLET DAILY 90 tablet 3    budesonide (ENTOCORT EC) 3 MG extended release capsule TAKE 3 CAPSULES BY MOUTH EVERY DAY      Ascorbic Acid (VITAMIN C) 250 MG tablet Take 250 mg by mouth daily      allopurinol (ZYLOPRIM) 300 MG tablet TAKE 1 TABLET TWICE A  tablet 3    Melatonin 10 MG TABS Take 1 tablet by mouth daily       zinc 50 MG CAPS Take 50 mg by mouth daily 30 capsule 3    pantoprazole (PROTONIX) 40 MG tablet Take 1 tablet by mouth every morning (before breakfast) 90 tablet 1    Metoprolol Tartrate 37.5 MG TABS Take 37.5 mg by mouth 2 times daily Takes 1 1/2 tablets 2xdaily      apixaban (ELIQUIS) 5 MG TABS tablet Take 5 mg by mouth 2 times daily      trospium (SANCTURA) 20 MG tablet Take 20 mg by mouth 2 times daily Prescribed by Dr. Mccollum July      acetaminophen (TYLENOL) 325 MG tablet Take 650 mg by mouth every 6 hours as needed for Pain      CINNAMON PO Take 2 capsules by mouth daily      Probiotic Product (PROBIOTIC ADVANCED PO) Take by mouth      Multiple Vitamins-Minerals (OCUVITE PO) Take 1 tablet by mouth daily      Cholecalciferol (VITAMIN D-3 PO) Take  by mouth daily. 2000 IU daily       Coenzyme Q10 (CO Q 10 PO) Take 1 tablet by mouth daily       polyethylene glycol (GLYCOLAX) 17 GM/SCOOP powder Please follow instructions given to you by provider (Patient not taking: Reported on 8/11/2022) 238 g 0    bisacodyl (BISACODYL) 5 MG EC tablet Please follow instructions given by provider (Patient not taking: Reported on 8/11/2022) 4 tablet 0    magnesium citrate solution Take 296 mLs by mouth once for 1 dose (Patient not taking: Reported on 8/11/2022) 296 mL 0    Bisacodyl (DULCOLAX PO) Take 1 tablet by mouth as needed  (Patient not taking: Reported on 8/11/2022)       No current facility-administered medications for this visit. Allergies   Allergen Reactions    Adhesive Tape Itching and Rash    Doxycycline Rash       Health Maintenance   Topic Date Due    Shingles vaccine (1 of 2) Never done    COVID-19 Vaccine (4 - Booster for Moderna series) 04/21/2022    Lipids  09/09/2022    Flu vaccine (1) 09/01/2022    Depression Screen  12/29/2022    Annual Wellness Visit (AWV)  12/30/2022    Prostate Specific Antigen (PSA) Screening or Monitoring  07/18/2023    DTaP/Tdap/Td vaccine (2 - Td or Tdap) 04/01/2032    Pneumococcal 65+ years Vaccine  Completed    Hepatitis C screen  Completed    Hepatitis A vaccine  Aged Out    Hepatitis B vaccine  Aged Out    Hib vaccine  Aged Out    Meningococcal (ACWY) vaccine  Aged Out       Subjective:      Review of Systems   Constitutional:  Negative for chills and fever.    HENT:  Negative for rhinorrhea and sore throat. Eyes:  Negative for discharge and redness. Respiratory:  Negative for cough, shortness of breath and wheezing. Cardiovascular:  Negative for chest pain and palpitations. Gastrointestinal:  Negative for abdominal pain, diarrhea, nausea and vomiting. Genitourinary:  Negative for dysuria and frequency. Musculoskeletal:  Negative for arthralgias and myalgias. Neurological:  Negative for dizziness, light-headedness and headaches. Psychiatric/Behavioral:  Negative for sleep disturbance. Objective:     /66   Pulse 76   Wt (!) 303 lb 9.6 oz (137.7 kg)   SpO2 96%   BMI 42.95 kg/m²   Physical Exam  Vitals and nursing note reviewed. Constitutional:       General: He is not in acute distress. Appearance: He is well-developed. He is not ill-appearing. HENT:      Head: Normocephalic and atraumatic. Right Ear: External ear normal.      Left Ear: External ear normal.   Eyes:      General: No scleral icterus. Right eye: No discharge. Left eye: No discharge. Conjunctiva/sclera: Conjunctivae normal.   Neck:      Thyroid: No thyromegaly. Trachea: No tracheal deviation. Cardiovascular:      Rate and Rhythm: Normal rate and regular rhythm. Heart sounds: Normal heart sounds. Pulmonary:      Effort: Pulmonary effort is normal. No respiratory distress. Breath sounds: Normal breath sounds. No wheezing. Lymphadenopathy:      Cervical: No cervical adenopathy. Skin:     General: Skin is warm. Findings: No rash. Neurological:      Mental Status: He is alert and oriented to person, place, and time. Psychiatric:         Mood and Affect: Mood normal.         Behavior: Behavior normal.         Thought Content: Thought content normal.       Assessment/Plan:   1. Primary hypertension  Assessment & Plan:   Well-controlled, continue current medications  2. Right hip pain  -     traMADol (ULTRAM) 50 MG tablet;  Take 2 tablets by mouth every 6 hours as needed for Pain for up to 30 days. , Disp-360 tablet, R-0Normal  3. Acquired hypothyroidism  Assessment & Plan:   Well-controlled, continue current medications     Return in about 3 months (around 11/11/2022) for medication f/u, HTN f/u. Data Unavailable     No orders of the defined types were placed in this encounter. Orders Placed This Encounter   Medications    furosemide (LASIX) 40 MG tablet     Sig: Take 1 tablet by mouth in the morning. Patient is taking 1.5 tab daily (60mg). Dispense:  135 tablet     Refill:  3    traMADol (ULTRAM) 50 MG tablet     Sig: Take 2 tablets by mouth every 6 hours as needed for Pain for up to 30 days. Dispense:  360 tablet     Refill:  0     Reduce doses taken as pain becomes manageable       Patient given educational materials - see patient instructions. Discussed use, benefit, and side effects of prescribed medications. All patient questions answered. Pt voiced understanding. Reviewed health maintenance. Instructed to continue current medications, diet and exercise. Patient agreed with treatment plan. Follow up as directed.      Electronically signed by Susu Traylor MD on 8/11/2022 at 4:06 PM

## 2022-09-12 ENCOUNTER — TELEPHONE (OUTPATIENT)
Dept: PRIMARY CARE CLINIC | Age: 77
End: 2022-09-12

## 2022-09-12 NOTE — TELEPHONE ENCOUNTER
Patient is calling in stating that he is has diarrhea after he took two tables of his lasix(9/6/22 after working out he had sweeling after) then the next days his nose started to run and feels \"run down\". Doesn't feel normal. Patient is asking if he needs to be on a ABX or not .     Pharmacy Ismael Santana in [address] [place] [address]

## 2022-09-15 ENCOUNTER — HOSPITAL ENCOUNTER (OUTPATIENT)
Age: 77
Setting detail: SPECIMEN
Discharge: HOME | End: 2022-09-15

## 2022-09-15 ENCOUNTER — OFFICE VISIT (OUTPATIENT)
Dept: PRIMARY CARE CLINIC | Age: 77
End: 2022-09-15
Payer: MEDICARE

## 2022-09-15 ENCOUNTER — HOSPITAL ENCOUNTER (OUTPATIENT)
Age: 77
Discharge: HOME | End: 2022-09-15
Payer: MEDICARE

## 2022-09-15 VITALS
SYSTOLIC BLOOD PRESSURE: 134 MMHG | DIASTOLIC BLOOD PRESSURE: 76 MMHG | BODY MASS INDEX: 43.71 KG/M2 | HEIGHT: 71 IN | HEART RATE: 85 BPM | WEIGHT: 312.2 LBS | TEMPERATURE: 97.9 F | OXYGEN SATURATION: 94 %

## 2022-09-15 DIAGNOSIS — R19.7 DIARRHEA, UNSPECIFIED TYPE: ICD-10-CM

## 2022-09-15 DIAGNOSIS — R73.9 HYPERGLYCEMIA: ICD-10-CM

## 2022-09-15 DIAGNOSIS — R53.1 WEAKNESS: ICD-10-CM

## 2022-09-15 DIAGNOSIS — R19.7 DIARRHEA, UNSPECIFIED TYPE: Primary | ICD-10-CM

## 2022-09-15 LAB
ABSOLUTE EOS #: 0.17 K/UL (ref 0–0.44)
ABSOLUTE IMMATURE GRANULOCYTE: 0.09 K/UL (ref 0–0.3)
ABSOLUTE LYMPH #: 1.7 K/UL (ref 1.1–3.7)
ABSOLUTE MONO #: 0.46 K/UL (ref 0.1–1.2)
BASOPHILS # BLD: 1 % (ref 0–2)
BASOPHILS ABSOLUTE: 0.04 K/UL (ref 0–0.2)
EOSINOPHILS RELATIVE PERCENT: 3 % (ref 1–4)
HCT VFR BLD CALC: 44.8 % (ref 40.7–50.3)
HEMOGLOBIN: 14.3 G/DL (ref 13–17)
IMMATURE GRANULOCYTES: 1 %
LYMPHOCYTES # BLD: 26 % (ref 24–43)
MCH RBC QN AUTO: 33.7 PG (ref 25.2–33.5)
MCHC RBC AUTO-ENTMCNC: 31.9 G/DL (ref 28.4–34.8)
MCV RBC AUTO: 105.7 FL (ref 82.6–102.9)
MONOCYTES # BLD: 7 % (ref 3–12)
NRBC AUTOMATED: 0.3 PER 100 WBC
PDW BLD-RTO: 15.3 % (ref 11.8–14.4)
PLATELET # BLD: 205 K/UL (ref 138–453)
PMV BLD AUTO: 9.8 FL (ref 8.1–13.5)
RBC # BLD: 4.24 M/UL (ref 4.21–5.77)
RBC # BLD: ABNORMAL 10*6/UL
SEG NEUTROPHILS: 62 % (ref 36–65)
SEGMENTED NEUTROPHILS ABSOLUTE COUNT: 4.03 K/UL (ref 1.5–8.1)
WBC # BLD: 6.5 K/UL (ref 3.5–11.3)

## 2022-09-15 PROCEDURE — 83735 ASSAY OF MAGNESIUM: CPT

## 2022-09-15 PROCEDURE — 99213 OFFICE O/P EST LOW 20 MIN: CPT | Performed by: FAMILY MEDICINE

## 2022-09-15 PROCEDURE — 80053 COMPREHEN METABOLIC PANEL: CPT

## 2022-09-15 PROCEDURE — 83036 HEMOGLOBIN GLYCOSYLATED A1C: CPT

## 2022-09-15 PROCEDURE — 1123F ACP DISCUSS/DSCN MKR DOCD: CPT | Performed by: FAMILY MEDICINE

## 2022-09-15 PROCEDURE — 84443 ASSAY THYROID STIM HORMONE: CPT

## 2022-09-15 PROCEDURE — 36415 COLL VENOUS BLD VENIPUNCTURE: CPT

## 2022-09-15 PROCEDURE — 85025 COMPLETE CBC W/AUTO DIFF WBC: CPT

## 2022-09-15 RX ORDER — TRAMADOL HYDROCHLORIDE 50 MG/1
50 TABLET ORAL EVERY 6 HOURS PRN
COMMUNITY

## 2022-09-15 ASSESSMENT — ENCOUNTER SYMPTOMS
COUGH: 0
VOMITING: 0
RHINORRHEA: 1
SORE THROAT: 0
NAUSEA: 0
SHORTNESS OF BREATH: 1
DIARRHEA: 1

## 2022-09-15 NOTE — PROGRESS NOTES
[facility] PRIMARY CARE  [address]  [facility] [address]  Dept: 69 Rosette Pepper is a 76 y.o. male Established patient, who presents today for his medical conditions/complaints as noted below. Chief Complaint   Patient presents with    Diarrhea     X1 week. Pt also having SOB and fatigue. HPI:     HPI- pt having diarrhea, also having fatigue- does not feel like doing anything- no energy. Took some extra lasix due to increased edema and the next day started with diarrhea and runny nose. Increased fatigue and negative covid testing x2. Appetite is okay but diarrhea when he eats anything. Taking imodium. No fever or chills.       Reviewed prior notes: None   Reviewed previous:  Labs    LDL Cholesterol (mg/dL)   Date Value   04/08/2019 67   12/04/2018 72   04/12/2016 116     LDL Calculated (mg/dL)   Date Value   09/09/2021 76   07/13/2021 73       (goal LDL is <100)   AST (U/L)   Date Value   05/19/2022 27     ALT (U/L)   Date Value   05/19/2022 22     BUN (mg/dL)   Date Value   05/19/2022 20     Hemoglobin A1C (%)   Date Value   07/13/2021 5.8     TSH (uIU/mL)   Date Value   05/19/2022 1.56     BP Readings from Last 3 Encounters:   09/15/22 134/76   08/11/22 124/66   08/09/22 (!) 161/88          (goal 120/80)    Past Medical History:   Diagnosis Date    Adenocarcinoma of prostate ([address] 10/30/2015    Anemia     Cellulitis of lower extremity     right     Cerebral artery occlusion with cerebral infarction ([address]     1996    CHF (congestive heart failure) ([address]     Chronic acquired lymphedema     Clavicle fracture     in high school    Hernia, abdominal     History of blood transfusion 2003 7 2006    [address]    History of CVA (cerebrovascular accident) 54    DEFECIT MILD MEMORY AND SPEECH    Hyperlipidemia     Hypertension     Hypothyroid 09/2015    Ileus, postoperative (HCC)     Mild renal insufficiency     Morbid obesity ([facility]     Non-healing wound of lower extremity     HISTORY OF, WAS SEEN IN WOUND CLINIC FOR COUPLE YRS. Osteoarthritis     Phlebitis     HX. OF     Prolonged emergence from general anesthesia     x1 had to be put back on ventolator, after prostate surgery, had to be hospitalized x12 days.      Prostate CA [facility])     Prostate cancer ([facility] 10/21/2015    PVD (peripheral vascular disease) ([facility]     Sleep apnea     C-PAP NIGHTLY-PT INSTRUCTED TO BRING    SVT (supraventricular tachycardia) (HCC)     Uric acid kidney stone 12/2014    HAD 6 PASSSED ONE ON OWN, OTHER 5 IS BEING MONITORED    Wears glasses     Wrist fracture rt,      Past Surgical History:   Procedure Laterality Date    ABLATION OF DYSRHYTHMIC FOCUS  03/16/2018    afib ablation    ABLATION OF DYSRHYTHMIC FOCUS  08/22/2019    ATRIAL FIB  /  DR [address]  07/30/2020    Dr. Eugenio Sanchez, [address] [place] [address]  11/17/2017    COLONOSCOPY  2002, 2007    COLONOSCOPY  07/11/2016    polyp,bx    COLONOSCOPY N/A 5/2/2022    COLONOSCOPY WITH RANDOM COLON BIOPSIES AND CLIPPING AT DISTAL TRANSVERSE COLON performed by Rachid Alberts MD at [address] N/A 8/9/2022    COLONOSCOPY POLYPECTOMY SNARE/COLD BIOPSY performed by Jen Burns MD at [address] [facility] [address] Left 08/05/2016    excision cyst anterior chest    HAMMER TOE SURGERY Bilateral     JOINT REPLACEMENT      KNEE SURGERY Left 1985    repair of torn ligaments    [place] COLON CA SCRN NOT  [address] IND N/A 07/31/2017    COLONOSCOPY performed by Rachid Alberts MD at [facility] 05/15/2018    HIP TOTAL ARTHROPLASTY MINIMALLY INVASIVE ASI WITH GPS performed by Wood Camarillo MD at [facility] [facility]  10/21/2015    robotic    SHOULDER ARTHROPLASTY Bilateral     STUDY POSS ABLATION  04/02/2018         TONSILLECTOMY      TOTAL KNEE ARTHROPLASTY Bilateral LT-2003, RT-2006    TRANSESOPHAGEAL ECHOCARDIOGRAM  11/17/2017    TRANSESOPHAGEAL Take 20 mg by mouth 2 times daily Prescribed by Dr. Christine Butler      acetaminophen (TYLENOL) 325 MG tablet Take 650 mg by mouth every 6 hours as needed for Pain      CINNAMON PO Take 2 capsules by mouth daily      Probiotic Product (PROBIOTIC ADVANCED PO) Take by mouth      Multiple Vitamins-Minerals (OCUVITE PO) Take 1 tablet by mouth daily      Cholecalciferol (VITAMIN D-3 PO) Take  by mouth daily. 2000 IU daily       Coenzyme Q10 (CO Q 10 PO) Take 1 tablet by mouth daily       budesonide (ENTOCORT EC) 3 MG extended release capsule TAKE 3 CAPSULES BY MOUTH EVERY DAY (Patient not taking: Reported on 9/15/2022)      polyethylene glycol (GLYCOLAX) 17 GM/SCOOP powder Please follow instructions given to you by provider (Patient not taking: No sig reported) 238 g 0    bisacodyl (BISACODYL) 5 MG EC tablet Please follow instructions given by provider (Patient not taking: No sig reported) 4 tablet 0    magnesium citrate solution Take 296 mLs by mouth once for 1 dose (Patient not taking: No sig reported) 296 mL 0    Bisacodyl (DULCOLAX PO) Take 1 tablet by mouth as needed  (Patient not taking: No sig reported)       No current facility-administered medications for this visit.      Allergies   Allergen Reactions    Adhesive Tape Itching and Rash    Doxycycline Rash       Health Maintenance   Topic Date Due    Shingles vaccine (1 of 2) Never done    COVID-19 Vaccine (4 - Booster for Moderna series) 04/21/2022    Flu vaccine (1) 09/01/2022    Lipids  09/09/2022    Annual Wellness Visit (AWV)  12/30/2022    Prostate Specific Antigen (PSA) Screening or Monitoring  07/18/2023    Depression Screen  08/11/2023    DTaP/Tdap/Td vaccine (2 - Td or Tdap) 04/01/2032    Pneumococcal 65+ years Vaccine  Completed    Hepatitis C screen  Completed    Hepatitis A vaccine  Aged Out    Hepatitis B vaccine  Aged Out    Hib vaccine  Aged Out    Meningococcal (ACWY) vaccine  Aged Out       Subjective:      Review of Systems   Constitutional:  Positive for fatigue. Negative for appetite change, chills and fever. HENT:  Positive for rhinorrhea. Negative for congestion and sore throat. Respiratory:  Positive for shortness of breath (with activity). Negative for cough. Cardiovascular:  Negative for chest pain and palpitations. Gastrointestinal:  Positive for diarrhea. Negative for nausea and vomiting. Genitourinary:  Negative for enuresis, frequency and hematuria. Skin:  Negative for rash. Neurological:  Positive for weakness and light-headedness. Negative for dizziness. Objective:     /76   Pulse 85   Temp 97.9 °F (36.6 °C) (Infrared)   Ht 5' 10.5\" (1.791 m)   Wt (!) 312 lb 3.2 oz (141.6 kg)   SpO2 94%   BMI 44.16 kg/m²   Physical Exam  Vitals and nursing note reviewed. Constitutional:       General: He is not in acute distress. Appearance: He is well-developed. He is not ill-appearing. HENT:      Head: Normocephalic and atraumatic. Right Ear: External ear normal.      Left Ear: External ear normal.   Eyes:      General: No scleral icterus. Right eye: No discharge. Left eye: No discharge. Conjunctiva/sclera: Conjunctivae normal.   Neck:      Thyroid: No thyromegaly. Trachea: No tracheal deviation. Cardiovascular:      Rate and Rhythm: Normal rate and regular rhythm. Heart sounds: Normal heart sounds. Pulmonary:      Effort: Pulmonary effort is normal. No respiratory distress. Breath sounds: Normal breath sounds. No wheezing. Lymphadenopathy:      Cervical: No cervical adenopathy. Skin:     General: Skin is warm. Findings: No rash. Neurological:      Mental Status: He is alert and oriented to person, place, and time. Psychiatric:         Mood and Affect: Mood normal.         Behavior: Behavior normal.         Thought Content: Thought content normal.       Assessment/Plan:   1.  Diarrhea, unspecified type  -     CBC with Auto Differential; Future  -     Comprehensive

## 2022-09-16 DIAGNOSIS — R19.7 DIARRHEA, UNSPECIFIED TYPE: ICD-10-CM

## 2022-09-16 DIAGNOSIS — D50.9 IRON DEFICIENCY ANEMIA, UNSPECIFIED IRON DEFICIENCY ANEMIA TYPE: ICD-10-CM

## 2022-09-16 DIAGNOSIS — D75.89 MACROCYTOSIS: ICD-10-CM

## 2022-09-16 DIAGNOSIS — D75.89 MACROCYTOSIS WITHOUT ANEMIA: ICD-10-CM

## 2022-09-16 DIAGNOSIS — E83.42 HYPOMAGNESEMIA: Primary | ICD-10-CM

## 2022-09-16 DIAGNOSIS — R53.1 WEAKNESS: ICD-10-CM

## 2022-09-16 DIAGNOSIS — R53.83 FATIGUE, UNSPECIFIED TYPE: ICD-10-CM

## 2022-09-16 LAB
ALBUMIN SERPL-MCNC: 3.9 G/DL (ref 3.5–5.2)
ALBUMIN/GLOBULIN RATIO: 1.8 (ref 1–2.5)
ALP BLD-CCNC: 59 U/L (ref 40–129)
ALT SERPL-CCNC: 16 U/L (ref 5–41)
ANION GAP SERPL CALCULATED.3IONS-SCNC: 14 MMOL/L (ref 9–17)
AST SERPL-CCNC: 18 U/L
BILIRUB SERPL-MCNC: 0.6 MG/DL (ref 0.3–1.2)
BUN BLDV-MCNC: 17 MG/DL (ref 8–23)
CALCIUM SERPL-MCNC: 9.2 MG/DL (ref 8.6–10.4)
CHLORIDE BLD-SCNC: 105 MMOL/L (ref 98–107)
CO2: 26 MMOL/L (ref 20–31)
CREAT SERPL-MCNC: 1.03 MG/DL (ref 0.7–1.2)
ESTIMATED AVERAGE GLUCOSE: 123 MG/DL
GFR AFRICAN AMERICAN: >60 ML/MIN
GFR NON-AFRICAN AMERICAN: >60 ML/MIN
GFR SERPL CREATININE-BSD FRML MDRD: ABNORMAL ML/MIN/{1.73_M2}
GLUCOSE BLD-MCNC: 133 MG/DL (ref 70–99)
HBA1C MFR BLD: 5.9 % (ref 4–6)
MAGNESIUM: 1.5 MG/DL (ref 1.6–2.6)
POTASSIUM SERPL-SCNC: 4.4 MMOL/L (ref 3.7–5.3)
SARS-COV-2: NORMAL
SARS-COV-2: NOT DETECTED
SODIUM BLD-SCNC: 145 MMOL/L (ref 135–144)
SOURCE: NORMAL
TOTAL PROTEIN: 6.1 G/DL (ref 6.4–8.3)
TSH SERPL DL<=0.05 MIU/L-ACNC: 1.76 UIU/ML (ref 0.3–5)

## 2022-09-16 RX ORDER — MAGNESIUM CHLORIDE 71.5 G/G
1 TABLET ORAL DAILY
Qty: 60 TABLET | Refills: 1 | Status: SHIPPED | OUTPATIENT
Start: 2022-09-16

## 2022-09-16 NOTE — RESULT ENCOUNTER NOTE
Adv pt his magnesium is low- Rx sent in for him to take daily for a while- sometimes can cause a little bit more diarrhea though so let me know if it is an issue. Recheck Mg in a couple of weeks. Also looks like he might be anemic so will check a couple of other things with the next lab draw.   Orders in

## 2022-09-20 ENCOUNTER — TELEPHONE (OUTPATIENT)
Dept: GASTROENTEROLOGY | Age: 77
End: 2022-09-20

## 2022-09-20 DIAGNOSIS — K52.9 COLITIS: ICD-10-CM

## 2022-09-20 NOTE — TELEPHONE ENCOUNTER
Pt called in stating he has had procedures/work done by Dr Ramya Chris before, states he is having loose stools and is wondering if any medication can be sent into pt pharmacy, pt states he has tried otc medications but with no relief, adv pt will send note back for Dr Delfina Cole nurse to f/u with  to see if any medication can be sent in, pt thanked writer call ended.

## 2022-09-22 NOTE — PROGRESS NOTES
GI CLINIC FOLLOW UP    INTERVAL HISTORY:   No referring provider defined for this encounter. Chief Complaint   Patient presents with    Diarrhea     Patient is f/u on colonoscopy and diarrhea. He has been having diarrhea several times daily most days. HISTORY OF PRESENT ILLNESS: Ann Sánchez is a 76 y.o. male , referred for evaluation of diarrhea    Here for f/u   Repeat colonoscopy was done the result is as below  IBD markers ar negative   LFTs are normal , CBC  normal, 9/15/2022  He was done with the budesonide and after that he had the symptoms back and its been significantly bothering him right now he is having 2-3 bowel movements a day, no blood no cramps no diarrhea no fever no chills. Denied any fever or chills or any upper GI symptoms      The prep was poor. Findings:  3 polyps 2 from the transverse colon removed with a cold snare the larger was 8 mm  1 from  The sigmoid removed with cold forceps     Poor preparation     Diverticulosis     Hemorrhoids  No colitis seen anywhere in this colon all the way to the cecum  No ileitis in the last 10 cm of the ileum     Withdrawal Time was (minutes): 10     The colon was decompressed and the scope was removed. The patient tolerated the procedure well. Recommendations/Plan:   Lifestyle and dietary modifications as discussed  F/U Biopsies  F/U In OfficeYes  Discussed with the family  Repeat colonoscopy [id number]     Electronically signed by So Musa MD  on 8/9/2022 at 11:35 AM   -- Diagnosis --   TRANSVERSE COLON, BIOPSY:   -PIECES OF TUBULAR ADENOMA. Radha Joseph M.D.   **Electronically Signed Out**         julio/8/10/2022     Past Medical,Family, and Social History reviewed and does contribute to the patient presentingcondition. Patient's PMH/PSH,SH,PSYCH Hx, MEDs, ALLERGIES, and ROS were all reviewed and updated in the appropriate sections.     PAST MEDICAL HISTORY:  Past Medical History:   Diagnosis Date Adenocarcinoma of prostate ([facility] 10/30/2015    Anemia     Cellulitis of lower extremity     right     Cerebral artery occlusion with cerebral infarction ([facility]     1996    CHF (congestive heart failure) ([facility]     Chronic acquired lymphedema     Clavicle fracture     in high school    Hernia, abdominal     History of blood transfusion 2003 7 2006    AUTOTRANSFUSION [address]    History of CVA (cerebrovascular accident) 54    DEFECIT MILD MEMORY AND SPEECH    Hyperlipidemia     Hypertension     Hypothyroid 09/2015    Ileus, postoperative (HCC)     Mild renal insufficiency     Morbid obesity ([facility]     Non-healing wound of lower extremity     HISTORY OF, WAS SEEN IN WOUND CLINIC FOR COUPLE YRS. Osteoarthritis     Phlebitis     HX. OF     Prolonged emergence from general anesthesia     x1 had to be put back on ventolator, after prostate surgery, had to be hospitalized x12 days.      Prostate CA [facility])     Prostate cancer ([facility] 10/21/2015    PVD (peripheral vascular disease) ([facility]     Sleep apnea     C-PAP NIGHTLY-PT INSTRUCTED TO BRING    SVT (supraventricular tachycardia) (HCC)     Uric acid kidney stone 12/2014    HAD 6 PASSSED ONE ON OWN, OTHER 5 IS BEING MONITORED    Wears glasses     Wrist fracture rt,       Past Surgical History:   Procedure Laterality Date    ABLATION OF DYSRHYTHMIC FOCUS  03/16/2018    afib ablation    ABLATION OF DYSRHYTHMIC FOCUS  08/22/2019    ATRIAL FIB  /  [facility] [address] [facility]  07/30/2020    Dr. Alexa Valerio, [address] [place] [address]  11/17/2017    COLONOSCOPY  2002, 2007    COLONOSCOPY  07/11/2016    polyp,bx    COLONOSCOPY N/A 5/2/2022    COLONOSCOPY WITH RANDOM COLON BIOPSIES AND CLIPPING AT DISTAL TRANSVERSE COLON performed by Miguel Ro MD at . [address] N/A 8/9/2022    COLONOSCOPY POLYPECTOMY SNARE/COLD BIOPSY performed by Rigoberto Schmitz MD at [address] Left 08/05/2016    excision cyst anterior chest    HAMMER TOE SURGERY Bilateral     JOINT REPLACEMENT      KNEE SURGERY Left 1985    repair of torn ligaments    [place] COLON CA SCRN NOT HI RSK IND N/A 07/31/2017    COLONOSCOPY performed by Cheyanne Denis MD at [address] [facility] Left 05/15/2018    HIP TOTAL ARTHROPLASTY MINIMALLY INVASIVE ASI WITH GPS performed by Alexandre Duke MD at [address] [facility]  10/21/2015    robotic    SHOULDER ARTHROPLASTY Bilateral     STUDY POSS ABLATION  04/02/2018         TONSILLECTOMY      TOTAL KNEE ARTHROPLASTY Bilateral LT-2003, RT-2006    TRANSESOPHAGEAL ECHOCARDIOGRAM  11/17/2017    TRANSESOPHAGEAL ECHOCARDIOGRAM  08/22/2019    TUMOR EXCISION      Throat/nose D/T benign tumor    UPPP UVULOPALATOPHARYGOPLASTY  90'S    VARICOSE VEIN SURGERY Bilateral 80's    x2       CURRENT MEDICATIONS:    Current Outpatient Medications:     diphenoxylate-atropine (DIPHENATOL) 2.5-0.025 MG per tablet, Take 1 tablet by mouth 4 times daily as needed for Diarrhea for up to 10 days. , Disp: 30 tablet, Rfl: 0    budesonide (ENTOCORT EC) 3 MG extended release capsule, Take 3 capsules by mouth 2 times daily, Disp: 180 capsule, Rfl: 0    mesalamine (LIALDA) 1.2 g EC tablet, Take 1 tablet by mouth 3 times daily, Disp: 30 tablet, Rfl: 3    budesonide (ENTOCORT EC) 3 MG extended release capsule, Take 3 capsules by mouth 2 times daily, Disp: 540 capsule, Rfl: 1    magnesium cl-calcium carbonate (SLOW-MAG) 71.5-119 MG TBEC tablet, Take 1 tablet by mouth daily, Disp: 60 tablet, Rfl: 1    traMADol (ULTRAM) 50 MG tablet, Take 50 mg by mouth every 6 hours as needed for Pain., Disp: , Rfl:     furosemide (LASIX) 40 MG tablet, Take 1 tablet by mouth in the morning. Patient is taking 1.5 tab daily (60mg). , Disp: 135 tablet, Rfl: 3    levothyroxine (SYNTHROID) 25 MCG tablet, TAKE 1 TABLET DAILY, Disp: 90 tablet, Rfl: 3    lisinopril (PRINIVIL;ZESTRIL) 5 MG tablet, TAKE 1 TABLET NIGHTLY, Disp: 90 tablet, Rfl: 3    atorvastatin (LIPITOR) 10 MG tablet, TAKE 1 TABLET DAILY, Disp: 90 tablet, Rfl: 3    budesonide (ENTOCORT EC) 3 MG extended release capsule, TAKE 3 CAPSULES BY MOUTH EVERY DAY (Patient not taking: Reported on 9/15/2022), Disp: , Rfl:     Ascorbic Acid (VITAMIN C) 250 MG tablet, Take 250 mg by mouth daily, Disp: , Rfl:     allopurinol (ZYLOPRIM) 300 MG tablet, TAKE 1 TABLET TWICE A DAY, Disp: 180 tablet, Rfl: 3    Melatonin 10 MG TABS, Take 1 tablet by mouth daily , Disp: , Rfl:     zinc 50 MG CAPS, Take 50 mg by mouth daily, Disp: 30 capsule, Rfl: 3    pantoprazole (PROTONIX) 40 MG tablet, Take 1 tablet by mouth every morning (before breakfast), Disp: 90 tablet, Rfl: 1    Metoprolol Tartrate 37.5 MG TABS, Take 37.5 mg by mouth 2 times daily Takes 1 1/2 tablets 2xdaily, Disp: , Rfl:     Bisacodyl (DULCOLAX PO), Take 1 tablet by mouth as needed  (Patient not taking: No sig reported), Disp: , Rfl:     apixaban (ELIQUIS) 5 MG TABS tablet, Take 5 mg by mouth 2 times daily, Disp: , Rfl:     trospium (SANCTURA) 20 MG tablet, Take 20 mg by mouth 2 times daily Prescribed by Dr. Daniel Bolanos, Disp: , Rfl:     acetaminophen (TYLENOL) 325 MG tablet, Take 650 mg by mouth every 6 hours as needed for Pain, Disp: , Rfl:     CINNAMON PO, Take 2 capsules by mouth daily, Disp: , Rfl:     Probiotic Product (PROBIOTIC ADVANCED PO), Take by mouth, Disp: , Rfl:     Multiple Vitamins-Minerals (OCUVITE PO), Take 1 tablet by mouth daily, Disp: , Rfl:     Cholecalciferol (VITAMIN D-3 PO), Take  by mouth daily.  2000 IU daily , Disp: , Rfl:     Coenzyme Q10 (CO Q 10 PO), Take 1 tablet by mouth daily , Disp: , Rfl:     ALLERGIES:   Allergies   Allergen Reactions    Adhesive Tape Itching and Rash    Doxycycline Rash       FAMILY HISTORY:       Problem Relation Age of Onset    Diabetes Mother     Hypertension Mother     Heart Attack Mother     Colon Cancer Father     Heart Attack Father     Stroke Father     Cirrhosis Brother 54        ALCOHOL RELATED    Dementia Maternal Grandmother     Diabetes abdominal pain (gas) and diarrhea. Negative for abdominal distention, anal bleeding, blood in stool, constipation, nausea, rectal pain and vomiting. Genitourinary:  Negative for difficulty urinating. Allergic/Immunologic: Negative for environmental allergies and food allergies. Neurological:  Negative for dizziness, weakness, light-headedness, numbness and headaches. Hematological:  Bruises/bleeds easily. Psychiatric/Behavioral:  Positive for sleep disturbance. The patient is nervous/anxious. LABORATORY DATA: Reviewed  Lab Results   Component Value Date    WBC 6.5 09/15/2022    HGB 14.3 09/15/2022    HCT 44.8 09/15/2022    .7 (H) 09/15/2022     09/15/2022     (H) 09/15/2022    K 4.4 09/15/2022     09/15/2022    CO2 26 09/15/2022    BUN 17 09/15/2022    CREATININE 1.03 09/15/2022    LABPROT 7.0 06/18/2012    LABALBU 3.9 09/15/2022    BILITOT 0.6 09/15/2022    ALKPHOS 59 09/15/2022    AST 18 09/15/2022    ALT 16 09/15/2022    INR 1.0 03/16/2018         Lab Results   Component Value Date    RBC 4.24 09/15/2022    HGB 14.3 09/15/2022    .7 (H) 09/15/2022    MCH 33.7 (H) 09/15/2022    MCHC 31.9 09/15/2022    RDW 15.3 (H) 09/15/2022    MPV 9.8 09/15/2022    BASOPCT 1 09/15/2022    LYMPHSABS 1.70 09/15/2022    MONOSABS 0.46 09/15/2022    NEUTROABS 4.03 09/15/2022    EOSABS 0.17 09/15/2022    BASOSABS 0.04 09/15/2022         DIAGNOSTIC TESTING:     No results found. PHYSICAL EXAMINATION: Vital signs reviewed per the nursing documentation. /75   Pulse 76   Temp 97.7 °F (36.5 °C)   Wt (!) 313 lb (142 kg)   BMI 44.28 kg/m²   Body mass index is 44.28 kg/m². Physical Exam  Vitals and nursing note reviewed. Constitutional:       General: He is not in acute distress. Appearance: He is well-developed. He is not diaphoretic. HENT:      Head: Normocephalic and atraumatic. Eyes:      General: No scleral icterus.      Pupils: Pupils are equal, round, and reactive to light. Neck:      Thyroid: No thyromegaly. Vascular: No JVD. Trachea: No tracheal deviation. Cardiovascular:      Rate and Rhythm: Normal rate and regular rhythm. Heart sounds: Normal heart sounds. No murmur heard. Pulmonary:      Effort: Pulmonary effort is normal. No respiratory distress. Breath sounds: Normal breath sounds. No wheezing. Abdominal:      General: Bowel sounds are normal. There is no distension. Palpations: Abdomen is soft. There is no mass. Tenderness: There is no abdominal tenderness. There is no guarding or rebound. Musculoskeletal:         General: No tenderness. Normal range of motion. Cervical back: Normal range of motion and neck supple. Skin:     General: Skin is warm. Coloration: Skin is not pale. Findings: No erythema or rash. Comments: He is not diaphoretic   Neurological:      Mental Status: He is alert and oriented to person, place, and time. Deep Tendon Reflexes: Reflexes are normal and symmetric. Psychiatric:         Behavior: Behavior normal.         Thought Content: Thought content normal.         Judgment: Judgment normal.         IMPRESSION: Mr. Chu Roman is a 76 y.o. male with      Diagnosis Orders   1. Diarrhea, unspecified type  diphenoxylate-atropine (DIPHENATOL) 2.5-0.025 MG per tablet      2. Colitis        3.  Adenoma of transverse colon          The patient is less likely to have IBD at this time with negative IBD markers and the colonoscopy not showing significant colitis, for which most likely does have microscopic colitis in the lumbar and back on the budesonide  With a significant flareup at this time  We will start him on so on mesalamine  Later on we will stop the budesonide and continue with the mesalamine alone  Give him some Lomotil tablets to use for diarrhea for emergencies  Is taking Imodium daily I told him to continue to do that  He will need repeat colonoscopy in 3 years    Diet/life style/natural hx /complication of the dx were all explained in details   Past medical, past surgical, social history, psychiatric history, medications or allergies, all reviewed and  updated    Thank you for allowing me to participate in the care of Mr. Jean Champion. For any further questions please do not hesitate to contact me. I have reviewed and agree with the ROS entered by the MA/RN. Note is dictated utilizing voice recognition software. Unfortunately this leads to occasional typographical errors. Please contact our office if you have any questions.       Guy Musa MD  [facility]  O: #[phone]

## 2022-09-23 ENCOUNTER — OFFICE VISIT (OUTPATIENT)
Dept: GASTROENTEROLOGY | Age: 77
End: 2022-09-23
Payer: MEDICARE

## 2022-09-23 VITALS
HEART RATE: 76 BPM | TEMPERATURE: 97.7 F | BODY MASS INDEX: 44.28 KG/M2 | WEIGHT: 313 LBS | SYSTOLIC BLOOD PRESSURE: 137 MMHG | DIASTOLIC BLOOD PRESSURE: 75 MMHG

## 2022-09-23 DIAGNOSIS — D12.3 ADENOMA OF TRANSVERSE COLON: ICD-10-CM

## 2022-09-23 DIAGNOSIS — R19.7 DIARRHEA, UNSPECIFIED TYPE: Primary | ICD-10-CM

## 2022-09-23 DIAGNOSIS — K52.9 COLITIS: ICD-10-CM

## 2022-09-23 PROCEDURE — 1123F ACP DISCUSS/DSCN MKR DOCD: CPT | Performed by: INTERNAL MEDICINE

## 2022-09-23 PROCEDURE — 99214 OFFICE O/P EST MOD 30 MIN: CPT | Performed by: INTERNAL MEDICINE

## 2022-09-23 RX ORDER — DIPHENOXYLATE HYDROCHLORIDE AND ATROPINE SULFATE 2.5; .025 MG/1; MG/1
1 TABLET ORAL 4 TIMES DAILY PRN
Qty: 30 TABLET | Refills: 0 | Status: SHIPPED | OUTPATIENT
Start: 2022-09-23 | End: 2022-10-03

## 2022-09-23 RX ORDER — BUDESONIDE 3 MG/1
9 CAPSULE, COATED PELLETS ORAL 2 TIMES DAILY
Qty: 180 CAPSULE | Refills: 0 | Status: SHIPPED | OUTPATIENT
Start: 2022-09-23 | End: 2022-10-23

## 2022-09-23 RX ORDER — MESALAMINE 1.2 G/1
1200 TABLET, DELAYED RELEASE ORAL 3 TIMES DAILY
Qty: 30 TABLET | Refills: 3 | Status: SHIPPED | OUTPATIENT
Start: 2022-09-23

## 2022-09-23 RX ORDER — BUDESONIDE 3 MG/1
9 CAPSULE, COATED PELLETS ORAL 2 TIMES DAILY
Qty: 540 CAPSULE | Refills: 1 | Status: SHIPPED | OUTPATIENT
Start: 2022-09-23 | End: 2022-12-22

## 2022-09-23 ASSESSMENT — ENCOUNTER SYMPTOMS
ANAL BLEEDING: 0
ABDOMINAL DISTENTION: 0
SHORTNESS OF BREATH: 0
TROUBLE SWALLOWING: 0
CHOKING: 0
NAUSEA: 0
COUGH: 0
WHEEZING: 0
BLOOD IN STOOL: 0
CONSTIPATION: 0
RECTAL PAIN: 0
DIARRHEA: 1
ABDOMINAL PAIN: 1
VOMITING: 0

## 2022-11-15 ENCOUNTER — OFFICE VISIT (OUTPATIENT)
Dept: ORTHOPEDIC SURGERY | Age: 77
End: 2022-11-15
Payer: MEDICARE

## 2022-11-15 ENCOUNTER — HOSPITAL ENCOUNTER (OUTPATIENT)
Age: 77
Setting detail: SPECIMEN
Discharge: HOME | End: 2022-11-15

## 2022-11-15 VITALS — WEIGHT: 313 LBS | BODY MASS INDEX: 44.81 KG/M2 | RESPIRATION RATE: 16 BRPM | HEIGHT: 70 IN

## 2022-11-15 DIAGNOSIS — M25.562 ACUTE PAIN OF LEFT KNEE: Primary | ICD-10-CM

## 2022-11-15 DIAGNOSIS — Z96.652 HISTORY OF TOTAL KNEE REPLACEMENT, LEFT: ICD-10-CM

## 2022-11-15 LAB
ANION GAP SERPL CALCULATED.3IONS-SCNC: 14 MMOL/L (ref 9–17)
BUN BLDV-MCNC: 63 MG/DL (ref 8–23)
CALCIUM SERPL-MCNC: 8.9 MG/DL (ref 8.6–10.4)
CHLORIDE BLD-SCNC: 102 MMOL/L (ref 98–107)
CO2: 29 MMOL/L (ref 20–31)
CREAT SERPL-MCNC: 1.74 MG/DL (ref 0.7–1.2)
GFR SERPL CREATININE-BSD FRML MDRD: 39 ML/MIN/1.73M2
GLUCOSE BLD-MCNC: 147 MG/DL (ref 70–99)
POTASSIUM SERPL-SCNC: 4.2 MMOL/L (ref 3.7–5.3)
SODIUM BLD-SCNC: 145 MMOL/L (ref 135–144)

## 2022-11-15 PROCEDURE — 1123F ACP DISCUSS/DSCN MKR DOCD: CPT | Performed by: PHYSICIAN ASSISTANT

## 2022-11-15 PROCEDURE — 99214 OFFICE O/P EST MOD 30 MIN: CPT | Performed by: PHYSICIAN ASSISTANT

## 2022-11-17 NOTE — PROGRESS NOTES
[address] [facility], [address] [place], [address] [place] [address], [address] [facility]           Dept Phone: [phone]           Dept Fax:  [address]           [employer]          Dept Phone: [phone]           Dept Fax:  [phone]      Chief Compliant:  Chief Complaint   Patient presents with    Knee Pain     NP- L knee pain        History of Present Illness: This is a 76 y.o. male who presents to the clinic today for evaluation of left knee stiffness and left leg pain. Patient reports he fell on 10/20/2022 hitting his left knee and left lower leg. Patient reports significant bruising to the lateral leg closer to the ankle and the knee itself at this point but continues to have stiffness and swelling within his left knee. History of bilateral total knee arthroplasty left knee in 2003 and right in 2006. He reports that his knee was doing excellent prior to this injury occurring he does chronically ambulate with a rolling walker. Patient reports over the last 2 weeks that the left knee pain actually has improved he does not have much pain in the knee itself but notes significant stiffness with deep flexion. The majority of his pain is actually to the lateral aspect of the lower leg. Patient denies any significant ankle pain. He does note chronic swelling of bilateral lower extremities but left has been worse since the fall. He does have compression stockings but states due to the swelling on his left side he has been unable to get this on his leg. He denies any numbness or tingling he does continue to ambulate with a walker but has some mild increased pain.     Past History:    Current Outpatient Medications:     mesalamine (LIALDA) 1.2 g EC tablet, Take 1 tablet by mouth 3 times daily, Disp: 30 tablet, Rfl: 3    budesonide (ENTOCORT EC) 3 MG extended release capsule, Take 3 capsules by mouth 2 times daily, Disp: 540 capsule, Rfl: 1    magnesium cl-calcium carbonate (SLOW-MAG) 71.5-119 MG TBEC tablet, Take 1 tablet by mouth daily, Disp: 60 tablet, Rfl: 1    traMADol (ULTRAM) 50 MG tablet, Take 50 mg by mouth every 6 hours as needed for Pain., Disp: , Rfl:     furosemide (LASIX) 40 MG tablet, Take 1 tablet by mouth in the morning. Patient is taking 1.5 tab daily (60mg). , Disp: 135 tablet, Rfl: 3    levothyroxine (SYNTHROID) 25 MCG tablet, TAKE 1 TABLET DAILY, Disp: 90 tablet, Rfl: 3    lisinopril (PRINIVIL;ZESTRIL) 5 MG tablet, TAKE 1 TABLET NIGHTLY, Disp: 90 tablet, Rfl: 3    atorvastatin (LIPITOR) 10 MG tablet, TAKE 1 TABLET DAILY, Disp: 90 tablet, Rfl: 3    budesonide (ENTOCORT EC) 3 MG extended release capsule, TAKE 3 CAPSULES BY MOUTH EVERY DAY (Patient not taking: Reported on 9/15/2022), Disp: , Rfl:     Ascorbic Acid (VITAMIN C) 250 MG tablet, Take 250 mg by mouth daily, Disp: , Rfl:     allopurinol (ZYLOPRIM) 300 MG tablet, TAKE 1 TABLET TWICE A DAY, Disp: 180 tablet, Rfl: 3    Melatonin 10 MG TABS, Take 1 tablet by mouth daily , Disp: , Rfl:     zinc 50 MG CAPS, Take 50 mg by mouth daily, Disp: 30 capsule, Rfl: 3    pantoprazole (PROTONIX) 40 MG tablet, Take 1 tablet by mouth every morning (before breakfast), Disp: 90 tablet, Rfl: 1    Metoprolol Tartrate 37.5 MG TABS, Take 37.5 mg by mouth 2 times daily Takes 1 1/2 tablets 2xdaily, Disp: , Rfl:     Bisacodyl (DULCOLAX PO), Take 1 tablet by mouth as needed  (Patient not taking: No sig reported), Disp: , Rfl:     apixaban (ELIQUIS) 5 MG TABS tablet, Take 5 mg by mouth 2 times daily, Disp: , Rfl:     trospium (SANCTURA) 20 MG tablet, Take 20 mg by mouth 2 times daily Prescribed by Dr. Llye Cueva, Disp: , Rfl:     acetaminophen (TYLENOL) 325 MG tablet, Take 650 mg by mouth every 6 hours as needed for Pain, Disp: , Rfl:     CINNAMON PO, Take 2 capsules by mouth daily, Disp: , Rfl:     Probiotic Product (PROBIOTIC ADVANCED PO), Take by mouth, Disp: , Rfl:     Multiple Vitamins-Minerals (OCUVITE PO), Take 1 tablet by mouth daily, Disp: , Rfl:     Cholecalciferol (VITAMIN D-3 PO), Take  by mouth daily.  2000 IU daily , Disp: , Rfl:     Coenzyme Q10 (CO Q 10 PO), Take 1 tablet by mouth daily , Disp: , Rfl:   Allergies   Allergen Reactions    Adhesive Tape Itching and Rash    Doxycycline Rash     Social History     Socioeconomic History    Marital status:      Spouse name: Not on file    Number of children: Not on file    Years of education: Not on file    Highest education level: Not on file   Occupational History    Not on file   Tobacco Use    Smoking status: Former     Packs/day: 2.00     Years: 10.00     Pack years: 20.00     Types: Cigarettes     Quit date: 1970     Years since quittin.9    Smokeless tobacco: Never   Vaping Use    Vaping Use: Never used   Substance and Sexual Activity    Alcohol use: Yes     Comment: 1-2 beer a day    Drug use: No    Sexual activity: Not on file   Other Topics Concern    Not on file   Social History Narrative    Not on file     Social Determinants of Health     Financial Resource Strain: Low Risk     Difficulty of Paying Living Expenses: Not hard at all   Food Insecurity: No Food Insecurity    Worried About Running Out of Food in the Last Year: Never true    Ran Out of Food in the Last Year: Never true   Transportation Needs: Not on file   Physical Activity: Not on file   Stress: Not on file   Social Connections: Not on file   Intimate Partner Violence: Not on file   Housing Stability: Not on file     Past Medical History:   Diagnosis Date    Adenocarcinoma of prostate ([facility] 10/30/2015    Anemia     Cellulitis of lower extremity     right     Cerebral artery occlusion with cerebral infarction ([facility]         CHF (congestive heart failure) ([facility]     Chronic acquired lymphedema     Clavicle fracture     in high school    Hernia, abdominal     History of blood transfusion 2003 7 2006 [address] [facility]. .. [facility] SURGERY    History of CVA (cerebrovascular accident) 54    DEFECIT MILD MEMORY AND SPEECH    Hyperlipidemia     Hypertension     Hypothyroid 09/2015    Ileus, postoperative (HCC)     Mild renal insufficiency     Morbid obesity ([facility]     Non-healing wound of lower extremity     HISTORY OF, WAS SEEN IN WOUND CLINIC FOR COUPLE YRS. Osteoarthritis     Phlebitis     HX. OF     Prolonged emergence from general anesthesia     x1 had to be put back on ventolator, after prostate surgery, had to be hospitalized x12 days.      Prostate CA [facility])     Prostate cancer ([facility] 10/21/2015    PVD (peripheral vascular disease) ([facility]     Sleep apnea     C-PAP NIGHTLY-PT INSTRUCTED TO BRING    SVT (supraventricular tachycardia) (HCC)     Uric acid kidney stone 12/2014    HAD 6 PASSSED ONE ON OWN, OTHER 5 IS BEING MONITORED    Wears glasses     Wrist fracture rt,     Past Surgical History:   Procedure Laterality Date    ABLATION OF DYSRHYTHMIC FOCUS  03/16/2018    afib ablation    ABLATION OF DYSRHYTHMIC FOCUS  08/22/2019    ATRIAL FIB  /  DR [address]  07/30/2020    Dr. Mikala King, [address] [place] [address]  11/17/2017    COLONOSCOPY  2002, 2007    COLONOSCOPY  07/11/2016    polyp,bx    COLONOSCOPY N/A 5/2/2022    COLONOSCOPY WITH RANDOM COLON BIOPSIES AND CLIPPING AT DISTAL TRANSVERSE COLON performed by Nina Londono MD at . [facility] N/A 8/9/2022    COLONOSCOPY POLYPECTOMY SNARE/COLD BIOPSY performed by Cora Stout MD at [address] [facility] [address] Left 08/05/2016    excision cyst anterior chest    HAMMER TOE SURGERY Bilateral     JOINT REPLACEMENT      KNEE SURGERY Left 1985    repair of torn ligaments    OH COLON CA SCRN NOT  [address] [place] [address] IND N/A 07/31/2017    COLONOSCOPY performed by Nina Londono MD at [address] [facility] 05/15/2018    HIP TOTAL ARTHROPLASTY MINIMALLY INVASIVE ASI WITH GPS performed by Federica Han MD at [address] [facility]  10/21/2015    robotic    SHOULDER ARTHROPLASTY Bilateral     STUDY POSS ABLATION  04/02/2018         TONSILLECTOMY      TOTAL KNEE ARTHROPLASTY Bilateral LT-2003, RT-2006    TRANSESOPHAGEAL ECHOCARDIOGRAM  11/17/2017    TRANSESOPHAGEAL ECHOCARDIOGRAM  08/22/2019    TUMOR EXCISION      Throat/nose D/T benign tumor    UPPP UVULOPALATOPHARYGOPLASTY  90'S    VARICOSE VEIN SURGERY Bilateral [de-identified]    x2     Family History   Problem Relation Age of Onset    Diabetes Mother     Hypertension Mother     Heart Attack Mother     Colon Cancer Father     Heart Attack Father     Stroke Father     Cirrhosis Brother 54        ALCOHOL RELATED    Dementia Maternal Grandmother     Diabetes Maternal Grandmother     Cancer Paternal Grandmother         uterine    Hypertension Paternal Grandfather         Review of Systems   Constitutional: Negative for fever, chills, sweats. Eyes: Negative for changes in vision, or pain. HENT: Negative for ear ache, epistaxis, or sore throat. Respiratory/Cardio: Negative for Chest pain, palpitations, SOB, or cough. Gastrointestinal: Negative for abdominal pain, N/V/D. Genitourinary: Negative for dysuria, frequency, urgency, or hematuria. Neurological: Negative for headache, numbness, or weakness. Integumentary: Negative for rash, itching, laceration, or abrasion. Musculoskeletal: Positive for Knee Pain (NP- L knee pain)       Physical Exam:  Constitutional: Patient is oriented to person, place, and time. Patient appears well-developed and well nourished. HENT: Negative otherwise noted  Head: Normocephalic and Atraumatic  Nose: Normal  Eyes: Conjunctivae and EOM are normal  Neck: Normal range of motion Neck supple. Respiratory/Cardio: Effort normal. No respiratory distress. Musculoskeletal:    Left Knee    Gait:  Antalgic with rolling walker   Incision:  Well healed without any incisional erythema.   No evidence of erythema or significant ecchymosis about the knee but patient with diffuse ecchymosis from the mid shin level down to the ankle. 2+ pitting edema bilateral lower extremities noted. Tenderness: No tenderness whatsoever about the knee no medial lateral joint line tenderness no anterior posterior knee tenderness. Patient with mild tenderness to the mid to distal portions of the lateral lower leg over the mid fibula. No lateral medial malleoli or tenderness. Flexion ROM:  100 able to flex approximately 110 degrees but significant pain for the last 10   Extension ROM:  -5   Effusion:  moderate   DVT Evaluation:  No evidence of DVT seen on physical exam.     left Ankle/Foot     Diffuse ecchymosis to the lower leg starting approximately 3 cm distal to the fibular head all the way down to the lateral malleolus. No evidence of abrasions or lacerations. Tenderness: No tenderness about the ankle itself but with mild tenderness to the lateral lower leg diffusely specially to the area of ecchymosis. Swellin+ pitting edema to bilateral lower extremities. Range of Motion:      Dorsiflexion: Normal     Plantar Flexion: Normal     Subtalar Inversion: Normal     Eversion: Normal       Muscle Strength:      Dorsiflexion:  5/5     Plantar Flexion:  5/5     Anterior Tibial:  5/5     Gastrosoleus:  5/5     Posterior Tibial:  5/5     Peroneal muscle:  5/5       Anterior Drawer:  Negative   Varus Tilt:  Negative       Neurological: Patient is alert and oriented to person, place, and time. Normal strenght. No sensory deficit. Skin: Skin is warm and dry  Psychiatric: Behavior is normal. Thought content normal.  Nursing note and vitals reviewed. Labs and Imaging:     XR taken today:  No results found. X-rays taken in clinic today and preliminarily reviewed by me 22:  AP bilateral knees standing lateral view of the left knee demonstrates patient status post bilateral total knee arthroplasty.   Components in the left total knee appear in appropriate position without any evidence of obvious hardware complication. Compared to right total knee there does appear to be some slight polyethylene wear but certainly not metal-on-metal apposition. 4 views of the left lower leg again demonstrate patient status post left total knee arthroplasty. No evidence of obvious loosening. No evidence of acute fracture or other acute osseous abnormality. Patient with mild degenerative changes of the tibiotalar joint. Orders Placed This Encounter   Procedures    XR KNEE LEFT (1-2 VIEWS)     Standing Status:   Future     Number of Occurrences:   1     Standing Expiration Date:   12/15/2022    XR TIBIA FIBULA LEFT (2 VIEWS)     Standing Status:   Future     Number of Occurrences:   1     Standing Expiration Date:   12/15/2022       Assessment and Plan:  1. Acute pain of left knee    2. History of total knee replacement, left          PLAN:  This is a 76 y.o. male who presents to the clinic today for evaluation of left leg pain after a fall on 10/28/2022. Patient is mostly here for reassurance as he has a history of left total knee arthroplasty and believes this took the brunt of the force. Today his left knee pain actually has improved quite a bit but he does continue to have some swelling. 1.  Patient and wife are given reassurance that I do not appreciate any obvious instability of the knee no evidence of hardware loosening or periprosthetic fracture. 2.  The majority of patient's pain actually seems to be of the lower leg where he has diffuse bruising and pitting edema greater than the right leg. Patient is anticoagulated with Eliquis likely contributing to the diffuse bruising and contusion of the lower leg  3. We discussed possibility of initiation of oral steroid however patient reports he is chronically taking this for other issues would recommend continue with this.   4.  Recommend compression stocking however patient reports his wounds at home do not currently fit so he is provided with a compression Ace wrap to be worn with activity he may remove at night. 5.  Follow-up in 4 weeks however patient may call return sooner for any questions or concerns          Please note that this chart was generated using voice recognition Dragon dictation software. Although every effort was made to ensure the accuracy of this automated transcription, some errors in transcription may have occurred.

## 2022-12-08 ENCOUNTER — HOSPITAL ENCOUNTER (OUTPATIENT)
Age: 77
Discharge: HOME | End: 2022-12-08
Payer: MEDICARE

## 2022-12-08 ENCOUNTER — OFFICE VISIT (OUTPATIENT)
Dept: PRIMARY CARE CLINIC | Age: 77
End: 2022-12-08

## 2022-12-08 VITALS
WEIGHT: 315 LBS | BODY MASS INDEX: 45.1 KG/M2 | HEIGHT: 70 IN | OXYGEN SATURATION: 96 % | DIASTOLIC BLOOD PRESSURE: 70 MMHG | HEART RATE: 64 BPM | SYSTOLIC BLOOD PRESSURE: 140 MMHG

## 2022-12-08 DIAGNOSIS — R60.0 LOCALIZED EDEMA: ICD-10-CM

## 2022-12-08 DIAGNOSIS — I10 PRIMARY HYPERTENSION: ICD-10-CM

## 2022-12-08 DIAGNOSIS — R73.9 HYPERGLYCEMIA: Primary | ICD-10-CM

## 2022-12-08 LAB
ANION GAP SERPL CALCULATED.3IONS-SCNC: 16 MMOL/L (ref 9–17)
BUN BLDV-MCNC: 32 MG/DL (ref 8–23)
CALCIUM SERPL-MCNC: 9.4 MG/DL (ref 8.6–10.4)
CHLORIDE BLD-SCNC: 98 MMOL/L (ref 98–107)
CO2: 29 MMOL/L (ref 20–31)
CREAT SERPL-MCNC: 1.16 MG/DL (ref 0.7–1.2)
GFR SERPL CREATININE-BSD FRML MDRD: >60 ML/MIN/1.73M2
GLUCOSE FASTING: 107 MG/DL (ref 70–99)
POTASSIUM SERPL-SCNC: 4.9 MMOL/L (ref 3.7–5.3)
SODIUM BLD-SCNC: 143 MMOL/L (ref 135–144)

## 2022-12-08 PROCEDURE — 36415 COLL VENOUS BLD VENIPUNCTURE: CPT

## 2022-12-08 PROCEDURE — 80048 BASIC METABOLIC PNL TOTAL CA: CPT

## 2022-12-08 ASSESSMENT — ENCOUNTER SYMPTOMS
COUGH: 0
SHORTNESS OF BREATH: 0
DIARRHEA: 0
NAUSEA: 0
VOMITING: 0

## 2022-12-08 NOTE — PROGRESS NOTES
[facility] PRIMARY CARE  [address] [facility] [address]  Dept: 69 Rosette Pepper is a 76 y.o. male Established patient, who presents today for his medical conditions/complaints as noted below. Chief Complaint   Patient presents with    Hypertension     Pt is here for a x3 month f/u       HPI:     HPI-  pt having issues with balance. Had a fall in October and landed on his knee. No fracture, but was very bruised with Eliquis. Leg is hurting, swelling is down, but still an issue. Can't get support socks on because he is too swollen. Budesonide started with GI and cardiol started him on the jardiance.       Reviewed prior notes: None   Reviewed previous:   labs    LDL Cholesterol (mg/dL)   Date Value   04/08/2019 67   12/04/2018 72   04/12/2016 116     LDL Calculated (mg/dL)   Date Value   09/09/2021 76   07/13/2021 73       (goal LDL is <100)   AST (U/L)   Date Value   09/15/2022 18     ALT (U/L)   Date Value   09/15/2022 16     BUN (mg/dL)   Date Value   11/15/2022 63 (H)     Hemoglobin A1C (%)   Date Value   09/15/2022 5.9     TSH (uIU/mL)   Date Value   09/15/2022 1.76     BP Readings from Last 3 Encounters:   12/08/22 (!) 140/70   09/23/22 137/75   09/15/22 134/76          (goal 120/80)    Past Medical History:   Diagnosis Date    Adenocarcinoma of prostate ([address] 10/30/2015    Anemia     Cellulitis of lower extremity     right     Cerebral artery occlusion with cerebral infarction ([address]     1996    CHF (congestive heart failure) ([address]     Chronic acquired lymphedema     Clavicle fracture     in high school    Hernia, abdominal     History of blood transfusion 2003 7 2006    AUTOTRANSFUSION [address]    History of CVA (cerebrovascular accident) 54    DEFECIT MILD MEMORY AND SPEECH    Hyperlipidemia     Hypertension     Hypothyroid 09/2015    Ileus, postoperative (HCC)     Mild renal insufficiency     Morbid obesity ([address] Non-healing wound of lower extremity     HISTORY OF, WAS SEEN IN WOUND CLINIC FOR COUPLE YRS. Osteoarthritis     Phlebitis     HX. OF     Prolonged emergence from general anesthesia     x1 had to be put back on ventolator, after prostate surgery, had to be hospitalized x12 days.      Prostate [facility])     Prostate cancer ([facility] 10/21/2015    PVD (peripheral vascular disease) ([facility]     Sleep apnea     C-PAP NIGHTLY-PT INSTRUCTED TO BRING    SVT (supraventricular tachycardia) (HCC)     Uric acid kidney stone 12/2014    HAD 6 PASSSED ONE ON OWN, OTHER 5 IS BEING MONITORED    Wears glasses     Wrist fracture rt,      Past Surgical History:   Procedure Laterality Date    ABLATION OF DYSRHYTHMIC FOCUS  03/16/2018    afib ablation    ABLATION OF DYSRHYTHMIC FOCUS  08/22/2019    ATRIAL FIB  /  DR [address]  07/30/2020    Dr. rFance Mitchell, [address] [place] [address]  11/17/2017    COLONOSCOPY  2002, 2007    COLONOSCOPY  07/11/2016    polyp,bx    COLONOSCOPY N/A 5/2/2022    COLONOSCOPY WITH RANDOM COLON BIOPSIES AND CLIPPING AT DISTAL TRANSVERSE COLON performed by Ashanti Izquierdo MD at . [address] N/A 8/9/2022    COLONOSCOPY POLYPECTOMY SNARE/COLD BIOPSY performed by Catina Reina MD at [address] [facility] [address] Left 08/05/2016    excision cyst anterior chest    HAMMER TOE SURGERY Bilateral     JOINT REPLACEMENT      KNEE SURGERY Left 1985    repair of torn ligaments    RI COLON CA SCRN NOT  [address] [place] [address] IND N/A 07/31/2017    COLONOSCOPY performed by Ashanti Izquierdo MD at [address] [facility] 05/15/2018    HIP TOTAL ARTHROPLASTY MINIMALLY INVASIVE ASI WITH GPS performed by Garima Aguilar MD at [address] [facility]  10/21/2015    robotic    SHOULDER ARTHROPLASTY Bilateral     STUDY POSS ABLATION  04/02/2018         TONSILLECTOMY      TOTAL KNEE ARTHROPLASTY Bilateral LT-2003, RT-2006    TRANSESOPHAGEAL ECHOCARDIOGRAM  11/17/2017    TRANSESOPHAGEAL ECHOCARDIOGRAM 2019    TUMOR EXCISION      Throat/nose D/T benign tumor    UPPP UVULOPALATOPHARYGOPLASTY  90'S    VARICOSE VEIN SURGERY Bilateral [de-identified]    x2       Family History   Problem Relation Age of Onset    Diabetes Mother     Hypertension Mother     Heart Attack Mother     Colon Cancer Father     Heart Attack Father     Stroke Father     Cirrhosis Brother 54        ALCOHOL RELATED    Dementia Maternal Grandmother     Diabetes Maternal Grandmother     Cancer Paternal Grandmother         uterine    Hypertension Paternal Grandfather        Social History     Tobacco Use    Smoking status: Former     Packs/day: 2.00     Years: 10.00     Pack years: 20.00     Types: Cigarettes     Quit date: 1970     Years since quittin.0    Smokeless tobacco: Never   Substance Use Topics    Alcohol use: Yes     Comment: 1-2 beer a day      Current Outpatient Medications   Medication Sig Dispense Refill    ciclopirox (LOPROX) 0.77 % cream       metroNIDAZOLE (METROCREAM) 0.75 % cream       mesalamine (LIALDA) 1.2 g EC tablet Take 1 tablet by mouth 3 times daily 30 tablet 3    budesonide (ENTOCORT EC) 3 MG extended release capsule Take 3 capsules by mouth 2 times daily 540 capsule 1    furosemide (LASIX) 40 MG tablet Take 1 tablet by mouth in the morning. Patient is taking 1.5 tab daily (60mg).  (Patient taking differently: Take 40 mg by mouth daily Patient is taking 1 tab daily) 135 tablet 3    levothyroxine (SYNTHROID) 25 MCG tablet TAKE 1 TABLET DAILY 90 tablet 3    lisinopril (PRINIVIL;ZESTRIL) 5 MG tablet TAKE 1 TABLET NIGHTLY 90 tablet 3    atorvastatin (LIPITOR) 10 MG tablet TAKE 1 TABLET DAILY 90 tablet 3    Ascorbic Acid (VITAMIN C) 250 MG tablet Take 250 mg by mouth daily      allopurinol (ZYLOPRIM) 300 MG tablet TAKE 1 TABLET TWICE A  tablet 3    Melatonin 10 MG TABS Take 1 tablet by mouth daily       zinc 50 MG CAPS Take 50 mg by mouth daily 30 capsule 3    pantoprazole (PROTONIX) 40 MG tablet Take 1 tablet by mouth every morning (before breakfast) 90 tablet 1    Metoprolol Tartrate 37.5 MG TABS Take 37.5 mg by mouth 2 times daily Takes 1 1/2 tablets 2xdaily      Bisacodyl (DULCOLAX PO) Take 1 tablet by mouth as needed      apixaban (ELIQUIS) 5 MG TABS tablet Take 5 mg by mouth 2 times daily      trospium (SANCTURA) 20 MG tablet Take 20 mg by mouth 2 times daily Prescribed by Dr. Ervin Bautista      acetaminophen (TYLENOL) 325 MG tablet Take 650 mg by mouth every 6 hours as needed for Pain      CINNAMON PO Take 2 capsules by mouth daily      Probiotic Product (PROBIOTIC ADVANCED PO) Take by mouth      Multiple Vitamins-Minerals (OCUVITE PO) Take 1 tablet by mouth daily      Cholecalciferol (VITAMIN D-3 PO) Take  by mouth daily. 2000 IU daily       Coenzyme Q10 (CO Q 10 PO) Take 1 tablet by mouth daily       magnesium cl-calcium carbonate (SLOW-MAG) 71.5-119 MG TBEC tablet Take 1 tablet by mouth daily (Patient not taking: Reported on 12/8/2022) 60 tablet 1    traMADol (ULTRAM) 50 MG tablet Take 50 mg by mouth every 6 hours as needed for Pain. (Patient not taking: Reported on 12/8/2022)      budesonide (ENTOCORT EC) 3 MG extended release capsule TAKE 3 CAPSULES BY MOUTH EVERY DAY (Patient not taking: Reported on 12/8/2022)       No current facility-administered medications for this visit.      Allergies   Allergen Reactions    Adhesive Tape Itching and Rash    Doxycycline Rash       Health Maintenance   Topic Date Due    Shingles vaccine (1 of 2) Never done    COVID-19 Vaccine (4 - Booster for Moderna series) 02/15/2022    Lipids  09/09/2022    Annual Wellness Visit (AWV)  12/30/2022    Prostate Specific Antigen (PSA) Screening or Monitoring  07/18/2023    Depression Screen  08/11/2023    DTaP/Tdap/Td vaccine (2 - Td or Tdap) 04/01/2032    Flu vaccine  Completed    Pneumococcal 65+ years Vaccine  Completed    Hepatitis C screen  Completed    Hepatitis A vaccine  Aged Out    Hib vaccine  Aged Out    Meningococcal (ACWY) vaccine  Aged Out       Subjective:      Review of Systems   HENT:  Negative for congestion. Respiratory:  Negative for cough and shortness of breath. Cardiovascular:  Negative for chest pain. Gastrointestinal:  Negative for diarrhea, nausea and vomiting. Neurological:  Positive for light-headedness (balance issues). Negative for dizziness. Objective:     BP (!) 140/70   Pulse 64   Ht 5' 10\" (1.778 m)   Wt (!) 326 lb 9.6 oz (148.1 kg)   SpO2 96%   BMI 46.86 kg/m²   Physical Exam  Vitals and nursing note reviewed. Constitutional:       General: He is not in acute distress. Appearance: He is well-developed. He is not ill-appearing. HENT:      Head: Normocephalic and atraumatic. Right Ear: External ear normal.      Left Ear: External ear normal.   Eyes:      General: No scleral icterus. Right eye: No discharge. Left eye: No discharge. Conjunctiva/sclera: Conjunctivae normal.   Neck:      Thyroid: No thyromegaly. Trachea: No tracheal deviation. Cardiovascular:      Rate and Rhythm: Normal rate and regular rhythm. Heart sounds: Normal heart sounds. Pulmonary:      Effort: Pulmonary effort is normal. No respiratory distress. Breath sounds: Normal breath sounds. No wheezing. Lymphadenopathy:      Cervical: No cervical adenopathy. Skin:     General: Skin is warm. Findings: No rash. Neurological:      Mental Status: He is alert and oriented to person, place, and time. Psychiatric:         Mood and Affect: Mood normal.         Behavior: Behavior normal.         Thought Content: Thought content normal.       Assessment/Plan:   1. Hyperglycemia  -     Hemoglobin A1C; Future  2. Localized edema  Assessment & Plan:   use lymphedema pumps and see if leg gets better, and if it does not then will consider PT for leg.     3. Primary hypertension  Assessment & Plan:   Well-controlled, continue current medications     Return in about 3 months (around 3/8/2023) for medication f/u. Data Unavailable     Orders Placed This Encounter   Procedures    Hemoglobin A1C     Standing Status:   Future     Standing Expiration Date:   12/9/2023     No orders of the defined types were placed in this encounter. Patient given educational materials - see patient instructions. Discussed use, benefit, and side effects of prescribed medications. All patient questions answered. Pt voiced understanding. Reviewed health maintenance. Instructed to continue current medications, diet and exercise. Patient agreed with treatment plan. Follow up as directed.      Electronically signed by Nuvia Painter MD on 12/8/2022 at 4:16 PM

## 2022-12-21 ENCOUNTER — HOSPITAL ENCOUNTER (OUTPATIENT)
Age: 77
Setting detail: SPECIMEN
Discharge: HOME | End: 2022-12-21

## 2022-12-21 DIAGNOSIS — R73.9 HYPERGLYCEMIA: ICD-10-CM

## 2022-12-22 LAB
ESTIMATED AVERAGE GLUCOSE: 123 MG/DL
HBA1C MFR BLD: 5.9 % (ref 4–6)

## 2022-12-30 ENCOUNTER — OFFICE VISIT (OUTPATIENT)
Dept: PRIMARY CARE CLINIC | Age: 77
End: 2022-12-30

## 2022-12-30 VITALS
WEIGHT: 315 LBS | HEIGHT: 70 IN | SYSTOLIC BLOOD PRESSURE: 138 MMHG | BODY MASS INDEX: 45.1 KG/M2 | OXYGEN SATURATION: 96 % | HEART RATE: 62 BPM | DIASTOLIC BLOOD PRESSURE: 62 MMHG

## 2022-12-30 DIAGNOSIS — D50.9 IRON DEFICIENCY ANEMIA, UNSPECIFIED IRON DEFICIENCY ANEMIA TYPE: ICD-10-CM

## 2022-12-30 DIAGNOSIS — E03.9 ACQUIRED HYPOTHYROIDISM: ICD-10-CM

## 2022-12-30 DIAGNOSIS — Z00.00 MEDICARE ANNUAL WELLNESS VISIT, SUBSEQUENT: Primary | ICD-10-CM

## 2022-12-30 DIAGNOSIS — I50.32 CHRONIC DIASTOLIC CONGESTIVE HEART FAILURE (HCC): ICD-10-CM

## 2022-12-30 ASSESSMENT — PATIENT HEALTH QUESTIONNAIRE - PHQ9
SUM OF ALL RESPONSES TO PHQ QUESTIONS 1-9: 0
SUM OF ALL RESPONSES TO PHQ QUESTIONS 1-9: 0
2. FEELING DOWN, DEPRESSED OR HOPELESS: 0
1. LITTLE INTEREST OR PLEASURE IN DOING THINGS: 0
SUM OF ALL RESPONSES TO PHQ QUESTIONS 1-9: 0
SUM OF ALL RESPONSES TO PHQ QUESTIONS 1-9: 0
SUM OF ALL RESPONSES TO PHQ9 QUESTIONS 1 & 2: 0

## 2022-12-30 ASSESSMENT — LIFESTYLE VARIABLES
HOW OFTEN DO YOU HAVE A DRINK CONTAINING ALCOHOL: 2-4 TIMES A MONTH
HOW MANY STANDARD DRINKS CONTAINING ALCOHOL DO YOU HAVE ON A TYPICAL DAY: 1 OR 2

## 2022-12-30 NOTE — PROGRESS NOTES
Medicare Annual Wellness Visit    Yunior Saldaña is here for Hypertension (Patient doesn't check B/P at home ), Hypothyroidism, and Medicare AWV (Patient was given the words respect elephant and green to remember. He was given the time of 4:15 to draw )    Assessment & Plan   Medicare annual wellness visit, subsequent  Chronic diastolic congestive heart failure ([facility]  -     CBC with Auto Differential; Future  -     Comprehensive Metabolic Panel; Future  -     Magnesium; Future  -     Vitamin B12 & Folate; Future  Acquired hypothyroidism  -     CBC with Auto Differential; Future  -     Comprehensive Metabolic Panel; Future  -     Magnesium; Future  -     Vitamin B12 & Folate; Future  Iron deficiency anemia, unspecified iron deficiency anemia type  -     CBC with Auto Differential; Future  -     Comprehensive Metabolic Panel; Future  -     Magnesium; Future  -     Vitamin B12 & Folate; Future    Recommendations for Preventive Services Due: see orders and patient instructions/AVS.  Recommended screening schedule for the next 5-10 years is provided to the patient in written form: see Patient Instructions/AVS.     Return in about 1 year (around 12/30/2023) for [address] Subjective   The following acute and/or chronic problems were also addressed today:  Pt in bed until 6-7 pm and then stays up until 3 or 4. Not exercising really. Discussed exercise options fro pt moving around more. Patient's complete Health Risk Assessment and screening values have been reviewed and are found in Flowsheets. The following problems were reviewed today and where indicated follow up appointments were made and/or referrals ordered. Positive Risk Factor Screenings with Interventions:    Fall Risk:  Do you feel unsteady or are you worried about falling? : (!) yes  2 or more falls in past year?: no  Fall with injury in past year?: no     Interventions:    Did have one fall- had to call EMS to get him up.                Opioid Risk: (Low risk score <55) Opioid risk score: 9    Patient is low risk for opioid use disorder or overdose. Last PDMP Lluvia Pimentel as Reviewed:  Review User Review Instant Review Result   MARSHALL SOTO 12/29/2021  2:26 PM     Reviewed PDMP [1]         Weight and Activity:  Physical Activity: Insufficiently Active    Days of Exercise per Week: 7 days    Minutes of Exercise per Session: 20 min     On average, how many days per week do you engage in moderate to strenuous exercise (like a brisk walk)?: 7 days  Have you lost any weight without trying in the past 3 months?: No  Body mass index: (!) 45.94  Obesity Interventions:  Discussed with pt more exercise options             Safety:  Do you have any tripping hazards - loose or unsecured carpets or rugs?: (!) Yes  Interventions:  Look at removing them. Objective   Vitals:    12/30/22 1045   BP: 138/62   Pulse: 62   SpO2: 96%   Weight: (!) 320 lb 3.2 oz (145.2 kg)   Height: 5' 10\" (1.778 m)      Body mass index is 45.94 kg/m². Physical Exam  Vitals and nursing note reviewed. Constitutional:       General: He is not in acute distress. Appearance: He is well-developed. He is not ill-appearing. HENT:      Head: Normocephalic and atraumatic. Right Ear: External ear normal.      Left Ear: External ear normal.   Eyes:      General: No scleral icterus. Right eye: No discharge. Left eye: No discharge. Conjunctiva/sclera: Conjunctivae normal.   Neck:      Thyroid: No thyromegaly. Trachea: No tracheal deviation. Cardiovascular:      Rate and Rhythm: Normal rate and regular rhythm. Heart sounds: Normal heart sounds. Pulmonary:      Effort: Pulmonary effort is normal. No respiratory distress. Breath sounds: Normal breath sounds. No wheezing. Lymphadenopathy:      Cervical: No cervical adenopathy. Skin:     General: Skin is warm. Findings: No rash.    Neurological:      Mental Status: He is alert and oriented to person, place, and time. Psychiatric:         Mood and Affect: Mood normal.         Behavior: Behavior normal.         Thought Content: Thought content normal.           Allergies   Allergen Reactions    Adhesive Tape Itching and Rash    Doxycycline Rash     Prior to Visit Medications    Medication Sig Taking? Authorizing Provider   empagliflozin (JARDIANCE) 10 MG tablet Take 10 mg by mouth daily Yes Historical Provider, MD   ciclopirox (LOPROX) 0.77 % cream  Yes Historical Provider, MD   metroNIDAZOLE (METROCREAM) 0.75 % cream  Yes Historical Provider, MD   mesalamine (LIALDA) 1.2 g EC tablet Take 1 tablet by mouth 3 times daily Yes Isha Osorio MD   traMADol (ULTRAM) 50 MG tablet Take 50 mg by mouth every 6 hours as needed for Pain. Yes Historical Provider, MD   furosemide (LASIX) 40 MG tablet Take 1 tablet by mouth in the morning. Patient is taking 1.5 tab daily (60mg).   Patient taking differently: Take 40 mg by mouth daily Patient is taking 1 tab daily Yes Silvia Yang MD   levothyroxine (SYNTHROID) 25 MCG tablet TAKE 1 TABLET DAILY Yes Silvia Yang MD   lisinopril (PRINIVIL;ZESTRIL) 5 MG tablet TAKE 1 TABLET NIGHTLY Yes Silvia Yang MD   atorvastatin (LIPITOR) 10 MG tablet TAKE 1 TABLET DAILY Yes Silvia Yang MD   budesonide (ENTOCORT EC) 3 MG extended release capsule  Yes Historical Provider, MD   Ascorbic Acid (VITAMIN C) 250 MG tablet Take 250 mg by mouth daily Yes Historical Provider, MD   allopurinol (ZYLOPRIM) 300 MG tablet TAKE 1 TABLET TWICE A DAY Yes Silvia Yang MD   Melatonin 10 MG TABS Take 1 tablet by mouth daily  Yes Historical Provider, MD   zinc 50 MG CAPS Take 50 mg by mouth daily Yes Silvia Yang MD   pantoprazole (PROTONIX) 40 MG tablet Take 1 tablet by mouth every morning (before breakfast) Yes DONALDO Coronado CNP   Metoprolol Tartrate 37.5 MG TABS Take 37.5 mg by mouth 2 times daily Takes 1 1/2 tablets 2xdaily Yes Historical Provider, MD   Bisacodyl (DULCOLAX PO) Take 1 tablet by mouth as needed Yes Historical Provider, MD   apixaban (ELIQUIS) 5 MG TABS tablet Take 5 mg by mouth 2 times daily Yes Historical Provider, MD   trospium (SANCTURA) 20 MG tablet Take 20 mg by mouth 2 times daily Prescribed by Dr. Francesca Spann Yes Historical Provider, MD   acetaminophen (TYLENOL) 325 MG tablet Take 650 mg by mouth every 6 hours as needed for Pain Yes Historical Provider, MD   CINNAMON PO Take 2 capsules by mouth daily Yes Historical Provider, MD   Probiotic Product (PROBIOTIC ADVANCED PO) Take by mouth Yes Historical Provider, MD   Multiple Vitamins-Minerals (OCUVITE PO) Take 1 tablet by mouth daily Yes Historical Provider, MD   Cholecalciferol (VITAMIN D-3 PO) Take  by mouth daily.  2000 IU daily  Yes Historical Provider, MD   Coenzyme Q10 (CO Q 10 PO) Take 1 tablet by mouth daily  Yes Historical Provider, MD   magnesium cl-calcium carbonate (SLOW-MAG) 71.5-119 MG TBEC tablet Take 1 tablet by mouth daily  Patient not taking: No sig reported  [facility] (Including outside providers/suppliers regularly involved in providing care):   Patient Care Team:  Julia Barajas MD as PCP - General  Julia Barajas MD as PCP - [facility] Empaneled Provider  Ancelmo Vigil MD as Consulting Physician (Gastroenterology)     Reviewed and updated this visit:  Tobacco  Allergies  Meds  Med Hx  Surg Hx  Soc Hx  Fam Hx

## 2023-01-25 DIAGNOSIS — K52.9 COLITIS: Primary | ICD-10-CM

## 2023-01-26 RX ORDER — BUDESONIDE 3 MG/1
9 CAPSULE, COATED PELLETS ORAL EVERY MORNING
Qty: 90 CAPSULE | Refills: 0 | Status: SHIPPED | OUTPATIENT
Start: 2023-01-26 | End: 2023-02-25

## 2023-02-01 ENCOUNTER — OFFICE VISIT (OUTPATIENT)
Dept: GASTROENTEROLOGY | Age: 78
End: 2023-02-01

## 2023-02-01 VITALS
DIASTOLIC BLOOD PRESSURE: 61 MMHG | HEART RATE: 62 BPM | WEIGHT: 315 LBS | BODY MASS INDEX: 46.06 KG/M2 | TEMPERATURE: 96.8 F | SYSTOLIC BLOOD PRESSURE: 122 MMHG

## 2023-02-01 DIAGNOSIS — K52.9 COLITIS: Primary | ICD-10-CM

## 2023-02-01 DIAGNOSIS — R19.7 DIARRHEA, UNSPECIFIED TYPE: ICD-10-CM

## 2023-02-01 DIAGNOSIS — D12.3 ADENOMA OF TRANSVERSE COLON: ICD-10-CM

## 2023-02-01 ASSESSMENT — ENCOUNTER SYMPTOMS
SHORTNESS OF BREATH: 1
ABDOMINAL PAIN: 0
WHEEZING: 0
ABDOMINAL DISTENTION: 0
VOMITING: 0
RECTAL PAIN: 0
TROUBLE SWALLOWING: 0
DIARRHEA: 0
NAUSEA: 0
ANAL BLEEDING: 0
CONSTIPATION: 0
BLOOD IN STOOL: 0
COUGH: 0
CHOKING: 0

## 2023-02-01 NOTE — PROGRESS NOTES
GI CLINIC FOLLOW UP    INTERVAL HISTORY:   No referring provider defined for this encounter. Chief Complaint   Patient presents with    Diarrhea     Patient is f/u on diarrhea, colitis. He states he has been having regular or soft BM           HISTORY OF PRESENT ILLNESS: Prabhakar Elizondo is a [de-identified] y.o. male , referred for evaluation of colitis, history of diarrhea,    Patient is here for follow-up as mentioned in the last visit this patient has questionable colitis but IBD  Felt to be less likely because of negative markers  And because repeat colonoscopy showed no more colitis  Last visit we switched him to [address] [place] [address]  And told him to taper the budesonide  Is doing that at this time but he is not taking the Lialda he said and even with that he is having no symptoms he has no diarrhea, no bleeding no abdominal pain no nausea no vomiting no fever no chills  No weight loss  In fact he is gaining weight  Is eating very well he said and his wife wanted actually to stop gaining weight she said    As stated in the last visit also his colonoscopy showed some polyps and diverticulosis  But there was no colitis anywhere in this colon  And no ileitis up to 10 cm      Past Medical,Family, and Social History reviewed and does contribute to the patient presentingcondition. Patient's PMH/PSH,SH,PSYCH Hx, MEDs, ALLERGIES, and ROS were all reviewed and updated in the appropriate sections.     PAST MEDICAL HISTORY:  Past Medical History:   Diagnosis Date    Adenocarcinoma of prostate ([facility] [address] 10/30/2015    Anemia     Cellulitis of lower extremity     right     Cerebral artery occlusion with cerebral infarction ([facility] [address]     1996    CHF (congestive heart failure) ([facility] [address]     Chronic acquired lymphedema     Clavicle fracture     in high school    Hernia, abdominal     History of blood transfusion 2003 7 2006    AUTOTRANSFUSION [address]    History of CVA (cerebrovascular accident) [facility] Hyperlipidemia     Hypertension     Hypothyroid 09/2015    Ileus, postoperative (HCC)     Mild renal insufficiency     Morbid obesity ([facility] [address]     Non-healing wound of lower extremity     HISTORY OF, WAS SEEN IN WOUND CLINIC FOR COUPLE YRS. Osteoarthritis     Phlebitis     HX. OF     Prolonged emergence from general anesthesia     x1 had to be put back on ventolator, after prostate surgery, had to be hospitalized x12 days.      Prostate CA [facility])     Prostate cancer ([facility] [address] 10/21/2015    PVD (peripheral vascular disease) ([facility] [address]     Sleep apnea     C-PAP NIGHTLY-PT INSTRUCTED TO BRING    SVT (supraventricular tachycardia) (HCC)     Uric acid kidney stone 12/2014    HAD 6 PASSSED ONE ON OWN, OTHER 5 IS BEING MONITORED    Wears glasses     Wrist fracture rt,       Past Surgical History:   Procedure Laterality Date    ABLATION OF DYSRHYTHMIC FOCUS  03/16/2018    afib ablation    ABLATION OF DYSRHYTHMIC FOCUS  08/22/2019    ATRIAL FIB  /  DR [address]  07/30/2020    Dr. Farideh Liz, [address]  11/17/2017    COLONOSCOPY  2002, 2007    COLONOSCOPY  07/11/2016    polyp,bx    COLONOSCOPY N/A 05/02/2022    COLONOSCOPY WITH RANDOM COLON BIOPSIES AND CLIPPING AT DISTAL TRANSVERSE COLON performed by Ho Vail MD at . [address] N/A 08/09/2022    COLONOSCOPY POLYPECTOMY SNARE/COLD BIOPSY performed by Mayito Nunez MD at [address] Left 08/05/2016    excision cyst anterior chest    HAMMER TOE SURGERY Bilateral     KNEE SURGERY Left 1985    repair of torn ligaments    [place] ARTHRP ACETBLR/PROX FEM PROSTC AGRFT/ALGRFT Left 05/15/2018    HIP TOTAL ARTHROPLASTY MINIMALLY INVASIVE ASI WITH GPS performed by Jaquelin Ibrahim MD at . [address] [place] [address] IND N/A 07/31/2017    COLONOSCOPY performed by Ho Vail MD at [address] [place] [address]  10/21/2015    robotic    SHOULDER ARTHROPLASTY Bilateral     STUDY POSS ABLATION  04/02/2018 TONSILLECTOMY      TOTAL KNEE ARTHROPLASTY Bilateral LT-2003, RT-2006    TRANSESOPHAGEAL ECHOCARDIOGRAM  11/17/2017    TRANSESOPHAGEAL ECHOCARDIOGRAM  08/22/2019    TUMOR EXCISION      Throat/nose D/T benign tumor    UPPP UVULOPALATOPHARYGOPLASTY  90'S    VARICOSE VEIN SURGERY Bilateral 80's    x2       CURRENT MEDICATIONS:    Current Outpatient Medications:     budesonide (ENTOCORT EC) 3 MG extended release capsule, Take 3 capsules by mouth every morning, Disp: 90 capsule, Rfl: 0    empagliflozin (JARDIANCE) 10 MG tablet, Take 10 mg by mouth daily, Disp: , Rfl:     ciclopirox (LOPROX) 0.77 % cream, , Disp: , Rfl:     metroNIDAZOLE (METROCREAM) 0.75 % cream, , Disp: , Rfl:     mesalamine (LIALDA) 1.2 g EC tablet, Take 1 tablet by mouth 3 times daily, Disp: 30 tablet, Rfl: 3    magnesium cl-calcium carbonate (SLOW-MAG) 71.5-119 MG TBEC tablet, Take 1 tablet by mouth daily (Patient not taking: No sig reported), Disp: 60 tablet, Rfl: 1    traMADol (ULTRAM) 50 MG tablet, Take 50 mg by mouth every 6 hours as needed for Pain., Disp: , Rfl:     furosemide (LASIX) 40 MG tablet, Take 1 tablet by mouth in the morning. Patient is taking 1.5 tab daily (60mg).  (Patient taking differently: Take 40 mg by mouth daily Patient is taking 1 tab daily), Disp: 135 tablet, Rfl: 3    levothyroxine (SYNTHROID) 25 MCG tablet, TAKE 1 TABLET DAILY, Disp: 90 tablet, Rfl: 3    lisinopril (PRINIVIL;ZESTRIL) 5 MG tablet, TAKE 1 TABLET NIGHTLY, Disp: 90 tablet, Rfl: 3    atorvastatin (LIPITOR) 10 MG tablet, TAKE 1 TABLET DAILY, Disp: 90 tablet, Rfl: 3    Ascorbic Acid (VITAMIN C) 250 MG tablet, Take 250 mg by mouth daily, Disp: , Rfl:     allopurinol (ZYLOPRIM) 300 MG tablet, TAKE 1 TABLET TWICE A DAY, Disp: 180 tablet, Rfl: 3    Melatonin 10 MG TABS, Take 1 tablet by mouth daily , Disp: , Rfl:     zinc 50 MG CAPS, Take 50 mg by mouth daily, Disp: 30 capsule, Rfl: 3    pantoprazole (PROTONIX) 40 MG tablet, Take 1 tablet by mouth every morning (before breakfast), Disp: 90 tablet, Rfl: 1    Metoprolol Tartrate 37.5 MG TABS, Take 37.5 mg by mouth 2 times daily Takes 1 1/2 tablets 2xdaily, Disp: , Rfl:     Bisacodyl (DULCOLAX PO), Take 1 tablet by mouth as needed, Disp: , Rfl:     apixaban (ELIQUIS) 5 MG TABS tablet, Take 5 mg by mouth 2 times daily, Disp: , Rfl:     trospium (SANCTURA) 20 MG tablet, Take 20 mg by mouth 2 times daily Prescribed by Dr. Bianka Dallas, Disp: , Rfl:     acetaminophen (TYLENOL) 325 MG tablet, Take 650 mg by mouth every 6 hours as needed for Pain, Disp: , Rfl:     CINNAMON PO, Take 2 capsules by mouth daily, Disp: , Rfl:     Probiotic Product (PROBIOTIC ADVANCED PO), Take by mouth, Disp: , Rfl:     Multiple Vitamins-Minerals (OCUVITE PO), Take 1 tablet by mouth daily, Disp: , Rfl:     Cholecalciferol (VITAMIN D-3 PO), Take  by mouth daily.  2000 IU daily , Disp: , Rfl:     Coenzyme Q10 (CO Q 10 PO), Take 1 tablet by mouth daily , Disp: , Rfl:     ALLERGIES:   Allergies   Allergen Reactions    Adhesive Tape Itching and Rash    Doxycycline Rash       FAMILY HISTORY:       Problem Relation Age of Onset    Diabetes Mother     Hypertension Mother     Heart Attack Mother     Colon Cancer Father     Heart Attack Father     Stroke Father     Other Sister         pneumonia    Cirrhosis Brother 54        ALCOHOL RELATED    Dementia Maternal Grandmother     Diabetes Maternal Grandmother     Cancer Paternal Grandmother         uterine    Hypertension Paternal Grandfather          SOCIAL HISTORY:   Social History     Socioeconomic History    Marital status:      Spouse name: Not on file    Number of children: Not on file    Years of education: Not on file    Highest education level: Not on file   Occupational History    Not on file   Tobacco Use    Smoking status: Former     Packs/day: 2.00     Years: 10.00     Pack years: 20.00     Types: Cigarettes     Quit date: 1970     Years since quittin.1    Smokeless tobacco: Never   Vaping Use    Vaping Use: Never used   Substance and Sexual Activity    Alcohol use: Yes     Comment: 2 per month    Drug use: No    Sexual activity: Not on file   Other Topics Concern    Not on file   Social History Narrative    Not on file     Social Determinants of Health     Financial Resource Strain: Low Risk     Difficulty of Paying Living Expenses: Not hard at all   Food Insecurity: No Food Insecurity    Worried About Running Out of Food in the Last Year: Never true    Ran Out of Food in the Last Year: Never true   Transportation Needs: Not on file   Physical Activity: Insufficiently Active    Days of Exercise per Week: 7 days    Minutes of Exercise per Session: 20 min   Stress: Not on file   Social Connections: Not on file   Intimate Partner Violence: Not on file   Housing Stability: Not on file       REVIEW OF SYSTEMS: A 12-point review of systemswas obtained and pertinent positives and negatives were enumerated above in the history of present illness. All other reviewed systems / symptoms were negative. Review of Systems   Constitutional:  Positive for fatigue. Negative for appetite change and unexpected weight change. HENT:  Negative for trouble swallowing. Eyes:  Positive for visual disturbance (glaases). Respiratory:  Positive for shortness of breath (with excertion). Negative for cough, choking and wheezing. Cardiovascular:  Negative for chest pain, palpitations and leg swelling. Gastrointestinal:  Negative for abdominal distention, abdominal pain, anal bleeding, blood in stool, constipation, diarrhea, nausea, rectal pain and vomiting. Genitourinary:  Negative for difficulty urinating. Allergic/Immunologic: Negative for environmental allergies and food allergies. Neurological:  Negative for dizziness, weakness, light-headedness, numbness and headaches. Hematological:  Bruises/bleeds easily. Psychiatric/Behavioral:  Positive for sleep disturbance. The patient is nervous/anxious. LABORATORY DATA: Reviewed  Lab Results   Component Value Date    WBC 6.5 09/15/2022    HGB 14.3 09/15/2022    HCT 44.8 09/15/2022    .7 (H) 09/15/2022     09/15/2022     12/08/2022    K 4.9 12/08/2022    CL 98 12/08/2022    CO2 29 12/08/2022    BUN 32 (H) 12/08/2022    CREATININE 1.16 12/08/2022    LABPROT 7.0 06/18/2012    LABALBU 3.9 09/15/2022    BILITOT 0.6 09/15/2022    ALKPHOS 59 09/15/2022    AST 18 09/15/2022    ALT 16 09/15/2022    INR 1.0 03/16/2018         Lab Results   Component Value Date    RBC 4.24 09/15/2022    HGB 14.3 09/15/2022    .7 (H) 09/15/2022    MCH 33.7 (H) 09/15/2022    MCHC 31.9 09/15/2022    RDW 15.3 (H) 09/15/2022    MPV 9.8 09/15/2022    BASOPCT 1 09/15/2022    LYMPHSABS 1.70 09/15/2022    MONOSABS 0.46 09/15/2022    NEUTROABS 4.03 09/15/2022    EOSABS 0.17 09/15/2022    BASOSABS 0.04 09/15/2022         DIAGNOSTIC TESTING:     No results found. PHYSICAL EXAMINATION: Vital signs reviewed per the nursing documentation. /61   Pulse 62   Temp 96.8 °F (36 °C)   Wt (!) 321 lb (145.6 kg)   BMI 46.06 kg/m²   Body mass index is 46.06 kg/m². Physical Exam  Vitals and nursing note reviewed. Constitutional:       General: He is not in acute distress. Appearance: He is well-developed. He is not diaphoretic. HENT:      Head: Normocephalic and atraumatic. Eyes:      General: No scleral icterus. Pupils: Pupils are equal, round, and reactive to light. Neck:      Thyroid: No thyromegaly. Vascular: No JVD. Trachea: No tracheal deviation. Cardiovascular:      Rate and Rhythm: Normal rate and regular rhythm. Heart sounds: Normal heart sounds. No murmur heard. Pulmonary:      Effort: Pulmonary effort is normal. No respiratory distress. Breath sounds: Normal breath sounds. No wheezing. Abdominal:      General: Bowel sounds are normal. There is no distension. Palpations: Abdomen is soft. There is no mass. Tenderness: There is no abdominal tenderness. There is no guarding or rebound. Musculoskeletal:         General: No tenderness. Normal range of motion. Cervical back: Normal range of motion and neck supple. Skin:     General: Skin is warm. Coloration: Skin is not pale. Findings: No erythema or rash. Comments: He is not diaphoretic   Neurological:      Mental Status: He is alert and oriented to person, place, and time. Deep Tendon Reflexes: Reflexes are normal and symmetric. Psychiatric:         Behavior: Behavior normal.         Thought Content: Thought content normal.         Judgment: Judgment normal.         IMPRESSION: Mr. Carolyn Galaviz is a [de-identified] y.o. male with    Diagnosis Orders   1. Colitis        2. Adenoma of transverse colon        3. Diarrhea, unspecified type          Patient asymptomatic at this time told him to taper off the budesonide  Is already not taking the Lialda  We will continue with symptomatic treatment  We will continue with the Protonix and will take from there  Is to report any recurrence of the symptoms    Diet/life style/natural hx /complication of the dx were all explained in details   Past medical, past surgical, social history, psychiatric history, medications or allergies, all reviewed and  updated    Thank you for allowing me to participate in the care of Mr. Carolyn Galaviz. For any further questions please do not hesitate to contact me. I have reviewed and agree with the ROS entered by the MA/RN. Note is dictated utilizing voice recognition software. Unfortunately this leads to occasional typographical errors. Please contact our office if you have any questions.       Mike Farley MD  [facility]  O: #[phone]

## 2023-04-05 RX ORDER — ALLOPURINOL 300 MG/1
TABLET ORAL
Qty: 180 TABLET | Refills: 3 | Status: SHIPPED | OUTPATIENT
Start: 2023-04-05

## 2023-04-05 NOTE — TELEPHONE ENCOUNTER
LAST VISIT:   12/30/2022     Future Appointments   Date Time Provider Woodrow Hall   4/6/2023  2:15 PM MD MINA Muñoz

## 2023-04-06 ENCOUNTER — OFFICE VISIT (OUTPATIENT)
Dept: PRIMARY CARE CLINIC | Age: 78
End: 2023-04-06
Payer: MEDICARE

## 2023-04-06 VITALS
HEART RATE: 62 BPM | SYSTOLIC BLOOD PRESSURE: 122 MMHG | DIASTOLIC BLOOD PRESSURE: 74 MMHG | OXYGEN SATURATION: 95 % | WEIGHT: 315 LBS | BODY MASS INDEX: 48.33 KG/M2

## 2023-04-06 DIAGNOSIS — D64.9 ANEMIA, UNSPECIFIED TYPE: ICD-10-CM

## 2023-04-06 DIAGNOSIS — E03.9 ACQUIRED HYPOTHYROIDISM: ICD-10-CM

## 2023-04-06 DIAGNOSIS — I10 PRIMARY HYPERTENSION: Primary | ICD-10-CM

## 2023-04-06 DIAGNOSIS — I89.0 CHRONIC ACQUIRED LYMPHEDEMA: ICD-10-CM

## 2023-04-06 DIAGNOSIS — I10 PRIMARY HYPERTENSION: ICD-10-CM

## 2023-04-06 DIAGNOSIS — R42 DIZZINESS: ICD-10-CM

## 2023-04-06 DIAGNOSIS — R73.9 HYPERGLYCEMIA: ICD-10-CM

## 2023-04-06 LAB
ABSOLUTE EOS #: 0.11 K/UL (ref 0–0.44)
ABSOLUTE IMMATURE GRANULOCYTE: 0.03 K/UL (ref 0–0.3)
ABSOLUTE LYMPH #: 1.75 K/UL (ref 1.1–3.7)
ABSOLUTE MONO #: 0.55 K/UL (ref 0.1–1.2)
ALBUMIN SERPL-MCNC: 3.9 G/DL (ref 3.5–5.2)
ALBUMIN/GLOBULIN RATIO: 1.4 (ref 1–2.5)
ALP BLD-CCNC: 82 U/L (ref 40–129)
ALT SERPL-CCNC: 16 U/L (ref 5–41)
ANION GAP SERPL CALCULATED.3IONS-SCNC: 8 MMOL/L (ref 9–17)
AST SERPL-CCNC: 17 U/L
BASOPHILS # BLD: 1 % (ref 0–2)
BASOPHILS ABSOLUTE: 0.04 K/UL (ref 0–0.2)
BILIRUB SERPL-MCNC: 0.5 MG/DL (ref 0.3–1.2)
BUN BLDV-MCNC: 21 MG/DL (ref 8–23)
CALCIUM SERPL-MCNC: 10 MG/DL (ref 8.6–10.4)
CHLORIDE BLD-SCNC: 101 MMOL/L (ref 98–107)
CO2: 33 MMOL/L (ref 20–31)
CREAT SERPL-MCNC: 1.25 MG/DL (ref 0.7–1.2)
EOSINOPHILS RELATIVE PERCENT: 1 % (ref 1–4)
FOLATE: 13.5 NG/ML
GFR SERPL CREATININE-BSD FRML MDRD: 58 ML/MIN/1.73M2
GLUCOSE BLD-MCNC: 122 MG/DL (ref 70–99)
HCT VFR BLD CALC: 52.9 % (ref 40.7–50.3)
HEMOGLOBIN: 15.7 G/DL (ref 13–17)
IMMATURE GRANULOCYTES: 0 %
LYMPHOCYTES # BLD: 22 % (ref 24–43)
MAGNESIUM: 2.4 MG/DL (ref 1.6–2.6)
MCH RBC QN AUTO: 31 PG (ref 25.2–33.5)
MCHC RBC AUTO-ENTMCNC: 29.7 G/DL (ref 28.4–34.8)
MCV RBC AUTO: 104.5 FL (ref 82.6–102.9)
MONOCYTES # BLD: 7 % (ref 3–12)
NRBC AUTOMATED: 0 PER 100 WBC
PDW BLD-RTO: 14.9 % (ref 11.8–14.4)
PLATELET # BLD: 229 K/UL (ref 138–453)
PMV BLD AUTO: 9.2 FL (ref 8.1–13.5)
POTASSIUM SERPL-SCNC: 4.7 MMOL/L (ref 3.7–5.3)
RBC # BLD: 5.06 M/UL (ref 4.21–5.77)
RBC # BLD: ABNORMAL 10*6/UL
RBC # BLD: ABNORMAL 10*6/UL
SEG NEUTROPHILS: 69 % (ref 36–65)
SEGMENTED NEUTROPHILS ABSOLUTE COUNT: 5.41 K/UL (ref 1.5–8.1)
SODIUM BLD-SCNC: 142 MMOL/L (ref 135–144)
TOTAL PROTEIN: 6.6 G/DL (ref 6.4–8.3)
TSH SERPL DL<=0.05 MIU/L-ACNC: 2.02 UIU/ML (ref 0.3–5)
VITAMIN B-12: >2000 PG/ML (ref 232–1245)
WBC # BLD: 7.9 K/UL (ref 3.5–11.3)

## 2023-04-06 PROCEDURE — 3078F DIAST BP <80 MM HG: CPT | Performed by: FAMILY MEDICINE

## 2023-04-06 PROCEDURE — 99213 OFFICE O/P EST LOW 20 MIN: CPT | Performed by: FAMILY MEDICINE

## 2023-04-06 PROCEDURE — 1123F ACP DISCUSS/DSCN MKR DOCD: CPT | Performed by: FAMILY MEDICINE

## 2023-04-06 PROCEDURE — 3074F SYST BP LT 130 MM HG: CPT | Performed by: FAMILY MEDICINE

## 2023-04-06 SDOH — ECONOMIC STABILITY: FOOD INSECURITY: WITHIN THE PAST 12 MONTHS, THE FOOD YOU BOUGHT JUST DIDN'T LAST AND YOU DIDN'T HAVE MONEY TO GET MORE.: NEVER TRUE

## 2023-04-06 SDOH — ECONOMIC STABILITY: HOUSING INSECURITY
IN THE LAST 12 MONTHS, WAS THERE A TIME WHEN YOU DID NOT HAVE A STEADY PLACE TO SLEEP OR SLEPT IN A SHELTER (INCLUDING NOW)?: NO

## 2023-04-06 SDOH — ECONOMIC STABILITY: FOOD INSECURITY: WITHIN THE PAST 12 MONTHS, YOU WORRIED THAT YOUR FOOD WOULD RUN OUT BEFORE YOU GOT MONEY TO BUY MORE.: NEVER TRUE

## 2023-04-06 SDOH — ECONOMIC STABILITY: INCOME INSECURITY: HOW HARD IS IT FOR YOU TO PAY FOR THE VERY BASICS LIKE FOOD, HOUSING, MEDICAL CARE, AND HEATING?: NOT HARD AT ALL

## 2023-04-06 ASSESSMENT — PATIENT HEALTH QUESTIONNAIRE - PHQ9
SUM OF ALL RESPONSES TO PHQ QUESTIONS 1-9: 0
1. LITTLE INTEREST OR PLEASURE IN DOING THINGS: 0
SUM OF ALL RESPONSES TO PHQ QUESTIONS 1-9: 0
2. FEELING DOWN, DEPRESSED OR HOPELESS: 0
SUM OF ALL RESPONSES TO PHQ QUESTIONS 1-9: 0
SUM OF ALL RESPONSES TO PHQ QUESTIONS 1-9: 0
SUM OF ALL RESPONSES TO PHQ9 QUESTIONS 1 & 2: 0

## 2023-04-06 ASSESSMENT — ENCOUNTER SYMPTOMS
EYE REDNESS: 0
COUGH: 1
VOMITING: 0
EYE DISCHARGE: 0
SHORTNESS OF BREATH: 0
WHEEZING: 0
SINUS COMPLAINT: 1
ABDOMINAL PAIN: 0
SORE THROAT: 0
DIARRHEA: 1
NAUSEA: 0
RHINORRHEA: 0

## 2023-04-06 NOTE — PROGRESS NOTES
Vitamins-Minerals (OCUVITE PO) Take 1 tablet by mouth daily      Cholecalciferol (VITAMIN D-3 PO) Take  by mouth daily. 2000 IU daily       magnesium cl-calcium carbonate (SLOW-MAG) 71.5-119 MG TBEC tablet Take 1 tablet by mouth daily (Patient not taking: Reported on 12/8/2022) 60 tablet 1    Coenzyme Q10 (CO Q 10 PO) Take 1 tablet by mouth daily  (Patient not taking: Reported on 4/6/2023)       No current facility-administered medications for this visit. Allergies   Allergen Reactions    Adhesive Tape Itching and Rash    Doxycycline Rash       Health Maintenance   Topic Date Due    Shingles vaccine (1 of 2) Never done    Lipids  09/09/2022    COVID-19 Vaccine (4 - Booster for Verdie Barbone series) 04/14/2025 (Originally 2/15/2022)    Prostate Specific Antigen (PSA) Screening or Monitoring  07/18/2023    Depression Screen  12/30/2023    Annual Wellness Visit (AWV)  12/31/2023    DTaP/Tdap/Td vaccine (2 - Td or Tdap) 04/01/2032    Flu vaccine  Completed    Pneumococcal 65+ years Vaccine  Completed    Hepatitis A vaccine  Aged Out    Hib vaccine  Aged Out    Meningococcal (ACWY) vaccine  Aged Out    Hepatitis C screen  Discontinued       Subjective:     Review of Systems   Constitutional:  Negative for chills and fever. HENT:  Negative for rhinorrhea and sore throat. Eyes:  Negative for discharge and redness. Respiratory:  Positive for cough. Negative for shortness of breath and wheezing. Cardiovascular:  Negative for chest pain and palpitations. Gastrointestinal:  Positive for diarrhea. Negative for abdominal pain, nausea and vomiting. Genitourinary:  Negative for dysuria and frequency. Musculoskeletal:  Negative for arthralgias and myalgias. Neurological:  Negative for dizziness, light-headedness and headaches. Psychiatric/Behavioral:  Negative for sleep disturbance.       Objective:     /74   Pulse 62   Wt (!) 336 lb 12.8 oz (152.8 kg)   SpO2 95%   BMI 48.33 kg/m²   Physical Exam  Vitals

## 2023-04-07 LAB
ESTIMATED AVERAGE GLUCOSE: 137 MG/DL
HBA1C MFR BLD: 6.4 % (ref 4–6)

## 2023-04-11 NOTE — TELEPHONE ENCOUNTER
Appointment 02/01/2023 he has changed pharmacies and will be out before next appointment. He says he take 3 tablets twice a day and needs a 90 day supply.
Per Dr Scout aSmson, patient should take 9 mg daily for 30 days then stop it. Writer called and spoke with Woody rosa. She is updated. Pharmacy is confirmed. She is also worried about the weather on his appt day She is advised to call and let us know if she will need to reschedule by the early morning the day of the procedure.
no

## 2023-05-25 RX ORDER — LISINOPRIL 5 MG/1
TABLET ORAL
Qty: 90 TABLET | Refills: 3 | Status: SHIPPED | OUTPATIENT
Start: 2023-05-25

## 2023-05-25 RX ORDER — ATORVASTATIN CALCIUM 10 MG/1
TABLET, FILM COATED ORAL
Qty: 90 TABLET | Refills: 3 | Status: SHIPPED | OUTPATIENT
Start: 2023-05-25

## 2023-05-25 RX ORDER — LEVOTHYROXINE SODIUM 0.03 MG/1
TABLET ORAL
Qty: 90 TABLET | Refills: 3 | Status: SHIPPED | OUTPATIENT
Start: 2023-05-25

## 2023-05-25 NOTE — TELEPHONE ENCOUNTER
LAST VISIT:   4/6/2023     Future Appointments   Date Time Provider Woodrow Hall   7/20/2023  2:30 PM MD MINA Leach

## 2023-05-25 NOTE — TELEPHONE ENCOUNTER
LAST VISIT:   4/6/2023     Future Appointments   Date Time Provider Woodrow Hall   7/20/2023  2:30 PM MD MINA Figueroa

## 2023-07-12 ENCOUNTER — OFFICE VISIT (OUTPATIENT)
Dept: ORTHOPEDIC SURGERY | Age: 78
End: 2023-07-12
Payer: MEDICARE

## 2023-07-12 DIAGNOSIS — M25.551 RIGHT HIP PAIN: Primary | ICD-10-CM

## 2023-07-12 PROCEDURE — 99213 OFFICE O/P EST LOW 20 MIN: CPT | Performed by: ORTHOPAEDIC SURGERY

## 2023-07-12 PROCEDURE — 1123F ACP DISCUSS/DSCN MKR DOCD: CPT | Performed by: ORTHOPAEDIC SURGERY

## 2023-07-12 NOTE — PROGRESS NOTES
Teresa Dunn M.D.            [address] [facility], [address] [facility], [address] [place] [address]           Dept Phone: [phone]           Dept Fax:  [address]          Dept Phone: [phone]           Dept Fax:  [phone]      Chief Compliant:  Chief Complaint   Patient presents with    Pain     Rt hip        History of Present Illness: This is a 78 y.o. male who presents to the clinic today for evaluation / follow up of right hip and leg pain. Patient is an 78-year-old gentleman who has a history of a left total of arthroplasty performed by me in 2018 he has no complaints with this at all. He has had couple knee injuries recently in the past was seen by [facility] with noted and no obvious acute injury or trauma noted and is done well with injection. His right hip is becoming more painful for him and difficult for him to ambulate. Review of Systems   Constitutional: Negative for fever, chills, sweats. Eyes: Negative for changes in vision, or pain. HENT: Negative for ear ache, epistaxis, or sore throat. Respiratory/Cardio: Negative for Chest pain, palpitations, SOB, or cough. Gastrointestinal: Negative for abdominal pain, N/V/D. Genitourinary: Negative for dysuria, frequency, urgency, or hematuria. Neurological: Negative for headache, numbness, or weakness. Integumentary: Negative for rash, itching, laceration, or abrasion. Musculoskeletal: Positive for Pain (Rt hip)       Physical Exam:  Constitutional: Patient is oriented to person, place, and time. Patient appears well-developed and well nourished. HENT: Negative otherwise noted  Head: Normocephalic and Atraumatic  Nose: Normal  Eyes: Conjunctivae and EOM are normal  Neck: Normal range of motion Neck supple.     Respiratory/Cardio: Effort normal. No respiratory

## 2023-07-19 ENCOUNTER — APPOINTMENT (OUTPATIENT)
Dept: GENERAL RADIOLOGY | Age: 78
DRG: 480 | End: 2023-07-19
Payer: MEDICARE

## 2023-07-19 ENCOUNTER — HOSPITAL ENCOUNTER (INPATIENT)
Age: 78
LOS: 7 days | Discharge: NURSING, REHAB OR LONG-TERM CARE | DRG: 480 | End: 2023-07-26
Attending: EMERGENCY MEDICINE | Admitting: STUDENT IN AN ORGANIZED HEALTH CARE EDUCATION/TRAINING PROGRAM
Payer: MEDICARE

## 2023-07-19 ENCOUNTER — APPOINTMENT (OUTPATIENT)
Dept: CT IMAGING | Age: 78
DRG: 480 | End: 2023-07-19
Payer: MEDICARE

## 2023-07-19 DIAGNOSIS — Z96.649 PERIPROSTHETIC FRACTURE OF SHAFT OF FEMUR: Primary | ICD-10-CM

## 2023-07-19 DIAGNOSIS — M97.8XXA PERIPROSTHETIC FRACTURE OF SHAFT OF FEMUR: Primary | ICD-10-CM

## 2023-07-19 LAB
ALBUMIN SERPL-MCNC: 3.4 G/DL (ref 3.5–5.2)
ALP SERPL-CCNC: 69 U/L (ref 40–129)
ALT SERPL-CCNC: 10 U/L (ref 5–41)
ANION GAP SERPL CALCULATED.3IONS-SCNC: 7 MMOL/L (ref 9–17)
AST SERPL-CCNC: 17 U/L
BASOPHILS # BLD: 0 K/UL (ref 0–0.2)
BASOPHILS NFR BLD: 1 % (ref 0–2)
BILIRUB SERPL-MCNC: 0.9 MG/DL (ref 0.3–1.2)
BUN SERPL-MCNC: 24 MG/DL (ref 8–23)
CALCIUM SERPL-MCNC: 9.1 MG/DL (ref 8.6–10.4)
CHLORIDE SERPL-SCNC: 99 MMOL/L (ref 98–107)
CO2 SERPL-SCNC: 35 MMOL/L (ref 20–31)
CREAT SERPL-MCNC: 1.4 MG/DL (ref 0.7–1.2)
EOSINOPHIL # BLD: 0.2 K/UL (ref 0–0.4)
EOSINOPHILS RELATIVE PERCENT: 3 % (ref 0–4)
ERYTHROCYTE [DISTWIDTH] IN BLOOD BY AUTOMATED COUNT: 19 % (ref 11.5–14.9)
GFR SERPL CREATININE-BSD FRML MDRD: 51 ML/MIN/1.73M2
GLUCOSE SERPL-MCNC: 165 MG/DL (ref 70–99)
HCT VFR BLD AUTO: 45.7 % (ref 41–53)
HGB BLD-MCNC: 14.6 G/DL (ref 13.5–17.5)
INR PPP: 1.3
LIPASE SERPL-CCNC: 14 U/L (ref 13–60)
LYMPHOCYTES NFR BLD: 1.2 K/UL (ref 1–4.8)
LYMPHOCYTES RELATIVE PERCENT: 19 % (ref 24–44)
MAGNESIUM SERPL-MCNC: 1.5 MG/DL (ref 1.6–2.6)
MCH RBC QN AUTO: 30.3 PG (ref 26–34)
MCHC RBC AUTO-ENTMCNC: 31.9 G/DL (ref 31–37)
MCV RBC AUTO: 94.9 FL (ref 80–100)
MONOCYTES NFR BLD: 0.4 K/UL (ref 0.1–1.3)
MONOCYTES NFR BLD: 7 % (ref 1–7)
NEUTROPHILS NFR BLD: 70 % (ref 36–66)
NEUTS SEG NFR BLD: 4.7 K/UL (ref 1.3–9.1)
PLATELET # BLD AUTO: 165 K/UL (ref 150–450)
PMV BLD AUTO: 7.3 FL (ref 6–12)
POTASSIUM SERPL-SCNC: 3.3 MMOL/L (ref 3.7–5.3)
PROT SERPL-MCNC: 5.8 G/DL (ref 6.4–8.3)
PROTHROMBIN TIME: 16.4 SEC (ref 11.8–14.6)
RBC # BLD AUTO: 4.82 M/UL (ref 4.5–5.9)
SODIUM SERPL-SCNC: 141 MMOL/L (ref 135–144)
TROPONIN I SERPL HS-MCNC: 51 NG/L (ref 0–22)
TROPONIN I SERPL HS-MCNC: 59 NG/L (ref 0–22)
WBC OTHER # BLD: 6.5 K/UL (ref 3.5–11)

## 2023-07-19 PROCEDURE — 73552 X-RAY EXAM OF FEMUR 2/>: CPT

## 2023-07-19 PROCEDURE — 96374 THER/PROPH/DIAG INJ IV PUSH: CPT

## 2023-07-19 PROCEDURE — 99222 1ST HOSP IP/OBS MODERATE 55: CPT | Performed by: STUDENT IN AN ORGANIZED HEALTH CARE EDUCATION/TRAINING PROGRAM

## 2023-07-19 PROCEDURE — 84484 ASSAY OF TROPONIN QUANT: CPT

## 2023-07-19 PROCEDURE — 96375 TX/PRO/DX INJ NEW DRUG ADDON: CPT

## 2023-07-19 PROCEDURE — 73700 CT LOWER EXTREMITY W/O DYE: CPT

## 2023-07-19 PROCEDURE — 2060000000 HC ICU INTERMEDIATE R&B

## 2023-07-19 PROCEDURE — 73502 X-RAY EXAM HIP UNI 2-3 VIEWS: CPT

## 2023-07-19 PROCEDURE — 83735 ASSAY OF MAGNESIUM: CPT

## 2023-07-19 PROCEDURE — 85027 COMPLETE CBC AUTOMATED: CPT

## 2023-07-19 PROCEDURE — 2500000003 HC RX 250 WO HCPCS: Performed by: EMERGENCY MEDICINE

## 2023-07-19 PROCEDURE — 36415 COLL VENOUS BLD VENIPUNCTURE: CPT

## 2023-07-19 PROCEDURE — 71045 X-RAY EXAM CHEST 1 VIEW: CPT

## 2023-07-19 PROCEDURE — 6360000002 HC RX W HCPCS: Performed by: EMERGENCY MEDICINE

## 2023-07-19 PROCEDURE — 73560 X-RAY EXAM OF KNEE 1 OR 2: CPT

## 2023-07-19 PROCEDURE — 6370000000 HC RX 637 (ALT 250 FOR IP): Performed by: INTERNAL MEDICINE

## 2023-07-19 PROCEDURE — 93005 ELECTROCARDIOGRAM TRACING: CPT | Performed by: EMERGENCY MEDICINE

## 2023-07-19 PROCEDURE — 85610 PROTHROMBIN TIME: CPT

## 2023-07-19 PROCEDURE — 80053 COMPREHEN METABOLIC PANEL: CPT

## 2023-07-19 PROCEDURE — 83690 ASSAY OF LIPASE: CPT

## 2023-07-19 PROCEDURE — 99222 1ST HOSP IP/OBS MODERATE 55: CPT | Performed by: INTERNAL MEDICINE

## 2023-07-19 PROCEDURE — 99285 EMERGENCY DEPT VISIT HI MDM: CPT

## 2023-07-19 RX ORDER — LISINOPRIL 5 MG/1
5 TABLET ORAL NIGHTLY
Status: DISCONTINUED | OUTPATIENT
Start: 2023-07-19 | End: 2023-07-26 | Stop reason: HOSPADM

## 2023-07-19 RX ORDER — BISACODYL 5 MG/1
5 TABLET, DELAYED RELEASE ORAL DAILY PRN
Status: ON HOLD | COMMUNITY

## 2023-07-19 RX ORDER — FUROSEMIDE 40 MG/1
40 TABLET ORAL 2 TIMES DAILY PRN
Status: ON HOLD | COMMUNITY

## 2023-07-19 RX ORDER — FENTANYL CITRATE 0.05 MG/ML
50 INJECTION, SOLUTION INTRAMUSCULAR; INTRAVENOUS
Status: DISCONTINUED | OUTPATIENT
Start: 2023-07-19 | End: 2023-07-20

## 2023-07-19 RX ORDER — FENTANYL CITRATE 0.05 MG/ML
50 INJECTION, SOLUTION INTRAMUSCULAR; INTRAVENOUS ONCE
Status: COMPLETED | OUTPATIENT
Start: 2023-07-19 | End: 2023-07-19

## 2023-07-19 RX ORDER — ATORVASTATIN CALCIUM 10 MG/1
10 TABLET, FILM COATED ORAL DAILY
Status: DISCONTINUED | OUTPATIENT
Start: 2023-07-19 | End: 2023-07-26 | Stop reason: HOSPADM

## 2023-07-19 RX ORDER — MAGNESIUM SULFATE HEPTAHYDRATE 40 MG/ML
2000 INJECTION, SOLUTION INTRAVENOUS ONCE
Status: COMPLETED | OUTPATIENT
Start: 2023-07-19 | End: 2023-07-19

## 2023-07-19 RX ORDER — LEVOTHYROXINE SODIUM 0.03 MG/1
25 TABLET ORAL DAILY
Status: DISCONTINUED | OUTPATIENT
Start: 2023-07-20 | End: 2023-07-26 | Stop reason: HOSPADM

## 2023-07-19 RX ORDER — POTASSIUM CHLORIDE 7.45 MG/ML
10 INJECTION INTRAVENOUS
Status: COMPLETED | OUTPATIENT
Start: 2023-07-19 | End: 2023-07-19

## 2023-07-19 RX ORDER — ALLOPURINOL 300 MG/1
300 TABLET ORAL 2 TIMES DAILY
Status: DISCONTINUED | OUTPATIENT
Start: 2023-07-19 | End: 2023-07-26 | Stop reason: HOSPADM

## 2023-07-19 RX ORDER — POTASSIUM CHLORIDE 20 MEQ/1
40 TABLET, EXTENDED RELEASE ORAL ONCE
Status: DISCONTINUED | OUTPATIENT
Start: 2023-07-19 | End: 2023-07-19

## 2023-07-19 RX ADMIN — POTASSIUM CHLORIDE 10 MEQ: 7.46 INJECTION, SOLUTION INTRAVENOUS at 15:23

## 2023-07-19 RX ADMIN — FENTANYL CITRATE 50 MCG: 0.05 INJECTION, SOLUTION INTRAMUSCULAR; INTRAVENOUS at 13:38

## 2023-07-19 RX ADMIN — LISINOPRIL 5 MG: 5 TABLET ORAL at 20:28

## 2023-07-19 RX ADMIN — ATORVASTATIN CALCIUM 10 MG: 10 TABLET, FILM COATED ORAL at 20:29

## 2023-07-19 RX ADMIN — ALLOPURINOL 300 MG: 300 TABLET ORAL at 20:28

## 2023-07-19 RX ADMIN — FENTANYL CITRATE 50 MCG: 0.05 INJECTION, SOLUTION INTRAMUSCULAR; INTRAVENOUS at 15:26

## 2023-07-19 RX ADMIN — POTASSIUM CHLORIDE 10 MEQ: 7.46 INJECTION, SOLUTION INTRAVENOUS at 17:58

## 2023-07-19 RX ADMIN — METOPROLOL TARTRATE 37.5 MG: 25 TABLET, FILM COATED ORAL at 20:31

## 2023-07-19 RX ADMIN — POTASSIUM CHLORIDE 10 MEQ: 7.46 INJECTION, SOLUTION INTRAVENOUS at 14:05

## 2023-07-19 RX ADMIN — FENTANYL CITRATE 50 MCG: 0.05 INJECTION, SOLUTION INTRAMUSCULAR; INTRAVENOUS at 19:38

## 2023-07-19 RX ADMIN — FENTANYL CITRATE 50 MCG: 0.05 INJECTION, SOLUTION INTRAMUSCULAR; INTRAVENOUS at 21:45

## 2023-07-19 RX ADMIN — POTASSIUM CHLORIDE 10 MEQ: 7.46 INJECTION, SOLUTION INTRAVENOUS at 16:46

## 2023-07-19 RX ADMIN — MAGNESIUM SULFATE HEPTAHYDRATE 2000 MG: 40 INJECTION, SOLUTION INTRAVENOUS at 13:39

## 2023-07-19 ASSESSMENT — ENCOUNTER SYMPTOMS
BACK PAIN: 0
EYE PAIN: 0
SHORTNESS OF BREATH: 0
ABDOMINAL PAIN: 0
COLOR CHANGE: 0

## 2023-07-19 ASSESSMENT — PAIN SCALES - GENERAL
PAINLEVEL_OUTOF10: 9
PAINLEVEL_OUTOF10: 8
PAINLEVEL_OUTOF10: 8
PAINLEVEL_OUTOF10: 6
PAINLEVEL_OUTOF10: 10
PAINLEVEL_OUTOF10: 9

## 2023-07-19 ASSESSMENT — PAIN DESCRIPTION - ORIENTATION
ORIENTATION: LEFT

## 2023-07-19 ASSESSMENT — PAIN - FUNCTIONAL ASSESSMENT
PAIN_FUNCTIONAL_ASSESSMENT: 0-10
PAIN_FUNCTIONAL_ASSESSMENT: 0-10

## 2023-07-19 ASSESSMENT — LIFESTYLE VARIABLES
HOW MANY STANDARD DRINKS CONTAINING ALCOHOL DO YOU HAVE ON A TYPICAL DAY: PATIENT DOES NOT DRINK
HOW OFTEN DO YOU HAVE A DRINK CONTAINING ALCOHOL: NEVER

## 2023-07-19 ASSESSMENT — PAIN DESCRIPTION - LOCATION
LOCATION: HIP

## 2023-07-19 NOTE — ED TRIAGE NOTES
Mode of arrival (squad #, walk in, police, etc) : Selena Elliottamento        Chief complaint(s): hip pain, fall, knee pain        Arrival Note (brief scenario, treatment PTA, etc). : pt was trying to get in his ar to come to [facility] pain clinic to get an injection due to the pain in his right hip that needs replaced. Pt fell getting into the car onto his left side. Pt was able to roll onto his right side. Pt has obvious deformity and shortening to left hip. Pt also has left knee pain. Pt states his left hip has been replaced as well as both knees. Pt given 50 mcg of fentanyl and 4 mg zofran en route. Pt leal not normally wear O2 at home ut was dropping to 88% in triage and was placed on 2L NC        C= \"Have you ever felt that you should Cut down on your drinking? \"  No  A= \"Have people Annoyed you by criticizing your drinking? \"  No  G= \"Have you ever felt bad or Guilty about your drinking? \"  No  E= \"Have you ever had a drink as an Eye-opener first thing in the morning to steady your nerves or to help a hangover? \"  No      Deferred []      Reason for deferring: N/A    *If yes to two or more: probable alcohol abuse. *

## 2023-07-19 NOTE — ED NOTES
Pts last meal and drink was at 1000. Pt had cereal with water.      Paulina Moreno, RN  07/19/23 [id number]

## 2023-07-19 NOTE — ED PROVIDER NOTES
EMERGENCY DEPARTMENT ENCOUNTER    Pt Name: Farideh Marin  MRN: [id number]  [address] [place] 1945  Date of evaluation: 7/19/23  CHIEF COMPLAINT       Chief Complaint   Patient presents with    Fall    Hip Pain    Knee Pain     Left     HISTORY OF PRESENT ILLNESS   69-year-old male presents for complaints of fall, left hip and knee pain. Patient reports that he was stepping up on the side of his car to get in states that his foot slipped off the side and he fell down onto his left knee. He reports he felt something pop and states has been having significant pain since that time. Reports a history of a left knee replacement. He denies hitting his head or loss of consciousness, denies any chest pain, dizziness or lightheadedness denies any shortness of breath. The history is provided by the patient. REVIEW OF SYSTEMS     Review of Systems   Constitutional:  Negative for chills and fever. HENT:  Negative for congestion and ear pain. Eyes:  Negative for pain. Respiratory:  Negative for shortness of breath. Cardiovascular:  Negative for chest pain, palpitations and leg swelling. Gastrointestinal:  Negative for abdominal pain. Genitourinary:  Negative for dysuria and flank pain. Musculoskeletal:  Negative for back pain. Left knee pain   Skin:  Negative for color change. Neurological:  Negative for numbness and headaches. Psychiatric/Behavioral:  Negative for confusion. All other systems reviewed and are negative.   PASTMEDICAL HISTORY     Past Medical History:   Diagnosis Date    Adenocarcinoma of prostate ([address]) 10/30/2015    Anemia     Cellulitis of lower extremity     right     Cerebral artery occlusion with cerebral infarction ([address])     1996    CHF (congestive heart failure) ([address])     Chronic acquired lymphedema     Clavicle fracture     in high school    Hernia, abdominal     History of blood transfusion 2003 7 2006 [address]    History of CVA

## 2023-07-20 ENCOUNTER — APPOINTMENT (OUTPATIENT)
Dept: GENERAL RADIOLOGY | Age: 78
DRG: 480 | End: 2023-07-20
Payer: MEDICARE

## 2023-07-20 ENCOUNTER — ANESTHESIA EVENT (OUTPATIENT)
Dept: OPERATING ROOM | Age: 78
End: 2023-07-20
Payer: MEDICARE

## 2023-07-20 ENCOUNTER — ANESTHESIA (OUTPATIENT)
Dept: OPERATING ROOM | Age: 78
End: 2023-07-20
Payer: MEDICARE

## 2023-07-20 LAB
ANION GAP SERPL CALCULATED.3IONS-SCNC: 12 MMOL/L (ref 9–17)
BASOPHILS # BLD: 0 K/UL (ref 0–0.2)
BASOPHILS NFR BLD: 0 % (ref 0–2)
BUN SERPL-MCNC: 22 MG/DL (ref 8–23)
CALCIUM SERPL-MCNC: 8.8 MG/DL (ref 8.6–10.4)
CHLORIDE SERPL-SCNC: 98 MMOL/L (ref 98–107)
CO2 SERPL-SCNC: 29 MMOL/L (ref 20–31)
CREAT SERPL-MCNC: 1.2 MG/DL (ref 0.7–1.2)
EKG ATRIAL RATE: 71 BPM
EKG P AXIS: 69 DEGREES
EKG P-R INTERVAL: 268 MS
EKG Q-T INTERVAL: 390 MS
EKG QRS DURATION: 114 MS
EKG QTC CALCULATION (BAZETT): 423 MS
EKG R AXIS: -36 DEGREES
EKG T AXIS: 33 DEGREES
EKG VENTRICULAR RATE: 71 BPM
EOSINOPHIL # BLD: 0 K/UL (ref 0–0.4)
EOSINOPHILS RELATIVE PERCENT: 0 % (ref 0–4)
ERYTHROCYTE [DISTWIDTH] IN BLOOD BY AUTOMATED COUNT: 19.3 % (ref 11.5–14.9)
GFR SERPL CREATININE-BSD FRML MDRD: >60 ML/MIN/1.73M2
GLUCOSE SERPL-MCNC: 190 MG/DL (ref 70–99)
HCT VFR BLD AUTO: 43.4 % (ref 41–53)
HGB BLD-MCNC: 13.7 G/DL (ref 13.5–17.5)
LYMPHOCYTES NFR BLD: 0.64 K/UL (ref 1–4.8)
LYMPHOCYTES RELATIVE PERCENT: 6 % (ref 24–44)
MAGNESIUM SERPL-MCNC: 2 MG/DL (ref 1.6–2.6)
MCH RBC QN AUTO: 30.5 PG (ref 26–34)
MCHC RBC AUTO-ENTMCNC: 31.5 G/DL (ref 31–37)
MCV RBC AUTO: 96.8 FL (ref 80–100)
MONOCYTES NFR BLD: 0.32 K/UL (ref 0.1–1.3)
MONOCYTES NFR BLD: 3 % (ref 1–7)
MORPHOLOGY: ABNORMAL
NEUTROPHILS NFR BLD: 91 % (ref 36–66)
NEUTS SEG NFR BLD: 9.64 K/UL (ref 1.3–9.1)
PLATELET # BLD AUTO: 138 K/UL (ref 150–450)
PMV BLD AUTO: 8.1 FL (ref 6–12)
POTASSIUM SERPL-SCNC: 3.7 MMOL/L (ref 3.7–5.3)
RBC # BLD AUTO: 4.49 M/UL (ref 4.5–5.9)
SODIUM SERPL-SCNC: 139 MMOL/L (ref 135–144)
WBC OTHER # BLD: 10.6 K/UL (ref 3.5–11)

## 2023-07-20 PROCEDURE — 2709999900 HC NON-CHARGEABLE SUPPLY: Performed by: STUDENT IN AN ORGANIZED HEALTH CARE EDUCATION/TRAINING PROGRAM

## 2023-07-20 PROCEDURE — 3600000012 HC SURGERY LEVEL 2 ADDTL 15MIN: Performed by: STUDENT IN AN ORGANIZED HEALTH CARE EDUCATION/TRAINING PROGRAM

## 2023-07-20 PROCEDURE — 3700000001 HC ADD 15 MINUTES (ANESTHESIA): Performed by: STUDENT IN AN ORGANIZED HEALTH CARE EDUCATION/TRAINING PROGRAM

## 2023-07-20 PROCEDURE — 80048 BASIC METABOLIC PNL TOTAL CA: CPT

## 2023-07-20 PROCEDURE — 2060000000 HC ICU INTERMEDIATE R&B

## 2023-07-20 PROCEDURE — 2720000010 HC SURG SUPPLY STERILE: Performed by: STUDENT IN AN ORGANIZED HEALTH CARE EDUCATION/TRAINING PROGRAM

## 2023-07-20 PROCEDURE — 3600000002 HC SURGERY LEVEL 2 BASE: Performed by: STUDENT IN AN ORGANIZED HEALTH CARE EDUCATION/TRAINING PROGRAM

## 2023-07-20 PROCEDURE — 6360000002 HC RX W HCPCS: Performed by: NURSE ANESTHETIST, CERTIFIED REGISTERED

## 2023-07-20 PROCEDURE — 0QS606Z REPOSITION RIGHT UPPER FEMUR WITH INTRAMEDULLARY INTERNAL FIXATION DEVICE, OPEN APPROACH: ICD-10-PCS | Performed by: STUDENT IN AN ORGANIZED HEALTH CARE EDUCATION/TRAINING PROGRAM

## 2023-07-20 PROCEDURE — 6370000000 HC RX 637 (ALT 250 FOR IP): Performed by: ORTHOPAEDIC SURGERY

## 2023-07-20 PROCEDURE — 27506 TREATMENT OF THIGH FRACTURE: CPT | Performed by: STUDENT IN AN ORGANIZED HEALTH CARE EDUCATION/TRAINING PROGRAM

## 2023-07-20 PROCEDURE — 7100000000 HC PACU RECOVERY - FIRST 15 MIN: Performed by: STUDENT IN AN ORGANIZED HEALTH CARE EDUCATION/TRAINING PROGRAM

## 2023-07-20 PROCEDURE — 3E0U33Z INTRODUCTION OF ANTI-INFLAMMATORY INTO JOINTS, PERCUTANEOUS APPROACH: ICD-10-PCS | Performed by: STUDENT IN AN ORGANIZED HEALTH CARE EDUCATION/TRAINING PROGRAM

## 2023-07-20 PROCEDURE — 6360000004 HC RX CONTRAST MEDICATION: Performed by: STUDENT IN AN ORGANIZED HEALTH CARE EDUCATION/TRAINING PROGRAM

## 2023-07-20 PROCEDURE — 6360000002 HC RX W HCPCS: Performed by: ORTHOPAEDIC SURGERY

## 2023-07-20 PROCEDURE — 2580000003 HC RX 258: Performed by: NURSE ANESTHETIST, CERTIFIED REGISTERED

## 2023-07-20 PROCEDURE — C1713 ANCHOR/SCREW BN/BN,TIS/BN: HCPCS | Performed by: STUDENT IN AN ORGANIZED HEALTH CARE EDUCATION/TRAINING PROGRAM

## 2023-07-20 PROCEDURE — 2580000003 HC RX 258: Performed by: ORTHOPAEDIC SURGERY

## 2023-07-20 PROCEDURE — 83735 ASSAY OF MAGNESIUM: CPT

## 2023-07-20 PROCEDURE — 3700000000 HC ANESTHESIA ATTENDED CARE: Performed by: STUDENT IN AN ORGANIZED HEALTH CARE EDUCATION/TRAINING PROGRAM

## 2023-07-20 PROCEDURE — 36415 COLL VENOUS BLD VENIPUNCTURE: CPT

## 2023-07-20 PROCEDURE — 1200000000 HC SEMI PRIVATE

## 2023-07-20 PROCEDURE — 6360000002 HC RX W HCPCS: Performed by: STUDENT IN AN ORGANIZED HEALTH CARE EDUCATION/TRAINING PROGRAM

## 2023-07-20 PROCEDURE — 73552 X-RAY EXAM OF FEMUR 2/>: CPT

## 2023-07-20 PROCEDURE — 7100000001 HC PACU RECOVERY - ADDTL 15 MIN: Performed by: STUDENT IN AN ORGANIZED HEALTH CARE EDUCATION/TRAINING PROGRAM

## 2023-07-20 PROCEDURE — 6360000002 HC RX W HCPCS: Performed by: ANESTHESIOLOGY

## 2023-07-20 PROCEDURE — 85027 COMPLETE CBC AUTOMATED: CPT

## 2023-07-20 PROCEDURE — 77002 NEEDLE LOCALIZATION BY XRAY: CPT | Performed by: STUDENT IN AN ORGANIZED HEALTH CARE EDUCATION/TRAINING PROGRAM

## 2023-07-20 PROCEDURE — 73562 X-RAY EXAM OF KNEE 3: CPT

## 2023-07-20 PROCEDURE — 2580000003 HC RX 258: Performed by: ANESTHESIOLOGY

## 2023-07-20 PROCEDURE — C1769 GUIDE WIRE: HCPCS | Performed by: STUDENT IN AN ORGANIZED HEALTH CARE EDUCATION/TRAINING PROGRAM

## 2023-07-20 PROCEDURE — 99223 1ST HOSP IP/OBS HIGH 75: CPT | Performed by: STUDENT IN AN ORGANIZED HEALTH CARE EDUCATION/TRAINING PROGRAM

## 2023-07-20 PROCEDURE — 20610 DRAIN/INJ JOINT/BURSA W/O US: CPT | Performed by: STUDENT IN AN ORGANIZED HEALTH CARE EDUCATION/TRAINING PROGRAM

## 2023-07-20 PROCEDURE — 93010 ELECTROCARDIOGRAM REPORT: CPT | Performed by: INTERNAL MEDICINE

## 2023-07-20 PROCEDURE — 2500000003 HC RX 250 WO HCPCS: Performed by: NURSE ANESTHETIST, CERTIFIED REGISTERED

## 2023-07-20 DEVICE — IMPLANTABLE DEVICE: Type: IMPLANTABLE DEVICE | Site: FEMUR | Status: FUNCTIONAL

## 2023-07-20 DEVICE — SCREW LCKING MAXFRAME HDLESS /XL25/X 5.0X38MM: Type: IMPLANTABLE DEVICE | Site: FEMUR | Status: FUNCTIONAL

## 2023-07-20 DEVICE — SCREW VA LCK OPTILINK S/T 5.0X65MM: Type: IMPLANTABLE DEVICE | Site: FEMUR | Status: FUNCTIONAL

## 2023-07-20 DEVICE — SCREW LCK F/IM NAIL 5X74MM XL26: Type: IMPLANTABLE DEVICE | Site: FEMUR | Status: FUNCTIONAL

## 2023-07-20 DEVICE — SCREW OPTILINK VA LCKING SLF -TP T25 SD 5.0X85MM: Type: IMPLANTABLE DEVICE | Site: FEMUR | Status: FUNCTIONAL

## 2023-07-20 DEVICE — SCREW BNE L90MM DIA3.5MM PROX TIB S STL ST FULL THRD W/ T15: Type: IMPLANTABLE DEVICE | Site: FEMUR | Status: FUNCTIONAL

## 2023-07-20 DEVICE — SCREW BNE L75MM DIA3.5MM PROX TIB S STL ST FULL THRD T15: Type: IMPLANTABLE DEVICE | Site: FEMUR | Status: FUNCTIONAL

## 2023-07-20 RX ORDER — TOBRAMYCIN 1.2 G/30ML
INJECTION, POWDER, LYOPHILIZED, FOR SOLUTION INTRAVENOUS PRN
Status: DISCONTINUED | OUTPATIENT
Start: 2023-07-20 | End: 2023-07-20 | Stop reason: ALTCHOICE

## 2023-07-20 RX ORDER — METOCLOPRAMIDE HYDROCHLORIDE 5 MG/ML
10 INJECTION INTRAMUSCULAR; INTRAVENOUS
Status: DISCONTINUED | OUTPATIENT
Start: 2023-07-20 | End: 2023-07-20 | Stop reason: HOSPADM

## 2023-07-20 RX ORDER — OXYCODONE HYDROCHLORIDE 10 MG/1
10 TABLET ORAL EVERY 4 HOURS PRN
Status: DISCONTINUED | OUTPATIENT
Start: 2023-07-20 | End: 2023-07-26 | Stop reason: HOSPADM

## 2023-07-20 RX ORDER — MEPERIDINE HYDROCHLORIDE 25 MG/ML
12.5 INJECTION INTRAMUSCULAR; INTRAVENOUS; SUBCUTANEOUS EVERY 5 MIN PRN
Status: DISCONTINUED | OUTPATIENT
Start: 2023-07-20 | End: 2023-07-20 | Stop reason: HOSPADM

## 2023-07-20 RX ORDER — LABETALOL HYDROCHLORIDE 5 MG/ML
10 INJECTION, SOLUTION INTRAVENOUS
Status: DISCONTINUED | OUTPATIENT
Start: 2023-07-20 | End: 2023-07-20 | Stop reason: HOSPADM

## 2023-07-20 RX ORDER — CYCLOBENZAPRINE HCL 10 MG
10 TABLET ORAL 3 TIMES DAILY PRN
Status: DISCONTINUED | OUTPATIENT
Start: 2023-07-20 | End: 2023-07-26 | Stop reason: HOSPADM

## 2023-07-20 RX ORDER — MORPHINE SULFATE 4 MG/ML
4 INJECTION, SOLUTION INTRAMUSCULAR; INTRAVENOUS
Status: DISCONTINUED | OUTPATIENT
Start: 2023-07-20 | End: 2023-07-20

## 2023-07-20 RX ORDER — FENTANYL CITRATE 0.05 MG/ML
25 INJECTION, SOLUTION INTRAMUSCULAR; INTRAVENOUS EVERY 5 MIN PRN
Status: DISCONTINUED | OUTPATIENT
Start: 2023-07-20 | End: 2023-07-20 | Stop reason: HOSPADM

## 2023-07-20 RX ORDER — BUPIVACAINE HYDROCHLORIDE 2.5 MG/ML
INJECTION, SOLUTION EPIDURAL; INFILTRATION; INTRACAUDAL PRN
Status: DISCONTINUED | OUTPATIENT
Start: 2023-07-20 | End: 2023-07-20 | Stop reason: ALTCHOICE

## 2023-07-20 RX ORDER — LIDOCAINE HYDROCHLORIDE 20 MG/ML
INJECTION, SOLUTION EPIDURAL; INFILTRATION; INTRACAUDAL; PERINEURAL PRN
Status: DISCONTINUED | OUTPATIENT
Start: 2023-07-20 | End: 2023-07-20 | Stop reason: SDUPTHER

## 2023-07-20 RX ORDER — METHYLPREDNISOLONE ACETATE 40 MG/ML
INJECTION, SUSPENSION INTRA-ARTICULAR; INTRALESIONAL; INTRAMUSCULAR; SOFT TISSUE PRN
Status: DISCONTINUED | OUTPATIENT
Start: 2023-07-20 | End: 2023-07-20 | Stop reason: ALTCHOICE

## 2023-07-20 RX ORDER — ACETAMINOPHEN 325 MG/1
650 TABLET ORAL
Status: DISCONTINUED | OUTPATIENT
Start: 2023-07-20 | End: 2023-07-20

## 2023-07-20 RX ORDER — SODIUM CHLORIDE 0.9 % (FLUSH) 0.9 %
5-40 SYRINGE (ML) INJECTION EVERY 12 HOURS SCHEDULED
Status: DISCONTINUED | OUTPATIENT
Start: 2023-07-20 | End: 2023-07-20 | Stop reason: HOSPADM

## 2023-07-20 RX ORDER — OXYCODONE HYDROCHLORIDE 5 MG/1
5 TABLET ORAL EVERY 4 HOURS PRN
Status: DISCONTINUED | OUTPATIENT
Start: 2023-07-20 | End: 2023-07-26 | Stop reason: HOSPADM

## 2023-07-20 RX ORDER — SODIUM CHLORIDE, SODIUM LACTATE, POTASSIUM CHLORIDE, CALCIUM CHLORIDE 600; 310; 30; 20 MG/100ML; MG/100ML; MG/100ML; MG/100ML
INJECTION, SOLUTION INTRAVENOUS CONTINUOUS PRN
Status: DISCONTINUED | OUTPATIENT
Start: 2023-07-20 | End: 2023-07-20 | Stop reason: SDUPTHER

## 2023-07-20 RX ORDER — DEXAMETHASONE SODIUM PHOSPHATE 4 MG/ML
INJECTION, SOLUTION INTRA-ARTICULAR; INTRALESIONAL; INTRAMUSCULAR; INTRAVENOUS; SOFT TISSUE PRN
Status: DISCONTINUED | OUTPATIENT
Start: 2023-07-20 | End: 2023-07-20 | Stop reason: SDUPTHER

## 2023-07-20 RX ORDER — PROPOFOL 10 MG/ML
INJECTION, EMULSION INTRAVENOUS PRN
Status: DISCONTINUED | OUTPATIENT
Start: 2023-07-20 | End: 2023-07-20 | Stop reason: SDUPTHER

## 2023-07-20 RX ORDER — GABAPENTIN 100 MG/1
100 CAPSULE ORAL 3 TIMES DAILY
Status: DISCONTINUED | OUTPATIENT
Start: 2023-07-20 | End: 2023-07-26 | Stop reason: HOSPADM

## 2023-07-20 RX ORDER — ROCURONIUM BROMIDE 10 MG/ML
INJECTION, SOLUTION INTRAVENOUS PRN
Status: DISCONTINUED | OUTPATIENT
Start: 2023-07-20 | End: 2023-07-20 | Stop reason: SDUPTHER

## 2023-07-20 RX ORDER — ACETAMINOPHEN 500 MG
1000 TABLET ORAL EVERY 6 HOURS
Status: DISCONTINUED | OUTPATIENT
Start: 2023-07-20 | End: 2023-07-26 | Stop reason: HOSPADM

## 2023-07-20 RX ORDER — SODIUM CHLORIDE, SODIUM LACTATE, POTASSIUM CHLORIDE, CALCIUM CHLORIDE 600; 310; 30; 20 MG/100ML; MG/100ML; MG/100ML; MG/100ML
INJECTION, SOLUTION INTRAVENOUS CONTINUOUS
Status: DISCONTINUED | OUTPATIENT
Start: 2023-07-20 | End: 2023-07-20 | Stop reason: HOSPADM

## 2023-07-20 RX ORDER — FENTANYL CITRATE 50 UG/ML
INJECTION, SOLUTION INTRAMUSCULAR; INTRAVENOUS PRN
Status: DISCONTINUED | OUTPATIENT
Start: 2023-07-20 | End: 2023-07-20

## 2023-07-20 RX ORDER — PHENYLEPHRINE HYDROCHLORIDE 10 MG/ML
INJECTION INTRAVENOUS PRN
Status: DISCONTINUED | OUTPATIENT
Start: 2023-07-20 | End: 2023-07-20 | Stop reason: SDUPTHER

## 2023-07-20 RX ORDER — EPHEDRINE SULFATE/0.9% NACL/PF 50 MG/5 ML
SYRINGE (ML) INTRAVENOUS PRN
Status: DISCONTINUED | OUTPATIENT
Start: 2023-07-20 | End: 2023-07-20 | Stop reason: SDUPTHER

## 2023-07-20 RX ORDER — SODIUM CHLORIDE 0.9 % (FLUSH) 0.9 %
5-40 SYRINGE (ML) INJECTION PRN
Status: DISCONTINUED | OUTPATIENT
Start: 2023-07-20 | End: 2023-07-20 | Stop reason: HOSPADM

## 2023-07-20 RX ORDER — DIPHENHYDRAMINE HYDROCHLORIDE 50 MG/ML
12.5 INJECTION INTRAMUSCULAR; INTRAVENOUS
Status: COMPLETED | OUTPATIENT
Start: 2023-07-20 | End: 2023-07-20

## 2023-07-20 RX ORDER — HYDRALAZINE HYDROCHLORIDE 20 MG/ML
10 INJECTION INTRAMUSCULAR; INTRAVENOUS
Status: DISCONTINUED | OUTPATIENT
Start: 2023-07-20 | End: 2023-07-20 | Stop reason: HOSPADM

## 2023-07-20 RX ORDER — ONDANSETRON 2 MG/ML
4 INJECTION INTRAMUSCULAR; INTRAVENOUS
Status: DISCONTINUED | OUTPATIENT
Start: 2023-07-20 | End: 2023-07-20 | Stop reason: HOSPADM

## 2023-07-20 RX ORDER — ONDANSETRON 2 MG/ML
INJECTION INTRAMUSCULAR; INTRAVENOUS PRN
Status: DISCONTINUED | OUTPATIENT
Start: 2023-07-20 | End: 2023-07-20 | Stop reason: SDUPTHER

## 2023-07-20 RX ORDER — SODIUM CHLORIDE 9 MG/ML
INJECTION, SOLUTION INTRAVENOUS PRN
Status: DISCONTINUED | OUTPATIENT
Start: 2023-07-20 | End: 2023-07-20 | Stop reason: HOSPADM

## 2023-07-20 RX ORDER — CEFAZOLIN SODIUM 1 G/3ML
INJECTION, POWDER, FOR SOLUTION INTRAMUSCULAR; INTRAVENOUS PRN
Status: DISCONTINUED | OUTPATIENT
Start: 2023-07-20 | End: 2023-07-20 | Stop reason: SDUPTHER

## 2023-07-20 RX ORDER — VANCOMYCIN HYDROCHLORIDE 1 G/20ML
INJECTION, POWDER, LYOPHILIZED, FOR SOLUTION INTRAVENOUS PRN
Status: DISCONTINUED | OUTPATIENT
Start: 2023-07-20 | End: 2023-07-20 | Stop reason: ALTCHOICE

## 2023-07-20 RX ORDER — SUCCINYLCHOLINE/SOD CL,ISO/PF 200MG/10ML
SYRINGE (ML) INTRAVENOUS PRN
Status: DISCONTINUED | OUTPATIENT
Start: 2023-07-20 | End: 2023-07-20 | Stop reason: SDUPTHER

## 2023-07-20 RX ADMIN — DIPHENHYDRAMINE HYDROCHLORIDE 12.5 MG: 50 INJECTION, SOLUTION INTRAMUSCULAR; INTRAVENOUS at 17:47

## 2023-07-20 RX ADMIN — ONDANSETRON 4 MG: 2 INJECTION INTRAMUSCULAR; INTRAVENOUS at 16:25

## 2023-07-20 RX ADMIN — CEFAZOLIN 3 G: 1 INJECTION, POWDER, FOR SOLUTION INTRAMUSCULAR; INTRAVENOUS at 13:45

## 2023-07-20 RX ADMIN — LIDOCAINE HYDROCHLORIDE 60 MG: 20 INJECTION, SOLUTION EPIDURAL; INFILTRATION; INTRACAUDAL; PERINEURAL at 13:36

## 2023-07-20 RX ADMIN — CEFAZOLIN 2000 MG: 10 INJECTION, POWDER, FOR SOLUTION INTRAVENOUS at 19:58

## 2023-07-20 RX ADMIN — Medication 140 MG: at 13:36

## 2023-07-20 RX ADMIN — MORPHINE SULFATE 4 MG: 4 INJECTION, SOLUTION INTRAMUSCULAR; INTRAVENOUS at 08:41

## 2023-07-20 RX ADMIN — DEXAMETHASONE SODIUM PHOSPHATE 4 MG: 4 INJECTION, SOLUTION INTRAMUSCULAR; INTRAVENOUS at 13:45

## 2023-07-20 RX ADMIN — HYDROMORPHONE HYDROCHLORIDE 0.5 MG: 1 INJECTION, SOLUTION INTRAMUSCULAR; INTRAVENOUS; SUBCUTANEOUS at 17:39

## 2023-07-20 RX ADMIN — SODIUM CHLORIDE, POTASSIUM CHLORIDE, SODIUM LACTATE AND CALCIUM CHLORIDE: 600; 310; 30; 20 INJECTION, SOLUTION INTRAVENOUS at 15:57

## 2023-07-20 RX ADMIN — SODIUM CHLORIDE, POTASSIUM CHLORIDE, SODIUM LACTATE AND CALCIUM CHLORIDE: 600; 310; 30; 20 INJECTION, SOLUTION INTRAVENOUS at 12:48

## 2023-07-20 RX ADMIN — DEXAMETHASONE SODIUM PHOSPHATE 4 MG: 4 INJECTION, SOLUTION INTRAMUSCULAR; INTRAVENOUS at 16:23

## 2023-07-20 RX ADMIN — LISINOPRIL 5 MG: 5 TABLET ORAL at 22:15

## 2023-07-20 RX ADMIN — ROCURONIUM BROMIDE 5 MG: 10 INJECTION, SOLUTION INTRAVENOUS at 13:36

## 2023-07-20 RX ADMIN — GABAPENTIN 100 MG: 100 CAPSULE ORAL at 22:23

## 2023-07-20 RX ADMIN — Medication 10 MG: at 15:17

## 2023-07-20 RX ADMIN — ACETAMINOPHEN 1000 MG: 500 TABLET ORAL at 22:15

## 2023-07-20 RX ADMIN — CYCLOBENZAPRINE 10 MG: 10 TABLET, FILM COATED ORAL at 22:14

## 2023-07-20 RX ADMIN — MORPHINE SULFATE 4 MG: 4 INJECTION, SOLUTION INTRAMUSCULAR; INTRAVENOUS at 04:27

## 2023-07-20 RX ADMIN — METOPROLOL TARTRATE 37.5 MG: 25 TABLET, FILM COATED ORAL at 22:15

## 2023-07-20 RX ADMIN — Medication 20 MG: at 13:51

## 2023-07-20 RX ADMIN — FENTANYL CITRATE 50 MCG: 50 INJECTION, SOLUTION INTRAMUSCULAR; INTRAVENOUS at 13:36

## 2023-07-20 RX ADMIN — SODIUM CHLORIDE, POTASSIUM CHLORIDE, SODIUM LACTATE AND CALCIUM CHLORIDE: 600; 310; 30; 20 INJECTION, SOLUTION INTRAVENOUS at 13:30

## 2023-07-20 RX ADMIN — Medication 20 MG: at 16:05

## 2023-07-20 RX ADMIN — FENTANYL CITRATE 50 MCG: 50 INJECTION, SOLUTION INTRAMUSCULAR; INTRAVENOUS at 16:18

## 2023-07-20 RX ADMIN — MORPHINE SULFATE 4 MG: 4 INJECTION, SOLUTION INTRAMUSCULAR; INTRAVENOUS at 01:19

## 2023-07-20 RX ADMIN — PHENYLEPHRINE HYDROCHLORIDE 200 MCG: 10 INJECTION INTRAVENOUS at 13:47

## 2023-07-20 RX ADMIN — ALLOPURINOL 300 MG: 300 TABLET ORAL at 22:15

## 2023-07-20 RX ADMIN — PROPOFOL 200 MG: 10 INJECTION, EMULSION INTRAVENOUS at 13:36

## 2023-07-20 ASSESSMENT — PAIN DESCRIPTION - LOCATION
LOCATION: HIP
LOCATION: INCISION;HIP
LOCATION: INCISION;LEG
LOCATION: HIP

## 2023-07-20 ASSESSMENT — PAIN DESCRIPTION - ORIENTATION
ORIENTATION: LEFT

## 2023-07-20 ASSESSMENT — PAIN SCALES - GENERAL
PAINLEVEL_OUTOF10: 10
PAINLEVEL_OUTOF10: 6
PAINLEVEL_OUTOF10: 6
PAINLEVEL_OUTOF10: 0
PAINLEVEL_OUTOF10: 8
PAINLEVEL_OUTOF10: 6
PAINLEVEL_OUTOF10: 8
PAINLEVEL_OUTOF10: 10
PAINLEVEL_OUTOF10: 7

## 2023-07-20 ASSESSMENT — PAIN DESCRIPTION - DESCRIPTORS
DESCRIPTORS: ACHING
DESCRIPTORS: STABBING
DESCRIPTORS: ACHING;SHARP

## 2023-07-20 ASSESSMENT — ENCOUNTER SYMPTOMS
SHORTNESS OF BREATH: 0
STRIDOR: 0

## 2023-07-20 ASSESSMENT — PAIN DESCRIPTION - PAIN TYPE: TYPE: SURGICAL PAIN

## 2023-07-20 ASSESSMENT — PAIN - FUNCTIONAL ASSESSMENT: PAIN_FUNCTIONAL_ASSESSMENT: 0-10

## 2023-07-20 ASSESSMENT — LIFESTYLE VARIABLES: SMOKING_STATUS: 0

## 2023-07-20 NOTE — OP NOTE
Operative Note    Luz Marina Meehan  YOB: 1945  [id number]      Pre-operative Diagnosis: Left periprosthetic distal femur fracture  Right hip osteoarthritis    Post-operative Diagnosis: Left periprosthetic distal femur fracture  Right hip osteoarthritis    Procedure: Intramedullary nail fixation Left Femur  Fluoroscopic guided right hip injection    Anesthesia: General    Surgeons: Manuel Figueroa DO    Assistants: Radha Cavazos    Estimated Blood Loss: 173FL    Complications: None    Specimens: Was Not Obtained    Findings: Displaced Left periprosthetic distal femur fracture  Right hip osteoarthritis    Implants: Synthes retrograde femoral nail advanced 10 x 360 mm, 10 degree LAW    Indications: This is a 77 yo Male who presents for operative intervention of their Left periprosthetic distal femur after he was on his way to get a right hip injection for osteoarthritis. All treatment options, including conservative and operative, were discussed with the patient in detail including the risks and benefits of each. Risks including but not limited to  bleeding, blood clots, infection, damage to nearby tissues, vessels and nerves, wound healing complications, failed procedure, stiffness, loss of motion, malunion, nonunion, anesthesia risk, loss of life were all discussed with the patient. Knowing these risks, the patient wished to proceed with surgery as indicated. Consent was obtained. Site Marked. All questions answered to the patient's satisfaction. Operative: The patient was taken to the operative suite, placed under general anesthesia without any complications. Ancef 3 gm was given prior to the procedure. At this time, all team members paused to identify proper patient name, indications, site and allergies. All team members were in agreeance. The Left lower extremity was prepped and draped in normal sterile fashion. Proximal tibial skeletal traction pin was placed using a 5/64 K wire.   This was

## 2023-07-20 NOTE — DISCHARGE INSTRUCTIONS
Orthopaedic Instructions:  -Weight bearing status: weight bearing as tolerated to the left leg.  -Do not remove Optifoam bandage (the large sealed \"Band-aid\"-like dressing) until your follow-up date in clinic. It is okay to shower with the Optifoam bandage. Should it fall off or saturated, replace with band-aids or gauze & tape until dry. It is still okay to shower if it falls off, but avoid baths and underwater submersion.  -Ice (20 minutes on and off 1 hour) knee and elevate above the level of the heart to reduce swelling and throbbing pain.  -Call the office or come to Emergency Room if signs of infection appear (hot, swollen, red, draining pus, fever)  -Take medications as prescribed.  -Wean off narcotics (percocet/norco) as soon as possible. Do not take tylenol if still taking narcotics.  -Follow up with  Dr. Darren Duron  in his office on  8/7/23  at 2:15PM. Call [phone] with any questions/concerns.

## 2023-07-20 NOTE — ED NOTES
Patient  complaining   of pain 10/10. Patient being  given  three time  fentanyl 50 ug ,   Pt  verbalized that  fentanyl is not  giving  enough  pain  relief.   Paged  Dr Angela Will and  informed him about patient concerns  Advised to  started  morphine  4mgQ3H  PRN       Cecil Lawson RN  07/20/23 1913

## 2023-07-20 NOTE — CARE COORDINATION
Case Management Assessment  Initial Evaluation    Date/Time of Evaluation: 7/20/2023 12:15 PM  Assessment Completed by: Gilda Mauricio RN    If patient is discharged prior to next notation, then this note serves as note for discharge by case management. Patient Name: Radha Newton                   YOB: 1945  Diagnosis: Periprosthetic fracture of shaft of femur [X92. Anola Sins                   Date / Time: 7/19/2023 11:22 AM    Patient Admission Status: Inpatient   Readmission Risk (Low < 19, Mod (19-27), High > 27): Readmission Risk Score: 16.5    Current PCP: Xavier Oneill MD  PCP verified by CM? Yes    Chart Reviewed: Yes      History Provided by: Patient  Patient Orientation: Alert and Oriented    Patient Cognition: Alert    Hospitalization in the last 30 days (Readmission):  No    If yes, Readmission Assessment in  Navigator will be completed. Advance Directives:      Code Status: Prior   Patient's Primary Decision Maker is: Named in [address] [place] [address]    Primary Decision Maker: Juancho Ureña Spouse - [phone]    Discharge Planning:    Patient lives with: Spouse/Significant Other Type of Home: House  Primary Care Giver: Spouse  Patient Support Systems include: Spouse/Significant Other   Current Financial resources: Medicare  Current community resources: None  Current services prior to admission: Durable Medical Equipment, C-pap            Current DME: Cane, Cpap, Wheelchair, Walker, Other (Comment) (Grab bars)            Type of Home Care services:  None    ADLS  Prior functional level: Assistance with the following:, Mobility, Shopping, Housework  Current functional level: Housework, Shopping, Assistance with the following:, Mobility    PT AM-PAC:   /24  OT AM-PAC:   /24    Family can provide assistance at DC: Yes  Would you like Case Management to discuss the discharge plan with any other family members/significant others, and if so, who?  Yes (Spouse,

## 2023-07-20 NOTE — ANESTHESIA PRE PROCEDURE
Department of Anesthesiology  Preprocedure Note       Name:  Juan Mendieta   Age:  78 y.o.  :  1945                                          MRN:  [id number]         Date:  2023      Surgeon: Misha Mera):  Estrella Newman DO    Procedure: Procedure(s):  RETROGRADE FEMORAL NAIL    Medications prior to admission:   Prior to Admission medications    Medication Sig Start Date End Date Taking? Authorizing Provider   psyllium (KONSYL) 28.3 % PACK Take 1 packet by mouth at bedtime   Yes Historical Provider, MD   bisacodyl (DULCOLAX) 5 MG EC tablet Take 1 tablet by mouth daily as needed for Constipation   Yes Historical Provider, MD   furosemide (LASIX) 40 MG tablet Take 1 tablet by mouth 2 times daily as needed Patient is always taking 1 tablet daily and a second tablet in the afternoon as needed for leg swelling. Patient's prescription is for twice daily.    Yes Historical Provider, MD   pantoprazole (PROTONIX) 40 MG tablet Take 1 tablet by mouth every morning (before breakfast) 23   Massimo Simon DO   levothyroxine (SYNTHROID) 25 MCG tablet TAKE 1 TABLET DAILY 23   Shannon Lopez MD   lisinopril (PRINIVIL;ZESTRIL) 5 MG tablet TAKE 1 TABLET NIGHTLY 23   Shannon Lopez MD   atorvastatin (LIPITOR) 10 MG tablet TAKE 1 TABLET DAILY 23   Shannon Lopez MD   [facility] NEUROMEDICAL [facility] PUMP 1 Device as needed (lymphedema) Bilateral lower extremities 23   Shannon Lopez MD   allopurinol (ZYLOPRIM) 300 MG tablet TAKE 1 TABLET TWICE A DAY 23   Shannon Lopez MD   empagliflozin (JARDIANCE) 10 MG tablet Take 1 tablet by mouth daily    Historical Provider, MD   ciclopirox (LOPROX) 0.77 % cream  10/24/22   Historical Provider, MD   metroNIDAZOLE (METROCREAM) 0.75 % cream  10/19/22   Historical Provider, MD   mesalamine (LIALDA) 1.2 g EC tablet Take 1 tablet by mouth 3 times daily  Patient not taking: Reported on 2023   Austin Schroeder MD   magnesium cl-calcium

## 2023-07-20 NOTE — ACP (ADVANCE CARE PLANNING)
Advance Care Planning     Advance Care Planning Activator (Inpatient)  Conversation Note      Date of ACP Conversation: 7/20/2023     Conversation Conducted with: Patient with Decision Making Capacity    ACP Activator: Ania Cedeno RN    Health Care Decision Maker:     Current Designated Health Care Decision Maker:     Primary Decision Maker: Bradley Graham Spouse - [phone]  Click here to complete Healthcare Decision Makers including section of the Healthcare Decision Maker Relationship (ie \"Primary\")  Today we documented Decision Maker(s) consistent with Legal Next of Kin hierarchy. Care Preferences    Ventilation: \"If you were in your present state of health and suddenly became very ill and were unable to breathe on your own, what would your preference be about the use of a ventilator (breathing machine) if it were available to you? \"      Would the patient desire the use of ventilator (breathing machine)?: yes    \"If your health worsens and it becomes clear that your chance of recovery is unlikely, what would your preference be about the use of a ventilator (breathing machine) if it were available to you? \"     Would the patient desire the use of ventilator (breathing machine)?: No      Resuscitation  \"CPR works best to restart the heart when there is a sudden event, like a heart attack, in someone who is otherwise healthy. Unfortunately, CPR does not typically restart the heart for people who have serious health conditions or who are very sick. \"    \"In the event your heart stopped as a result of an underlying serious health condition, would you want attempts to be made to restart your heart (answer \"yes\" for attempt to resuscitate) or would you prefer a natural death (answer \"no\" for do not attempt to resuscitate)? \" yes       [] Yes   [x] No   Educated Patient / Rhunette Mallet regarding differences between Advance Directives and portable DNR orders.     Length of ACP Conversation in minutes:

## 2023-07-20 NOTE — ED NOTES
Pt seen by Dr Sky Ansari and applied Knee Immobilizer on Left knee     Ajit Bonilla, RN  07/19/23 0558

## 2023-07-21 ENCOUNTER — APPOINTMENT (OUTPATIENT)
Dept: GENERAL RADIOLOGY | Age: 78
DRG: 480 | End: 2023-07-21
Payer: MEDICARE

## 2023-07-21 LAB
ARTERIAL PATENCY WRIST A: ABNORMAL
BDY SITE: ABNORMAL
BODY TEMPERATURE: 37
COHGB MFR BLD: 2.1 % (ref 0–5)
GAS FLOW.O2 O2 DELIVERY SYS: ABNORMAL L/MIN
HCO3 ARTERIAL: 35.3 MMOL/L (ref 22–26)
METHEMOGLOBIN: 0.9 % (ref 0–1.9)
O2 SAT, ARTERIAL: 84.9 % (ref 95–98)
PCO2 ARTERIAL: 53.1 MMHG (ref 35–45)
PH ARTERIAL: 7.43 (ref 7.35–7.45)
PO2 ARTERIAL: 51.3 MMHG (ref 80–100)
POSITIVE BASE EXCESS, ART: 11 MMOL/L (ref 0–2)
PT. POSITION: ABNORMAL
RESPIRATORY RATE: 16
TEXT FOR RESPIRATORY: ABNORMAL

## 2023-07-21 PROCEDURE — 97162 PT EVAL MOD COMPLEX 30 MIN: CPT

## 2023-07-21 PROCEDURE — 71045 X-RAY EXAM CHEST 1 VIEW: CPT

## 2023-07-21 PROCEDURE — 97530 THERAPEUTIC ACTIVITIES: CPT

## 2023-07-21 PROCEDURE — 2060000000 HC ICU INTERMEDIATE R&B

## 2023-07-21 PROCEDURE — 2700000000 HC OXYGEN THERAPY PER DAY

## 2023-07-21 PROCEDURE — 36600 WITHDRAWAL OF ARTERIAL BLOOD: CPT

## 2023-07-21 PROCEDURE — 6370000000 HC RX 637 (ALT 250 FOR IP): Performed by: INTERNAL MEDICINE

## 2023-07-21 PROCEDURE — 82805 BLOOD GASES W/O2 SATURATION: CPT

## 2023-07-21 PROCEDURE — 1200000000 HC SEMI PRIVATE

## 2023-07-21 PROCEDURE — 6370000000 HC RX 637 (ALT 250 FOR IP): Performed by: ORTHOPAEDIC SURGERY

## 2023-07-21 PROCEDURE — 94761 N-INVAS EAR/PLS OXIMETRY MLT: CPT

## 2023-07-21 PROCEDURE — 97535 SELF CARE MNGMENT TRAINING: CPT

## 2023-07-21 PROCEDURE — 97166 OT EVAL MOD COMPLEX 45 MIN: CPT

## 2023-07-21 PROCEDURE — 99233 SBSQ HOSP IP/OBS HIGH 50: CPT | Performed by: INTERNAL MEDICINE

## 2023-07-21 RX ADMIN — ACETAMINOPHEN 1000 MG: 500 TABLET ORAL at 23:00

## 2023-07-21 RX ADMIN — GABAPENTIN 100 MG: 100 CAPSULE ORAL at 20:04

## 2023-07-21 RX ADMIN — ACETAMINOPHEN 1000 MG: 500 TABLET ORAL at 05:17

## 2023-07-21 RX ADMIN — GABAPENTIN 100 MG: 100 CAPSULE ORAL at 11:00

## 2023-07-21 RX ADMIN — GABAPENTIN 100 MG: 100 CAPSULE ORAL at 15:22

## 2023-07-21 RX ADMIN — LISINOPRIL 5 MG: 5 TABLET ORAL at 21:42

## 2023-07-21 RX ADMIN — OXYCODONE HYDROCHLORIDE 5 MG: 5 TABLET ORAL at 15:22

## 2023-07-21 RX ADMIN — APIXABAN 5 MG: 5 TABLET, FILM COATED ORAL at 11:00

## 2023-07-21 RX ADMIN — LEVOTHYROXINE SODIUM 25 MCG: 0.03 TABLET ORAL at 05:17

## 2023-07-21 RX ADMIN — OXYCODONE HYDROCHLORIDE 5 MG: 5 TABLET ORAL at 11:00

## 2023-07-21 RX ADMIN — METOPROLOL TARTRATE 37.5 MG: 25 TABLET, FILM COATED ORAL at 20:04

## 2023-07-21 RX ADMIN — METOPROLOL TARTRATE 37.5 MG: 25 TABLET, FILM COATED ORAL at 11:00

## 2023-07-21 RX ADMIN — ATORVASTATIN CALCIUM 10 MG: 10 TABLET, FILM COATED ORAL at 11:00

## 2023-07-21 RX ADMIN — ACETAMINOPHEN 1000 MG: 500 TABLET ORAL at 18:30

## 2023-07-21 RX ADMIN — ALLOPURINOL 300 MG: 300 TABLET ORAL at 20:03

## 2023-07-21 RX ADMIN — OXYCODONE HYDROCHLORIDE 5 MG: 5 TABLET ORAL at 20:08

## 2023-07-21 RX ADMIN — ACETAMINOPHEN 1000 MG: 500 TABLET ORAL at 11:00

## 2023-07-21 RX ADMIN — APIXABAN 5 MG: 5 TABLET, FILM COATED ORAL at 20:04

## 2023-07-21 RX ADMIN — ALLOPURINOL 300 MG: 300 TABLET ORAL at 11:00

## 2023-07-21 ASSESSMENT — PAIN DESCRIPTION - ORIENTATION
ORIENTATION: LEFT
ORIENTATION: RIGHT
ORIENTATION: LEFT
ORIENTATION: LEFT

## 2023-07-21 ASSESSMENT — PAIN SCALES - GENERAL
PAINLEVEL_OUTOF10: 5
PAINLEVEL_OUTOF10: 5
PAINLEVEL_OUTOF10: 8
PAINLEVEL_OUTOF10: 4

## 2023-07-21 ASSESSMENT — PAIN DESCRIPTION - DESCRIPTORS
DESCRIPTORS: DISCOMFORT
DESCRIPTORS: ACHING

## 2023-07-21 ASSESSMENT — PAIN DESCRIPTION - LOCATION
LOCATION: HIP;LEG
LOCATION: HIP
LOCATION: INCISION;HIP;LEG
LOCATION: HIP;LEG

## 2023-07-21 NOTE — CONSULTS
Date:   7/19/2023  Patient name: Dallas Prospect  Date of admission:  7/19/2023 11:22 AM  MRN:   [id number]  YOB: 1945  PCP: Rodney Morrison MD    Reason for Admission: Periprosthetic fracture of shaft of femur [V95. [address], [address]    Cardiology consult: Preop cardiac assessment, abnormal troponin, abnormal ECG       Referring physician: Dr Valeria Kern    Impression    Admission 7/19/2023 with a comminuted distal femoral periprosthetic fracture status post fall  Elevated high-sensitivity troponin, significance not clear no chest pain  ECG sinus rhythm nonspecific T wave changes patient has no potassium 3.3 and low magnesium 1.5  Normal coronary arteries cardiac cath 7/30/2020  History of A-fib, status post ablation  Morbid obesity sleep apnea on CPAP  Hypothyroidism  Hyperlipidemia  Stay of prostate cancer, proctectomy  History of recurrent UTI  Sigmoid diverticulosis  Nonobstructing renal calculi multiple renal cyst    Past surgical history  Bilateral knee replacement, hip replacement, bilateral shoulder surgery, history of bilateral clavicle fracture, robot assisted laparoscopic radical prostatectomy    Investigation work-up    ECG 7/19/2023  Sinus rhythm heart rate 71, first-degree heart block MI interval 268, incomplete right bundle branch block, probable old inferior and anterior MI, nonspecific ST-T abnormalities    Chest x-ray 7/19/2023  No acute process    Transesophageal echo examination 9/24/2019  Normal LV size, normal LV function ejection fraction 55%  PFO with left-to-right shunt    CT left knee without contrast 1/19/2023  Comminuted distal femoral periprosthetic fracture    History of present illness  78years old  male with a past medical history of ablation for A-fib, morbid obesity, sleep apnea, multiple surgeries including bilateral knee replacement, left hip replacement, shoulder surgery had a fall when he was getting ready to go to have right hip steroid injection.   He
Orthopedic Consult      CHIEF COMPLAINT: Left knee pain    HISTORY OF PRESENT ILLNESS:      The patient is a 78 y.o. male who presented to [facility] ED with complaints of left knee pain after he fell earlier today. Patient states he was going to get his hip injected when he fell landing on his left knee. Patient's wife present during my examination. Patient denies hitting his head or any loss of conscious. Patient has a history of left total hip arthroplasty, and bilateral total knee arthroplasties. Patient denies any numbness or tingling. At baseline, patient is a community ambulator. Patient's wife states that patient has become substantially weak with ambulation as of late. Patient has a significant cardiac history with elevated troponin upon presentation for which cardiology was consulted for evaluation. Past Medical History:    Past Medical History:   Diagnosis Date    Adenocarcinoma of prostate ([address]) 10/30/2015    Anemia     Cellulitis of lower extremity     right     Cerebral artery occlusion with cerebral infarction ([address])     1996    CHF (congestive heart failure) ([address])     Chronic acquired lymphedema     Clavicle fracture     in high school    Hernia, abdominal     History of blood transfusion 2003 7 2006    AUTOTRANSFUSION [facility]    History of CVA (cerebrovascular accident) 54    DEFECIT MILD MEMORY AND SPEECH    Hyperlipidemia     Hypertension     Hypothyroid 09/2015    Ileus, postoperative (HCC)     Mild renal insufficiency     Morbid obesity ([address])     Non-healing wound of lower extremity     HISTORY OF, WAS SEEN IN WOUND CLINIC FOR COUPLE YRS. Osteoarthritis     Phlebitis     HX. OF     Prolonged emergence from general anesthesia     x1 had to be put back on ventolator, after prostate surgery, had to be hospitalized x12 days.      Prostate CA [facility])     Prostate cancer ([address]) 10/21/2015    PVD (peripheral vascular disease) ([address])     Sleep apnea     C-PAP NIGHTLY-PT INSTRUCTED TO
PULMONARY  CONSULT NOTE      Date of Admission: 7/19/2023 11:22 AM    Reason for Consult: resp failure    Referring Physician: DR Ora Young  PCP: Alvin Garcia MD     History of Present Illness:     58-year-old male presents on 7/19/23 for complaints of fall, left hip and knee pain. Patient reports that he was stepping up on the side of his car to get in states that his foot slipped off the side and he fell down onto his left knee. He reports he felt something pop and states has been having significant pain since that time. Reports a history of a left knee replacement. He denies hitting his head or loss of consciousness, denies any chest pain, dizziness or lightheadedness denies any shortness of breath. He was noted to have distal femur fracture left - underwent nail fixation on 7/20/23. He is known to ave AMBER and uses PAP machine; noted to be hypoxic - requiring O2 at 5L NC. Problem:  Principal Problem:     PMH:   Past Medical History:   Diagnosis Date    Adenocarcinoma of prostate ([address]) 10/30/2015    Anemia     Cellulitis of lower extremity     right     Cerebral artery occlusion with cerebral infarction ([address])     1996    CHF (congestive heart failure) (HCC)     Chronic acquired lymphedema     Clavicle fracture     in high school    Hernia, abdominal     History of blood transfusion 2003 7 2006    [facility]    History of CVA (cerebrovascular accident) 54    DEFECIT MILD MEMORY AND SPEECH    Hyperlipidemia     Hypertension     Hypothyroid 09/2015    Ileus, postoperative (HCC)     Mild renal insufficiency     Morbid obesity ([address])     Non-healing wound of lower extremity     HISTORY OF, WAS SEEN IN WOUND CLINIC FOR COUPLE YRS. Osteoarthritis     Phlebitis     HX. OF     Prolonged emergence from general anesthesia     x1 had to be put back on ventolator, after prostate surgery, had to be hospitalized x12 days.      Prostate CA [facility])     Prostate cancer ([address]) 10/21/2015
hour   Intake 1000 ml   Output 225 ml   Net 775 ml       General Appearance:  alert, well appearing, and in no acute distress  Mental status: oriented to person, place, and time with normal affect  Head:  normocephalic, atraumatic. Eye: no icterus, redness, pupils equal and reactive, extraocular eye movements intact, conjunctiva clear  Ear: normal external ear, no discharge, hearing intact  Nose:  no drainage noted  Mouth: mucous membranes moist  Neck: supple, no carotid bruits, thyroid not palpable  Lungs: Bilateral equal air entry, clear to ausculation, no wheezing, rales or rhonchi, normal effort  Cardiovascular: normal rate, regular rhythm, no murmur, gallop, rub. Abdomen: Soft, nontender, nondistended, normal bowel sounds, no hepatomegaly or splenomegaly  Neurologic: There are no new focal motor or sensory deficits, normal muscle tone and bulk, no abnormal sensation, normal speech, cranial nerves II through XII grossly intact  Skin: No gross lesions, rashes, bruising or bleeding on exposed skin area  Extremities: Lymphedema bilateral lower extremity. Psych: normal affect     Investigations:      Laboratory Testing:  No results found for this or any previous visit (from the past 24 hour(s)). Imaging/Diagnostics:        Assessment :      Primary Problem  Periprosthetic fracture of shaft of femur    Active Hospital Problems    Diagnosis Date Noted    Periprosthetic fracture of shaft of femur [V96. Mercpedro Sour, [address] [place] [address] E 07/19/2023       Plan:     Patient is 78-year-old male with multiple comorbidities including history of A-fib CHF history of previous stroke morbid obesity, AMBER admitted to the hospital with mechanical fall, found to have left femur fracture  Going for surgical repair today, medicine team has been consulted for medical management and clearance  Cardiology is consulted for preop clearance since patient has elevated troponin and chronic EKG changes, had cardiac cath done in 2020 which was negative,

## 2023-07-21 NOTE — DISCHARGE INSTR - COC
Continuity of Care Form    Patient Name: Timmy Councilman   :  1945  MRN:  [id number]    Admit date:  2023  Discharge date:  2023    Code Status Order: Prior   Advance Directives:   Trini Shaw Jose Armandodelilah Documentation       Date/Time Healthcare Directive Type of Healthcare Directive Copy in [address] Agent's Name Healthcare Agent's Phone Number    [phone] Yes, patient has an advance directive for healthcare treatment Durable power of  for health care;Living will No, copy requested from family -- -- --            Admitting Physician:  Pal Martinez DO  PCP: Adrian Alejandro MD    Discharging Nurse: Teri Canales, [address] Unit/Room#: 2125/2125-01  Discharging Unit Phone Number: [phone]    Emergency Contact:   Extended Emergency Contact Information  Primary Emergency Contact: Rosita Gilbert  Address: [address] [place] [address] [facility] Phone: [phone]  Work Phone: [phone]  Mobile Phone: [phone]  Relation: Spouse    Past Surgical History:  Past Surgical History:   Procedure Laterality Date    ABLATION OF DYSRHYTHMIC FOCUS  2018    afib ablation    ABLATION OF DYSRHYTHMIC FOCUS  2019    ATRIAL FIB  /   [facility]  2020    Dr. Karin Capellan, [facility]  2017    COLONOSCOPY  ,     COLONOSCOPY  2016    polyp,bx    COLONOSCOPY N/A 2022    COLONOSCOPY WITH RANDOM COLON BIOPSIES AND CLIPPING AT DISTAL TRANSVERSE COLON performed by Alisia Ford MD at [place] [address] 2022    COLONOSCOPY POLYPECTOMY SNARE/COLD BIOPSY performed by Marifer Vargas MD at [facility] 2016    excision cyst anterior chest    FEMUR FRACTURE SURGERY Left 2023    RETROGRADE FEMORAL NAIL WITH CORTICOSTEROID INJECTION RIGHT HIP performed by Pal Martinez DO at [address] [place] [address]

## 2023-07-21 NOTE — ANESTHESIA POSTPROCEDURE EVALUATION
Department of Anesthesiology  Postprocedure Note    Patient: Artemio Delarosa  MRN: [id number]  YOB: 1945  Date of evaluation: 7/21/2023      Procedure Summary     Date: 07/20/23 Room / Location: [address] [place] [address]    Anesthesia Start: 1400 Anesthesia Stop: 3051    Procedure: RETROGRADE FEMORAL NAIL WITH CORTICOSTEROID INJECTION RIGHT HIP (Left: Leg Upper) Diagnosis:       Periprosthetic fracture of shaft of femur      (Periprosthetic fracture of shaft of femur [M97. Kameron Milks)    Surgeons: Americo Galindo DO Responsible Provider: Efren Rodriguez MD    Anesthesia Type: general ASA Status: 3 - Emergent          Anesthesia Type: No value filed. Flo Phase I: Flo Score: 9    Flo Phase II:        Anesthesia Post Evaluation    Comments: POD #1 Patient seen lying in bed. C/o Pain at operative site. No anesthesia related issues.

## 2023-07-21 NOTE — CARE COORDINATION
ONGOING DISCHARGE PLAN:    Patient is alert and oriented x4. Spoke with patient regarding discharge plan and patient confirms that plan is still to discharge to a skilled nursing facility; three referrals sent yesterday. [facility] has no bed available. POD #1 left intramedullary nail fixation femur and fluoroscopic guided right hip injection. Will continue to follow for additional discharge needs. If patient is discharged prior to next notation, then this note serves as note for discharge by case management. Electronically signed by Emerson Cronin RN on 7/21/2023 at 12:00 PM    ADDENDUM:    Shyla Hoyt of [facility] is reviewing and will return call tomorrow, 7/22/23, regarding acceptance. Voicemail left for Moy Johnson at [facility].      Electronically signed by Emerson Cronin RN on 7/21/2023 at 5:11 PM

## 2023-07-22 ENCOUNTER — APPOINTMENT (OUTPATIENT)
Dept: GENERAL RADIOLOGY | Age: 78
DRG: 480 | End: 2023-07-22
Payer: MEDICARE

## 2023-07-22 LAB — BNP SERPL-MCNC: 750 PG/ML

## 2023-07-22 PROCEDURE — 6370000000 HC RX 637 (ALT 250 FOR IP): Performed by: ORTHOPAEDIC SURGERY

## 2023-07-22 PROCEDURE — 36415 COLL VENOUS BLD VENIPUNCTURE: CPT

## 2023-07-22 PROCEDURE — 6360000002 HC RX W HCPCS: Performed by: INTERNAL MEDICINE

## 2023-07-22 PROCEDURE — 71045 X-RAY EXAM CHEST 1 VIEW: CPT

## 2023-07-22 PROCEDURE — 2060000000 HC ICU INTERMEDIATE R&B

## 2023-07-22 PROCEDURE — 94761 N-INVAS EAR/PLS OXIMETRY MLT: CPT

## 2023-07-22 PROCEDURE — 1200000000 HC SEMI PRIVATE

## 2023-07-22 PROCEDURE — 6370000000 HC RX 637 (ALT 250 FOR IP)

## 2023-07-22 PROCEDURE — 97530 THERAPEUTIC ACTIVITIES: CPT

## 2023-07-22 PROCEDURE — 2700000000 HC OXYGEN THERAPY PER DAY

## 2023-07-22 PROCEDURE — 99233 SBSQ HOSP IP/OBS HIGH 50: CPT | Performed by: INTERNAL MEDICINE

## 2023-07-22 PROCEDURE — 6370000000 HC RX 637 (ALT 250 FOR IP): Performed by: INTERNAL MEDICINE

## 2023-07-22 PROCEDURE — 83880 ASSAY OF NATRIURETIC PEPTIDE: CPT

## 2023-07-22 RX ORDER — POLYETHYLENE GLYCOL 3350 17 G/17G
17 POWDER, FOR SOLUTION ORAL DAILY
Status: DISCONTINUED | OUTPATIENT
Start: 2023-07-22 | End: 2023-07-26 | Stop reason: HOSPADM

## 2023-07-22 RX ORDER — FUROSEMIDE 10 MG/ML
20 INJECTION INTRAMUSCULAR; INTRAVENOUS 2 TIMES DAILY
Status: DISCONTINUED | OUTPATIENT
Start: 2023-07-22 | End: 2023-07-26 | Stop reason: HOSPADM

## 2023-07-22 RX ADMIN — LEVOTHYROXINE SODIUM 25 MCG: 0.03 TABLET ORAL at 05:10

## 2023-07-22 RX ADMIN — GABAPENTIN 100 MG: 100 CAPSULE ORAL at 14:22

## 2023-07-22 RX ADMIN — METOPROLOL TARTRATE 37.5 MG: 25 TABLET, FILM COATED ORAL at 09:57

## 2023-07-22 RX ADMIN — ALLOPURINOL 300 MG: 300 TABLET ORAL at 09:59

## 2023-07-22 RX ADMIN — ACETAMINOPHEN 1000 MG: 500 TABLET ORAL at 11:57

## 2023-07-22 RX ADMIN — APIXABAN 5 MG: 5 TABLET, FILM COATED ORAL at 20:13

## 2023-07-22 RX ADMIN — POLYETHYLENE GLYCOL 3350 17 G: 17 POWDER, FOR SOLUTION ORAL at 11:58

## 2023-07-22 RX ADMIN — CYCLOBENZAPRINE 10 MG: 10 TABLET, FILM COATED ORAL at 20:13

## 2023-07-22 RX ADMIN — ATORVASTATIN CALCIUM 10 MG: 10 TABLET, FILM COATED ORAL at 09:57

## 2023-07-22 RX ADMIN — OXYCODONE HYDROCHLORIDE 5 MG: 5 TABLET ORAL at 14:24

## 2023-07-22 RX ADMIN — ACETAMINOPHEN 1000 MG: 500 TABLET ORAL at 18:37

## 2023-07-22 RX ADMIN — OXYCODONE HYDROCHLORIDE 5 MG: 5 TABLET ORAL at 09:56

## 2023-07-22 RX ADMIN — METOPROLOL TARTRATE 37.5 MG: 25 TABLET, FILM COATED ORAL at 20:13

## 2023-07-22 RX ADMIN — GABAPENTIN 100 MG: 100 CAPSULE ORAL at 09:57

## 2023-07-22 RX ADMIN — FUROSEMIDE 20 MG: 10 INJECTION, SOLUTION INTRAMUSCULAR; INTRAVENOUS at 18:37

## 2023-07-22 RX ADMIN — OXYCODONE HYDROCHLORIDE 10 MG: 10 TABLET ORAL at 20:13

## 2023-07-22 RX ADMIN — APIXABAN 5 MG: 5 TABLET, FILM COATED ORAL at 09:57

## 2023-07-22 RX ADMIN — GABAPENTIN 100 MG: 100 CAPSULE ORAL at 20:13

## 2023-07-22 RX ADMIN — LISINOPRIL 5 MG: 5 TABLET ORAL at 20:13

## 2023-07-22 RX ADMIN — ALLOPURINOL 300 MG: 300 TABLET ORAL at 20:13

## 2023-07-22 RX ADMIN — OXYCODONE HYDROCHLORIDE 5 MG: 5 TABLET ORAL at 00:09

## 2023-07-22 ASSESSMENT — PAIN SCALES - GENERAL
PAINLEVEL_OUTOF10: 6
PAINLEVEL_OUTOF10: 5
PAINLEVEL_OUTOF10: 5
PAINLEVEL_OUTOF10: 6
PAINLEVEL_OUTOF10: 8
PAINLEVEL_OUTOF10: 5

## 2023-07-22 ASSESSMENT — PAIN DESCRIPTION - LOCATION
LOCATION: LEG
LOCATION: LEG;BUTTOCKS
LOCATION: LEG
LOCATION: BUTTOCKS;LEG

## 2023-07-22 ASSESSMENT — PAIN DESCRIPTION - DESCRIPTORS
DESCRIPTORS: SHARP;ACHING
DESCRIPTORS: STABBING;GNAWING
DESCRIPTORS: ACHING;BURNING
DESCRIPTORS: BURNING;SHARP

## 2023-07-22 ASSESSMENT — PAIN DESCRIPTION - ORIENTATION: ORIENTATION: LEFT

## 2023-07-23 PROCEDURE — 2060000000 HC ICU INTERMEDIATE R&B

## 2023-07-23 PROCEDURE — 94761 N-INVAS EAR/PLS OXIMETRY MLT: CPT

## 2023-07-23 PROCEDURE — 97530 THERAPEUTIC ACTIVITIES: CPT

## 2023-07-23 PROCEDURE — 6370000000 HC RX 637 (ALT 250 FOR IP): Performed by: ORTHOPAEDIC SURGERY

## 2023-07-23 PROCEDURE — 94660 CPAP INITIATION&MGMT: CPT

## 2023-07-23 PROCEDURE — 6370000000 HC RX 637 (ALT 250 FOR IP)

## 2023-07-23 PROCEDURE — 6360000002 HC RX W HCPCS: Performed by: INTERNAL MEDICINE

## 2023-07-23 PROCEDURE — 2700000000 HC OXYGEN THERAPY PER DAY

## 2023-07-23 PROCEDURE — 6370000000 HC RX 637 (ALT 250 FOR IP): Performed by: INTERNAL MEDICINE

## 2023-07-23 PROCEDURE — 1200000000 HC SEMI PRIVATE

## 2023-07-23 RX ADMIN — POLYETHYLENE GLYCOL 3350 17 G: 17 POWDER, FOR SOLUTION ORAL at 09:42

## 2023-07-23 RX ADMIN — LISINOPRIL 5 MG: 5 TABLET ORAL at 21:28

## 2023-07-23 RX ADMIN — FUROSEMIDE 20 MG: 10 INJECTION, SOLUTION INTRAMUSCULAR; INTRAVENOUS at 18:07

## 2023-07-23 RX ADMIN — FUROSEMIDE 20 MG: 10 INJECTION, SOLUTION INTRAMUSCULAR; INTRAVENOUS at 09:42

## 2023-07-23 RX ADMIN — GABAPENTIN 100 MG: 100 CAPSULE ORAL at 21:28

## 2023-07-23 RX ADMIN — ALLOPURINOL 300 MG: 300 TABLET ORAL at 09:42

## 2023-07-23 RX ADMIN — APIXABAN 5 MG: 5 TABLET, FILM COATED ORAL at 09:42

## 2023-07-23 RX ADMIN — OXYCODONE HYDROCHLORIDE 10 MG: 10 TABLET ORAL at 21:28

## 2023-07-23 RX ADMIN — LEVOTHYROXINE SODIUM 25 MCG: 0.03 TABLET ORAL at 06:37

## 2023-07-23 RX ADMIN — ACETAMINOPHEN 1000 MG: 500 TABLET ORAL at 13:20

## 2023-07-23 RX ADMIN — ALLOPURINOL 300 MG: 300 TABLET ORAL at 21:28

## 2023-07-23 RX ADMIN — CYCLOBENZAPRINE 10 MG: 10 TABLET, FILM COATED ORAL at 16:34

## 2023-07-23 RX ADMIN — ACETAMINOPHEN 1000 MG: 500 TABLET ORAL at 00:17

## 2023-07-23 RX ADMIN — GABAPENTIN 100 MG: 100 CAPSULE ORAL at 13:20

## 2023-07-23 RX ADMIN — METOPROLOL TARTRATE 37.5 MG: 25 TABLET, FILM COATED ORAL at 21:28

## 2023-07-23 RX ADMIN — APIXABAN 5 MG: 5 TABLET, FILM COATED ORAL at 21:28

## 2023-07-23 RX ADMIN — CYCLOBENZAPRINE 10 MG: 10 TABLET, FILM COATED ORAL at 09:42

## 2023-07-23 RX ADMIN — ATORVASTATIN CALCIUM 10 MG: 10 TABLET, FILM COATED ORAL at 09:42

## 2023-07-23 RX ADMIN — GABAPENTIN 100 MG: 100 CAPSULE ORAL at 09:42

## 2023-07-23 RX ADMIN — OXYCODONE HYDROCHLORIDE 5 MG: 5 TABLET ORAL at 11:10

## 2023-07-23 RX ADMIN — ACETAMINOPHEN 1000 MG: 500 TABLET ORAL at 06:37

## 2023-07-23 RX ADMIN — OXYCODONE HYDROCHLORIDE 10 MG: 10 TABLET ORAL at 00:17

## 2023-07-23 RX ADMIN — ACETAMINOPHEN 1000 MG: 500 TABLET ORAL at 18:06

## 2023-07-23 ASSESSMENT — PAIN DESCRIPTION - DESCRIPTORS
DESCRIPTORS: ACHING
DESCRIPTORS: ACHING
DESCRIPTORS: ACHING;SHARP
DESCRIPTORS: ACHING
DESCRIPTORS: ACHING

## 2023-07-23 ASSESSMENT — PAIN SCALES - GENERAL
PAINLEVEL_OUTOF10: 5
PAINLEVEL_OUTOF10: 6
PAINLEVEL_OUTOF10: 5
PAINLEVEL_OUTOF10: 5
PAINLEVEL_OUTOF10: 7

## 2023-07-23 ASSESSMENT — PAIN DESCRIPTION - LOCATION
LOCATION: BUTTOCKS;LEG
LOCATION: LEG;BUTTOCKS

## 2023-07-23 NOTE — CARE COORDINATION
ONGOING DISCHARGE PLANNING NOTE:    Writer reviewed LSW notes, and discharge plan is to go to SNF. Waleska Starr from [facility] called and they can accept this patient . Waleska Starr stated to start precert. This writers access to [employer] blocked. Unable to complete precert today due to unable to access the system. Request for precert will need to be completed Monday morning. VM left for  Afshin Voss and DORA Medina.      Electronically signed by Judith Viveros RN on 7/23/2023 at 8:54 AM

## 2023-07-24 ENCOUNTER — APPOINTMENT (OUTPATIENT)
Dept: CT IMAGING | Age: 78
DRG: 480 | End: 2023-07-24
Payer: MEDICARE

## 2023-07-24 LAB
ANION GAP SERPL CALCULATED.3IONS-SCNC: 5 MMOL/L (ref 9–17)
BUN SERPL-MCNC: 40 MG/DL (ref 8–23)
CALCIUM SERPL-MCNC: 9.3 MG/DL (ref 8.6–10.4)
CHLORIDE SERPL-SCNC: 101 MMOL/L (ref 98–107)
CO2 SERPL-SCNC: 35 MMOL/L (ref 20–31)
CREAT SERPL-MCNC: 1.1 MG/DL (ref 0.7–1.2)
ERYTHROCYTE [DISTWIDTH] IN BLOOD BY AUTOMATED COUNT: 18.9 % (ref 11.5–14.9)
GFR SERPL CREATININE-BSD FRML MDRD: >60 ML/MIN/1.73M2
GLUCOSE SERPL-MCNC: 164 MG/DL (ref 70–99)
HCT VFR BLD AUTO: 39.8 % (ref 41–53)
HGB BLD-MCNC: 12.3 G/DL (ref 13.5–17.5)
MCH RBC QN AUTO: 30 PG (ref 26–34)
MCHC RBC AUTO-ENTMCNC: 30.9 G/DL (ref 31–37)
MCV RBC AUTO: 97 FL (ref 80–100)
PLATELET # BLD AUTO: 193 K/UL (ref 150–450)
PMV BLD AUTO: 7.6 FL (ref 6–12)
POTASSIUM SERPL-SCNC: 4.8 MMOL/L (ref 3.7–5.3)
RBC # BLD AUTO: 4.11 M/UL (ref 4.5–5.9)
SODIUM SERPL-SCNC: 141 MMOL/L (ref 135–144)
WBC OTHER # BLD: 5.8 K/UL (ref 3.5–11)

## 2023-07-24 PROCEDURE — 2700000000 HC OXYGEN THERAPY PER DAY

## 2023-07-24 PROCEDURE — 99232 SBSQ HOSP IP/OBS MODERATE 35: CPT | Performed by: INTERNAL MEDICINE

## 2023-07-24 PROCEDURE — 97110 THERAPEUTIC EXERCISES: CPT

## 2023-07-24 PROCEDURE — 6370000000 HC RX 637 (ALT 250 FOR IP): Performed by: INTERNAL MEDICINE

## 2023-07-24 PROCEDURE — 6370000000 HC RX 637 (ALT 250 FOR IP): Performed by: ORTHOPAEDIC SURGERY

## 2023-07-24 PROCEDURE — 36415 COLL VENOUS BLD VENIPUNCTURE: CPT

## 2023-07-24 PROCEDURE — 85027 COMPLETE CBC AUTOMATED: CPT

## 2023-07-24 PROCEDURE — 94761 N-INVAS EAR/PLS OXIMETRY MLT: CPT

## 2023-07-24 PROCEDURE — 1200000000 HC SEMI PRIVATE

## 2023-07-24 PROCEDURE — 80048 BASIC METABOLIC PNL TOTAL CA: CPT

## 2023-07-24 PROCEDURE — 6360000002 HC RX W HCPCS: Performed by: INTERNAL MEDICINE

## 2023-07-24 PROCEDURE — 94660 CPAP INITIATION&MGMT: CPT

## 2023-07-24 PROCEDURE — 97530 THERAPEUTIC ACTIVITIES: CPT

## 2023-07-24 PROCEDURE — 97116 GAIT TRAINING THERAPY: CPT

## 2023-07-24 PROCEDURE — 71250 CT THORAX DX C-: CPT

## 2023-07-24 RX ADMIN — GABAPENTIN 100 MG: 100 CAPSULE ORAL at 08:36

## 2023-07-24 RX ADMIN — LISINOPRIL 5 MG: 5 TABLET ORAL at 20:43

## 2023-07-24 RX ADMIN — FUROSEMIDE 20 MG: 10 INJECTION, SOLUTION INTRAMUSCULAR; INTRAVENOUS at 08:36

## 2023-07-24 RX ADMIN — APIXABAN 5 MG: 5 TABLET, FILM COATED ORAL at 08:36

## 2023-07-24 RX ADMIN — ATORVASTATIN CALCIUM 10 MG: 10 TABLET, FILM COATED ORAL at 08:36

## 2023-07-24 RX ADMIN — ACETAMINOPHEN 1000 MG: 500 TABLET ORAL at 08:42

## 2023-07-24 RX ADMIN — OXYCODONE HYDROCHLORIDE 10 MG: 10 TABLET ORAL at 01:18

## 2023-07-24 RX ADMIN — ACETAMINOPHEN 1000 MG: 500 TABLET ORAL at 13:38

## 2023-07-24 RX ADMIN — GABAPENTIN 100 MG: 100 CAPSULE ORAL at 13:39

## 2023-07-24 RX ADMIN — LEVOTHYROXINE SODIUM 25 MCG: 0.03 TABLET ORAL at 08:42

## 2023-07-24 RX ADMIN — ACETAMINOPHEN 1000 MG: 500 TABLET ORAL at 18:10

## 2023-07-24 RX ADMIN — METOPROLOL TARTRATE 37.5 MG: 25 TABLET, FILM COATED ORAL at 08:36

## 2023-07-24 RX ADMIN — ALLOPURINOL 300 MG: 300 TABLET ORAL at 20:43

## 2023-07-24 RX ADMIN — FUROSEMIDE 20 MG: 10 INJECTION, SOLUTION INTRAMUSCULAR; INTRAVENOUS at 17:53

## 2023-07-24 RX ADMIN — OXYCODONE HYDROCHLORIDE 10 MG: 10 TABLET ORAL at 09:19

## 2023-07-24 RX ADMIN — GABAPENTIN 100 MG: 100 CAPSULE ORAL at 20:43

## 2023-07-24 RX ADMIN — METOPROLOL TARTRATE 37.5 MG: 25 TABLET, FILM COATED ORAL at 20:43

## 2023-07-24 RX ADMIN — OXYCODONE HYDROCHLORIDE 10 MG: 10 TABLET ORAL at 20:43

## 2023-07-24 RX ADMIN — ALLOPURINOL 300 MG: 300 TABLET ORAL at 08:36

## 2023-07-24 RX ADMIN — APIXABAN 5 MG: 5 TABLET, FILM COATED ORAL at 20:43

## 2023-07-24 ASSESSMENT — PAIN DESCRIPTION - PAIN TYPE: TYPE: SURGICAL PAIN

## 2023-07-24 ASSESSMENT — PAIN SCALES - GENERAL
PAINLEVEL_OUTOF10: 7
PAINLEVEL_OUTOF10: 8
PAINLEVEL_OUTOF10: 7
PAINLEVEL_OUTOF10: 8
PAINLEVEL_OUTOF10: 7
PAINLEVEL_OUTOF10: 8
PAINLEVEL_OUTOF10: 4
PAINLEVEL_OUTOF10: 6
PAINLEVEL_OUTOF10: 5

## 2023-07-24 ASSESSMENT — PAIN DESCRIPTION - LOCATION
LOCATION: LEG
LOCATION: HIP

## 2023-07-24 ASSESSMENT — PAIN DESCRIPTION - ORIENTATION
ORIENTATION: LEFT

## 2023-07-24 ASSESSMENT — PAIN DESCRIPTION - DESCRIPTORS
DESCRIPTORS: STABBING
DESCRIPTORS: ACHING;STABBING
DESCRIPTORS: THROBBING
DESCRIPTORS: ACHING;BURNING;STABBING

## 2023-07-24 NOTE — CARE COORDINATION
Writer placed call back to [facility] at [facility] AND [facility] regarding authorization. Confirmed writer will start authorization through [employer]. Electronically signed by ANDREW Roger on 7/24/2023 at 11:04 AM    Writer submitted authorization via [employer]. Lg Wyatt ID: [id number]  HENS started. Electronically signed by ANDREW Roger on 7/24/2023 at 1:07 PM    Writer received approval through [employer]  Pt is approved 07/25/2023-07/27/2023. Writer placed call to [facility] regarding authorization approval.  No answer, voicemail left. Electronically signed by ANDREW Roger on 7/24/2023 at 2:10 PM    Writer received call back from [facility] regarding approval.  [facility] requested writer to fax approval letter to [phone]. Approval letter faxed.   Electronically signed by ANDREW Roger on 7/24/2023 at 2:24 PM

## 2023-07-25 PROCEDURE — 94761 N-INVAS EAR/PLS OXIMETRY MLT: CPT

## 2023-07-25 PROCEDURE — 97530 THERAPEUTIC ACTIVITIES: CPT

## 2023-07-25 PROCEDURE — 6370000000 HC RX 637 (ALT 250 FOR IP): Performed by: INTERNAL MEDICINE

## 2023-07-25 PROCEDURE — 1200000000 HC SEMI PRIVATE

## 2023-07-25 PROCEDURE — 2700000000 HC OXYGEN THERAPY PER DAY

## 2023-07-25 PROCEDURE — 99232 SBSQ HOSP IP/OBS MODERATE 35: CPT | Performed by: INTERNAL MEDICINE

## 2023-07-25 PROCEDURE — 94660 CPAP INITIATION&MGMT: CPT

## 2023-07-25 PROCEDURE — 6370000000 HC RX 637 (ALT 250 FOR IP)

## 2023-07-25 PROCEDURE — 6370000000 HC RX 637 (ALT 250 FOR IP): Performed by: ORTHOPAEDIC SURGERY

## 2023-07-25 PROCEDURE — 6360000002 HC RX W HCPCS: Performed by: INTERNAL MEDICINE

## 2023-07-25 RX ORDER — CYCLOBENZAPRINE HCL 10 MG
10 TABLET ORAL 3 TIMES DAILY PRN
Qty: 30 TABLET | Refills: 0 | Status: ON HOLD | OUTPATIENT
Start: 2023-07-25 | End: 2023-08-04

## 2023-07-25 RX ORDER — OXYCODONE HYDROCHLORIDE 5 MG/1
5 TABLET ORAL EVERY 4 HOURS PRN
Qty: 42 TABLET | Refills: 0 | Status: ON HOLD | OUTPATIENT
Start: 2023-07-25 | End: 2023-08-01

## 2023-07-25 RX ADMIN — OXYCODONE HYDROCHLORIDE 10 MG: 10 TABLET ORAL at 00:51

## 2023-07-25 RX ADMIN — ATORVASTATIN CALCIUM 10 MG: 10 TABLET, FILM COATED ORAL at 08:51

## 2023-07-25 RX ADMIN — OXYCODONE HYDROCHLORIDE 10 MG: 10 TABLET ORAL at 23:59

## 2023-07-25 RX ADMIN — APIXABAN 5 MG: 5 TABLET, FILM COATED ORAL at 21:30

## 2023-07-25 RX ADMIN — ALLOPURINOL 300 MG: 300 TABLET ORAL at 08:47

## 2023-07-25 RX ADMIN — POLYETHYLENE GLYCOL 3350 17 G: 17 POWDER, FOR SOLUTION ORAL at 08:52

## 2023-07-25 RX ADMIN — ALLOPURINOL 300 MG: 300 TABLET ORAL at 21:30

## 2023-07-25 RX ADMIN — GABAPENTIN 100 MG: 100 CAPSULE ORAL at 14:18

## 2023-07-25 RX ADMIN — CYCLOBENZAPRINE 10 MG: 10 TABLET, FILM COATED ORAL at 05:39

## 2023-07-25 RX ADMIN — ACETAMINOPHEN 1000 MG: 500 TABLET ORAL at 00:51

## 2023-07-25 RX ADMIN — FUROSEMIDE 20 MG: 10 INJECTION, SOLUTION INTRAMUSCULAR; INTRAVENOUS at 17:51

## 2023-07-25 RX ADMIN — FUROSEMIDE 20 MG: 10 INJECTION, SOLUTION INTRAMUSCULAR; INTRAVENOUS at 08:51

## 2023-07-25 RX ADMIN — ACETAMINOPHEN 1000 MG: 500 TABLET ORAL at 14:17

## 2023-07-25 RX ADMIN — GABAPENTIN 100 MG: 100 CAPSULE ORAL at 21:31

## 2023-07-25 RX ADMIN — OXYCODONE HYDROCHLORIDE 5 MG: 5 TABLET ORAL at 05:39

## 2023-07-25 RX ADMIN — LISINOPRIL 5 MG: 5 TABLET ORAL at 21:31

## 2023-07-25 RX ADMIN — METOPROLOL TARTRATE 37.5 MG: 25 TABLET, FILM COATED ORAL at 21:30

## 2023-07-25 RX ADMIN — METOPROLOL TARTRATE 37.5 MG: 25 TABLET, FILM COATED ORAL at 08:48

## 2023-07-25 RX ADMIN — LEVOTHYROXINE SODIUM 25 MCG: 0.03 TABLET ORAL at 05:28

## 2023-07-25 RX ADMIN — ACETAMINOPHEN 1000 MG: 500 TABLET ORAL at 23:59

## 2023-07-25 RX ADMIN — APIXABAN 5 MG: 5 TABLET, FILM COATED ORAL at 08:51

## 2023-07-25 RX ADMIN — CYCLOBENZAPRINE 10 MG: 10 TABLET, FILM COATED ORAL at 21:31

## 2023-07-25 RX ADMIN — OXYCODONE HYDROCHLORIDE 5 MG: 5 TABLET ORAL at 10:21

## 2023-07-25 RX ADMIN — ACETAMINOPHEN 1000 MG: 500 TABLET ORAL at 17:50

## 2023-07-25 RX ADMIN — GABAPENTIN 100 MG: 100 CAPSULE ORAL at 08:51

## 2023-07-25 RX ADMIN — OXYCODONE HYDROCHLORIDE 5 MG: 5 TABLET ORAL at 19:52

## 2023-07-25 ASSESSMENT — PAIN SCALES - GENERAL
PAINLEVEL_OUTOF10: 5
PAINLEVEL_OUTOF10: 0
PAINLEVEL_OUTOF10: 8
PAINLEVEL_OUTOF10: 10
PAINLEVEL_OUTOF10: 4
PAINLEVEL_OUTOF10: 6
PAINLEVEL_OUTOF10: 5
PAINLEVEL_OUTOF10: 1
PAINLEVEL_OUTOF10: 3

## 2023-07-25 ASSESSMENT — PAIN DESCRIPTION - LOCATION
LOCATION: LEG
LOCATION: HIP;LEG
LOCATION: KNEE;HIP

## 2023-07-25 ASSESSMENT — PAIN DESCRIPTION - ORIENTATION
ORIENTATION: LEFT

## 2023-07-25 ASSESSMENT — PAIN DESCRIPTION - DESCRIPTORS
DESCRIPTORS: THROBBING
DESCRIPTORS: THROBBING;SORE
DESCRIPTORS: THROBBING
DESCRIPTORS: CRAMPING;ACHING
DESCRIPTORS: CRAMPING;ACHING
DESCRIPTORS: PRESSURE
DESCRIPTORS: ACHING;THROBBING

## 2023-07-25 ASSESSMENT — PAIN SCALES - WONG BAKER
WONGBAKER_NUMERICALRESPONSE: 0
WONGBAKER_NUMERICALRESPONSE: 2

## 2023-07-25 NOTE — CARE COORDINATION
Writer obtained e-mail from [facility] showing approval for SNF from 7/25/23 - 7/27/23 - Auth # [id number].     Electronically signed by Linda Tate RN on 7/25/2023 at 9:23 AM

## 2023-07-25 NOTE — CARE COORDINATION
Writer placed call to bedside RN Ludin Rose regarding d/c status and authorization approval to confirm if writer could set up transport. Ludin Rose states she has not heard anything about d/c yet. Electronically signed by ANDREW Camacho on 7/25/2023 at 9:30 AM    Writer placed call back to Ludin Rose regarding pt status and d/c. No new update.   Electronically signed by ANDREW Camacho on 7/25/2023 at 2:10 PM

## 2023-07-25 NOTE — CARE COORDINATION
Writer placed call to [place] at [facility] to verify they received the insurance approval letter that was faxed yesterday. Odessa states it was received.   Electronically signed by ANDREW Membreno on 7/25/2023 at 8:35 AM

## 2023-07-25 NOTE — CARE COORDINATION
Writer advised Spouse very upset that Patient is going to [facility]. Writer went in to patients room and spoke to both patient and Spouse. Spouse says she  gave Lance Mauricio 3 choices, [facility], [facility] and [facility]. Spouse advised that the 3 choices that she provided us was [facility], [facility] and [facility]. She says that s not true, she would never suggest [facility]. Explained that [facility] had no beds available, [facility] never responded to the referral and [facility] accepted Nkechi Farooq and insurance approved it. Spouse said she will let Nkechi Farooq go to [facility] , but will not be going up to see him everyday because its too far for her to travel. Writer asked then way did you pick [facility] if that were the case. Spouse was upset with writer and said, FINE, Just send him to [facility] and we will deal with it. Writer explained that if Nkechi Farooq is not happy with [facility] they can request a transfer to [facility] if they are able to accept. and get approved. Nkechi Farooq said Sunita is fine, but not happy that spouse will not visit daily. Spouse again said dont worry about him going to [facility], he needs to move along to get his therapy. Writer again asked if she was sure, spouse said do we have a choice, he is leaving tomorrow at 8:00a.m. to get his therapy started and ended conversation and started opening patients dinner tray to set up his dinner.     Electronically signed by Reji Cárdenas RN on 7/25/2023 at 5:10 PM

## 2023-07-25 NOTE — CARE COORDINATION
Transportation has been set up for [facility] for Wednesday 7/26/23 at 8:00a.m. No transportation available tonight until 11:00p. m.and that is too late for SNF to accept. Will need RN to call report to [phone]. [facility] notified of  tomorrow for tomorrow morning.     Electronically signed by Renée Ricketts RN on 7/25/2023 at 4:21 PM

## 2023-07-25 NOTE — PLAN OF CARE
Problem: Discharge Planning  Goal: Discharge to home or other facility with appropriate resources  7/25/2023 1426 by Andrew Phillips RN  Outcome: Progressing     Problem: Safety - Adult  Goal: Free from fall injury  7/25/2023 1426 by Andrew Phillips RN  Outcome: Progressing     Problem: Pain  Goal: Verbalizes/displays adequate comfort level or baseline comfort level  7/25/2023 1426 by Andrew Phillips RN  Outcome: Progressing     Problem: Skin/Tissue Integrity  Goal: Absence of new skin breakdown  Description: 1. Monitor for areas of redness and/or skin breakdown  2. Assess vascular access sites hourly  3. Every 4-6 hours minimum:  Change oxygen saturation probe site  4. Every 4-6 hours:  If on nasal continuous positive airway pressure, respiratory therapy assess nares and determine need for appliance change or resting period.   7/25/2023 1426 by Andrew Phillips RN  Outcome: Progressing     Problem: ABCDS Injury Assessment  Goal: Absence of physical injury  7/25/2023 1426 by Andrew Phillips RN  Outcome: Progressing     Problem: Chronic Conditions and Co-morbidities  Goal: Patient's chronic conditions and co-morbidity symptoms are monitored and maintained or improved  7/25/2023 1426 by Andrew Phillips RN  Outcome: Progressing

## 2023-07-26 VITALS
RESPIRATION RATE: 18 BRPM | WEIGHT: 315 LBS | HEIGHT: 71 IN | OXYGEN SATURATION: 100 % | HEART RATE: 52 BPM | SYSTOLIC BLOOD PRESSURE: 132 MMHG | BODY MASS INDEX: 44.1 KG/M2 | DIASTOLIC BLOOD PRESSURE: 72 MMHG | TEMPERATURE: 98.6 F

## 2023-07-26 PROCEDURE — 94660 CPAP INITIATION&MGMT: CPT

## 2023-07-26 PROCEDURE — 94761 N-INVAS EAR/PLS OXIMETRY MLT: CPT

## 2023-07-26 PROCEDURE — 2700000000 HC OXYGEN THERAPY PER DAY

## 2023-07-26 PROCEDURE — 6370000000 HC RX 637 (ALT 250 FOR IP): Performed by: ORTHOPAEDIC SURGERY

## 2023-07-26 RX ADMIN — OXYCODONE HYDROCHLORIDE 10 MG: 10 TABLET ORAL at 08:19

## 2023-07-26 RX ADMIN — ACETAMINOPHEN 1000 MG: 500 TABLET ORAL at 06:39

## 2023-07-26 RX ADMIN — LEVOTHYROXINE SODIUM 25 MCG: 0.03 TABLET ORAL at 06:39

## 2023-07-26 ASSESSMENT — PAIN SCALES - GENERAL
PAINLEVEL_OUTOF10: 5
PAINLEVEL_OUTOF10: 10

## 2023-07-26 ASSESSMENT — PAIN DESCRIPTION - LOCATION: LOCATION: LEG

## 2023-07-26 ASSESSMENT — PAIN DESCRIPTION - DESCRIPTORS: DESCRIPTORS: THROBBING

## 2023-07-26 ASSESSMENT — PAIN DESCRIPTION - ORIENTATION: ORIENTATION: LEFT

## 2023-07-26 NOTE — PLAN OF CARE
Problem: Discharge Planning  Goal: Discharge to home or other facility with appropriate resources  7/26/2023 0345 by Jorge Miranda RN  Outcome: Progressing  7/25/2023 1426 by Angeli Flowers RN  Outcome: Progressing     Problem: Safety - Adult  Goal: Free from fall injury  7/26/2023 0345 by Jorge Miranda RN  Outcome: Progressing  7/25/2023 1426 by Angeli Flowers RN  Outcome: Progressing     Problem: Pain  Goal: Verbalizes/displays adequate comfort level or baseline comfort level  7/26/2023 0345 by Jorge Miranda RN  Outcome: Progressing  7/25/2023 1426 by Angeli Flowers RN  Outcome: Progressing     Problem: Skin/Tissue Integrity  Goal: Absence of new skin breakdown  Description: 1. Monitor for areas of redness and/or skin breakdown  2. Assess vascular access sites hourly  3. Every 4-6 hours minimum:  Change oxygen saturation probe site  4. Every 4-6 hours:  If on nasal continuous positive airway pressure, respiratory therapy assess nares and determine need for appliance change or resting period.   7/26/2023 0345 by Jorge Miranda RN  Outcome: Progressing  7/25/2023 1426 by Angeli Flowers RN  Outcome: Progressing     Problem: ABCDS Injury Assessment  Goal: Absence of physical injury  7/26/2023 0345 by Jorge Miranda RN  Outcome: Progressing  7/25/2023 1426 by Angeli Flowers RN  Outcome: Progressing     Problem: Chronic Conditions and Co-morbidities  Goal: Patient's chronic conditions and co-morbidity symptoms are monitored and maintained or improved  7/26/2023 0345 by Jorge Miranda RN  Outcome: Progressing  7/25/2023 1426 by Angeli Flowers RN  Outcome: Progressing

## 2023-07-26 NOTE — DISCHARGE SUMMARY
Orthopaedic Discharge Summary     Patient ID:  Dannielle Santacruz  [id number]  13 y.o.  1945    Admit date: 7/19/2023    Discharge date and time: 7/26/2023  8:26 AM     Admitting Physician: Naya Low DO     Discharge Physician: same    Admission Diagnoses: Periprosthetic fracture of shaft of femur [G02. [address], [address]    Discharge Diagnoses: same    Admission Condition: good    Discharged Condition: good    Surgical procedure: Intramedullary nail fixation Left Femur  Fluoroscopic guided right hip injection    CONSULT SERVICES    [x] Internal Medicine    [] Neurosurgery    [] Urology     [] Trauma     [] Critical Care    [] GI    [] ID     [] Neurology    [] Vascular    [] General Surgery    [] Pain Management     [] None    [] Other: Cardiology    Disposition: [facility]    Patient Instructions:      Medication List        START taking these medications      ciclopirox 0.77 % cream  Commonly known as: LOPROX     cyclobenzaprine 10 MG tablet  Commonly known as: FLEXERIL  Take 1 tablet by mouth 3 times daily as needed for Muscle spasms     mesalamine 1.2 g EC tablet  Commonly known as: LIALDA  Take 1 tablet by mouth 3 times daily     metroNIDAZOLE 0.75 % cream  Commonly known as: METROCREAM     oxyCODONE 5 MG immediate release tablet  Commonly known as: ROXICODONE  Take 1 tablet by mouth every 4 hours as needed for Pain for up to 7 days.  Max Daily Amount: 30 mg     Slow-Mag 71.5-119 MG Tbec tablet  Generic drug: magnesium cl-calcium carbonate  Take 1 tablet by mouth daily            CONTINUE taking these medications      allopurinol 300 MG tablet  Commonly known as: ZYLOPRIM  TAKE 1 TABLET TWICE A DAY     apixaban 5 MG Tabs tablet  Commonly known as: ELIQUIS     atorvastatin 10 MG tablet  Commonly known as: LIPITOR  TAKE 1 TABLET DAILY     bisacodyl 5 MG EC tablet  Commonly known as: DULCOLAX     Cinnamon 500 MG Caps     CO Q 10 PO     empagliflozin 10 MG tablet  Commonly known as: JARDIANCE     furosemide 40 MG

## 2023-07-30 ENCOUNTER — APPOINTMENT (OUTPATIENT)
Dept: CT IMAGING | Age: 78
DRG: 177 | End: 2023-07-30
Payer: MEDICARE

## 2023-07-30 ENCOUNTER — HOSPITAL ENCOUNTER (INPATIENT)
Age: 78
LOS: 10 days | Discharge: NURSING, REHAB OR LONG-TERM CARE | DRG: 177 | End: 2023-08-09
Attending: SPECIALIST | Admitting: INTERNAL MEDICINE
Payer: MEDICARE

## 2023-07-30 DIAGNOSIS — M97.8XXA PERIPROSTHETIC FRACTURE OF SHAFT OF FEMUR: ICD-10-CM

## 2023-07-30 DIAGNOSIS — Z96.649 PERIPROSTHETIC FRACTURE OF SHAFT OF FEMUR: ICD-10-CM

## 2023-07-30 DIAGNOSIS — N30.00 ACUTE CYSTITIS WITHOUT HEMATURIA: ICD-10-CM

## 2023-07-30 DIAGNOSIS — U07.1 COVID-19: Primary | ICD-10-CM

## 2023-07-30 DIAGNOSIS — R09.02 HYPOXIA: ICD-10-CM

## 2023-07-30 LAB
ANION GAP SERPL CALCULATED.3IONS-SCNC: 12 MMOL/L (ref 9–17)
BACTERIA URNS QL MICRO: ABNORMAL
BASOPHILS # BLD: 0 K/UL (ref 0–0.2)
BASOPHILS NFR BLD: 1 % (ref 0–2)
BILIRUB UR QL STRIP: NEGATIVE
BNP SERPL-MCNC: 798 PG/ML
BUN SERPL-MCNC: 36 MG/DL (ref 8–23)
CALCIUM SERPL-MCNC: 8.8 MG/DL (ref 8.6–10.4)
CHLORIDE SERPL-SCNC: 95 MMOL/L (ref 98–107)
CLARITY UR: CLEAR
CO2 SERPL-SCNC: 31 MMOL/L (ref 20–31)
COLOR UR: YELLOW
CREAT SERPL-MCNC: 1.4 MG/DL (ref 0.7–1.2)
EOSINOPHIL # BLD: 0.2 K/UL (ref 0–0.4)
EOSINOPHILS RELATIVE PERCENT: 3 % (ref 1–4)
EPI CELLS #/AREA URNS HPF: ABNORMAL /HPF (ref 0–5)
ERYTHROCYTE [DISTWIDTH] IN BLOOD BY AUTOMATED COUNT: 19.4 % (ref 12.5–15.4)
GFR SERPL CREATININE-BSD FRML MDRD: 51 ML/MIN/1.73M2
GLUCOSE SERPL-MCNC: 149 MG/DL (ref 70–99)
GLUCOSE UR STRIP-MCNC: ABNORMAL MG/DL
HCO3 VENOUS: 34 MMOL/L (ref 22–29)
HCT VFR BLD AUTO: 39.3 % (ref 41–53)
HGB BLD-MCNC: 12.9 G/DL (ref 13.5–17.5)
HGB UR QL STRIP.AUTO: NEGATIVE
KETONES UR STRIP-MCNC: NEGATIVE MG/DL
LEUKOCYTE ESTERASE UR QL STRIP: ABNORMAL
LYMPHOCYTES NFR BLD: 1.1 K/UL (ref 1–4.8)
LYMPHOCYTES RELATIVE PERCENT: 20 % (ref 24–44)
MCH RBC QN AUTO: 31.1 PG (ref 26–34)
MCHC RBC AUTO-ENTMCNC: 32.8 G/DL (ref 31–37)
MCV RBC AUTO: 94.8 FL (ref 80–100)
MONOCYTES NFR BLD: 0.6 K/UL (ref 0.1–1.2)
MONOCYTES NFR BLD: 11 % (ref 2–11)
NEUTROPHILS NFR BLD: 65 % (ref 36–66)
NEUTS SEG NFR BLD: 3.6 K/UL (ref 1.8–7.7)
NITRITE UR QL STRIP: NEGATIVE
O2 SAT, VEN: 43.7 % (ref 60–85)
PCO2, VEN: 55.9 MM HG (ref 41–51)
PH UR STRIP: 6 [PH] (ref 5–8)
PH VENOUS: 7.39 (ref 7.32–7.43)
PLATELET # BLD AUTO: 259 K/UL (ref 140–450)
PMV BLD AUTO: 7.5 FL (ref 6–12)
PO2, VEN: 25.3 MM HG (ref 30–50)
POSITIVE BASE EXCESS, VEN: 7.1 MMOL/L (ref 0–3)
POTASSIUM SERPL-SCNC: 4.6 MMOL/L (ref 3.7–5.3)
PROT UR STRIP-MCNC: NEGATIVE MG/DL
RBC # BLD AUTO: 4.15 M/UL (ref 4.5–5.9)
RBC #/AREA URNS HPF: ABNORMAL /HPF (ref 0–2)
SODIUM SERPL-SCNC: 138 MMOL/L (ref 135–144)
SP GR UR STRIP: 1.01 (ref 1–1.03)
TROPONIN I SERPL HS-MCNC: 62 NG/L (ref 0–22)
TROPONIN I SERPL HS-MCNC: 62 NG/L (ref 0–22)
UROBILINOGEN UR STRIP-ACNC: NORMAL EU/DL (ref 0–1)
WBC #/AREA URNS HPF: ABNORMAL /HPF (ref 0–5)
WBC OTHER # BLD: 5.6 K/UL (ref 3.5–11)

## 2023-07-30 PROCEDURE — 6360000002 HC RX W HCPCS

## 2023-07-30 PROCEDURE — 6360000004 HC RX CONTRAST MEDICATION: Performed by: SPECIALIST

## 2023-07-30 PROCEDURE — 82803 BLOOD GASES ANY COMBINATION: CPT

## 2023-07-30 PROCEDURE — 87077 CULTURE AEROBIC IDENTIFY: CPT

## 2023-07-30 PROCEDURE — 84484 ASSAY OF TROPONIN QUANT: CPT

## 2023-07-30 PROCEDURE — 94640 AIRWAY INHALATION TREATMENT: CPT

## 2023-07-30 PROCEDURE — 80048 BASIC METABOLIC PNL TOTAL CA: CPT

## 2023-07-30 PROCEDURE — 85027 COMPLETE CBC AUTOMATED: CPT

## 2023-07-30 PROCEDURE — 6370000000 HC RX 637 (ALT 250 FOR IP): Performed by: NURSE PRACTITIONER

## 2023-07-30 PROCEDURE — 81001 URINALYSIS AUTO W/SCOPE: CPT

## 2023-07-30 PROCEDURE — 2700000000 HC OXYGEN THERAPY PER DAY

## 2023-07-30 PROCEDURE — 93005 ELECTROCARDIOGRAM TRACING: CPT

## 2023-07-30 PROCEDURE — 71260 CT THORAX DX C+: CPT

## 2023-07-30 PROCEDURE — 83880 ASSAY OF NATRIURETIC PEPTIDE: CPT

## 2023-07-30 PROCEDURE — 87186 SC STD MICRODIL/AGAR DIL: CPT

## 2023-07-30 PROCEDURE — 2580000003 HC RX 258: Performed by: SPECIALIST

## 2023-07-30 PROCEDURE — 36415 COLL VENOUS BLD VENIPUNCTURE: CPT

## 2023-07-30 PROCEDURE — 2580000003 HC RX 258

## 2023-07-30 PROCEDURE — 99285 EMERGENCY DEPT VISIT HI MDM: CPT

## 2023-07-30 PROCEDURE — 6360000002 HC RX W HCPCS: Performed by: NURSE PRACTITIONER

## 2023-07-30 PROCEDURE — 2060000000 HC ICU INTERMEDIATE R&B

## 2023-07-30 PROCEDURE — 6370000000 HC RX 637 (ALT 250 FOR IP): Performed by: INTERNAL MEDICINE

## 2023-07-30 PROCEDURE — 6370000000 HC RX 637 (ALT 250 FOR IP)

## 2023-07-30 PROCEDURE — 87086 URINE CULTURE/COLONY COUNT: CPT

## 2023-07-30 PROCEDURE — 2580000003 HC RX 258: Performed by: NURSE PRACTITIONER

## 2023-07-30 RX ORDER — PANTOPRAZOLE SODIUM 40 MG/1
40 TABLET, DELAYED RELEASE ORAL
Status: DISCONTINUED | OUTPATIENT
Start: 2023-07-31 | End: 2023-08-09 | Stop reason: HOSPADM

## 2023-07-30 RX ORDER — ALBUTEROL SULFATE 2.5 MG/3ML
2.5 SOLUTION RESPIRATORY (INHALATION)
Status: DISCONTINUED | OUTPATIENT
Start: 2023-07-30 | End: 2023-07-30

## 2023-07-30 RX ORDER — SODIUM CHLORIDE 0.9 % (FLUSH) 0.9 %
5-40 SYRINGE (ML) INJECTION EVERY 12 HOURS SCHEDULED
Status: DISCONTINUED | OUTPATIENT
Start: 2023-07-30 | End: 2023-08-09 | Stop reason: HOSPADM

## 2023-07-30 RX ORDER — LEVOTHYROXINE SODIUM 0.03 MG/1
25 TABLET ORAL DAILY
Status: DISCONTINUED | OUTPATIENT
Start: 2023-07-31 | End: 2023-08-09 | Stop reason: HOSPADM

## 2023-07-30 RX ORDER — SODIUM CHLORIDE 0.9 % (FLUSH) 0.9 %
5-40 SYRINGE (ML) INJECTION PRN
Status: DISCONTINUED | OUTPATIENT
Start: 2023-07-30 | End: 2023-08-09 | Stop reason: HOSPADM

## 2023-07-30 RX ORDER — OXYCODONE HYDROCHLORIDE 5 MG/1
10 TABLET ORAL EVERY 4 HOURS PRN
Status: DISCONTINUED | OUTPATIENT
Start: 2023-07-30 | End: 2023-08-02

## 2023-07-30 RX ORDER — IPRATROPIUM BROMIDE AND ALBUTEROL SULFATE 2.5; .5 MG/3ML; MG/3ML
1 SOLUTION RESPIRATORY (INHALATION)
Status: DISCONTINUED | OUTPATIENT
Start: 2023-07-31 | End: 2023-07-30

## 2023-07-30 RX ORDER — ONDANSETRON 4 MG/1
4 TABLET, ORALLY DISINTEGRATING ORAL EVERY 8 HOURS PRN
Status: DISCONTINUED | OUTPATIENT
Start: 2023-07-30 | End: 2023-08-09 | Stop reason: HOSPADM

## 2023-07-30 RX ORDER — ATORVASTATIN CALCIUM 10 MG/1
10 TABLET, FILM COATED ORAL DAILY
Status: DISCONTINUED | OUTPATIENT
Start: 2023-07-30 | End: 2023-08-09 | Stop reason: HOSPADM

## 2023-07-30 RX ORDER — SODIUM CHLORIDE 0.9 % (FLUSH) 0.9 %
10 SYRINGE (ML) INJECTION PRN
Status: DISCONTINUED | OUTPATIENT
Start: 2023-07-30 | End: 2023-08-09 | Stop reason: HOSPADM

## 2023-07-30 RX ORDER — ALBUTEROL SULFATE 2.5 MG/3ML
2.5 SOLUTION RESPIRATORY (INHALATION) EVERY 4 HOURS PRN
Status: DISCONTINUED | OUTPATIENT
Start: 2023-07-30 | End: 2023-08-09 | Stop reason: HOSPADM

## 2023-07-30 RX ORDER — LISINOPRIL 5 MG/1
5 TABLET ORAL NIGHTLY
Status: DISCONTINUED | OUTPATIENT
Start: 2023-07-30 | End: 2023-08-01

## 2023-07-30 RX ORDER — OXYCODONE HYDROCHLORIDE 5 MG/1
5 TABLET ORAL EVERY 4 HOURS PRN
Status: DISCONTINUED | OUTPATIENT
Start: 2023-07-30 | End: 2023-08-02

## 2023-07-30 RX ORDER — ACETAMINOPHEN 325 MG/1
650 TABLET ORAL EVERY 6 HOURS PRN
Status: DISCONTINUED | OUTPATIENT
Start: 2023-07-30 | End: 2023-08-09 | Stop reason: HOSPADM

## 2023-07-30 RX ORDER — ACETAMINOPHEN 650 MG/1
650 SUPPOSITORY RECTAL EVERY 6 HOURS PRN
Status: DISCONTINUED | OUTPATIENT
Start: 2023-07-30 | End: 2023-08-09 | Stop reason: HOSPADM

## 2023-07-30 RX ORDER — SODIUM CHLORIDE 9 MG/ML
INJECTION, SOLUTION INTRAVENOUS PRN
Status: DISCONTINUED | OUTPATIENT
Start: 2023-07-30 | End: 2023-08-09 | Stop reason: HOSPADM

## 2023-07-30 RX ORDER — LANOLIN ALCOHOL/MO/W.PET/CERES
10 CREAM (GRAM) TOPICAL NIGHTLY
Status: DISCONTINUED | OUTPATIENT
Start: 2023-07-30 | End: 2023-08-09 | Stop reason: HOSPADM

## 2023-07-30 RX ORDER — POLYETHYLENE GLYCOL 3350 17 G/17G
17 POWDER, FOR SOLUTION ORAL DAILY PRN
Status: DISCONTINUED | OUTPATIENT
Start: 2023-07-30 | End: 2023-08-01

## 2023-07-30 RX ORDER — IPRATROPIUM BROMIDE AND ALBUTEROL SULFATE 2.5; .5 MG/3ML; MG/3ML
1 SOLUTION RESPIRATORY (INHALATION) ONCE
Status: COMPLETED | OUTPATIENT
Start: 2023-07-30 | End: 2023-07-30

## 2023-07-30 RX ORDER — 0.9 % SODIUM CHLORIDE 0.9 %
80 INTRAVENOUS SOLUTION INTRAVENOUS ONCE
Status: DISCONTINUED | OUTPATIENT
Start: 2023-07-30 | End: 2023-08-09 | Stop reason: HOSPADM

## 2023-07-30 RX ORDER — IPRATROPIUM BROMIDE AND ALBUTEROL SULFATE 2.5; .5 MG/3ML; MG/3ML
1 SOLUTION RESPIRATORY (INHALATION)
Status: DISCONTINUED | OUTPATIENT
Start: 2023-07-30 | End: 2023-07-31

## 2023-07-30 RX ORDER — ONDANSETRON 2 MG/ML
4 INJECTION INTRAMUSCULAR; INTRAVENOUS EVERY 6 HOURS PRN
Status: DISCONTINUED | OUTPATIENT
Start: 2023-07-30 | End: 2023-08-09 | Stop reason: HOSPADM

## 2023-07-30 RX ORDER — DEXAMETHASONE SODIUM PHOSPHATE 10 MG/ML
6 INJECTION INTRAMUSCULAR; INTRAVENOUS EVERY 24 HOURS
Status: DISCONTINUED | OUTPATIENT
Start: 2023-07-30 | End: 2023-07-31

## 2023-07-30 RX ORDER — SODIUM CHLORIDE 9 MG/ML
INJECTION, SOLUTION INTRAVENOUS CONTINUOUS
Status: DISCONTINUED | OUTPATIENT
Start: 2023-07-30 | End: 2023-08-01

## 2023-07-30 RX ADMIN — ATORVASTATIN CALCIUM 10 MG: 10 TABLET, FILM COATED ORAL at 22:46

## 2023-07-30 RX ADMIN — DEXAMETHASONE SODIUM PHOSPHATE 6 MG: 10 INJECTION INTRAMUSCULAR; INTRAVENOUS at 22:47

## 2023-07-30 RX ADMIN — MELATONIN 10.5 MG: 3 TAB ORAL at 22:47

## 2023-07-30 RX ADMIN — SODIUM CHLORIDE, PRESERVATIVE FREE 10 ML: 5 INJECTION INTRAVENOUS at 22:29

## 2023-07-30 RX ADMIN — SODIUM CHLORIDE: 9 INJECTION, SOLUTION INTRAVENOUS at 23:06

## 2023-07-30 RX ADMIN — Medication 100 ML: at 18:56

## 2023-07-30 RX ADMIN — IPRATROPIUM BROMIDE AND ALBUTEROL SULFATE 1 DOSE: .5; 2.5 SOLUTION RESPIRATORY (INHALATION) at 16:19

## 2023-07-30 RX ADMIN — METOPROLOL TARTRATE 37.5 MG: 25 TABLET, FILM COATED ORAL at 22:46

## 2023-07-30 RX ADMIN — OXYCODONE HYDROCHLORIDE 5 MG: 5 TABLET ORAL at 22:47

## 2023-07-30 RX ADMIN — IPRATROPIUM BROMIDE AND ALBUTEROL SULFATE 1 DOSE: .5; 3 SOLUTION RESPIRATORY (INHALATION) at 22:09

## 2023-07-30 RX ADMIN — LISINOPRIL 5 MG: 5 TABLET ORAL at 22:46

## 2023-07-30 RX ADMIN — SODIUM CHLORIDE, PRESERVATIVE FREE 10 ML: 5 INJECTION INTRAVENOUS at 18:51

## 2023-07-30 RX ADMIN — CEFTRIAXONE SODIUM 1000 MG: 1 INJECTION, POWDER, FOR SOLUTION INTRAMUSCULAR; INTRAVENOUS at 20:57

## 2023-07-30 RX ADMIN — APIXABAN 5 MG: 5 TABLET, FILM COATED ORAL at 22:46

## 2023-07-30 RX ADMIN — IOPAMIDOL 75 ML: 755 INJECTION, SOLUTION INTRAVENOUS at 18:51

## 2023-07-30 ASSESSMENT — ENCOUNTER SYMPTOMS
BLOOD IN STOOL: 0
SHORTNESS OF BREATH: 1
DIARRHEA: 0
CHEST TIGHTNESS: 0
WHEEZING: 1
NAUSEA: 0
CONSTIPATION: 0
SORE THROAT: 0
VOMITING: 0
SINUS PRESSURE: 0
SINUS PAIN: 0
TROUBLE SWALLOWING: 0
BACK PAIN: 0
COUGH: 1
ABDOMINAL PAIN: 0
RHINORRHEA: 0
COLOR CHANGE: 1
VOICE CHANGE: 0

## 2023-07-30 ASSESSMENT — PAIN DESCRIPTION - LOCATION: LOCATION: LEG

## 2023-07-30 ASSESSMENT — PAIN DESCRIPTION - ORIENTATION: ORIENTATION: LEFT

## 2023-07-30 ASSESSMENT — PAIN SCALES - GENERAL
PAINLEVEL_OUTOF10: 7
PAINLEVEL_OUTOF10: 7

## 2023-07-30 NOTE — ED PROVIDER NOTES
[facility] Emergency Department  [id number] [facility] [address]  Phone: [phone]  Fax: [phone]        Pt Name: Gayla Sicard  MRN: [id number]  [address] [place] 1945  Date of evaluation: 7/30/23    CHIEFCOMPLAINT       No chief complaint on file. HISTORY OF PRESENT ILLNESS (Location/Symptom, Timing/Onset, Context/Setting, Quality, Duration, Modifying Factors, Severity)      Gayla Sicard is a 78 y.o. male with no pertinent PMH who presents to the ED via EMS from [facility] with complaint of shortness of breath, confusion intermittently observed by patient's wife this morning and positive COVID test today. The patient was admitted to [facility] on 7/2523 for periprosthetic fracture of the shaft of his right femur, was discharged on 7/26/2023 to [facility] for rehab. This morning the patient woke up about 5 AM and removed his CPAP due to feeling short of breath, according to [facility]'s nursing note the patient appeared dusky, was having visual hallucinations per the patient's wife. Patient had a COVID swab and chest x-ray performed, negative chest x-ray and positive COVID swab. Patient did receive albuterol treatment at the facility and the color returned to normal, was no longer wheezing and appeared to be more comfortable and less short of breath. Patient denies history of COPD or asthma, denies any cough or fever yesterday. Patient denies chest pain, nausea, vomiting, abdominal pain. Patient arrives alert and oriented x4, speaking in clear complete sentences without difficulty. Of note the patient's wife requested transfer to the emergency department for admission and a change in facilities that she is not happy with the care provided for the patient at [facility].     PAST MEDICAL / SURGICAL / SOCIAL / FAMILY HISTORY     PMH:  has a past medical history of Adenocarcinoma of prostate ([address]), Anemia, Cellulitis of lower extremity, Cerebral artery occlusion

## 2023-07-31 ENCOUNTER — APPOINTMENT (OUTPATIENT)
Dept: GENERAL RADIOLOGY | Age: 78
DRG: 177 | End: 2023-07-31
Payer: MEDICARE

## 2023-07-31 LAB
25(OH)D3 SERPL-MCNC: 39.3 NG/ML
ALBUMIN SERPL-MCNC: 3.1 G/DL (ref 3.5–5.2)
ALBUMIN/GLOB SERPL: 1 {RATIO} (ref 1–2.5)
ALP SERPL-CCNC: 110 U/L (ref 40–129)
ALT SERPL-CCNC: 45 U/L (ref 5–41)
ANION GAP SERPL CALCULATED.3IONS-SCNC: 11 MMOL/L (ref 9–17)
AST SERPL-CCNC: 43 U/L
BASOPHILS # BLD: 0 K/UL (ref 0–0.2)
BASOPHILS NFR BLD: 0 % (ref 0–2)
BILIRUB SERPL-MCNC: 0.9 MG/DL (ref 0.3–1.2)
BUN SERPL-MCNC: 33 MG/DL (ref 8–23)
CALCIUM SERPL-MCNC: 8.9 MG/DL (ref 8.6–10.4)
CHLORIDE SERPL-SCNC: 97 MMOL/L (ref 98–107)
CO2 SERPL-SCNC: 30 MMOL/L (ref 20–31)
CREAT SERPL-MCNC: 1.3 MG/DL (ref 0.7–1.2)
CRP SERPL HS-MCNC: 85.1 MG/L (ref 0–5)
D DIMER PPP FEU-MCNC: 2.56 UG/ML FEU
EOSINOPHIL # BLD: 0 K/UL (ref 0–0.4)
EOSINOPHILS RELATIVE PERCENT: 0 % (ref 1–4)
ERYTHROCYTE [DISTWIDTH] IN BLOOD BY AUTOMATED COUNT: 19 % (ref 12.5–15.4)
FERRITIN SERPL-MCNC: 476 NG/ML (ref 30–400)
FIBRINOGEN PPP-MCNC: 706 MG/DL (ref 203–521)
GFR SERPL CREATININE-BSD FRML MDRD: 56 ML/MIN/1.73M2
GLUCOSE SERPL-MCNC: 176 MG/DL (ref 70–99)
HCT VFR BLD AUTO: 41.5 % (ref 41–53)
HGB BLD-MCNC: 13.3 G/DL (ref 13.5–17.5)
INR PPP: 1
LACTATE BLDV-SCNC: 2.1 MMOL/L (ref 0.5–2.2)
LDH SERPL-CCNC: 295 U/L (ref 135–225)
LYMPHOCYTES NFR BLD: 0.5 K/UL (ref 1–4.8)
LYMPHOCYTES RELATIVE PERCENT: 9 % (ref 24–44)
MCH RBC QN AUTO: 30.5 PG (ref 26–34)
MCHC RBC AUTO-ENTMCNC: 32 G/DL (ref 31–37)
MCV RBC AUTO: 95.2 FL (ref 80–100)
MONOCYTES NFR BLD: 0.1 K/UL (ref 0.1–1.2)
MONOCYTES NFR BLD: 3 % (ref 2–11)
NEUTROPHILS NFR BLD: 88 % (ref 36–66)
NEUTS SEG NFR BLD: 4.4 K/UL (ref 1.8–7.7)
PARTIAL THROMBOPLASTIN TIME: 26.8 SEC (ref 21.3–31.3)
PLATELET # BLD AUTO: 286 K/UL (ref 140–450)
PMV BLD AUTO: 7.5 FL (ref 6–12)
POTASSIUM SERPL-SCNC: 4.5 MMOL/L (ref 3.7–5.3)
PROT SERPL-MCNC: 6.2 G/DL (ref 6.4–8.3)
PROTHROMBIN TIME: 10.6 SEC (ref 9.4–12.6)
RBC # BLD AUTO: 4.36 M/UL (ref 4.5–5.9)
SODIUM SERPL-SCNC: 138 MMOL/L (ref 135–144)
TROPONIN I SERPL HS-MCNC: 50 NG/L (ref 0–22)
WBC OTHER # BLD: 5 K/UL (ref 3.5–11)

## 2023-07-31 PROCEDURE — 6370000000 HC RX 637 (ALT 250 FOR IP): Performed by: NURSE PRACTITIONER

## 2023-07-31 PROCEDURE — 97166 OT EVAL MOD COMPLEX 45 MIN: CPT

## 2023-07-31 PROCEDURE — 85610 PROTHROMBIN TIME: CPT

## 2023-07-31 PROCEDURE — 6370000000 HC RX 637 (ALT 250 FOR IP): Performed by: STUDENT IN AN ORGANIZED HEALTH CARE EDUCATION/TRAINING PROGRAM

## 2023-07-31 PROCEDURE — 85379 FIBRIN DEGRADATION QUANT: CPT

## 2023-07-31 PROCEDURE — 97162 PT EVAL MOD COMPLEX 30 MIN: CPT

## 2023-07-31 PROCEDURE — 85025 COMPLETE CBC W/AUTO DIFF WBC: CPT

## 2023-07-31 PROCEDURE — 36415 COLL VENOUS BLD VENIPUNCTURE: CPT

## 2023-07-31 PROCEDURE — 6360000002 HC RX W HCPCS: Performed by: INTERNAL MEDICINE

## 2023-07-31 PROCEDURE — 97530 THERAPEUTIC ACTIVITIES: CPT

## 2023-07-31 PROCEDURE — 94761 N-INVAS EAR/PLS OXIMETRY MLT: CPT

## 2023-07-31 PROCEDURE — 99222 1ST HOSP IP/OBS MODERATE 55: CPT | Performed by: NURSE PRACTITIONER

## 2023-07-31 PROCEDURE — 94640 AIRWAY INHALATION TREATMENT: CPT

## 2023-07-31 PROCEDURE — 83615 LACTATE (LD) (LDH) ENZYME: CPT

## 2023-07-31 PROCEDURE — 97535 SELF CARE MNGMENT TRAINING: CPT

## 2023-07-31 PROCEDURE — 80053 COMPREHEN METABOLIC PANEL: CPT

## 2023-07-31 PROCEDURE — 6360000002 HC RX W HCPCS: Performed by: NURSE PRACTITIONER

## 2023-07-31 PROCEDURE — 71045 X-RAY EXAM CHEST 1 VIEW: CPT

## 2023-07-31 PROCEDURE — 84484 ASSAY OF TROPONIN QUANT: CPT

## 2023-07-31 PROCEDURE — 82728 ASSAY OF FERRITIN: CPT

## 2023-07-31 PROCEDURE — 2700000000 HC OXYGEN THERAPY PER DAY

## 2023-07-31 PROCEDURE — 86140 C-REACTIVE PROTEIN: CPT

## 2023-07-31 PROCEDURE — 83605 ASSAY OF LACTIC ACID: CPT

## 2023-07-31 PROCEDURE — 85730 THROMBOPLASTIN TIME PARTIAL: CPT

## 2023-07-31 PROCEDURE — 85384 FIBRINOGEN ACTIVITY: CPT

## 2023-07-31 PROCEDURE — 2060000000 HC ICU INTERMEDIATE R&B

## 2023-07-31 PROCEDURE — 82306 VITAMIN D 25 HYDROXY: CPT

## 2023-07-31 RX ORDER — DEXAMETHASONE 4 MG/1
10 TABLET ORAL DAILY
Status: DISCONTINUED | OUTPATIENT
Start: 2023-07-31 | End: 2023-08-01

## 2023-07-31 RX ORDER — IPRATROPIUM BROMIDE AND ALBUTEROL SULFATE 2.5; .5 MG/3ML; MG/3ML
1 SOLUTION RESPIRATORY (INHALATION)
Status: DISCONTINUED | OUTPATIENT
Start: 2023-07-31 | End: 2023-08-03

## 2023-07-31 RX ORDER — LEVOFLOXACIN 500 MG/1
500 TABLET, FILM COATED ORAL DAILY
Status: DISCONTINUED | OUTPATIENT
Start: 2023-07-31 | End: 2023-07-31

## 2023-07-31 RX ORDER — AMOXICILLIN AND CLAVULANATE POTASSIUM 500; 125 MG/1; MG/1
1 TABLET, FILM COATED ORAL EVERY 8 HOURS SCHEDULED
Status: DISCONTINUED | OUTPATIENT
Start: 2023-07-31 | End: 2023-08-01

## 2023-07-31 RX ORDER — AZITHROMYCIN 250 MG/1
500 TABLET, FILM COATED ORAL DAILY
Status: DISCONTINUED | OUTPATIENT
Start: 2023-07-31 | End: 2023-08-01

## 2023-07-31 RX ADMIN — APIXABAN 5 MG: 5 TABLET, FILM COATED ORAL at 10:46

## 2023-07-31 RX ADMIN — IPRATROPIUM BROMIDE AND ALBUTEROL SULFATE 1 DOSE: .5; 2.5 SOLUTION RESPIRATORY (INHALATION) at 19:56

## 2023-07-31 RX ADMIN — LISINOPRIL 5 MG: 5 TABLET ORAL at 20:47

## 2023-07-31 RX ADMIN — MELATONIN 10.5 MG: 3 TAB ORAL at 20:49

## 2023-07-31 RX ADMIN — DEXAMETHASONE 10 MG: 4 TABLET ORAL at 10:47

## 2023-07-31 RX ADMIN — APIXABAN 5 MG: 5 TABLET, FILM COATED ORAL at 20:48

## 2023-07-31 RX ADMIN — METOPROLOL TARTRATE 37.5 MG: 25 TABLET, FILM COATED ORAL at 10:46

## 2023-07-31 RX ADMIN — AMOXICILLIN AND CLAVULANATE POTASSIUM 1 TABLET: 500; 125 TABLET, FILM COATED ORAL at 13:44

## 2023-07-31 RX ADMIN — AZITHROMYCIN DIHYDRATE 500 MG: 250 TABLET ORAL at 10:46

## 2023-07-31 RX ADMIN — METOPROLOL TARTRATE 37.5 MG: 25 TABLET, FILM COATED ORAL at 20:47

## 2023-07-31 RX ADMIN — AMOXICILLIN AND CLAVULANATE POTASSIUM 1 TABLET: 500; 125 TABLET, FILM COATED ORAL at 20:48

## 2023-07-31 RX ADMIN — LEVOTHYROXINE SODIUM 25 MCG: 0.03 TABLET ORAL at 10:46

## 2023-07-31 RX ADMIN — AMOXICILLIN AND CLAVULANATE POTASSIUM 1 TABLET: 500; 125 TABLET, FILM COATED ORAL at 10:47

## 2023-07-31 RX ADMIN — ALBUTEROL SULFATE 2.5 MG: 2.5 SOLUTION RESPIRATORY (INHALATION) at 08:25

## 2023-07-31 RX ADMIN — ATORVASTATIN CALCIUM 10 MG: 10 TABLET, FILM COATED ORAL at 20:46

## 2023-07-31 RX ADMIN — PANTOPRAZOLE SODIUM 40 MG: 40 TABLET, DELAYED RELEASE ORAL at 06:45

## 2023-07-31 ASSESSMENT — ENCOUNTER SYMPTOMS
COUGH: 1
WHEEZING: 0
SINUS PAIN: 0
CONSTIPATION: 0
VOMITING: 0
SINUS PRESSURE: 0
ABDOMINAL PAIN: 0
NAUSEA: 0
SHORTNESS OF BREATH: 1
PHOTOPHOBIA: 0
DIARRHEA: 0

## 2023-07-31 NOTE — DISCHARGE INSTR - COC
10/21/2015    robotic    SHOULDER ARTHROPLASTY Bilateral     STUDY POSS ABLATION  04/02/2018         TONSILLECTOMY      TOTAL KNEE ARTHROPLASTY Bilateral LT-2003, RT-2006    TRANSESOPHAGEAL ECHOCARDIOGRAM  11/17/2017    TRANSESOPHAGEAL ECHOCARDIOGRAM  08/22/2019    TUMOR EXCISION      Throat/nose D/T benign tumor    UPPP UVULOPALATOPHARYGOPLASTY  90'S    VARICOSE VEIN SURGERY Bilateral 80's    x2       Immunization History:   Immunization History   Administered Date(s) Administered    COVID-19, MODERNA BLUE border, Primary or Immunocompromised, (age 12y+), IM, 100 mcg/0.5mL 02/25/2021, 03/25/2021, 12/21/2021    Influenza Vaccine, unspecified formulation 10/09/2015, 10/02/2018    Influenza Virus Vaccine 10/02/2018    Influenza, High Dose (Fluzone 65 yrs and older) 10/02/2017, 10/02/2019, 10/01/2020, 10/05/2021    Pneumococcal, PCV-13, PREVNAR 15, (age 6w+), IM, 0.5mL 10/13/2014    Pneumococcal, PPSV23, PNEUMOVAX 21, (age 2y+), SC/IM, 0.5mL 10/13/2015    TDaP, ADACEL (age 6y-58y), BOOSTRIX (age 10y+), IM, 0.5mL 04/01/2022       Active Problems:  Patient Active Problem List   Diagnosis Code    Hypertension I10    Chronic acquired lymphedema I89.0    Sleep apnea G47.30    History of CVA (cerebrovascular accident) Z86.73    Hyperlipidemia E78.5    Chronic diastolic congestive heart failure (HCC) I50.32    Osteoarthritis M19.90    Renal insufficiency N28.9    Anemia D64.9    Hyperglycemia R73.9    Acquired hypothyroidism E03.9    Arthritis of left hip M16.12    Lumbar radiculopathy M54.16    Spinal stenosis of lumbar region M48.061    Primary osteoarthritis of left hip M16.12    Class 3 obesity due to excess calories with serious comorbidity and body mass index (BMI) of 40.0 to 44.9 in adult [id number]    Osteoarthritis of right glenohumeral joint M19.011    Osteoarthritis of left glenohumeral joint M19.012    History of prostate cancer Z85.46    Atrial flutter (HCC) I48.92    Morbid obesity with BMI of 40.0-44.9, adult

## 2023-07-31 NOTE — CARE COORDINATION
Discharge planning    Patient is a readmission. Was at [facility] from 7/19-7/26 for left MERI. Was discharged to [facility]. He is returning due to sob. Will notify ss of the admission. Will need to follow up with wife. Per prior CM notes may want different facility . Will be a precert. Therapy is ordered . Admitted with COVID and hypoxia. Currently on 2 L.

## 2023-07-31 NOTE — H&P
Platelets [phone] 367 k/uL    MPV 7.5 6.0 - 12.0 fL    Neutrophils % 65 36 - 66 %    Lymphocytes % 20 (L) 24 - 44 %    Monocytes % 11 2 - 11 %    Eosinophils % 3 1 - 4 %    Basophils % 1 0 - 2 %    Neutrophils Absolute 3.60 1.8 - 7.7 k/uL    Lymphocytes Absolute 1.10 1.0 - 4.8 k/uL    Monocytes Absolute 0.60 0.1 - 1.2 k/uL    Eosinophils Absolute 0.20 0.0 - 0.4 k/uL    Basophils Absolute 0.00 0.0 - 0.2 k/uL   Troponin    Collection Time: 07/30/23  4:52 PM   Result Value Ref Range    Troponin, High Sensitivity 62 (HH) 0 - 22 ng/L   Brain Natriuretic Peptide    Collection Time: 07/30/23  4:52 PM   Result Value Ref Range    Pro- (H) <300 pg/mL   EKG 12 Lead    Collection Time: 07/30/23  5:53 PM   Result Value Ref Range    Ventricular Rate 81 BPM    Atrial Rate 81 BPM    P-R Interval 200 ms    QRS Duration 104 ms    Q-T Interval 454 ms    QTc Calculation (Bazett) 527 ms    P Axis 53 degrees    R Axis -33 degrees    T Axis 35 degrees   Troponin    Collection Time: 07/30/23  6:38 PM   Result Value Ref Range    Troponin, High Sensitivity 62 (HH) 0 - 22 ng/L   Urinalysis with Microscopic    Collection Time: 07/30/23  7:45 PM   Result Value Ref Range    Color, UA Yellow Yellow    Turbidity UA Clear Clear    Glucose, Ur 1+ (A) NEGATIVE mg/dL    Bilirubin Urine NEGATIVE NEGATIVE    Ketones, Urine NEGATIVE NEGATIVE mg/dL    Specific Gravity, UA 1.015 1.005 - 1.030    Urine Hgb NEGATIVE NEGATIVE    pH, UA 6.0 5.0 - 8.0    Protein, UA NEGATIVE NEGATIVE mg/dL    Urobilinogen, Urine Normal 0.0 - 1.0 EU/dL    Nitrite, Urine NEGATIVE NEGATIVE    Leukocyte Esterase, Urine TRACE (A) NEGATIVE    WBC, UA 5 TO 10 0 - 5 /HPF    RBC, UA 0 TO 2 0 - 2 /HPF    Epithelial Cells UA 2 TO 5 0 - 5 /HPF    Bacteria, UA FEW (A) None   Venous Blood Gas, POC    Collection Time: 07/30/23  8:50 PM   Result Value Ref Range    pH, Bernabe 7.391 7.320 - 7.430    pCO2, Bernabe 55.9 (H) 41.0 - 51.0 mm Hg    pO2, Bernabe 25.3 (L) 30.0 - 50.0 mm Hg    HCO3, Venous

## 2023-07-31 NOTE — CARE COORDINATION
Social work: Spouse is interested in [facility]. PMR consult requested. Referral faxed. Will need precert.

## 2023-07-31 NOTE — CARE COORDINATION
07/31/23 0733   Readmission Assessment   Number of Days since last admission? 1-7 days   Previous Disposition SNF   Who is being Interviewed Patient  (chart)   What was the patient's/caregiver's perception as to why they think they needed to return back to the hospital? Other (Comment)  (admitted due to sob/covid)   Did you visit your Primary Care Physician after you left the hospital, before you returned this time? No   Why weren't you able to visit your PCP? Did not have an appointment   Did you see a specialist, such as Cardiac, Pulmonary, Orthopedic Physician, etc. after you left the hospital? No   Who advised the patient to return to the hospital? Skilled Unit   Does the patient report anything that got in the way of taking their medications? No   In our efforts to provide the best possible care to you and others like you, can you think of anything that we could have done to help you after you left the hospital the first time, so that you might not have needed to return so soon?  Improved written discharge instructions

## 2023-08-01 PROBLEM — R09.02 HYPOXIA: Status: ACTIVE | Noted: 2023-08-01

## 2023-08-01 LAB
ALLEN TEST: POSITIVE
CRP SERPL HS-MCNC: 46.4 MG/L (ref 0–5)
D DIMER PPP FEU-MCNC: 2.09 UG/ML FEU
EKG ATRIAL RATE: 81 BPM
EKG P AXIS: 53 DEGREES
EKG P-R INTERVAL: 200 MS
EKG Q-T INTERVAL: 454 MS
EKG QRS DURATION: 104 MS
EKG QTC CALCULATION (BAZETT): 527 MS
EKG R AXIS: -33 DEGREES
EKG T AXIS: 35 DEGREES
EKG VENTRICULAR RATE: 81 BPM
FIO2: 28
LACTATE BLDV-SCNC: 1.7 MMOL/L (ref 0.5–2.2)
O2 DELIVERY DEVICE: ABNORMAL
POC HCO3: 29.4 MMOL/L (ref 21–28)
POC O2 SATURATION: 91.2 % (ref 94–98)
POC PCO2: 40.4 MM HG (ref 35–48)
POC PH: 7.47 (ref 7.35–7.45)
POC PO2: 57.6 MM HG (ref 83–108)
POSITIVE BASE EXCESS, ART: 5.2 MMOL/L (ref 0–3)
SAMPLE SITE: ABNORMAL
SARS-COV-2 RDRP RESP QL NAA+PROBE: DETECTED
SPECIMEN DESCRIPTION: ABNORMAL

## 2023-08-01 PROCEDURE — 36415 COLL VENOUS BLD VENIPUNCTURE: CPT

## 2023-08-01 PROCEDURE — 6370000000 HC RX 637 (ALT 250 FOR IP): Performed by: HOSPITALIST

## 2023-08-01 PROCEDURE — 6370000000 HC RX 637 (ALT 250 FOR IP): Performed by: NURSE PRACTITIONER

## 2023-08-01 PROCEDURE — 85379 FIBRIN DEGRADATION QUANT: CPT

## 2023-08-01 PROCEDURE — 86140 C-REACTIVE PROTEIN: CPT

## 2023-08-01 PROCEDURE — 82803 BLOOD GASES ANY COMBINATION: CPT

## 2023-08-01 PROCEDURE — 6370000000 HC RX 637 (ALT 250 FOR IP): Performed by: STUDENT IN AN ORGANIZED HEALTH CARE EDUCATION/TRAINING PROGRAM

## 2023-08-01 PROCEDURE — 2060000000 HC ICU INTERMEDIATE R&B

## 2023-08-01 PROCEDURE — 84145 PROCALCITONIN (PCT): CPT

## 2023-08-01 PROCEDURE — 2580000003 HC RX 258: Performed by: NURSE PRACTITIONER

## 2023-08-01 PROCEDURE — 99223 1ST HOSP IP/OBS HIGH 75: CPT | Performed by: INTERNAL MEDICINE

## 2023-08-01 PROCEDURE — 97110 THERAPEUTIC EXERCISES: CPT

## 2023-08-01 PROCEDURE — 99232 SBSQ HOSP IP/OBS MODERATE 35: CPT | Performed by: HOSPITALIST

## 2023-08-01 PROCEDURE — 6370000000 HC RX 637 (ALT 250 FOR IP): Performed by: INTERNAL MEDICINE

## 2023-08-01 PROCEDURE — 87635 SARS-COV-2 COVID-19 AMP PRB: CPT

## 2023-08-01 PROCEDURE — 83605 ASSAY OF LACTIC ACID: CPT

## 2023-08-01 PROCEDURE — 2700000000 HC OXYGEN THERAPY PER DAY

## 2023-08-01 PROCEDURE — 2580000003 HC RX 258: Performed by: HOSPITALIST

## 2023-08-01 PROCEDURE — 36600 WITHDRAWAL OF ARTERIAL BLOOD: CPT

## 2023-08-01 PROCEDURE — 94761 N-INVAS EAR/PLS OXIMETRY MLT: CPT

## 2023-08-01 PROCEDURE — 94760 N-INVAS EAR/PLS OXIMETRY 1: CPT

## 2023-08-01 PROCEDURE — 94640 AIRWAY INHALATION TREATMENT: CPT

## 2023-08-01 PROCEDURE — 6360000002 HC RX W HCPCS: Performed by: HOSPITALIST

## 2023-08-01 RX ORDER — DEXAMETHASONE SODIUM PHOSPHATE 10 MG/ML
6 INJECTION INTRAMUSCULAR; INTRAVENOUS DAILY
Status: DISCONTINUED | OUTPATIENT
Start: 2023-08-02 | End: 2023-08-09 | Stop reason: HOSPADM

## 2023-08-01 RX ORDER — DOCUSATE SODIUM 100 MG/1
100 CAPSULE, LIQUID FILLED ORAL 2 TIMES DAILY
Status: DISCONTINUED | OUTPATIENT
Start: 2023-08-01 | End: 2023-08-09 | Stop reason: HOSPADM

## 2023-08-01 RX ORDER — POLYETHYLENE GLYCOL 3350 17 G/17G
17 POWDER, FOR SOLUTION ORAL DAILY
Status: DISCONTINUED | OUTPATIENT
Start: 2023-08-01 | End: 2023-08-09 | Stop reason: HOSPADM

## 2023-08-01 RX ORDER — DEXAMETHASONE 4 MG/1
10 TABLET ORAL DAILY
Status: DISCONTINUED | OUTPATIENT
Start: 2023-08-01 | End: 2023-08-01

## 2023-08-01 RX ORDER — 0.9 % SODIUM CHLORIDE 0.9 %
500 INTRAVENOUS SOLUTION INTRAVENOUS ONCE
Status: COMPLETED | OUTPATIENT
Start: 2023-08-01 | End: 2023-08-01

## 2023-08-01 RX ORDER — MORPHINE SULFATE 4 MG/ML
4 INJECTION, SOLUTION INTRAMUSCULAR; INTRAVENOUS
Status: DISCONTINUED | OUTPATIENT
Start: 2023-08-01 | End: 2023-08-02

## 2023-08-01 RX ORDER — METOPROLOL TARTRATE 50 MG/1
50 TABLET, FILM COATED ORAL 2 TIMES DAILY
Status: DISCONTINUED | OUTPATIENT
Start: 2023-08-01 | End: 2023-08-09

## 2023-08-01 RX ORDER — MORPHINE SULFATE 2 MG/ML
2 INJECTION, SOLUTION INTRAMUSCULAR; INTRAVENOUS
Status: DISCONTINUED | OUTPATIENT
Start: 2023-08-01 | End: 2023-08-02

## 2023-08-01 RX ORDER — FUROSEMIDE 20 MG/1
40 TABLET ORAL DAILY
Status: DISCONTINUED | OUTPATIENT
Start: 2023-08-01 | End: 2023-08-09 | Stop reason: HOSPADM

## 2023-08-01 RX ADMIN — AZITHROMYCIN DIHYDRATE 500 MG: 250 TABLET ORAL at 09:38

## 2023-08-01 RX ADMIN — IPRATROPIUM BROMIDE AND ALBUTEROL SULFATE 1 DOSE: .5; 2.5 SOLUTION RESPIRATORY (INHALATION) at 08:55

## 2023-08-01 RX ADMIN — APIXABAN 2.5 MG: 2.5 TABLET, FILM COATED ORAL at 20:43

## 2023-08-01 RX ADMIN — DEXAMETHASONE 10 MG: 4 TABLET ORAL at 09:38

## 2023-08-01 RX ADMIN — FUROSEMIDE 40 MG: 20 TABLET ORAL at 11:42

## 2023-08-01 RX ADMIN — IPRATROPIUM BROMIDE AND ALBUTEROL SULFATE 1 DOSE: .5; 2.5 SOLUTION RESPIRATORY (INHALATION) at 20:25

## 2023-08-01 RX ADMIN — SODIUM CHLORIDE, PRESERVATIVE FREE 10 ML: 5 INJECTION INTRAVENOUS at 11:55

## 2023-08-01 RX ADMIN — LEVOTHYROXINE SODIUM 25 MCG: 0.03 TABLET ORAL at 09:38

## 2023-08-01 RX ADMIN — METOPROLOL TARTRATE 50 MG: 50 TABLET, FILM COATED ORAL at 20:48

## 2023-08-01 RX ADMIN — SODIUM CHLORIDE, PRESERVATIVE FREE 10 ML: 5 INJECTION INTRAVENOUS at 20:43

## 2023-08-01 RX ADMIN — MORPHINE SULFATE 2 MG: 2 INJECTION, SOLUTION INTRAMUSCULAR; INTRAVENOUS at 11:42

## 2023-08-01 RX ADMIN — PANTOPRAZOLE SODIUM 40 MG: 40 TABLET, DELAYED RELEASE ORAL at 06:15

## 2023-08-01 RX ADMIN — APIXABAN 5 MG: 5 TABLET, FILM COATED ORAL at 09:38

## 2023-08-01 RX ADMIN — METOPROLOL TARTRATE 37.5 MG: 25 TABLET, FILM COATED ORAL at 09:38

## 2023-08-01 RX ADMIN — ATORVASTATIN CALCIUM 10 MG: 10 TABLET, FILM COATED ORAL at 20:42

## 2023-08-01 RX ADMIN — OXYCODONE HYDROCHLORIDE 10 MG: 5 TABLET ORAL at 22:30

## 2023-08-01 RX ADMIN — DOCUSATE SODIUM 100 MG: 100 CAPSULE, LIQUID FILLED ORAL at 20:43

## 2023-08-01 RX ADMIN — SODIUM CHLORIDE 500 ML: 9 INJECTION, SOLUTION INTRAVENOUS at 17:04

## 2023-08-01 RX ADMIN — MELATONIN 10.5 MG: 3 TAB ORAL at 20:43

## 2023-08-01 RX ADMIN — AMOXICILLIN AND CLAVULANATE POTASSIUM 1 TABLET: 500; 125 TABLET, FILM COATED ORAL at 06:15

## 2023-08-01 ASSESSMENT — PAIN DESCRIPTION - DESCRIPTORS
DESCRIPTORS: ACHING;THROBBING
DESCRIPTORS: ACHING;SHARP

## 2023-08-01 ASSESSMENT — PAIN DESCRIPTION - ORIENTATION
ORIENTATION: LEFT
ORIENTATION: RIGHT

## 2023-08-01 ASSESSMENT — PAIN DESCRIPTION - LOCATION
LOCATION: CHEST
LOCATION: KNEE;INCISION

## 2023-08-01 ASSESSMENT — PAIN SCALES - GENERAL
PAINLEVEL_OUTOF10: 6
PAINLEVEL_OUTOF10: 3
PAINLEVEL_OUTOF10: 8

## 2023-08-01 ASSESSMENT — PAIN - FUNCTIONAL ASSESSMENT: PAIN_FUNCTIONAL_ASSESSMENT: ACTIVITIES ARE NOT PREVENTED

## 2023-08-01 NOTE — CARE COORDINATION
Social work: Spoke to [employer]. Mukesh Eduin, stating patient would have to be at least 10 days from positive covid dx before they could consider patient. Met with patient and spouse at bedside to discuss other options. She really wants him to have as much therapy as possible, so she is agreeable to referral to [address] [place] [address] and [facility]. If denied, snf choices would be [facility], [address] or [facility]. Referrals to all of the above.

## 2023-08-02 ENCOUNTER — APPOINTMENT (OUTPATIENT)
Dept: CT IMAGING | Age: 78
DRG: 177 | End: 2023-08-02
Payer: MEDICARE

## 2023-08-02 LAB
ALLEN TEST: POSITIVE
AMMONIA PLAS-SCNC: 11 UMOL/L (ref 16–60)
ANION GAP SERPL CALCULATED.3IONS-SCNC: 10 MMOL/L (ref 9–17)
BUN SERPL-MCNC: 33 MG/DL (ref 8–23)
CALCIUM SERPL-MCNC: 9.2 MG/DL (ref 8.6–10.4)
CHLORIDE SERPL-SCNC: 103 MMOL/L (ref 98–107)
CO2 SERPL-SCNC: 31 MMOL/L (ref 20–31)
CREAT SERPL-MCNC: 1.1 MG/DL (ref 0.7–1.2)
ERYTHROCYTE [DISTWIDTH] IN BLOOD BY AUTOMATED COUNT: 19.6 % (ref 12.5–15.4)
FIO2: 28
GFR SERPL CREATININE-BSD FRML MDRD: >60 ML/MIN/1.73M2
GLUCOSE SERPL-MCNC: 157 MG/DL (ref 70–99)
HCT VFR BLD AUTO: 42.7 % (ref 41–53)
HGB BLD-MCNC: 13.6 G/DL (ref 13.5–17.5)
MCH RBC QN AUTO: 30.6 PG (ref 26–34)
MCHC RBC AUTO-ENTMCNC: 31.9 G/DL (ref 31–37)
MCV RBC AUTO: 96.1 FL (ref 80–100)
METER GLUCOSE: 144 MG/DL (ref 75–110)
MICROORGANISM SPEC CULT: ABNORMAL
O2 DELIVERY DEVICE: ABNORMAL
PLATELET # BLD AUTO: 357 K/UL (ref 140–450)
PMV BLD AUTO: 7.3 FL (ref 6–12)
POC HCO3: 27.4 MMOL/L (ref 21–28)
POC O2 SATURATION: 95.3 % (ref 94–98)
POC PCO2: 36.9 MM HG (ref 35–48)
POC PH: 7.48 (ref 7.35–7.45)
POC PO2: 71.6 MM HG (ref 83–108)
POSITIVE BASE EXCESS, ART: 3.8 MMOL/L (ref 0–3)
POTASSIUM SERPL-SCNC: 4.7 MMOL/L (ref 3.7–5.3)
PROCALCITONIN SERPL-MCNC: 0.1 NG/ML
RBC # BLD AUTO: 4.44 M/UL (ref 4.5–5.9)
SAMPLE SITE: ABNORMAL
SODIUM SERPL-SCNC: 144 MMOL/L (ref 135–144)
SPECIMEN DESCRIPTION: ABNORMAL
WBC OTHER # BLD: 11.7 K/UL (ref 3.5–11)

## 2023-08-02 PROCEDURE — 2500000003 HC RX 250 WO HCPCS: Performed by: NURSE PRACTITIONER

## 2023-08-02 PROCEDURE — 93005 ELECTROCARDIOGRAM TRACING: CPT | Performed by: NURSE PRACTITIONER

## 2023-08-02 PROCEDURE — 6370000000 HC RX 637 (ALT 250 FOR IP): Performed by: HOSPITALIST

## 2023-08-02 PROCEDURE — 6360000002 HC RX W HCPCS: Performed by: INTERNAL MEDICINE

## 2023-08-02 PROCEDURE — 2580000003 HC RX 258: Performed by: NURSE PRACTITIONER

## 2023-08-02 PROCEDURE — 80048 BASIC METABOLIC PNL TOTAL CA: CPT

## 2023-08-02 PROCEDURE — 94640 AIRWAY INHALATION TREATMENT: CPT

## 2023-08-02 PROCEDURE — 99232 SBSQ HOSP IP/OBS MODERATE 35: CPT | Performed by: HOSPITALIST

## 2023-08-02 PROCEDURE — 82803 BLOOD GASES ANY COMBINATION: CPT

## 2023-08-02 PROCEDURE — 99223 1ST HOSP IP/OBS HIGH 75: CPT | Performed by: NURSE PRACTITIONER

## 2023-08-02 PROCEDURE — 36600 WITHDRAWAL OF ARTERIAL BLOOD: CPT

## 2023-08-02 PROCEDURE — 70450 CT HEAD/BRAIN W/O DYE: CPT

## 2023-08-02 PROCEDURE — 93005 ELECTROCARDIOGRAM TRACING: CPT | Performed by: INTERNAL MEDICINE

## 2023-08-02 PROCEDURE — 6360000002 HC RX W HCPCS: Performed by: HOSPITALIST

## 2023-08-02 PROCEDURE — 2000000000 HC ICU R&B

## 2023-08-02 PROCEDURE — 82947 ASSAY GLUCOSE BLOOD QUANT: CPT

## 2023-08-02 PROCEDURE — 6370000000 HC RX 637 (ALT 250 FOR IP): Performed by: NURSE PRACTITIONER

## 2023-08-02 PROCEDURE — 82140 ASSAY OF AMMONIA: CPT

## 2023-08-02 PROCEDURE — 6370000000 HC RX 637 (ALT 250 FOR IP): Performed by: STUDENT IN AN ORGANIZED HEALTH CARE EDUCATION/TRAINING PROGRAM

## 2023-08-02 PROCEDURE — 85027 COMPLETE CBC AUTOMATED: CPT

## 2023-08-02 PROCEDURE — 36415 COLL VENOUS BLD VENIPUNCTURE: CPT

## 2023-08-02 PROCEDURE — 2700000000 HC OXYGEN THERAPY PER DAY

## 2023-08-02 PROCEDURE — 94761 N-INVAS EAR/PLS OXIMETRY MLT: CPT

## 2023-08-02 PROCEDURE — 6370000000 HC RX 637 (ALT 250 FOR IP): Performed by: INTERNAL MEDICINE

## 2023-08-02 RX ORDER — ENOXAPARIN SODIUM 150 MG/ML
1 INJECTION SUBCUTANEOUS 2 TIMES DAILY
Status: DISCONTINUED | OUTPATIENT
Start: 2023-08-02 | End: 2023-08-03

## 2023-08-02 RX ORDER — METOPROLOL TARTRATE 5 MG/5ML
5 INJECTION INTRAVENOUS ONCE
Status: COMPLETED | OUTPATIENT
Start: 2023-08-02 | End: 2023-08-02

## 2023-08-02 RX ORDER — HALOPERIDOL 5 MG/ML
2 INJECTION INTRAMUSCULAR ONCE
Status: COMPLETED | OUTPATIENT
Start: 2023-08-02 | End: 2023-08-02

## 2023-08-02 RX ORDER — OXYCODONE HYDROCHLORIDE 5 MG/1
5 TABLET ORAL EVERY 4 HOURS PRN
Status: DISCONTINUED | OUTPATIENT
Start: 2023-08-02 | End: 2023-08-09 | Stop reason: HOSPADM

## 2023-08-02 RX ADMIN — SODIUM CHLORIDE, PRESERVATIVE FREE 10 ML: 5 INJECTION INTRAVENOUS at 21:43

## 2023-08-02 RX ADMIN — SODIUM CHLORIDE, PRESERVATIVE FREE 10 ML: 5 INJECTION INTRAVENOUS at 10:49

## 2023-08-02 RX ADMIN — DOCUSATE SODIUM 100 MG: 100 CAPSULE, LIQUID FILLED ORAL at 21:45

## 2023-08-02 RX ADMIN — NIRMATRELVIR AND RITONAVIR 3 TABLET: KIT at 21:43

## 2023-08-02 RX ADMIN — IPRATROPIUM BROMIDE AND ALBUTEROL SULFATE 1 DOSE: .5; 2.5 SOLUTION RESPIRATORY (INHALATION) at 20:18

## 2023-08-02 RX ADMIN — ENOXAPARIN SODIUM 135 MG: 150 INJECTION SUBCUTANEOUS at 14:00

## 2023-08-02 RX ADMIN — HALOPERIDOL LACTATE 2 MG: 5 INJECTION, SOLUTION INTRAMUSCULAR at 12:04

## 2023-08-02 RX ADMIN — ENOXAPARIN SODIUM 135 MG: 150 INJECTION SUBCUTANEOUS at 21:45

## 2023-08-02 RX ADMIN — METOPROLOL TARTRATE 50 MG: 50 TABLET, FILM COATED ORAL at 21:45

## 2023-08-02 RX ADMIN — NIRMATRELVIR AND RITONAVIR 3 TABLET: KIT at 06:44

## 2023-08-02 RX ADMIN — PANTOPRAZOLE SODIUM 40 MG: 40 TABLET, DELAYED RELEASE ORAL at 06:44

## 2023-08-02 RX ADMIN — MELATONIN 10.5 MG: 3 TAB ORAL at 21:45

## 2023-08-02 RX ADMIN — DEXAMETHASONE SODIUM PHOSPHATE 6 MG: 10 INJECTION INTRAMUSCULAR; INTRAVENOUS at 10:47

## 2023-08-02 RX ADMIN — ATORVASTATIN CALCIUM 10 MG: 10 TABLET, FILM COATED ORAL at 21:45

## 2023-08-02 RX ADMIN — IPRATROPIUM BROMIDE AND ALBUTEROL SULFATE 1 DOSE: .5; 2.5 SOLUTION RESPIRATORY (INHALATION) at 08:29

## 2023-08-02 RX ADMIN — METOPROLOL TARTRATE 5 MG: 5 INJECTION INTRAVENOUS at 23:07

## 2023-08-02 ASSESSMENT — PAIN SCALES - GENERAL: PAINLEVEL_OUTOF10: 0

## 2023-08-02 NOTE — CARE COORDINATION
Social work: [facility] can accept and will submit for precert. Spouse updated. She ideally would still like patient to go to [facility], if he is still in the hospital.  [facility] continues to follow.

## 2023-08-03 ENCOUNTER — APPOINTMENT (OUTPATIENT)
Age: 78
DRG: 177 | End: 2023-08-03
Payer: MEDICARE

## 2023-08-03 LAB
EKG ATRIAL RATE: 136 BPM
EKG ATRIAL RATE: 54 BPM
EKG P AXIS: 63 DEGREES
EKG P-R INTERVAL: 222 MS
EKG Q-T INTERVAL: 252 MS
EKG Q-T INTERVAL: 460 MS
EKG QRS DURATION: 102 MS
EKG QRS DURATION: 112 MS
EKG QTC CALCULATION (BAZETT): 396 MS
EKG QTC CALCULATION (BAZETT): 436 MS
EKG R AXIS: -20 DEGREES
EKG R AXIS: -26 DEGREES
EKG T AXIS: 169 DEGREES
EKG T AXIS: 64 DEGREES
EKG VENTRICULAR RATE: 149 BPM
EKG VENTRICULAR RATE: 54 BPM

## 2023-08-03 PROCEDURE — 6370000000 HC RX 637 (ALT 250 FOR IP): Performed by: NURSE PRACTITIONER

## 2023-08-03 PROCEDURE — 93306 TTE W/DOPPLER COMPLETE: CPT

## 2023-08-03 PROCEDURE — 6370000000 HC RX 637 (ALT 250 FOR IP): Performed by: INTERNAL MEDICINE

## 2023-08-03 PROCEDURE — 2500000003 HC RX 250 WO HCPCS: Performed by: INTERNAL MEDICINE

## 2023-08-03 PROCEDURE — 94640 AIRWAY INHALATION TREATMENT: CPT

## 2023-08-03 PROCEDURE — 97535 SELF CARE MNGMENT TRAINING: CPT

## 2023-08-03 PROCEDURE — 2580000003 HC RX 258: Performed by: NURSE PRACTITIONER

## 2023-08-03 PROCEDURE — 99232 SBSQ HOSP IP/OBS MODERATE 35: CPT | Performed by: HOSPITALIST

## 2023-08-03 PROCEDURE — 2700000000 HC OXYGEN THERAPY PER DAY

## 2023-08-03 PROCEDURE — 6360000002 HC RX W HCPCS: Performed by: HOSPITALIST

## 2023-08-03 PROCEDURE — 97530 THERAPEUTIC ACTIVITIES: CPT

## 2023-08-03 PROCEDURE — 2580000003 HC RX 258: Performed by: INTERNAL MEDICINE

## 2023-08-03 PROCEDURE — 97110 THERAPEUTIC EXERCISES: CPT

## 2023-08-03 PROCEDURE — 6370000000 HC RX 637 (ALT 250 FOR IP): Performed by: HOSPITALIST

## 2023-08-03 PROCEDURE — 99233 SBSQ HOSP IP/OBS HIGH 50: CPT | Performed by: NURSE PRACTITIONER

## 2023-08-03 PROCEDURE — 6360000002 HC RX W HCPCS: Performed by: NURSE PRACTITIONER

## 2023-08-03 PROCEDURE — 93306 TTE W/DOPPLER COMPLETE: CPT | Performed by: INTERNAL MEDICINE

## 2023-08-03 PROCEDURE — 2060000000 HC ICU INTERMEDIATE R&B

## 2023-08-03 RX ORDER — IPRATROPIUM BROMIDE AND ALBUTEROL SULFATE 2.5; .5 MG/3ML; MG/3ML
1 SOLUTION RESPIRATORY (INHALATION)
Status: DISCONTINUED | OUTPATIENT
Start: 2023-08-03 | End: 2023-08-08

## 2023-08-03 RX ORDER — DILTIAZEM HYDROCHLORIDE 5 MG/ML
10 INJECTION INTRAVENOUS ONCE
Status: COMPLETED | OUTPATIENT
Start: 2023-08-03 | End: 2023-08-03

## 2023-08-03 RX ORDER — IPRATROPIUM BROMIDE AND ALBUTEROL SULFATE 2.5; .5 MG/3ML; MG/3ML
1 SOLUTION RESPIRATORY (INHALATION)
Status: DISCONTINUED | OUTPATIENT
Start: 2023-08-03 | End: 2023-08-03

## 2023-08-03 RX ADMIN — POLYETHYLENE GLYCOL 3350 17 G: 17 POWDER, FOR SOLUTION ORAL at 08:03

## 2023-08-03 RX ADMIN — APIXABAN 2.5 MG: 2.5 TABLET, FILM COATED ORAL at 20:45

## 2023-08-03 RX ADMIN — SODIUM CHLORIDE, PRESERVATIVE FREE 10 ML: 5 INJECTION INTRAVENOUS at 20:46

## 2023-08-03 RX ADMIN — PANTOPRAZOLE SODIUM 40 MG: 40 TABLET, DELAYED RELEASE ORAL at 06:09

## 2023-08-03 RX ADMIN — METOPROLOL TARTRATE 50 MG: 50 TABLET, FILM COATED ORAL at 20:45

## 2023-08-03 RX ADMIN — DOCUSATE SODIUM 100 MG: 100 CAPSULE, LIQUID FILLED ORAL at 08:04

## 2023-08-03 RX ADMIN — MELATONIN 10.5 MG: 3 TAB ORAL at 20:45

## 2023-08-03 RX ADMIN — NIRMATRELVIR AND RITONAVIR 3 TABLET: KIT at 06:09

## 2023-08-03 RX ADMIN — LEVOTHYROXINE SODIUM 25 MCG: 0.03 TABLET ORAL at 08:04

## 2023-08-03 RX ADMIN — DOCUSATE SODIUM 100 MG: 100 CAPSULE, LIQUID FILLED ORAL at 20:45

## 2023-08-03 RX ADMIN — DILTIAZEM HYDROCHLORIDE 2.5 MG/HR: 5 INJECTION INTRAVENOUS at 01:03

## 2023-08-03 RX ADMIN — ENOXAPARIN SODIUM 135 MG: 150 INJECTION SUBCUTANEOUS at 08:03

## 2023-08-03 RX ADMIN — NIRMATRELVIR AND RITONAVIR 3 TABLET: KIT at 17:23

## 2023-08-03 RX ADMIN — METOPROLOL TARTRATE 50 MG: 50 TABLET, FILM COATED ORAL at 08:04

## 2023-08-03 RX ADMIN — ONDANSETRON 4 MG: 2 INJECTION INTRAMUSCULAR; INTRAVENOUS at 01:54

## 2023-08-03 RX ADMIN — ATORVASTATIN CALCIUM 10 MG: 10 TABLET, FILM COATED ORAL at 20:45

## 2023-08-03 RX ADMIN — DILTIAZEM HYDROCHLORIDE 10 MG: 5 INJECTION INTRAVENOUS at 01:03

## 2023-08-03 RX ADMIN — IPRATROPIUM BROMIDE AND ALBUTEROL SULFATE 1 DOSE: .5; 2.5 SOLUTION RESPIRATORY (INHALATION) at 08:31

## 2023-08-03 RX ADMIN — IPRATROPIUM BROMIDE AND ALBUTEROL SULFATE 1 DOSE: .5; 2.5 SOLUTION RESPIRATORY (INHALATION) at 20:18

## 2023-08-03 RX ADMIN — DEXAMETHASONE SODIUM PHOSPHATE 6 MG: 10 INJECTION INTRAMUSCULAR; INTRAVENOUS at 08:04

## 2023-08-03 RX ADMIN — FUROSEMIDE 40 MG: 20 TABLET ORAL at 08:04

## 2023-08-04 LAB
ANION GAP SERPL CALCULATED.3IONS-SCNC: 9 MMOL/L (ref 9–17)
BUN SERPL-MCNC: 32 MG/DL (ref 8–23)
CALCIUM SERPL-MCNC: 8.9 MG/DL (ref 8.6–10.4)
CHLORIDE SERPL-SCNC: 100 MMOL/L (ref 98–107)
CO2 SERPL-SCNC: 30 MMOL/L (ref 20–31)
CREAT SERPL-MCNC: 1.1 MG/DL (ref 0.7–1.2)
ECHO AO ROOT DIAM: 3.8 CM
ECHO AO ROOT INDEX: 1.51 CM/M2
ECHO AV AREA PEAK VELOCITY: 2.9 CM2
ECHO AV AREA/BSA PEAK VELOCITY: 1.2 CM2/M2
ECHO AV PEAK GRADIENT: 7 MMHG
ECHO AV PEAK VELOCITY: 1.3 M/S
ECHO AV VELOCITY RATIO: 0.62
ECHO BSA: 2.63 M2
ECHO EST RA PRESSURE: 5 MMHG
ECHO LA AREA 2C: 29.6 CM2
ECHO LA AREA 4C: 41.5 CM2
ECHO LA DIAMETER INDEX: 1.63 CM/M2
ECHO LA DIAMETER: 4.1 CM
ECHO LA MAJOR AXIS: 8.7 CM
ECHO LA MINOR AXIS: 7.3 CM
ECHO LA TO AORTIC ROOT RATIO: 1.08
ECHO LA VOL 2C: 98 ML (ref 18–58)
ECHO LA VOL 4C: 163 ML (ref 18–58)
ECHO LA VOL BP: 138 ML (ref 18–58)
ECHO LA VOL/BSA BIPLANE: 55 ML/M2 (ref 16–34)
ECHO LA VOLUME INDEX A2C: 39 ML/M2 (ref 16–34)
ECHO LA VOLUME INDEX A4C: 65 ML/M2 (ref 16–34)
ECHO LV EDV A2C: 72 ML
ECHO LV EDV A4C: 103 ML
ECHO LV EDV INDEX A4C: 41 ML/M2
ECHO LV EDV NDEX A2C: 29 ML/M2
ECHO LV EJECTION FRACTION A2C: 59 %
ECHO LV EJECTION FRACTION A4C: 52 %
ECHO LV EJECTION FRACTION BIPLANE: 56 % (ref 55–100)
ECHO LV ESV A2C: 29 ML
ECHO LV ESV A4C: 50 ML
ECHO LV ESV INDEX A2C: 12 ML/M2
ECHO LV ESV INDEX A4C: 20 ML/M2
ECHO LV FRACTIONAL SHORTENING: 29 % (ref 28–44)
ECHO LV INTERNAL DIMENSION DIASTOLE INDEX: 1.67 CM/M2
ECHO LV INTERNAL DIMENSION DIASTOLIC: 4.2 CM (ref 4.2–5.9)
ECHO LV INTERNAL DIMENSION SYSTOLIC INDEX: 1.19 CM/M2
ECHO LV INTERNAL DIMENSION SYSTOLIC: 3 CM
ECHO LV IVSD: 1.5 CM (ref 0.6–1)
ECHO LV MASS 2D: 236.7 G (ref 88–224)
ECHO LV MASS INDEX 2D: 93.9 G/M2 (ref 49–115)
ECHO LV POSTERIOR WALL DIASTOLIC: 1.4 CM (ref 0.6–1)
ECHO LV RELATIVE WALL THICKNESS RATIO: 0.67
ECHO LVOT AREA: 4.5 CM2
ECHO LVOT DIAM: 2.4 CM
ECHO LVOT PEAK GRADIENT: 3 MMHG
ECHO LVOT PEAK VELOCITY: 0.8 M/S
ECHO MV A VELOCITY: 0.4 M/S
ECHO MV E DECELERATION TIME (DT): 229 MS
ECHO MV E VELOCITY: 0.75 M/S
ECHO MV E/A RATIO: 1.88
ECHO MV REGURGITANT PEAK GRADIENT: 104 MMHG
ECHO MV REGURGITANT PEAK VELOCITY: 5.1 M/S
ECHO PV MAX VELOCITY: 1 M/S
ECHO PV PEAK GRADIENT: 4 MMHG
ECHO RA AREA 4C: 22.2 CM2
ECHO RIGHT VENTRICULAR SYSTOLIC PRESSURE (RVSP): 44 MMHG
ECHO RV FREE WALL PEAK S': 16 CM/S
ECHO RV INTERNAL DIMENSION: 4.1 CM
ECHO RV TAPSE: 2.6 CM (ref 1.7–?)
ECHO TV REGURGITANT MAX VELOCITY: 3.12 M/S
ECHO TV REGURGITANT PEAK GRADIENT: 39 MMHG
ERYTHROCYTE [DISTWIDTH] IN BLOOD BY AUTOMATED COUNT: 18.8 % (ref 12.5–15.4)
GFR SERPL CREATININE-BSD FRML MDRD: >60 ML/MIN/1.73M2
GLUCOSE SERPL-MCNC: 175 MG/DL (ref 70–99)
HCT VFR BLD AUTO: 39.1 % (ref 41–53)
HGB BLD-MCNC: 12.7 G/DL (ref 13.5–17.5)
MCH RBC QN AUTO: 31.2 PG (ref 26–34)
MCHC RBC AUTO-ENTMCNC: 32.5 G/DL (ref 31–37)
MCV RBC AUTO: 95.9 FL (ref 80–100)
PLATELET # BLD AUTO: 306 K/UL (ref 140–450)
PMV BLD AUTO: 7.5 FL (ref 6–12)
POTASSIUM SERPL-SCNC: 4.4 MMOL/L (ref 3.7–5.3)
PROCALCITONIN SERPL-MCNC: 0.07 NG/ML
RBC # BLD AUTO: 4.08 M/UL (ref 4.5–5.9)
SODIUM SERPL-SCNC: 139 MMOL/L (ref 135–144)
WBC OTHER # BLD: 6.2 K/UL (ref 3.5–11)

## 2023-08-04 PROCEDURE — 6360000002 HC RX W HCPCS: Performed by: INTERNAL MEDICINE

## 2023-08-04 PROCEDURE — 6360000002 HC RX W HCPCS: Performed by: NURSE PRACTITIONER

## 2023-08-04 PROCEDURE — 6370000000 HC RX 637 (ALT 250 FOR IP): Performed by: HOSPITALIST

## 2023-08-04 PROCEDURE — 2060000000 HC ICU INTERMEDIATE R&B

## 2023-08-04 PROCEDURE — 6370000000 HC RX 637 (ALT 250 FOR IP): Performed by: NURSE PRACTITIONER

## 2023-08-04 PROCEDURE — 99233 SBSQ HOSP IP/OBS HIGH 50: CPT | Performed by: NURSE PRACTITIONER

## 2023-08-04 PROCEDURE — 97530 THERAPEUTIC ACTIVITIES: CPT

## 2023-08-04 PROCEDURE — 99232 SBSQ HOSP IP/OBS MODERATE 35: CPT | Performed by: INTERNAL MEDICINE

## 2023-08-04 PROCEDURE — 2580000003 HC RX 258: Performed by: NURSE PRACTITIONER

## 2023-08-04 PROCEDURE — 94761 N-INVAS EAR/PLS OXIMETRY MLT: CPT

## 2023-08-04 PROCEDURE — 94640 AIRWAY INHALATION TREATMENT: CPT

## 2023-08-04 PROCEDURE — 84145 PROCALCITONIN (PCT): CPT

## 2023-08-04 PROCEDURE — 99232 SBSQ HOSP IP/OBS MODERATE 35: CPT | Performed by: HOSPITALIST

## 2023-08-04 PROCEDURE — 6370000000 HC RX 637 (ALT 250 FOR IP): Performed by: INTERNAL MEDICINE

## 2023-08-04 PROCEDURE — 85027 COMPLETE CBC AUTOMATED: CPT

## 2023-08-04 PROCEDURE — 6360000002 HC RX W HCPCS: Performed by: HOSPITALIST

## 2023-08-04 PROCEDURE — 97535 SELF CARE MNGMENT TRAINING: CPT

## 2023-08-04 PROCEDURE — 80048 BASIC METABOLIC PNL TOTAL CA: CPT

## 2023-08-04 PROCEDURE — 2700000000 HC OXYGEN THERAPY PER DAY

## 2023-08-04 PROCEDURE — 97116 GAIT TRAINING THERAPY: CPT

## 2023-08-04 PROCEDURE — 36415 COLL VENOUS BLD VENIPUNCTURE: CPT

## 2023-08-04 RX ORDER — OXYCODONE HYDROCHLORIDE 5 MG/1
5 TABLET ORAL EVERY 4 HOURS PRN
Qty: 6 TABLET | Refills: 0 | Status: SHIPPED | OUTPATIENT
Start: 2023-08-04 | End: 2023-08-11

## 2023-08-04 RX ORDER — DEXAMETHASONE 6 MG/1
6 TABLET ORAL
Qty: 7 TABLET | Refills: 0
Start: 2023-08-04 | End: 2023-08-11

## 2023-08-04 RX ADMIN — DEXAMETHASONE SODIUM PHOSPHATE 6 MG: 10 INJECTION INTRAMUSCULAR; INTRAVENOUS at 09:01

## 2023-08-04 RX ADMIN — LEVOTHYROXINE SODIUM 25 MCG: 0.03 TABLET ORAL at 09:00

## 2023-08-04 RX ADMIN — IPRATROPIUM BROMIDE AND ALBUTEROL SULFATE 1 DOSE: .5; 2.5 SOLUTION RESPIRATORY (INHALATION) at 09:00

## 2023-08-04 RX ADMIN — DOCUSATE SODIUM 100 MG: 100 CAPSULE, LIQUID FILLED ORAL at 09:01

## 2023-08-04 RX ADMIN — ALBUTEROL SULFATE 2.5 MG: 2.5 SOLUTION RESPIRATORY (INHALATION) at 17:17

## 2023-08-04 RX ADMIN — METOPROLOL TARTRATE 50 MG: 50 TABLET, FILM COATED ORAL at 09:01

## 2023-08-04 RX ADMIN — DOCUSATE SODIUM 100 MG: 100 CAPSULE, LIQUID FILLED ORAL at 22:24

## 2023-08-04 RX ADMIN — APIXABAN 2.5 MG: 2.5 TABLET, FILM COATED ORAL at 09:01

## 2023-08-04 RX ADMIN — IPRATROPIUM BROMIDE AND ALBUTEROL SULFATE 1 DOSE: .5; 2.5 SOLUTION RESPIRATORY (INHALATION) at 20:50

## 2023-08-04 RX ADMIN — NIRMATRELVIR AND RITONAVIR 3 TABLET: KIT at 16:45

## 2023-08-04 RX ADMIN — MELATONIN 10.5 MG: 3 TAB ORAL at 22:24

## 2023-08-04 RX ADMIN — FUROSEMIDE 40 MG: 20 TABLET ORAL at 09:01

## 2023-08-04 RX ADMIN — ATORVASTATIN CALCIUM 10 MG: 10 TABLET, FILM COATED ORAL at 22:25

## 2023-08-04 RX ADMIN — PANTOPRAZOLE SODIUM 40 MG: 40 TABLET, DELAYED RELEASE ORAL at 06:31

## 2023-08-04 RX ADMIN — NIRMATRELVIR AND RITONAVIR 3 TABLET: KIT at 06:31

## 2023-08-04 RX ADMIN — SODIUM CHLORIDE, PRESERVATIVE FREE 10 ML: 5 INJECTION INTRAVENOUS at 22:25

## 2023-08-04 RX ADMIN — POLYETHYLENE GLYCOL 3350 17 G: 17 POWDER, FOR SOLUTION ORAL at 09:01

## 2023-08-04 RX ADMIN — ONDANSETRON 4 MG: 2 INJECTION INTRAMUSCULAR; INTRAVENOUS at 13:19

## 2023-08-04 RX ADMIN — SODIUM CHLORIDE, PRESERVATIVE FREE 10 ML: 5 INJECTION INTRAVENOUS at 09:02

## 2023-08-04 RX ADMIN — APIXABAN 2.5 MG: 2.5 TABLET, FILM COATED ORAL at 22:25

## 2023-08-04 ASSESSMENT — ENCOUNTER SYMPTOMS
RESPIRATORY NEGATIVE: 1
GASTROINTESTINAL NEGATIVE: 1

## 2023-08-04 NOTE — CARE COORDINATION
Social work: Spoke to [employer], precert is still pending at this time. As mentioned, spouse ideally wants patient to go to [facility], however, they will not accept until patient is 10 days from initial COVID diagnosis(8/30). Writer informed spouse of this, she states she will do medicare appeal.  Writer also explained to her that medicare could deny ARU precert, at which point next step would be snf. SNF choices are [facility] or  [id number] [facility].

## 2023-08-05 LAB
ANION GAP SERPL CALCULATED.3IONS-SCNC: 9 MMOL/L (ref 9–17)
BUN SERPL-MCNC: 30 MG/DL (ref 8–23)
CALCIUM SERPL-MCNC: 8.8 MG/DL (ref 8.6–10.4)
CHLORIDE SERPL-SCNC: 100 MMOL/L (ref 98–107)
CO2 SERPL-SCNC: 29 MMOL/L (ref 20–31)
CREAT SERPL-MCNC: 1.1 MG/DL (ref 0.7–1.2)
EKG ATRIAL RATE: 54 BPM
EKG ATRIAL RATE: 60 BPM
EKG P AXIS: 63 DEGREES
EKG P AXIS: 74 DEGREES
EKG P-R INTERVAL: 216 MS
EKG P-R INTERVAL: 224 MS
EKG Q-T INTERVAL: 450 MS
EKG Q-T INTERVAL: 450 MS
EKG QRS DURATION: 108 MS
EKG QRS DURATION: 110 MS
EKG QTC CALCULATION (BAZETT): 426 MS
EKG QTC CALCULATION (BAZETT): 450 MS
EKG R AXIS: -21 DEGREES
EKG R AXIS: -21 DEGREES
EKG T AXIS: 1 DEGREES
EKG T AXIS: 2 DEGREES
EKG VENTRICULAR RATE: 54 BPM
EKG VENTRICULAR RATE: 60 BPM
ERYTHROCYTE [DISTWIDTH] IN BLOOD BY AUTOMATED COUNT: 19.2 % (ref 12.5–15.4)
GFR SERPL CREATININE-BSD FRML MDRD: >60 ML/MIN/1.73M2
GLUCOSE SERPL-MCNC: 172 MG/DL (ref 70–99)
HCT VFR BLD AUTO: 40.7 % (ref 41–53)
HGB BLD-MCNC: 13.2 G/DL (ref 13.5–17.5)
MCH RBC QN AUTO: 30.9 PG (ref 26–34)
MCHC RBC AUTO-ENTMCNC: 32.3 G/DL (ref 31–37)
MCV RBC AUTO: 95.8 FL (ref 80–100)
PLATELET # BLD AUTO: 283 K/UL (ref 140–450)
PMV BLD AUTO: 7.6 FL (ref 6–12)
POTASSIUM SERPL-SCNC: 4.6 MMOL/L (ref 3.7–5.3)
RBC # BLD AUTO: 4.25 M/UL (ref 4.5–5.9)
SODIUM SERPL-SCNC: 138 MMOL/L (ref 135–144)
WBC OTHER # BLD: 5.6 K/UL (ref 3.5–11)

## 2023-08-05 PROCEDURE — 94640 AIRWAY INHALATION TREATMENT: CPT

## 2023-08-05 PROCEDURE — 93005 ELECTROCARDIOGRAM TRACING: CPT | Performed by: NURSE PRACTITIONER

## 2023-08-05 PROCEDURE — 6370000000 HC RX 637 (ALT 250 FOR IP): Performed by: INTERNAL MEDICINE

## 2023-08-05 PROCEDURE — 85027 COMPLETE CBC AUTOMATED: CPT

## 2023-08-05 PROCEDURE — 94761 N-INVAS EAR/PLS OXIMETRY MLT: CPT

## 2023-08-05 PROCEDURE — 6370000000 HC RX 637 (ALT 250 FOR IP): Performed by: NURSE PRACTITIONER

## 2023-08-05 PROCEDURE — 80048 BASIC METABOLIC PNL TOTAL CA: CPT

## 2023-08-05 PROCEDURE — 6370000000 HC RX 637 (ALT 250 FOR IP): Performed by: HOSPITALIST

## 2023-08-05 PROCEDURE — 2060000000 HC ICU INTERMEDIATE R&B

## 2023-08-05 PROCEDURE — 2580000003 HC RX 258: Performed by: NURSE PRACTITIONER

## 2023-08-05 PROCEDURE — 36415 COLL VENOUS BLD VENIPUNCTURE: CPT

## 2023-08-05 PROCEDURE — 99232 SBSQ HOSP IP/OBS MODERATE 35: CPT | Performed by: HOSPITALIST

## 2023-08-05 PROCEDURE — 6360000002 HC RX W HCPCS: Performed by: HOSPITALIST

## 2023-08-05 PROCEDURE — 2700000000 HC OXYGEN THERAPY PER DAY

## 2023-08-05 RX ADMIN — APIXABAN 2.5 MG: 2.5 TABLET, FILM COATED ORAL at 21:45

## 2023-08-05 RX ADMIN — LEVOTHYROXINE SODIUM 25 MCG: 0.03 TABLET ORAL at 10:44

## 2023-08-05 RX ADMIN — PANTOPRAZOLE SODIUM 40 MG: 40 TABLET, DELAYED RELEASE ORAL at 07:03

## 2023-08-05 RX ADMIN — ATORVASTATIN CALCIUM 10 MG: 10 TABLET, FILM COATED ORAL at 21:45

## 2023-08-05 RX ADMIN — FUROSEMIDE 40 MG: 20 TABLET ORAL at 10:44

## 2023-08-05 RX ADMIN — NIRMATRELVIR AND RITONAVIR 3 TABLET: KIT at 07:03

## 2023-08-05 RX ADMIN — NIRMATRELVIR AND RITONAVIR 3 TABLET: KIT at 18:02

## 2023-08-05 RX ADMIN — SODIUM CHLORIDE, PRESERVATIVE FREE 10 ML: 5 INJECTION INTRAVENOUS at 09:00

## 2023-08-05 RX ADMIN — DEXAMETHASONE SODIUM PHOSPHATE 6 MG: 10 INJECTION INTRAMUSCULAR; INTRAVENOUS at 10:44

## 2023-08-05 RX ADMIN — APIXABAN 2.5 MG: 2.5 TABLET, FILM COATED ORAL at 10:44

## 2023-08-05 RX ADMIN — IPRATROPIUM BROMIDE AND ALBUTEROL SULFATE 1 DOSE: .5; 2.5 SOLUTION RESPIRATORY (INHALATION) at 08:16

## 2023-08-05 RX ADMIN — METOPROLOL TARTRATE 50 MG: 50 TABLET, FILM COATED ORAL at 10:44

## 2023-08-05 RX ADMIN — DOCUSATE SODIUM 100 MG: 100 CAPSULE, LIQUID FILLED ORAL at 21:45

## 2023-08-05 RX ADMIN — IPRATROPIUM BROMIDE AND ALBUTEROL SULFATE 1 DOSE: .5; 2.5 SOLUTION RESPIRATORY (INHALATION) at 20:29

## 2023-08-05 RX ADMIN — METOPROLOL TARTRATE 50 MG: 50 TABLET, FILM COATED ORAL at 21:47

## 2023-08-05 RX ADMIN — MELATONIN 10.5 MG: 3 TAB ORAL at 21:47

## 2023-08-05 RX ADMIN — SODIUM CHLORIDE, PRESERVATIVE FREE 10 ML: 5 INJECTION INTRAVENOUS at 21:46

## 2023-08-06 LAB
ANION GAP SERPL CALCULATED.3IONS-SCNC: 9 MMOL/L (ref 9–17)
BUN SERPL-MCNC: 27 MG/DL (ref 8–23)
CALCIUM SERPL-MCNC: 9 MG/DL (ref 8.6–10.4)
CHLORIDE SERPL-SCNC: 100 MMOL/L (ref 98–107)
CO2 SERPL-SCNC: 29 MMOL/L (ref 20–31)
CREAT SERPL-MCNC: 1.1 MG/DL (ref 0.7–1.2)
ERYTHROCYTE [DISTWIDTH] IN BLOOD BY AUTOMATED COUNT: 19.6 % (ref 12.5–15.4)
GFR SERPL CREATININE-BSD FRML MDRD: >60 ML/MIN/1.73M2
GLUCOSE SERPL-MCNC: 177 MG/DL (ref 70–99)
HCT VFR BLD AUTO: 42 % (ref 41–53)
HGB BLD-MCNC: 13.7 G/DL (ref 13.5–17.5)
MCH RBC QN AUTO: 31.3 PG (ref 26–34)
MCHC RBC AUTO-ENTMCNC: 32.6 G/DL (ref 31–37)
MCV RBC AUTO: 95.8 FL (ref 80–100)
PLATELET # BLD AUTO: 297 K/UL (ref 140–450)
PMV BLD AUTO: 8.1 FL (ref 6–12)
POTASSIUM SERPL-SCNC: 4.4 MMOL/L (ref 3.7–5.3)
RBC # BLD AUTO: 4.38 M/UL (ref 4.5–5.9)
SODIUM SERPL-SCNC: 138 MMOL/L (ref 135–144)
WBC OTHER # BLD: 6.5 K/UL (ref 3.5–11)

## 2023-08-06 PROCEDURE — 97535 SELF CARE MNGMENT TRAINING: CPT

## 2023-08-06 PROCEDURE — 94640 AIRWAY INHALATION TREATMENT: CPT

## 2023-08-06 PROCEDURE — 6370000000 HC RX 637 (ALT 250 FOR IP): Performed by: INTERNAL MEDICINE

## 2023-08-06 PROCEDURE — 80048 BASIC METABOLIC PNL TOTAL CA: CPT

## 2023-08-06 PROCEDURE — 2580000003 HC RX 258: Performed by: NURSE PRACTITIONER

## 2023-08-06 PROCEDURE — 2700000000 HC OXYGEN THERAPY PER DAY

## 2023-08-06 PROCEDURE — 97110 THERAPEUTIC EXERCISES: CPT

## 2023-08-06 PROCEDURE — 99232 SBSQ HOSP IP/OBS MODERATE 35: CPT | Performed by: HOSPITALIST

## 2023-08-06 PROCEDURE — 2060000000 HC ICU INTERMEDIATE R&B

## 2023-08-06 PROCEDURE — 6370000000 HC RX 637 (ALT 250 FOR IP): Performed by: HOSPITALIST

## 2023-08-06 PROCEDURE — 97116 GAIT TRAINING THERAPY: CPT

## 2023-08-06 PROCEDURE — 94760 N-INVAS EAR/PLS OXIMETRY 1: CPT

## 2023-08-06 PROCEDURE — 36415 COLL VENOUS BLD VENIPUNCTURE: CPT

## 2023-08-06 PROCEDURE — 6360000002 HC RX W HCPCS: Performed by: HOSPITALIST

## 2023-08-06 PROCEDURE — 6370000000 HC RX 637 (ALT 250 FOR IP): Performed by: NURSE PRACTITIONER

## 2023-08-06 PROCEDURE — 93005 ELECTROCARDIOGRAM TRACING: CPT | Performed by: NURSE PRACTITIONER

## 2023-08-06 PROCEDURE — 85027 COMPLETE CBC AUTOMATED: CPT

## 2023-08-06 RX ADMIN — DEXAMETHASONE SODIUM PHOSPHATE 6 MG: 10 INJECTION INTRAMUSCULAR; INTRAVENOUS at 09:37

## 2023-08-06 RX ADMIN — SODIUM CHLORIDE, PRESERVATIVE FREE 10 ML: 5 INJECTION INTRAVENOUS at 09:38

## 2023-08-06 RX ADMIN — DOCUSATE SODIUM 100 MG: 100 CAPSULE, LIQUID FILLED ORAL at 09:37

## 2023-08-06 RX ADMIN — APIXABAN 2.5 MG: 2.5 TABLET, FILM COATED ORAL at 09:37

## 2023-08-06 RX ADMIN — POLYETHYLENE GLYCOL 3350 17 G: 17 POWDER, FOR SOLUTION ORAL at 09:37

## 2023-08-06 RX ADMIN — PANTOPRAZOLE SODIUM 40 MG: 40 TABLET, DELAYED RELEASE ORAL at 07:01

## 2023-08-06 RX ADMIN — IPRATROPIUM BROMIDE AND ALBUTEROL SULFATE 1 DOSE: .5; 2.5 SOLUTION RESPIRATORY (INHALATION) at 08:36

## 2023-08-06 RX ADMIN — IPRATROPIUM BROMIDE AND ALBUTEROL SULFATE 1 DOSE: .5; 2.5 SOLUTION RESPIRATORY (INHALATION) at 20:08

## 2023-08-06 RX ADMIN — METOPROLOL TARTRATE 50 MG: 50 TABLET, FILM COATED ORAL at 21:27

## 2023-08-06 RX ADMIN — METOPROLOL TARTRATE 50 MG: 50 TABLET, FILM COATED ORAL at 09:37

## 2023-08-06 RX ADMIN — SODIUM CHLORIDE, PRESERVATIVE FREE 10 ML: 5 INJECTION INTRAVENOUS at 21:27

## 2023-08-06 RX ADMIN — FUROSEMIDE 40 MG: 20 TABLET ORAL at 09:37

## 2023-08-06 RX ADMIN — NIRMATRELVIR AND RITONAVIR 3 TABLET: KIT at 07:01

## 2023-08-06 RX ADMIN — DOCUSATE SODIUM 100 MG: 100 CAPSULE, LIQUID FILLED ORAL at 21:27

## 2023-08-06 RX ADMIN — LEVOTHYROXINE SODIUM 25 MCG: 0.03 TABLET ORAL at 09:37

## 2023-08-06 RX ADMIN — MELATONIN 10.5 MG: 3 TAB ORAL at 21:27

## 2023-08-06 RX ADMIN — APIXABAN 2.5 MG: 2.5 TABLET, FILM COATED ORAL at 21:27

## 2023-08-06 RX ADMIN — ATORVASTATIN CALCIUM 10 MG: 10 TABLET, FILM COATED ORAL at 21:27

## 2023-08-06 ASSESSMENT — PAIN SCALES - GENERAL: PAINLEVEL_OUTOF10: 0

## 2023-08-07 LAB
EKG ATRIAL RATE: 52 BPM
EKG ATRIAL RATE: 58 BPM
EKG P AXIS: 32 DEGREES
EKG P AXIS: 60 DEGREES
EKG P-R INTERVAL: 230 MS
EKG P-R INTERVAL: 234 MS
EKG Q-T INTERVAL: 462 MS
EKG Q-T INTERVAL: 482 MS
EKG QRS DURATION: 110 MS
EKG QRS DURATION: 88 MS
EKG QTC CALCULATION (BAZETT): 429 MS
EKG QTC CALCULATION (BAZETT): 473 MS
EKG R AXIS: -6 DEGREES
EKG R AXIS: -8 DEGREES
EKG T AXIS: 11 DEGREES
EKG T AXIS: 16 DEGREES
EKG VENTRICULAR RATE: 52 BPM
EKG VENTRICULAR RATE: 58 BPM

## 2023-08-07 PROCEDURE — 99232 SBSQ HOSP IP/OBS MODERATE 35: CPT | Performed by: INTERNAL MEDICINE

## 2023-08-07 PROCEDURE — 6370000000 HC RX 637 (ALT 250 FOR IP): Performed by: NURSE PRACTITIONER

## 2023-08-07 PROCEDURE — 99232 SBSQ HOSP IP/OBS MODERATE 35: CPT | Performed by: HOSPITALIST

## 2023-08-07 PROCEDURE — 97110 THERAPEUTIC EXERCISES: CPT

## 2023-08-07 PROCEDURE — 6370000000 HC RX 637 (ALT 250 FOR IP): Performed by: HOSPITALIST

## 2023-08-07 PROCEDURE — 97530 THERAPEUTIC ACTIVITIES: CPT

## 2023-08-07 PROCEDURE — 2580000003 HC RX 258: Performed by: NURSE PRACTITIONER

## 2023-08-07 PROCEDURE — 94760 N-INVAS EAR/PLS OXIMETRY 1: CPT

## 2023-08-07 PROCEDURE — 94761 N-INVAS EAR/PLS OXIMETRY MLT: CPT

## 2023-08-07 PROCEDURE — 2700000000 HC OXYGEN THERAPY PER DAY

## 2023-08-07 PROCEDURE — 97535 SELF CARE MNGMENT TRAINING: CPT

## 2023-08-07 PROCEDURE — 97116 GAIT TRAINING THERAPY: CPT

## 2023-08-07 PROCEDURE — 94640 AIRWAY INHALATION TREATMENT: CPT

## 2023-08-07 PROCEDURE — 2060000000 HC ICU INTERMEDIATE R&B

## 2023-08-07 PROCEDURE — 6370000000 HC RX 637 (ALT 250 FOR IP): Performed by: INTERNAL MEDICINE

## 2023-08-07 PROCEDURE — 6360000002 HC RX W HCPCS: Performed by: HOSPITALIST

## 2023-08-07 RX ADMIN — LEVOTHYROXINE SODIUM 25 MCG: 0.03 TABLET ORAL at 09:17

## 2023-08-07 RX ADMIN — ATORVASTATIN CALCIUM 10 MG: 10 TABLET, FILM COATED ORAL at 21:33

## 2023-08-07 RX ADMIN — DOCUSATE SODIUM 100 MG: 100 CAPSULE, LIQUID FILLED ORAL at 09:17

## 2023-08-07 RX ADMIN — IPRATROPIUM BROMIDE AND ALBUTEROL SULFATE 1 DOSE: .5; 2.5 SOLUTION RESPIRATORY (INHALATION) at 08:53

## 2023-08-07 RX ADMIN — DEXAMETHASONE SODIUM PHOSPHATE 6 MG: 10 INJECTION INTRAMUSCULAR; INTRAVENOUS at 09:17

## 2023-08-07 RX ADMIN — FUROSEMIDE 40 MG: 20 TABLET ORAL at 09:17

## 2023-08-07 RX ADMIN — POLYETHYLENE GLYCOL 3350 17 G: 17 POWDER, FOR SOLUTION ORAL at 09:18

## 2023-08-07 RX ADMIN — SODIUM CHLORIDE, PRESERVATIVE FREE 10 ML: 5 INJECTION INTRAVENOUS at 21:33

## 2023-08-07 RX ADMIN — MELATONIN 10.5 MG: 3 TAB ORAL at 21:33

## 2023-08-07 RX ADMIN — SODIUM CHLORIDE, PRESERVATIVE FREE 10 ML: 5 INJECTION INTRAVENOUS at 09:17

## 2023-08-07 RX ADMIN — APIXABAN 2.5 MG: 2.5 TABLET, FILM COATED ORAL at 21:30

## 2023-08-07 RX ADMIN — METOPROLOL TARTRATE 50 MG: 50 TABLET, FILM COATED ORAL at 21:31

## 2023-08-07 RX ADMIN — PANTOPRAZOLE SODIUM 40 MG: 40 TABLET, DELAYED RELEASE ORAL at 09:22

## 2023-08-07 RX ADMIN — DOCUSATE SODIUM 100 MG: 100 CAPSULE, LIQUID FILLED ORAL at 21:30

## 2023-08-07 RX ADMIN — APIXABAN 2.5 MG: 2.5 TABLET, FILM COATED ORAL at 09:17

## 2023-08-07 RX ADMIN — METOPROLOL TARTRATE 50 MG: 50 TABLET, FILM COATED ORAL at 09:17

## 2023-08-07 RX ADMIN — IPRATROPIUM BROMIDE AND ALBUTEROL SULFATE 1 DOSE: .5; 2.5 SOLUTION RESPIRATORY (INHALATION) at 19:57

## 2023-08-07 ASSESSMENT — ENCOUNTER SYMPTOMS
RESPIRATORY NEGATIVE: 1
GASTROINTESTINAL NEGATIVE: 1

## 2023-08-07 NOTE — CARE COORDINATION
Social Work-Janay failed to return Dr Sanz Model call and denied inpatient rehab and recommended SNF. Left message for wife regarding options.  Zacarias Gonzalez

## 2023-08-08 LAB
EKG ATRIAL RATE: 47 BPM
EKG P AXIS: 19 DEGREES
EKG P-R INTERVAL: 228 MS
EKG Q-T INTERVAL: 458 MS
EKG QRS DURATION: 122 MS
EKG QTC CALCULATION (BAZETT): 405 MS
EKG R AXIS: -23 DEGREES
EKG T AXIS: 7 DEGREES
EKG VENTRICULAR RATE: 47 BPM

## 2023-08-08 PROCEDURE — 93005 ELECTROCARDIOGRAM TRACING: CPT | Performed by: NURSE PRACTITIONER

## 2023-08-08 PROCEDURE — 94761 N-INVAS EAR/PLS OXIMETRY MLT: CPT

## 2023-08-08 PROCEDURE — 97116 GAIT TRAINING THERAPY: CPT

## 2023-08-08 PROCEDURE — 2060000000 HC ICU INTERMEDIATE R&B

## 2023-08-08 PROCEDURE — 97110 THERAPEUTIC EXERCISES: CPT

## 2023-08-08 PROCEDURE — 2580000003 HC RX 258: Performed by: NURSE PRACTITIONER

## 2023-08-08 PROCEDURE — 6370000000 HC RX 637 (ALT 250 FOR IP): Performed by: NURSE PRACTITIONER

## 2023-08-08 PROCEDURE — 2700000000 HC OXYGEN THERAPY PER DAY

## 2023-08-08 PROCEDURE — 6360000002 HC RX W HCPCS: Performed by: HOSPITALIST

## 2023-08-08 PROCEDURE — 6370000000 HC RX 637 (ALT 250 FOR IP): Performed by: HOSPITALIST

## 2023-08-08 PROCEDURE — 99222 1ST HOSP IP/OBS MODERATE 55: CPT | Performed by: STUDENT IN AN ORGANIZED HEALTH CARE EDUCATION/TRAINING PROGRAM

## 2023-08-08 PROCEDURE — 6370000000 HC RX 637 (ALT 250 FOR IP): Performed by: INTERNAL MEDICINE

## 2023-08-08 PROCEDURE — 97535 SELF CARE MNGMENT TRAINING: CPT

## 2023-08-08 RX ADMIN — DEXAMETHASONE SODIUM PHOSPHATE 6 MG: 10 INJECTION INTRAMUSCULAR; INTRAVENOUS at 08:24

## 2023-08-08 RX ADMIN — DOCUSATE SODIUM 100 MG: 100 CAPSULE, LIQUID FILLED ORAL at 21:13

## 2023-08-08 RX ADMIN — ATORVASTATIN CALCIUM 10 MG: 10 TABLET, FILM COATED ORAL at 21:13

## 2023-08-08 RX ADMIN — LEVOTHYROXINE SODIUM 25 MCG: 0.03 TABLET ORAL at 08:23

## 2023-08-08 RX ADMIN — APIXABAN 2.5 MG: 2.5 TABLET, FILM COATED ORAL at 08:23

## 2023-08-08 RX ADMIN — PANTOPRAZOLE SODIUM 40 MG: 40 TABLET, DELAYED RELEASE ORAL at 08:25

## 2023-08-08 RX ADMIN — FUROSEMIDE 40 MG: 20 TABLET ORAL at 08:23

## 2023-08-08 RX ADMIN — POLYETHYLENE GLYCOL 3350 17 G: 17 POWDER, FOR SOLUTION ORAL at 08:24

## 2023-08-08 RX ADMIN — METOPROLOL TARTRATE 50 MG: 50 TABLET, FILM COATED ORAL at 08:23

## 2023-08-08 RX ADMIN — SODIUM CHLORIDE, PRESERVATIVE FREE 10 ML: 5 INJECTION INTRAVENOUS at 21:14

## 2023-08-08 RX ADMIN — DOCUSATE SODIUM 100 MG: 100 CAPSULE, LIQUID FILLED ORAL at 08:23

## 2023-08-08 RX ADMIN — SODIUM CHLORIDE, PRESERVATIVE FREE 10 ML: 5 INJECTION INTRAVENOUS at 08:24

## 2023-08-08 RX ADMIN — MELATONIN 10.5 MG: 3 TAB ORAL at 21:13

## 2023-08-08 RX ADMIN — METOPROLOL TARTRATE 50 MG: 50 TABLET, FILM COATED ORAL at 21:13

## 2023-08-08 RX ADMIN — APIXABAN 2.5 MG: 2.5 TABLET, FILM COATED ORAL at 21:13

## 2023-08-08 ASSESSMENT — PAIN SCALES - GENERAL: PAINLEVEL_OUTOF10: 0

## 2023-08-08 NOTE — CARE COORDINATION
Transitional Planning    Followed up on referrals sent for SNFs on 8/4. [facility] has no beds at this time. Left message for [facility].     Left message for Brock Zhou @ [facility]. [address] [place] [address] with Brock Zhou at [facility]. They can accept. Received call from [facility]- they will review. 18 Spoke with pt's wife. She is requesting to hear back from [facility] before making final d/c decisions. [facility] with D[facility] at [facility]. They can accept. Will submit for precert.

## 2023-08-09 VITALS
BODY MASS INDEX: 42.16 KG/M2 | DIASTOLIC BLOOD PRESSURE: 80 MMHG | WEIGHT: 301.15 LBS | SYSTOLIC BLOOD PRESSURE: 131 MMHG | OXYGEN SATURATION: 96 % | RESPIRATION RATE: 18 BRPM | TEMPERATURE: 98.4 F | HEART RATE: 73 BPM | HEIGHT: 71 IN

## 2023-08-09 LAB
EKG ATRIAL RATE: 54 BPM
EKG P AXIS: 70 DEGREES
EKG P-R INTERVAL: 208 MS
EKG Q-T INTERVAL: 458 MS
EKG QRS DURATION: 114 MS
EKG QTC CALCULATION (BAZETT): 434 MS
EKG R AXIS: -3 DEGREES
EKG T AXIS: 26 DEGREES
EKG VENTRICULAR RATE: 54 BPM

## 2023-08-09 PROCEDURE — 6370000000 HC RX 637 (ALT 250 FOR IP): Performed by: HOSPITALIST

## 2023-08-09 PROCEDURE — 6370000000 HC RX 637 (ALT 250 FOR IP): Performed by: INTERNAL MEDICINE

## 2023-08-09 PROCEDURE — 99239 HOSP IP/OBS DSCHRG MGMT >30: CPT | Performed by: STUDENT IN AN ORGANIZED HEALTH CARE EDUCATION/TRAINING PROGRAM

## 2023-08-09 PROCEDURE — 93005 ELECTROCARDIOGRAM TRACING: CPT | Performed by: NURSE PRACTITIONER

## 2023-08-09 PROCEDURE — 2580000003 HC RX 258: Performed by: NURSE PRACTITIONER

## 2023-08-09 PROCEDURE — 6370000000 HC RX 637 (ALT 250 FOR IP): Performed by: NURSE PRACTITIONER

## 2023-08-09 PROCEDURE — 6360000002 HC RX W HCPCS: Performed by: HOSPITALIST

## 2023-08-09 PROCEDURE — 97535 SELF CARE MNGMENT TRAINING: CPT

## 2023-08-09 PROCEDURE — 6370000000 HC RX 637 (ALT 250 FOR IP): Performed by: STUDENT IN AN ORGANIZED HEALTH CARE EDUCATION/TRAINING PROGRAM

## 2023-08-09 RX ADMIN — DOCUSATE SODIUM 100 MG: 100 CAPSULE, LIQUID FILLED ORAL at 09:26

## 2023-08-09 RX ADMIN — DEXAMETHASONE SODIUM PHOSPHATE 6 MG: 10 INJECTION INTRAMUSCULAR; INTRAVENOUS at 09:26

## 2023-08-09 RX ADMIN — METOPROLOL TARTRATE 12.5 MG: 25 TABLET, FILM COATED ORAL at 12:33

## 2023-08-09 RX ADMIN — APIXABAN 2.5 MG: 2.5 TABLET, FILM COATED ORAL at 09:26

## 2023-08-09 RX ADMIN — LEVOTHYROXINE SODIUM 25 MCG: 0.03 TABLET ORAL at 09:26

## 2023-08-09 RX ADMIN — FUROSEMIDE 40 MG: 20 TABLET ORAL at 09:26

## 2023-08-09 RX ADMIN — PANTOPRAZOLE SODIUM 40 MG: 40 TABLET, DELAYED RELEASE ORAL at 06:37

## 2023-08-09 RX ADMIN — POLYETHYLENE GLYCOL 3350 17 G: 17 POWDER, FOR SOLUTION ORAL at 09:26

## 2023-08-09 RX ADMIN — SODIUM CHLORIDE, PRESERVATIVE FREE 10 ML: 5 INJECTION INTRAVENOUS at 09:26

## 2023-08-09 NOTE — CARE COORDINATION
Transitional Planning    Spoke with admissions at [facility]- confirmed precert has been started and still awaiting response. 1221 received VM from [place] approved.  Transport requested    123

## 2023-08-09 NOTE — DISCHARGE SUMMARY
tablets (two 150 mg nirmatrelvir and one 100 mg ritonavir tablets) by mouth every 12 hours for 5 days. CHANGE how you take these medications      furosemide 40 MG tablet  Commonly known as: LASIX  TAKE 1 TABLET BY MOUTH TWICE A  DAY  What changed:   when to take this  reasons to take this  additional instructions            CONTINUE taking these medications      allopurinol 300 MG tablet  Commonly known as: ZYLOPRIM  TAKE 1 TABLET TWICE A DAY     apixaban 5 MG Tabs tablet  Commonly known as: ELIQUIS     atorvastatin 10 MG tablet  Commonly known as: LIPITOR  TAKE 1 TABLET DAILY     bisacodyl 5 MG EC tablet  Commonly known as: DULCOLAX     ciclopirox 0.77 % cream  Commonly known as: LOPROX     Cinnamon 500 MG Caps     Jardiance 10 MG tablet  Generic drug: empagliflozin  TAKE 1 TABLET DAILY     levothyroxine 25 MCG tablet  Commonly known as: SYNTHROID  TAKE 1 TABLET DAILY     LYMPHEDEMA PUMP  1 Device as needed (lymphedema) Bilateral lower extremities     Melatonin 10 MG Tabs     mesalamine 1.2 g EC tablet  Commonly known as: LIALDA  Take 1 tablet by mouth 3 times daily     metoprolol tartrate 25 MG tablet  Commonly known as: LOPRESSOR     metroNIDAZOLE 0.75 % cream  Commonly known as: METROCREAM     OCUVITE PO     oxyCODONE 5 MG immediate release tablet  Commonly known as: ROXICODONE  Take 1 tablet by mouth every 4 hours as needed for Pain for up to 7 days.  Max Daily Amount: 30 mg     pantoprazole 40 MG tablet  Commonly known as: PROTONIX  Take 1 tablet by mouth every morning (before breakfast)     PROBIOTIC ADVANCED PO     psyllium 28.3 % Pack  Commonly known as: KONSYL     Slow-Mag 71.5-119 MG Tbec tablet  Generic drug: magnesium cl-calcium carbonate  Take 1 tablet by mouth daily     trospium 20 MG tablet  Commonly known as: SANCTURA     vitamin C 250 MG tablet     vitamin D-3 25 MCG (1000 UT) Caps     zinc 50 MG Caps  Take 50 mg by mouth daily            STOP taking these medications      CO Q 10 PO

## 2023-08-09 NOTE — CARE COORDINATION
Transitional Planning    Spoke with admissions at [facility]- confirmed precert has been started and still awaiting response. 1221 received VM from [place] approved.  Transport requested

## 2023-08-09 NOTE — PLAN OF CARE
Problem: Discharge Planning  Goal: Discharge to home or other facility with appropriate resources  8/1/2023 1554 by Cherry Roberts RN  Outcome: Progressing  8/1/2023 0504 by Neida Falcon RN  Outcome: Progressing  Flowsheets (Taken 7/31/2023 2030)  Discharge to home or other facility with appropriate resources: Identify barriers to discharge with patient and caregiver     Problem: Pain  Goal: Verbalizes/displays adequate comfort level or baseline comfort level  8/1/2023 1554 by Cherry Roberts RN  Outcome: Progressing  8/1/2023 0504 by Neida Falcon RN  Outcome: Progressing  Flowsheets (Taken 7/31/2023 2030)  Verbalizes/displays adequate comfort level or baseline comfort level: Encourage patient to monitor pain and request assistance     Problem: Skin/Tissue Integrity  Goal: Absence of new skin breakdown  Description: 1. Monitor for areas of redness and/or skin breakdown  2. Assess vascular access sites hourly  3. Every 4-6 hours minimum:  Change oxygen saturation probe site  4. Every 4-6 hours:  If on nasal continuous positive airway pressure, respiratory therapy assess nares and determine need for appliance change or resting period.   8/1/2023 1554 by Cherry Roberts RN  Outcome: Progressing  8/1/2023 0504 by Neida Falcon RN  Outcome: Progressing     Problem: ABCDS Injury Assessment  Goal: Absence of physical injury  8/1/2023 1554 by Cherry Roberts RN  Outcome: Progressing  Flowsheets (Taken 8/1/2023 1310)  Absence of Physical Injury: Implement safety measures based on patient assessment  8/1/2023 0504 by Neida Falcon RN  Outcome: Progressing     Problem: Safety - Adult  Goal: Free from fall injury  8/1/2023 1554 by Cherry Roberts RN  Outcome: Progressing  Flowsheets (Taken 8/1/2023 1310)  Free From Fall Injury: Instruct family/caregiver on patient safety  8/1/2023 0504 by Neida Falcon RN  Outcome: Progressing  Flowsheets (Taken 7/31/2023 2030)  Free From Fall Injury: Instruct family/caregiver
Problem: Discharge Planning  Goal: Discharge to home or other facility with appropriate resources  8/2/2023 0144 by Risa Campbell RN  Outcome: Progressing  Flowsheets (Taken 8/1/2023 2000)  Discharge to home or other facility with appropriate resources:   Identify barriers to discharge with patient and caregiver   Arrange for needed discharge resources and transportation as appropriate   Identify discharge learning needs (meds, wound care, etc)   Refer to discharge planning if patient needs post-hospital services based on physician order or complex needs related to functional status, cognitive ability or social support system  8/1/2023 1554 by Brook Lorenzo RN  Outcome: Progressing     Problem: Pain  Goal: Verbalizes/displays adequate comfort level or baseline comfort level  8/2/2023 0144 by Risa Campbell RN  Outcome: Progressing  8/1/2023 1554 by Brook Lorenzo RN  Outcome: Progressing     Problem: Skin/Tissue Integrity  Goal: Absence of new skin breakdown  Description: 1. Monitor for areas of redness and/or skin breakdown  2. Assess vascular access sites hourly  3. Every 4-6 hours minimum:  Change oxygen saturation probe site  4. Every 4-6 hours:  If on nasal continuous positive airway pressure, respiratory therapy assess nares and determine need for appliance change or resting period.   8/2/2023 0144 by Risa Campbell RN  Outcome: Progressing  8/1/2023 1554 by Brook Lorenzo RN  Outcome: Progressing     Problem: ABCDS Injury Assessment  Goal: Absence of physical injury  8/2/2023 0144 by Risa Campbell RN  Outcome: Progressing  8/1/2023 1554 by Brook Lorenzo RN  Outcome: Progressing  Flowsheets (Taken 8/1/2023 1310)  Absence of Physical Injury: Implement safety measures based on patient assessment     Problem: Safety - Adult  Goal: Free from fall injury  8/2/2023 0144 by Risa Campbell RN  Outcome: Progressing  8/1/2023 1554 by Brook Lorenzo RN  Outcome: Progressing  Flowsheets (Taken 8/1/2023
Problem: Discharge Planning  Goal: Discharge to home or other facility with appropriate resources  8/3/2023 0337 by Brooke Graf RN  Outcome: Progressing  8/2/2023 1458 by Isabel Conner RN  Outcome: Progressing  Flowsheets (Taken 8/2/2023 1046 by Vish Galindo RN)  Discharge to home or other facility with appropriate resources:   Identify barriers to discharge with patient and caregiver   Arrange for needed discharge resources and transportation as appropriate   Identify discharge learning needs (meds, wound care, etc)     Problem: Pain  Goal: Verbalizes/displays adequate comfort level or baseline comfort level  8/3/2023 0337 by Brooke Graf RN  Outcome: Progressing  8/2/2023 1458 by Isabel Conner RN  Outcome: Progressing  Flowsheets (Taken 8/2/2023 1200)  Verbalizes/displays adequate comfort level or baseline comfort level: Encourage patient to monitor pain and request assistance     Problem: Skin/Tissue Integrity  Goal: Absence of new skin breakdown  Description: 1. Monitor for areas of redness and/or skin breakdown  2. Assess vascular access sites hourly  3. Every 4-6 hours minimum:  Change oxygen saturation probe site  4. Every 4-6 hours:  If on nasal continuous positive airway pressure, respiratory therapy assess nares and determine need for appliance change or resting period.   8/3/2023 7299 by Brooke Graf RN  Outcome: Progressing  8/2/2023 1458 by Isabel Conner RN  Outcome: Progressing     Problem: ABCDS Injury Assessment  Goal: Absence of physical injury  8/3/2023 0337 by Brooke Graf RN  Outcome: Progressing  8/2/2023 1458 by Isabel Conner RN  Outcome: Progressing     Problem: Safety - Adult  Goal: Free from fall injury  8/3/2023 0337 by Brooke Graf RN  Outcome: Progressing  8/2/2023 1458 by Isabel Conner RN  Outcome: Progressing     Problem: Respiratory - Adult  Goal: Achieves optimal ventilation and oxygenation  8/3/2023 0337 by Brooke Graf
Problem: Discharge Planning  Goal: Discharge to home or other facility with appropriate resources  8/5/2023 1952 by Pebbles Elliott RN  Outcome: Progressing  8/5/2023 0556 by Nicole Martinez RN  Outcome: Progressing     Problem: Pain  Goal: Verbalizes/displays adequate comfort level or baseline comfort level  8/5/2023 1952 by Pebbles Elliott RN  Outcome: Progressing  8/5/2023 0556 by Nicole Martinez RN  Outcome: Progressing     Problem: Skin/Tissue Integrity  Goal: Absence of new skin breakdown  Description: 1. Monitor for areas of redness and/or skin breakdown  2. Assess vascular access sites hourly  3. Every 4-6 hours minimum:  Change oxygen saturation probe site  4. Every 4-6 hours:  If on nasal continuous positive airway pressure, respiratory therapy assess nares and determine need for appliance change or resting period.   8/5/2023 1952 by Pebbles Elliott RN  Outcome: Progressing  8/5/2023 0556 by Nicole Martinez RN  Outcome: Progressing     Problem: ABCDS Injury Assessment  Goal: Absence of physical injury  8/5/2023 1952 by Pebbles Elliott RN  Outcome: Progressing  8/5/2023 0556 by Nicole Martinez RN  Outcome: Progressing     Problem: Safety - Adult  Goal: Free from fall injury  8/5/2023 1952 by Pebbles Elliott RN  Outcome: Progressing  8/5/2023 0556 by Nicole Martinez RN  Outcome: Progressing     Problem: Respiratory - Adult  Goal: Achieves optimal ventilation and oxygenation  8/5/2023 1952 by Pebbles Elliott RN  Outcome: Progressing  8/5/2023 0840 by Karl Duggan RCP  Flowsheets (Taken 8/5/2023 0840)  Achieves optimal ventilation and oxygenation:   Assess for changes in respiratory status   Respiratory therapy support as indicated  8/5/2023 0556 by Nicole Martinez RN  Outcome: Progressing     Problem: Chronic Conditions and Co-morbidities  Goal: Patient's chronic conditions and co-morbidity symptoms are monitored and maintained or improved  8/5/2023 1952 by Pebbles Elliott RN  Outcome: Progressing  8/5/2023 2535
Problem: Discharge Planning  Goal: Discharge to home or other facility with appropriate resources  8/6/2023 0201 by Nels Dubin, RN  Outcome: Progressing   Patient actively participates in ADL's and decision making regarding plan of care; pt expresses eagerness to transfer to acute rehab on approval of precert  Problem: Pain  Goal: Verbalizes/displays adequate comfort level or baseline comfort level  8/6/2023 0201 by Nels Dubin, RN  Outcome: Progressing   Pt denies any pain at this time, continuing to monitor  Problem: Skin/Tissue Integrity  Goal: Absence of new skin breakdown  Description: 1. Monitor for areas of redness and/or skin breakdown  2. Assess vascular access sites hourly  3. Every 4-6 hours minimum:  Change oxygen saturation probe site  4. Every 4-6 hours:  If on nasal continuous positive airway pressure, respiratory therapy assess nares and determine need for appliance change or resting period.   8/6/2023 0201 by Nels Dubin, RN  Outcome: Progressing   No new skin breakdown noted, no new signs/symptoms of infection, continue to monitor labwork including WBC, medications administered per physician orders; pt shifts weight to reposition frequently, continued brief change and repositioning  Problem: Safety - Adult  Goal: Free from fall injury  8/6/2023 0201 by Nels Dubin, RN  Outcome: Progressing   No falls/injuries this shift, bed in lowest position, brakes on, bed alarm on, call light in reach, side rails up x2, use of jasmine stedy to transfer from chair to bed  Problem: Respiratory - Adult  Goal: Achieves optimal ventilation and oxygenation  8/6/2023 0201 by Nels Dubin, RN  Outcome: Progressing   Pt remains on 2L nasal cannula with oxygen saturation above 90%, continuous pulse ox monitoring
Problem: Discharge Planning  Goal: Discharge to home or other facility with appropriate resources  8/7/2023 1244 by Sai Blas RN  Outcome: Progressing  8/7/2023 0013 by Gene Blunt RN  Outcome: Progressing     Problem: Pain  Goal: Verbalizes/displays adequate comfort level or baseline comfort level  8/7/2023 1244 by Sai Blas RN  Outcome: Progressing  8/7/2023 0013 by Gene Blunt RN  Outcome: Progressing     Problem: Skin/Tissue Integrity  Goal: Absence of new skin breakdown  Description: 1. Monitor for areas of redness and/or skin breakdown  2. Assess vascular access sites hourly  3. Every 4-6 hours minimum:  Change oxygen saturation probe site  4. Every 4-6 hours:  If on nasal continuous positive airway pressure, respiratory therapy assess nares and determine need for appliance change or resting period.   8/7/2023 1244 by Sai Blas RN  Outcome: Progressing  8/7/2023 0013 by Gene Blunt RN  Outcome: Progressing     Problem: ABCDS Injury Assessment  Goal: Absence of physical injury  8/7/2023 1244 by Sai Blas RN  Outcome: Progressing  8/7/2023 0013 by Gene Blunt RN  Outcome: Progressing     Problem: Safety - Adult  Goal: Free from fall injury  8/7/2023 1244 by Sai Blas RN  Outcome: Progressing  8/7/2023 0013 by Gene Blunt RN  Outcome: Progressing     Problem: Respiratory - Adult  Goal: Achieves optimal ventilation and oxygenation  8/7/2023 1244 by Sai Blas RN  Outcome: Progressing  8/7/2023 0900 by Day Mcmullen RCP  Outcome: Progressing  Flowsheets (Taken 8/7/2023 0900)  Achieves optimal ventilation and oxygenation:   Respiratory therapy support as indicated   Assess and instruct to report shortness of breath or any respiratory difficulty   Oxygen supplementation based on oxygen saturation or arterial blood gases  8/7/2023 0013 by Gene Blunt RN  Outcome: Progressing     Problem: Chronic Conditions and Co-morbidities  Goal:
Problem: Discharge Planning  Goal: Discharge to home or other facility with appropriate resources  8/8/2023 0125 by Ward Pavon RN  Outcome: Progressing   Patient actively participates in ADL's and decision making regarding plan of care ; ambulating a few steps with walker which is improvement from needing a jasmine steady. Pt is eager to discuss discharge plan with physician with wants of transferring to an acute rehab. Problem: Pain  Goal: Verbalizes/displays adequate comfort level or baseline comfort level  8/8/2023 0125 by Ward Pavon RN  Outcome: Progressing   Pt denies pain for the shift, continuing to monitor. Problem: Skin/Tissue Integrity  Goal: Absence of new skin breakdown  Description: 1. Monitor for areas of redness and/or skin breakdown  2. Assess vascular access sites hourly  3. Every 4-6 hours minimum:  Change oxygen saturation probe site  4. Every 4-6 hours:  If on nasal continuous positive airway pressure, respiratory therapy assess nares and determine need for appliance change or resting period. 8/8/2023 0125 by Ward Pavon RN  Outcome: Progressing   No new signs of skin breakdown, free of tears and no new drainage from surgical dressings. Buttocks has some blanchable erythema, patient and staff repositioning and shifting weight routinely every 2 hours. Will continue to monitor. Problem: Safety - Adult  Goal: Free from fall injury  8/8/2023 0125 by Ward Pavon RN  Outcome: Progressing  Pt uses call light appropriately for needs, bed alarm is on, side rails x2, bed in lowest position with wheels locked, and hourly rounding for safety. Problem: Respiratory - Adult  Goal: Achieves optimal ventilation and oxygenation  8/8/2023 0125 by Ward Pavon RN  Outcome: Progressing   Pt is maintaining oxygen saturations in the 90s on 2L nasal cannula and bi-PAP at night. Patient denies any shortness of breath and occasionally has scant-small amounts of sputum.  Pt states water intake
Problem: Discharge Planning  Goal: Discharge to home or other facility with appropriate resources  8/9/2023 0651 by Virgil Mansfield RN  Outcome: Progressing   Pt continues to express wishes to SNF with denial of [facility]. Patient actively participates in ADL's and decision making regarding plan of care   Problem: Pain  Goal: Verbalizes/displays adequate comfort level or baseline comfort level  8/9/2023 0651 by Virgil Mansfield RN  Outcome: Progressing   Pt denies pain this shift, continuing to assess  Problem: Skin/Tissue Integrity  Goal: Absence of new skin breakdown  Description: 1. Monitor for areas of redness and/or skin breakdown  2. Assess vascular access sites hourly  3. Every 4-6 hours minimum:  Change oxygen saturation probe site  4. Every 4-6 hours:  If on nasal continuous positive airway pressure, respiratory therapy assess nares and determine need for appliance change or resting period.   8/9/2023 0651 by Virgil Mansfield RN  Outcome: Progressing   No new skin breakdown noted, no new signs/symptoms of infection, continue to monitor labwork including WBC, medications administered per physician orders
Problem: Discharge Planning  Goal: Discharge to home or other facility with appropriate resources  Outcome: Progressing     Problem: Pain  Goal: Verbalizes/displays adequate comfort level or baseline comfort level  Outcome: Progressing     Problem: Skin/Tissue Integrity  Goal: Absence of new skin breakdown  Description: 1. Monitor for areas of redness and/or skin breakdown  2. Assess vascular access sites hourly  3. Every 4-6 hours minimum:  Change oxygen saturation probe site  4. Every 4-6 hours:  If on nasal continuous positive airway pressure, respiratory therapy assess nares and determine need for appliance change or resting period.   Outcome: Progressing     Problem: ABCDS Injury Assessment  Goal: Absence of physical injury  Outcome: Progressing  Flowsheets (Taken 8/8/2023 1156)  Absence of Physical Injury: Implement safety measures based on patient assessment     Problem: Safety - Adult  Goal: Free from fall injury  Outcome: Progressing  Flowsheets (Taken 8/8/2023 1156)  Free From Fall Injury: Instruct family/caregiver on patient safety     Problem: Respiratory - Adult  Goal: Achieves optimal ventilation and oxygenation  8/8/2023 1714 by Sofía Obrien RN  Outcome: Progressing  8/8/2023 0813 by Jai Stout RCP  Outcome: Progressing  Flowsheets  Taken 8/8/2023 0751 by Sofía Obrien RN  Achieves optimal ventilation and oxygenation:   Assess for changes in respiratory status   Assess for changes in mentation and behavior   Position to facilitate oxygenation and minimize respiratory effort  Taken 8/7/2023 2006 by Palak Elizondo RCP  Achieves optimal ventilation and oxygenation:   Assess for changes in respiratory status   Respiratory therapy support as indicated   Encourage broncho-pulmonary hygiene including cough, deep breathe, incentive spirometry   Assess and instruct to report shortness of breath or any respiratory difficulty     Problem: Chronic Conditions and Co-morbidities  Goal:
Problem: Discharge Planning  Goal: Discharge to home or other facility with appropriate resources  Outcome: Progressing   Patient actively participates in ADLs and decision making regarding plan of care     Problem: Pain  Goal: Verbalizes/displays adequate comfort level or baseline comfort level  Outcome: Progressing   No new signs/symptoms of pain noted, pain rating < 3 on scale 0-10, pain controlled with medication/ repositioning     Problem: Skin/Tissue Integrity  Goal: Absence of new skin breakdown  Description: 1. Monitor for areas of redness and/or skin breakdown  2. Assess vascular access sites hourly  3. Every 4-6 hours minimum:  Change oxygen saturation probe site  4. Every 4-6 hours:  If on nasal continuous positive airway pressure, respiratory therapy assess nares and determine need for appliance change or resting period. Outcome: Progressing   No new skin breakdown noted, no new signs/symptoms of infection, continue to monitor lab work including WBC, and medications administered per physicians orders     Problem: Safety - Adult  Goal: Free from fall injury  Outcome: Progressing   No falls/injuries this shift, bed in lowest position, breaks on, bed alarm on, call light within reach, side rails up X2     Problem: Respiratory - Adult  Goal: Achieves optimal ventilation and oxygenation  7/31/2023 1205 by Robert Carty RN  Outcome: Progressing   Oxygen administered as needed. Pulse oximetry levels WNL. No cyanosis noted. Repositioned to encourage proper ventilation.
Problem: Discharge Planning  Goal: Discharge to home or other facility with appropriate resources  Outcome: Progressing   Pt vocalizes wishes to discharge to [facility] rehab, will continue to work with case management on transitions into continuation of care. Problem: Pain  Goal: Verbalizes/displays adequate comfort level or baseline comfort level  Outcome: Progressing   Pt denies pain at this time, will report any changes and continue to monitor. Problem: Skin/Tissue Integrity  Goal: Absence of new skin breakdown  Description: 1. Monitor for areas of redness and/or skin breakdown  2. Assess vascular access sites hourly  3. Every 4-6 hours minimum:  Change oxygen saturation probe site  4. Every 4-6 hours:  If on nasal continuous positive airway pressure, respiratory therapy assess nares and determine need for appliance change or resting period. Outcome: Progressing   Pt remains free of skin breakdown and repositions self frequently. Monitoring pressure points and assisting in brief changes and repositioning. Problem: Safety - Adult  Goal: Free from fall injury  Outcome: Progressing   Pt remains free of falls and continued orientation to call light use. Call light in reach, bed wheels locked, bed in lowest position, side rails x2, frequent hourly rounding. Problem: Respiratory - Adult  Goal: Achieves optimal ventilation and oxygenation  8/5/2023 0556 by Fernando Bonner RN  Outcome: Progressing   Teaching to keep HOB elevated, Cough and deep breathing with repositioning and turning, reminded patient how and when to use incentive spirometer. Pt is remaining above 90% oxygen saturation and HOB locked about 30 degrees.
Problem: Discharge Planning  Goal: Discharge to home or other facility with appropriate resources  Outcome: Progressing  Flowsheets (Taken 7/31/2023 2030)  Discharge to home or other facility with appropriate resources: Identify barriers to discharge with patient and caregiver     Problem: Pain  Goal: Verbalizes/displays adequate comfort level or baseline comfort level  Outcome: Progressing  Flowsheets (Taken 7/31/2023 2030)  Verbalizes/displays adequate comfort level or baseline comfort level: Encourage patient to monitor pain and request assistance     Problem: Skin/Tissue Integrity  Goal: Absence of new skin breakdown  Description: 1. Monitor for areas of redness and/or skin breakdown  2. Assess vascular access sites hourly  3. Every 4-6 hours minimum:  Change oxygen saturation probe site  4. Every 4-6 hours:  If on nasal continuous positive airway pressure, respiratory therapy assess nares and determine need for appliance change or resting period. Outcome: Progressing     Problem: ABCDS Injury Assessment  Goal: Absence of physical injury  Outcome: Progressing     Problem: Safety - Adult  Goal: Free from fall injury  Outcome: Progressing  Flowsheets (Taken 7/31/2023 2030)  Free From Fall Injury: Instruct family/caregiver on patient safety     Problem: Pain  Goal: Verbalizes/displays adequate comfort level or baseline comfort level  Outcome: Progressing  Flowsheets (Taken 7/31/2023 2030)  Verbalizes/displays adequate comfort level or baseline comfort level: Encourage patient to monitor pain and request assistance     Problem: Skin/Tissue Integrity  Goal: Absence of new skin breakdown  Description: 1. Monitor for areas of redness and/or skin breakdown  2. Assess vascular access sites hourly  3. Every 4-6 hours minimum:  Change oxygen saturation probe site  4.   Every 4-6 hours:  If on nasal continuous positive airway pressure, respiratory therapy assess nares and determine need for appliance change or
Problem: Pain  Goal: Verbalizes/displays adequate comfort level or baseline comfort level  Outcome: Progressing   No new signs/symptoms of pain noted, pain rating < 3 on scale 0-10, pain controlled with medication/repositioning   Problem: Skin/Tissue Integrity  Goal: Absence of new skin breakdown  Description: 1. Monitor for areas of redness and/or skin breakdown  2. Assess vascular access sites hourly  3. Every 4-6 hours minimum:  Change oxygen saturation probe site  4. Every 4-6 hours:  If on nasal continuous positive airway pressure, respiratory therapy assess nares and determine need for appliance change or resting period.   Outcome: Progressing   No new skin breakdown noted, no new signs/symptoms of infection, continue to monitor labwork including WBC, medications administered per physician orders   Problem: ABCDS Injury Assessment  Goal: Absence of physical injury  Outcome: Progressing   No falls/injuries this shift, bed in lowest position, brakes on, bed alarm on, call light in reach, side rails up x2   Problem: Safety - Adult  Goal: Free from fall injury  Outcome: Progressing   No falls/injuries this shift, bed in lowest position, brakes on, bed alarm on, call light in reach, side rails up x2   Problem: Respiratory - Adult  Goal: Achieves optimal ventilation and oxygenation  Outcome: Progressing   Pt remains on room air during the day and home CPAP @ night, O2 sats>93%
Problem: Respiratory - Adult  Goal: Achieves optimal ventilation and oxygenation  7/31/2023 1132 by Joe Escobar RCP  Outcome: Progressing  Flowsheets (Taken 7/31/2023 1132)  Achieves optimal ventilation and oxygenation:   Assess for changes in respiratory status   Position to facilitate oxygenation and minimize respiratory effort   Assess and instruct to report shortness of breath or any respiratory difficulty   Respiratory therapy support as indicated
Problem: Respiratory - Adult  Goal: Achieves optimal ventilation and oxygenation  8/1/2023 0906 by Joy Cosby RCP  Flowsheets (Taken 8/1/2023 4212)  Achieves optimal ventilation and oxygenation:   Assess for changes in respiratory status   Assess for changes in mentation and behavior   Assess and instruct to report shortness of breath or any respiratory difficulty   Respiratory therapy support as indicated
Problem: Respiratory - Adult  Goal: Achieves optimal ventilation and oxygenation  8/3/2023 0529 by Lexx Valdez RCP  Outcome: Progressing  Flowsheets (Taken 8/3/2023 0529)  Achieves optimal ventilation and oxygenation:   Assess for changes in respiratory status   Position to facilitate oxygenation and minimize respiratory effort   Oxygen supplementation based on oxygen saturation or arterial blood gases   Encourage broncho-pulmonary hygiene including cough, deep breathe, incentive spirometry   Assess and instruct to report shortness of breath or any respiratory difficulty   Respiratory therapy support as indicated
Problem: Respiratory - Adult  Goal: Achieves optimal ventilation and oxygenation  8/3/2023 1031 by Adrian Healy RCP  Outcome: Progressing  Flowsheets (Taken 8/3/2023 1031)  Achieves optimal ventilation and oxygenation:   Assess for changes in respiratory status   Respiratory therapy support as indicated   Assess for changes in mentation and behavior   Oxygen supplementation based on oxygen saturation or arterial blood gases
Problem: Respiratory - Adult  Goal: Achieves optimal ventilation and oxygenation  8/5/2023 0840 by Larry Carbajal RCP  Flowsheets (Taken 8/5/2023 0840)  Achieves optimal ventilation and oxygenation:   Assess for changes in respiratory status   Respiratory therapy support as indicated  8/5/2023 0556 by Milka Clarke RN  Outcome: Progressing  8/4/2023 2056 by Natanael Conteh RCP  Outcome: Progressing  Flowsheets (Taken 8/4/2023 2056)  Achieves optimal ventilation and oxygenation:   Assess for changes in respiratory status   Oxygen supplementation based on oxygen saturation or arterial blood gases   Assess and instruct to report shortness of breath or any respiratory difficulty   Respiratory therapy support as indicated
Problem: Respiratory - Adult  Goal: Achieves optimal ventilation and oxygenation  8/7/2023 0900 by Joy Cosby RCP  Outcome: Progressing  Flowsheets (Taken 8/7/2023 0900)  Achieves optimal ventilation and oxygenation:   Respiratory therapy support as indicated   Assess and instruct to report shortness of breath or any respiratory difficulty   Oxygen supplementation based on oxygen saturation or arterial blood gases
Problem: Respiratory - Adult  Goal: Achieves optimal ventilation and oxygenation  8/7/2023 2006 by Dede Solorzano RCP  Outcome: Progressing  Flowsheets (Taken 8/7/2023 2006)  Achieves optimal ventilation and oxygenation:   Assess for changes in respiratory status   Respiratory therapy support as indicated   Encourage broncho-pulmonary hygiene including cough, deep breathe, incentive spirometry   Assess and instruct to report shortness of breath or any respiratory difficulty
Problem: Respiratory - Adult  Goal: Achieves optimal ventilation and oxygenation  8/8/2023 0813 by Tavo Farah RCP  Outcome: Progressing  Flowsheets (Taken 8/7/2023 2006 by Nathan Real RCP)  Achieves optimal ventilation and oxygenation:   Assess for changes in respiratory status   Respiratory therapy support as indicated   Encourage broncho-pulmonary hygiene including cough, deep breathe, incentive spirometry   Assess and instruct to report shortness of breath or any respiratory difficulty
Problem: Respiratory - Adult  Goal: Achieves optimal ventilation and oxygenation  8/8/2023 2236 by Daryn Valenzuela RCP  Outcome: Progressing  Flowsheets (Taken 8/8/2023 2236)  Achieves optimal ventilation and oxygenation:   Assess for changes in respiratory status   Position to facilitate oxygenation and minimize respiratory effort
Problem: Respiratory - Adult  Goal: Achieves optimal ventilation and oxygenation  Flowsheets (Taken 8/2/2023 3242)  Achieves optimal ventilation and oxygenation:   Assess for changes in respiratory status   Respiratory therapy support as indicated
Problem: Respiratory - Adult  Goal: Achieves optimal ventilation and oxygenation  Outcome: Progressing  Flowsheets (Taken 7/30/2023 2233)  Achieves optimal ventilation and oxygenation:   Assess for changes in respiratory status   Assess for changes in mentation and behavior   Position to facilitate oxygenation and minimize respiratory effort   Oxygen supplementation based on oxygen saturation or arterial blood gases   Encourage broncho-pulmonary hygiene including cough, deep breathe, incentive spirometry   Assess and instruct to report shortness of breath or any respiratory difficulty   Respiratory therapy support as indicated
Problem: Respiratory - Adult  Goal: Achieves optimal ventilation and oxygenation  Outcome: Progressing  Flowsheets (Taken 8/4/2023 2056)  Achieves optimal ventilation and oxygenation:   Assess for changes in respiratory status   Oxygen supplementation based on oxygen saturation or arterial blood gases   Assess and instruct to report shortness of breath or any respiratory difficulty   Respiratory therapy support as indicated
Pt wearing home bipap at night,nc 2lpm during day.    Problem: Respiratory - Adult  Goal: Achieves optimal ventilation and oxygenation  8/6/2023 0854 by Sophia Lew RCP  Outcome: Progressing
Plan - Patient's Chronic Conditions and Co-Morbidity Symptoms are Monitored and Maintained or Improved:   Monitor and assess patient's chronic conditions and comorbid symptoms for stability, deterioration, or improvement   Collaborate with multidisciplinary team to address chronic and comorbid conditions and prevent exacerbation or deterioration   Update acute care plan with appropriate goals if chronic or comorbid symptoms are exacerbated and prevent overall improvement and discharge   Education given on the importance of maintaining and checking blood sugars. Reviewed and re-educated on current diabetic medication and low carb diet. Patient verbalizes understanding. Problem: Safety - Adult  Goal: Free from fall injury  8/2/2023 1458 by Black Milner, RN  Outcome: Progressing  8/2/2023 0144 by Flakito Mittal, ESTEFANIA  Outcome: Progressing   Fall assessment preformed. Bed in low locked position with call light and tray table within reach. Education given. Will continue to monitor.

## 2023-08-09 NOTE — FLOWSHEET NOTE
Report called to [facility] at [facility] all questions answered. AVS faxed over per nurses request. Called and left VM on wifes phone for update on discharge. Patient discharged with all belongings and scripts.

## 2023-08-10 NOTE — ADT AUTH CERT
Utilization Reviews       8/4/23CSR by Amna Joya RN       Review Status Review Entered   In Primary 8/8/2023 1520       Created By   Amna Joya RN      Criteria Review   DATE: 8/4/23  inpt        RELEVANT BASELINES: (lab values, vitals, o2 amount/delivery, etc.)  RA     PERTINENT UPDATES:  IV decadron, Iv prn antiemetics  Neb tx's, on oxygen   Pt/OT tx and eval     VITALS:122, 109/50, 20, 97.5 and 88%PAP        ABNL/PERTINENT LABS/RADIOLOGY/DIAGNOSTIC STUDIES:  BUN,BUNPL: 32 (H)  Glucose, Random: 175 (H)  RBC: 4.08 (L)  Hemoglobin Quant: 12.7 (L)  Hematocrit: 39.1 (L)  RDW: 18.8 (H)     PHYSICAL EXAM:  General appearance:  alert, cooperative and no distress  Mental Status:  oriented to person, place and time and normal affect  Lungs:  clear to auscultation bilaterally, normal effort  Heart:  regular rate and rhythm, no murmur  Abdomen:  soft, nontender, nondistended, normal bowel sounds, no masses, hepatomegaly, splenomegaly  Extremities:  no edema, redness, tenderness in the calves  Skin:  no gross lesions, rashes, induration     MD CONSULTS/ASSESSMENT AND PLAN:  IM note:     Plan:     1. Acute hypoxic respiratory failure secondary to COVID-19  1. Continue Paxlovid and Decadron  2. Acute delirium  1. Resolved, likely secondary to sleep deprivation  3. SVT  1. Resolved  2. Continue Lopressor  4. Chronic hypercapnic respiratory failure/obesity hypoventilation syndrome  1. BiPAP if necessary  5. Recent distal femur fracture  1. Continue PT/OT  6. Essential hypertension  1. Continue Lopressor  7. Chronic diastolic congestive heart failure  1. Continue Lasix        ID note:  Recommendations:  ?          The patient continues on Paxlovid through 8/6/2023  ? The patient remains on Decadron through 8/11/2023  ?           The patient had Pseudomonas aeruginosa isolated in the

## 2023-08-16 ENCOUNTER — TELEPHONE (OUTPATIENT)
Dept: ORTHOPEDIC SURGERY | Age: 78
End: 2023-08-16

## 2023-08-16 NOTE — TELEPHONE ENCOUNTER
Dottie at pts facility called in to cancels pts appointment today (8/16/23) due to no transport. Attempted to schedule next available Monday 8/21 facility declined sooner appointment due to \"needing a weeks notice\" to schedule rides. Stressed the importance to facility staff that the pt present for his post op visits, unclear if writer was understood. Scheduled appointment for 8/23/23 @1:30p per facility request. [facility] also inquired if sutures/staples be removed in facility, informed them sutures/staples will be removed at follow up visit.

## 2023-08-22 ENCOUNTER — TELEPHONE (OUTPATIENT)
Dept: ORTHOPEDIC SURGERY | Age: 78
End: 2023-08-22

## 2023-08-22 NOTE — TELEPHONE ENCOUNTER
Pt is scheduled for staple removal on 8/23/23 with Dr. Mariann Gore. The pt is coming from a nursing home using [employer] transportation. They are dropping pt off at 1:00 that day and picking pt back up promptly at 2:15. If they miss the pickup at 2:15 they will have no alternate transportation. Noble, pt's spouse is very concerned if the timing of the appt will work with the transportation co.  And she is concerned about the staples still being in the pt. Please call Noble to discuss any other recommendations regarding this appt.

## 2023-08-23 ENCOUNTER — OFFICE VISIT (OUTPATIENT)
Dept: ORTHOPEDIC SURGERY | Age: 78
End: 2023-08-23

## 2023-08-23 VITALS — HEIGHT: 70 IN | BODY MASS INDEX: 43.09 KG/M2 | WEIGHT: 301 LBS

## 2023-08-23 DIAGNOSIS — S72.492D OTHER CLOSED FRACTURE OF DISTAL END OF LEFT FEMUR WITH ROUTINE HEALING, SUBSEQUENT ENCOUNTER: Primary | ICD-10-CM

## 2023-08-23 DIAGNOSIS — M25.551 RIGHT HIP PAIN: ICD-10-CM

## 2023-08-23 PROCEDURE — 99024 POSTOP FOLLOW-UP VISIT: CPT | Performed by: STUDENT IN AN ORGANIZED HEALTH CARE EDUCATION/TRAINING PROGRAM

## 2023-08-23 NOTE — PROGRESS NOTES
[address] [facility]  [address] [address]  Dept: [phone]  Dept Fax: [phone]        Orthopaedic Trauma Clinic Follow Up      Subjective:   Date of Surgery: 7/20/23, Left periprosthetic distal femur fracture, right hip injection    Jyotsna Elias is a 78y.o. year old male who presents to the clinic today for routine 5 week followup regarding his surgery listed above. Patient is morbidly obese and presents today in wheelchair. He has not been ambulating with physical therapy due to global fatigue and weakness. He has successfully stood 4 times with physical therapy in the past week. He denies any significant pain in either the right or lower extremity at this time. He works with physical therapy twice daily. Denies any fevers, chills, drainage from the incisions, new falls or trauma, calf pain. He was unable to previously see us earlier for an in office post-op visit due to transportation issues. Patient has been staying at [facility]. Presents with wife today. While in clinic x-rays of the left hip were attempted to be obtained. Due to patient's body habitus, global fatigue and weakness and inability to stand we were unable to transport patient to the x-ray table in office today for images. We will have the facility obtain x-rays of the left hip when they are able. Review of Systems  Gen: no fever, chills, malaise  CV: no chest pain or palpitations  Resp: no cough or shortness of breath  GI: no nausea, vomiting, diarrhea, or constipation  Neuro: no numbness, tingling, or weakness  Msk: bilateral lower extremity weakness  10 remaining systems reviewed and negative    Objective : There were no vitals filed for this visit. There is no height or weight on file to calculate BMI.   General: No acute distress, resting comfortably in the clinic  Neuro: Alert, oriented  Eyes: Extra-ocular muscles

## 2023-10-16 ENCOUNTER — OFFICE VISIT (OUTPATIENT)
Dept: ORTHOPEDIC SURGERY | Age: 78
End: 2023-10-16

## 2023-10-16 VITALS — HEIGHT: 70 IN | BODY MASS INDEX: 43.09 KG/M2 | WEIGHT: 301 LBS

## 2023-10-16 DIAGNOSIS — S72.492D OTHER CLOSED FRACTURE OF DISTAL END OF LEFT FEMUR WITH ROUTINE HEALING, SUBSEQUENT ENCOUNTER: Primary | ICD-10-CM

## 2023-10-16 PROCEDURE — 99024 POSTOP FOLLOW-UP VISIT: CPT | Performed by: STUDENT IN AN ORGANIZED HEALTH CARE EDUCATION/TRAINING PROGRAM

## 2023-10-31 RX ORDER — IPRATROPIUM BROMIDE AND ALBUTEROL SULFATE 2.5; .5 MG/3ML; MG/3ML
1 SOLUTION RESPIRATORY (INHALATION) EVERY 4 HOURS PRN
Qty: 360 ML | Refills: 1 | Status: SHIPPED | OUTPATIENT
Start: 2023-10-31

## 2023-10-31 RX ORDER — IPRATROPIUM BROMIDE AND ALBUTEROL SULFATE 2.5; .5 MG/3ML; MG/3ML
SOLUTION RESPIRATORY (INHALATION)
COMMUNITY
Start: 2023-10-21 | End: 2023-10-31 | Stop reason: SDUPTHER

## 2023-10-31 NOTE — TELEPHONE ENCOUNTER
LAST VISIT:   4/6/2023     Future Appointments   Date Time Provider [address]   3/11/2024  1:15 PM DO CARLOS Blancas SPECIA [facility]   4/24/2024  1:00 PM Massimo Simon DO [facility]

## 2023-11-20 ENCOUNTER — OFFICE VISIT (OUTPATIENT)
Dept: PRIMARY CARE CLINIC | Age: 78
End: 2023-11-20

## 2023-11-20 VITALS
SYSTOLIC BLOOD PRESSURE: 126 MMHG | OXYGEN SATURATION: 96 % | BODY MASS INDEX: 42.14 KG/M2 | WEIGHT: 301 LBS | HEIGHT: 71 IN | HEART RATE: 59 BPM | DIASTOLIC BLOOD PRESSURE: 74 MMHG

## 2023-11-20 DIAGNOSIS — R73.9 HYPERGLYCEMIA: ICD-10-CM

## 2023-11-20 DIAGNOSIS — M97.8XXA PERIPROSTHETIC FRACTURE OF SHAFT OF FEMUR: ICD-10-CM

## 2023-11-20 DIAGNOSIS — S81.812A NONINFECTED SKIN TEAR OF LEG, LEFT, INITIAL ENCOUNTER: ICD-10-CM

## 2023-11-20 DIAGNOSIS — Z91.81 AT HIGH RISK FOR FALLS: ICD-10-CM

## 2023-11-20 DIAGNOSIS — B37.89 CANDIDA RASH OF GROIN: ICD-10-CM

## 2023-11-20 DIAGNOSIS — Z09 HOSPITAL DISCHARGE FOLLOW-UP: Primary | ICD-10-CM

## 2023-11-20 DIAGNOSIS — R73.03 PREDIABETES: ICD-10-CM

## 2023-11-20 DIAGNOSIS — Z96.649 PERIPROSTHETIC FRACTURE OF SHAFT OF FEMUR: ICD-10-CM

## 2023-11-20 LAB — HBA1C MFR BLD: 5.9 %

## 2023-11-20 RX ORDER — NYSTATIN 100000 [USP'U]/G
POWDER TOPICAL
Qty: 15 G | Refills: 1 | Status: SHIPPED | OUTPATIENT
Start: 2023-11-20

## 2023-11-20 NOTE — PROGRESS NOTES
Post-Discharge Transitional Care  Follow Up      Lawyer Brewster   YOB: 1945    Date of Office Visit:  11/20/2023  Date of Hospital Admission: 7/30/23  Date of Hospital Discharge: 8/9/23  Risk of hospital readmission (high >=14%. Medium >=10%) :Readmission Risk Score: 19.3      Care management risk score Rising risk (score 2-5) and Complex Care (Scores >=6): No Risk Score On File     Non face to face  following discharge, date last encounter closed (first attempt may have been earlier): *No documented post hospital discharge outreach found in the last 14 days    Call initiated 2 business days of discharge: *No response recorded in the last 14 days    ASSESSMENT/PLAN:   Hospital discharge follow-up  -     ID DISCHARGE MEDS RECONCILED W/ CURRENT OUTPATIENT MED LIST  Hyperglycemia  -     POCT glycosylated hemoglobin (Hb A1C)  Candida rash of groin  -     nystatin (MYCOSTATIN) 057831 UNIT/GM powder; Apply 3 times daily. , Disp-15 g, R-1, Normal  Noninfected skin tear of leg, left, initial encounter  Prediabetes  Periprosthetic fracture of shaft of femur  At high risk for falls      Medical Decision Making: high complexity  Return in about 1 month (around 12/20/2023) for recheck with Dr. Bianka Bowers. POC A1c 5.9%. Patient should keep left leg wound clean. Continue diuretic to keep edema down, which will allow wound to heal.  Cleaned and wrapped prior to patient leaving the office  Advised that he continue the Jardiance as it seems to help his A1c. Continue home health and PT  Nystatin for rash  Follow-up in 1 month for recheck with Dr. Bianka Bowers. Subjective:   HPI:  Follow up of Hospital problems/diagnosis(es): COVID pneumonia, left femur fracture with hardware. Patient has followed-up with orthopedics. Missed his other follow-up due to persistent weakness. Was discharged from skilled nursing 10/28/2023. Has home health. Having trouble getting in and out of house still.  Activity is limited by

## 2023-12-04 RX ORDER — IPRATROPIUM BROMIDE AND ALBUTEROL SULFATE 2.5; .5 MG/3ML; MG/3ML
SOLUTION RESPIRATORY (INHALATION)
Qty: 720 ML | Refills: 3 | Status: SHIPPED | OUTPATIENT
Start: 2023-12-04

## 2023-12-07 RX ORDER — METOPROLOL TARTRATE 37.5 MG/1
37.5 TABLET, FILM COATED ORAL 2 TIMES DAILY
Qty: 270 TABLET | Refills: 3 | Status: SHIPPED | OUTPATIENT
Start: 2023-12-07 | End: 2023-12-08

## 2023-12-07 NOTE — TELEPHONE ENCOUNTER
Last Appt:  10/25/2023  Next Appt:   Visit date not found  Med verified in [address] [place] [address]

## 2023-12-08 NOTE — TELEPHONE ENCOUNTER
Last Appt:  10/25/2023  Next Appt:   Visit date not found  Med verified in [address] [place] [address]

## 2023-12-22 PROBLEM — S81.802A LEG WOUND, LEFT: Status: ACTIVE | Noted: 2023-12-22

## 2023-12-22 PROBLEM — S31.809A WOUND OF BUTTOCK: Status: ACTIVE | Noted: 2023-12-22

## 2024-01-23 DIAGNOSIS — S81.802A WOUND OF LEFT LOWER EXTREMITY, INITIAL ENCOUNTER: ICD-10-CM

## 2024-01-23 DIAGNOSIS — S31.809A WOUND OF BUTTOCK, UNSPECIFIED LATERALITY, INITIAL ENCOUNTER: ICD-10-CM

## 2024-01-23 RX ORDER — TRAMADOL HYDROCHLORIDE 50 MG/1
50 TABLET ORAL EVERY 6 HOURS PRN
Qty: 30 TABLET | Refills: 0 | Status: SHIPPED | OUTPATIENT
Start: 2024-01-23 | End: 2024-02-22

## 2024-01-30 DIAGNOSIS — S81.802A WOUND OF LEFT LOWER EXTREMITY, INITIAL ENCOUNTER: ICD-10-CM

## 2024-01-30 DIAGNOSIS — S31.809A WOUND OF BUTTOCK, UNSPECIFIED LATERALITY, INITIAL ENCOUNTER: ICD-10-CM

## 2024-01-30 RX ORDER — TRAMADOL HYDROCHLORIDE 50 MG/1
50 TABLET ORAL EVERY 6 HOURS PRN
Qty: 180 TABLET | Refills: 0 | Status: SHIPPED | OUTPATIENT
Start: 2024-01-30 | End: 2024-02-01 | Stop reason: SDUPTHER

## 2024-01-30 RX ORDER — TRAMADOL HYDROCHLORIDE 50 MG/1
50 TABLET ORAL EVERY 6 HOURS PRN
Qty: 30 TABLET | Refills: 0 | Status: CANCELLED | OUTPATIENT
Start: 2024-01-30 | End: 2024-02-29

## 2024-01-30 NOTE — TELEPHONE ENCOUNTER
Directions are every 6 hours as needed- looks like it was sent in for short Rx while I was out of the office.  New Rx sent in to [facility].  Should be every 6 hours IF NEEDED

## 2024-01-30 NOTE — TELEPHONE ENCOUNTER
Patient states he is almost out of meds because the directions say to take one tablet every 6 hours so he has been taking 4 daily but the last refill was sent for 30 pills.

## 2024-01-30 NOTE — TELEPHONE ENCOUNTER
LAST VISIT:   12/22/2023     Future Appointments   Date Time Provider Department Center   3/29/2024  2:15 PM Gilma Barba MD [facility]   4/8/2024  1:15 PM Tad Danielle, DO [facility]TH[facility]   4/24/2024  1:00 PM Massimo Simon, DO [facility]       Pharmacy verified? Yes    [facility] - [address] [phone] -  [phone]

## 2024-02-01 DIAGNOSIS — S31.809A WOUND OF BUTTOCK, UNSPECIFIED LATERALITY, INITIAL ENCOUNTER: ICD-10-CM

## 2024-02-01 DIAGNOSIS — S81.802A WOUND OF LEFT LOWER EXTREMITY, INITIAL ENCOUNTER: ICD-10-CM

## 2024-02-01 RX ORDER — TRAMADOL HYDROCHLORIDE 50 MG/1
50 TABLET ORAL EVERY 6 HOURS PRN
Qty: 180 TABLET | Refills: 0 | Status: SHIPPED | OUTPATIENT
Start: 2024-02-01 | End: 2024-03-02

## 2024-02-01 NOTE — TELEPHONE ENCOUNTER
Family notified mail order prescription sent in.  Prescription was canceled at [facility] pharmacy.

## 2024-02-01 NOTE — TELEPHONE ENCOUNTER
Patient advised to only take \"IF\" needed.  Voices understanding.  States he is only taking two a day.   Patient states he does not want this at [facility], he would like it sent to [facility]

## 2024-02-16 DIAGNOSIS — S31.809A WOUND OF BUTTOCK, UNSPECIFIED LATERALITY, INITIAL ENCOUNTER: ICD-10-CM

## 2024-02-16 DIAGNOSIS — S81.802A WOUND OF LEFT LOWER EXTREMITY, INITIAL ENCOUNTER: ICD-10-CM

## 2024-02-16 RX ORDER — TRAMADOL HYDROCHLORIDE 50 MG/1
50 TABLET ORAL EVERY 6 HOURS PRN
Qty: 180 TABLET | Refills: 0 | Status: SHIPPED | OUTPATIENT
Start: 2024-02-16 | End: 2024-03-17

## 2024-02-21 DIAGNOSIS — S81.802A WOUND OF LEFT LOWER EXTREMITY, INITIAL ENCOUNTER: ICD-10-CM

## 2024-02-21 DIAGNOSIS — S31.809A WOUND OF BUTTOCK, UNSPECIFIED LATERALITY, INITIAL ENCOUNTER: ICD-10-CM

## 2024-02-21 RX ORDER — TRAMADOL HYDROCHLORIDE 50 MG/1
50 TABLET ORAL EVERY 6 HOURS PRN
Qty: 120 TABLET | Refills: 0 | Status: SHIPPED | OUTPATIENT
Start: 2024-02-21 | End: 2024-03-22

## 2024-02-21 NOTE — TELEPHONE ENCOUNTER
Per Pharmacy they will no longer dispense opioid medication more than 30 supply at a time. Quantity needs changed to 120 instead of 80 for tramadol.    Order fixed and pended.

## 2024-03-04 RX ORDER — IPRATROPIUM BROMIDE AND ALBUTEROL SULFATE 2.5; .5 MG/3ML; MG/3ML
SOLUTION RESPIRATORY (INHALATION)
Qty: 1620 ML | Refills: 3 | Status: SHIPPED | OUTPATIENT
Start: 2024-03-04

## 2024-03-29 DIAGNOSIS — S81.802A WOUND OF LEFT LOWER EXTREMITY, INITIAL ENCOUNTER: ICD-10-CM

## 2024-03-29 DIAGNOSIS — S31.809A WOUND OF BUTTOCK, UNSPECIFIED LATERALITY, INITIAL ENCOUNTER: ICD-10-CM

## 2024-03-29 RX ORDER — LEVOTHYROXINE SODIUM 0.03 MG/1
TABLET ORAL
Qty: 90 TABLET | Refills: 3 | Status: SHIPPED | OUTPATIENT
Start: 2024-03-29

## 2024-03-29 RX ORDER — TRAMADOL HYDROCHLORIDE 50 MG/1
50 TABLET ORAL EVERY 6 HOURS PRN
Qty: 120 TABLET | Refills: 0 | Status: SHIPPED | OUTPATIENT
Start: 2024-03-29 | End: 2024-04-28

## 2024-03-29 RX ORDER — ATORVASTATIN CALCIUM 10 MG/1
10 TABLET, FILM COATED ORAL DAILY
Qty: 90 TABLET | Refills: 3 | Status: SHIPPED | OUTPATIENT
Start: 2024-03-29

## 2024-03-29 RX ORDER — ALLOPURINOL 300 MG/1
300 TABLET ORAL 2 TIMES DAILY
Qty: 180 TABLET | Refills: 3 | Status: SHIPPED | OUTPATIENT
Start: 2024-03-29

## 2024-04-03 ENCOUNTER — OFFICE VISIT (OUTPATIENT)
Dept: PRIMARY CARE CLINIC | Age: 79
End: 2024-04-03
Payer: MEDICARE

## 2024-04-03 VITALS
OXYGEN SATURATION: 92 % | BODY MASS INDEX: 42.39 KG/M2 | DIASTOLIC BLOOD PRESSURE: 72 MMHG | WEIGHT: 302.8 LBS | SYSTOLIC BLOOD PRESSURE: 118 MMHG | HEART RATE: 75 BPM | HEIGHT: 71 IN

## 2024-04-03 DIAGNOSIS — Z85.46 HISTORY OF PROSTATE CANCER: ICD-10-CM

## 2024-04-03 DIAGNOSIS — N28.9 RENAL INSUFFICIENCY: ICD-10-CM

## 2024-04-03 DIAGNOSIS — E03.9 ACQUIRED HYPOTHYROIDISM: ICD-10-CM

## 2024-04-03 DIAGNOSIS — R73.9 HYPERGLYCEMIA: ICD-10-CM

## 2024-04-03 DIAGNOSIS — N18.31 STAGE 3A CHRONIC KIDNEY DISEASE (HCC): ICD-10-CM

## 2024-04-03 DIAGNOSIS — R20.0 NUMBNESS: ICD-10-CM

## 2024-04-03 DIAGNOSIS — R20.0 NUMBNESS: Primary | ICD-10-CM

## 2024-04-03 LAB
ALBUMIN SERPL-MCNC: 4 G/DL (ref 3.5–5.2)
ALBUMIN/GLOBULIN RATIO: 1 (ref 1–2.5)
ALP BLD-CCNC: 109 U/L (ref 40–129)
ALT SERPL-CCNC: 11 U/L (ref 10–50)
ANION GAP SERPL CALCULATED.3IONS-SCNC: 13 MMOL/L (ref 9–16)
AST SERPL-CCNC: 18 U/L (ref 10–50)
BASOPHILS ABSOLUTE: 0.03 K/UL (ref 0–0.2)
BASOPHILS RELATIVE PERCENT: 1 % (ref 0–2)
BILIRUB SERPL-MCNC: 0.5 MG/DL (ref 0–1.2)
BUN BLDV-MCNC: 58 MG/DL (ref 8–23)
CALCIUM SERPL-MCNC: 9.6 MG/DL (ref 8.6–10.4)
CHLORIDE BLD-SCNC: 101 MMOL/L (ref 98–107)
CO2: 24 MMOL/L (ref 20–31)
CREAT SERPL-MCNC: 1.8 MG/DL (ref 0.7–1.2)
EOSINOPHILS ABSOLUTE: 0.4 K/UL (ref 0–0.44)
EOSINOPHILS RELATIVE PERCENT: 6 % (ref 1–4)
ESTIMATED AVERAGE GLUCOSE: 126 MG/DL
FOLATE: 13.2 NG/ML (ref 4.8–24.2)
GFR SERPL CREATININE-BSD FRML MDRD: 38 ML/MIN/1.73M2
GLUCOSE BLD-MCNC: 116 MG/DL (ref 74–99)
HBA1C MFR BLD: 6 % (ref 4–6)
HCT VFR BLD CALC: 47.6 % (ref 40.7–50.3)
HEMOGLOBIN: 15 G/DL (ref 13–17)
IMMATURE GRANULOCYTES %: 0 %
IMMATURE GRANULOCYTES ABSOLUTE: <0.03 K/UL (ref 0–0.3)
LYMPHOCYTES ABSOLUTE: 1.47 K/UL (ref 1.1–3.7)
LYMPHOCYTES RELATIVE PERCENT: 24 % (ref 24–43)
MCH RBC QN AUTO: 32.7 PG (ref 25.2–33.5)
MCHC RBC AUTO-ENTMCNC: 31.5 G/DL (ref 28.4–34.8)
MCV RBC AUTO: 103.7 FL (ref 82.6–102.9)
MONOCYTES ABSOLUTE: 0.46 K/UL (ref 0.1–1.2)
MONOCYTES RELATIVE PERCENT: 7 % (ref 3–12)
NEUTROPHILS ABSOLUTE: 3.86 K/UL (ref 1.5–8.1)
NEUTROPHILS RELATIVE PERCENT: 62 % (ref 36–65)
NRBC AUTOMATED: 0 PER 100 WBC
PDW BLD-RTO: 17.4 % (ref 11.8–14.4)
PLATELET # BLD: 209 K/UL (ref 138–453)
PMV BLD AUTO: 10.1 FL (ref 8.1–13.5)
POTASSIUM SERPL-SCNC: 5 MMOL/L (ref 3.7–5.3)
PROSTATE SPECIFIC ANTIGEN: <0.03 NG/ML (ref 0–4)
RBC # BLD: 4.59 M/UL (ref 4.21–5.77)
RBC # BLD: ABNORMAL 10*6/UL
RBC # BLD: ABNORMAL 10*6/UL
SODIUM BLD-SCNC: 138 MMOL/L (ref 136–145)
TOTAL PROTEIN: 6.7 G/DL (ref 6.6–8.7)
TSH SERPL DL<=0.05 MIU/L-ACNC: 2.48 UIU/ML (ref 0.27–4.2)
VITAMIN B-12: >2000 PG/ML (ref 232–1245)
VITAMIN D 25-HYDROXY: 41.2 NG/ML (ref 30–100)
WBC # BLD: 6.2 K/UL (ref 3.5–11.3)

## 2024-04-03 PROCEDURE — 3074F SYST BP LT 130 MM HG: CPT | Performed by: FAMILY MEDICINE

## 2024-04-03 PROCEDURE — 3078F DIAST BP <80 MM HG: CPT | Performed by: FAMILY MEDICINE

## 2024-04-03 PROCEDURE — 99213 OFFICE O/P EST LOW 20 MIN: CPT | Performed by: FAMILY MEDICINE

## 2024-04-03 PROCEDURE — 1123F ACP DISCUSS/DSCN MKR DOCD: CPT | Performed by: FAMILY MEDICINE

## 2024-04-03 ASSESSMENT — ENCOUNTER SYMPTOMS
SHORTNESS OF BREATH: 0
COUGH: 0

## 2024-04-03 ASSESSMENT — PATIENT HEALTH QUESTIONNAIRE - PHQ9
SUM OF ALL RESPONSES TO PHQ QUESTIONS 1-9: 0
2. FEELING DOWN, DEPRESSED OR HOPELESS: NOT AT ALL
SUM OF ALL RESPONSES TO PHQ9 QUESTIONS 1 & 2: 0
1. LITTLE INTEREST OR PLEASURE IN DOING THINGS: NOT AT ALL
SUM OF ALL RESPONSES TO PHQ QUESTIONS 1-9: 0

## 2024-04-03 NOTE — PROGRESS NOTES
time.      Comments: Has feeling in right hand c/t left, but not as good.  No swelling or pain or color change.    Psychiatric:         Mood and Affect: Mood normal.         Behavior: Behavior normal.         Thought Content: Thought content normal.         Assessment/Plan:   1. Numbness  -     PSA, Diagnostic; Future  -     Comprehensive Metabolic Panel; Future  -     CBC with Auto Differential; Future  -     Hemoglobin A1C; Future  -     Vitamin B12 & Folate; Future  -     Vitamin D 25 Hydroxy; Future  -     TSH; Future  2. Renal insufficiency  -     PSA, Diagnostic; Future  -     Comprehensive Metabolic Panel; Future  -     CBC with Auto Differential; Future  -     Hemoglobin A1C; Future  -     Vitamin B12 & Folate; Future  -     Vitamin D 25 Hydroxy; Future  -     TSH; Future  3. History of prostate cancer  -     PSA, Diagnostic; Future  -     Comprehensive Metabolic Panel; Future  -     CBC with Auto Differential; Future  -     Hemoglobin A1C; Future  -     Vitamin B12 & Folate; Future  -     Vitamin D 25 Hydroxy; Future  -     TSH; Future  4. Hyperglycemia  -     PSA, Diagnostic; Future  -     Comprehensive Metabolic Panel; Future  -     CBC with Auto Differential; Future  -     Hemoglobin A1C; Future  -     Vitamin B12 & Folate; Future  -     Vitamin D 25 Hydroxy; Future  -     TSH; Future  5. Acquired hypothyroidism  -     PSA, Diagnostic; Future  -     Comprehensive Metabolic Panel; Future  -     CBC with Auto Differential; Future  -     Hemoglobin A1C; Future  -     Vitamin B12 & Folate; Future  -     Vitamin D 25 Hydroxy; Future  -     TSH; Future  6. Stage 3a chronic kidney disease (HCC)  -     PSA, Diagnostic; Future  -     Comprehensive Metabolic Panel; Future  -     CBC with Auto Differential; Future  -     Hemoglobin A1C; Future  -     Vitamin B12 & Folate; Future  -     Vitamin D 25 Hydroxy; Future  -     TSH; Future       No follow-ups on file.  Data Unavailable     Orders Placed This Encounter

## 2024-04-04 DIAGNOSIS — R20.0 NUMBNESS: ICD-10-CM

## 2024-04-04 DIAGNOSIS — N18.31 STAGE 3A CHRONIC KIDNEY DISEASE (HCC): Primary | ICD-10-CM

## 2024-04-04 NOTE — RESULT ENCOUNTER NOTE
Adv pt labs show kidney function is getting worse. Push the water a bit and recheck labs on Monday.  Order is in the computer. - no sign of any other cause for his numbness.  Most likely due to a pinched nerve somewhere.  Would recommend doing an EMG which will help tell us where the numbness is coming from.  EMG order in.

## 2024-04-05 DIAGNOSIS — N17.9 AKI (ACUTE KIDNEY INJURY) (HCC): Primary | ICD-10-CM

## 2024-04-05 NOTE — RESULT ENCOUNTER NOTE
I would say we need to decrease them.  Stop his lasix but continue the aldactone. Recheck labs in a week.

## 2024-04-10 ENCOUNTER — OFFICE VISIT (OUTPATIENT)
Dept: ORTHOPEDIC SURGERY | Age: 79
End: 2024-04-10
Payer: MEDICARE

## 2024-04-10 DIAGNOSIS — M25.551 RIGHT HIP PAIN: Primary | ICD-10-CM

## 2024-04-10 PROCEDURE — 1123F ACP DISCUSS/DSCN MKR DOCD: CPT | Performed by: ORTHOPAEDIC SURGERY

## 2024-04-10 PROCEDURE — 99213 OFFICE O/P EST LOW 20 MIN: CPT | Performed by: ORTHOPAEDIC SURGERY

## 2024-04-10 NOTE — PROGRESS NOTES
BY MOUTH TWICE A  DAY (Patient taking differently: Take 1 tablet by mouth), Disp: 180 tablet, Rfl: 3    JARDIANCE 10 MG tablet, TAKE 1 TABLET DAILY, Disp: 90 tablet, Rfl: 3    psyllium (KONSYL) 28.3 % PACK, Take 1 packet by mouth at bedtime, Disp: , Rfl:     bisacodyl (DULCOLAX) 5 MG EC tablet, Take 1 tablet by mouth daily as needed for Constipation, Disp: , Rfl:     LYMPHEDEMA PUMP, 1 Device as needed (lymphedema) Bilateral lower extremities (Patient not taking: Reported on 10/25/2023), Disp: 1 each, Rfl: 0    Ascorbic Acid (VITAMIN C) 250 MG tablet, Take 1 tablet by mouth daily, Disp: , Rfl:     Melatonin 10 MG TABS, Take 1 tablet by mouth at bedtime, Disp: , Rfl:     zinc 50 MG CAPS, Take 50 mg by mouth daily, Disp: 30 capsule, Rfl: 3    trospium (SANCTURA) 20 MG tablet, Take 1 tablet by mouth 2 times daily Prescribed by Dr. Llanes, Disp: , Rfl:     Cinnamon 500 MG CAPS, Take 2 capsules by mouth daily, Disp: , Rfl:     Probiotic Product (PROBIOTIC ADVANCED PO), Take by mouth, Disp: , Rfl:     Multiple Vitamins-Minerals (OCUVITE PO), Take 1 tablet by mouth daily, Disp: , Rfl:     Cholecalciferol (VITAMIN D-3) 25 MCG (1000 UT) CAPS, Take 2,000 Units by mouth daily, Disp: , Rfl:   Allergies   Allergen Reactions    Adhesive Tape Itching and Rash    Doxycycline Rash     Social History     Socioeconomic History    Marital status:      Spouse name: Not on file    Number of children: Not on file    Years of education: Not on file    Highest education level: Not on file   Occupational History    Not on file   Tobacco Use    Smoking status: Former     Current packs/day: 0.00     Average packs/day: 2.0 packs/day for 10.0 years (20.0 ttl pk-yrs)     Types: Cigarettes     Start date: 1960     Quit date: 1970     Years since quittin.3    Smokeless tobacco: Never   Vaping Use    Vaping Use: Never used   Substance and Sexual Activity    Alcohol use: Yes     Comment: 2 per month    Drug use: No    Sexual

## 2024-04-24 ENCOUNTER — HOSPITAL ENCOUNTER (OUTPATIENT)
Age: 79
Setting detail: SPECIMEN
Discharge: HOME | End: 2024-04-24

## 2024-04-24 DIAGNOSIS — N17.9 AKI (ACUTE KIDNEY INJURY) (HCC): ICD-10-CM

## 2024-04-24 LAB
ANION GAP SERPL CALCULATED.3IONS-SCNC: 11 MMOL/L (ref 9–16)
BUN SERPL-MCNC: 22 MG/DL (ref 8–23)
CALCIUM SERPL-MCNC: 9.8 MG/DL (ref 8.6–10.4)
CHLORIDE SERPL-SCNC: 108 MMOL/L (ref 98–107)
CO2 SERPL-SCNC: 26 MMOL/L (ref 20–31)
CREAT SERPL-MCNC: 1.2 MG/DL (ref 0.7–1.2)
GFR SERPL CREATININE-BSD FRML MDRD: 61 ML/MIN/1.73M2
GLUCOSE SERPL-MCNC: 88 MG/DL (ref 74–99)
POTASSIUM SERPL-SCNC: 5 MMOL/L (ref 3.7–5.3)
SODIUM SERPL-SCNC: 145 MMOL/L (ref 136–145)

## 2024-04-26 RX ORDER — TROSPIUM CHLORIDE 20 MG/1
20 TABLET, FILM COATED ORAL 2 TIMES DAILY
Qty: 60 TABLET | Refills: 0 | OUTPATIENT
Start: 2024-04-26

## 2024-04-26 NOTE — RESULT ENCOUNTER NOTE
Should continue to take the lisinopril because of his sugars.  Okay to stop the spironolactone if he wants.  Is he having side effects or does it not work?

## 2024-04-29 ENCOUNTER — TELEPHONE (OUTPATIENT)
Dept: PRIMARY CARE CLINIC | Age: 79
End: 2024-04-29

## 2024-04-29 ENCOUNTER — OFFICE VISIT (OUTPATIENT)
Dept: ORTHOPEDIC SURGERY | Age: 79
End: 2024-04-29

## 2024-04-29 VITALS — WEIGHT: 305 LBS | BODY MASS INDEX: 43.67 KG/M2 | HEIGHT: 70 IN

## 2024-04-29 DIAGNOSIS — S81.802A WOUND OF LEFT LOWER EXTREMITY, INITIAL ENCOUNTER: ICD-10-CM

## 2024-04-29 DIAGNOSIS — S72.492D OTHER CLOSED FRACTURE OF DISTAL END OF LEFT FEMUR WITH ROUTINE HEALING, SUBSEQUENT ENCOUNTER: Primary | ICD-10-CM

## 2024-04-29 DIAGNOSIS — S31.809A WOUND OF BUTTOCK, UNSPECIFIED LATERALITY, INITIAL ENCOUNTER: ICD-10-CM

## 2024-04-29 RX ORDER — TRAMADOL HYDROCHLORIDE 50 MG/1
50 TABLET ORAL EVERY 6 HOURS PRN
Qty: 360 TABLET | Refills: 0 | Status: SHIPPED | OUTPATIENT
Start: 2024-04-29 | End: 2024-07-28

## 2024-04-29 NOTE — PROGRESS NOTES
[facility]  [address]  [place] [address]  Dept Phone: [phone]  Dept Fax: [phone]      Orthopaedic Trauma Clinic Follow Up      Subjective:   Date of Surgery: 7/20/2023    Taqueria Gilbert is a 79 y.o. year old male who presents to the clinic today for follow up status post intramedullary nail fixation left interprosthetic femur fracture.  Patient presents to clinic today with his wife.  At the time of the patient surgery, patient also underwent right hip corticosteroid injection.  Patient had 2 months of relief from the injection.  Patient states he has no complaints with his left lower extremity, but has continued right groin pain, and would like to have some sort of intervention performed.  Since my last encounter, patient's wife states he has had bilateral shoulders replaced.  Denies any new injuries or falls.  Ambulates with a walker.    Review of Systems  Gen: no fever, chills, malaise  CV: no chest pain or palpitations  Resp: no cough or shortness of breath  GI: no nausea, vomiting, diarrhea, or constipation  Neuro: no seizures, vertigo, or headache  Msk: Per HPI  10 remaining systems reviewed and negative    Objective :   There were no vitals filed for this visit.Body mass index is 43.76 kg/m².  General: No acute distress, resting comfortably in the clinic  Neuro: alert. oriented  Eyes: Extra-ocular muscles intact  Pulm: Respirations unlabored and regular.  Skin: warm, well perfused  Psych:   Patient has good fund of knowledge and displays understanding of exam, diagnosis, and plan.  Bilateral lower extremities: Mature scars noted throughout left lower extremity from prior surgical intervention.  Patient able to flex and extend left knee without any pain or discomfort.  Patient ambulates with antalgic gait and walker.  Logroll produces right groin pain.  Skin intact.  Compartments soft/compressible.    Radiology:  Imaging studies from today were independently

## 2024-05-01 ENCOUNTER — OFFICE VISIT (OUTPATIENT)
Dept: ORTHOPEDIC SURGERY | Age: 79
End: 2024-05-01
Payer: MEDICARE

## 2024-05-01 ENCOUNTER — TELEPHONE (OUTPATIENT)
Dept: PRIMARY CARE CLINIC | Age: 79
End: 2024-05-01

## 2024-05-01 DIAGNOSIS — M16.11 PRIMARY OSTEOARTHRITIS OF RIGHT HIP: Primary | ICD-10-CM

## 2024-05-01 PROCEDURE — 99213 OFFICE O/P EST LOW 20 MIN: CPT | Performed by: ORTHOPAEDIC SURGERY

## 2024-05-01 PROCEDURE — 1123F ACP DISCUSS/DSCN MKR DOCD: CPT | Performed by: ORTHOPAEDIC SURGERY

## 2024-05-01 NOTE — PROGRESS NOTES
this documentation. All medical record entries made by the scribe were at my direction and in my presence. I have reviewed the chart and discharge instructions and agree that the records reflect my personal performance and is accurate and complete. Kieran Nicholson MD. 05/01/24      Please note that this chart was generated using voice recognition Dragon dictation software.  Although every effort was made to ensure the accuracy of this automated transcription, some errors in transcription may have occurred.

## 2024-05-01 NOTE — TELEPHONE ENCOUNTER
KENNETHI    [facility] no longer dispenses more than a 30-day supply at one itme. They only dispensed 120 of TRAMADOL HCL TAB 50MG to patient rather than 360. The partial qty remaining is processed as 30 day supply refills for the limit available.

## 2024-06-26 RX ORDER — LISINOPRIL 5 MG/1
5 TABLET ORAL DAILY
COMMUNITY
Start: 2024-06-04

## 2024-06-26 RX ORDER — SPIRONOLACTONE 25 MG/1
25 TABLET ORAL DAILY
COMMUNITY
Start: 2024-06-19

## 2024-07-03 ENCOUNTER — HOSPITAL ENCOUNTER (OUTPATIENT)
Dept: PREADMISSION TESTING | Age: 79
Discharge: HOME | End: 2024-07-03
Attending: ORTHOPAEDIC SURGERY | Admitting: ORTHOPAEDIC SURGERY
Payer: MEDICARE

## 2024-07-03 VITALS
SYSTOLIC BLOOD PRESSURE: 114 MMHG | HEART RATE: 72 BPM | HEIGHT: 71 IN | OXYGEN SATURATION: 95 % | TEMPERATURE: 98.1 F | DIASTOLIC BLOOD PRESSURE: 56 MMHG | WEIGHT: 289 LBS | BODY MASS INDEX: 40.46 KG/M2 | RESPIRATION RATE: 22 BRPM

## 2024-07-03 LAB
ABO + RH BLD: NORMAL
ANION GAP SERPL CALCULATED.3IONS-SCNC: 14 MMOL/L (ref 9–17)
ARM BAND NUMBER: NORMAL
BACTERIA URNS QL MICRO: ABNORMAL
BASOPHILS # BLD: 0 K/UL (ref 0–0.2)
BASOPHILS NFR BLD: 0 % (ref 0–2)
BILIRUB UR QL STRIP: NEGATIVE
BLOOD BANK SAMPLE EXPIRATION: NORMAL
BLOOD GROUP ANTIBODIES SERPL: NEGATIVE
BUN SERPL-MCNC: 42 MG/DL (ref 8–23)
CALCIUM SERPL-MCNC: 10.1 MG/DL (ref 8.6–10.4)
CASTS #/AREA URNS LPF: ABNORMAL /LPF
CHLORIDE SERPL-SCNC: 102 MMOL/L (ref 98–107)
CLARITY UR: CLEAR
CO2 SERPL-SCNC: 26 MMOL/L (ref 20–31)
COLOR UR: YELLOW
CREAT SERPL-MCNC: 1.3 MG/DL (ref 0.7–1.2)
EOSINOPHIL # BLD: 0.5 K/UL (ref 0–0.4)
EOSINOPHILS RELATIVE PERCENT: 7 % (ref 0–4)
EPI CELLS #/AREA URNS HPF: ABNORMAL /HPF
ERYTHROCYTE [DISTWIDTH] IN BLOOD BY AUTOMATED COUNT: 17 % (ref 11.5–14.9)
GFR, ESTIMATED: 55 ML/MIN/1.73M2
GLUCOSE SERPL-MCNC: 115 MG/DL (ref 70–99)
GLUCOSE UR STRIP-MCNC: NEGATIVE MG/DL
HCT VFR BLD AUTO: 49.5 % (ref 41–53)
HGB BLD-MCNC: 15.8 G/DL (ref 13.5–17.5)
HGB UR QL STRIP.AUTO: ABNORMAL
KETONES UR STRIP-MCNC: ABNORMAL MG/DL
LEUKOCYTE ESTERASE UR QL STRIP: NEGATIVE
LYMPHOCYTES NFR BLD: 2.2 K/UL (ref 1–4.8)
LYMPHOCYTES RELATIVE PERCENT: 30 % (ref 24–44)
MCH RBC QN AUTO: 33 PG (ref 26–34)
MCHC RBC AUTO-ENTMCNC: 31.9 G/DL (ref 31–37)
MCV RBC AUTO: 103.6 FL (ref 80–100)
MONOCYTES NFR BLD: 0.5 K/UL (ref 0.1–1.3)
MONOCYTES NFR BLD: 7 % (ref 1–7)
NEUTROPHILS NFR BLD: 56 % (ref 36–66)
NEUTS SEG NFR BLD: 4 K/UL (ref 1.3–9.1)
NITRITE UR QL STRIP: NEGATIVE
PH UR STRIP: 5.5 [PH] (ref 5–8)
PLATELET # BLD AUTO: 191 K/UL (ref 150–450)
PMV BLD AUTO: 7.7 FL (ref 6–12)
POTASSIUM SERPL-SCNC: 5.1 MMOL/L (ref 3.7–5.3)
PROT UR STRIP-MCNC: ABNORMAL MG/DL
RBC # BLD AUTO: 4.78 M/UL (ref 4.5–5.9)
RBC #/AREA URNS HPF: ABNORMAL /HPF
SODIUM SERPL-SCNC: 142 MMOL/L (ref 135–144)
SP GR UR STRIP: 1.02 (ref 1–1.03)
UROBILINOGEN UR STRIP-ACNC: NORMAL EU/DL (ref 0–1)
WBC #/AREA URNS HPF: ABNORMAL /HPF
WBC OTHER # BLD: 7.2 K/UL (ref 3.5–11)

## 2024-07-03 PROCEDURE — 87641 MR-STAPH DNA AMP PROBE: CPT

## 2024-07-03 PROCEDURE — 81001 URINALYSIS AUTO W/SCOPE: CPT

## 2024-07-03 PROCEDURE — 86850 RBC ANTIBODY SCREEN: CPT

## 2024-07-03 PROCEDURE — 86901 BLOOD TYPING SEROLOGIC RH(D): CPT

## 2024-07-03 PROCEDURE — 86900 BLOOD TYPING SEROLOGIC ABO: CPT

## 2024-07-03 PROCEDURE — 36415 COLL VENOUS BLD VENIPUNCTURE: CPT

## 2024-07-03 PROCEDURE — 85025 COMPLETE CBC W/AUTO DIFF WBC: CPT

## 2024-07-03 PROCEDURE — 80048 BASIC METABOLIC PNL TOTAL CA: CPT

## 2024-07-03 PROCEDURE — APPSS45 APP SPLIT SHARED TIME 31-45 MINUTES: Performed by: NURSE PRACTITIONER

## 2024-07-03 ASSESSMENT — ENCOUNTER SYMPTOMS
DIARRHEA: 0
VOMITING: 0
WHEEZING: 0
ABDOMINAL PAIN: 0
SHORTNESS OF BREATH: 1
COUGH: 0
CONSTIPATION: 0
NAUSEA: 0
SORE THROAT: 0

## 2024-07-03 NOTE — H&P (VIEW-ONLY)
HISTORY and PHYSICAL  [facility]       NAME:  Taqueria Gilbert  MRN: [id number]   YOB: 1945   Date: 7/3/2024   Age: 79 y.o.  Gender: male       COMPLAINT AND PRESENT HISTORY:     Taqueria Gilbert is 79 y.o.  male, here for preadmission testing related to primary osteoarthritis of right hip with scheduled HIP TOTAL ARTHROPLASTY ASI RIGHT per Dr. Nicholson.     Pt had left femur fracture in which had surgical repair in 07/2023 per Dr. Danielle  Symptoms started several years ago and has been progressively worsening.   Pt c/o pain to right hip with radiation into right groin, down right leg to knee and into right buttocks. .  Describes pain as constant.  Takes tramadol and tylenol with good relief.  Walking and standing aggravates pain.  Nothing in particular helps alleviate symptoms.  Denies numbness and tingling.   Denies recent fall, injury or trauma.  Denies redness or rash to right hip.   Denies hx of MRSA infection.  Has tried injections with temporary relief.     PMHx includes:    Sees Dr. Barba for PCP with upcoming appt in two days for medical clearance.     Chronic diastolic heart failure, Paroxysmal atrial fibrillation/flutter status post ablation and cardioversion in the past, SVT, HTN, Nonobstructive CAD on cardiac catheterization in 07/20,  Dyslipidemia, renal insufficiency:   Associated medications include:lisinopril, aldactone, lasix, metoprolol, Lipitor, Eliquis.   Has SOBOE. Denies recent or current chest pain/pressure, palpitations, headaches, dizziness. Has bilateral lower extremity edema related to lymphedema.   Sees Dr. Simon for cardiology with last appt 04/24/24. Stress test completed on 06/24/24 - see results below. Pt has obtained cardiac risk stratification.     Prediabetes, hyperglycemia:   Associated medications include: jardiance.    Hemoglobin A1C   Date Value Ref Range Status   04/03/2024 6.0 4.0 - 6.0 % Final     Stroke: remote history 1996. Denies residual

## 2024-07-03 NOTE — DISCHARGE INSTRUCTIONS
Pre-op Instructions For Out-Patient Surgery    Medication Instructions:  Please stop herbs and any supplements now (includes vitamins and minerals).    Please contact your surgeon and prescribing physician for pre-op instructions for any blood thinners. Stop Eliquis as directed    If you have inhalers/aerosol treatments at home, please use them the morning of your surgery and bring the inhalers with you to the hospital.    Please take the following medications the morning of your surgery with a sip of water:    Metoprolol, Levothyroxine, Pantoprazole, Nebulizer    Surgery Instructions:  After midnight before surgery:  Do not eat or drink anything, including water, mints, gum, and hard candy.  You may brush your teeth without swallowing.  No smoking, chewing tobacco, or street drugs.    Please shower or bathe before surgery.  If you were given Surgical Scrub Chlorhexidine Gluconate Liquid (CHG), please shower the night before and the morning of your surgery following the detailed instructions you received during your pre-admission visit.     Please do not wear any cologne, lotion, powder, deodorant, jewelry, piercings, perfume, makeup, nail polish, hair accessories, or hair spray on the day of surgery.  Wear loose comfortable clothing.    Leave your valuables at home but bring a payment source for any after-surgery prescriptions you plan to fill at [facility].  Bring a storage case for any glasses/contacts.    An adult who is responsible for you MUST drive you home and should be with you for the first 24 hours after surgery.     If having out-patient knee and foot surgeries, please arrange for planned crutches, walker, or wheelchair before arriving to the hospital.    The Day of Surgery:  Arrive at [facility] Surgery Entrance at the time directed by your surgeon and check in at the desk.     If you have a living will or healthcare power of , please bring a

## 2024-07-03 NOTE — H&P
Gastrointestinal:  Negative for abdominal pain, constipation, diarrhea, nausea and vomiting.   Genitourinary:  Negative for dysuria and hematuria.   Musculoskeletal:  Positive for gait problem.   Skin:  Negative for rash and wound.   Neurological:  Negative for speech difficulty.   Hematological:  Bruises/bleeds easily.         GENERAL PHYSICAL EXAM     Vitals: BP (!) 114/56   Pulse 72   Temp 98.1 °F (36.7 °C)   Resp 22   Ht 1.791 m (5' 10.5\")   Wt 131.1 kg (289 lb)   SpO2 95%   BMI 40.88 kg/m²  Body mass index is 40.88 kg/m².                              Physical Exam  Constitutional:       General: He is not in acute distress.     Appearance: He is well-developed. He is obese. He is not ill-appearing.   HENT:      Head: Normocephalic and atraumatic.      Nose: Nose normal.      Mouth/Throat:      Mouth: Mucous membranes are moist.      Pharynx: Oropharynx is clear. No oropharyngeal exudate or posterior oropharyngeal erythema.   Eyes:      General: No scleral icterus.        Right eye: No discharge.         Left eye: No discharge.      Pupils: Pupils are equal, round, and reactive to light.   Neck:      Trachea: No tracheal deviation.   Cardiovascular:      Rate and Rhythm: Normal rate and regular rhythm.      Heart sounds: Normal heart sounds. No murmur heard.     No friction rub. No gallop.   Pulmonary:      Effort: Pulmonary effort is normal. No respiratory distress.      Breath sounds: Normal breath sounds. No wheezing, rhonchi or rales.   Abdominal:      General: Bowel sounds are normal. There is no distension.      Palpations: Abdomen is soft.      Tenderness: There is no abdominal tenderness. There is no guarding.      Hernia: A hernia (ventral hernia) is present.   Musculoskeletal:      Cervical back: Neck supple.      Right hip: Tenderness and bony tenderness present. Decreased range of motion.      Right lower le+ Pitting Edema present.      Left lower le+ Pitting Edema present.   Skin:      General: Skin is warm and dry.      Coloration: Skin is not jaundiced.      Findings: No bruising, erythema or rash.   Neurological:      General: No focal deficit present.      Mental Status: He is alert and oriented to person, place, and time.      Cranial Nerves: No cranial nerve deficit.      Motor: Weakness (generalized) present.      Gait: Gait abnormal (ambulates with walker).   Psychiatric:         Mood and Affect: Mood normal.        PROVISIONAL DIAGNOSES / SURGERY:      HIP TOTAL ARTHROPLASTY ASI RIGHT    Pre-Op Diagnosis Codes:     * Primary osteoarthritis of right hip [M16.11]     Patient Active Problem List    Diagnosis Date Noted    Colitis 05/02/2022    Wound of buttock 12/22/2023    Leg wound, left 12/22/2023    Prediabetes 11/20/2023    Hypoxia 08/01/2023    COVID-19 07/30/2023    Periprosthetic fracture of shaft of femur 07/19/2023    Localized edema 04/18/2022    Stage 3a chronic kidney disease (HCC) 12/29/2021    Chronic right shoulder pain 07/16/2021    Secondary hyperparathyroidism of renal origin (HCC) 03/29/2021    Atrial flutter (HCC) 06/03/2020    Morbid obesity with BMI of 40.0-44.9, adult (HCC) 06/03/2020    History of prostate cancer 03/26/2020    Osteoarthritis of right glenohumeral joint 07/20/2019    Osteoarthritis of left glenohumeral joint 07/20/2019    Class 3 obesity due to excess calories with serious comorbidity and body mass index (BMI) of 40.0 to 44.9 in adult 07/17/2018    Primary osteoarthritis of left hip 05/15/2018    Arthritis of left hip 05/08/2018    Lumbar radiculopathy 11/15/2017    Spinal stenosis of lumbar region 11/15/2017    Acquired hypothyroidism 07/07/2016    Renal insufficiency 12/20/2011    Anemia 12/20/2011    Hyperglycemia 12/20/2011    Hypertension     Chronic acquired lymphedema     Sleep apnea     History of CVA (cerebrovascular accident)     Hyperlipidemia     Chronic diastolic congestive heart failure (HCC)     Osteoarthritis          Pt has obtained

## 2024-07-04 LAB
MRSA, DNA, NASAL: NEGATIVE
SPECIMEN DESCRIPTION: NORMAL

## 2024-07-05 ENCOUNTER — OFFICE VISIT (OUTPATIENT)
Dept: PRIMARY CARE CLINIC | Age: 79
End: 2024-07-05
Payer: MEDICARE

## 2024-07-05 VITALS
WEIGHT: 292.8 LBS | SYSTOLIC BLOOD PRESSURE: 100 MMHG | HEART RATE: 62 BPM | DIASTOLIC BLOOD PRESSURE: 50 MMHG | BODY MASS INDEX: 41.42 KG/M2 | OXYGEN SATURATION: 94 %

## 2024-07-05 DIAGNOSIS — Z00.00 HEALTHCARE MAINTENANCE: ICD-10-CM

## 2024-07-05 DIAGNOSIS — Z00.00 MEDICARE ANNUAL WELLNESS VISIT, SUBSEQUENT: Primary | ICD-10-CM

## 2024-07-05 DIAGNOSIS — R73.03 PREDIABETES: ICD-10-CM

## 2024-07-05 LAB — HBA1C MFR BLD: 6 %

## 2024-07-05 PROCEDURE — 1123F ACP DISCUSS/DSCN MKR DOCD: CPT | Performed by: FAMILY MEDICINE

## 2024-07-05 PROCEDURE — G0439 PPPS, SUBSEQ VISIT: HCPCS | Performed by: FAMILY MEDICINE

## 2024-07-05 PROCEDURE — 3078F DIAST BP <80 MM HG: CPT | Performed by: FAMILY MEDICINE

## 2024-07-05 PROCEDURE — 83036 HEMOGLOBIN GLYCOSYLATED A1C: CPT | Performed by: FAMILY MEDICINE

## 2024-07-05 PROCEDURE — 3074F SYST BP LT 130 MM HG: CPT | Performed by: FAMILY MEDICINE

## 2024-07-05 SDOH — ECONOMIC STABILITY: FOOD INSECURITY: WITHIN THE PAST 12 MONTHS, THE FOOD YOU BOUGHT JUST DIDN'T LAST AND YOU DIDN'T HAVE MONEY TO GET MORE.: NEVER TRUE

## 2024-07-05 SDOH — ECONOMIC STABILITY: FOOD INSECURITY: WITHIN THE PAST 12 MONTHS, YOU WORRIED THAT YOUR FOOD WOULD RUN OUT BEFORE YOU GOT MONEY TO BUY MORE.: NEVER TRUE

## 2024-07-05 SDOH — ECONOMIC STABILITY: INCOME INSECURITY: HOW HARD IS IT FOR YOU TO PAY FOR THE VERY BASICS LIKE FOOD, HOUSING, MEDICAL CARE, AND HEATING?: NOT HARD AT ALL

## 2024-07-05 ASSESSMENT — PATIENT HEALTH QUESTIONNAIRE - PHQ9
SUM OF ALL RESPONSES TO PHQ QUESTIONS 1-9: 0
1. LITTLE INTEREST OR PLEASURE IN DOING THINGS: NOT AT ALL
SUM OF ALL RESPONSES TO PHQ QUESTIONS 1-9: 0
SUM OF ALL RESPONSES TO PHQ9 QUESTIONS 1 & 2: 0
2. FEELING DOWN, DEPRESSED OR HOPELESS: NOT AT ALL

## 2024-07-05 ASSESSMENT — LIFESTYLE VARIABLES
HOW OFTEN DO YOU HAVE A DRINK CONTAINING ALCOHOL: NEVER
HOW MANY STANDARD DRINKS CONTAINING ALCOHOL DO YOU HAVE ON A TYPICAL DAY: PATIENT DOES NOT DRINK

## 2024-07-08 ENCOUNTER — CLINICAL DOCUMENTATION (OUTPATIENT)
Dept: SPIRITUAL SERVICES | Age: 79
End: 2024-07-08

## 2024-07-08 NOTE — ACP (ADVANCE CARE PLANNING)
Advance Care Planning   Ambulatory ACP Specialist Patient Outreach    Date:  7/8/2024    ACP Specialist:  Yesi Faust LPN    Outreach call to patient in follow-up to ACP Specialist referral from:Gilma Barba MD    [x] PCP  [] Provider   [] Ambulatory Care Management [] Other     For:                  [x] Advance Directive Assistance              [] Complete Portable DNR order              [] Complete POST/POLST/MOST              [] Code Status Discussion             [] Discuss Goals of Care             [] Early ACP Decision-Making              [] Other (Specify)    Date Referral Received:7/5/24    Next Step:   [] ACP scheduled conversation  [] Outreach again in one week               [] Email / Mail ACP Info Sheets  [] Email / Mail Advance Directive   [] Closing referral.  Routing closure to referring provider/staff and to ACP Specialist .    [] Closure letter mailed to patient with invitation to contact ACP Specialist if / when ready.   [x] Other (Specify here):  referral to  for inperson appt.      [x] At this time, Healthcare Decision Maker Is:     Primary Decision Maker: Rosita Gilbert V - Spouse - [phone]         [] Primary agent named in scanned advance directive.    [x] Legal Next of Kin.     [] Unable to determine legal decision maker at this time.    Outreaches:       [x] 1st -  Date:  7/8/24               Intervention:  [x] Spoke with Patient's wife  [] Left Voice mail [] Email / Mail    [] EQUISOhart  [] Other (Specify) :     Outcomes:  Spoke with patient's wife Rosita to discuss ACP.  Wife stated that she would like to have documents done for both her  as well as her.  She is also requesting an in person appointment.  The closest facility for them would be [facility].  Referral forwarded to Christina Duke to schedule with .           [] 2nd -  Date:                 Intervention:  [] Spoke with Patient  [] Left Voice mail [] Email / Mail    [] MyChart  [] Other

## 2024-07-09 ENCOUNTER — CLINICAL DOCUMENTATION (OUTPATIENT)
Dept: SPIRITUAL SERVICES | Age: 79
End: 2024-07-09

## 2024-07-09 NOTE — ACP (ADVANCE CARE PLANNING)
spouse Rosita Gilbert.  An ACP Conversation was scheduled for 7/11/24 at 11:30 AM at PCP office.           []  Additional Outreach -  Date:     (Specify Dates & special circumstances):    Outcomes:         Thank you for this referral.

## 2024-07-11 ENCOUNTER — CLINICAL DOCUMENTATION (OUTPATIENT)
Dept: SPIRITUAL SERVICES | Age: 79
End: 2024-07-11

## 2024-07-11 NOTE — ACP (ADVANCE CARE PLANNING)
Advance Care Planning     Advance Care Planning Clinical Specialist  Conversation Note      Date of ACP Conversation: 7/11/2024    Conversation Conducted with: Patient with Decision Making Capacity    ACP Clinical Specialist: ANDREW De La Torre    Healthcare Decision Maker:     Current Designated Healthcare Decision Maker:     Primary Decision Maker: Dolores Flores - Niece/Nephew - [phone]    Ventilation:  \"If you were in your present state of health and suddenly became very ill and were unable to breathe on your own, what would your preference be about the use of a ventilator (breathing machine) if it were available to you?\"      If the patient would desire the use of ventilator (breathing machine), answer \"yes\".  If not, \"no\": yes    \"If your health worsens and it becomes clear that your chance of recovery is unlikely, what would your preference be about the use of a ventilator (breathing machine) if it were available to you?\"     Would the patient desire the use of ventilator (breathing machine)?: No      Resuscitation  \"CPR works best to restart the heart when there is a sudden event, like a heart attack, in someone who is otherwise healthy. Unfortunately, CPR does not typically restart the heart for people who have serious health conditions or who are very sick.\"    \"In the event your heart stopped as a result of an underlying serious health condition, would you want attempts to be made to restart your heart (answer \"yes\" for attempt to resuscitate) or would you prefer a natural death (answer \"no\" for do not attempt to resuscitate)?\" no       [x] Yes   [] No   Educated Patient / Decision Maker regarding differences between Advance Directives and portable DNR orders.    Length of ACP Conversation in minutes:  70    Conversation Outcomes:  ACP discussion completed and New Advance Directive completed    Follow-up plan:    [] Schedule follow-up conversation to continue planning  [x] Referred individual to

## 2024-07-15 ENCOUNTER — ANESTHESIA EVENT (OUTPATIENT)
Dept: OPERATING ROOM | Age: 79
DRG: 470 | End: 2024-07-15
Payer: MEDICARE

## 2024-07-16 ENCOUNTER — HOSPITAL ENCOUNTER (INPATIENT)
Age: 79
LOS: 2 days | Discharge: NURSING, REHAB OR LONG-TERM CARE | DRG: 470 | End: 2024-07-18
Attending: ORTHOPAEDIC SURGERY | Admitting: ORTHOPAEDIC SURGERY
Payer: MEDICARE

## 2024-07-16 ENCOUNTER — APPOINTMENT (OUTPATIENT)
Dept: GENERAL RADIOLOGY | Age: 79
DRG: 470 | End: 2024-07-16
Attending: ORTHOPAEDIC SURGERY
Payer: MEDICARE

## 2024-07-16 ENCOUNTER — ANESTHESIA (OUTPATIENT)
Dept: OPERATING ROOM | Age: 79
DRG: 470 | End: 2024-07-16
Payer: MEDICARE

## 2024-07-16 DIAGNOSIS — M16.11 PRIMARY OSTEOARTHRITIS OF RIGHT HIP: Primary | ICD-10-CM

## 2024-07-16 PROCEDURE — 7100000001 HC PACU RECOVERY - ADDTL 15 MIN: Performed by: ORTHOPAEDIC SURGERY

## 2024-07-16 PROCEDURE — 6370000000 HC RX 637 (ALT 250 FOR IP): Performed by: ORTHOPAEDIC SURGERY

## 2024-07-16 PROCEDURE — 6360000002 HC RX W HCPCS: Performed by: ORTHOPAEDIC SURGERY

## 2024-07-16 PROCEDURE — 2500000003 HC RX 250 WO HCPCS: Performed by: ORTHOPAEDIC SURGERY

## 2024-07-16 PROCEDURE — 3600000003 HC SURGERY LEVEL 3 BASE: Performed by: ORTHOPAEDIC SURGERY

## 2024-07-16 PROCEDURE — 2580000003 HC RX 258: Performed by: ANESTHESIOLOGY

## 2024-07-16 PROCEDURE — 3700000000 HC ANESTHESIA ATTENDED CARE: Performed by: ORTHOPAEDIC SURGERY

## 2024-07-16 PROCEDURE — 97162 PT EVAL MOD COMPLEX 30 MIN: CPT

## 2024-07-16 PROCEDURE — C1776 JOINT DEVICE (IMPLANTABLE): HCPCS | Performed by: ORTHOPAEDIC SURGERY

## 2024-07-16 PROCEDURE — 2720000010 HC SURG SUPPLY STERILE: Performed by: ORTHOPAEDIC SURGERY

## 2024-07-16 PROCEDURE — 7100000000 HC PACU RECOVERY - FIRST 15 MIN: Performed by: ORTHOPAEDIC SURGERY

## 2024-07-16 PROCEDURE — 2709999900 HC NON-CHARGEABLE SUPPLY: Performed by: ORTHOPAEDIC SURGERY

## 2024-07-16 PROCEDURE — 2500000003 HC RX 250 WO HCPCS: Performed by: ANESTHESIOLOGY

## 2024-07-16 PROCEDURE — 0SR902A REPLACEMENT OF RIGHT HIP JOINT WITH METAL ON POLYETHYLENE SYNTHETIC SUBSTITUTE, UNCEMENTED, OPEN APPROACH: ICD-10-PCS | Performed by: ORTHOPAEDIC SURGERY

## 2024-07-16 PROCEDURE — 6360000002 HC RX W HCPCS

## 2024-07-16 PROCEDURE — 97530 THERAPEUTIC ACTIVITIES: CPT

## 2024-07-16 PROCEDURE — 2580000003 HC RX 258: Performed by: ORTHOPAEDIC SURGERY

## 2024-07-16 PROCEDURE — 3600000013 HC SURGERY LEVEL 3 ADDTL 15MIN: Performed by: ORTHOPAEDIC SURGERY

## 2024-07-16 PROCEDURE — 2500000003 HC RX 250 WO HCPCS

## 2024-07-16 PROCEDURE — 6360000002 HC RX W HCPCS: Performed by: ANESTHESIOLOGY

## 2024-07-16 PROCEDURE — 3700000001 HC ADD 15 MINUTES (ANESTHESIA): Performed by: ORTHOPAEDIC SURGERY

## 2024-07-16 PROCEDURE — 1200000000 HC SEMI PRIVATE

## 2024-07-16 PROCEDURE — 97166 OT EVAL MOD COMPLEX 45 MIN: CPT

## 2024-07-16 DEVICE — IMPLANTABLE DEVICE
Type: IMPLANTABLE DEVICE | Site: HIP | Status: FUNCTIONAL
Brand: TAPERLOC COMPLETE MICRO PRIMARY FEMORAL

## 2024-07-16 DEVICE — IMPLANTABLE DEVICE
Type: IMPLANTABLE DEVICE | Site: HIP | Status: FUNCTIONAL
Brand: G7® ACETABULAR SYSTEM

## 2024-07-16 DEVICE — IMPLANTABLE DEVICE
Type: IMPLANTABLE DEVICE | Site: HIP | Status: FUNCTIONAL
Brand: G7® LONGEVITY®

## 2024-07-16 DEVICE — IMPLANTABLE DEVICE
Type: IMPLANTABLE DEVICE | Site: HIP | Status: FUNCTIONAL
Brand: BIOMET®

## 2024-07-16 RX ORDER — ASPIRIN 81 MG/1
81 TABLET ORAL 2 TIMES DAILY
Status: DISCONTINUED | OUTPATIENT
Start: 2024-07-16 | End: 2024-07-18 | Stop reason: HOSPADM

## 2024-07-16 RX ORDER — ONDANSETRON 2 MG/ML
4 INJECTION INTRAMUSCULAR; INTRAVENOUS
Status: COMPLETED | OUTPATIENT
Start: 2024-07-16 | End: 2024-07-16

## 2024-07-16 RX ORDER — OXYCODONE HYDROCHLORIDE 5 MG/1
5 TABLET ORAL EVERY 4 HOURS PRN
Status: DISCONTINUED | OUTPATIENT
Start: 2024-07-16 | End: 2024-07-18 | Stop reason: HOSPADM

## 2024-07-16 RX ORDER — SODIUM CHLORIDE 9 MG/ML
INJECTION, SOLUTION INTRAVENOUS PRN
Status: DISCONTINUED | OUTPATIENT
Start: 2024-07-16 | End: 2024-07-16 | Stop reason: HOSPADM

## 2024-07-16 RX ORDER — LIDOCAINE HYDROCHLORIDE 20 MG/ML
INJECTION, SOLUTION EPIDURAL; INFILTRATION; INTRACAUDAL; PERINEURAL PRN
Status: DISCONTINUED | OUTPATIENT
Start: 2024-07-16 | End: 2024-07-16 | Stop reason: SDUPTHER

## 2024-07-16 RX ORDER — OXYCODONE HYDROCHLORIDE 10 MG/1
10 TABLET ORAL EVERY 4 HOURS PRN
Status: DISCONTINUED | OUTPATIENT
Start: 2024-07-16 | End: 2024-07-18 | Stop reason: HOSPADM

## 2024-07-16 RX ORDER — DIPHENHYDRAMINE HYDROCHLORIDE 50 MG/ML
12.5 INJECTION INTRAMUSCULAR; INTRAVENOUS
Status: COMPLETED | OUTPATIENT
Start: 2024-07-16 | End: 2024-07-16

## 2024-07-16 RX ORDER — ACETAMINOPHEN 500 MG
1000 TABLET ORAL ONCE
Status: COMPLETED | OUTPATIENT
Start: 2024-07-16 | End: 2024-07-16

## 2024-07-16 RX ORDER — ROCURONIUM BROMIDE 10 MG/ML
INJECTION, SOLUTION INTRAVENOUS PRN
Status: DISCONTINUED | OUTPATIENT
Start: 2024-07-16 | End: 2024-07-16 | Stop reason: SDUPTHER

## 2024-07-16 RX ORDER — PROPOFOL 10 MG/ML
INJECTION, EMULSION INTRAVENOUS PRN
Status: DISCONTINUED | OUTPATIENT
Start: 2024-07-16 | End: 2024-07-16 | Stop reason: SDUPTHER

## 2024-07-16 RX ORDER — SODIUM CHLORIDE 0.9 % (FLUSH) 0.9 %
5-40 SYRINGE (ML) INJECTION PRN
Status: DISCONTINUED | OUTPATIENT
Start: 2024-07-16 | End: 2024-07-18 | Stop reason: HOSPADM

## 2024-07-16 RX ORDER — SODIUM CHLORIDE 9 MG/ML
INJECTION, SOLUTION INTRAVENOUS PRN
Status: DISCONTINUED | OUTPATIENT
Start: 2024-07-16 | End: 2024-07-18 | Stop reason: HOSPADM

## 2024-07-16 RX ORDER — GABAPENTIN 600 MG/1
300 TABLET ORAL ONCE
Status: COMPLETED | OUTPATIENT
Start: 2024-07-16 | End: 2024-07-16

## 2024-07-16 RX ORDER — SODIUM CHLORIDE 0.9 % (FLUSH) 0.9 %
5-40 SYRINGE (ML) INJECTION PRN
Status: DISCONTINUED | OUTPATIENT
Start: 2024-07-16 | End: 2024-07-16 | Stop reason: HOSPADM

## 2024-07-16 RX ORDER — EPHEDRINE SULFATE/0.9% NACL/PF 25 MG/5 ML
SYRINGE (ML) INTRAVENOUS PRN
Status: DISCONTINUED | OUTPATIENT
Start: 2024-07-16 | End: 2024-07-16 | Stop reason: SDUPTHER

## 2024-07-16 RX ORDER — SODIUM CHLORIDE, SODIUM LACTATE, POTASSIUM CHLORIDE, CALCIUM CHLORIDE 600; 310; 30; 20 MG/100ML; MG/100ML; MG/100ML; MG/100ML
INJECTION, SOLUTION INTRAVENOUS CONTINUOUS
Status: DISCONTINUED | OUTPATIENT
Start: 2024-07-16 | End: 2024-07-18 | Stop reason: HOSPADM

## 2024-07-16 RX ORDER — FENTANYL CITRATE 0.05 MG/ML
50 INJECTION, SOLUTION INTRAMUSCULAR; INTRAVENOUS EVERY 5 MIN PRN
Status: COMPLETED | OUTPATIENT
Start: 2024-07-16 | End: 2024-07-16

## 2024-07-16 RX ORDER — SODIUM CHLORIDE 0.9 % (FLUSH) 0.9 %
5-40 SYRINGE (ML) INJECTION EVERY 12 HOURS SCHEDULED
Status: DISCONTINUED | OUTPATIENT
Start: 2024-07-16 | End: 2024-07-16 | Stop reason: HOSPADM

## 2024-07-16 RX ORDER — SODIUM CHLORIDE 0.9 % (FLUSH) 0.9 %
5-40 SYRINGE (ML) INJECTION EVERY 12 HOURS SCHEDULED
Status: DISCONTINUED | OUTPATIENT
Start: 2024-07-16 | End: 2024-07-18 | Stop reason: HOSPADM

## 2024-07-16 RX ORDER — ONDANSETRON 2 MG/ML
4 INJECTION INTRAMUSCULAR; INTRAVENOUS EVERY 6 HOURS PRN
Status: DISCONTINUED | OUTPATIENT
Start: 2024-07-16 | End: 2024-07-18 | Stop reason: HOSPADM

## 2024-07-16 RX ORDER — FENTANYL CITRATE 50 UG/ML
INJECTION, SOLUTION INTRAMUSCULAR; INTRAVENOUS PRN
Status: DISCONTINUED | OUTPATIENT
Start: 2024-07-16 | End: 2024-07-16 | Stop reason: SDUPTHER

## 2024-07-16 RX ORDER — DEXAMETHASONE SODIUM PHOSPHATE 10 MG/ML
10 INJECTION, SOLUTION INTRAMUSCULAR; INTRAVENOUS ONCE
Status: COMPLETED | OUTPATIENT
Start: 2024-07-16 | End: 2024-07-16

## 2024-07-16 RX ORDER — SODIUM CHLORIDE, SODIUM LACTATE, POTASSIUM CHLORIDE, CALCIUM CHLORIDE 600; 310; 30; 20 MG/100ML; MG/100ML; MG/100ML; MG/100ML
INJECTION, SOLUTION INTRAVENOUS CONTINUOUS
Status: DISCONTINUED | OUTPATIENT
Start: 2024-07-16 | End: 2024-07-16 | Stop reason: HOSPADM

## 2024-07-16 RX ORDER — ONDANSETRON 2 MG/ML
INJECTION INTRAMUSCULAR; INTRAVENOUS PRN
Status: DISCONTINUED | OUTPATIENT
Start: 2024-07-16 | End: 2024-07-16 | Stop reason: SDUPTHER

## 2024-07-16 RX ORDER — LIDOCAINE HYDROCHLORIDE 10 MG/ML
1 INJECTION, SOLUTION EPIDURAL; INFILTRATION; INTRACAUDAL; PERINEURAL
Status: COMPLETED | OUTPATIENT
Start: 2024-07-16 | End: 2024-07-16

## 2024-07-16 RX ORDER — TRANEXAMIC ACID 100 MG/ML
INJECTION, SOLUTION INTRAVENOUS PRN
Status: DISCONTINUED | OUTPATIENT
Start: 2024-07-16 | End: 2024-07-16 | Stop reason: SDUPTHER

## 2024-07-16 RX ORDER — ACETAMINOPHEN 325 MG/1
650 TABLET ORAL EVERY 6 HOURS
Status: DISCONTINUED | OUTPATIENT
Start: 2024-07-16 | End: 2024-07-18 | Stop reason: HOSPADM

## 2024-07-16 RX ORDER — CALCIUM CHLORIDE 100 MG/ML
INJECTION INTRAVENOUS; INTRAVENTRICULAR PRN
Status: DISCONTINUED | OUTPATIENT
Start: 2024-07-16 | End: 2024-07-16 | Stop reason: ALTCHOICE

## 2024-07-16 RX ORDER — FENTANYL CITRATE 0.05 MG/ML
25 INJECTION, SOLUTION INTRAMUSCULAR; INTRAVENOUS EVERY 5 MIN PRN
Status: DISCONTINUED | OUTPATIENT
Start: 2024-07-16 | End: 2024-07-16 | Stop reason: HOSPADM

## 2024-07-16 RX ADMIN — OXYCODONE HYDROCHLORIDE 10 MG: 10 TABLET ORAL at 14:45

## 2024-07-16 RX ADMIN — EPHEDRINE SULFATE 10 MG: 5 INJECTION INTRAVENOUS at 09:53

## 2024-07-16 RX ADMIN — LIDOCAINE HYDROCHLORIDE 1 ML: 10 INJECTION, SOLUTION EPIDURAL; INFILTRATION; INTRACAUDAL; PERINEURAL at 08:15

## 2024-07-16 RX ADMIN — FENTANYL CITRATE 50 MCG: 0.05 INJECTION, SOLUTION INTRAMUSCULAR; INTRAVENOUS at 12:22

## 2024-07-16 RX ADMIN — Medication 3000 MG: at 17:24

## 2024-07-16 RX ADMIN — FENTANYL CITRATE 25 MCG: 50 INJECTION, SOLUTION INTRAMUSCULAR; INTRAVENOUS at 11:14

## 2024-07-16 RX ADMIN — ONDANSETRON 4 MG: 2 INJECTION INTRAMUSCULAR; INTRAVENOUS at 12:22

## 2024-07-16 RX ADMIN — FENTANYL CITRATE 50 MCG: 0.05 INJECTION, SOLUTION INTRAMUSCULAR; INTRAVENOUS at 13:04

## 2024-07-16 RX ADMIN — PROPOFOL 200 MG: 10 INJECTION, EMULSION INTRAVENOUS at 09:40

## 2024-07-16 RX ADMIN — ONDANSETRON 4 MG: 2 INJECTION INTRAMUSCULAR; INTRAVENOUS at 11:49

## 2024-07-16 RX ADMIN — SODIUM CHLORIDE, POTASSIUM CHLORIDE, SODIUM LACTATE AND CALCIUM CHLORIDE: 600; 310; 30; 20 INJECTION, SOLUTION INTRAVENOUS at 08:11

## 2024-07-16 RX ADMIN — TRANEXAMIC ACID 1000 MG: 100 INJECTION, SOLUTION INTRAVENOUS at 10:05

## 2024-07-16 RX ADMIN — ROCURONIUM BROMIDE 20 MG: 10 INJECTION, SOLUTION INTRAVENOUS at 10:05

## 2024-07-16 RX ADMIN — DIPHENHYDRAMINE HYDROCHLORIDE 12.5 MG: 50 INJECTION INTRAMUSCULAR; INTRAVENOUS at 12:26

## 2024-07-16 RX ADMIN — Medication 3000 MG: at 09:44

## 2024-07-16 RX ADMIN — FENTANYL CITRATE 25 MCG: 50 INJECTION, SOLUTION INTRAMUSCULAR; INTRAVENOUS at 10:05

## 2024-07-16 RX ADMIN — ACETAMINOPHEN 650 MG: 325 TABLET ORAL at 14:45

## 2024-07-16 RX ADMIN — ROCURONIUM BROMIDE 50 MG: 10 INJECTION, SOLUTION INTRAVENOUS at 09:40

## 2024-07-16 RX ADMIN — EPHEDRINE SULFATE 10 MG: 5 INJECTION INTRAVENOUS at 12:00

## 2024-07-16 RX ADMIN — SODIUM CHLORIDE, PRESERVATIVE FREE 10 ML: 5 INJECTION INTRAVENOUS at 21:45

## 2024-07-16 RX ADMIN — FENTANYL CITRATE 50 MCG: 0.05 INJECTION, SOLUTION INTRAMUSCULAR; INTRAVENOUS at 12:28

## 2024-07-16 RX ADMIN — LIDOCAINE HYDROCHLORIDE 100 MG: 20 INJECTION, SOLUTION EPIDURAL; INFILTRATION; INTRACAUDAL; PERINEURAL at 09:40

## 2024-07-16 RX ADMIN — ACETAMINOPHEN 1000 MG: 500 TABLET ORAL at 08:14

## 2024-07-16 RX ADMIN — SODIUM CHLORIDE, POTASSIUM CHLORIDE, SODIUM LACTATE AND CALCIUM CHLORIDE: 600; 310; 30; 20 INJECTION, SOLUTION INTRAVENOUS at 15:32

## 2024-07-16 RX ADMIN — SUGAMMADEX 200 MG: 100 INJECTION, SOLUTION INTRAVENOUS at 11:51

## 2024-07-16 RX ADMIN — DEXAMETHASONE SODIUM PHOSPHATE 10 MG: 10 INJECTION, SOLUTION INTRAMUSCULAR; INTRAVENOUS at 08:11

## 2024-07-16 RX ADMIN — HYDROMORPHONE HYDROCHLORIDE 2 MG: 1 INJECTION, SOLUTION INTRAMUSCULAR; INTRAVENOUS; SUBCUTANEOUS at 16:12

## 2024-07-16 RX ADMIN — FENTANYL CITRATE 50 MCG: 50 INJECTION, SOLUTION INTRAMUSCULAR; INTRAVENOUS at 09:40

## 2024-07-16 RX ADMIN — GABAPENTIN 300 MG: 600 TABLET, FILM COATED ORAL at 08:14

## 2024-07-16 RX ADMIN — TRANEXAMIC ACID 1000 MG: 100 INJECTION, SOLUTION INTRAVENOUS at 11:58

## 2024-07-16 RX ADMIN — FENTANYL CITRATE 50 MCG: 0.05 INJECTION, SOLUTION INTRAMUSCULAR; INTRAVENOUS at 12:46

## 2024-07-16 ASSESSMENT — PAIN DESCRIPTION - LOCATION: LOCATION: HIP

## 2024-07-16 ASSESSMENT — PAIN SCALES - GENERAL
PAINLEVEL_OUTOF10: 10
PAINLEVEL_OUTOF10: 4
PAINLEVEL_OUTOF10: 10
PAINLEVEL_OUTOF10: 2
PAINLEVEL_OUTOF10: 10

## 2024-07-16 ASSESSMENT — PAIN DESCRIPTION - DESCRIPTORS
DESCRIPTORS: DISCOMFORT
DESCRIPTORS: SHARP

## 2024-07-16 ASSESSMENT — ENCOUNTER SYMPTOMS
STRIDOR: 0
SHORTNESS OF BREATH: 0

## 2024-07-16 ASSESSMENT — PAIN - FUNCTIONAL ASSESSMENT
PAIN_FUNCTIONAL_ASSESSMENT: 0-10
PAIN_FUNCTIONAL_ASSESSMENT: 0-10

## 2024-07-16 ASSESSMENT — PAIN DESCRIPTION - ONSET: ONSET: AWAKENED FROM SLEEP

## 2024-07-16 ASSESSMENT — PAIN DESCRIPTION - ORIENTATION: ORIENTATION: RIGHT

## 2024-07-16 ASSESSMENT — LIFESTYLE VARIABLES: SMOKING_STATUS: 0

## 2024-07-16 NOTE — CARE COORDINATION
Case Management Assessment  Initial Evaluation    Date/Time of Evaluation: 7/16/2024 2:35 PM  Assessment Completed by: Meagan Dumont RN    If patient is discharged prior to next notation, then this note serves as note for discharge by case management.    Patient Name: Taqueria Gilbert                   YOB: 1945  Diagnosis: Primary osteoarthritis of right hip [M16.11]                   Date / Time: 7/16/2024  7:13 AM    Patient Admission Status: Inpatient   Readmission Risk (Low < 19, Mod (19-27), High > 27): Readmission Risk Score: 11.4    Current PCP: Gilma Barba MD  PCP verified by CM?      Chart Reviewed: Yes      History Provided by: Patient, Spouse (Rosita Gilbert)  Patient Orientation: Alert and Oriented    Patient Cognition: Alert    Hospitalization in the last 30 days (Readmission):  No    If yes, Readmission Assessment in  Navigator will be completed.    Advance Directives:      Code Status: Full Code   Patient's Primary Decision Maker is:      Primary Decision Maker: Dolores Flores - Niece/Nephew - [phone]    Discharge Planning:    Patient lives with: Spouse/Significant Other Type of Home: House  Primary Care Giver: Spouse  Patient Support Systems include: Spouse/Significant Other, Family Members   Current Financial resources: Medicare  Current community resources: None  Current services prior to admission: Durable Medical Equipment, C-pap            Current DME: Walker            Type of Home Care services:  None    ADLS  Prior functional level: Independent in ADLs/IADLs  Current functional level: Independent in ADLs/IADLs    PT AM-PAC:   /24  OT AM-PAC:   /24    Family can provide assistance at DC: Yes  Would you like Case Management to discuss the discharge plan with any other family members/significant others, and if so, who? Yes (Rosita)  Plans to Return to Present Housing: No (Spouse feels he needs SNF and would like [place] [facility]. [facility] VS. [place]

## 2024-07-16 NOTE — INTERVAL H&P NOTE
Update History & Physical    The patient's History and Physical of July 3, 2024 was reviewed with the patient and I examined the patient. There was no change. The surgical site was confirmed by the patient and me. Pt undergoing for HIP TOTAL ARTHROPLASTY ASI RIGHT per Dr. Nicholson.   Pt Npo since the past midnight, pt took his BP medication and Tylenol.  today with sip of water  Pt denies fever/chills, chest pain or SOB   Pt denies hx of MRSA infection   Pt denies of blood clots  Pt stopped taking Eliquis two days ago   Pt has hx of prolonged emergence from anesthesia. Otherwise, denies personal and family history of complications with anesthesia  Physical exam remains unchanged including cardiac and pulmonary assessment   See nursing flow sheet for vital sings     Lab Results   Component Value Date    WBC 7.2 07/03/2024    HGB 15.8 07/03/2024    HCT 49.5 07/03/2024    .6 (H) 07/03/2024     07/03/2024     Lab Results   Component Value Date/Time     07/03/2024 02:30 PM    K 5.1 07/03/2024 02:30 PM     07/03/2024 02:30 PM    CO2 26 07/03/2024 02:30 PM    BUN 42 07/03/2024 02:30 PM    CREATININE 1.3 07/03/2024 02:30 PM    GLUCOSE 115 07/03/2024 02:30 PM    GLUCOSE 100 04/19/2012 02:20 PM    CALCIUM 10.1 07/03/2024 02:30 PM    LABGLOM 55 07/03/2024 02:30 PM    LABGLOM 61 04/24/2024 02:05 PM      Lab Results   Component Value Date/Time    COLORU Yellow 07/03/2024 02:30 PM    NITRU NEGATIVE 07/03/2024 02:30 PM    GLUCOSEU NEGATIVE 07/03/2024 02:30 PM    KETUA TRACE 07/03/2024 02:30 PM    UROBILINOGEN Normal 07/03/2024 02:30 PM    BILIRUBINUR NEGATIVE 07/03/2024 02:30 PM     Electronically signed by ODNALDO Westbrook CNP on 7/16/2024 at 7:36 AM

## 2024-07-16 NOTE — OP NOTE
Operative Note      Patient: Taqueria Gilbert  YOB: 1945  MRN: [id number]    Date of Procedure: 7/16/2024    Pre-Op Diagnosis Codes:     * Primary osteoarthritis of right hip [M16.11]    Post-Op Diagnosis: Same       Procedure(s):  HIP TOTAL ARTHROPLASTY ASI RIGHT    Surgeon(s):  Kieran Nicholson MD    Assistant:   Resident: Giovanna Mi DO Joe Bielecki, CST    Anesthesia: General    Estimated Blood Loss (mL): 500    Complications: None    Specimens:   * No specimens in log *    Implants:  Implant Name Type Inv. Item Serial No.  Lot No. LRB No. Used Action   G7 FINNED 4 HOLE SHELL 58G - [id number]  G7 FINNED 4 HOLE SHELL 58G  [email or web address] [facility] [id number] Right 1 Implanted   LINER ACET NEUT G 40 MM LONGEVITY G7 - [id number]  LINER ACET NEUT G 40 MM LONGEVITY G7  [email or web address]- [id number] Right 1 Implanted   STEM FEM SZ 13 L111MM NK L39.6MM 133DEG HI OFFSET HIP TI - [id number]  STEM FEM SZ 13 L111MM NK L39.6MM 133DEG HI OFFSET HIP TI  [employer]- [id number] Right 1 Implanted   COCR FEM HD 40MM TYPE 1 STD - [id number]  COCR FEM HD 40MM TYPE 1 STD  [employer]- [id number] Right 1 Implanted         Drains: * No LDAs found *    Findings:  Infection Present At Time Of Surgery (PATOS) (choose all levels that have infection present):  No infection present  Other Findings: Severe degenerative joint disease right hip    Detailed Description of Procedure:         Patient is a 79 y.o. male with a long standing history of DJD of the right hip. The patient has failed all types of conservative treatments including physical therapy, NSAIDs, weight loss counseling, and intra-articular steroid injection. The patient's activities of daily living have become significantly restricted. Having failed conservative treatment, the patient has agreed to proceed  with total hip arthroplasty aware of all risks highlighted in the surgical

## 2024-07-16 NOTE — ANESTHESIA PRE PROCEDURE
Department of Anesthesiology  Preprocedure Note       Name:  Taqueria Gilbert   Age:  79 y.o.  :  1945                                          MRN:  [id number]         Date:  2024      Surgeon: Surgeon(s):  Kieran Nicholson MD    Procedure: Procedure(s):  HIP TOTAL ARTHROPLASTY ASI RIGHT    Medications prior to admission:   Prior to Admission medications    Medication Sig Start Date End Date Taking? Authorizing Provider   lisinopril (PRINIVIL;ZESTRIL) 5 MG tablet 1 tablet daily 24   Lizeth Davenport MD   spironolactone (ALDACTONE) 25 MG tablet 1 tablet daily 24   Lizeth Davenport MD   JARDIANCE 10 MG tablet TAKE 1 TABLET BY MOUTH DAILY 24   Massimo Simon DO   furosemide (LASIX) 40 MG tablet TAKE 1 TABLET BY MOUTH TWICE  DAILY 24   Massimo Simon DO   traMADol (ULTRAM) 50 MG tablet Take 1 tablet by mouth every 6 hours as needed for Pain for up to 90 days. Max Daily Amount: 200 mg 24  Gilma Barba MD   metoprolol tartrate (LOPRESSOR) 25 MG tablet TAKE 1 AND 1/2 TABLETS BY MOUTH  TWICE DAILY 24   Kenzie Fraser MD   allopurinol (ZYLOPRIM) 300 MG tablet TAKE 1 TABLET BY MOUTH TWICE  DAILY 3/29/24   Gilma Barba MD   levothyroxine (SYNTHROID) 25 MCG tablet TAKE 1 TABLET BY MOUTH DAILY 3/29/24   Gilma Barba MD   atorvastatin (LIPITOR) 10 MG tablet TAKE 1 TABLET BY MOUTH DAILY 3/29/24   Gilma Barba MD   ipratropium 0.5 mg-albuterol 2.5 mg (DUONEB) 0.5-2.5 (3) MG/3ML SOLN nebulizer solution USE 1 VIAL VIA NEBULIZER EVERY 4 HOURS AS NEEDED FOR SHORTNESS OF BREATH 3/4/24   Gilma Barba MD   acetaminophen (TYLENOL) 500 MG tablet Take 1 tablet by mouth every 6 hours as needed for Pain    Lizeth Davenport MD   vitamin B-12 (CYANOCOBALAMIN) 100 MCG tablet Take 0.5 tablets by mouth daily    Lizeth Davenport MD   zinc oxide 13 % CREA Apply topically 2 times daily as needed for Rash  Patient not taking: Reported on 
131.1 kg (289 lb)   Height: 1.791 m (5' 10.5\")                                              BP Readings from Last 3 Encounters:   07/16/24 (!) 124/58   07/03/24 (!) 114/56   07/05/24 (!) 100/50       NPO Status: Time of last liquid consumption: 2230                        Time of last solid consumption: 1900                        Date of last liquid consumption: 07/15/24                        Date of last solid food consumption: 07/15/24    BMI:   Wt Readings from Last 3 Encounters:   07/16/24 131.1 kg (289 lb)   07/03/24 131.1 kg (289 lb)   07/05/24 132.8 kg (292 lb 12.8 oz)     Body mass index is 40.88 kg/m².    CBC:   Lab Results   Component Value Date/Time    WBC 7.2 07/03/2024 02:30 PM    RBC 4.78 07/03/2024 02:30 PM    RBC 4.74 03/08/2012 06:24 PM    HGB 15.8 07/03/2024 02:30 PM    HCT 49.5 07/03/2024 02:30 PM    .6 07/03/2024 02:30 PM    RDW 17.0 07/03/2024 02:30 PM     07/03/2024 02:30 PM     03/08/2012 06:24 PM       CMP:   Lab Results   Component Value Date/Time     07/03/2024 02:30 PM    K 5.1 07/03/2024 02:30 PM     07/03/2024 02:30 PM    CO2 26 07/03/2024 02:30 PM    BUN 42 07/03/2024 02:30 PM    CREATININE 1.3 07/03/2024 02:30 PM    GFRAA >60 09/15/2022 06:01 PM    LABGLOM 55 07/03/2024 02:30 PM    LABGLOM 61 04/24/2024 02:05 PM    GLUCOSE 115 07/03/2024 02:30 PM    GLUCOSE 100 04/19/2012 02:20 PM    CALCIUM 10.1 07/03/2024 02:30 PM    BILITOT 0.5 04/03/2024 03:02 PM    ALKPHOS 109 04/03/2024 03:02 PM    AST 18 04/03/2024 03:02 PM    ALT 11 04/03/2024 03:02 PM       POC Tests: No results for input(s): \"POCGLU\", \"POCNA\", \"POCK\", \"POCCL\", \"POCBUN\", \"POCHEMO\", \"POCHCT\" in the last 72 hours.    Coags:   Lab Results   Component Value Date/Time    PROTIME 10.6 07/31/2023 06:44 AM    INR 1.0 07/31/2023 06:44 AM    APTT 26.8 07/31/2023 06:44 AM       HCG (If Applicable): No results found for: \"PREGTESTUR\", \"PREGSERUM\", \"HCG\", \"HCGQUANT\"     ABGs:   Lab Results   Component

## 2024-07-16 NOTE — ANESTHESIA POSTPROCEDURE EVALUATION
Department of Anesthesiology  Postprocedure Note    Patient: Taqueria Gilbert  MRN: [id number]  YOB: 1945  Date of evaluation: 7/16/2024    Procedure Summary       Date: 07/16/24 Room / Location: [address]    Anesthesia Start: 0932 Anesthesia Stop: 1219    Procedure: HIP TOTAL ARTHROPLASTY ASI RIGHT (Right: Hip) Diagnosis:       Primary osteoarthritis of right hip      (Primary osteoarthritis of right hip [M16.11])    Surgeons: Kieran Nicholson MD Responsible Provider: Kimberly Millard MD    Anesthesia Type: General ASA Status: 3            Anesthesia Type: General    Flo Phase I: Flo Score: 8    Flo Phase II:      Anesthesia Post Evaluation    Comments: POST- ANESTHESIA EVALUATION       Pt Name: Taqueria Gilbert  MRN: [id number]  YOB: 1945  Date of evaluation: 7/16/2024  Time:  2:39 PM      /66   Pulse 80   Temp 97.5 °F (36.4 °C)   Resp 16   Ht 1.791 m (5' 10.5\")   Wt 131.1 kg (289 lb)   SpO2 97%   BMI 40.88 kg/m²      Consciousness Level  Awake  Cardiopulmonary Status  Stable  Pain Adequately Treated YES  Nausea / Vomiting  NO  Adequate Hydration  YES  Anesthesia Related Complications NONE      Electronically signed by Kimberly Millard MD on 7/16/2024 at 2:39 PM           No notable events documented.

## 2024-07-16 NOTE — DISCHARGE INSTRUCTIONS
DISCHARGE INSTRUCTIONS  Caring for yourself after joint replacement surgery (Total Hip and Total Knee Replacement)    Activity and Therapy  Receive physical therapy three times per week. (Pain medication one hour prior to therapy)   Perform PT exercises on own when not receiving home or outpatient PT.  Ideally exercises should be at least two times a day.  Increase level of activity and ambulation each day.  Perform deep breathing exercises daily.  Patient provides self-care when possible.  Work on Range of motion for Total knee patients.   No pillow under the knee for Total knee patients.  Elevate the surgical leg when seated.  No driving until cleared by surgeon      Diet:  Increase oral intake of fruits, fiber and water to prevent constipation.  Drink fluids frequently and take stool softeners to aid in bowel motility.  Increase protein intake/reduce high-sugar intake to help promote healing and prevent infection.    Incision Care:  Keep Primaseal or other dressing intact and remove as directed by surgeon, unless saturated, in which case, call surgeon and request instructions.  If dressing falls off, call surgeon.  Luke Hose on in the am and off in the pm to reduce swelling.  Ice affected area four times a day, for twenty minutes.    Pain Medications and Anticoagulant  You have been place on an anticoagulant to prevent blood clots.  Take this medication exactly as prescribed.  Be alert for signs of bleeding.  Take care not to injure yourself.  You have been provided pain medicine to control your pain.  Do not take more narcotics than prescribed.  You may begin weaning from narcotics as your pain level improves by decreasing the amount or frequency of the narcotics.  You may also take plain acetaminophen as an alternate to the narcotics.   Never exceed the recommended dosage.   Ice, rest and elevating the surgical limb also help with pain control.      When to call the Surgeon:  Increased redness, warmth, drainage,

## 2024-07-17 LAB
HCT VFR BLD AUTO: 37.4 % (ref 41–53)
HGB BLD-MCNC: 12.1 G/DL (ref 13.5–17.5)
SARS-COV-2 RDRP RESP QL NAA+PROBE: NOT DETECTED
SPECIMEN DESCRIPTION: NORMAL

## 2024-07-17 PROCEDURE — 36415 COLL VENOUS BLD VENIPUNCTURE: CPT

## 2024-07-17 PROCEDURE — 1200000000 HC SEMI PRIVATE

## 2024-07-17 PROCEDURE — 97530 THERAPEUTIC ACTIVITIES: CPT

## 2024-07-17 PROCEDURE — 85014 HEMATOCRIT: CPT

## 2024-07-17 PROCEDURE — 97110 THERAPEUTIC EXERCISES: CPT

## 2024-07-17 PROCEDURE — 2580000003 HC RX 258: Performed by: ORTHOPAEDIC SURGERY

## 2024-07-17 PROCEDURE — 87635 SARS-COV-2 COVID-19 AMP PRB: CPT

## 2024-07-17 PROCEDURE — 97116 GAIT TRAINING THERAPY: CPT

## 2024-07-17 PROCEDURE — 97535 SELF CARE MNGMENT TRAINING: CPT

## 2024-07-17 PROCEDURE — 6370000000 HC RX 637 (ALT 250 FOR IP): Performed by: ORTHOPAEDIC SURGERY

## 2024-07-17 PROCEDURE — 85018 HEMOGLOBIN: CPT

## 2024-07-17 RX ORDER — OXYCODONE HYDROCHLORIDE AND ACETAMINOPHEN 5; 325 MG/1; MG/1
1 TABLET ORAL EVERY 4 HOURS PRN
Qty: 42 TABLET | Refills: 0 | Status: SHIPPED | OUTPATIENT
Start: 2024-07-17 | End: 2024-07-24

## 2024-07-17 RX ADMIN — OXYCODONE HYDROCHLORIDE 10 MG: 10 TABLET ORAL at 09:29

## 2024-07-17 RX ADMIN — ASPIRIN 81 MG: 81 TABLET, COATED ORAL at 09:30

## 2024-07-17 RX ADMIN — OXYCODONE HYDROCHLORIDE 10 MG: 10 TABLET ORAL at 19:39

## 2024-07-17 RX ADMIN — ACETAMINOPHEN 650 MG: 325 TABLET ORAL at 19:39

## 2024-07-17 RX ADMIN — OXYCODONE HYDROCHLORIDE 10 MG: 10 TABLET ORAL at 23:39

## 2024-07-17 RX ADMIN — SODIUM CHLORIDE, PRESERVATIVE FREE 10 ML: 5 INJECTION INTRAVENOUS at 09:31

## 2024-07-17 RX ADMIN — ACETAMINOPHEN 650 MG: 325 TABLET ORAL at 09:29

## 2024-07-17 RX ADMIN — SODIUM CHLORIDE, POTASSIUM CHLORIDE, SODIUM LACTATE AND CALCIUM CHLORIDE: 600; 310; 30; 20 INJECTION, SOLUTION INTRAVENOUS at 19:38

## 2024-07-17 RX ADMIN — ACETAMINOPHEN 650 MG: 325 TABLET ORAL at 04:10

## 2024-07-17 RX ADMIN — OXYCODONE HYDROCHLORIDE 10 MG: 10 TABLET ORAL at 04:10

## 2024-07-17 RX ADMIN — SODIUM CHLORIDE, POTASSIUM CHLORIDE, SODIUM LACTATE AND CALCIUM CHLORIDE: 600; 310; 30; 20 INJECTION, SOLUTION INTRAVENOUS at 00:20

## 2024-07-17 RX ADMIN — ASPIRIN 81 MG: 81 TABLET, COATED ORAL at 19:39

## 2024-07-17 RX ADMIN — ACETAMINOPHEN 650 MG: 325 TABLET ORAL at 16:11

## 2024-07-17 ASSESSMENT — PAIN DESCRIPTION - DESCRIPTORS
DESCRIPTORS: ACHING;DULL
DESCRIPTORS: DULL;ACHING
DESCRIPTORS: ACHING;DULL
DESCRIPTORS: SHARP;DISCOMFORT
DESCRIPTORS: ACHING

## 2024-07-17 ASSESSMENT — PAIN DESCRIPTION - ORIENTATION
ORIENTATION: RIGHT
ORIENTATION: MID
ORIENTATION: RIGHT
ORIENTATION: RIGHT

## 2024-07-17 ASSESSMENT — PAIN - FUNCTIONAL ASSESSMENT
PAIN_FUNCTIONAL_ASSESSMENT: PREVENTS OR INTERFERES SOME ACTIVE ACTIVITIES AND ADLS

## 2024-07-17 ASSESSMENT — PAIN DESCRIPTION - LOCATION
LOCATION: HIP
LOCATION: HIP;SACRUM
LOCATION: SACRUM
LOCATION: HIP;SACRUM

## 2024-07-17 ASSESSMENT — PAIN SCALES - GENERAL
PAINLEVEL_OUTOF10: 6
PAINLEVEL_OUTOF10: 3
PAINLEVEL_OUTOF10: 10
PAINLEVEL_OUTOF10: 9
PAINLEVEL_OUTOF10: 8
PAINLEVEL_OUTOF10: 8

## 2024-07-17 ASSESSMENT — PAIN SCALES - WONG BAKER
WONGBAKER_NUMERICALRESPONSE: HURTS WHOLE LOT
WONGBAKER_NUMERICALRESPONSE: NO HURT

## 2024-07-17 NOTE — DISCHARGE INSTR - COC
repositioning, distraction - music, tv, reading a book.    Numbness near the incision site is normal - this will improve with time.    NOTE: Ensure/Remind patient to go to their follow-up appointment with their orthopedic surgeon, which is usually scheduled for 7-10 days after the surgery.    Abnormal Conditions - When to call the Surgeon:  Increasing/excessive redness, warmth or swelling at the incisional site not relieved with ice and elevation.  Increasing/excessive pain not well-controlled by prescribed medications.  Drainage or odor from or around the incision site.  (A little blood showing through the bandage is ok, but active leaking, of any color, coming out of the bandage is NOT normal.  Temperature above 101 degrees.  (A mild temp of 99 - 100 is normal - if temp gets to 101 you may use Tylenol once - if it does not improve, you may use a 2nd dose of Tylenol after 5 hours - if temperature is still at or above 101 degrees then call the surgeon.)  Calf tenderness, swelling, or redness or numbness of the foot/lower leg.  Shortness of breath or chest pain.  Any other incision or surgical-related concerns.  CALL SURGEON with concerns PRIOR TO sending patient to hospital.     Patient's personal belongings (please select all that are sent with patient):  All belongings returned to patient    RN SIGNATURE:      CASE MANAGEMENT/SOCIAL WORK SECTION    Inpatient Status Date: 7/16/24    Readmission Risk Assessment Score:  Readmission Risk              Risk of Unplanned Readmission:  13           Discharging to Facility/ Agency   [facility]  Phone: [phone]  Fax: [phone]    / signature: Electronically signed by Meagan Dumont RN on 7/17/24 at 9:53 AM EDT    PHYSICIAN SECTION    Prognosis: Fair    Condition at Discharge: Stable    Rehab Potential (if transferring to Rehab): Fair    Recommended Labs or Other Treatments After Discharge: see above    Physician

## 2024-07-17 NOTE — ANESTHESIA POSTPROCEDURE EVALUATION
Department of Anesthesiology  Postprocedure Note    Patient: Taqueria Gilbert  MRN: [id number]  YOB: 1945  Date of evaluation: 7/17/2024    Procedure Summary       Date: 07/16/24 Room / Location: [address]    Anesthesia Start: 0932 Anesthesia Stop: 1219    Procedure: HIP TOTAL ARTHROPLASTY ASI RIGHT (Right: Hip) Diagnosis:       Primary osteoarthritis of right hip      (Primary osteoarthritis of right hip [M16.11])    Surgeons: Kieran Nicholson MD Responsible Provider: Kimberly Millard MD    Anesthesia Type: General ASA Status: 3            Anesthesia Type: General    Flo Phase I: Flo Score: 8    Flo Phase II:      Anesthesia Post Evaluation    Comments: POD #1 Patient seen sitting up in chair. Had a lot of pain post op. Had called for me to see him yesterday as his wife was wondering why the nerve block was not done. It was explained to him that the nurses felt his pain was under control in PACU before he was transported to the floor. And moreover, most patient don't end up getting the block as the pain from Hip surgery usually gets controlled with IV medications  Otherwise he had no other problems with anesthesia.    No notable events documented.

## 2024-07-17 NOTE — CARE COORDINATION
DISCHARGE PLANNING NOTE:    Left message for Mary at [facility] to make sure referral has been received and to see if they are able to accept.    Electronically signed by Meagan Dumont RN on 7/17/2024 at 9:20 AM    Received call from Alysia at [facility] (covering for Mary). She states she is checking to see if they have a bed available for patient and will get back with writer.    POD#1 RTH.    Electronically signed by Meagan Dumont RN on 7/17/2024 at 9:52 AM    Spoke with Alysia at [facility] who states they do have a bed for patient. Informed patient and he states [facility] is facility of choice.  Writer will start insurance auth via [employer] portal.    Electronically signed by Meagan Dumont RN on 7/17/2024 at 1:04 PM    Insurance authorization submitted via [employer] online portal with pending auth ID: [id number].    Electronically signed by Meagan Dumont RN on 7/17/2024 at 1:17 PM    Received call from Alysia stating rapid Covid is needed. Order placed.    Electronically signed by Meagan Dumont RN on 7/17/2024 at 1:22 PM

## 2024-07-18 VITALS
RESPIRATION RATE: 16 BRPM | SYSTOLIC BLOOD PRESSURE: 124 MMHG | HEIGHT: 71 IN | BODY MASS INDEX: 40.46 KG/M2 | OXYGEN SATURATION: 94 % | WEIGHT: 289 LBS | HEART RATE: 87 BPM | DIASTOLIC BLOOD PRESSURE: 62 MMHG | TEMPERATURE: 97.3 F

## 2024-07-18 PROCEDURE — 97110 THERAPEUTIC EXERCISES: CPT

## 2024-07-18 PROCEDURE — 97530 THERAPEUTIC ACTIVITIES: CPT

## 2024-07-18 PROCEDURE — 97535 SELF CARE MNGMENT TRAINING: CPT

## 2024-07-18 PROCEDURE — 6360000002 HC RX W HCPCS: Performed by: ORTHOPAEDIC SURGERY

## 2024-07-18 PROCEDURE — 6370000000 HC RX 637 (ALT 250 FOR IP): Performed by: ORTHOPAEDIC SURGERY

## 2024-07-18 PROCEDURE — 97116 GAIT TRAINING THERAPY: CPT

## 2024-07-18 RX ORDER — DOCUSATE SODIUM 100 MG/1
100 CAPSULE, LIQUID FILLED ORAL DAILY
Status: DISCONTINUED | OUTPATIENT
Start: 2024-07-18 | End: 2024-07-18 | Stop reason: HOSPADM

## 2024-07-18 RX ADMIN — HYDROMORPHONE HYDROCHLORIDE 2 MG: 1 INJECTION, SOLUTION INTRAMUSCULAR; INTRAVENOUS; SUBCUTANEOUS at 13:33

## 2024-07-18 RX ADMIN — ASPIRIN 81 MG: 81 TABLET, COATED ORAL at 09:10

## 2024-07-18 RX ADMIN — ACETAMINOPHEN 650 MG: 325 TABLET ORAL at 09:10

## 2024-07-18 RX ADMIN — HYDROMORPHONE HYDROCHLORIDE 1 MG: 1 INJECTION, SOLUTION INTRAMUSCULAR; INTRAVENOUS; SUBCUTANEOUS at 09:15

## 2024-07-18 RX ADMIN — OXYCODONE HYDROCHLORIDE 10 MG: 10 TABLET ORAL at 07:18

## 2024-07-18 RX ADMIN — OXYCODONE HYDROCHLORIDE 10 MG: 10 TABLET ORAL at 12:35

## 2024-07-18 RX ADMIN — DOCUSATE SODIUM 100 MG: 100 CAPSULE, LIQUID FILLED ORAL at 09:10

## 2024-07-18 ASSESSMENT — PAIN - FUNCTIONAL ASSESSMENT
PAIN_FUNCTIONAL_ASSESSMENT: PREVENTS OR INTERFERES SOME ACTIVE ACTIVITIES AND ADLS

## 2024-07-18 ASSESSMENT — PAIN DESCRIPTION - ORIENTATION
ORIENTATION: RIGHT

## 2024-07-18 ASSESSMENT — PAIN DESCRIPTION - LOCATION
LOCATION: KNEE

## 2024-07-18 ASSESSMENT — PAIN DESCRIPTION - ONSET
ONSET: GRADUAL
ONSET: AWAKENED FROM SLEEP

## 2024-07-18 ASSESSMENT — PAIN DESCRIPTION - DESCRIPTORS
DESCRIPTORS: ACHING;DISCOMFORT

## 2024-07-18 ASSESSMENT — PAIN SCALES - GENERAL
PAINLEVEL_OUTOF10: 5
PAINLEVEL_OUTOF10: 10
PAINLEVEL_OUTOF10: 3
PAINLEVEL_OUTOF10: 9
PAINLEVEL_OUTOF10: 9
PAINLEVEL_OUTOF10: 3
PAINLEVEL_OUTOF10: 3

## 2024-07-18 ASSESSMENT — PAIN SCALES - WONG BAKER: WONGBAKER_NUMERICALRESPONSE: NO HURT

## 2024-07-18 ASSESSMENT — PAIN DESCRIPTION - PAIN TYPE
TYPE: SURGICAL PAIN

## 2024-07-18 ASSESSMENT — PAIN DESCRIPTION - FREQUENCY
FREQUENCY: CONTINUOUS

## 2024-07-18 NOTE — CARE COORDINATION
DISCHARGE PLANNING NOTE:    Insurance auth obtained for [facility] [place]. Faxed auth to [place].     Transportation tentatively arranged with [employer] for 1pm.    Electronically signed by Meagan Dumont RN on 7/18/2024 at 8:08 AM    Received call from Valeria at [facility] stating Deonte Velazquez can transport at 3pm. This is the soonest available transportation.    Left message for Alysia at [place] to notify of this as well as primary care RN, Kalin.    Electronically signed by Meagan Dumont RN on 7/18/2024 at 8:58 AM    Informed pt of transportation time.    Spoke with Alysia from [place] who is agreeable to 3pm transportation.  AVS printed, Alysia can pull [facility]AND[facility] and d/c med list from [employer].    Electronically signed by Meagan Dumont RN on 7/18/2024 at 9:37 AM    HENS completed.    Document ID : [id number]     Electronically signed by Meagan Dumont RN on 7/18/2024 at 9:41 AM    Spoke with pt's wife, Rosita, over the phone to inform of transportation time.    Electronically signed by Meagan Dumont RN on 7/18/2024 at 9:43 AM

## 2024-07-18 NOTE — CARE COORDINATION
Taqueria Parr    Next Review Date  07/19/2024  Admit Date  07/16/2024  Medicare ID  [id number]  Eligibility Dates  01/01/2023 - 12/31/9999  Date of Last Decision  07/17/2024  Date of Birth - Gender  1945 - Male  [facility] Member ID  [id number]       Authorizations  Plan Auth ID  Auth ID  Service  Request Date  Dates  Status  Request / Approve / Denied  Rug/Level/CMGs  Next Review Date  [id number]/19/[id number]/3/007/19/2024

## 2024-07-18 NOTE — PROGRESS NOTES
Occupational Therapy  [facility]   INPATIENT OCCUPATIONAL THERAPY  PROGRESS NOTE  Date: 2024  Patient Name: Taqueria Gilbert       Room:   MRN: [id number]    : 1945  (79 y.o.)  Gender: male   Referring Practitioner: Kieran Nicholson MD  Diagnosis: Primary osteoarthritis of R hip      Discharge Recommendations:  Further Occupational Therapy is recommended upon facility discharge.    OT Equipment Recommendations  Other: TBD    Restrictions/Precautions  Restrictions/Precautions  Restrictions/Precautions: Fall Risk;General Precautions;Surgical Protocols;Weight Bearing;Up as Tolerated  Required Braces or Orthoses?: No  Implants present? : Metal implants  Lower Extremity Weight Bearing Restrictions  Right Lower Extremity Weight Bearing: Weight Bearing As Tolerated    O2 Device: None RA    Subjective  Subjective  Subjective: pt moans loudly throughout bed mobility 2* pain. tx sceduled around pain medications.  Subjective  Pain: pt reports 8/10 at rest, 10+/10 pain when moving the RLE,  Comments: Amando roman tx. co-tx ebony Wilson PTA    Objective  Orientation  Overall Orientation Status: Within Functional Limits  Orientation Level: Oriented X4  Cognition  Overall Cognitive Status: WFL    Activities of Daily Living  ADL  Grooming: Setup  UE Bathing: Setup  LE Bathing: Maximum assistance  LE Dressing: Dependent/Total  LE Dressing Skilled Clinical Factors: TA to don gripper socks  Toileting: Dependent/Total  Toileting Skilled Clinical Factors: pt req TA to position HH urinal 2* body habitus and to empty  Additional Comments: pt completes grooming, UE/LE bathing sitting EOB. Pt is currently limited by decreased strength, endurance, activity tolerance and increased pain which impacts the pt's ability to safely and independently complete self-care/mobility. pt states that he still has AE/DME at home from previous numerous surgeries. no questions on how to utilize 
Pain in excruciating amount of pain he describes, I let Dr. Nicholson know. Wife and patient are wondering about if he got a nerve block I let him know he did not, he wondered why. Dr. Millard came and spoke with patient         Dr. Nicholson gave order for 1-2 mg dilaudid q2 hr prn  
Patients home BIPAP/CPAP checked for integrity and cleanliness.  Equipment sticker placed on home unit after check completed. Pt placed on cpap. SpO2 92% with 3lpm O2 bled into pts machine per current O2 requirements.  
Physical Therapy  Facility/Department: [facility] MED SURG  Physical Therapy Initial Assessment    Name: Taqueria Gilbert  : 1945  MRN: [id number]  Date of Service: 2024    Discharge Recommendations:  Patient would benefit from continued therapy after discharge, Therapy recommended at discharge   PT Equipment Recommendations  Equipment Needed: No      Patient Diagnosis(es): There were no encounter diagnoses.  Past Medical History:  has a past medical history of Adenocarcinoma of prostate (HCC), Anemia, Anesthesia, Atrial flutter (HCC), Cellulitis of lower extremity, Cerebral artery occlusion with cerebral infarction (HCC), CHF (congestive heart failure) (HCC), Chronic acquired lymphedema, Clavicle fracture, Hernia, abdominal, History of blood transfusion, History of CVA (cerebrovascular accident), Hx of blood clots, Hyperglycemia, Hyperlipidemia, Hypertension, Hypothyroid, Ileus, postoperative (HCC), Mild renal insufficiency, Morbid obesity (HCC), Non-healing wound of lower extremity, Osteoarthritis, Phlebitis, Prediabetes, Prolonged emergence from general anesthesia, Prostate CA (HCC), Prostate cancer (HCC), PVD (peripheral vascular disease) (HCC), Sleep apnea, SVT (supraventricular tachycardia) (HCC), Uric acid kidney stone, Wears glasses, and Wrist fracture.  Past Surgical History:  has a past surgical history that includes tumor excision; knee surgery (Left, ); Hammer toe surgery (Bilateral); UPPP (90'S); Prostatectomy (10/21/2015); cyst removal (Left, 2016); Total knee arthroplasty (Bilateral, LT-, RT-); Varicose vein surgery (Bilateral, 80's); pr colon ca scrn not hi rsk ind (N/A, 2017); Cardioversion (2017); transesophageal echocardiogram (2017); ablation of dysrhythmic focus (2018); Study possible ablation (2018); Colonoscopy (, ); Colonoscopy (2016); pr arthrp acetblr/prox fem prostc agrft/algrft (Left, 05/15/2018); ablation of dysrhythmic focus 
Post Operative Progress Note    7/17/2024 12:51 PM  Info:   Admit Date: 7/16/2024  PCP: Gilma Barba MD      Medications:   Scheduled Meds:   sodium chloride flush  5-40 mL IntraVENous 2 times per day    acetaminophen  650 mg Oral Q6H    aspirin  81 mg Oral BID     Continuous Infusions:   lactated ringers IV soln 125 mL/hr at 07/17/24 0020    sodium chloride       PRN Meds:sodium chloride flush, sodium chloride, oxyCODONE **OR** oxyCODONE, ondansetron, HYDROmorphone **OR** HYDROmorphone    Diet:   Diet: ADULT DIET; Regular    Subjective:   Systemic or Specific Complaints:No Complaints    Objective:     Patient Vitals for the past 24 hrs:   BP Temp Pulse Resp SpO2   07/17/24 0956 -- -- -- -- 92 %   07/17/24 0440 -- -- -- 18 --   07/17/24 0400 (!) 102/56 -- 62 16 --   07/17/24 0011 109/60 97.4 °F (36.3 °C) 65 16 96 %   07/16/24 2030 (!) 99/51 -- 60 16 --   07/16/24 2012 (!) 107/59 98.2 °F (36.8 °C) 68 16 92 %   07/16/24 1730 (!) 94/51 -- -- -- --   07/16/24 1640 (!) 87/44 97.9 °F (36.6 °C) 70 16 97 %   07/16/24 1403 133/66 97.5 °F (36.4 °C) 80 16 97 %   07/16/24 1310 (!) 122/58 98.5 °F (36.9 °C) 81 14 95 %   07/16/24 1300 129/63 -- 83 19 94 %         Wound: incision clean and dry without sign of infection   Motion: expected discomfort with range of motion in affected extremity   DVT Exam:  no evidence of DVT on physical examination         Data Review  CBC:   Recent Labs     07/17/24  0703   HGB 12.1*           Assessment:   Patient is S/P right Hip, Arthoplasty  Pain is well controlled. He has no nausea and no vomiting.    Current activity is up with assistance        Plan:      1:  Continue Physical Therapy  2:  Continue Deep venous thrombosis prophylaxis  3:  Continue Pain Control  4:  Discharge plans SNF       Electronically signed by Kieran Nicholson MD on 7/17/2024 at 12:51 PM    Post Operative Progress Note    7/17/2024 12:51 PM  Info:   Admit Date: 7/16/2024  PCP: Gilma Barba, 
Post Operative Progress Note    7/18/2024 10:58 AM  Info:   Admit Date: 7/16/2024  PCP: Gilma Barba MD      Medications:   Scheduled Meds:   docusate sodium  100 mg Oral Daily    sodium chloride flush  5-40 mL IntraVENous 2 times per day    acetaminophen  650 mg Oral Q6H    aspirin  81 mg Oral BID     Continuous Infusions:   lactated ringers IV soln 125 mL/hr at 07/17/24 1938    sodium chloride       PRN Meds:sodium chloride flush, sodium chloride, oxyCODONE **OR** oxyCODONE, ondansetron, HYDROmorphone **OR** HYDROmorphone    Diet:   Diet: ADULT DIET; Regular    Subjective:   Systemic or Specific Complaints:Pain Control    Objective:     Patient Vitals for the past 24 hrs:   BP Temp Temp src Pulse Resp SpO2   07/18/24 0718 -- -- -- -- 16 --   07/18/24 0643 124/62 -- -- 87 18 94 %   07/18/24 0009 -- -- -- -- 18 --   07/17/24 2345 (!) 148/67 97.3 °F (36.3 °C) Oral 90 18 94 %   07/17/24 2339 -- -- -- -- 18 --   07/17/24 2009 -- -- -- -- 18 --   07/17/24 1815 (!) 124/54 97.3 °F (36.3 °C) -- 79 -- 100 %   07/17/24 1320 (!) 103/50 98.6 °F (37 °C) Oral 66 -- 96 %         Wound: incision clean and dry without sign of infection   Motion: expected discomfort with range of motion in affected extremity   DVT Exam:  no evidence of DVT on physical examination         Data Review  CBC:   Recent Labs     07/17/24  0703   HGB 12.1*           Assessment:   Patient is S/P right Hip, Arthoplasty  Pain is not well controlled. He has no nausea and no vomiting.    Current activity is up with assistance        Plan:      1:  Continue Physical Therapy  2:  Continue Deep venous thrombosis prophylaxis  3:  Continue Pain Control  4:  Discharge plans [facility] this [place]       Electronically signed by Kieran Nicholson MD on 7/18/2024 at 10:58 AM      
Report called to Tayler morrell [place]  
X2;Maximum assistance (MOD A from elevated bed in AM. MAX A x2 from reclining chair in  PM.)  Stand to Sit: Moderate assistance  Stand Pivot Transfers: Assist X2;Contact-guard assistance  Bed to Chair: Contact-guard assistance;Assist X2  Gait Training  Gait Training: Yes  Gait  Gait Training: Yes  Overall Level of Assistance: Contact-guard assistance;Assist X2  Distance (ft): 5 Feet (FWD and retro)  Assistive Device: Gait belt;Walker, rolling     PT Exercises  A/AROM Exercises: Seated in chair x 15 reps : ankle pumps, marching, LAQ's. (AAROM required for LAQ on R LE)  Static Sitting Balance Exercises: Pt sat side of bed ~ 8 minutes SBA for seated balance  Static Standing Balance Exercises: Standing within RW with B UE support and CGA x2 for safety ~4mins             Goals  Short Term Goals  Time Frame for Short Term Goals: 3 days  Short Term Goal 1: pt able to perform bed mobility with Sergio of 1  Short Term Goal 2: pt able to stand from varius surfaces with RW and Sergio of 1  Short Term Goal 3: pt able to transfer to/from chair with RW and Sergio of 1  Short Term Goal 4: pt able to ambulate with RW and Sergio of 1 x 10-12 ft    Education  Patient Education  Education Given To: Patient  Education Provided Comments: Importance of icing and icing on:off times, extensive education with consistent cuing for pursed lip breathing with mobility, pain coping strategies, and precautions .  Education Method: Verbal  Barriers to Learning: None  Education Outcome: Verbalized understanding;Continued education needed    AM-PAC - Mobility    AM-PAC Basic Mobility - Inpatient   How much help is needed turning from your back to your side while in a flat bed without using bedrails?: Total  How much help is needed moving from lying on your back to sitting on the side of a flat bed without using bedrails?: Total  How much help is needed moving to and from a bed to a chair?: Total  How much help is needed standing up from a chair using your 
assistance  Stand to sit: Moderate assistance  Transfer Comments: with bariatric RW    Functional Mobility  Functional - Mobility Device: Rolling Walker  Activity: Other (bed-recliner and a few steps forwards/backwards within the room)  Assist Level: Contact guard assistance (CGA x2)  Functional Mobility Comments: with RW and second person CGA-SBA for safety           Patient Education  Patient Education  Education Given To: Patient, Family  Education Provided: Transfer Training  Education Provided Comments: educated on breathing techniques to breathe through the pain and maintain O2 saturation throughout transfers  Education Method: Demonstration, Verbal, Teach Back  Barriers to Learning: Other (Comment) (pain)  Education Outcome: Verbalized understanding, Continued education needed    Goals  Short Term Goals  Time Frame for Short Term Goals: By discharge  Short Term Goal 1: Pt will complete bed mobility with Min A x1-2 to sit EOB unsupported 10+ minutes with SUP during self-care/functional activity  Short Term Goal 2: Pt will complete functional transfers with Min A x1-2, good safety, and use of least restrictive device  Short Term Goal 3: Pt will complete UB ADLs with Setup and LB ADLs with Min A, good safety, and use of AE/DME/Modified techniques as needed  Short Term Goal 4: Pt will actively participate in 15+ minutes of therapeutic exercise/functional activity to promote safety and independence with self-care  Short Term Goal 5: Pt will tolerate standing for 5+ minutes, SBA, during self-care/functional activity for improved balance/activity tolerance  Short Term Goal 6: Pt will verbalize/demonstrate good understanding of at least 2 non-pharmaceutical pain relief strategies for improved safety and independence with self-care  Short Term Goal 7: Pt will complete functional mobility with CGA x1-2, good safety, and use of least restrictive device (Goal updated by IRAJ Glez/L on 7/17/24)  Occupational 
side. Education on breathing techniques have poor carryover.  Assessment  Performance deficits / Impairments: Decreased functional mobility , Decreased ADL status, Decreased ROM, Decreased strength, Decreased safe awareness, Decreased endurance, Decreased balance, Decreased high-level IADLs, Decreased posture  Treatment Diagnosis: Impaired self-care status  Prognosis: Good  Decision Making: Medium Complexity  Discharge Recommendations: Patient would benefit from continued therapy after discharge  OT Equipment Recommendations  Other: TBD  Safety Devices  Type of Devices: All fall risk precautions in place, Bed alarm in place, Call light within reach, Gait belt, Patient at risk for falls, Left in bed, Nurse notified    AM-[facility] Daily Activities Inpatient  AM-PAC Daily Activity - Inpatient   How much help is needed for putting on and taking off regular lower body clothing?: Total  How much help is needed for bathing (which includes washing, rinsing, drying)?: Total  How much help is needed for toileting (which includes using toilet, bedpan, or urinal)?: Total  How much help is needed for putting on and taking off regular upper body clothing?: A Little  How much help is needed for taking care of personal grooming?: A Little  How much help for eating meals?: A Little  AM-[facility] Inpatient Daily Activity Raw Score: 12  AM-PAC Inpatient ADL T-Scale Score : 30.6  ADL Inpatient CMS 0-100% Score: 66.57  ADL Inpatient CMS G-Code Modifier : CL    OT Minutes  OT Individual Minutes  Time In: 0957  Time Out: 1029  Minutes: 32      Electronically signed by LESA Whalen  on 7/17/24 at 11:08 AM EDT  
Short Term Goals: 3 days  Short Term Goal 1: pt able to perform bed mobility with Sergio of 1  Short Term Goal 2: pt able to stand from varius surfaces with RW and Sergio of 1  Short Term Goal 3: pt able to transfer to/from chair with RW and Sergio of 1  Short Term Goal 4: pt able to ambulate with RW and Sergio of 1 x 10-12 ft    PLAN OF CARE  Frequency: 1-2 treatment sessions per day, 5-7 days per week  Physical Therapy Plan  General Plan: 2 times a day 7 days a week  Current Treatment Recommendations: Strengthening;Functional mobility training;Gait training;Transfer training;Endurance training;Safety education & training;Therapeutic activities  Additional Comments: Pt left sitting up in early mobility chair.  Safety Devices  Type of Devices: All fall risk precautions in place;Call light within reach;Gait belt;Nurse notified;Patient at risk for falls;Left in chair    AM-[facility] Basic Mobility - Inpatient   How much help is needed turning from your back to your side while in a flat bed without using bedrails?: A Lot  How much help is needed moving from lying on your back to sitting on the side of a flat bed without using bedrails?: A Lot  How much help is needed moving to and from a bed to a chair?: A Little  How much help is needed standing up from a chair using your arms?: Total  How much help is needed walking in hospital room?: A Little  How much help is needed climbing 3-5 steps with a railing?: Total  AM-PAC Inpatient Mobility Raw Score : 12  AM-PAC Inpatient T-Scale Score : 35.33  Mobility Inpatient CMS 0-100% Score: 68.66  Mobility Inpatient CMS G-Code Modifier : CL     Therapy Time   Individual Concurrent Group Co-treatment   Time In 1014         Time Out 1056         Minutes 42                   Katie Us PTA, 07/18/24 at 11:12 AM         
Outcome: Verbalized understanding, Continued education needed    Functional Outcome Measures  AM-PAC Daily Activity - Inpatient   How much help is needed for putting on and taking off regular lower body clothing?: Total  How much help is needed for bathing (which includes washing, rinsing, drying)?: Total  How much help is needed for toileting (which includes using toilet, bedpan, or urinal)?: Total  How much help is needed for putting on and taking off regular upper body clothing?: A Little  How much help is needed for taking care of personal grooming?: A Little  How much help for eating meals?: A Little  [facility]: 12  AM-PAC Inpatient ADL T-Scale Score : 30.6  ADL Inpatient CMS 0-100% Score: 66.57  ADL Inpatient CMS G-Code Modifier : CL    Goals  Short Term Goals  Time Frame for Short Term Goals: By discharge  Short Term Goal 1: Pt will complete bed mobility with Min A x1-2 to sit EOB unsupported 10+ minutes with SUP during self-care/functional activity  Short Term Goal 2: Pt will complete functional transfers with Min A x1-2, good safety, and use of least restrictive device  Short Term Goal 3: Pt will complete UB ADLs with Setup and LB ADLs with Min A, good safety, and use of AE/DME/Modified techniques as needed  Short Term Goal 4: Pt will actively participate in 15+ minutes of therapeutic exercise/functional activity to promote safety and independence with self-care  Short Term Goal 5: Pt will tolerate standing for 5+ minutes, SBA, during self-care/functional activity for improved balance/activity tolerance  Short Term Goal 6: Pt will verbalize/demonstrate good understanding of at least 2 non-pharmaceutical pain relief strategies for improved safety and independence with self-care  Short Term Goal 7: OT to further assess functional mobility and notify OTR to update goal    Plan  Occupational Therapy Plan  Times Per Week: 5-7 (1-2x/day)  Current Treatment Recommendations:

## 2024-07-19 NOTE — DISCHARGE SUMMARY
Discharge Summary     Patient ID:  Taqueria Gilbert  [id number]  79 y.o.  1945    Admit date: 7/16/2024    Discharge date and time: 7/18/2024  3:52 PM     Admitting Physician: Kieran Nicholson MD     Admission Diagnoses: Primary osteoarthritis of right hip [M16.11]    Discharge Diagnoses: same    Admission Condition: fair    Discharged Condition: fair    Indication for Admission: MERI    Hospital Course: no complications    Consults: [facility]    Treatments: surgery and PT    Disposition: SNF    Patient Instructions:   Discharge Medication List as of 7/18/2024 11:30 AM        START taking these medications    Details   oxyCODONE-acetaminophen (PERCOCET) 5-325 MG per tablet Take 1 tablet by mouth every 4 hours as needed for Pain for up to 7 days. Intended supply: 7 days. Take lowest dose possible to manage pain Max Daily Amount: 6 tablets, Disp-42 tablet, R-0Print           CONTINUE these medications which have NOT CHANGED    Details   lisinopril (PRINIVIL;ZESTRIL) 5 MG tablet 1 tablet dailyHistorical Med      spironolactone (ALDACTONE) 25 MG tablet 1 tablet dailyHistorical Med      JARDIANCE 10 MG tablet TAKE 1 TABLET BY MOUTH DAILY, Disp-90 tablet, R-3Please send a replace/new response with 90-Day Supply if appropriate to maximize member benefit. Requesting 1 year supply.Normal      furosemide (LASIX) 40 MG tablet TAKE 1 TABLET BY MOUTH TWICE  DAILY, Disp-180 tablet, R-3Please send a replace/new response with 90-Day Supply if appropriate to maximize member benefit. Requesting 1 year supply.Normal      traMADol (ULTRAM) 50 MG tablet Take 1 tablet by mouth every 6 hours as needed for Pain for up to 90 days. Max Daily Amount: 200 mg, Disp-360 tablet, R-0Normal      metoprolol tartrate (LOPRESSOR) 25 MG tablet TAKE 1 AND 1/2 TABLETS BY MOUTH  TWICE DAILY, Disp-270 tablet, R-3Please send a replace/new response with 90-Day Supply if appropriate to maximize member benefit. Requesting 1 year supply.Normal

## 2024-07-31 ENCOUNTER — OFFICE VISIT (OUTPATIENT)
Dept: ORTHOPEDIC SURGERY | Age: 79
End: 2024-07-31

## 2024-07-31 VITALS — BODY MASS INDEX: 40.46 KG/M2 | RESPIRATION RATE: 14 BRPM | HEIGHT: 71 IN | WEIGHT: 289 LBS

## 2024-07-31 DIAGNOSIS — M16.11 PRIMARY OSTEOARTHRITIS OF RIGHT HIP: Primary | ICD-10-CM

## 2024-07-31 PROCEDURE — 99024 POSTOP FOLLOW-UP VISIT: CPT | Performed by: PHYSICIAN ASSISTANT

## 2024-07-31 NOTE — PROGRESS NOTES
deficit.  Skin: Skin is warm and dry. ASI incision without redness or drainage.   Nursing note and vitals reviewed.     Labs and Imaging:     XR taken today:  No results found.     X-rays taken in clinic today on 7/31/2024 available for independent review  Suboptimal views given patient difficulty to stand or position for x-rays.  However available views do demonstrate patient status post bilateral total hip arthroplasty.  Most recently right total hip appears to be in appropriate position without evidence of hardware complication     Orders Placed This Encounter   Procedures    XR HIP 1 VW W PELVIS RIGHT     Standing Status:   Future     Number of Occurrences:   1     Standing Expiration Date:   8/31/2024       Assessment and Plan:  1. Primary osteoarthritis of right hip            This is a 79 y.o. male who presents to the clinic today 2 weeks status post right MERI-ASI.Patient to transition from home to outpatient Pt. ASI restrictions given. WBAT with/without assisted devices. Continue anticoagulation protocol. RTO 5-6 weeks. Call if any problems or issues prior to that time.     Electronically signed by KEIKO Parra on 7/31/2024 at 12:59 PM        Please note that this chart was generated using voice recognition Dragon dictation software.  Although every effort was made to ensure the accuracy of this automated transcription, some errors in transcription may have occurred.

## 2024-08-16 ENCOUNTER — HOSPITAL ENCOUNTER (INPATIENT)
Age: 79
LOS: 4 days | Discharge: HOME HEALTH | DRG: 683 | End: 2024-08-20
Attending: EMERGENCY MEDICINE | Admitting: INTERNAL MEDICINE
Payer: MEDICARE

## 2024-08-16 DIAGNOSIS — R53.1 GENERAL WEAKNESS: ICD-10-CM

## 2024-08-16 DIAGNOSIS — E86.0 DEHYDRATION: Primary | ICD-10-CM

## 2024-08-16 DIAGNOSIS — R42 DIZZINESS: ICD-10-CM

## 2024-08-16 PROBLEM — N17.9 ACUTE KIDNEY INJURY (HCC): Status: ACTIVE | Noted: 2024-08-16

## 2024-08-16 LAB
ANION GAP SERPL CALCULATED.3IONS-SCNC: 16 MMOL/L (ref 9–17)
BACTERIA URNS QL MICRO: ABNORMAL
BASOPHILS # BLD: 0 K/UL (ref 0–0.2)
BASOPHILS NFR BLD: 0 % (ref 0–2)
BILIRUB UR QL STRIP: NEGATIVE
BUN SERPL-MCNC: 49 MG/DL (ref 8–23)
CALCIUM SERPL-MCNC: 10 MG/DL (ref 8.6–10.4)
CASTS #/AREA URNS LPF: ABNORMAL /LPF
CHLORIDE SERPL-SCNC: 95 MMOL/L (ref 98–107)
CK SERPL-CCNC: 36 U/L (ref 39–308)
CLARITY UR: CLEAR
CO2 SERPL-SCNC: 22 MMOL/L (ref 20–31)
COLOR UR: YELLOW
CREAT SERPL-MCNC: 1.7 MG/DL (ref 0.7–1.2)
CREAT UR-MCNC: 83.5 MG/DL (ref 39–259)
EOSINOPHIL # BLD: 0.2 K/UL (ref 0–0.4)
EOSINOPHILS RELATIVE PERCENT: 3 % (ref 0–4)
EPI CELLS #/AREA URNS HPF: ABNORMAL /HPF
ERYTHROCYTE [DISTWIDTH] IN BLOOD BY AUTOMATED COUNT: 17.8 % (ref 11.5–14.9)
FLUAV RNA RESP QL NAA+PROBE: NOT DETECTED
FLUBV RNA RESP QL NAA+PROBE: NOT DETECTED
GFR, ESTIMATED: 40 ML/MIN/1.73M2
GLUCOSE SERPL-MCNC: 111 MG/DL (ref 70–99)
GLUCOSE UR STRIP-MCNC: ABNORMAL MG/DL
HCT VFR BLD AUTO: 42.1 % (ref 41–53)
HGB BLD-MCNC: 13.7 G/DL (ref 13.5–17.5)
HGB UR QL STRIP.AUTO: NEGATIVE
KETONES UR STRIP-MCNC: NEGATIVE MG/DL
LEUKOCYTE ESTERASE UR QL STRIP: ABNORMAL
LYMPHOCYTES NFR BLD: 2 K/UL (ref 1–4.8)
LYMPHOCYTES RELATIVE PERCENT: 23 % (ref 24–44)
MCH RBC QN AUTO: 33.7 PG (ref 26–34)
MCHC RBC AUTO-ENTMCNC: 32.5 G/DL (ref 31–37)
MCV RBC AUTO: 104 FL (ref 80–100)
MONOCYTES NFR BLD: 0.7 K/UL (ref 0.1–1.3)
MONOCYTES NFR BLD: 8 % (ref 1–7)
MYOGLOBIN SERPL-MCNC: 76 NG/ML (ref 28–72)
MYOGLOBIN SERPL-MCNC: 79 NG/ML (ref 28–72)
NEUTROPHILS NFR BLD: 66 % (ref 36–66)
NEUTS SEG NFR BLD: 5.7 K/UL (ref 1.3–9.1)
NITRITE UR QL STRIP: NEGATIVE
PH UR STRIP: 5.5 [PH] (ref 5–8)
PLATELET # BLD AUTO: 250 K/UL (ref 150–450)
PMV BLD AUTO: 7.5 FL (ref 6–12)
POTASSIUM SERPL-SCNC: 4.3 MMOL/L (ref 3.7–5.3)
PROT UR STRIP-MCNC: NEGATIVE MG/DL
RBC # BLD AUTO: 4.05 M/UL (ref 4.5–5.9)
RBC #/AREA URNS HPF: ABNORMAL /HPF
SARS-COV-2 RNA RESP QL NAA+PROBE: NOT DETECTED
SODIUM SERPL-SCNC: 133 MMOL/L (ref 135–144)
SODIUM UR-SCNC: 39 MMOL/L
SOURCE: NORMAL
SP GR UR STRIP: 1.01 (ref 1–1.03)
SPECIMEN DESCRIPTION: NORMAL
TOTAL PROTEIN, URINE: 8 MG/DL
TROPONIN I SERPL HS-MCNC: 82 NG/L (ref 0–22)
TROPONIN I SERPL HS-MCNC: 87 NG/L (ref 0–22)
URINE TOTAL PROTEIN CREATININE RATIO: 0.1 (ref 0–0.2)
UROBILINOGEN UR STRIP-ACNC: NORMAL EU/DL (ref 0–1)
UUN UR-MCNC: 768 MG/DL
WBC #/AREA URNS HPF: ABNORMAL /HPF
WBC OTHER # BLD: 8.7 K/UL (ref 3.5–11)

## 2024-08-16 PROCEDURE — 93005 ELECTROCARDIOGRAM TRACING: CPT | Performed by: EMERGENCY MEDICINE

## 2024-08-16 PROCEDURE — 81001 URINALYSIS AUTO W/SCOPE: CPT

## 2024-08-16 PROCEDURE — 85025 COMPLETE CBC W/AUTO DIFF WBC: CPT

## 2024-08-16 PROCEDURE — 87636 SARSCOV2 & INF A&B AMP PRB: CPT

## 2024-08-16 PROCEDURE — 99223 1ST HOSP IP/OBS HIGH 75: CPT | Performed by: INTERNAL MEDICINE

## 2024-08-16 PROCEDURE — 83874 ASSAY OF MYOGLOBIN: CPT

## 2024-08-16 PROCEDURE — 99285 EMERGENCY DEPT VISIT HI MDM: CPT

## 2024-08-16 PROCEDURE — 36415 COLL VENOUS BLD VENIPUNCTURE: CPT

## 2024-08-16 PROCEDURE — 84156 ASSAY OF PROTEIN URINE: CPT

## 2024-08-16 PROCEDURE — 2580000003 HC RX 258: Performed by: EMERGENCY MEDICINE

## 2024-08-16 PROCEDURE — 82550 ASSAY OF CK (CPK): CPT

## 2024-08-16 PROCEDURE — 2060000000 HC ICU INTERMEDIATE R&B

## 2024-08-16 PROCEDURE — 84300 ASSAY OF URINE SODIUM: CPT

## 2024-08-16 PROCEDURE — 82570 ASSAY OF URINE CREATININE: CPT

## 2024-08-16 PROCEDURE — 87205 SMEAR GRAM STAIN: CPT

## 2024-08-16 PROCEDURE — 84540 ASSAY OF URINE/UREA-N: CPT

## 2024-08-16 PROCEDURE — 6370000000 HC RX 637 (ALT 250 FOR IP): Performed by: INTERNAL MEDICINE

## 2024-08-16 PROCEDURE — 80048 BASIC METABOLIC PNL TOTAL CA: CPT

## 2024-08-16 PROCEDURE — 84484 ASSAY OF TROPONIN QUANT: CPT

## 2024-08-16 RX ORDER — FLUTICASONE PROPIONATE 50 MCG
1 SPRAY, SUSPENSION (ML) NASAL DAILY
Status: DISCONTINUED | OUTPATIENT
Start: 2024-08-16 | End: 2024-08-20 | Stop reason: HOSPADM

## 2024-08-16 RX ORDER — POTASSIUM CHLORIDE 20 MEQ/1
40 TABLET, EXTENDED RELEASE ORAL PRN
Status: DISCONTINUED | OUTPATIENT
Start: 2024-08-16 | End: 2024-08-20 | Stop reason: HOSPADM

## 2024-08-16 RX ORDER — MAGNESIUM SULFATE HEPTAHYDRATE 40 MG/ML
2000 INJECTION, SOLUTION INTRAVENOUS PRN
Status: DISCONTINUED | OUTPATIENT
Start: 2024-08-16 | End: 2024-08-20 | Stop reason: HOSPADM

## 2024-08-16 RX ORDER — ONDANSETRON 4 MG/1
4 TABLET, ORALLY DISINTEGRATING ORAL EVERY 8 HOURS PRN
Status: DISCONTINUED | OUTPATIENT
Start: 2024-08-16 | End: 2024-08-20 | Stop reason: HOSPADM

## 2024-08-16 RX ORDER — 0.9 % SODIUM CHLORIDE 0.9 %
1000 INTRAVENOUS SOLUTION INTRAVENOUS ONCE
Status: COMPLETED | OUTPATIENT
Start: 2024-08-16 | End: 2024-08-16

## 2024-08-16 RX ORDER — POTASSIUM CHLORIDE 7.45 MG/ML
10 INJECTION INTRAVENOUS PRN
Status: DISCONTINUED | OUTPATIENT
Start: 2024-08-16 | End: 2024-08-20 | Stop reason: HOSPADM

## 2024-08-16 RX ORDER — TRAMADOL HYDROCHLORIDE 50 MG/1
50 TABLET ORAL EVERY 6 HOURS PRN
COMMUNITY

## 2024-08-16 RX ORDER — ONDANSETRON 2 MG/ML
4 INJECTION INTRAMUSCULAR; INTRAVENOUS EVERY 6 HOURS PRN
Status: DISCONTINUED | OUTPATIENT
Start: 2024-08-16 | End: 2024-08-20 | Stop reason: HOSPADM

## 2024-08-16 RX ORDER — SODIUM CHLORIDE 0.9 % (FLUSH) 0.9 %
5-40 SYRINGE (ML) INJECTION PRN
Status: DISCONTINUED | OUTPATIENT
Start: 2024-08-16 | End: 2024-08-20 | Stop reason: HOSPADM

## 2024-08-16 RX ORDER — ALLOPURINOL 300 MG/1
300 TABLET ORAL DAILY
Status: DISCONTINUED | OUTPATIENT
Start: 2024-08-16 | End: 2024-08-20 | Stop reason: HOSPADM

## 2024-08-16 RX ORDER — POLYETHYLENE GLYCOL 3350 17 G/17G
17 POWDER, FOR SOLUTION ORAL DAILY PRN
Status: DISCONTINUED | OUTPATIENT
Start: 2024-08-16 | End: 2024-08-20 | Stop reason: HOSPADM

## 2024-08-16 RX ORDER — SODIUM CHLORIDE 0.9 % (FLUSH) 0.9 %
5-40 SYRINGE (ML) INJECTION EVERY 12 HOURS SCHEDULED
Status: DISCONTINUED | OUTPATIENT
Start: 2024-08-16 | End: 2024-08-20 | Stop reason: HOSPADM

## 2024-08-16 RX ORDER — ACETAMINOPHEN 650 MG/1
650 SUPPOSITORY RECTAL EVERY 6 HOURS PRN
Status: DISCONTINUED | OUTPATIENT
Start: 2024-08-16 | End: 2024-08-20 | Stop reason: HOSPADM

## 2024-08-16 RX ORDER — PANTOPRAZOLE SODIUM 40 MG/1
40 TABLET, DELAYED RELEASE ORAL
Status: DISCONTINUED | OUTPATIENT
Start: 2024-08-17 | End: 2024-08-20 | Stop reason: HOSPADM

## 2024-08-16 RX ORDER — LEVOTHYROXINE SODIUM 0.03 MG/1
25 TABLET ORAL DAILY
Status: DISCONTINUED | OUTPATIENT
Start: 2024-08-17 | End: 2024-08-20 | Stop reason: HOSPADM

## 2024-08-16 RX ORDER — ACETAMINOPHEN 325 MG/1
650 TABLET ORAL EVERY 6 HOURS PRN
Status: DISCONTINUED | OUTPATIENT
Start: 2024-08-16 | End: 2024-08-20 | Stop reason: HOSPADM

## 2024-08-16 RX ORDER — TRAMADOL HYDROCHLORIDE 50 MG/1
50 TABLET ORAL EVERY 6 HOURS PRN
Status: DISCONTINUED | OUTPATIENT
Start: 2024-08-16 | End: 2024-08-20 | Stop reason: HOSPADM

## 2024-08-16 RX ORDER — SODIUM CHLORIDE 9 MG/ML
INJECTION, SOLUTION INTRAVENOUS PRN
Status: DISCONTINUED | OUTPATIENT
Start: 2024-08-16 | End: 2024-08-20 | Stop reason: HOSPADM

## 2024-08-16 RX ADMIN — APIXABAN 2.5 MG: 2.5 TABLET, FILM COATED ORAL at 20:42

## 2024-08-16 RX ADMIN — SODIUM CHLORIDE 1000 ML: 9 INJECTION, SOLUTION INTRAVENOUS at 17:00

## 2024-08-16 RX ADMIN — SODIUM CHLORIDE 1000 ML: 9 INJECTION, SOLUTION INTRAVENOUS at 16:46

## 2024-08-16 ASSESSMENT — PAIN - FUNCTIONAL ASSESSMENT: PAIN_FUNCTIONAL_ASSESSMENT: NONE - DENIES PAIN

## 2024-08-16 NOTE — H&P
the last 72 hours.  No intake or output data in the 24 hours ending 08/16/24 9564    General Appearance:  alert, well appearing, and in no acute distress  Mental status: oriented to person, place, and time with normal affect  Head:  normocephalic, atraumatic.  Eye: no icterus, redness, pupils equal and reactive, extraocular eye movements intact, conjunctiva clear  Ear: normal external ear, no discharge, hearing intact  Nose:  no drainage noted  Mouth: Oral mucosa dry  Neck: supple, no carotid bruits,   Lungs: Bilateral equal air entry, clear to ausculation, no wheezing, rales or rhonchi, normal effort  Cardiovascular: normal rate, regular rhythm, no murmur, gallop, rub.  Abdomen: Soft, nontender, nondistended, normal bowel sounds, no hepatomegaly or splenomegaly  Neurologic: There are no new focal motor or sensory deficits, normal muscle tone and bulk, no abnormal sensation, normal speech, cranial nerves II through XII grossly intact. Normal coordination and DTR.  No focal weakness noted  Skin: Signs of chronic venous stasis dermatitis, no concerns for cellulitis, pulses felt bilateral lower extremities.  Right hip incision site clear  Extremities:  peripheral pulses palpable, no pedal edema or calf pain with palpation       Investigations:      Laboratory Testing:  Recent Results (from the past 24 hour(s))   CBC with Auto Differential    Collection Time: 08/16/24  4:05 PM   Result Value Ref Range    WBC 8.7 3.5 - 11.0 k/uL    RBC 4.05 (L) 4.5 - 5.9 m/uL    Hemoglobin 13.7 13.5 - 17.5 g/dL    Hematocrit 42.1 41 - 53 %    .0 (H) 80 - 100 fL    MCH 33.7 26 - 34 pg    MCHC 32.5 31 - 37 g/dL    RDW 17.8 (H) 11.5 - 14.9 %    Platelets 250 150 - 450 k/uL    MPV 7.5 6.0 - 12.0 fL    Neutrophils % 66 36 - 66 %    Lymphocytes % 23 (L) 24 - 44 %    Monocytes % 8 (H) 1 - 7 %    Eosinophils % 3 0 - 4 %    Basophils % 0 0 - 2 %    Neutrophils Absolute 5.70 1.3 - 9.1 k/uL    Lymphocytes Absolute 2.00 1.0 - 4.8 k/uL

## 2024-08-16 NOTE — ED PROVIDER NOTES
(*)     .0 (*)     RDW 17.8 (*)     Lymphocytes % 23 (*)     Monocytes % 8 (*)     All other components within normal limits   BASIC METABOLIC PANEL - Abnormal; Notable for the following components:    Sodium 133 (*)     Chloride 95 (*)     Glucose 111 (*)     BUN 49 (*)     Creatinine 1.7 (*)     Est, Glom Filt Rate 40 (*)     All other components within normal limits   URINALYSIS WITH REFLEX TO CULTURE - Abnormal; Notable for the following components:    Glucose, Ur SMALL (*)     Leukocyte Esterase, Urine TRACE (*)     All other components within normal limits   TROP/MYOGLOBIN - Abnormal; Notable for the following components:    Troponin, High Sensitivity 87 (*)     Myoglobin 79 (*)     All other components within normal limits   MICROSCOPIC URINALYSIS - Abnormal; Notable for the following components:    WBC, UA 0 TO 2 (*)     Casts UA 0 TO 2 (*)     All other components within normal limits   COVID-19 & INFLUENZA COMBO   TROP/MYOGLOBIN       Vitals Reviewed:    Vitals:    08/16/24 1606 08/16/24 1633 08/16/24 1643   BP: 95/70 112/70 100/78   Pulse: (!) 118 (!) 103 (!) 112   Resp: 17 11 10   Temp: 97.7 °F (36.5 °C)     TempSrc: Oral     SpO2: 95% 95% 95%   Weight: 129.3 kg (285 lb)     Height: 1.791 m (5' 10.5\")       MEDICATIONS GIVEN TO PATIENT THIS ENCOUNTER:  Orders Placed This Encounter   Medications    sodium chloride 0.9 % bolus 1,000 mL    sodium chloride 0.9 % bolus 1,000 mL     DISCHARGE PRESCRIPTIONS:  New Prescriptions    No medications on file     PHYSICIAN CONSULTS ORDERED THIS ENCOUNTER:  IP CONSULT TO PRIMARY CARE PROVIDER    FINAL IMPRESSION      1. Dehydration    2. General weakness    3. Dizziness          DISPOSITION/PLAN   DISPOSITION Decision To Admit 08/16/2024 05:32:36 PM  Condition at Disposition: Stable      PATIENT REFERREDTO:  No follow-up provider specified.    DISCHARGEMEDICATIONS:  New Prescriptions    No medications on file       (Please note that portions of this note were

## 2024-08-17 LAB
ANION GAP SERPL CALCULATED.3IONS-SCNC: 11 MMOL/L (ref 9–17)
BASOPHILS # BLD: 0 K/UL (ref 0–0.2)
BASOPHILS NFR BLD: 1 % (ref 0–2)
BUN SERPL-MCNC: 43 MG/DL (ref 8–23)
C DIFF GDH + TOXINS A+B STL QL IA.RAPID: NEGATIVE
CALCIUM SERPL-MCNC: 8.8 MG/DL (ref 8.6–10.4)
CHLORIDE SERPL-SCNC: 105 MMOL/L (ref 98–107)
CO2 SERPL-SCNC: 24 MMOL/L (ref 20–31)
CREAT SERPL-MCNC: 1.7 MG/DL (ref 0.7–1.2)
EOSINOPHIL # BLD: 0.3 K/UL (ref 0–0.4)
EOSINOPHIL,URINE: NORMAL
EOSINOPHILS RELATIVE PERCENT: 5 % (ref 0–4)
ERYTHROCYTE [DISTWIDTH] IN BLOOD BY AUTOMATED COUNT: 18.3 % (ref 11.5–14.9)
GFR, ESTIMATED: 40 ML/MIN/1.73M2
GLUCOSE SERPL-MCNC: 121 MG/DL (ref 70–99)
HCT VFR BLD AUTO: 36.5 % (ref 41–53)
HGB BLD-MCNC: 11.8 G/DL (ref 13.5–17.5)
LYMPHOCYTES NFR BLD: 1.4 K/UL (ref 1–4.8)
LYMPHOCYTES RELATIVE PERCENT: 22 % (ref 24–44)
MAGNESIUM SERPL-MCNC: 2.2 MG/DL (ref 1.6–2.6)
MCH RBC QN AUTO: 33.6 PG (ref 26–34)
MCHC RBC AUTO-ENTMCNC: 32.3 G/DL (ref 31–37)
MCV RBC AUTO: 104.2 FL (ref 80–100)
MONOCYTES NFR BLD: 0.5 K/UL (ref 0.1–1.3)
MONOCYTES NFR BLD: 8 % (ref 1–7)
NEUTROPHILS NFR BLD: 64 % (ref 36–66)
NEUTS SEG NFR BLD: 4.2 K/UL (ref 1.3–9.1)
PLATELET # BLD AUTO: 208 K/UL (ref 150–450)
PMV BLD AUTO: 7.1 FL (ref 6–12)
POTASSIUM SERPL-SCNC: 4.2 MMOL/L (ref 3.7–5.3)
RBC # BLD AUTO: 3.51 M/UL (ref 4.5–5.9)
SODIUM SERPL-SCNC: 140 MMOL/L (ref 135–144)
SPECIMEN DESCRIPTION: NORMAL
WBC OTHER # BLD: 6.5 K/UL (ref 3.5–11)

## 2024-08-17 PROCEDURE — 2580000003 HC RX 258: Performed by: INTERNAL MEDICINE

## 2024-08-17 PROCEDURE — 87324 CLOSTRIDIUM AG IA: CPT

## 2024-08-17 PROCEDURE — 80048 BASIC METABOLIC PNL TOTAL CA: CPT

## 2024-08-17 PROCEDURE — 87449 NOS EACH ORGANISM AG IA: CPT

## 2024-08-17 PROCEDURE — 2060000000 HC ICU INTERMEDIATE R&B

## 2024-08-17 PROCEDURE — 6370000000 HC RX 637 (ALT 250 FOR IP): Performed by: INTERNAL MEDICINE

## 2024-08-17 PROCEDURE — 36415 COLL VENOUS BLD VENIPUNCTURE: CPT

## 2024-08-17 PROCEDURE — 85025 COMPLETE CBC W/AUTO DIFF WBC: CPT

## 2024-08-17 PROCEDURE — 99232 SBSQ HOSP IP/OBS MODERATE 35: CPT | Performed by: INTERNAL MEDICINE

## 2024-08-17 PROCEDURE — 83735 ASSAY OF MAGNESIUM: CPT

## 2024-08-17 PROCEDURE — 51798 US URINE CAPACITY MEASURE: CPT

## 2024-08-17 RX ORDER — SODIUM CHLORIDE 9 MG/ML
INJECTION, SOLUTION INTRAVENOUS CONTINUOUS
Status: DISCONTINUED | OUTPATIENT
Start: 2024-08-17 | End: 2024-08-20

## 2024-08-17 RX ADMIN — SODIUM CHLORIDE: 9 INJECTION, SOLUTION INTRAVENOUS at 09:14

## 2024-08-17 RX ADMIN — SODIUM CHLORIDE: 9 INJECTION, SOLUTION INTRAVENOUS at 18:34

## 2024-08-17 RX ADMIN — APIXABAN 2.5 MG: 2.5 TABLET, FILM COATED ORAL at 20:44

## 2024-08-17 RX ADMIN — LEVOTHYROXINE SODIUM 25 MCG: 0.03 TABLET ORAL at 06:40

## 2024-08-17 RX ADMIN — ALLOPURINOL 300 MG: 300 TABLET ORAL at 09:10

## 2024-08-17 RX ADMIN — METOPROLOL TARTRATE 37.5 MG: 25 TABLET, FILM COATED ORAL at 20:44

## 2024-08-17 RX ADMIN — FLUTICASONE PROPIONATE 1 SPRAY: 50 SPRAY, METERED NASAL at 09:11

## 2024-08-17 RX ADMIN — APIXABAN 2.5 MG: 2.5 TABLET, FILM COATED ORAL at 09:10

## 2024-08-17 RX ADMIN — METOPROLOL TARTRATE 37.5 MG: 25 TABLET, FILM COATED ORAL at 09:10

## 2024-08-17 RX ADMIN — PANTOPRAZOLE SODIUM 40 MG: 40 TABLET, DELAYED RELEASE ORAL at 06:40

## 2024-08-17 NOTE — ACP (ADVANCE CARE PLANNING)
Advance Care Planning     Advance Care Planning Activator (Inpatient)  Conversation Note      Date of ACP Conversation: 8/17/2024     Conversation Conducted with: Patient with Decision Making Capacity    ACP Activator: Marjorie Bonds RN      Health Care Decision Maker:     Current Designated Health Care Decision Maker:     Primary Decision Maker: Rosita Gilbert - Spouse - [phone]    Primary Decision Maker: Dolores Flores - Niece/Nephew - [phone]        Care Preferences    Ventilation:  \"If you were in your present state of health and suddenly became very ill and were unable to breathe on your own, what would your preference be about the use of a ventilator (breathing machine) if it were available to you?\"      Would the patient desire the use of ventilator (breathing machine)?: yes    \"If your health worsens and it becomes clear that your chance of recovery is unlikely, what would your preference be about the use of a ventilator (breathing machine) if it were available to you?\"     Would the patient desire the use of ventilator (breathing machine)?: Yes      Resuscitation  \"CPR works best to restart the heart when there is a sudden event, like a heart attack, in someone who is otherwise healthy. Unfortunately, CPR does not typically restart the heart for people who have serious health conditions or who are very sick.\"    \"In the event your heart stopped as a result of an underlying serious health condition, would you want attempts to be made to restart your heart (answer \"yes\" for attempt to resuscitate) or would you prefer a natural death (answer \"no\" for do not attempt to resuscitate)?\" yes       [] Yes   [] No   Educated Patient / Decision Maker regarding differences between Advance Directives and portable DNR orders.    Length of ACP Conversation in minutes:      Conversation Outcomes:  ACP discussion completed    Follow-up plan:    [] Schedule follow-up conversation to continue planning  []

## 2024-08-17 NOTE — PLAN OF CARE
Problem: Discharge Planning  Goal: Discharge to home or other facility with appropriate resources  8/17/2024 1739 by Radha Long RN  Outcome: Progressing     Problem: Safety - Adult  Goal: Free from fall injury  8/17/2024 1739 by Radha Long RN  Outcome: Progressing     Problem: Pain  Goal: Verbalizes/displays adequate comfort level or baseline comfort level  8/17/2024 1739 by Radha Long RN  Outcome: Progressing     Problem: Skin/Tissue Integrity  Goal: Absence of new skin breakdown  8/17/2024 1739 by Radha Long, RN  Outcome: Progressing

## 2024-08-17 NOTE — CARE COORDINATION
Case Management Assessment  Initial Evaluation    Date/Time of Evaluation: 8/17/2024 1:56 PM  Assessment Completed by: Marjorie Bonds RN    If patient is discharged prior to next notation, then this note serves as note for discharge by case management.    Patient Name: Taqueria Gilbert                   YOB: 1945  Diagnosis: Dehydration [E86.0]  Dizziness [R42]  General weakness [R53.1]  Acute kidney injury (HCC) [N17.9]                   Date / Time: 8/16/2024  3:56 PM    Patient Admission Status: Inpatient   Readmission Risk (Low < 19, Mod (19-27), High > 27): Readmission Risk Score: 18.7    Current PCP: Gilma Barba MD  PCP verified by CM? Yes    Chart Reviewed: Yes      History Provided by: Patient  Patient Orientation: Alert and Oriented    Patient Cognition: Alert    Hospitalization in the last 30 days (Readmission):  Yes    If yes, Readmission Assessment in  Navigator will be completed.    Advance Directives:      Code Status: Full Code   Patient's Primary Decision Maker is:      Primary Decision Maker: Rosita Gilbert - Spouse - [phone]    Primary Decision Maker: Dolores Flores - Niece/Nephew - [phone]    Discharge Planning:    Patient lives with: Spouse/Significant Other Type of Home: House  Primary Care Giver: Spouse  Patient Support Systems include: Spouse/Significant Other, Family Members   Current Financial resources: Medicare  Current community resources: ECF/Home Care (\[facility] [place] [facility]\"" was set up w/ [facility], [facility], Prior to DC from [facility], Referral sent.)  Current services prior to admission: Durable Medical Equipment, C-pap            Current DME: Shower Chair, Walker, Wheelchair, Cane (GB)            Type of Home Care services:  OT, PT, Skilled Therapy    ADLS  Prior functional level: Independent in ADLs/IADLs  Current functional level: Independent in ADLs/IADLs    PT AM-PAC:   /24  OT AM-PAC:   /24    Family can provide assistance at DC: Yes  Would you like Case

## 2024-08-18 LAB
ANION GAP SERPL CALCULATED.3IONS-SCNC: 9 MMOL/L (ref 9–17)
BUN SERPL-MCNC: 28 MG/DL (ref 8–23)
CALCIUM SERPL-MCNC: 8.9 MG/DL (ref 8.6–10.4)
CHLORIDE SERPL-SCNC: 106 MMOL/L (ref 98–107)
CHLORIDE UR-SCNC: 60 MMOL/L
CO2 SERPL-SCNC: 24 MMOL/L (ref 20–31)
CREAT SERPL-MCNC: 1.3 MG/DL (ref 0.7–1.2)
EKG Q-T INTERVAL: 356 MS
EKG QRS DURATION: 100 MS
EKG QTC CALCULATION (BAZETT): 488 MS
EKG R AXIS: 90 DEGREES
EKG T AXIS: 15 DEGREES
EKG VENTRICULAR RATE: 113 BPM
GFR, ESTIMATED: 55 ML/MIN/1.73M2
GLUCOSE SERPL-MCNC: 91 MG/DL (ref 70–99)
MAGNESIUM SERPL-MCNC: 2.3 MG/DL (ref 1.6–2.6)
POTASSIUM SERPL-SCNC: 4.4 MMOL/L (ref 3.7–5.3)
SODIUM SERPL-SCNC: 139 MMOL/L (ref 135–144)

## 2024-08-18 PROCEDURE — 2060000000 HC ICU INTERMEDIATE R&B

## 2024-08-18 PROCEDURE — 6370000000 HC RX 637 (ALT 250 FOR IP): Performed by: INTERNAL MEDICINE

## 2024-08-18 PROCEDURE — 2580000003 HC RX 258: Performed by: INTERNAL MEDICINE

## 2024-08-18 PROCEDURE — 51798 US URINE CAPACITY MEASURE: CPT

## 2024-08-18 PROCEDURE — 80048 BASIC METABOLIC PNL TOTAL CA: CPT

## 2024-08-18 PROCEDURE — 99233 SBSQ HOSP IP/OBS HIGH 50: CPT | Performed by: INTERNAL MEDICINE

## 2024-08-18 PROCEDURE — 36415 COLL VENOUS BLD VENIPUNCTURE: CPT

## 2024-08-18 PROCEDURE — 82436 ASSAY OF URINE CHLORIDE: CPT

## 2024-08-18 PROCEDURE — 83735 ASSAY OF MAGNESIUM: CPT

## 2024-08-18 RX ORDER — LOPERAMIDE HYDROCHLORIDE 2 MG/1
2 CAPSULE ORAL EVERY 4 HOURS PRN
Status: DISCONTINUED | OUTPATIENT
Start: 2024-08-18 | End: 2024-08-20 | Stop reason: HOSPADM

## 2024-08-18 RX ADMIN — SODIUM CHLORIDE: 9 INJECTION, SOLUTION INTRAVENOUS at 16:50

## 2024-08-18 RX ADMIN — METOPROLOL TARTRATE 37.5 MG: 25 TABLET, FILM COATED ORAL at 09:06

## 2024-08-18 RX ADMIN — FLUTICASONE PROPIONATE 1 SPRAY: 50 SPRAY, METERED NASAL at 09:07

## 2024-08-18 RX ADMIN — LEVOTHYROXINE SODIUM 25 MCG: 0.03 TABLET ORAL at 07:03

## 2024-08-18 RX ADMIN — LOPERAMIDE HYDROCHLORIDE 2 MG: 2 CAPSULE ORAL at 20:44

## 2024-08-18 RX ADMIN — ALLOPURINOL 300 MG: 300 TABLET ORAL at 09:05

## 2024-08-18 RX ADMIN — METOPROLOL TARTRATE 37.5 MG: 25 TABLET, FILM COATED ORAL at 20:40

## 2024-08-18 RX ADMIN — APIXABAN 2.5 MG: 2.5 TABLET, FILM COATED ORAL at 09:05

## 2024-08-18 RX ADMIN — APIXABAN 2.5 MG: 2.5 TABLET, FILM COATED ORAL at 20:40

## 2024-08-18 RX ADMIN — SODIUM CHLORIDE: 9 INJECTION, SOLUTION INTRAVENOUS at 04:45

## 2024-08-18 RX ADMIN — PANTOPRAZOLE SODIUM 40 MG: 40 TABLET, DELAYED RELEASE ORAL at 07:03

## 2024-08-18 RX ADMIN — LOPERAMIDE HYDROCHLORIDE 2 MG: 2 CAPSULE ORAL at 10:43

## 2024-08-18 NOTE — CONSULTS
°C)  RESPIRATIONS RANGE: Resp  Av.7  Min: 16  Max: 18  PULSE RANGE: Pulse  Av.5  Min: 61  Max: 81  BLOOD PRESSURE RANGE:  Systolic (24hrs), Av , Min:107 , Max:126  ; Diastolic (24hrs), Av, Min:52, Max:74   PULSE OXIMETRY RANGE: SpO2  Av %  Min: 95 %  Max: 100 %  24HR INTAKE/OUTPUT:    Intake/Output Summary (Last 24 hours) at 2024 1321  Last data filed at 2024 1013  Gross per 24 hour   Intake 240 ml   Output 2500 ml   Net -2260 ml     Constitutional: alert, appears stated age, and cooperative    Skin: Skin color, texture, turgor normal. No rashes or lesions    Head: Normocephalic, without obvious abnormality, atraumatic     ENT:  no epistaxis or rhinorrhea    Neck:  supple with no jugular venous distention.    Cardiovascular/Edema: irregularly irregular rhythm    Respiratory: Lungs: clear to auscultation bilaterally    Abdomen: soft, non-tender; bowel sounds normal; no masses,  no organomegaly    Back: symmetric, no curvature. ROM normal. No CVA tenderness.    Extremities: extremities normal, atraumatic, no cyanosis or edema    Neuro:  Grossly normal    CBC:   Recent Labs     24  1605 24  0601   WBC 8.7 6.5   HGB 13.7 11.8*    208     BMP:    Recent Labs     24  1605 24  0601 24  0534   * 140 139   K 4.3 4.2 4.4   CL 95* 105 106   CO2  24   BUN 49* 43* 28*   CREATININE 1.7* 1.7* 1.3*   GLUCOSE 111* 121* 91     Lab Results   Component Value Date/Time    NITRU NEGATIVE 2024 05:10 PM    COLORU Yellow 2024 05:10 PM    PHUR 5.5 2024 05:10 PM    PHUR 6.0 2023 07:45 PM    WBCUA 0 TO 2 2024 05:10 PM    RBCUA 0 TO 2 2024 05:10 PM    MUCUS NOT REPORTED 01/15/2020 11:47 AM    TRICHOMONAS NOT REPORTED 01/15/2020 11:47 AM    YEAST NOT REPORTED 01/15/2020 11:47 AM    BACTERIA None 2024 05:10 PM    LEUKOCYTESUR TRACE 2024 05:10 PM    UROBILINOGEN Normal 2024 05:10 PM    BILIRUBINUR NEGATIVE

## 2024-08-18 NOTE — PLAN OF CARE
Problem: Discharge Planning  Goal: Discharge to home or other facility with appropriate resources  8/18/2024 0506 by Douglas Guillen, RN  Outcome: Progressing     Problem: Safety - Adult  Goal: Free from fall injury  8/18/2024 0506 by Douglas Guillen, RN  Outcome: Progressing     Problem: Pain  Goal: Verbalizes/displays adequate comfort level or baseline comfort level  8/18/2024 0506 by Douglas Guillen, RN  Outcome: Progressing     Problem: Skin/Tissue Integrity  Goal: Absence of new skin breakdown  Description: 1.  Monitor for areas of redness and/or skin breakdown  2.  Assess vascular access sites hourly  3.  Every 4-6 hours minimum:  Change oxygen saturation probe site  4.  Every 4-6 hours:  If on nasal continuous positive airway pressure, respiratory therapy assess nares and determine need for appliance change or resting period.  8/18/2024 0506 by Douglas Guillen, RN  Outcome: Progressing

## 2024-08-18 NOTE — PLAN OF CARE
Problem: Discharge Planning  Goal: Discharge to home or other facility with appropriate resources  8/18/2024 1732 by Radha Long RN  Outcome: Progressing     Problem: Safety - Adult  Goal: Free from fall injury  8/18/2024 1732 by Radha Long RN  Outcome: Progressing     Problem: Pain  Goal: Verbalizes/displays adequate comfort level or baseline comfort level  8/18/2024 1732 by Radha Long RN  Outcome: Progressing     Problem: Skin/Tissue Integrity  Goal: Absence of new skin breakdown  8/18/2024 1732 by Radha Long RN  Outcome: Progressing     Problem: ABCDS Injury Assessment  Goal: Absence of physical injury  Outcome: Progressing

## 2024-08-19 LAB
ALBUMIN: 3.1 G/DL (ref 3.5–5.2)
ANION GAP SERPL CALCULATED.3IONS-SCNC: 8 MMOL/L (ref 9–17)
BUN SERPL-MCNC: 22 MG/DL (ref 8–23)
CALCIUM SERPL-MCNC: 9.2 MG/DL (ref 8.6–10.4)
CHLORIDE SERPL-SCNC: 106 MMOL/L (ref 98–107)
CO2 SERPL-SCNC: 24 MMOL/L (ref 20–31)
CREAT SERPL-MCNC: 1.3 MG/DL (ref 0.7–1.2)
GFR, ESTIMATED: 55 ML/MIN/1.73M2
GLUCOSE BLD-MCNC: 107 MG/DL (ref 75–110)
GLUCOSE SERPL-MCNC: 105 MG/DL (ref 70–99)
PHOSPHATE SERPL-MCNC: 3 MG/DL (ref 2.5–4.5)
POTASSIUM SERPL-SCNC: 4.4 MMOL/L (ref 3.7–5.3)
SODIUM SERPL-SCNC: 138 MMOL/L (ref 135–144)

## 2024-08-19 PROCEDURE — 2580000003 HC RX 258: Performed by: INTERNAL MEDICINE

## 2024-08-19 PROCEDURE — 97162 PT EVAL MOD COMPLEX 30 MIN: CPT

## 2024-08-19 PROCEDURE — 82947 ASSAY GLUCOSE BLOOD QUANT: CPT

## 2024-08-19 PROCEDURE — 36415 COLL VENOUS BLD VENIPUNCTURE: CPT

## 2024-08-19 PROCEDURE — 97110 THERAPEUTIC EXERCISES: CPT

## 2024-08-19 PROCEDURE — 99232 SBSQ HOSP IP/OBS MODERATE 35: CPT | Performed by: INTERNAL MEDICINE

## 2024-08-19 PROCEDURE — 80069 RENAL FUNCTION PANEL: CPT

## 2024-08-19 PROCEDURE — 97116 GAIT TRAINING THERAPY: CPT

## 2024-08-19 PROCEDURE — 2060000000 HC ICU INTERMEDIATE R&B

## 2024-08-19 PROCEDURE — 6370000000 HC RX 637 (ALT 250 FOR IP): Performed by: INTERNAL MEDICINE

## 2024-08-19 PROCEDURE — 97530 THERAPEUTIC ACTIVITIES: CPT

## 2024-08-19 RX ADMIN — APIXABAN 2.5 MG: 2.5 TABLET, FILM COATED ORAL at 09:03

## 2024-08-19 RX ADMIN — SODIUM CHLORIDE, PRESERVATIVE FREE 10 ML: 5 INJECTION INTRAVENOUS at 09:03

## 2024-08-19 RX ADMIN — ALLOPURINOL 300 MG: 300 TABLET ORAL at 09:03

## 2024-08-19 RX ADMIN — SODIUM CHLORIDE: 9 INJECTION, SOLUTION INTRAVENOUS at 11:56

## 2024-08-19 RX ADMIN — PANTOPRAZOLE SODIUM 40 MG: 40 TABLET, DELAYED RELEASE ORAL at 09:03

## 2024-08-19 RX ADMIN — METOPROLOL TARTRATE 37.5 MG: 25 TABLET, FILM COATED ORAL at 20:57

## 2024-08-19 RX ADMIN — METOPROLOL TARTRATE 37.5 MG: 25 TABLET, FILM COATED ORAL at 09:03

## 2024-08-19 RX ADMIN — SODIUM CHLORIDE, PRESERVATIVE FREE 10 ML: 5 INJECTION INTRAVENOUS at 20:57

## 2024-08-19 RX ADMIN — FLUTICASONE PROPIONATE 1 SPRAY: 50 SPRAY, METERED NASAL at 09:08

## 2024-08-19 RX ADMIN — LEVOTHYROXINE SODIUM 25 MCG: 0.03 TABLET ORAL at 09:03

## 2024-08-19 RX ADMIN — APIXABAN 2.5 MG: 2.5 TABLET, FILM COATED ORAL at 20:57

## 2024-08-19 ASSESSMENT — PAIN SCALES - GENERAL: PAINLEVEL_OUTOF10: 0

## 2024-08-19 NOTE — PLAN OF CARE
Problem: Discharge Planning  Goal: Discharge to home or other facility with appropriate resources  8/19/2024 0410 by Douglas Guillen RN  Outcome: Progressing     Problem: Safety - Adult  Goal: Free from fall injury  8/19/2024 0410 by Douglas Guillen, RN  Outcome: Progressing     Problem: Pain  Goal: Verbalizes/displays adequate comfort level or baseline comfort level  8/19/2024 0410 by Douglas Guillen, RN  Outcome: Progressing     Problem: Skin/Tissue Integrity  Goal: Absence of new skin breakdown  Description: 1.  Monitor for areas of redness and/or skin breakdown  2.  Assess vascular access sites hourly  3.  Every 4-6 hours minimum:  Change oxygen saturation probe site  4.  Every 4-6 hours:  If on nasal continuous positive airway pressure, respiratory therapy assess nares and determine need for appliance change or resting period.  8/19/2024 0410 by Douglas Guillen, RN  Outcome: Progressing

## 2024-08-19 NOTE — CARE COORDINATION
ONGOING DISCHARGE PLAN:    Patient is alert and oriented x4.    Spoke with patient regarding discharge plan and patient confirms that plan is still home with [facility].     Nephro Consult-EMILY, Cr 1.7 now 1.3    Patient with diarrhea, C-Diff negative.    Patient no longer lightheaded, BP WNL.    Patient on Eliquis for A-FIB.    PT/OT    Will continue to follow for additional discharge needs.    If patient is discharged prior to next notation, then this note serves as note for discharge by case management.    Electronically signed by Meagan Bonner RN on 8/19/2024 at 12:04 PM

## 2024-08-19 NOTE — PLAN OF CARE
Problem: Discharge Planning  Goal: Discharge to home or other facility with appropriate resources  8/19/2024 1327 by Suzy Nevarez RN  Outcome: Progressing  Flowsheets (Taken 8/19/2024 0912)  Discharge to home or other facility with appropriate resources: Identify barriers to discharge with patient and caregiver   Pt to discharge home once medically cleared.   Problem: Safety - Adult  Goal: Free from fall injury  8/19/2024 1327 by Suzy Nevarez RN  Outcome: Progressing  Flowsheets (Taken 8/19/2024 0911)  Free From Fall Injury: Instruct family/caregiver on patient safety   No injury noted this shift.   Problem: Pain  Goal: Verbalizes/displays adequate comfort level or baseline comfort level  8/19/2024 1327 by Suzy Nevarez RN  Outcome: Progressing   Pain denied this shift. Will continue to monitor.   Problem: Skin/Tissue Integrity  Goal: Absence of new skin breakdown  Description: 1.  Monitor for areas of redness and/or skin breakdown  2.  Assess vascular access sites hourly  3.  Every 4-6 hours minimum:  Change oxygen saturation probe site  4.  Every 4-6 hours:  If on nasal continuous positive airway pressure, respiratory therapy assess nares and determine need for appliance change or resting period.  8/19/2024 1327 by Suzy Nevarez RN  Outcome: Progressing   No new skin breakdown noted this shift.   Problem: ABCDS Injury Assessment  Goal: Absence of physical injury  Outcome: Progressing   No injury noted this shift.   Problem: Chronic Conditions and Co-morbidities  Goal: Patient's chronic conditions and co-morbidity symptoms are monitored and maintained or improved  Outcome: Progressing  Flowsheets (Taken 8/19/2024 0912)  Care Plan - Patient's Chronic Conditions and Co-Morbidity Symptoms are Monitored and Maintained or Improved: Monitor and assess patient's chronic conditions and comorbid symptoms for stability, deterioration, or improvement   Monitoring.

## 2024-08-19 NOTE — DISCHARGE INSTR - COC
assessment and monitoring. Medication education and monitoring per protocol.      Patient's personal belongings (please select all that are sent with patient):  {CHP DME Belongings:520660892}    RN SIGNATURE:  {Esignature:183123612}    CASE MANAGEMENT/SOCIAL WORK SECTION    Inpatient Status Date: 8/16/24    Readmission Risk Assessment Score:  Readmission Risk              Risk of Unplanned Readmission:  19           Discharging to Facility/ Agency   Name: [facility]  [address] [place] [address]  P: [phone]   F: [phone]   Address:  Phone:  Fax:    Dialysis Facility (if applicable)   Name:  Address:  Dialysis Schedule:  Phone:  Fax:    / signature: Electronically signed by Meagan Bonner RN on 8/19/24 at 12:07 PM EDT    PHYSICIAN SECTION    Prognosis: {Prognosis:6136775357}    Condition at Discharge: { Patient Condition:551795315}    Rehab Potential (if transferring to Rehab): {Prognosis:8611704145}    Recommended Labs or Other Treatments After Discharge: ***    Physician Certification: I certify the above information and transfer of Taqueria Gilbert  is necessary for the continuing treatment of the diagnosis listed and that he requires {Admit to Appropriate Level of Care:82831} for {GREATER/LESS:905399581} 30 days.     Update Admission H&P: {CHP DME Changes in HandP:868296025}    PHYSICIAN SIGNATURE:  Electronically signed by Alec Ravi MD on 8/20/24 at 4:20 PM EDT

## 2024-08-20 ENCOUNTER — TELEPHONE (OUTPATIENT)
Dept: PRIMARY CARE CLINIC | Age: 79
End: 2024-08-20

## 2024-08-20 VITALS
DIASTOLIC BLOOD PRESSURE: 59 MMHG | SYSTOLIC BLOOD PRESSURE: 132 MMHG | OXYGEN SATURATION: 99 % | HEIGHT: 71 IN | BODY MASS INDEX: 39.9 KG/M2 | WEIGHT: 285 LBS | RESPIRATION RATE: 16 BRPM | HEART RATE: 75 BPM | TEMPERATURE: 97.7 F

## 2024-08-20 LAB
ANION GAP SERPL CALCULATED.3IONS-SCNC: 10 MMOL/L (ref 9–17)
BUN SERPL-MCNC: 24 MG/DL (ref 8–23)
CALCIUM SERPL-MCNC: 9.3 MG/DL (ref 8.6–10.4)
CHLORIDE SERPL-SCNC: 105 MMOL/L (ref 98–107)
CO2 SERPL-SCNC: 21 MMOL/L (ref 20–31)
CREAT SERPL-MCNC: 1.2 MG/DL (ref 0.7–1.2)
GFR, ESTIMATED: 61 ML/MIN/1.73M2
GLUCOSE SERPL-MCNC: 107 MG/DL (ref 70–99)
POTASSIUM SERPL-SCNC: 4.3 MMOL/L (ref 3.7–5.3)
SODIUM SERPL-SCNC: 136 MMOL/L (ref 135–144)

## 2024-08-20 PROCEDURE — 97116 GAIT TRAINING THERAPY: CPT

## 2024-08-20 PROCEDURE — 36415 COLL VENOUS BLD VENIPUNCTURE: CPT

## 2024-08-20 PROCEDURE — 51798 US URINE CAPACITY MEASURE: CPT

## 2024-08-20 PROCEDURE — 6370000000 HC RX 637 (ALT 250 FOR IP): Performed by: INTERNAL MEDICINE

## 2024-08-20 PROCEDURE — 97110 THERAPEUTIC EXERCISES: CPT

## 2024-08-20 PROCEDURE — 80048 BASIC METABOLIC PNL TOTAL CA: CPT

## 2024-08-20 PROCEDURE — 99239 HOSP IP/OBS DSCHRG MGMT >30: CPT | Performed by: INTERNAL MEDICINE

## 2024-08-20 RX ADMIN — LEVOTHYROXINE SODIUM 25 MCG: 0.03 TABLET ORAL at 05:00

## 2024-08-20 RX ADMIN — METOPROLOL TARTRATE 37.5 MG: 25 TABLET, FILM COATED ORAL at 07:51

## 2024-08-20 RX ADMIN — PANTOPRAZOLE SODIUM 40 MG: 40 TABLET, DELAYED RELEASE ORAL at 05:00

## 2024-08-20 RX ADMIN — ALLOPURINOL 300 MG: 300 TABLET ORAL at 07:51

## 2024-08-20 RX ADMIN — FLUTICASONE PROPIONATE 1 SPRAY: 50 SPRAY, METERED NASAL at 07:53

## 2024-08-20 RX ADMIN — APIXABAN 2.5 MG: 2.5 TABLET, FILM COATED ORAL at 07:51

## 2024-08-20 NOTE — CARE COORDINATION
ONGOING DISCHARGE PLAN:    Patient is alert and oriented x4.    Spoke with patient regarding discharge plan and patient confirms that plan is still to discharge to home with [facility]     Labs    Hold lasix and aldactone     Ok to discharge per Nephro    Will continue to follow for additional discharge needs.    If patient is discharged prior to next notation, then this note serves as note for discharge by case management.    Electronically signed by Merlyn Casillas RN on 8/20/2024 at 3:01 PM

## 2024-08-20 NOTE — PLAN OF CARE
Problem: Discharge Planning  Goal: Discharge to home or other facility with appropriate resources  Outcome: Completed     Problem: Safety - Adult  Goal: Free from fall injury  8/20/2024 1517 by Effie Rogers RN  Outcome: Completed     Problem: Pain  Goal: Verbalizes/displays adequate comfort level or baseline comfort level  8/20/2024 1517 by Effie Rogers RN  Outcome: Completed     Problem: Skin/Tissue Integrity  Goal: Absence of new skin breakdown  Description: 1.  Monitor for areas of redness and/or skin breakdown  2.  Assess vascular access sites hourly  3.  Every 4-6 hours minimum:  Change oxygen saturation probe site  4.  Every 4-6 hours:  If on nasal continuous positive airway pressure, respiratory therapy assess nares and determine need for appliance change or resting period.  8/20/2024 1517 by Effie Rogers RN  Outcome: Completed     Problem: ABCDS Injury Assessment  Goal: Absence of physical injury  Outcome: Completed     Problem: Chronic Conditions and Co-morbidities  Goal: Patient's chronic conditions and co-morbidity symptoms are monitored and maintained or improved  Outcome: Completed

## 2024-08-20 NOTE — PLAN OF CARE
Problem: Safety - Adult  Goal: Free from fall injury  8/20/2024 0231 by Christina Felix RN  Outcome: Progressing  Flowsheets (Taken 8/19/2024 0911 by Suzy Nevarez RN)  Free From Fall Injury: Instruct family/caregiver on patient safety  8/19/2024 1327 by Suzy Nevarez RN  Outcome: Progressing  Flowsheets (Taken 8/19/2024 0911)  Free From Fall Injury: Instruct family/caregiver on patient safety     Problem: Pain  Goal: Verbalizes/displays adequate comfort level or baseline comfort level  8/20/2024 0231 by Christina Felix RN  Outcome: Progressing  Flowsheets  Taken 8/20/2024 0231 by Christina Felix RN  Verbalizes/displays adequate comfort level or baseline comfort level:   Assess pain using appropriate pain scale   Implement non-pharmacological measures as appropriate and evaluate response   Encourage patient to monitor pain and request assistance  Taken 8/19/2024 1500 by Suzy Nevarez RN  Verbalizes/displays adequate comfort level or baseline comfort level: Encourage patient to monitor pain and request assistance  8/19/2024 1327 by Suzy Nevarez RN  Outcome: Progressing     Problem: Skin/Tissue Integrity  Goal: Absence of new skin breakdown  Description: 1.  Monitor for areas of redness and/or skin breakdown  2.  Assess vascular access sites hourly  3.  Every 4-6 hours minimum:  Change oxygen saturation probe site  4.  Every 4-6 hours:  If on nasal continuous positive airway pressure, respiratory therapy assess nares and determine need for appliance change or resting period.  8/20/2024 0231 by Christina Felix RN  Outcome: Progressing  Note: No new skin breakdown noted on exam  8/19/2024 1327 by Suzy Nevarez RN  Outcome: Progressing

## 2024-08-20 NOTE — TELEPHONE ENCOUNTER
Preeti from [facility] called and wanted you to follow up with Patient with a home care visit  her call back number is [phone] option 1

## 2024-08-20 NOTE — TELEPHONE ENCOUNTER
Please clarify- do you mean she wanted to know if I would follow the patient for home care and sign all of their home care paperwork?

## 2024-08-22 ENCOUNTER — TELEPHONE (OUTPATIENT)
Dept: PRIMARY CARE CLINIC | Age: 79
End: 2024-08-22

## 2024-08-22 NOTE — DISCHARGE SUMMARY
IN-PATIENT SERVICE   [facility] Internal Medicine    Discharge Summary     Patient ID: Taqueria Gilbert  :  1945   MRN: [id number]     ACCOUNT:  [id number]   Patient's PCP: Gilma Barba MD  Admit Date: 2024   Discharge Date: 2024    Length of Stay: 4  Code Status:  Prior  Admitting Physician: Alec Ravi MD  Discharge Physician: Alec Ravi MD     Active Discharge Diagnoses:     Primary Problem  Acute kidney injury (HCC)      Hospital Problems  Active Hospital Problems    Diagnosis Date Noted    Acute kidney injury (HCC) [N17.9] 2024       Admission Condition: poor     Discharged Condition: fair    Hospital Stay:     Hospital Course:  Taqueria Gilbert is a 79 y.o. male who was admitted for the management of Acute kidney injury (HCC) , presented to ER with Diarrhea, Dizziness, and Fatigue  Patient, admitted to [facility] with extreme dizziness, patient was found to be hypotensive with blood pressure in 60s, had acute kidney injury  Has history of heart failure preserved ejection fraction, atrial fibrillation, hypertension, coronary artery disease, follows with cardiologist  Patient had diarrhea, C. difficile was tested which was negative  Patient treated with IV fluids, evaluated by nephro, diuretics kept on hold  Creatinine improved, patient's symptoms improved  Patient is advised to hold diuretics, and follow-up with cardiologist and nephro as outpatient          Significant therapeutic interventions:     Significant Diagnostic Studies:   Labs / Micro:        Radiology:    XR HIP 1 VW W PELVIS RIGHT    Result Date: 2024  X-rays taken in clinic today on 2024 available for independent review Suboptimal views given patient difficulty to stand or position for x-rays.  However available views do demonstrate patient status post bilateral total hip arthroplasty.  Most recently right total hip appears to be in appropriate position without evidence of

## 2024-08-22 NOTE — TELEPHONE ENCOUNTER
Care Transitions Initial Follow Up Call    Outreach made within 2 business days of discharge: Yes    Patient: Taqueria Gilbert Patient : 1945   MRN: [id number]  Reason for Admission: EMILY  Discharge Date: 24       Spoke with: VICTORINO left    Discharge department/facility: [facility] Interactive Patient Contact:  Was patient able to fill all prescriptions:   Was patient instructed to bring all medications to the follow-up visit:   Is patient taking all medications as directed in the discharge summary?   Does patient understand their discharge instructions:   Does patient have questions or concerns that need addressed prior to 7-14 day follow up office visit:     Additional needs identified to be addressed with provider               Scheduled appointment with PCP within 7-14 days    Follow Up  Future Appointments   Date Time Provider Department Center   2024 11:15 AM Francisco Clay PA [id number]   2024  2:00 PM Gilma Barba MD [facility]   10/23/2024  1:15 PM Massimo Simon DO [facility]       JOSE MARK MA

## 2024-08-22 NOTE — PROGRESS NOTES
Department of Internal Medicine  Nephrology Jordan Rivera MD   Consult Note    Reason for consultation: Management of acute kidney injury and chronic kidney disease stage IIIa.    Consulting physician: Alec Ravi MD.    Subjective    Patient seen and examined, feels better, was able to participate in PT, and he was able to walk.  1 episode of bowel movement soft, no watery  Scr improved to 1.3 mg/dl  BP stable  Good uop    History of present illness: This is a 79 y.o. male with a significant past medical history of nephrolithiasis  prostatic adenocarcinoma, systemic hypertension, s/p cerebrovascular accident [1996], atrial flutter, systemic hypertension and chronic kidney disease stage IIIa [baseline serum creatinine 1.1 to 1.3 mg/dL and follows up with Dr. Tayo Davis of [facility]], who presented to the hospital with complaints of diarrhea associated with dizziness and generalized fatigue.  He did not have nausea or vomiting but did mild abdominal pain.    Blood pressure at presentation was 100/78 mmHg and pulse 112.  Home medications include furosemide, spironolactone, lisinopril and Jardiance.    Laboratory studies at presentation were remarkable for BUNs/creatinine 49/1.7 mg/dL and hence nephrology consultation.  Patient feels better today.   Stool C. Difficile was negative.  CPK was within normal.    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)     Cellulitis of lower extremity     right     Cerebral artery occlusion with cerebral infarction ([facility])     1996    CHF (congestive heart failure) ([facility])     Chronic acquired lymphedema     Clavicle fracture     in high school    Hernia, abdominal     History of blood transfusion 2003 7 2006    AUTOTRANSFUSION DURING SURGERY    History of CVA (cerebrovascular 
    [facility]   IN-PATIENT SERVICE   [facility]    HISTORY AND PHYSICAL EXAMINATION            Date:   8/18/2024  Patient name:  Taqueria Gilbert  Date of admission:  8/16/2024  3:56 PM  MRN:   [id number]  Account:  [id number]  YOB: 1945  PCP:    Gilma Barba MD  Room:   [id number]  Code Status:    Full Code    Chief Complaint:     Chief Complaint   Patient presents with    Diarrhea    Dizziness    Fatigue       History Obtained From:     patient    History of Present Illness:     The patient is a 79 y.o.  Non- / non  male who presents with Diarrhea, Dizziness, and Fatigue   and he is admitted to the hospital for the management of generalized weakness, dizziness.    The patient reports that he was discharged from the rehab earlier today, however on returning home when he tried to use his walker to get to his house he felt dizzy, had to sit down.  He reports he has been having hypotension with systolic in the 60s for the past 3 days at the rehab.  They recommended that he stay another 3 more days.  He reports that he usually uses Lasix once daily rather than twice daily which she was receiving in the rehab.  Also over the past 3 days has been having large amounts of nonbloody diarrhea.  Denies abdominal pain, no nausea no vomiting.  No fevers no chills.    Denies chest pain, palpitations, shortness of breath.    8/17/2024  Patient reports feeling better  Voiding well  8/18   Patient clinically doing better  Diarrhea improving  C. 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 
    [facility]   IN-PATIENT SERVICE   [facility]    HISTORY AND PHYSICAL EXAMINATION            Date:   8/20/2024  Patient name:  Taqueria Gilbert  Date of admission:  8/16/2024  3:56 PM  MRN:   [id number]  Account:  [id number]  YOB: 1945  PCP:    Gilma Barba MD  Room:   [id number]  Code Status:    Full Code    Chief Complaint:     Chief Complaint   Patient presents with    Diarrhea    Dizziness    Fatigue       History Obtained From:     patient    History of Present Illness:     The patient is a 79 y.o.  Non- / non  male who presents with Diarrhea, Dizziness, and Fatigue   and he is admitted to the hospital for the management of generalized weakness, dizziness.    The patient reports that he was discharged from the rehab earlier today, however on returning home when he tried to use his walker to get to his house he felt dizzy, had to sit down.  He reports he has been having hypotension with systolic in the 60s for the past 3 days at the rehab.  They recommended that he stay another 3 more days.  He reports that he usually uses Lasix once daily rather than twice daily which she was receiving in the rehab.  Also over the past 3 days has been having large amounts of nonbloody diarrhea.  Denies abdominal pain, no nausea no vomiting.  No fevers no chills.    Denies chest pain, palpitations, shortness of breath.    8/17/2024  Patient reports feeling better  Voiding well  8/18   Patient clinically doing better  Diarrhea improving  C. 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 
   08/19/24 1952   Encounter Summary   Encounter Overview/Reason Spiritual/Emotional Needs   Service Provided For Patient   Referral/Consult From Rounding   Complexity of Encounter Low   Spiritual/Emotional needs   Type Spiritual Support   Assessment/Intervention/Outcome   Assessment Unable to assess  (pt sleeping)   Intervention Prayer (assurance of)/[place]       
  Physician Progress Note      PATIENT:               MOLINA DOE  CSN #:                  [id number]  :                       1945  ADMIT DATE:       2024 3:56 PM  DISCH DATE:        2024 5:02 PM  RESPONDING  PROVIDER #:        Alec Ravi MD          QUERY TEXT:    Patient admitted with acute kidney failure, noted to have  atrial fibrillation   and is maintained on Eliquis.    If possible, please document in progress notes and discharge summary if you   are evaluating and/or treating any of the following:?  ?  The medical record reflects the following:  Risk Factors: 80 y/o male, CHF, HTN, hx  CVA  Clinical Indicators: atrial fibrillation, age >75 years, HTN, HFpEF  Treatment: Naz Arrington, MSN, RN, CCDS, CRCR  Clinical   .  Options provided:  -- Secondary hypercoagulable state in a patient with atrial fibrillation  -- Other - I will add my own diagnosis  -- Disagree - Not applicable / Not valid  -- Disagree - Clinically unable to determine / Unknown  -- Refer to Clinical Documentation Reviewer    PROVIDER RESPONSE TEXT:    This patient has secondary hypercoagulable state in a patient with atrial   fibrillation.    Query created by: Nesha Arrington on 2024 1:51 PM      QUERY TEXT:    Patient admitted with acute kidney injury. Per H and P: NSTEMI likely type II    In order to support the diagnosis of type II MI, please refer to 4th universal   definition of MI below and include additional clinical indicators in your   documentation.  Or please document if the diagnosis of type II MI has been   ruled out after study.    The medical record reflects the following:  Risk Factors: recent right total hip arthroplasty, diarrhea, CHF  Clinical Indicators: Per H and P: \"denies chest pain, EKG reviewed. Mild   nonspecific ST-T wav changes\";   Troponins: 87 -> 82  Treatment: lab monitoring    Fourth Universal Definition of Myocardial Infarction:  Clearly 
IV and telemetry removed. Discharge paperwork reviewed in detail with pt and spouse including medications to be stopped, follow up appointments, and education. Pt and spouse verbalized understanding. Belongings packed by pt and spouse and pt wheeled outside.  
Pharmacy Medication History Note      List of current medications patient is taking is complete.    Source of information: Patient, [employer], OARRS    Changes made to medication list:  Medications removed (include reason, ex. therapy complete or physician discontinued, noncompliance):  Probiotic - reports not taking, old prescription  Psyllium - reports not taking  Zinc cream - reports not using     Medications flagged for provider review:  Duoneb - patient reports not using anymore  Lisinopril - patient reports rehab facility stopped due to low blood pressure  Spironolactone - patient reports rehab facility stopped    Medications added/doses adjusted:  Tramadol 50 mg by mouth every 6 hours as needed for pain - patient reports taking, but has not been filled since 5/31    Other notes (ex. Recent course of antibiotics, Coumadin dosing):  OARRS report shows Tramadol 50 mg 120 tablets for 30 days on 5/31       Current Home Medication List at Time of Admission:  Prior to Admission medications    Medication Sig   JARDIANCE 10 MG tablet TAKE 1 TABLET BY MOUTH DAILY   furosemide (LASIX) 40 MG tablet TAKE 1 TABLET BY MOUTH TWICE  DAILY   metoprolol tartrate (LOPRESSOR) 25 MG tablet TAKE 1 AND 1/2 TABLETS BY MOUTH  TWICE DAILY   allopurinol (ZYLOPRIM) 300 MG tablet TAKE 1 TABLET BY MOUTH TWICE  DAILY   levothyroxine (SYNTHROID) 25 MCG tablet TAKE 1 TABLET BY MOUTH DAILY   atorvastatin (LIPITOR) 10 MG tablet TAKE 1 TABLET BY MOUTH DAILY   acetaminophen (TYLENOL) 500 MG tablet Take 1 tablet by mouth every 6 hours as needed for Pain   vitamin B-12 (CYANOCOBALAMIN) 100 MCG tablet Take 1 tablet by mouth daily   apixaban (ELIQUIS) 5 MG TABS tablet Take 1 tablet by mouth 2 times daily   pantoprazole (PROTONIX) 40 MG tablet TAKE 1 TABLET BY MOUTH IN THE  MORNING BEFORE BREAKFAST   fluticasone (FLONASE) 50 MCG/ACT nasal spray 1 spray by Each Nostril route daily   bisacodyl (DULCOLAX) 5 MG EC tablet Take 1 tablet by mouth daily as 
Physical Therapy  Facility/Department: [facility] PROGRESSIVE CARE  Daily Treatment Note  NAME: Taqueria Gilbert  : 1945  MRN: [id number]    Date of Service: 2024    Discharge Recommendations:  Therapy recommended at discharge, Patient would benefit from continued therapy after discharge        Patient Diagnosis(es): The primary encounter diagnosis was Dehydration. Diagnoses of General weakness and Dizziness were also pertinent to this visit.       Activity Tolerance: Patient tolerated treatment well     Plan    Physical Therapy Plan  General Plan: 5-7 times per week  Current Treatment Recommendations: Strengthening;ROM;Balance training;Functional mobility training;Transfer training;Endurance training;Gait training;Stair training;Home exercise program;Safety education & training;Patient/Caregiver education & training;Positioning;Vestibular rehab;Equipment evaluation, education, & procurement     Restrictions  Restrictions/Precautions  Restrictions/Precautions: Fall Risk  Implants present? : Metal implants (B TKA, B MERI, B TSA)  Position Activity Restriction  Hip Precautions: Anterior hip precautions (No  SLR)  Other position/activity restrictions: R MERI by Dr Nicholson 24, was DC DNF post -op     Subjective    Pt sitting up in recliner upon entering room  Pain: no pain noted by patient.     Objective     Bed Mobility Training: No    Transfer Training  Sit to Stand: Contact-guard assistance (height of recliner elevated)  Stand to Sit: Contact-guard assistance    Gait Training  Right Side Weight Bearing: As tolerated  Left Side Weight Bearing: As tolerated    Overall Level of Assistance: Stand-by assistance  Distance (ft): 125 Feet  Assistive Device: Gait belt;Walker, rolling  Interventions:  (IV)  Base of Support: Widened  Speed/Nyla: Shuffled;Slow  Gait Abnormalities: Decreased step clearance (downward gaze)       PT Exercises  A/AROM Exercises: supine in recliner hip abd/add MIN A  (B) 10 
Pt arrived to floor via stretcher from ED and was transfered to bed.  Vitals taken, telemetry applied. No distress noted. Call light within reach, and pt educated on its use. Bed in lowest position, and locked. Side rails up x 2.   
Updated Dr. Rivera of orthostatic bp numbers. Orthostatic BP Lyin/61 Sittin/61 Standin/58.     No new orders.   
Writer agrees with Christina MAC charting  
agrft/algrft (Left, 05/15/2018); ablation of dysrhythmic focus (08/22/2019); transesophageal echocardiogram (08/22/2019); Cardiac catheterization (07/30/2020); Shoulder Arthroplasty (Bilateral); Tonsillectomy; Colonoscopy (N/A, 05/02/2022); Colonoscopy (N/A, 08/09/2022); Femur fracture surgery (Left, 7/20/2023); and Total hip arthroplasty (Right, 7/16/2024).    Assessment   Assessment: Pt had recent R MERI 7/16/24, was DC to SNF, Pt was DC home, but could not make it inside the home due to low BP/dizzyness, admitted to [facility] again. Pt required mod A for bed mobility, At bseline pt does not sleep in a bed at home, sleeps in a recliner lzy bot which is raised up 7\" off the floor . Pt karla to stand CGA and ambualte 45 ft with RW CGA. Will benefit from continued therapy while in acute care as well as aftr DC  Treatment Diagnosis: Impaired mobility  Therapy Prognosis: Good  Decision Making: Medium Complexity  History: Recent R MERI 7/16/24  Requires PT Follow-Up: Yes  Activity Tolerance  Activity Tolerance: Patient tolerated evaluation without incident     Plan   Physical Therapy Plan  General Plan: 5-7 times per week  Current Treatment Recommendations: Strengthening, ROM, Balance training, Functional mobility training, Transfer training, Endurance training, Gait training, Stair training, Home exercise program, Safety education & training, Patient/Caregiver education & training, Positioning, Vestibular rehab, Equipment evaluation, education, & procurement  Safety Devices  Type of Devices: Gait belt, Call light within reach, All fall risk precautions in place, Chair alarm in place, Left in chair, Nurse notified, Patient at risk for falls (Pt left in mobility chair, ESTEFANIA ayala notified.)     Restrictions  Restrictions/Precautions  Restrictions/Precautions: Fall Risk  Implants present? : Metal implants (B TKA, B MERI, B TSA)  Position Activity Restriction  Hip Precautions: Anterior hip precautions (No  SLR)  Other 
  SpO2 92%   BMI 40.32 kg/m²   TEMPERATURE:  Current - Temp: 97.6 °F (36.4 °C); Max - Temp  Av.9 °F (36.6 °C)  Min: 97.5 °F (36.4 °C)  Max: 98.9 °F (37.2 °C)  RESPIRATIONS RANGE: Resp  Av.5  Min: 16  Max: 18  PULSE RANGE: Pulse  Av.4  Min: 60  Max: 71  BLOOD PRESSURE RANGE:  Systolic (24hrs), Av , Min:112 , Max:132   ; Diastolic (24hrs), Av, Min:54, Max:88    PULSE OXIMETRY RANGE: SpO2  Av.5 %  Min: 92 %  Max: 98 %  24HR INTAKE/OUTPUT:    Intake/Output Summary (Last 24 hours) at 2024 1203  Last data filed at 2024 0746  Gross per 24 hour   Intake 1101.15 ml   Output 775 ml   Net 326.15 ml     Constitutional: alert, appears stated age, and cooperative    Skin: Skin color, texture, turgor normal. No rashes or lesions    Head: Normocephalic, without obvious abnormality, atraumatic     ENT:  no epistaxis or rhinorrhea    Neck:  supple with no jugular venous distention.    Cardiovascular/Edema: irregularly irregular rhythm    Respiratory: Lungs: clear to auscultation bilaterally    Abdomen: soft, non-tender; bowel sounds normal; no masses,  no organomegaly    Back: symmetric, no curvature. ROM normal. No CVA tenderness.    Extremities: extremities normal, atraumatic, no cyanosis or edema    Neuro:  Grossly normal    CBC:   No results for input(s): \"WBC\", \"HGB\", \"PLT\" in the last 72 hours.    BMP:    Recent Labs     24  0534 24  0539 24  1020    138 136   K 4.4 4.4 4.3    106 105   CO2 24 24 21   BUN 28* 22 24*   CREATININE 1.3* 1.3* 1.2   GLUCOSE 91 105* 107*     Lab Results   Component Value Date/Time    NITRU NEGATIVE 2024 05:10 PM    COLORU Yellow 2024 05:10 PM    PHUR 5.5 2024 05:10 PM    PHUR 6.0 2023 07:45 PM    WBCUA 0 TO 2 2024 05:10 PM    RBCUA 0 TO 2 2024 05:10 PM    MUCUS NOT REPORTED 01/15/2020 11:47 AM    TRICHOMONAS NOT REPORTED 01/15/2020 11:47 AM    YEAST NOT REPORTED 01/15/2020 11:47 AM    BACTERIA 
1.0 EU/dL    Nitrite, Urine NEGATIVE NEGATIVE    Leukocyte Esterase, Urine TRACE (A) NEGATIVE   Microscopic Urinalysis    Collection Time: 08/16/24  5:10 PM   Result Value Ref Range    WBC, UA 0 TO 2 (A) 0 TO 5 /HPF    RBC, UA 0 TO 2 0 TO 2 /HPF    Casts UA 0 TO 2 (A) None /LPF    Epithelial Cells, UA 0 TO 2 /HPF    Bacteria, UA None None   Troponin    Collection Time: 08/16/24  6:24 PM   Result Value Ref Range    Troponin, High Sensitivity 82 (HH) 0 - 22 ng/L   Myoglobin, Blood    Collection Time: 08/16/24  6:24 PM   Result Value Ref Range    Myoglobin 76 (H) 28 - 72 ng/mL   Eosinophils, Urine    Collection Time: 08/16/24 11:33 PM   Result Value Ref Range    Eosinophil, Ur NONE SEEN    Urea nitrogen, urine    Collection Time: 08/16/24 11:33 PM   Result Value Ref Range    Urea Nitrogen, Ur 768 mg/dL   Sodium, urine, random    Collection Time: 08/16/24 11:33 PM   Result Value Ref Range    Sodium, Ur 39 mmol/L   Protein / creatinine ratio, urine    Collection Time: 08/16/24 11:33 PM   Result Value Ref Range    Total Protein, Urine 8 mg/dL    Creatinine, Ur 83.5 39.0 - 259.0 mg/dL    Urine Total Protein Creatinine Ratio 0.10 0.00 - 0.20   Basic Metabolic Panel w/ Reflex to MG    Collection Time: 08/17/24  6:01 AM   Result Value Ref Range    Sodium 140 135 - 144 mmol/L    Potassium 4.2 3.7 - 5.3 mmol/L    Chloride 105 98 - 107 mmol/L    CO2 24 20 - 31 mmol/L    Anion Gap 11 9 - 17 mmol/L    Glucose 121 (H) 70 - 99 mg/dL    BUN 43 (H) 8 - 23 mg/dL    Creatinine 1.7 (H) 0.7 - 1.2 mg/dL    Est, Glom Filt Rate 40 (L) >60 mL/min/1.73m2    Calcium 8.8 8.6 - 10.4 mg/dL   CBC with Auto Differential    Collection Time: 08/17/24  6:01 AM   Result Value Ref Range    WBC 6.5 3.5 - 11.0 k/uL    RBC 3.51 (L) 4.5 - 5.9 m/uL    Hemoglobin 11.8 (L) 13.5 - 17.5 g/dL    Hematocrit 36.5 (L) 41 - 53 %    .2 (H) 80 - 100 fL    MCH 33.6 26 - 34 pg    MCHC 32.3 31 - 37 g/dL    RDW 18.3 (H) 11.5 - 14.9 %    Platelets 208 150 - 450 k/uL 
symptoms as outlined, requirement for current medical therapies and most importantly because of direct risk to patient if care not provided in a hospital setting.    Alec Ravi MD  8/19/2024  3:34 PM    Copy sent to Gilma Leon MD    Please note that this chart was generated using voice recognition Dragon dictation software.  Although every effort was made to ensure the accuracy of this automated transcription, some errors in transcription may have occurred.

## 2024-08-23 ENCOUNTER — TELEPHONE (OUTPATIENT)
Dept: PRIMARY CARE CLINIC | Age: 79
End: 2024-08-23

## 2024-08-23 NOTE — TELEPHONE ENCOUNTER
Care Transitions Initial Follow Up Call    Outreach made within 2 business days of discharge: Yes    Patient: Taqueria Gilbert Patient : 1945   MRN: [id number]  Reason for Admission: EMILY  Discharge Date: 24       Spoke with: pt    Discharge department/facility: [facility] Patient Contact:  Was patient able to fill all prescriptions: Yes  Was patient instructed to bring all medications to the follow-up visit: Yes  Is patient taking all medications as directed in the discharge summary? Yes  Does patient understand their discharge instructions: Yes  Does patient have questions or concerns that need addressed prior to 7-14 day follow up office visit: yes -     Additional needs identified to be addressed with provider  Diarrhea x10 days. Has not tried anything OTC. Asking for something to be sent in to [facility]               Scheduled appointment with PCP within 7-14 days    Follow Up  Future Appointments   Date Time Provider Department Center   2024  1:30 PM Gilma Barba MD [facility] DEP   2024 11:15 AM Francisco Clay PA [facility]   2024  2:00 PM Gilma Barba MD [facility] DEP   10/23/2024  1:15 PM Massimo Simon DO [facility]

## 2024-09-03 ENCOUNTER — TELEPHONE (OUTPATIENT)
Dept: ORTHOPEDIC SURGERY | Age: 79
End: 2024-09-03

## 2024-09-03 NOTE — TELEPHONE ENCOUNTER
Patient wanted to cancel his Post Op appointment for 9/4/24. Patient states he has no concerns or complaints when it comes to his hip replacement, but patient did state the reason he was cancelling appointment was because he was afraid of falling. He said that PT was coming in to help him with that. Patient stated he would call to reschedule at a later date.

## 2024-09-04 ENCOUNTER — TELEPHONE (OUTPATIENT)
Dept: PRIMARY CARE CLINIC | Age: 79
End: 2024-09-04

## 2024-09-04 NOTE — TELEPHONE ENCOUNTER
Is it like that all the time, or just today?  If just today, then increase fluid intake for the next couple of days.  If it has been like that then decrease metoprolol dose (please verify his dose in his med list)  if he has been taking 1.5 tabs bid, then decrease to 1 tab bid

## 2024-09-04 NOTE — TELEPHONE ENCOUNTER
Patient's blood pressure running low today 108/60, 115/60. Pulse 48 to 60 when standing. Feeling dizzy when standing and doing activity. Orthostatic BP normal.     Judy rex/[facility]  [phone]

## 2024-09-05 NOTE — TELEPHONE ENCOUNTER
Judy states the lower blood pressure readings have been pretty consistent as well as the dizziness. Confirmed with Judy that patient was taking 1.5 tablet BID, advised to reduce dose to 1 tablet BID and to call with updated readings.

## 2024-09-09 ENCOUNTER — OFFICE VISIT (OUTPATIENT)
Dept: PRIMARY CARE CLINIC | Age: 79
End: 2024-09-09
Payer: MEDICARE

## 2024-09-09 VITALS — SYSTOLIC BLOOD PRESSURE: 116 MMHG | OXYGEN SATURATION: 96 % | DIASTOLIC BLOOD PRESSURE: 62 MMHG | HEART RATE: 64 BPM

## 2024-09-09 DIAGNOSIS — Z09 HOSPITAL DISCHARGE FOLLOW-UP: ICD-10-CM

## 2024-09-09 DIAGNOSIS — M16.11 PRIMARY OSTEOARTHRITIS OF RIGHT HIP: ICD-10-CM

## 2024-09-09 DIAGNOSIS — M16.12 PRIMARY OSTEOARTHRITIS OF LEFT HIP: ICD-10-CM

## 2024-09-09 DIAGNOSIS — N18.31 STAGE 3A CHRONIC KIDNEY DISEASE (HCC): Primary | ICD-10-CM

## 2024-09-09 DIAGNOSIS — N18.31 STAGE 3A CHRONIC KIDNEY DISEASE (HCC): ICD-10-CM

## 2024-09-09 DIAGNOSIS — I50.32 CHRONIC DIASTOLIC CONGESTIVE HEART FAILURE (HCC): Chronic | ICD-10-CM

## 2024-09-09 DIAGNOSIS — M54.16 LUMBAR RADICULOPATHY: ICD-10-CM

## 2024-09-09 LAB
ANION GAP SERPL CALCULATED.3IONS-SCNC: 9 MMOL/L (ref 9–16)
BUN BLDV-MCNC: 35 MG/DL (ref 8–23)
CALCIUM SERPL-MCNC: 9.6 MG/DL (ref 8.6–10.4)
CHLORIDE BLD-SCNC: 101 MMOL/L (ref 98–107)
CO2: 32 MMOL/L (ref 20–31)
CREAT SERPL-MCNC: 1.3 MG/DL (ref 0.7–1.2)
GFR, ESTIMATED: 54 ML/MIN/1.73M2
GLUCOSE BLD-MCNC: 104 MG/DL (ref 74–99)
MAGNESIUM: 2.3 MG/DL (ref 1.6–2.4)
POTASSIUM SERPL-SCNC: 5.1 MMOL/L (ref 3.7–5.3)
SODIUM BLD-SCNC: 142 MMOL/L (ref 136–145)

## 2024-09-09 PROCEDURE — 1111F DSCHRG MED/CURRENT MED MERGE: CPT | Performed by: FAMILY MEDICINE

## 2024-09-09 PROCEDURE — 99214 OFFICE O/P EST MOD 30 MIN: CPT | Performed by: FAMILY MEDICINE

## 2024-09-09 PROCEDURE — 3078F DIAST BP <80 MM HG: CPT | Performed by: FAMILY MEDICINE

## 2024-09-09 PROCEDURE — 3074F SYST BP LT 130 MM HG: CPT | Performed by: FAMILY MEDICINE

## 2024-09-09 PROCEDURE — 1123F ACP DISCUSS/DSCN MKR DOCD: CPT | Performed by: FAMILY MEDICINE

## 2024-09-09 RX ORDER — SPIRONOLACTONE 25 MG/1
25 TABLET ORAL DAILY
Qty: 90 TABLET | Refills: 1
Start: 2024-09-09

## 2024-09-09 RX ORDER — LISINOPRIL 5 MG/1
TABLET ORAL
COMMUNITY
Start: 2024-08-28

## 2024-09-09 RX ORDER — FUROSEMIDE 40 MG
40 TABLET ORAL DAILY
Qty: 90 TABLET | Refills: 1
Start: 2024-09-09

## 2024-09-10 DIAGNOSIS — N17.9 ACUTE KIDNEY INJURY (HCC): Primary | ICD-10-CM

## 2024-09-11 RX ORDER — IPRATROPIUM BROMIDE AND ALBUTEROL SULFATE 2.5; .5 MG/3ML; MG/3ML
1 SOLUTION RESPIRATORY (INHALATION) EVERY 4 HOURS
COMMUNITY

## 2024-09-11 RX ORDER — L.ACID/L.CASEI/B.BIF/B.LON/FOS 2B CELL-50
1 CAPSULE ORAL DAILY
COMMUNITY

## 2024-09-18 ENCOUNTER — OFFICE VISIT (OUTPATIENT)
Dept: ORTHOPEDIC SURGERY | Age: 79
End: 2024-09-18

## 2024-09-18 DIAGNOSIS — M16.11 PRIMARY OSTEOARTHRITIS OF RIGHT HIP: Primary | ICD-10-CM

## 2024-09-18 DIAGNOSIS — M25.551 RIGHT HIP PAIN: ICD-10-CM

## 2024-09-18 LAB
BUN BLDV-MCNC: NORMAL MG/DL
CALCIUM SERPL-MCNC: NORMAL MG/DL
CHLORIDE BLD-SCNC: NORMAL MMOL/L
CO2: NORMAL
CREAT SERPL-MCNC: NORMAL MG/DL
EGFR: 48
GLUCOSE BLD-MCNC: NORMAL MG/DL
POTASSIUM SERPL-SCNC: NORMAL MMOL/L
SODIUM BLD-SCNC: NORMAL MMOL/L

## 2024-09-18 PROCEDURE — 99024 POSTOP FOLLOW-UP VISIT: CPT | Performed by: PHYSICIAN ASSISTANT

## 2024-09-18 RX ORDER — TRAMADOL HYDROCHLORIDE 50 MG/1
50 TABLET ORAL EVERY 6 HOURS PRN
Qty: 28 TABLET | Refills: 0 | Status: SHIPPED | OUTPATIENT
Start: 2024-09-18 | End: 2024-09-25

## 2024-09-26 DIAGNOSIS — N17.9 ACUTE KIDNEY INJURY (HCC): ICD-10-CM

## 2024-10-07 ENCOUNTER — NURSE ONLY (OUTPATIENT)
Dept: PRIMARY CARE CLINIC | Age: 79
End: 2024-10-07
Payer: MEDICARE

## 2024-10-07 VITALS — HEART RATE: 77 BPM | SYSTOLIC BLOOD PRESSURE: 106 MMHG | OXYGEN SATURATION: 96 % | DIASTOLIC BLOOD PRESSURE: 64 MMHG

## 2024-10-07 DIAGNOSIS — R35.0 URINARY FREQUENCY: Primary | ICD-10-CM

## 2024-10-07 DIAGNOSIS — R73.09 OTHER ABNORMAL GLUCOSE: ICD-10-CM

## 2024-10-07 LAB
CHP ED QC CHECK: NORMAL
GLUCOSE BLD-MCNC: 121 MG/DL
HBA1C MFR BLD: 5.9 %

## 2024-10-07 PROCEDURE — 82962 GLUCOSE BLOOD TEST: CPT | Performed by: FAMILY MEDICINE

## 2024-10-07 PROCEDURE — 83036 HEMOGLOBIN GLYCOSYLATED A1C: CPT | Performed by: FAMILY MEDICINE

## 2024-10-07 NOTE — PROGRESS NOTES
Patient presents for glucose and A1c due to recent episodes of polyuria and his history of being prediabetic. Glucose was 121 and A1c was 5.9

## 2024-10-15 ENCOUNTER — OFFICE VISIT (OUTPATIENT)
Dept: PRIMARY CARE CLINIC | Age: 79
End: 2024-10-15

## 2024-10-15 VITALS
SYSTOLIC BLOOD PRESSURE: 124 MMHG | OXYGEN SATURATION: 95 % | HEART RATE: 74 BPM | BODY MASS INDEX: 42.95 KG/M2 | DIASTOLIC BLOOD PRESSURE: 60 MMHG | WEIGHT: 300 LBS | HEIGHT: 70 IN

## 2024-10-15 DIAGNOSIS — R19.7 DIARRHEA, UNSPECIFIED TYPE: ICD-10-CM

## 2024-10-15 DIAGNOSIS — H65.93 MIDDLE EAR EFFUSION, BILATERAL: Primary | ICD-10-CM

## 2024-10-15 DIAGNOSIS — N25.81 SECONDARY HYPERPARATHYROIDISM OF RENAL ORIGIN (HCC): ICD-10-CM

## 2024-10-15 RX ORDER — MECLIZINE HCL 12.5 MG 12.5 MG/1
12.5 TABLET ORAL 3 TIMES DAILY PRN
Qty: 15 TABLET | Refills: 0 | Status: SHIPPED | OUTPATIENT
Start: 2024-10-15 | End: 2024-10-25

## 2024-10-15 ASSESSMENT — ENCOUNTER SYMPTOMS
ABDOMINAL PAIN: 0
SORE THROAT: 0
ABDOMINAL DISTENTION: 0
SHORTNESS OF BREATH: 0
CHEST TIGHTNESS: 0
DIARRHEA: 0
COUGH: 0
CONSTIPATION: 0

## 2024-10-15 ASSESSMENT — PATIENT HEALTH QUESTIONNAIRE - PHQ9
SUM OF ALL RESPONSES TO PHQ9 QUESTIONS 1 & 2: 0
2. FEELING DOWN, DEPRESSED OR HOPELESS: NOT AT ALL
SUM OF ALL RESPONSES TO PHQ QUESTIONS 1-9: 0
DEPRESSION UNABLE TO ASSESS: FUNCTIONAL CAPACITY MOTIVATION LIMITS ACCURACY
1. LITTLE INTEREST OR PLEASURE IN DOING THINGS: NOT AT ALL
SUM OF ALL RESPONSES TO PHQ QUESTIONS 1-9: 0

## 2024-10-15 NOTE — PROGRESS NOTES
Right Ear: External ear normal.      Left Ear: External ear normal.      Nose: Nose normal.      Mouth/Throat:      Mouth: Mucous membranes are moist.   Neck:      Vascular: No carotid bruit.   Cardiovascular:      Rate and Rhythm: Normal rate and regular rhythm.      Pulses: Normal pulses.      Heart sounds: Normal heart sounds.   Pulmonary:      Effort: Pulmonary effort is normal. No respiratory distress.      Breath sounds: Normal breath sounds.   Musculoskeletal:         General: Normal range of motion.      Cervical back: Normal range of motion and neck supple.   Lymphadenopathy:      Cervical: No cervical adenopathy.   Skin:     General: Skin is warm and dry.   Neurological:      Mental Status: He is alert and oriented to person, place, and time.   Psychiatric:         Mood and Affect: Mood normal.         Behavior: Behavior normal.         Thought Content: Thought content normal.         Assessment and Plan:     Assessment & Plan     1. Middle ear effusion, bilateral  -     meclizine (ANTIVERT) 12.5 MG tablet; Take 1 tablet by mouth 3 times daily as needed for Dizziness, Disp-15 tablet, R-0Normal  2. Secondary hyperparathyroidism of renal origin (HCC)  Assessment & Plan:   Chronic, at goal (stable), continue current treatment plan  3. Diarrhea, unspecified type  -     Clostridium Difficile Toxin/Antigen; Future  -     Gastrointestinal Panel, Molecular; Future  -     Calprotectin Stool; Future  -     Fecal lactoferrin; Future   Get in with Dr. Osorio for repeat c scope.   May need lialda or budesonide again if symptoms worsen or stool studies show elevated inflammatory markers.   Complete stool studies.   Hold on CT scan due to kindey function.         No follow-ups on file.     Patient given educational materials - see patient instructions.  Discussed use, benefit, and side effects of prescribed medications.  All patient questions answered. Pt voiced understanding. Reviewed health maintenance.  Instructed to

## 2024-10-16 ENCOUNTER — TELEPHONE (OUTPATIENT)
Dept: GASTROENTEROLOGY | Age: 79
End: 2024-10-16

## 2024-10-16 NOTE — TELEPHONE ENCOUNTER
Patients wife Rosita called to schedule appointment for diarrhea states its been going on over a month and over the counter meds aren't helping  Please return call  Thank you

## 2024-10-17 ENCOUNTER — HOSPITAL ENCOUNTER (OUTPATIENT)
Age: 79
Setting detail: SPECIMEN
Discharge: HOME | End: 2024-10-17

## 2024-10-17 ENCOUNTER — OFFICE VISIT (OUTPATIENT)
Dept: GASTROENTEROLOGY | Age: 79
End: 2024-10-17
Payer: MEDICARE

## 2024-10-17 VITALS
WEIGHT: 304 LBS | SYSTOLIC BLOOD PRESSURE: 129 MMHG | HEIGHT: 70 IN | BODY MASS INDEX: 43.52 KG/M2 | HEART RATE: 64 BPM | DIASTOLIC BLOOD PRESSURE: 68 MMHG

## 2024-10-17 DIAGNOSIS — R14.0 BLOATING: ICD-10-CM

## 2024-10-17 DIAGNOSIS — K52.832 LYMPHOCYTIC COLITIS: Primary | ICD-10-CM

## 2024-10-17 DIAGNOSIS — R19.7 DIARRHEA, UNSPECIFIED TYPE: ICD-10-CM

## 2024-10-17 DIAGNOSIS — K52.832 LYMPHOCYTIC COLITIS: ICD-10-CM

## 2024-10-17 LAB
ALBUMIN SERPL-MCNC: 3.9 G/DL (ref 3.5–5.2)
ALBUMIN/GLOB SERPL: 2 {RATIO} (ref 1–2.5)
ALP SERPL-CCNC: 87 U/L (ref 40–129)
ALT SERPL-CCNC: 8 U/L (ref 10–50)
ANION GAP SERPL CALCULATED.3IONS-SCNC: 10 MMOL/L (ref 9–16)
AST SERPL-CCNC: 15 U/L (ref 10–50)
BASOPHILS # BLD: 0.03 K/UL (ref 0–0.2)
BASOPHILS NFR BLD: 0 % (ref 0–2)
BILIRUB DIRECT SERPL-MCNC: 0.2 MG/DL (ref 0–0.2)
BILIRUB INDIRECT SERPL-MCNC: 0.2 MG/DL (ref 0–1)
BILIRUB SERPL-MCNC: 0.4 MG/DL (ref 0–1.2)
BUN SERPL-MCNC: 23 MG/DL (ref 8–23)
CALCIUM SERPL-MCNC: 9.8 MG/DL (ref 8.6–10.4)
CHLORIDE SERPL-SCNC: 107 MMOL/L (ref 98–107)
CO2 SERPL-SCNC: 26 MMOL/L (ref 20–31)
CREAT SERPL-MCNC: 1.2 MG/DL (ref 0.7–1.2)
CRP SERPL HS-MCNC: 11.6 MG/L (ref 0–5)
EOSINOPHIL # BLD: 0.38 K/UL (ref 0–0.44)
EOSINOPHILS RELATIVE PERCENT: 6 % (ref 1–4)
ERYTHROCYTE [DISTWIDTH] IN BLOOD BY AUTOMATED COUNT: 14.7 % (ref 11.8–14.4)
ERYTHROCYTE [SEDIMENTATION RATE] IN BLOOD BY PHOTOMETRIC METHOD: 16 MM/HR (ref 0–20)
GFR, ESTIMATED: 63 ML/MIN/1.73M2
GLIADIN IGA SER IA-ACNC: NORMAL U/ML
GLIADIN IGG SER IA-ACNC: NORMAL U/ML
GLUCOSE SERPL-MCNC: 78 MG/DL (ref 74–99)
HCT VFR BLD AUTO: 46 % (ref 40.7–50.3)
HGB BLD-MCNC: 13.6 G/DL (ref 13–17)
IGA SERPL-MCNC: 106 MG/DL (ref 70–400)
IMM GRANULOCYTES # BLD AUTO: <0.03 K/UL (ref 0–0.3)
IMM GRANULOCYTES NFR BLD: 0 %
LYMPHOCYTES NFR BLD: 2.01 K/UL (ref 1.1–3.7)
LYMPHOCYTES RELATIVE PERCENT: 30 % (ref 24–43)
MCH RBC QN AUTO: 30.9 PG (ref 25.2–33.5)
MCHC RBC AUTO-ENTMCNC: 29.6 G/DL (ref 28.4–34.8)
MCV RBC AUTO: 104.5 FL (ref 82.6–102.9)
MONOCYTES NFR BLD: 0.69 K/UL (ref 0.1–1.2)
MONOCYTES NFR BLD: 10 % (ref 3–12)
NEUTROPHILS NFR BLD: 54 % (ref 36–65)
NEUTS SEG NFR BLD: 3.6 K/UL (ref 1.5–8.1)
NRBC BLD-RTO: 0 PER 100 WBC
PLATELET # BLD AUTO: 270 K/UL (ref 138–453)
PMV BLD AUTO: 9.7 FL (ref 8.1–13.5)
POTASSIUM SERPL-SCNC: 5.1 MMOL/L (ref 3.7–5.3)
PROT SERPL-MCNC: 6.3 G/DL (ref 6.6–8.7)
RBC # BLD AUTO: 4.4 M/UL (ref 4.21–5.77)
RBC # BLD: ABNORMAL 10*6/UL
RBC # BLD: ABNORMAL 10*6/UL
SODIUM SERPL-SCNC: 143 MMOL/L (ref 136–145)
TSH SERPL DL<=0.05 MIU/L-ACNC: 2.18 UIU/ML (ref 0.27–4.2)
TTG IGA SER IA-ACNC: NORMAL U/ML
WBC OTHER # BLD: 6.7 K/UL (ref 3.5–11.3)

## 2024-10-17 PROCEDURE — 3074F SYST BP LT 130 MM HG: CPT | Performed by: PHYSICIAN ASSISTANT

## 2024-10-17 PROCEDURE — 1123F ACP DISCUSS/DSCN MKR DOCD: CPT | Performed by: PHYSICIAN ASSISTANT

## 2024-10-17 PROCEDURE — 99214 OFFICE O/P EST MOD 30 MIN: CPT | Performed by: PHYSICIAN ASSISTANT

## 2024-10-17 PROCEDURE — 3078F DIAST BP <80 MM HG: CPT | Performed by: PHYSICIAN ASSISTANT

## 2024-10-17 RX ORDER — BUDESONIDE 3 MG/1
9 CAPSULE, COATED PELLETS ORAL EVERY MORNING
Qty: 90 CAPSULE | Refills: 1 | Status: SHIPPED | OUTPATIENT
Start: 2024-10-17

## 2024-10-17 NOTE — PROGRESS NOTES
GI CLINIC FOLLOW UP    INTERVAL HISTORY:   No referring provider defined for this encounter.    Chief Complaint   Patient presents with    Follow-up     Diarrhea        HISTORY OF PRESENT ILLNESS: Mr.Leland SARAHI Gilbert is a 79 y.o. male , referred for evaluation of lymphocytic colitis, history of diarrhea.    Here for f/u  Last seen 2/1/23    Initially was seen for colonoscopy f/u (general surgery) which showed LC on biopsy early in 2022.  IBD has been deemed unlikely due to negative biomarkers and negative repeat colonoscopy. He has been tapered off of prednisone and prescribed Lialda but he reportedly was not taking the Lialda (mesalamine). Has been treated symptomatically, but today reports return of his diarrhea symptoms. PCP ordered fecal lactoferrin, calpro, GI panel, C Diff but they have not been collected yet.     Diarrhea has been intermittent since July of this year when he spent time at a SNF for 1 month for recovery from total hip replacement. Also reports increased flatus, bloating. He has been taking imodium with slight improvement. Previously also benefited from lomotil. He has not recently received any steroid prescriptions    Also reports GERD for which he is taking protonix 40mg. No complaints.    Denies fever/chills, change in appetite, unintentional weight loss, dysphagia/odynophagia, n/v, heartburn, abd pain, constipation, black or bloody stools.     Takes eliquis for a flutter    Last EGD: none on file  Last colonoscopy: 8/9/22 - poor prep. 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

## 2024-10-18 ENCOUNTER — HOSPITAL ENCOUNTER (OUTPATIENT)
Age: 79
Setting detail: SPECIMEN
Discharge: HOME | End: 2024-10-18
Payer: MEDICARE

## 2024-10-18 PROCEDURE — 83993 ASSAY FOR CALPROTECTIN FECAL: CPT

## 2024-10-18 PROCEDURE — 87449 NOS EACH ORGANISM AG IA: CPT

## 2024-10-18 PROCEDURE — 87324 CLOSTRIDIUM AG IA: CPT

## 2024-10-18 PROCEDURE — 83630 LACTOFERRIN FECAL (QUAL): CPT

## 2024-10-18 PROCEDURE — 87506 IADNA-DNA/RNA PROBE TQ 6-11: CPT

## 2024-10-21 ENCOUNTER — TELEPHONE (OUTPATIENT)
Dept: GASTROENTEROLOGY | Age: 79
End: 2024-10-21

## 2024-10-21 DIAGNOSIS — R19.7 DIARRHEA, UNSPECIFIED TYPE: ICD-10-CM

## 2024-10-21 LAB
GLIADIN IGA SER IA-ACNC: 1.3 U/ML
GLIADIN IGG SER IA-ACNC: <0.4 U/ML
IGA SERPL-MCNC: 106 MG/DL (ref 70–400)
TTG IGA SER IA-ACNC: 0.7 U/ML

## 2024-10-22 ENCOUNTER — TELEPHONE (OUTPATIENT)
Dept: PRIMARY CARE CLINIC | Age: 79
End: 2024-10-22

## 2024-10-22 DIAGNOSIS — H91.22 SUDDEN IDIOPATHIC HEARING LOSS OF LEFT EAR WITH RESTRICTED HEARING OF RIGHT EAR: Primary | ICD-10-CM

## 2024-10-22 LAB
C DIFF GDH + TOXINS A+B STL QL IA.RAPID: NEGATIVE
CAMPYLOBACTER DNA SPEC NAA+PROBE: NORMAL
ETEC ELTA+ESTB GENES STL QL NAA+PROBE: NORMAL
LACTOFERRIN STL QL: ABNORMAL
P SHIGELLOIDES DNA STL QL NAA+PROBE: NORMAL
SALMONELLA DNA SPEC QL NAA+PROBE: NORMAL
SHIGA TOXIN STX GENE SPEC NAA+PROBE: NORMAL
SHIGELLA DNA SPEC QL NAA+PROBE: NORMAL
SPECIMEN DESCRIPTION: NORMAL
SPECIMEN DESCRIPTION: NORMAL
V CHOL+PARA RFBL+TRKH+TNAA STL QL NAA+PR: NORMAL
Y ENTERO RECN STL QL NAA+PROBE: NORMAL

## 2024-10-22 RX ORDER — PREDNISONE 20 MG/1
20 TABLET ORAL 2 TIMES DAILY
Qty: 10 TABLET | Refills: 0 | Status: SHIPPED | OUTPATIENT
Start: 2024-10-22 | End: 2024-10-25

## 2024-10-22 NOTE — TELEPHONE ENCOUNTER
I have placed urgent ENT referral and sent in steroid for patient. Please have him follow with ENT ASAP and complete steroid to find out what is causing this hearing loss.

## 2024-10-22 NOTE — RESULT ENCOUNTER NOTE
The results showed positive fecal lactoferrin which is a marker for intestinal inflammation such as colitis.  Keep follow-up with gastro as scheduled in November.

## 2024-10-23 ENCOUNTER — OFFICE VISIT (OUTPATIENT)
Dept: ORTHOPEDIC SURGERY | Age: 79
End: 2024-10-23

## 2024-10-23 DIAGNOSIS — Z96.641 STATUS POST RIGHT HIP REPLACEMENT: Primary | ICD-10-CM

## 2024-10-23 PROCEDURE — 99024 POSTOP FOLLOW-UP VISIT: CPT | Performed by: ORTHOPAEDIC SURGERY

## 2024-10-23 RX ORDER — LISINOPRIL 5 MG/1
5 TABLET ORAL DAILY
Qty: 90 TABLET | Refills: 3 | Status: SHIPPED | OUTPATIENT
Start: 2024-10-23

## 2024-10-23 NOTE — TELEPHONE ENCOUNTER
Okay for referral to any ENT that accepts his insurance and is able to get patient in in a timely fashion urgent referral same as before okay.

## 2024-10-23 NOTE — PROGRESS NOTES
initiation of breastfeeding/breast milk feeding
03/16/2018    afib ablation    ABLATION OF DYSRHYTHMIC FOCUS  08/22/2019    ATRIAL FIB  /  DR BYRNE    CARDIAC CATHETERIZATION  07/30/2020    Dr. Seven Elder, [place]    CARDIOVERSION  11/17/2017    COLONOSCOPY  2002, 2007    COLONOSCOPY  07/11/2016    polyp,bx    COLONOSCOPY N/A 05/02/2022    COLONOSCOPY WITH RANDOM COLON BIOPSIES AND CLIPPING AT DISTAL TRANSVERSE COLON performed by Efrain Cabral MD at [facility] ENDO    COLONOSCOPY N/A 08/09/2022    COLONOSCOPY POLYPECTOMY SNARE/COLD BIOPSY performed by Isha Oosrio MD at [facility] ENDO    CYST REMOVAL Left 08/05/2016    excision cyst anterior chest    FEMUR FRACTURE SURGERY Left 7/20/2023    RETROGRADE FEMORAL NAIL WITH CORTICOSTEROID INJECTION RIGHT HIP performed by Tad Danielle DO at [facility] OR    HAMMER TOE SURGERY Bilateral     KNEE SURGERY Left 1985    repair of torn ligaments    CO ARTHRP ACETBLR/PROX FEM PROSTC AGRFT/ALGRFT Left 05/15/2018    HIP TOTAL ARTHROPLASTY MINIMALLY INVASIVE ASI WITH GPS performed by Kieran Nicholson MD at [facility] OR    CO COLON CA SCRN NOT HI RSK IND N/A 07/31/2017    COLONOSCOPY performed by Efrain Cabral MD at [facility] OR    PROSTATECTOMY  10/21/2015    robotic    SHOULDER ARTHROPLASTY Bilateral     STUDY POSS ABLATION  04/02/2018         TONSILLECTOMY      TOTAL HIP ARTHROPLASTY Right 7/16/2024    HIP TOTAL ARTHROPLASTY ASI RIGHT performed by Kieran Nicholson MD at [facility] OR    TOTAL KNEE ARTHROPLASTY Bilateral LT-2003, RT-2006    TRANSESOPHAGEAL ECHOCARDIOGRAM  11/17/2017    TRANSESOPHAGEAL ECHOCARDIOGRAM  08/22/2019    TUMOR EXCISION      Throat/nose D/T benign tumor    UPPP UVULOPALATOPHARYGOPLASTY  90'S    VARICOSE VEIN SURGERY Bilateral 80's    x2     Family History   Problem Relation Age of Onset    Diabetes Mother     Hypertension Mother     Heart Attack Mother     Colon Cancer Father     Heart Attack Father     Stroke Father     Other Sister         pneumonia    Osteoarthritis Brother         back issues    Cirrhosis Brother

## 2024-10-24 ENCOUNTER — TELEPHONE (OUTPATIENT)
Dept: GASTROENTEROLOGY | Age: 79
End: 2024-10-24

## 2024-10-24 DIAGNOSIS — K52.832 LYMPHOCYTIC COLITIS: Primary | ICD-10-CM

## 2024-10-24 DIAGNOSIS — R19.5 ELEVATED FECAL CALPROTECTIN: ICD-10-CM

## 2024-10-24 DIAGNOSIS — R19.7 DIARRHEA, UNSPECIFIED TYPE: ICD-10-CM

## 2024-10-24 LAB — CALPROTECTIN, FECAL: 881 UG/G

## 2024-10-24 NOTE — TELEPHONE ENCOUNTER
Lm to check with insurance to see what ENT he can see and who can see him in a timely fashion then call us for referral to that practice.

## 2024-10-24 NOTE — TELEPHONE ENCOUNTER
Pt called stating that medication that he is still having diarrhea and medication isn't working. Would like to know if he can take anything else.

## 2024-10-25 RX ORDER — PREDNISONE 10 MG/1
TABLET ORAL
Qty: 70 TABLET | Refills: 0 | Status: SHIPPED | OUTPATIENT
Start: 2024-10-25 | End: 2024-11-21

## 2024-10-25 NOTE — TELEPHONE ENCOUNTER
Switching to prednisone taper  Restart mesalamine  Stat CTE so we can determine the extent of his disease involvement

## 2024-11-11 ENCOUNTER — HOSPITAL ENCOUNTER (OUTPATIENT)
Dept: MRI IMAGING | Age: 79
Discharge: HOME | End: 2024-11-13
Payer: MEDICARE

## 2024-11-11 DIAGNOSIS — K52.832 LYMPHOCYTIC COLITIS: ICD-10-CM

## 2024-11-11 DIAGNOSIS — R19.7 DIARRHEA, UNSPECIFIED TYPE: ICD-10-CM

## 2024-11-11 DIAGNOSIS — R19.5 ELEVATED FECAL CALPROTECTIN: ICD-10-CM

## 2024-11-11 PROCEDURE — 6360000002 HC RX W HCPCS: Performed by: PHYSICIAN ASSISTANT

## 2024-11-11 PROCEDURE — A9579 GAD-BASE MR CONTRAST NOS,1ML: HCPCS | Performed by: PHYSICIAN ASSISTANT

## 2024-11-11 PROCEDURE — 72197 MRI PELVIS W/O & W/DYE: CPT

## 2024-11-11 PROCEDURE — 6360000004 HC RX CONTRAST MEDICATION: Performed by: PHYSICIAN ASSISTANT

## 2024-11-11 PROCEDURE — 2580000003 HC RX 258: Performed by: PHYSICIAN ASSISTANT

## 2024-11-11 RX ORDER — SODIUM CHLORIDE 0.9 % (FLUSH) 0.9 %
10 SYRINGE (ML) INJECTION PRN
Status: DISCONTINUED | OUTPATIENT
Start: 2024-11-11 | End: 2024-11-14 | Stop reason: HOSPADM

## 2024-11-11 RX ORDER — 0.9 % SODIUM CHLORIDE 0.9 %
50 INTRAVENOUS SOLUTION INTRAVENOUS ONCE
Status: COMPLETED | OUTPATIENT
Start: 2024-11-11 | End: 2024-11-11

## 2024-11-11 RX ADMIN — Medication 0.4 MG: at 10:23

## 2024-11-11 RX ADMIN — GADOTERIDOL 20 ML: 279.3 INJECTION, SOLUTION INTRAVENOUS at 11:13

## 2024-11-11 RX ADMIN — SODIUM CHLORIDE 50 ML: 9 INJECTION, SOLUTION INTRAVENOUS at 11:13

## 2024-11-11 RX ADMIN — SODIUM CHLORIDE, PRESERVATIVE FREE 10 ML: 5 INJECTION INTRAVENOUS at 11:13

## 2024-11-11 RX ADMIN — Medication 0.4 MG: at 10:47

## 2024-11-14 ENCOUNTER — OFFICE VISIT (OUTPATIENT)
Dept: GASTROENTEROLOGY | Age: 79
End: 2024-11-14

## 2024-11-14 VITALS
HEIGHT: 70 IN | HEART RATE: 68 BPM | BODY MASS INDEX: 42.8 KG/M2 | WEIGHT: 299 LBS | TEMPERATURE: 97.2 F | SYSTOLIC BLOOD PRESSURE: 139 MMHG | DIASTOLIC BLOOD PRESSURE: 81 MMHG

## 2024-11-14 DIAGNOSIS — R14.0 BLOATING: ICD-10-CM

## 2024-11-14 DIAGNOSIS — R19.5 ELEVATED FECAL CALPROTECTIN: ICD-10-CM

## 2024-11-14 DIAGNOSIS — K52.832 LYMPHOCYTIC COLITIS: Primary | ICD-10-CM

## 2024-11-14 DIAGNOSIS — Z51.81 THERAPEUTIC DRUG MONITORING: ICD-10-CM

## 2024-11-14 NOTE — PROGRESS NOTES
holding his lasix - pt is planning to restart       Medications and potential side effects were discussed with patient. GI rx refilled.   Diet/life style/natural hx /complication of the dx were all explained in detail     He should alert me if there are persistent or worsening symptoms, dysphagia, weight loss or GI bleeding.     Past medical, past surgical, social history, psychiatric history, medications or allergies, all reviewed and updated. I personally reviewed all labs results and imaging studies of the abdomen available which were ordered by the primary care physician, and the other consultants. I did review all the pathology from the biopsies done on the previous endoscopies.  I have reviewed and agree with the ROS entered by the MA/RN.     Thank you for allowing me to participate in the care of Mr. Gilbert. For any further questions please do not hesitate to contact me.        Electronically signed by KEIKO Damon on 11/14/2024 at 11:45 AM  [facility]  P: [phone]  F: [phone]

## 2024-11-26 ENCOUNTER — TELEPHONE (OUTPATIENT)
Dept: ORTHOPEDIC SURGERY | Age: 79
End: 2024-11-26

## 2024-11-26 NOTE — TELEPHONE ENCOUNTER
[facility] called for referral for right hip physical therapy for patient. 10/23/24 PT referral from Dr. Nicholson was e-faxed to [phone].

## 2024-12-11 ENCOUNTER — OFFICE VISIT (OUTPATIENT)
Dept: PRIMARY CARE CLINIC | Age: 79
End: 2024-12-11
Payer: MEDICARE

## 2024-12-11 VITALS
HEART RATE: 111 BPM | HEIGHT: 70 IN | WEIGHT: 300.2 LBS | OXYGEN SATURATION: 98 % | BODY MASS INDEX: 42.98 KG/M2 | DIASTOLIC BLOOD PRESSURE: 64 MMHG | SYSTOLIC BLOOD PRESSURE: 118 MMHG

## 2024-12-11 DIAGNOSIS — G56.03 BILATERAL CARPAL TUNNEL SYNDROME: Primary | ICD-10-CM

## 2024-12-11 DIAGNOSIS — R29.898 WEAKNESS OF BOTH LOWER EXTREMITIES: ICD-10-CM

## 2024-12-11 DIAGNOSIS — I10 PRIMARY HYPERTENSION: ICD-10-CM

## 2024-12-11 PROCEDURE — G8482 FLU IMMUNIZE ORDER/ADMIN: HCPCS | Performed by: FAMILY MEDICINE

## 2024-12-11 PROCEDURE — 1036F TOBACCO NON-USER: CPT | Performed by: FAMILY MEDICINE

## 2024-12-11 PROCEDURE — 3078F DIAST BP <80 MM HG: CPT | Performed by: FAMILY MEDICINE

## 2024-12-11 PROCEDURE — 1159F MED LIST DOCD IN RCRD: CPT | Performed by: FAMILY MEDICINE

## 2024-12-11 PROCEDURE — G8427 DOCREV CUR MEDS BY ELIG CLIN: HCPCS | Performed by: FAMILY MEDICINE

## 2024-12-11 PROCEDURE — G8417 CALC BMI ABV UP PARAM F/U: HCPCS | Performed by: FAMILY MEDICINE

## 2024-12-11 PROCEDURE — 99213 OFFICE O/P EST LOW 20 MIN: CPT | Performed by: FAMILY MEDICINE

## 2024-12-11 PROCEDURE — 3074F SYST BP LT 130 MM HG: CPT | Performed by: FAMILY MEDICINE

## 2024-12-11 PROCEDURE — 1123F ACP DISCUSS/DSCN MKR DOCD: CPT | Performed by: FAMILY MEDICINE

## 2024-12-11 ASSESSMENT — ENCOUNTER SYMPTOMS
SHORTNESS OF BREATH: 1
COUGH: 0

## 2024-12-11 NOTE — PROGRESS NOTES
[facility]  [facility] PRIMARY CARE  [address]  [place] [address]  Dept: [phone]    Taqueria Gilbert is a 79 y.o. male Established patient, who presents today for his medical conditions/complaints as noted below.      Chief Complaint   Patient presents with    Numbness     Patient complains of numbness and cramping in both hands.    Referral - General     Patient had a referral for ENT for hearing - patient states his hearing is normal, so referral is no longer needed.       HPI:   Uses 1 lb weights to do his exercises and then the next day will get the numbness in his hands.  Pinky finger just as much as the thumb and index.  Cramps if he is trying to play a game on his phone.      Hearing is better so would like to cancel the ENT referral.     Reviewed prior notes: None   Reviewed previous:   na    No components found for: \"LDLCHOLESTEROL\", \"LDLCALC\"    (goal LDL is <100)   AST (U/L)   Date Value   10/17/2024 15     ALT (U/L)   Date Value   10/17/2024 8 (L)     BUN (mg/dL)   Date Value   10/24/2024 30 (H)     Hemoglobin A1C (%)   Date Value   10/07/2024 5.9     TSH (uIU/mL)   Date Value   10/17/2024 2.18     BP Readings from Last 3 Encounters:   12/11/24 118/64   11/14/24 139/81   10/18/24 (!) 122/58          (goal 120/80)    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

## 2024-12-18 ENCOUNTER — OFFICE VISIT (OUTPATIENT)
Dept: ORTHOPEDIC SURGERY | Age: 79
End: 2024-12-18
Payer: MEDICARE

## 2024-12-18 DIAGNOSIS — Z96.641 STATUS POST RIGHT HIP REPLACEMENT: Primary | ICD-10-CM

## 2024-12-18 PROCEDURE — G8482 FLU IMMUNIZE ORDER/ADMIN: HCPCS | Performed by: ORTHOPAEDIC SURGERY

## 2024-12-18 PROCEDURE — 1123F ACP DISCUSS/DSCN MKR DOCD: CPT | Performed by: ORTHOPAEDIC SURGERY

## 2024-12-18 PROCEDURE — G8417 CALC BMI ABV UP PARAM F/U: HCPCS | Performed by: ORTHOPAEDIC SURGERY

## 2024-12-18 PROCEDURE — 99213 OFFICE O/P EST LOW 20 MIN: CPT | Performed by: ORTHOPAEDIC SURGERY

## 2024-12-18 PROCEDURE — 1036F TOBACCO NON-USER: CPT | Performed by: ORTHOPAEDIC SURGERY

## 2024-12-18 PROCEDURE — G8428 CUR MEDS NOT DOCUMENT: HCPCS | Performed by: ORTHOPAEDIC SURGERY

## 2024-12-18 NOTE — PROGRESS NOTES
3 = A little assistance
(CPAP MACHINE) MISC, by Does not apply route, Disp: , Rfl:     Elastic Bandages & Supports (JOBST KNEE HIGH COMPRESSION SM) MISC, 1 each by Does not apply route daily as needed, Disp: , Rfl:     spironolactone (ALDACTONE) 25 MG tablet, Take 1 tablet by mouth daily, Disp: 90 tablet, Rfl: 1    furosemide (LASIX) 40 MG tablet, Take 1 tablet by mouth daily, Disp: 90 tablet, Rfl: 1    traMADol (ULTRAM) 50 MG tablet, Take 1 tablet by mouth every 6 hours as needed for Pain. (Patient not taking: Reported on 10/18/2024), Disp: , Rfl:     JARDIANCE 10 MG tablet, TAKE 1 TABLET BY MOUTH DAILY, Disp: 90 tablet, Rfl: 3    metoprolol tartrate (LOPRESSOR) 25 MG tablet, TAKE 1 AND 1/2 TABLETS BY MOUTH  TWICE DAILY (Patient taking differently: 1 tablet 2 times daily), Disp: 270 tablet, Rfl: 3    allopurinol (ZYLOPRIM) 300 MG tablet, TAKE 1 TABLET BY MOUTH TWICE  DAILY, Disp: 180 tablet, Rfl: 3    levothyroxine (SYNTHROID) 25 MCG tablet, TAKE 1 TABLET BY MOUTH DAILY, Disp: 90 tablet, Rfl: 3    acetaminophen (TYLENOL) 500 MG tablet, Take 1 tablet by mouth every 6 hours as needed for Pain, Disp: , Rfl:     vitamin B-12 (CYANOCOBALAMIN) 100 MCG tablet, Take 1 tablet by mouth daily, Disp: , Rfl:     fluticasone (FLONASE) 50 MCG/ACT nasal spray, 1 spray by Each Nostril route daily, Disp: , Rfl:     LYMPHEDEMA PUMP, 1 Device as needed (lymphedema) Bilateral lower extremities, Disp: 1 each, Rfl: 0    Ascorbic Acid (VITAMIN C) 250 MG tablet, Take 1 tablet by mouth daily, Disp: , Rfl:     Melatonin 10 MG TABS, Take 1 tablet by mouth nightly as needed, Disp: , Rfl:     zinc 50 MG CAPS, Take 50 mg by mouth daily, Disp: 30 capsule, Rfl: 3    trospium (SANCTURA) 20 MG tablet, Take 1 tablet by mouth 2 times daily Prescribed by Dr. Llanes, Disp: , Rfl:     Cinnamon 500 MG CAPS, Take 2 capsules by mouth daily, Disp: , Rfl:     Multiple Vitamins-Minerals (OCUVITE PO), Take 1 tablet by mouth daily, Disp: , Rfl:     Cholecalciferol (VITAMIN D-3) 25 MCG

## 2024-12-31 DIAGNOSIS — K52.832 LYMPHOCYTIC COLITIS: ICD-10-CM

## 2024-12-31 DIAGNOSIS — R19.7 DIARRHEA, UNSPECIFIED TYPE: ICD-10-CM

## 2024-12-31 DIAGNOSIS — R19.5 ELEVATED FECAL CALPROTECTIN: ICD-10-CM

## 2025-01-02 RX ORDER — MESALAMINE 250 MG/1
250 CAPSULE ORAL 3 TIMES DAILY
Qty: 270 CAPSULE | Refills: 3 | Status: SHIPPED | OUTPATIENT
Start: 2025-01-02

## 2025-01-16 ENCOUNTER — TELEPHONE (OUTPATIENT)
Dept: GASTROENTEROLOGY | Age: 80
End: 2025-01-16

## 2025-01-16 DIAGNOSIS — K52.832 LYMPHOCYTIC COLITIS: Primary | ICD-10-CM

## 2025-01-16 NOTE — TELEPHONE ENCOUNTER
Patient called and is having diarrhea again. Patient want to know if he could get a refill of the Budesonide ER 9mg. He thinks this is what helped the diarrhea.  And was waiting for the Pentasa 250mg to come.  Please send to [facility] home delivery because he is afraid to go out because of the falling factor.

## 2025-01-21 DIAGNOSIS — K52.832 LYMPHOCYTIC COLITIS: ICD-10-CM

## 2025-02-28 RX ORDER — LEVOTHYROXINE SODIUM 25 UG/1
TABLET ORAL
Qty: 90 TABLET | Refills: 3 | Status: SHIPPED | OUTPATIENT
Start: 2025-02-28

## 2025-02-28 RX ORDER — ALLOPURINOL 300 MG/1
300 TABLET ORAL 2 TIMES DAILY
Qty: 180 TABLET | Refills: 3 | Status: SHIPPED | OUTPATIENT
Start: 2025-02-28

## 2025-03-07 NOTE — TELEPHONE ENCOUNTER
PT PHARMACY IS REQUESTING A REFILL ON METOPROLOL 25 MG 1 1/2 TABS BID   SHE REPORTS TAKING 1 TAB BID   PLEASE ADVISE ON WHICH DIRECTIONS PT SHOULD USE THANK YOU

## 2025-03-10 ENCOUNTER — TELEPHONE (OUTPATIENT)
Dept: PRIMARY CARE CLINIC | Age: 80
End: 2025-03-10

## 2025-03-10 DIAGNOSIS — R29.898 WEAKNESS OF BOTH LOWER EXTREMITIES: Primary | ICD-10-CM

## 2025-03-10 DIAGNOSIS — M16.11 PRIMARY OSTEOARTHRITIS OF RIGHT HIP: ICD-10-CM

## 2025-03-10 DIAGNOSIS — I50.32 CHRONIC DIASTOLIC CONGESTIVE HEART FAILURE (HCC): ICD-10-CM

## 2025-03-10 DIAGNOSIS — M16.12 PRIMARY OSTEOARTHRITIS OF LEFT HIP: ICD-10-CM

## 2025-03-10 RX ORDER — METOPROLOL TARTRATE 25 MG/1
TABLET, FILM COATED ORAL
Qty: 270 TABLET | Refills: 3 | Status: SHIPPED | OUTPATIENT
Start: 2025-03-10

## 2025-03-10 NOTE — TELEPHONE ENCOUNTER
Patient's wife came in to see if she can get an order put in for [facility] to see him for this. Rosita is having surgery tomorrow and will be out of commission for a few weeks. She also states his blood sugar was 150 yesterday.     Call patients cell phone number to reach him. [phone].     Please advise.

## 2025-03-18 ENCOUNTER — TELEPHONE (OUTPATIENT)
Dept: PRIMARY CARE CLINIC | Age: 80
End: 2025-03-18

## 2025-03-18 NOTE — TELEPHONE ENCOUNTER
[facility] called in stating they received a ferral for patient but requesting the most recent office note.    Faxed over to [phone]  [place] [facility]

## 2025-03-20 ENCOUNTER — OFFICE VISIT (OUTPATIENT)
Dept: PRIMARY CARE CLINIC | Age: 80
End: 2025-03-20
Payer: MEDICARE

## 2025-03-20 VITALS
HEIGHT: 70 IN | DIASTOLIC BLOOD PRESSURE: 60 MMHG | BODY MASS INDEX: 43.61 KG/M2 | WEIGHT: 304.6 LBS | SYSTOLIC BLOOD PRESSURE: 104 MMHG | HEART RATE: 69 BPM | OXYGEN SATURATION: 94 %

## 2025-03-20 DIAGNOSIS — R73.9 HYPERGLYCEMIA: ICD-10-CM

## 2025-03-20 DIAGNOSIS — R73.9 HYPERGLYCEMIA: Primary | ICD-10-CM

## 2025-03-20 DIAGNOSIS — Z13.6 SCREENING FOR CARDIOVASCULAR CONDITION: ICD-10-CM

## 2025-03-20 DIAGNOSIS — E11.9 TYPE 2 DIABETES MELLITUS WITHOUT COMPLICATION, WITHOUT LONG-TERM CURRENT USE OF INSULIN: ICD-10-CM

## 2025-03-20 DIAGNOSIS — R73.03 PREDIABETES: ICD-10-CM

## 2025-03-20 DIAGNOSIS — S31.809A WOUND OF BUTTOCK, UNSPECIFIED LATERALITY, INITIAL ENCOUNTER: ICD-10-CM

## 2025-03-20 LAB
ALBUMIN/GLOBULIN RATIO: 2 (ref 1–2.5)
ALBUMIN: 4.3 G/DL (ref 3.5–5.2)
ALP BLD-CCNC: 77 U/L (ref 40–129)
ALT SERPL-CCNC: 30 U/L (ref 10–50)
ANION GAP SERPL CALCULATED.3IONS-SCNC: 13 MMOL/L (ref 9–16)
AST SERPL-CCNC: 18 U/L (ref 10–50)
BASOPHILS ABSOLUTE: 0.03 K/UL (ref 0–0.2)
BASOPHILS RELATIVE PERCENT: 0 % (ref 0–2)
BILIRUB SERPL-MCNC: 0.9 MG/DL (ref 0–1.2)
BUN BLDV-MCNC: 47 MG/DL (ref 8–23)
CALCIUM SERPL-MCNC: 10.2 MG/DL (ref 8.6–10.4)
CHLORIDE BLD-SCNC: 99 MMOL/L (ref 98–107)
CHOLESTEROL, TOTAL: 118 MG/DL (ref 0–199)
CHOLESTEROL/HDL RATIO: 2.7
CO2: 30 MMOL/L (ref 20–31)
CREAT SERPL-MCNC: 1.4 MG/DL (ref 0.7–1.2)
EOSINOPHILS ABSOLUTE: 0.03 K/UL (ref 0–0.44)
EOSINOPHILS RELATIVE PERCENT: 0 % (ref 1–4)
GFR, ESTIMATED: 50 ML/MIN/1.73M2
GLUCOSE BLD-MCNC: 141 MG/DL (ref 74–99)
HBA1C MFR BLD: 5.9 %
HCT VFR BLD CALC: 50.6 % (ref 40.7–50.3)
HDLC SERPL-MCNC: 44 MG/DL
HEMOGLOBIN: 15.6 G/DL (ref 13–17)
IMMATURE GRANULOCYTES %: 1 %
IMMATURE GRANULOCYTES ABSOLUTE: 0.06 K/UL (ref 0–0.3)
LDL CHOLESTEROL: 52 MG/DL (ref 0–100)
LYMPHOCYTES ABSOLUTE: 1.68 K/UL (ref 1.1–3.7)
LYMPHOCYTES RELATIVE PERCENT: 21 % (ref 24–43)
MCH RBC QN AUTO: 32 PG (ref 25.2–33.5)
MCHC RBC AUTO-ENTMCNC: 30.8 G/DL (ref 28.4–34.8)
MCV RBC AUTO: 103.9 FL (ref 82.6–102.9)
MONOCYTES ABSOLUTE: 0.58 K/UL (ref 0.1–1.2)
MONOCYTES RELATIVE PERCENT: 7 % (ref 3–12)
NEUTROPHILS ABSOLUTE: 5.54 K/UL (ref 1.5–8.1)
NEUTROPHILS RELATIVE PERCENT: 71 % (ref 36–65)
NRBC AUTOMATED: 0.3 PER 100 WBC
PDW BLD-RTO: 15.7 % (ref 11.8–14.4)
PLATELET # BLD: 243 K/UL (ref 138–453)
PMV BLD AUTO: 10.5 FL (ref 8.1–13.5)
POTASSIUM SERPL-SCNC: 4.1 MMOL/L (ref 3.7–5.3)
RBC # BLD: 4.87 M/UL (ref 4.21–5.77)
RBC # BLD: ABNORMAL 10*6/UL
RBC # BLD: ABNORMAL 10*6/UL
SODIUM BLD-SCNC: 142 MMOL/L (ref 136–145)
TOTAL PROTEIN: 6.4 G/DL (ref 6.6–8.7)
TRIGL SERPL-MCNC: 111 MG/DL
VLDLC SERPL CALC-MCNC: 22 MG/DL (ref 1–30)
WBC # BLD: 7.9 K/UL (ref 3.5–11.3)

## 2025-03-20 PROCEDURE — 83036 HEMOGLOBIN GLYCOSYLATED A1C: CPT | Performed by: FAMILY MEDICINE

## 2025-03-20 PROCEDURE — 1123F ACP DISCUSS/DSCN MKR DOCD: CPT | Performed by: FAMILY MEDICINE

## 2025-03-20 PROCEDURE — G8427 DOCREV CUR MEDS BY ELIG CLIN: HCPCS | Performed by: FAMILY MEDICINE

## 2025-03-20 PROCEDURE — 1036F TOBACCO NON-USER: CPT | Performed by: FAMILY MEDICINE

## 2025-03-20 PROCEDURE — 1159F MED LIST DOCD IN RCRD: CPT | Performed by: FAMILY MEDICINE

## 2025-03-20 PROCEDURE — 3078F DIAST BP <80 MM HG: CPT | Performed by: FAMILY MEDICINE

## 2025-03-20 PROCEDURE — 99214 OFFICE O/P EST MOD 30 MIN: CPT | Performed by: FAMILY MEDICINE

## 2025-03-20 PROCEDURE — G8417 CALC BMI ABV UP PARAM F/U: HCPCS | Performed by: FAMILY MEDICINE

## 2025-03-20 PROCEDURE — 3074F SYST BP LT 130 MM HG: CPT | Performed by: FAMILY MEDICINE

## 2025-03-20 RX ORDER — MECLIZINE HCL 12.5 MG 12.5 MG/1
12.5 TABLET ORAL 3 TIMES DAILY PRN
COMMUNITY

## 2025-03-20 RX ORDER — BLOOD-GLUCOSE METER
1 KIT MISCELLANEOUS DAILY
Qty: 1 KIT | Refills: 0 | Status: SHIPPED | OUTPATIENT
Start: 2025-03-20

## 2025-03-20 ASSESSMENT — PATIENT HEALTH QUESTIONNAIRE - PHQ9
SUM OF ALL RESPONSES TO PHQ QUESTIONS 1-9: 0
SUM OF ALL RESPONSES TO PHQ QUESTIONS 1-9: 0
1. LITTLE INTEREST OR PLEASURE IN DOING THINGS: NOT AT ALL
2. FEELING DOWN, DEPRESSED OR HOPELESS: NOT AT ALL
SUM OF ALL RESPONSES TO PHQ QUESTIONS 1-9: 0
SUM OF ALL RESPONSES TO PHQ QUESTIONS 1-9: 0

## 2025-03-20 NOTE — PROGRESS NOTES
[facility]  [facility] PRIMARY CARE  [address]  [place] [address]  Dept: [phone]    Taqueria Gilbert is a 80 y.o. male Established patient, who presents today for his medical conditions/complaints as noted below.      Chief Complaint   Patient presents with    Wound Check     Patient would like to discus wound on buttocks.     Blood Sugar Problem     Patient states blood sugar has been high with fasting       HPI:     History of Present Illness  The patient presents for evaluation of diabetes mellitus, buttock wound, and colitis.    He has been experiencing elevated blood glucose levels, even after a 12-hour fast. His diet includes three ham sandwiches with six slices of bread for breakfast, which he suspects may be contributing to his hyperglycemia. He also consumes pizza and candy. He has not consumed milk in the past three days. He is on Jardiance.    He has developed a sore on his buttock, which was initially covered by a scab that subsequently dislodged, leading to persistent bleeding. He is on Eliquis. He also reports a dark spot in the intergluteal cleft. He spends a significant amount of time seated and uses a blanket rolled up like a donut pillow for comfort. He had been using zinc oxide mixed with honey to manage the bleeding, but now just has a bandage on it.  He sleeps in a recliner chair. He experiences back pain when standing still for extended periods. He has been using zinc oxide mixed with honey to manage the bleeding.    He has a history of kidney disease and is currently under the care of a nephrologist. He was previously hospitalized due to severe kidney damage secondary to dehydration.    He has not undergone physical therapy since December 2024 due to inclement weather. He is unable to ascend stairs and has not bathed since July 2024, relying on sponge baths instead. He is seeking a prescription for additional physical therapy sessions.    He has a

## 2025-03-21 ENCOUNTER — RESULTS FOLLOW-UP (OUTPATIENT)
Dept: PRIMARY CARE CLINIC | Age: 80
End: 2025-03-21

## 2025-03-21 NOTE — RESULT ENCOUNTER NOTE
Adv pt kidney function is about the same as it was when he was in the hospital.  Just make sure he is avoiding NSAIDs and drinking enough water.

## 2025-04-04 ENCOUNTER — TELEPHONE (OUTPATIENT)
Dept: PRIMARY CARE CLINIC | Age: 80
End: 2025-04-04

## 2025-04-04 RX ORDER — SULFAMETHOXAZOLE AND TRIMETHOPRIM 800; 160 MG/1; MG/1
1 TABLET ORAL 2 TIMES DAILY
Qty: 10 TABLET | Refills: 0 | Status: SHIPPED | OUTPATIENT
Start: 2025-04-04 | End: 2025-04-09

## 2025-04-04 NOTE — TELEPHONE ENCOUNTER
Abx sent to pharmacy for over the weekend, but needs to be seen or send picture to know if they really need to be continued

## 2025-04-04 NOTE — TELEPHONE ENCOUNTER
Right lower leg-bright red area, tender to touch and scaly skin on pretibial calf area. Believes it is cellulitis    Judy rex/[facility].   [phone]

## 2025-04-07 ENCOUNTER — APPOINTMENT (OUTPATIENT)
Dept: GENERAL RADIOLOGY | Age: 80
DRG: 641 | End: 2025-04-07
Payer: MEDICARE

## 2025-04-07 ENCOUNTER — OFFICE VISIT (OUTPATIENT)
Dept: PRIMARY CARE CLINIC | Age: 80
End: 2025-04-07
Payer: MEDICARE

## 2025-04-07 ENCOUNTER — HOSPITAL ENCOUNTER (INPATIENT)
Age: 80
LOS: 2 days | Discharge: HOME | DRG: 641 | End: 2025-04-09
Attending: EMERGENCY MEDICINE | Admitting: INTERNAL MEDICINE
Payer: MEDICARE

## 2025-04-07 VITALS
HEIGHT: 71 IN | BODY MASS INDEX: 43.15 KG/M2 | HEART RATE: 88 BPM | SYSTOLIC BLOOD PRESSURE: 92 MMHG | OXYGEN SATURATION: 97 % | WEIGHT: 308.2 LBS | DIASTOLIC BLOOD PRESSURE: 64 MMHG

## 2025-04-07 DIAGNOSIS — L03.119 CELLULITIS OF LOWER EXTREMITY, UNSPECIFIED LATERALITY: ICD-10-CM

## 2025-04-07 DIAGNOSIS — M79.604 PAIN IN BOTH LOWER EXTREMITIES: Primary | ICD-10-CM

## 2025-04-07 DIAGNOSIS — M79.605 PAIN IN BOTH LOWER EXTREMITIES: Primary | ICD-10-CM

## 2025-04-07 DIAGNOSIS — R60.0 LOWER LEG EDEMA: Primary | ICD-10-CM

## 2025-04-07 PROBLEM — E87.20 LACTIC ACIDOSIS: Status: ACTIVE | Noted: 2025-04-07

## 2025-04-07 LAB
ALBUMIN SERPL-MCNC: 4.1 G/DL (ref 3.5–5.2)
ALP SERPL-CCNC: 69 U/L (ref 40–129)
ALT SERPL-CCNC: 32 U/L (ref 10–50)
ANION GAP SERPL CALCULATED.3IONS-SCNC: 12 MMOL/L (ref 9–16)
AST SERPL-CCNC: 20 U/L (ref 10–50)
BASOPHILS # BLD: 0 K/UL (ref 0–0.2)
BASOPHILS NFR BLD: 1 % (ref 0–2)
BILIRUB SERPL-MCNC: 0.6 MG/DL (ref 0–1.2)
BNP SERPL-MCNC: 703 PG/ML (ref 0–300)
BUN SERPL-MCNC: 52 MG/DL (ref 8–23)
CALCIUM SERPL-MCNC: 10 MG/DL (ref 8.6–10.4)
CHLORIDE SERPL-SCNC: 98 MMOL/L (ref 98–107)
CO2 SERPL-SCNC: 29 MMOL/L (ref 20–31)
CREAT SERPL-MCNC: 2.2 MG/DL (ref 0.7–1.2)
EOSINOPHIL # BLD: 0 K/UL (ref 0–0.4)
EOSINOPHILS RELATIVE PERCENT: 1 % (ref 0–4)
ERYTHROCYTE [DISTWIDTH] IN BLOOD BY AUTOMATED COUNT: 17 % (ref 11.5–14.9)
GFR, ESTIMATED: 29 ML/MIN/1.73M2
GLUCOSE BLD-MCNC: 121 MG/DL (ref 75–110)
GLUCOSE SERPL-MCNC: 150 MG/DL (ref 74–99)
HCT VFR BLD AUTO: 49.4 % (ref 41–53)
HGB BLD-MCNC: 15.9 G/DL (ref 13.5–17.5)
LACTATE BLDV-SCNC: 1.1 MMOL/L (ref 0.5–2.2)
LACTATE BLDV-SCNC: 3.1 MMOL/L (ref 0.5–2.2)
LYMPHOCYTES NFR BLD: 0.8 K/UL (ref 1–4.8)
LYMPHOCYTES RELATIVE PERCENT: 13 % (ref 24–44)
MCH RBC QN AUTO: 32.3 PG (ref 26–34)
MCHC RBC AUTO-ENTMCNC: 32.2 G/DL (ref 31–37)
MCV RBC AUTO: 100.4 FL (ref 80–100)
MONOCYTES NFR BLD: 0.3 K/UL (ref 0.1–1.3)
MONOCYTES NFR BLD: 5 % (ref 1–7)
NEUTROPHILS NFR BLD: 80 % (ref 36–66)
NEUTS SEG NFR BLD: 5.1 K/UL (ref 1.3–9.1)
PLATELET # BLD AUTO: 155 K/UL (ref 150–450)
PMV BLD AUTO: 7.7 FL (ref 6–12)
POTASSIUM SERPL-SCNC: 4.6 MMOL/L (ref 3.7–5.3)
PROT SERPL-MCNC: 6.3 G/DL (ref 6.6–8.7)
RBC # BLD AUTO: 4.92 M/UL (ref 4.5–5.9)
SODIUM SERPL-SCNC: 139 MMOL/L (ref 136–145)
TROPONIN I SERPL HS-MCNC: 74 NG/L (ref 0–22)
WBC OTHER # BLD: 6.3 K/UL (ref 3.5–11)

## 2025-04-07 PROCEDURE — 96375 TX/PRO/DX INJ NEW DRUG ADDON: CPT

## 2025-04-07 PROCEDURE — 6360000002 HC RX W HCPCS: Performed by: EMERGENCY MEDICINE

## 2025-04-07 PROCEDURE — 96374 THER/PROPH/DIAG INJ IV PUSH: CPT

## 2025-04-07 PROCEDURE — 96366 THER/PROPH/DIAG IV INF ADDON: CPT

## 2025-04-07 PROCEDURE — G8427 DOCREV CUR MEDS BY ELIG CLIN: HCPCS | Performed by: FAMILY MEDICINE

## 2025-04-07 PROCEDURE — 6370000000 HC RX 637 (ALT 250 FOR IP)

## 2025-04-07 PROCEDURE — G8417 CALC BMI ABV UP PARAM F/U: HCPCS | Performed by: FAMILY MEDICINE

## 2025-04-07 PROCEDURE — 1159F MED LIST DOCD IN RCRD: CPT | Performed by: FAMILY MEDICINE

## 2025-04-07 PROCEDURE — 71045 X-RAY EXAM CHEST 1 VIEW: CPT

## 2025-04-07 PROCEDURE — 1036F TOBACCO NON-USER: CPT | Performed by: FAMILY MEDICINE

## 2025-04-07 PROCEDURE — 3078F DIAST BP <80 MM HG: CPT | Performed by: FAMILY MEDICINE

## 2025-04-07 PROCEDURE — G0378 HOSPITAL OBSERVATION PER HR: HCPCS

## 2025-04-07 PROCEDURE — 2060000000 HC ICU INTERMEDIATE R&B

## 2025-04-07 PROCEDURE — 83605 ASSAY OF LACTIC ACID: CPT

## 2025-04-07 PROCEDURE — 96365 THER/PROPH/DIAG IV INF INIT: CPT

## 2025-04-07 PROCEDURE — 1123F ACP DISCUSS/DSCN MKR DOCD: CPT | Performed by: FAMILY MEDICINE

## 2025-04-07 PROCEDURE — 36415 COLL VENOUS BLD VENIPUNCTURE: CPT

## 2025-04-07 PROCEDURE — 2580000003 HC RX 258: Performed by: EMERGENCY MEDICINE

## 2025-04-07 PROCEDURE — 93005 ELECTROCARDIOGRAM TRACING: CPT | Performed by: EMERGENCY MEDICINE

## 2025-04-07 PROCEDURE — 99213 OFFICE O/P EST LOW 20 MIN: CPT | Performed by: FAMILY MEDICINE

## 2025-04-07 PROCEDURE — 83880 ASSAY OF NATRIURETIC PEPTIDE: CPT

## 2025-04-07 PROCEDURE — 3074F SYST BP LT 130 MM HG: CPT | Performed by: FAMILY MEDICINE

## 2025-04-07 PROCEDURE — 82947 ASSAY GLUCOSE BLOOD QUANT: CPT

## 2025-04-07 PROCEDURE — 85025 COMPLETE CBC W/AUTO DIFF WBC: CPT

## 2025-04-07 PROCEDURE — 99285 EMERGENCY DEPT VISIT HI MDM: CPT

## 2025-04-07 PROCEDURE — 80053 COMPREHEN METABOLIC PANEL: CPT

## 2025-04-07 PROCEDURE — 84484 ASSAY OF TROPONIN QUANT: CPT

## 2025-04-07 RX ORDER — SPIRONOLACTONE 25 MG/1
25 TABLET ORAL DAILY
Status: DISCONTINUED | OUTPATIENT
Start: 2025-04-07 | End: 2025-04-09 | Stop reason: HOSPADM

## 2025-04-07 RX ORDER — SODIUM CHLORIDE 0.9 % (FLUSH) 0.9 %
5-40 SYRINGE (ML) INJECTION EVERY 12 HOURS SCHEDULED
Status: DISCONTINUED | OUTPATIENT
Start: 2025-04-07 | End: 2025-04-09 | Stop reason: HOSPADM

## 2025-04-07 RX ORDER — MAGNESIUM SULFATE HEPTAHYDRATE 40 MG/ML
2000 INJECTION, SOLUTION INTRAVENOUS PRN
Status: DISCONTINUED | OUTPATIENT
Start: 2025-04-07 | End: 2025-04-09 | Stop reason: HOSPADM

## 2025-04-07 RX ORDER — INSULIN LISPRO 100 [IU]/ML
0-4 INJECTION, SOLUTION INTRAVENOUS; SUBCUTANEOUS
Status: DISCONTINUED | OUTPATIENT
Start: 2025-04-07 | End: 2025-04-09 | Stop reason: HOSPADM

## 2025-04-07 RX ORDER — ACETAMINOPHEN 325 MG/1
650 TABLET ORAL EVERY 6 HOURS PRN
Status: DISCONTINUED | OUTPATIENT
Start: 2025-04-07 | End: 2025-04-09 | Stop reason: HOSPADM

## 2025-04-07 RX ORDER — ONDANSETRON 4 MG/1
4 TABLET, ORALLY DISINTEGRATING ORAL EVERY 8 HOURS PRN
Status: DISCONTINUED | OUTPATIENT
Start: 2025-04-07 | End: 2025-04-09 | Stop reason: HOSPADM

## 2025-04-07 RX ORDER — POTASSIUM CHLORIDE 7.45 MG/ML
10 INJECTION INTRAVENOUS PRN
Status: DISCONTINUED | OUTPATIENT
Start: 2025-04-07 | End: 2025-04-09 | Stop reason: HOSPADM

## 2025-04-07 RX ORDER — MECLIZINE HYDROCHLORIDE 25 MG/1
12.5 TABLET ORAL 3 TIMES DAILY PRN
Status: DISCONTINUED | OUTPATIENT
Start: 2025-04-07 | End: 2025-04-07

## 2025-04-07 RX ORDER — LEVOTHYROXINE SODIUM 25 UG/1
25 TABLET ORAL DAILY
Status: DISCONTINUED | OUTPATIENT
Start: 2025-04-08 | End: 2025-04-09 | Stop reason: HOSPADM

## 2025-04-07 RX ORDER — FUROSEMIDE 40 MG/1
40 TABLET ORAL DAILY
Status: DISCONTINUED | OUTPATIENT
Start: 2025-04-07 | End: 2025-04-07

## 2025-04-07 RX ORDER — ALLOPURINOL 300 MG/1
300 TABLET ORAL 2 TIMES DAILY
Status: DISCONTINUED | OUTPATIENT
Start: 2025-04-07 | End: 2025-04-09 | Stop reason: HOSPADM

## 2025-04-07 RX ORDER — ACETAMINOPHEN 650 MG/1
650 SUPPOSITORY RECTAL EVERY 6 HOURS PRN
Status: DISCONTINUED | OUTPATIENT
Start: 2025-04-07 | End: 2025-04-09 | Stop reason: HOSPADM

## 2025-04-07 RX ORDER — POLYETHYLENE GLYCOL 3350 17 G/17G
17 POWDER, FOR SOLUTION ORAL DAILY PRN
Status: DISCONTINUED | OUTPATIENT
Start: 2025-04-07 | End: 2025-04-09 | Stop reason: HOSPADM

## 2025-04-07 RX ORDER — SODIUM CHLORIDE 9 MG/ML
INJECTION, SOLUTION INTRAVENOUS PRN
Status: DISCONTINUED | OUTPATIENT
Start: 2025-04-07 | End: 2025-04-09 | Stop reason: HOSPADM

## 2025-04-07 RX ORDER — SODIUM CHLORIDE 0.9 % (FLUSH) 0.9 %
10 SYRINGE (ML) INJECTION PRN
Status: DISCONTINUED | OUTPATIENT
Start: 2025-04-07 | End: 2025-04-09 | Stop reason: HOSPADM

## 2025-04-07 RX ORDER — ATORVASTATIN CALCIUM 20 MG/1
20 TABLET, FILM COATED ORAL DAILY
Status: DISCONTINUED | OUTPATIENT
Start: 2025-04-07 | End: 2025-04-09 | Stop reason: HOSPADM

## 2025-04-07 RX ORDER — POTASSIUM CHLORIDE 1500 MG/1
40 TABLET, EXTENDED RELEASE ORAL PRN
Status: DISCONTINUED | OUTPATIENT
Start: 2025-04-07 | End: 2025-04-09 | Stop reason: HOSPADM

## 2025-04-07 RX ORDER — BISACODYL 10 MG
10 SUPPOSITORY, RECTAL RECTAL DAILY PRN
Status: DISCONTINUED | OUTPATIENT
Start: 2025-04-07 | End: 2025-04-09 | Stop reason: HOSPADM

## 2025-04-07 RX ORDER — ONDANSETRON 2 MG/ML
4 INJECTION INTRAMUSCULAR; INTRAVENOUS EVERY 6 HOURS PRN
Status: DISCONTINUED | OUTPATIENT
Start: 2025-04-07 | End: 2025-04-09 | Stop reason: HOSPADM

## 2025-04-07 RX ORDER — BUDESONIDE 3 MG/1
9 CAPSULE, COATED PELLETS ORAL DAILY
Status: DISCONTINUED | OUTPATIENT
Start: 2025-04-07 | End: 2025-04-09 | Stop reason: HOSPADM

## 2025-04-07 RX ORDER — FUROSEMIDE 10 MG/ML
40 INJECTION INTRAMUSCULAR; INTRAVENOUS ONCE
Status: COMPLETED | OUTPATIENT
Start: 2025-04-07 | End: 2025-04-07

## 2025-04-07 RX ORDER — METOPROLOL TARTRATE 25 MG/1
25 TABLET, FILM COATED ORAL 2 TIMES DAILY
Status: DISCONTINUED | OUTPATIENT
Start: 2025-04-07 | End: 2025-04-09 | Stop reason: HOSPADM

## 2025-04-07 RX ORDER — LISINOPRIL 5 MG/1
5 TABLET ORAL DAILY
Status: DISCONTINUED | OUTPATIENT
Start: 2025-04-07 | End: 2025-04-09 | Stop reason: HOSPADM

## 2025-04-07 RX ORDER — TROSPIUM CHLORIDE 20 MG/1
20 TABLET, FILM COATED ORAL
Status: DISCONTINUED | OUTPATIENT
Start: 2025-04-08 | End: 2025-04-09 | Stop reason: HOSPADM

## 2025-04-07 RX ADMIN — ALLOPURINOL 300 MG: 300 TABLET ORAL at 22:34

## 2025-04-07 RX ADMIN — ACETAMINOPHEN 650 MG: 325 TABLET, FILM COATED ORAL at 22:34

## 2025-04-07 RX ADMIN — LISINOPRIL 5 MG: 5 TABLET ORAL at 22:34

## 2025-04-07 RX ADMIN — APIXABAN 5 MG: 5 TABLET, FILM COATED ORAL at 22:34

## 2025-04-07 RX ADMIN — SPIRONOLACTONE 25 MG: 25 TABLET ORAL at 22:34

## 2025-04-07 RX ADMIN — FUROSEMIDE 40 MG: 10 INJECTION, SOLUTION INTRAMUSCULAR; INTRAVENOUS at 17:43

## 2025-04-07 RX ADMIN — METOPROLOL TARTRATE 25 MG: 25 TABLET, FILM COATED ORAL at 22:34

## 2025-04-07 RX ADMIN — Medication 3 MG: at 22:34

## 2025-04-07 RX ADMIN — VANCOMYCIN HYDROCHLORIDE 2000 MG: 1 INJECTION, POWDER, LYOPHILIZED, FOR SOLUTION INTRAVENOUS at 18:34

## 2025-04-07 ASSESSMENT — PAIN DESCRIPTION - LOCATION
LOCATION: FOOT
LOCATION: LEG
LOCATION: FOOT

## 2025-04-07 ASSESSMENT — PAIN - FUNCTIONAL ASSESSMENT
PAIN_FUNCTIONAL_ASSESSMENT: ACTIVITIES ARE NOT PREVENTED
PAIN_FUNCTIONAL_ASSESSMENT: 0-10

## 2025-04-07 ASSESSMENT — PAIN SCALES - GENERAL
PAINLEVEL_OUTOF10: 1
PAINLEVEL_OUTOF10: 8
PAINLEVEL_OUTOF10: 2

## 2025-04-07 ASSESSMENT — PAIN DESCRIPTION - PAIN TYPE
TYPE: CHRONIC PAIN
TYPE: CHRONIC PAIN

## 2025-04-07 ASSESSMENT — PAIN SCALES - WONG BAKER: WONGBAKER_NUMERICALRESPONSE: NO HURT

## 2025-04-07 ASSESSMENT — PAIN DESCRIPTION - ORIENTATION
ORIENTATION: RIGHT;LEFT

## 2025-04-07 ASSESSMENT — PAIN DESCRIPTION - DESCRIPTORS: DESCRIPTORS: ACHING

## 2025-04-07 NOTE — PROGRESS NOTES
[facility]  [facility] PRIMARY CARE  [address] [address]  Dept: [phone]    Taqueria Gilbert is a 80 y.o. male Established patient, who presents today for his medical conditions/complaints as noted below.      Chief Complaint   Patient presents with    Skin Problem     Bilateral legs; worse in the Left - Redness, swelling, seeping/oozing, painful to the touch       HPI:     History of Present Illness  The patient presents for evaluation of leg pain.    Severe pain has progressively worsened since the last visit, significantly impairing mobility. Sharp, persistent pain in the back of legs intensifies upon standing for more than 5 minutes. The pain remains constant during ambulation but escalates when stationary. A stinging and burning sensation in both legs is reported, accompanied by redness and swelling. A physical therapist who recently visited expressed concern over the severity of symptoms. Zinc oxide cream has been applied to the legs, and powder is used in shoes to prevent athlete's foot. Feet are extremely sensitive to touch, a symptom attributed to the side effects of medication. There is a history of wound care at a specialized center where an Unna boot was applied to the shin.    FAMILY HISTORY  His grandfather had gangrene.      Hemoglobin A1C (%)   Date Value   03/20/2025 5.9   10/07/2024 5.9   07/05/2024 6.0         Reviewed prior notes: None  Reviewed previous:  na    LDL Cholesterol (mg/dL)   Date Value   03/20/2025 52     LDL Calculated (mg/dL)   Date Value   09/09/2021 76     HDL (mg/dL)   Date Value   03/20/2025 44       (goal LDL is <100)   AST (U/L)   Date Value   04/07/2025 20     ALT (U/L)   Date Value   04/07/2025 32     BUN (mg/dL)   Date Value   04/07/2025 52 (H)     Hemoglobin A1C (%)   Date Value   03/20/2025 5.9     TSH (uIU/mL)   Date Value   10/17/2024 2.18     BP Readings from Last 3 Encounters:   04/07/25 (!) 125/50   04/07/25 92/64

## 2025-04-07 NOTE — PROGRESS NOTES
[facility]   Pharmacy Pharmacokinetic Monitoring Service - Vancomycin     Taqueria Gilbert is a 80 y.o. male starting on vancomycin therapy for   Indication: SSTI, MRSA suspected.. Pharmacy consulted by Dr. Mcarthur for monitoring and adjustment.    Target Concentration: Goal trough of 10-15 mg/L and AUC/DEBBIE <500 mg*hr/L    Additional Antimicrobials: none    Pertinent Laboratory Values:   Wt Readings from Last 1 Encounters:   04/07/25 (!) 138.3 kg (305 lb)     Temp Readings from Last 1 Encounters:   04/07/25 97.5 °F (36.4 °C) (Oral)     Estimated Creatinine Clearance: 37 mL/min (A) (based on SCr of 2.2 mg/dL (H)).  Recent Labs     04/07/25  1517   CREATININE 2.2*   BUN 52*   WBC 6.3       Pertinent Cultures: see micro  MRSA Nasal Swab: N/A. Non-respiratory infection.      Plan:  Dosing recommendations based on Bayesian software  Start vancomycin 2000mg x1, followed by 1000mg every 24 hours.  Renal labs as indicated   Vancomycin concentration ordered for 4/9 @ 0600   Pharmacy will continue to monitor patient and adjust therapy as indicated    Thank you for the consult,  Antonio Carrera [facility]  4/7/2025 5:50 PM

## 2025-04-07 NOTE — ED PROVIDER NOTES
[facility] CARE  EMERGENCY DEPARTMENT ENCOUNTER      Pt Name: Taqueria Gilbert  MRN: [id number]  Birthdate 1945  Date of evaluation: 4/7/25      CHIEF COMPLAINT       Chief Complaint   Patient presents with    Foot Injury     Bilateral foot swelling         HISTORY OF PRESENT ILLNESS   HPI 80 y.o. male presents with c/o foot swelling.  Patient reports that he has chronic lymphedema and that he has had worsening color change and foot swelling in his lower leg he is starting to get his some skin breakdown and weeping from his wounds.  He had a nurse come out and check on him today, and sent him to the emergency department after wrapping his lower legs.  He says he has had dark coloration on his legs for a few years.  He denies any pain, he reports chronic shortness of breath which is worse recently.  He denies any cough fevers chills or chest pain.    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    Atrial flutter ([facility])     Cellulitis of lower extremity     right     Cerebral artery occlusion with cerebral infarction ([facility])     1996    CHF (congestive heart failure) ([facility])     Chronic acquired lymphedema     Clavicle fracture     in high school    Hernia, abdominal     History of blood transfusion 2003 7 2006    AUTOTRANSFUSION DURING SURGERY    History of CVA (cerebrovascular accident) 1996    DEFECIT MILD MEMORY AND SPEECH    Hx of blood clots     DVT    Hyperglycemia 12/20/2011    Hyperlipidemia     Hypertension     Hypothyroid 09/2015    Ileus, postoperative ([facility])     Mild renal insufficiency     Morbid obesity     Non-healing wound of lower extremity     HISTORY OF, WAS SEEN IN WOUND CLINIC FOR COUPLE YRS.    Osteoarthritis     Phlebitis     HX. OF         sodium chloride flush 0.9 % injection 5-40 mL    sodium chloride flush 0.9 % injection 10 mL    0.9 % sodium chloride infusion    OR Linked Order Group     potassium chloride (KLOR-CON M) extended release tablet 40 mEq     potassium bicarb-citric acid (EFFER-K) effervescent tablet 40 mEq     potassium chloride 10 mEq/100 mL IVPB (Peripheral Line)    magnesium sulfate 2000 mg in water 50 mL IVPB    OR Linked Order Group     ondansetron (ZOFRAN-ODT) disintegrating tablet 4 mg     ondansetron (ZOFRAN) injection 4 mg    polyethylene glycol (GLYCOLAX) packet 17 g    bisacodyl (DULCOLAX) suppository 10 mg    OR Linked Order Group     acetaminophen (TYLENOL) tablet 650 mg     acetaminophen (TYLENOL) suppository 650 mg    insulin lispro (HUMALOG,ADMELOG) injection vial 0-4 Units     DISCHARGE PRESCRIPTIONS:  Current Discharge Medication List        PHYSICIAN CONSULTS ORDERED THIS ENCOUNTER:  PHARMACY TO DOSE VANCOMYCIN  IP CONSULT TO VASCULAR SURGERY  IP CONSULT TO INTERNAL MEDICINE  PHARMACY TO DOSE VANCOMYCIN     FINAL IMPRESSION      1. Lower leg edema          DISPOSITION/PLAN   DISPOSITION Admitted 04/07/2025 06:32:26 PM               PATIENT REFERRED TO:  No follow-up provider specified.    DISCHARGE MEDICATIONS:  Current Discharge Medication List            Eric Mcarthur MD  Attending Emergency Physician                      Eric Mcarthur MD  04/07/25 [id number]

## 2025-04-07 NOTE — ED NOTES
Report given to ESTEFANIA Lin from ED.   Report method by phone   The following was reviewed with receiving RN:   Current vital signs:  BP (!) 150/82   Pulse 97   Temp 97.5 °F (36.4 °C) (Oral)   Resp 18   Wt (!) 138.3 kg (305 lb)   SpO2 96%   BMI 42.54 kg/m²                MEWS Score: 1     Any medication or safety alerts were reviewed. Any pending diagnostics and notifications were also reviewed, as well as any safety concerns or issues, abnormal labs, abnormal imaging, and abnormal assessment findings. Questions were answered.

## 2025-04-08 PROBLEM — L03.90 CELLULITIS: Status: ACTIVE | Noted: 2025-04-08

## 2025-04-08 PROBLEM — D69.6 THROMBOCYTOPENIA: Status: ACTIVE | Noted: 2025-04-08

## 2025-04-08 PROBLEM — R60.0 LOWER LEG EDEMA: Status: ACTIVE | Noted: 2025-04-08

## 2025-04-08 LAB
ANION GAP SERPL CALCULATED.3IONS-SCNC: 9 MMOL/L (ref 9–16)
BASOPHILS # BLD: 0.1 K/UL (ref 0–0.2)
BASOPHILS NFR BLD: 1 % (ref 0–2)
BUN SERPL-MCNC: 45 MG/DL (ref 8–23)
CALCIUM SERPL-MCNC: 9.5 MG/DL (ref 8.6–10.4)
CHLORIDE SERPL-SCNC: 101 MMOL/L (ref 98–107)
CO2 SERPL-SCNC: 28 MMOL/L (ref 20–31)
CREAT SERPL-MCNC: 1.8 MG/DL (ref 0.7–1.2)
EKG Q-T INTERVAL: 372 MS
EKG QRS DURATION: 98 MS
EKG QTC CALCULATION (BAZETT): 447 MS
EKG R AXIS: -34 DEGREES
EKG T AXIS: 28 DEGREES
EKG VENTRICULAR RATE: 87 BPM
EOSINOPHIL # BLD: 0 K/UL (ref 0–0.4)
EOSINOPHILS RELATIVE PERCENT: 1 % (ref 0–4)
ERYTHROCYTE [DISTWIDTH] IN BLOOD BY AUTOMATED COUNT: 16.8 % (ref 11.5–14.9)
GFR, ESTIMATED: 37 ML/MIN/1.73M2
GLUCOSE BLD-MCNC: 117 MG/DL (ref 75–110)
GLUCOSE BLD-MCNC: 122 MG/DL (ref 75–110)
GLUCOSE BLD-MCNC: 130 MG/DL (ref 75–110)
GLUCOSE BLD-MCNC: 144 MG/DL (ref 75–110)
GLUCOSE SERPL-MCNC: 122 MG/DL (ref 74–99)
HCT VFR BLD AUTO: 45 % (ref 41–53)
HGB BLD-MCNC: 14.6 G/DL (ref 13.5–17.5)
LYMPHOCYTES NFR BLD: 0.8 K/UL (ref 1–4.8)
LYMPHOCYTES RELATIVE PERCENT: 17 % (ref 24–44)
MCH RBC QN AUTO: 32.6 PG (ref 26–34)
MCHC RBC AUTO-ENTMCNC: 32.5 G/DL (ref 31–37)
MCV RBC AUTO: 100.1 FL (ref 80–100)
MONOCYTES NFR BLD: 0.3 K/UL (ref 0.1–1.3)
MONOCYTES NFR BLD: 6 % (ref 1–7)
NEUTROPHILS NFR BLD: 75 % (ref 36–66)
NEUTS SEG NFR BLD: 3.7 K/UL (ref 1.3–9.1)
PLATELET # BLD AUTO: 128 K/UL (ref 150–450)
PMV BLD AUTO: 6.9 FL (ref 6–12)
POTASSIUM SERPL-SCNC: 4.9 MMOL/L (ref 3.7–5.3)
RBC # BLD AUTO: 4.5 M/UL (ref 4.5–5.9)
SODIUM SERPL-SCNC: 138 MMOL/L (ref 136–145)
WBC OTHER # BLD: 4.9 K/UL (ref 3.5–11)

## 2025-04-08 PROCEDURE — 99223 1ST HOSP IP/OBS HIGH 75: CPT | Performed by: NURSE PRACTITIONER

## 2025-04-08 PROCEDURE — 99223 1ST HOSP IP/OBS HIGH 75: CPT | Performed by: INTERNAL MEDICINE

## 2025-04-08 PROCEDURE — 82947 ASSAY GLUCOSE BLOOD QUANT: CPT

## 2025-04-08 PROCEDURE — 6360000002 HC RX W HCPCS: Performed by: EMERGENCY MEDICINE

## 2025-04-08 PROCEDURE — 97530 THERAPEUTIC ACTIVITIES: CPT

## 2025-04-08 PROCEDURE — 36415 COLL VENOUS BLD VENIPUNCTURE: CPT

## 2025-04-08 PROCEDURE — G0378 HOSPITAL OBSERVATION PER HR: HCPCS

## 2025-04-08 PROCEDURE — 6370000000 HC RX 637 (ALT 250 FOR IP)

## 2025-04-08 PROCEDURE — 2500000003 HC RX 250 WO HCPCS

## 2025-04-08 PROCEDURE — 97166 OT EVAL MOD COMPLEX 45 MIN: CPT

## 2025-04-08 PROCEDURE — 85025 COMPLETE CBC W/AUTO DIFF WBC: CPT

## 2025-04-08 PROCEDURE — 99213 OFFICE O/P EST LOW 20 MIN: CPT

## 2025-04-08 PROCEDURE — 2580000003 HC RX 258: Performed by: EMERGENCY MEDICINE

## 2025-04-08 PROCEDURE — 93010 ELECTROCARDIOGRAM REPORT: CPT | Performed by: INTERNAL MEDICINE

## 2025-04-08 PROCEDURE — 97116 GAIT TRAINING THERAPY: CPT

## 2025-04-08 PROCEDURE — 97162 PT EVAL MOD COMPLEX 30 MIN: CPT

## 2025-04-08 PROCEDURE — 2060000000 HC ICU INTERMEDIATE R&B

## 2025-04-08 PROCEDURE — 96366 THER/PROPH/DIAG IV INF ADDON: CPT

## 2025-04-08 PROCEDURE — 80048 BASIC METABOLIC PNL TOTAL CA: CPT

## 2025-04-08 RX ADMIN — Medication 3 MG: at 21:30

## 2025-04-08 RX ADMIN — LISINOPRIL 5 MG: 5 TABLET ORAL at 09:21

## 2025-04-08 RX ADMIN — TROSPIUM CHLORIDE 20 MG: 20 TABLET, FILM COATED ORAL at 16:20

## 2025-04-08 RX ADMIN — ALLOPURINOL 300 MG: 300 TABLET ORAL at 21:30

## 2025-04-08 RX ADMIN — VANCOMYCIN HYDROCHLORIDE 1000 MG: 1 INJECTION, POWDER, LYOPHILIZED, FOR SOLUTION INTRAVENOUS at 18:24

## 2025-04-08 RX ADMIN — ATORVASTATIN CALCIUM 20 MG: 20 TABLET, FILM COATED ORAL at 09:21

## 2025-04-08 RX ADMIN — APIXABAN 5 MG: 5 TABLET, FILM COATED ORAL at 09:21

## 2025-04-08 RX ADMIN — SPIRONOLACTONE 25 MG: 25 TABLET ORAL at 09:21

## 2025-04-08 RX ADMIN — APIXABAN 5 MG: 5 TABLET, FILM COATED ORAL at 21:30

## 2025-04-08 RX ADMIN — METOPROLOL TARTRATE 25 MG: 25 TABLET, FILM COATED ORAL at 21:30

## 2025-04-08 RX ADMIN — EMPAGLIFLOZIN 10 MG: 10 TABLET, FILM COATED ORAL at 09:21

## 2025-04-08 RX ADMIN — METOPROLOL TARTRATE 25 MG: 25 TABLET, FILM COATED ORAL at 09:21

## 2025-04-08 RX ADMIN — TROSPIUM CHLORIDE 20 MG: 20 TABLET, FILM COATED ORAL at 05:37

## 2025-04-08 RX ADMIN — LEVOTHYROXINE SODIUM 25 MCG: 0.03 TABLET ORAL at 05:37

## 2025-04-08 RX ADMIN — BUDESONIDE 9 MG: 3 CAPSULE ORAL at 09:20

## 2025-04-08 RX ADMIN — ALLOPURINOL 300 MG: 300 TABLET ORAL at 09:20

## 2025-04-08 RX ADMIN — SODIUM CHLORIDE, PRESERVATIVE FREE 10 ML: 5 INJECTION INTRAVENOUS at 09:21

## 2025-04-08 NOTE — CARE COORDINATION
provided with a choice of provider and agrees with the discharge plan. Freedom of choice list with basic dialogue that supports the patient's individualized plan of care/goals and shares the quality data associated with the providers was provided to: Patient   Patient Representative Name:       The Patient and/or Patient Representative Agree with the Discharge Plan? Yes    Marjorie Bonds RN  Case Management Department  Ph:  Fax:

## 2025-04-08 NOTE — PROGRESS NOTES
[facility] Internal Medicine  Robinson Solorzano MD; Earl Zazueta MD; Chris Paul MD; MD Holly Garcia MD; Ritika Coppola MD  [facility] Internal Medicine   IN-PATIENT SERVICE  [facility]                 Date:   4/7/2025  Patientname:  Taqueria Gilbert  Date of admission:  4/7/2025  2:53 PM  MRN:   [id number]  Account:  [id number]  YOB: 1945  PCP:    Gilma Barba MD  Room:   2105/2105-01  Code Status:    Full Code      Chief Complaint:     Chief Complaint   Patient presents with    Foot Injury     Bilateral foot swelling       History of Present Illness:     Taqueria Gilbert is a 80 y.o. Declined male with a history of prostate cancer, atrial flutter, bilateral extremity cellulitis, cerebral artery occlusion with cerebral infarction, CHF, hyperglycemia, hyperlipidemia, hypertension, hypothyroidism, CPAP, and SVT who presents with Foot Injury (Bilateral foot swelling)   and is admitted to the hospital for the management of Lactic acidosis.    According to patient, he has had chronic bilateral lower extremity swelling and edema with leg discoloration for the past several years.  However, he has begun to experience open sores on the anterior aspect of both lower extremities.  He also endorses increased level of pain that is sharp, persistent, in the back of the legs, and intensifies upon standing for more than 5 minutes.  The pain is constant during ambulation and escalates when stationary.  It is accompanied by stinging and burning.  He has been treating with zinc oxide    Upon presentation to the ER, bilateral leg edema and darkened skin was noted.  ER provider reports arterial pulses in both legs, and he is on Eliquis.  Initial lactic acid was 3.1 which down trended to 1.1.  EMILY was noted with a creatinine of 2.2.  Base was 1.4.  His pro BNP is 703 which is chronic and his troponin elevation is also chronic.  His baseline is between 82 and 87.

## 2025-04-08 NOTE — CONSULTS
[facility]    Consult                        Today's Date: 4/8/2025  Patient Name: Taqueria Gilbert  Date of admission: 4/7/2025  2:53 PM  Patient's age: 80 y.o., 1945  Admission Dx: Lactic acidosis [E87.20]    Reason for Consult:  Cardiac evaluation    Requesting Physician: Chris Paul MD    CHIEF COMPLAINT:  wounds to bilateral lower extremities     History Obtained From:  patient, electronic medical record    HISTORY OF PRESENT ILLNESS:      Per previous documentation: \"with a history of prostate cancer, atrial flutter, bilateral extremity cellulitis, cerebral artery occlusion with cerebral infarction, CHF, hyperglycemia, hyperlipidemia, hypertension, hypothyroidism, CPAP, and SVT who presents with Foot Injury (Bilateral foot swelling)   and is admitted to the hospital for the management of Lactic acidosis.     According to patient, he has had chronic bilateral lower extremity swelling and edema with leg discoloration for the past several years.  However, he has begun to experience open sores on the anterior aspect of both lower extremities.  He also endorses increased level of pain that is sharp, persistent, in the back of the legs, and intensifies upon standing for more than 5 minutes.  The pain is constant during ambulation and escalates when stationary.  It is accompanied by stinging and burning.  He has been treating with zinc oxide     Upon presentation to the ER, bilateral leg edema and darkened skin was noted.  ER provider reports arterial pulses in both legs, and he is on Eliquis.  Initial lactic acid was 3.1 which down trended to 1.1.  EMILY was noted with a creatinine of 2.2.  Base was 1.4.  His pro BNP is 703 which is chronic and his troponin elevation is also chronic.  His baseline is between 82 and 87.  Chest x-ray was concerning for possible pneumonia.  EKG showed atrial fibrillation without ST elevation.     ER provider discussed the case with vascular surgery who

## 2025-04-08 NOTE — PLAN OF CARE
Problem: Chronic Conditions and Co-morbidities  Goal: Patient's chronic conditions and co-morbidity symptoms are monitored and maintained or improved  Outcome: Progressing  Flowsheets (Taken 4/8/2025 0326)  Care Plan - Patient's Chronic Conditions and Co-Morbidity Symptoms are Monitored and Maintained or Improved:   Monitor and assess patient's chronic conditions and comorbid symptoms for stability, deterioration, or improvement   Collaborate with multidisciplinary team to address chronic and comorbid conditions and prevent exacerbation or deterioration     Problem: Discharge Planning  Goal: Discharge to home or other facility with appropriate resources  Outcome: Progressing  Flowsheets (Taken 4/8/2025 0326)  Discharge to home or other facility with appropriate resources:   Identify barriers to discharge with patient and caregiver   Arrange for needed discharge resources and transportation as appropriate   Identify discharge learning needs (meds, wound care, etc)   Refer to discharge planning if patient needs post-hospital services based on physician order or complex needs related to functional status, cognitive ability or social support system     Problem: Pain  Goal: Verbalizes/displays adequate comfort level or baseline comfort level  Outcome: Progressing  Flowsheets (Taken 4/8/2025 0326)  Verbalizes/displays adequate comfort level or baseline comfort level:   Encourage patient to monitor pain and request assistance   Assess pain using appropriate pain scale     Problem: Skin/Tissue Integrity  Goal: Skin integrity remains intact  Description: 1.  Monitor for areas of redness and/or skin breakdown  2.  Assess vascular access sites hourly  3.  Every 4-6 hours minimum:  Change oxygen saturation probe site  4.  Every 4-6 hours:  If on nasal continuous positive airway pressure, respiratory therapy assess nares and determine need for appliance change or resting period  Outcome: Progressing  Flowsheets (Taken 4/8/2025

## 2025-04-08 NOTE — PROGRESS NOTES
Patient arrived to the unit via stretcher and was transferred by staff to bed. Patient was assessed, vitals were taken, 4 eyes skin assessment was completed, patient was placed in fall precautions, and admission questions were answered.

## 2025-04-08 NOTE — PROGRESS NOTES
Physical Therapy  [facility]   Physical Therapy Evaluation  Date: 25  Patient Name: Taqueria Gilbert       Room: 2105/2105-01  MRN: [id number]  Account: [id number]   : 1945  (80 y.o.) Gender: male     Discharge Recommendations:  Discharge Recommendations: Continue to assess pending progress, Patient would benefit from continued therapy after discharge     PT D/C Equipment  Equipment Needed: No     Past Medical History:  has a past medical history of Adenocarcinoma of prostate (HCC), Anemia, Anesthesia, Atrial flutter (HCC), Cellulitis of lower extremity, Cerebral artery occlusion with cerebral infarction (HCC), CHF (congestive heart failure) (HCC), Chronic acquired lymphedema, Clavicle fracture, Hernia, abdominal, History of blood transfusion, History of CVA (cerebrovascular accident), Hx of blood clots, Hyperglycemia, Hyperlipidemia, Hypertension, Hypothyroid, Ileus, postoperative (HCC), Mild renal insufficiency, Morbid obesity, Non-healing wound of lower extremity, Osteoarthritis, Phlebitis, Prediabetes, Prolonged emergence from general anesthesia, Prostate CA (HCC), Prostate cancer (HCC), PVD (peripheral vascular disease), Sleep apnea, SVT (supraventricular tachycardia), Uric acid kidney stone, Wears glasses, and Wrist fracture.  Past Surgical History:   has a past surgical history that includes tumor excision; knee surgery (Left, ); Hammer toe surgery (Bilateral); UPPP (90'S); Prostatectomy (10/21/2015); cyst removal (Left, 2016); Total knee arthroplasty (Bilateral, LT-, RT-); Varicose vein surgery (Bilateral, 80's); pr colon ca scrn not hi rsk ind (N/A, 2017); Cardioversion (2017); transesophageal echocardiogram (2017); ablation of dysrhythmic focus (2018); Study possible ablation (2018); Colonoscopy (, ); Colonoscopy (2016); pr arthrp acetblr/prox fem prostc agrft/algrft (Left, 05/15/2018); ablation of dysrhythmic  5-7 times per week  Current Treatment Recommendations: Strengthening, Balance training, Functional mobility training, Transfer training, Gait training, Stair training, Neuromuscular re-education, Therapeutic activities   Safety Devices  Type of Devices: Call light within reach, Gait belt, Patient at risk for falls, Left in bed, Bed alarm in place, Nurse notified  Restraints  Restraints Initially in Place: No       PT Individual Minutes  Time In: 1003  Time Out: 1041  Minutes: 38   Time Code Minutes  Timed Code Treatment Minutes: 38 Minutes       Electronically signed by Telma Mcclelland PT on 4/8/25 at 11:28 AM EDT

## 2025-04-08 NOTE — RT PROTOCOL NOTE
Pt's home cpap unit's electrical connections have been checked and sticker applied for non-hospital equipment check off. Pt responsible for alerting staff if cpap is not operating at it's usual capacity.

## 2025-04-08 NOTE — PROGRESS NOTES
Pharmacy Medication History Note      List of current medications patient is taking is complete.    Source of information: Sure Scripts, OARRS, Care Everywhere     Changes made to medication list:  Medications removed (include reason, ex. therapy complete or physician discontinued, noncompliance):  None     Medications flagged for provider review:  Trospium - last filled in 2023   Tramadol - Per [place]ARRS, last filled in September 2024 with quantity 28 for 7 days  Align - patient not taking   Meclizine - last filled in October 2024 for 5 days   Duoneb - patient reports not taking   Furosemide - last filled in June 2024 for 90 days     Medications added/doses adjusted:  None     Other notes (ex. Recent course of antibiotics, Coumadin dosing):  The patient filled a 5 day supply of Bactrim DS on 4/4/25.   OARRS negative except as above      Current Home Medication List at Time of Admission:  Prior to Admission medications    Medication Sig   sulfamethoxazole-trimethoprim (BACTRIM DS) 800-160 MG per tablet Take 1 tablet by mouth 2 times daily for 5 days   spironolactone (ALDACTONE) 25 MG tablet Take 1 tablet by mouth daily   meclizine (ANTIVERT) 12.5 MG tablet Take 1 tablet by mouth 3 times daily as needed  Patient not taking: Reported on 4/7/2025   metoprolol tartrate (LOPRESSOR) 25 MG tablet TAKE 1 AND 1/2 TABLETS BY MOUTH  TWICE DAILY   levothyroxine (SYNTHROID) 25 MCG tablet TAKE 1 TABLET BY MOUTH DAILY   allopurinol (ZYLOPRIM) 300 MG tablet TAKE 1 TABLET BY MOUTH TWICE  DAILY   Budesonide ER 9 MG CP24 Take 1 capsule by mouth daily   PENTASA 250 MG extended release capsule TAKE 1 CAPSULE BY MOUTH 3 TIMES  DAILY   atorvastatin (LIPITOR) 20 MG tablet Take 1 tablet by mouth daily   lisinopril (PRINIVIL;ZESTRIL) 5 MG tablet TAKE 1 TABLET BY MOUTH DAILY   pantoprazole (PROTONIX) 40 MG tablet TAKE 1 TABLET BY MOUTH IN THE  MORNING BEFORE BREAKFAST   ELIQUIS 5 MG TABS tablet TAKE 1 TABLET BY MOUTH TWICE  DAILY   probiotic

## 2025-04-08 NOTE — CONSULTS
[facility]  Consult Note       NAME:  Taqueria Gilbert  MEDICAL RECORD NUMBER:  [id number]  AGE: 80 y.o.   GENDER: male  : 1945  TODAY'S DATE:  2025    Subjective:      Taqueria Gilbert is a 80 y.o. male with inpatient referral to [facility] for:  Bilateral leg wounds      Wound Identification:  Wound Type: venous  Contributing Factors: edema, venous stasis, diabetes, and decreased tissue oxygenation    Wound History: Patient states that he normally keeps his legs moisturized with a combination of zinc oxide and manuka honey. He has had increased swelling and now has open areas on his legs. He has compression stockings at home but is not able to get them on due to having hip surgery last year. No recent evaluation by vascular surgery, but he did have some varicose veins treated about 25 years ago.   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    Atrial flutter ([facility])     Cellulitis of lower extremity     right     Cerebral artery occlusion with cerebral infarction ([facility])         CHF (congestive heart failure) ([facility])     Chronic acquired lymphedema     Clavicle fracture     in high school    Hernia, abdominal     History of blood transfusion 2003    AUTOTRANSFUSION DURING SURGERY    History of CVA (cerebrovascular accident)     DEFECIT MILD MEMORY AND SPEECH    Hx of blood clots     DVT    Hyperglycemia 2011    Hyperlipidemia     Hypertension     Hypothyroid 2015    Ileus, postoperative ([facility])     Mild renal insufficiency     Morbid obesity     Non-healing wound of lower extremity     HISTORY OF, WAS SEEN IN WOUND CLINIC FOR COUPLE YRS.    Osteoarthritis     Phlebitis      50 MG tablet Take 1 tablet by mouth every 6 hours as needed for Pain.  Patient not taking: Reported on 10/18/2024    Lizeth Davenport MD   JARDIANCE 10 MG tablet TAKE 1 TABLET BY MOUTH DAILY 6/18/24   Massimo Simon DO   acetaminophen (TYLENOL) 500 MG tablet Take 1 tablet by mouth every 6 hours as needed for Pain    Lizeth Davenport MD   vitamin B-12 (CYANOCOBALAMIN) 100 MCG tablet Take 1 tablet by mouth daily    Lizeth Davenport MD   fluticasone (FLONASE) 50 MCG/ACT nasal spray 1 spray by Each Nostril route daily as needed for Allergies    Lizeth Davenport MD   LYMPHEDEMA PUMP 1 Device as needed (lymphedema) Bilateral lower extremities  Patient not taking: Reported on 4/7/2025 4/6/23   Gilma Barba MD   Ascorbic Acid (VITAMIN C) 250 MG tablet Take 1 tablet by mouth daily    Lizeth Davenport MD   Melatonin 10 MG TABS Take 1 tablet by mouth nightly as needed    Lizeth Davenport MD   zinc 50 MG CAPS Take 50 mg by mouth daily 3/29/21   Gilma Barba MD   trospium (SANCTURA) 20 MG tablet Take 1 tablet by mouth 2 times daily Prescribed by Dr. Llanes  Patient not taking: Reported on 4/7/2025    Lizeth Davenport MD   Cinnamon 500 MG CAPS Take 2 capsules by mouth daily    Lizeth Davenport MD   Multiple Vitamins-Minerals (OCUVITE PO) Take 1 tablet by mouth daily    Lizeth Davenport MD   Cholecalciferol (VITAMIN D-3) 25 MCG (1000 UT) CAPS Take 2 capsules by mouth daily    Lizeth Davenport MD       CURRENT MEDICATIONS:  Current Facility-Administered Medications   Medication Dose Route Frequency Provider Last Rate Last Admin    vancomycin (VANCOCIN) intermittent dosing (placeholder)   Other RX Placeholder Eric Mcarthur MD        vancomycin (VANCOCIN) 1,000 mg in sodium chloride 0.9 % 250 mL IVPB ([id number])  1,000 mg IntraVENous Q24H Eric Mcarthur MD        allopurinol (ZYLOPRIM) tablet 300 mg  300 mg Oral BID Amina Rodriguez, APRN - NP

## 2025-04-08 NOTE — ACP (ADVANCE CARE PLANNING)
Advance Care Planning     Advance Care Planning Activator (Inpatient)  Conversation Note      Date of ACP Conversation: 4/8/2025     Conversation Conducted with: Patient with Decision Making Capacity    ACP Activator: Marjorie Bonds RN        Health Care Decision Maker:     Current Designated Health Care Decision Maker:     Primary Decision Maker: Rosita Gilbert - Spouse - [phone]    Primary Decision Maker: Dolores Flores - Niece/Nephew - [phone]        Care Preferences    Ventilation:  \"If you were in your present state of health and suddenly became very ill and were unable to breathe on your own, what would your preference be about the use of a ventilator (breathing machine) if it were available to you?\"      Would the patient desire the use of ventilator (breathing machine)?: yes    \"If your health worsens and it becomes clear that your chance of recovery is unlikely, what would your preference be about the use of a ventilator (breathing machine) if it were available to you?\"     Would the patient desire the use of ventilator (breathing machine)?: Yes      Resuscitation  \"CPR works best to restart the heart when there is a sudden event, like a heart attack, in someone who is otherwise healthy. Unfortunately, CPR does not typically restart the heart for people who have serious health conditions or who are very sick.\"    \"In the event your heart stopped as a result of an underlying serious health condition, would you want attempts to be made to restart your heart (answer \"yes\" for attempt to resuscitate) or would you prefer a natural death (answer \"no\" for do not attempt to resuscitate)?\" yes       [] Yes   [] No   Educated Patient / Decision Maker regarding differences between Advance Directives and portable DNR orders.    Length of ACP Conversation in minutes:      Conversation Outcomes:  ACP discussion completed    Follow-up plan:    [] Schedule follow-up conversation to continue planning  []  Referred individual to Provider for additional questions/concerns   [] Advised patient/agent/surrogate to review completed ACP document and update if needed with changes in condition, patient preferences or care setting    [] This note routed to one or more involved healthcare providers

## 2025-04-08 NOTE — PROGRESS NOTES
[facility]   Pharmacy Pharmacokinetic Monitoring Service - Vancomycin    Consulting Provider: Dr Wright   Indication: SSTI  Target Concentration: Goal trough of 10-15 mg/L and AUC/DEBBIE <500 mg*hr/L  Day of Therapy: 2  Additional Antimicrobials:     Pertinent Laboratory Values:   Wt Readings from Last 1 Encounters:   04/08/25 (!) 138.6 kg (305 lb 8.9 oz)     Temp Readings from Last 1 Encounters:   04/08/25 97.9 °F (36.6 °C) (Oral)     Estimated Creatinine Clearance: 45 mL/min (A) (based on SCr of 1.8 mg/dL (H)).  Recent Labs     04/07/25  1517 04/08/25  0536   CREATININE 2.2* 1.8*   BUN 52* 45*   WBC 6.3 4.9     Procalcitonin:     Pertinent Cultures: see micro  MRSA Nasal Swab: N/A. Non-respiratory infection.    Recent vancomycin administrations                     vancomycin (VANCOCIN) 2,000 mg in sodium chloride 0.9 % 500 mL IVPB (mg) 2,000 mg New Bag 04/07/25 1834                    Assessment:  Note: Serum concentrations collected for AUC dosing may appear elevated if collected in close proximity to the dose administered, this is not necessarily an indication of toxicity    Plan:  Current dosing regimen is therapeutic. Scr slightly improved today. InsightRx updated  Continue current dose  Repeat vancomycin concentration ordered for 4/9 @ 0600   Pharmacy will continue to monitor patient and adjust therapy as indicated    Thank you for the consult,    Cm Banks, McD, [facility]  4/8/2025 8:08 AM

## 2025-04-08 NOTE — PROGRESS NOTES
[facility]   Occupational Therapy Evaluation  Date: 25  Patient Name: Taqueria Gilbert       Room: 2105/2105-01  MRN: [id number]  Account: [id number]   : 1945  (80 y.o.) Gender: male     Discharge Recommendations:  Further Occupational Therapy is recommended upon facility discharge.    OT Equipment Recommendations  Other: continue to assess    Referring Practitioner: Amina Rodriguez APRN - NP  Diagnosis: Lactic acidosis        Treatment Diagnosis: impaired self care status    Past Medical History:  has a past medical history of Adenocarcinoma of prostate (HCC), Anemia, Anesthesia, Atrial flutter (HCC), Cellulitis of lower extremity, Cerebral artery occlusion with cerebral infarction (HCC), CHF (congestive heart failure) (HCC), Chronic acquired lymphedema, Clavicle fracture, Hernia, abdominal, History of blood transfusion, History of CVA (cerebrovascular accident), Hx of blood clots, Hyperglycemia, Hyperlipidemia, Hypertension, Hypothyroid, Ileus, postoperative (HCC), Mild renal insufficiency, Morbid obesity, Non-healing wound of lower extremity, Osteoarthritis, Phlebitis, Prediabetes, Prolonged emergence from general anesthesia, Prostate CA (HCC), Prostate cancer (HCC), PVD (peripheral vascular disease), Sleep apnea, SVT (supraventricular tachycardia), Uric acid kidney stone, Wears glasses, and Wrist fracture.    Past Surgical History:   has a past surgical history that includes tumor excision; knee surgery (Left, ); Hammer toe surgery (Bilateral); UPPP (90'S); Prostatectomy (10/21/2015); cyst removal (Left, 2016); Total knee arthroplasty (Bilateral, LT-, RT-); Varicose vein surgery (Bilateral, 80's); pr colon ca scrn not hi rsk ind (N/A, 2017); Cardioversion (2017); transesophageal echocardiogram (2017); ablation of dysrhythmic focus (2018); Study possible ablation (2018); Colonoscopy (, ); Colonoscopy (2016); pr  arthrp acetblr/prox fem prostc agrft/algrft (Left, 05/15/2018); ablation of dysrhythmic focus (08/22/2019); transesophageal echocardiogram (08/22/2019); Cardiac catheterization (07/30/2020); Shoulder Arthroplasty (Bilateral); Tonsillectomy; Colonoscopy (N/A, 05/02/2022); Colonoscopy (N/A, 08/09/2022); Femur fracture surgery (Left, 7/20/2023); and Total hip arthroplasty (Right, 7/16/2024).    Restrictions  Restrictions/Precautions  Restrictions/Precautions: Fall Risk, Bed Alarm (IV right cephalic, wound dressing BLE)  Activity Level: Up as Tolerated, Up with Assist  Required Braces or Orthoses?: No  Implants Present? : Metal implants (B TKR, R THR, left femur, shoulder)      Vitals  Vitals  O2 Device: None (Room air)     Subjective  Subjective: \"I have a pump for my legs but it is 25 years old. I keep having to fix it\"  Comments: Pt in bed upon arrival. Pleasant and agreeable to OT sandeep. OK to see per ESTEFANIA Gutierrez.  Pain  Pre-Pain: 3  Post-Pain:  (increased back pain with walking, no pain score assigned)  Pain Location: Foot, Right, Left (BLE, lower back with standing)  Pain Interventions: Repositioning    Social/Functional History  Social/Functional History  Lives With: Spouse  Type of Home: House  Home Layout: Two level, Performs ADL's on one level (no bedroom downstairs, sleeps in lazy boy that he built raiser for (7 inches). Half bath only downstairs. Unable to go upstairs since hip replacement)  Home Access: Stairs to enter with rails  Entrance Stairs - Number of Steps: 1 + 2  Entrance Stairs - Rails: Right (installed grab bar to exit house backwards)  Bathroom Shower/Tub:  (unable to access, only takes sponge baths downstairs)  Bathroom Toilet: Handicap height (with toilet safety frame with raiser)  Bathroom Equipment: Safety frame  Bathroom Accessibility: Walker accessible  Home Equipment: Walker - Rolling, Rollator, Cane - Quad, Wheelchair - Manual, Sock aid, Reacher, Long-handled shoehorn (toilet safety frame

## 2025-04-08 NOTE — CONSULTS
MOUTH DAILY 6/18/24   Massimo Simon DO   acetaminophen (TYLENOL) 500 MG tablet Take 1 tablet by mouth every 6 hours as needed for Pain    Lizeth Davenport MD   vitamin B-12 (CYANOCOBALAMIN) 100 MCG tablet Take 1 tablet by mouth daily    Lizeth Davenport MD   fluticasone (FLONASE) 50 MCG/ACT nasal spray 1 spray by Each Nostril route daily as needed for Allergies    Lizeth Davenport MD   LYMPHEDEMA PUMP 1 Device as needed (lymphedema) Bilateral lower extremities  Patient not taking: Reported on 4/7/2025 4/6/23   Gilma Barba MD   Ascorbic Acid (VITAMIN C) 250 MG tablet Take 1 tablet by mouth daily    Lizeth Davenport MD   Melatonin 10 MG TABS Take 1 tablet by mouth nightly as needed    Lizeth Davenport MD   zinc 50 MG CAPS Take 50 mg by mouth daily 3/29/21   Gilma Barba MD   trospium (SANCTURA) 20 MG tablet Take 1 tablet by mouth 2 times daily Prescribed by Dr. Llanes  Patient not taking: Reported on 4/7/2025    ProviderLizeth MD   Cinnamon 500 MG CAPS Take 2 capsules by mouth daily    Lizeth Davenport MD   Multiple Vitamins-Minerals (OCUVITE PO) Take 1 tablet by mouth daily    Lizeth Davenport MD   Cholecalciferol (VITAMIN D-3) 25 MCG (1000 UT) CAPS Take 2 capsules by mouth daily    Lizeth Davenport MD     Current Facility-Administered Medications   Medication Dose Route Frequency Provider Last Rate Last Admin    vancomycin (VANCOCIN) intermittent dosing (placeholder)   Other RX Placeholder Eric Mcarthur MD        vancomycin (VANCOCIN) 1,000 mg in sodium chloride 0.9 % 250 mL IVPB ([id number])  1,000 mg IntraVENous Q24H Eric Mcarthur MD        allopurinol (ZYLOPRIM) tablet 300 mg  300 mg Oral BID Amina Rodriguez APRN - NP   300 mg at 04/08/25 0920    atorvastatin (LIPITOR) tablet 20 mg  20 mg Oral Daily Amina Rodriguez APRN - NP   20 mg at 04/08/25 0921    budesonide (ENTOCORT EC) delayed release capsule 9 mg  9 mg Oral  of this problem. I explained the significance of these abnormalities and possible etiology and management options  Reviewed previous medical records.  Discussed differential diagnosis of thrombocytopenia.  Patient platelet count was within range last month.  I suspect patient thrombocytopenia likely due to acute illness peripheral consumption and myelosuppression from acute illness.  Clinical suspicion for HIT is low  Will order peripheral smear.  Doubt TTP  Monitor platelet count  Okay to give anticoagulation as long as patient not bleeding and platelet count above 50,000  Transfuse to keep platelet count above 10,000  Will follow while in house      Discussed with patient and Nurse.      Thank you for asking us to see this patient.          Randy Le MD          This note is created with the assistance of a speech recognition program.  While intending to generate a document that actually reflects the content of the visit, the document can still have some errors including those of syntax and sound a like substitutions which may escape proof reading.  It such instances, actual meaning can be extrapolated by contextual diversion.

## 2025-04-08 NOTE — CONSULTS
Division of Vascular Surgery          Consult received overnight 4/7/25 for concern for phlegmasia.  I reviewed images of his legs in the chart, appear to be related to chronic swelling and venous insuffiencey.  Per ER patient has palpable pulses.  I recommended local wound care, compression (consider unna boot therapy) and elevation to help with venous insufficiency.  Ideally transfer to [place] if he has large DVT for intervention.  Otherwise he can be seen as outpatient for follow up.    Electronically signed by Kenzie Little MD on 4/8/25 at 3:48 PM EDT      [facility] [place]  O: [phone]  C: [phone]  Email: [email or web address]

## 2025-04-08 NOTE — H&P
[facility] Internal Medicine  Robinson Solorzano MD; Earl Zazueta MD; Chris Paul MD; MD Holly Garcia MD; Ritika Coppola MD    [facility] Internal Medicine   IN-PATIENT SERVICE   [facility]    HISTORY AND PHYSICAL EXAMINATION            Date:   4/8/2025  Patient name:  Taqueria Gilbert  Date of admission:  4/7/2025  2:53 PM  MRN:   [id number]  Account:  [id number]  YOB: 1945  PCP:    Gilma Barba MD  Room:   2105/2105-01  Code Status:    Full Code    Chief Complaint:     Chief Complaint   Patient presents with    Foot Injury     Bilateral foot swelling       History Obtained From:     Pt medical record and nursing staff    History of Present Illness:     Taqueria Gilbert is a 80 y.o. Declined male who presents with Foot Injury (Bilateral foot swelling)   and is admitted to the hospital for the management of Lactic acidosis.    Taqueria Gilbert is a 80 y.o. Declined male with a history of prostate cancer, atrial flutter, bilateral extremity cellulitis, cerebral artery occlusion with cerebral infarction, CHF, hyperglycemia, hyperlipidemia, hypertension, hypothyroidism, CPAP, and SVT who presents with Foot Injury (Bilateral foot swelling)   and is admitted to the hospital for the management of Lactic acidosis.     According to patient, he has had chronic bilateral lower extremity swelling and edema with leg discoloration for the past several years.  However, he has begun to experience open sores on the anterior aspect of both lower extremities.  He also endorses increased level of pain that is sharp, persistent, in the back of the legs, and intensifies upon standing for more than 5 minutes.  The pain is constant during ambulation and escalates when stationary.  It is accompanied by stinging and burning.  He has been treating with zinc oxide     Upon presentation to the ER, bilateral leg edema and darkened skin was noted.  ER provider

## 2025-04-08 NOTE — DISCHARGE INSTR - COC
Continuity of Care Form    Patient Name: Taqueria Gilbert   :  1945  MRN:  [id number]    Admit date:  2025  Discharge date:  25    Code Status Order: Full Code   Advance Directives:     Admitting Physician:  Chris Paul MD  PCP: Gilma Barba MD    Discharging Nurse: ESTEFANIA Gutierrez  Discharging Hospital Unit/Room#: 2105/2105-01  Discharging Unit Phone Number: [phone]    Emergency Contact:   Extended Emergency Contact Information  Primary Emergency Contact: Rosita Gilbert  Address: [address] [place] [address]  Home Phone: [phone]  Work Phone: [phone]  Mobile Phone: [phone]  Relation: Spouse  Secondary Emergency Contact: Dolores Flores  Mobile Phone: [phone]  Relation: Niece/Nephew    Past Surgical History:  Past Surgical History:   Procedure Laterality Date    ABLATION OF DYSRHYTHMIC FOCUS  2018    afib ablation    ABLATION OF DYSRHYTHMIC FOCUS  2019    ATRIAL FIB  /  DR BYRNE    CARDIAC CATHETERIZATION  2020    Dr. Seven Elder, [place]    CARDIOVERSION  2017    COLONOSCOPY  ,     COLONOSCOPY  2016    polyp,bx    COLONOSCOPY N/A 2022    COLONOSCOPY WITH RANDOM COLON BIOPSIES AND CLIPPING AT DISTAL TRANSVERSE COLON performed by Efrain Cabral MD at [facility] ENDO    COLONOSCOPY N/A 2022    COLONOSCOPY POLYPECTOMY SNARE/COLD BIOPSY performed by Isha Osorio MD at [facility] ENDO    CYST REMOVAL Left 2016    excision cyst anterior chest    FEMUR FRACTURE SURGERY Left 2023    RETROGRADE FEMORAL NAIL WITH CORTICOSTEROID INJECTION RIGHT HIP performed by Tad Danielle DO at [facility] OR    HAMMER TOE SURGERY Bilateral     KNEE SURGERY Left     repair of torn ligaments    TN ARTHRP ACETBLR/PROX FEM PROSTC AGRFT/ALGRFT Left 05/15/2018    HIP TOTAL ARTHROPLASTY MINIMALLY INVASIVE ASI WITH GPS performed by Kieran Nicholson MD at [facility] OR    TN COLON CA SCRN NOT HI RSK IND N/A  None    Nursing Mobility/ADLs:  Walking   Assisted  Transfer  Assisted  Bathing  Assisted  Dressing  Assisted  Toileting  Assisted  Feeding  Independent  Med Admin  Independent  Med Delivery   whole    Wound Care Documentation and Therapy:  Wound 04/07/25 Pretibial Right skin tear and leg discoloration (Active)   Wound Image    04/08/25 0725   Dressing Status Clean;Dry;Intact 04/08/25 0920   Wound Cleansed Not Cleansed 04/08/25 0427   Dressing/Treatment Non adherent;ABD 04/08/25 0920   Wound Assessment Pink/red 04/07/25 2016   Drainage Amount Moderate (25-50%) 04/07/25 2016   Drainage Description Thin 04/07/25 2016   Odor None 04/07/25 2016   Natasha-wound Assessment Dry/flaky;Intact;Warm 04/07/25 2016   Margins Defined edges;Attached edges 04/07/25 2016   Number of days: 0       Wound 04/07/25 Pretibial Left skin tear and skin discooration (Active)   Wound Image    04/08/25 0725   Dressing Status Clean;Dry;Intact 04/08/25 0920   Wound Cleansed Not Cleansed 04/08/25 0427   Dressing/Treatment ABD;Non adherent 04/08/25 0920   Wound Assessment Pink/red 04/07/25 2016   Drainage Amount Small (< 25%) 04/07/25 2016   Drainage Description Thin 04/07/25 2016   Odor None 04/07/25 2016   Natasha-wound Assessment Warm;Dry/flaky;Intact 04/07/25 2016   Margins Defined edges;Attached edges 04/07/25 2016   Number of days: 0       Wound 04/07/25 Buttocks Other (Comment) purple discoloration (blanchable) (Active)   Wound Image   04/08/25 0725   Dressing Status Other (Comment) 04/08/25 0427   Wound Cleansed Not Cleansed 04/07/25 2016   Wound Assessment Purple/maroon 04/08/25 0920   Drainage Amount None (dry) 04/07/25 2016   Odor None 04/07/25 2016   Natasha-wound Assessment Intact;Warm;Other (Comment) 04/07/25 2016   Margins Other (Comment) 04/07/25 2016   Number of days: 0       Incision 07/16/24 Hip Right (Active)   Number of days: 266        Elimination:  Continence:   Bowel: No  Bladder: No  Urinary Catheter: None

## 2025-04-09 ENCOUNTER — HOSPITAL ENCOUNTER (INPATIENT)
Dept: VASCULAR LAB | Age: 80
Discharge: HOME | DRG: 641 | End: 2025-04-11
Attending: INTERNAL MEDICINE
Payer: MEDICARE

## 2025-04-09 VITALS
HEART RATE: 87 BPM | OXYGEN SATURATION: 95 % | HEIGHT: 71 IN | SYSTOLIC BLOOD PRESSURE: 126 MMHG | DIASTOLIC BLOOD PRESSURE: 81 MMHG | RESPIRATION RATE: 18 BRPM | BODY MASS INDEX: 42.78 KG/M2 | WEIGHT: 305.56 LBS | TEMPERATURE: 97.6 F

## 2025-04-09 LAB
ANION GAP SERPL CALCULATED.3IONS-SCNC: 7 MMOL/L (ref 9–16)
BASOPHILS # BLD: 0 K/UL (ref 0–0.2)
BASOPHILS NFR BLD: 0 % (ref 0–2)
BUN SERPL-MCNC: 38 MG/DL (ref 8–23)
CALCIUM SERPL-MCNC: 9.3 MG/DL (ref 8.6–10.4)
CHLORIDE SERPL-SCNC: 104 MMOL/L (ref 98–107)
CO2 SERPL-SCNC: 30 MMOL/L (ref 20–31)
CREAT SERPL-MCNC: 1.7 MG/DL (ref 0.7–1.2)
DATE LAST DOSE: NORMAL
ECHO BSA: 2.65 M2
EOSINOPHIL # BLD: 0.1 K/UL (ref 0–0.4)
EOSINOPHILS RELATIVE PERCENT: 2 % (ref 0–4)
ERYTHROCYTE [DISTWIDTH] IN BLOOD BY AUTOMATED COUNT: 16.8 % (ref 11.5–14.9)
GFR, ESTIMATED: 40 ML/MIN/1.73M2
GLUCOSE BLD-MCNC: 128 MG/DL (ref 75–110)
GLUCOSE BLD-MCNC: 130 MG/DL (ref 75–110)
GLUCOSE SERPL-MCNC: 122 MG/DL (ref 74–99)
HCT VFR BLD AUTO: 44.2 % (ref 41–53)
HGB BLD-MCNC: 14.3 G/DL (ref 13.5–17.5)
LYMPHOCYTES NFR BLD: 1.2 K/UL (ref 1–4.8)
LYMPHOCYTES RELATIVE PERCENT: 21 % (ref 24–44)
MCH RBC QN AUTO: 32.5 PG (ref 26–34)
MCHC RBC AUTO-ENTMCNC: 32.3 G/DL (ref 31–37)
MCV RBC AUTO: 100.6 FL (ref 80–100)
MONOCYTES NFR BLD: 0.4 K/UL (ref 0.1–1.3)
MONOCYTES NFR BLD: 7 % (ref 1–7)
NEUTROPHILS NFR BLD: 70 % (ref 36–66)
NEUTS SEG NFR BLD: 3.9 K/UL (ref 1.3–9.1)
PLATELET # BLD AUTO: 133 K/UL (ref 150–450)
PMV BLD AUTO: 7.1 FL (ref 6–12)
POTASSIUM SERPL-SCNC: 5.2 MMOL/L (ref 3.7–5.3)
RBC # BLD AUTO: 4.39 M/UL (ref 4.5–5.9)
SODIUM SERPL-SCNC: 141 MMOL/L (ref 136–145)
TME LAST DOSE: 1824 H
VANCOMYCIN DOSE: NORMAL MG
VANCOMYCIN SERPL-MCNC: 13.1 UG/ML (ref 5–40)
VAS LEFT ABI: 1.22
VAS LEFT ANKLE BP: 149 MMHG
VAS LEFT ARM BP: 115 MMHG
VAS LEFT DORSALIS PEDIS BP: 109 MMHG
VAS LEFT PTA BP: 149 MMHG
VAS LEFT TBI: 0.83
VAS LEFT TOE PRESSURE: 101 MMHG
VAS RIGHT ABI: 1.15
VAS RIGHT ANKLE BP: 140 MMHG
VAS RIGHT ARM BP: 122 MMHG
VAS RIGHT DORSALIS PEDIS BP: 131 MMHG
VAS RIGHT PTA BP: 140 MMHG
VAS RIGHT TBI: 1.02
VAS RIGHT TOE PRESSURE: 125 MMHG
WBC OTHER # BLD: 5.6 K/UL (ref 3.5–11)

## 2025-04-09 PROCEDURE — 97110 THERAPEUTIC EXERCISES: CPT

## 2025-04-09 PROCEDURE — 99239 HOSP IP/OBS DSCHRG MGMT >30: CPT | Performed by: INTERNAL MEDICINE

## 2025-04-09 PROCEDURE — G0378 HOSPITAL OBSERVATION PER HR: HCPCS

## 2025-04-09 PROCEDURE — 99232 SBSQ HOSP IP/OBS MODERATE 35: CPT | Performed by: NURSE PRACTITIONER

## 2025-04-09 PROCEDURE — 97530 THERAPEUTIC ACTIVITIES: CPT

## 2025-04-09 PROCEDURE — 2500000003 HC RX 250 WO HCPCS

## 2025-04-09 PROCEDURE — 85025 COMPLETE CBC W/AUTO DIFF WBC: CPT

## 2025-04-09 PROCEDURE — 80048 BASIC METABOLIC PNL TOTAL CA: CPT

## 2025-04-09 PROCEDURE — 36415 COLL VENOUS BLD VENIPUNCTURE: CPT

## 2025-04-09 PROCEDURE — 6370000000 HC RX 637 (ALT 250 FOR IP)

## 2025-04-09 PROCEDURE — 82947 ASSAY GLUCOSE BLOOD QUANT: CPT

## 2025-04-09 PROCEDURE — 97116 GAIT TRAINING THERAPY: CPT

## 2025-04-09 PROCEDURE — 93923 UPR/LXTR ART STDY 3+ LVLS: CPT

## 2025-04-09 PROCEDURE — 93923 UPR/LXTR ART STDY 3+ LVLS: CPT | Performed by: SURGERY

## 2025-04-09 PROCEDURE — 80202 ASSAY OF VANCOMYCIN: CPT

## 2025-04-09 RX ORDER — CEPHALEXIN 500 MG/1
500 CAPSULE ORAL 4 TIMES DAILY
Qty: 40 CAPSULE | Refills: 0 | Status: SHIPPED | OUTPATIENT
Start: 2025-04-09 | End: 2025-04-19

## 2025-04-09 RX ORDER — SULFAMETHOXAZOLE AND TRIMETHOPRIM 800; 160 MG/1; MG/1
1 TABLET ORAL 2 TIMES DAILY
Qty: 20 TABLET | Refills: 0 | Status: ON HOLD | OUTPATIENT
Start: 2025-04-09 | End: 2025-04-19 | Stop reason: HOSPADM

## 2025-04-09 RX ADMIN — BUDESONIDE 9 MG: 3 CAPSULE ORAL at 08:49

## 2025-04-09 RX ADMIN — LISINOPRIL 5 MG: 5 TABLET ORAL at 08:49

## 2025-04-09 RX ADMIN — ALLOPURINOL 300 MG: 300 TABLET ORAL at 08:49

## 2025-04-09 RX ADMIN — EMPAGLIFLOZIN 10 MG: 10 TABLET, FILM COATED ORAL at 08:49

## 2025-04-09 RX ADMIN — METOPROLOL TARTRATE 25 MG: 25 TABLET, FILM COATED ORAL at 08:49

## 2025-04-09 RX ADMIN — LEVOTHYROXINE SODIUM 25 MCG: 0.03 TABLET ORAL at 06:00

## 2025-04-09 RX ADMIN — TROSPIUM CHLORIDE 20 MG: 20 TABLET, FILM COATED ORAL at 06:00

## 2025-04-09 RX ADMIN — SPIRONOLACTONE 25 MG: 25 TABLET ORAL at 08:49

## 2025-04-09 RX ADMIN — POLYETHYLENE GLYCOL 3350 17 G: 17 POWDER, FOR SOLUTION ORAL at 09:00

## 2025-04-09 RX ADMIN — ACETAMINOPHEN 650 MG: 325 TABLET, FILM COATED ORAL at 08:49

## 2025-04-09 RX ADMIN — ACETAMINOPHEN 650 MG: 325 TABLET, FILM COATED ORAL at 03:40

## 2025-04-09 RX ADMIN — APIXABAN 5 MG: 5 TABLET, FILM COATED ORAL at 08:49

## 2025-04-09 RX ADMIN — ATORVASTATIN CALCIUM 20 MG: 20 TABLET, FILM COATED ORAL at 08:49

## 2025-04-09 RX ADMIN — SODIUM CHLORIDE, PRESERVATIVE FREE 10 ML: 5 INJECTION INTRAVENOUS at 08:51

## 2025-04-09 ASSESSMENT — PAIN - FUNCTIONAL ASSESSMENT
PAIN_FUNCTIONAL_ASSESSMENT: ACTIVITIES ARE NOT PREVENTED

## 2025-04-09 ASSESSMENT — PAIN DESCRIPTION - LOCATION
LOCATION: COCCYX

## 2025-04-09 ASSESSMENT — PAIN SCALES - GENERAL
PAINLEVEL_OUTOF10: 6
PAINLEVEL_OUTOF10: 9
PAINLEVEL_OUTOF10: 4
PAINLEVEL_OUTOF10: 2

## 2025-04-09 ASSESSMENT — PAIN DESCRIPTION - DESCRIPTORS
DESCRIPTORS: STABBING
DESCRIPTORS: DISCOMFORT
DESCRIPTORS: DISCOMFORT

## 2025-04-09 NOTE — PROGRESS NOTES
Today's Date: 4/9/2025  Patient Name: Taqueria Gilbert  Date of admission: 4/7/2025  2:53 PM  Patient's age: 80 y.o., 1945  Admission Dx: Lactic acidosis [E87.20]    Reason for Consult: management recommendations  Requesting Physician: Chris Paul MD    CHIEF COMPLAINT: Thrombocytopenia.    History Obtained From:  patient, electronic medical record      Interval history:    Patient seen and examined  Labs vitals reviewed  Patient resting comfortably  Platelet count 133,000 today    HISTORY OF PRESENT ILLNESS:      The patient is a 80 y.o.  male who is admitted  to the hospital with complaints of bilateral lower extremity swelling.  Workup reveals lactic acidosis.  Patient has history of prostate cancer atrial fibrillation CHF.  Patient is now developing open sores on his lower extremities.  Also complains of pain in lower extremities.  Pain is constant.  Upon evaluation in the ER patient creatinine noted to be 2.2.  BNP elevated.  Chest x-ray shows finding concerning for pneumonia.  Patient EKG shows atrial fibrillation.  Patient is on Eliquis at home.  Patient started on vancomycin due to suspected cellulitis.  Patient also has decubitus ulcer.  Vascular surgery consulted.  They are considering transfer to [facility].    Hematology oncology team consulted due to thrombocytopenia.  Platelet count 1 28,000.  Was noted to be within normal range last month.  Hemoglobin within range.      Past Medical History:   has a past medical history of Adenocarcinoma of prostate (HCC), Anemia, Anesthesia, Atrial flutter (HCC), Cellulitis of lower extremity, Cerebral artery occlusion with cerebral infarction (HCC), CHF (congestive heart failure) (HCC), Chronic acquired lymphedema, Clavicle fracture, Hernia, abdominal, History of blood transfusion, History of CVA (cerebrovascular accident), Hx of blood clots, Hyperglycemia, Hyperlipidemia, Hypertension, Hypothyroid, Ileus, postoperative (HCC), Mild renal  Does not apply route daily 3/20/25   Glima Barba MD   blood glucose test strips (ASCENSIA AUTODISC VI;ONE TOUCH ULTRA TEST VI) strip 1 each by In Vitro route daily As needed. 3/20/25   Gilma Barba MD   metoprolol tartrate (LOPRESSOR) 25 MG tablet TAKE 1 AND 1/2 TABLETS BY MOUTH  TWICE DAILY 3/10/25   Juilo Gonzalez MD   levothyroxine (SYNTHROID) 25 MCG tablet TAKE 1 TABLET BY MOUTH DAILY 2/28/25   Gilma Barba MD   allopurinol (ZYLOPRIM) 300 MG tablet TAKE 1 TABLET BY MOUTH TWICE  DAILY 2/28/25   Gilma Barba MD   Budesonide ER 9 MG CP24 Take 1 capsule by mouth daily 1/21/25   [facility]   PENTASA 250 MG extended release capsule TAKE 1 CAPSULE BY MOUTH 3 TIMES  DAILY 1/2/25   [facility]   atorvastatin (LIPITOR) 20 MG tablet Take 1 tablet by mouth daily 10/28/24   Julio Gonzalez MD   lisinopril (PRINIVIL;ZESTRIL) 5 MG tablet TAKE 1 TABLET BY MOUTH DAILY 10/23/24   Gilma Barba MD   pantoprazole (PROTONIX) 40 MG tablet TAKE 1 TABLET BY MOUTH IN THE  MORNING BEFORE BREAKFAST 10/11/24   Massimo Simon DO   ELIQUIS 5 MG TABS tablet TAKE 1 TABLET BY MOUTH TWICE  DAILY 10/3/24   Massimo Simon DO   Misc. Devices (CPAP MACHINE) MISC by Does not apply route    Lizeth Davenport MD   Elastic Bandages & Supports (JOBST KNEE HIGH COMPRESSION SM) MISC 1 each by Does not apply route daily as needed  Patient not taking: Reported on 4/7/2025    Lizeth Davenport MD   JARDIANCE 10 MG tablet TAKE 1 TABLET BY MOUTH DAILY 6/18/24   Massimo Simon DO   acetaminophen (TYLENOL) 500 MG tablet Take 1 tablet by mouth every 6 hours as needed for Pain    Lizeth Davenport MD   vitamin B-12 (CYANOCOBALAMIN) 100 MCG tablet Take 1 tablet by mouth daily    Lizeth Davenport MD   fluticasone (FLONASE) 50 MCG/ACT nasal spray 1 spray by Each Nostril route daily as needed for Allergies    Provider, MD Lizeth   LYMPHEDEMA PUMP 1 Device as needed

## 2025-04-09 NOTE — PROGRESS NOTES
[facility]   OCCUPATIONAL THERAPY MISSED TREATMENT NOTE   INPATIENT   Date: 25  Patient Name: Taqueria Gilbert       Room:   MRN: [id number]   Account #: [id number]    : 1945  (80 y.o.)  Gender: male   Referring Practitioner: Amina Rodriguez APRN - NP  Diagnosis: Lactic acidosis             REASON FOR MISSED TREATMENT:  OT treatment attempted. Pt reports that he is discharging soon and wants to conserve energy. Writer offered to assist him with dressing; pt requesting to wait until wife returns at later time. OT will attempt to return at later time when pt is ready.    -    [id number]        Electronically signed by ALEJANDRA Hart on 25 at 3:04 PM EDT

## 2025-04-09 NOTE — PLAN OF CARE
Problem: Chronic Conditions and Co-morbidities  Goal: Patient's chronic conditions and co-morbidity symptoms are monitored and maintained or improved  Outcome: Progressing     Problem: Discharge Planning  Goal: Discharge to home or other facility with appropriate resources  Outcome: Progressing     Problem: Pain  Goal: Verbalizes/displays adequate comfort level or baseline comfort level  Outcome: Progressing  Note: Pt medicated with pain medication prn.  Assessed all pain characteristics including level, type, location, frequency, and onset.  Non-pharmacologic interventions offered to pt as well.  Pt states pain is tolerable at this time.        Problem: Skin/Tissue Integrity  Goal: Skin integrity remains intact  Description: 1.  Monitor for areas of redness and/or skin breakdown  2.  Assess vascular access sites hourly  3.  Every 4-6 hours minimum:  Change oxygen saturation probe site  4.  Every 4-6 hours:  If on nasal continuous positive airway pressure, respiratory therapy assess nares and determine need for appliance change or resting period  Outcome: Progressing     Problem: Safety - Adult  Goal: Free from fall injury  Outcome: Progressing  Note: Pt assessed as a fall risk this shift. Remains free from falls and accidental injury at this time. Fall precautions in place, including falling star sign and fall risk band on pt. Floor free from obstacles, and bed is locked and in lowest position. Adequate lighting provided.  Pt encouraged to call before getting OOB for any need.  Bed alarm activated. Will continue to monitor needs during hourly rounding, and reinforce education on use of call light.      Problem: ABCDS Injury Assessment  Goal: Absence of physical injury  Outcome: Progressing

## 2025-04-09 NOTE — PROGRESS NOTES
Physical Therapy  [facility]   Physical Therapy Treatment  Date: 25  Patient Name: Taqueria Gilbert       Room: 5-  MRN: [id number]  Account: [id number]   : 1945  (80 y.o.) Gender: male     Discharge Recommendations:  Discharge Recommendations: Continue to assess pending progress, Patient would benefit from continued therapy after discharge     PT D/C Equipment  Equipment Needed: No    General  Chart Reviewed: Yes  Additional Pertinent Hx: Per progress report 25: Chief Complaint  Patient presents with   Foot Injury      Bilateral foot swelling        History of Present Illness:     Taqueria Gilbert is a 80 y.o. Declined male with a history of prostate cancer, atrial flutter, bilateral extremity cellulitis, cerebral artery occlusion with cerebral infarction, CHF, hyperglycemia, hyperlipidemia, hypertension, hypothyroidism, CPAP, and SVT who presents with Foot Injury (Bilateral foot swelling)   and is admitted to the hospital for the management of Lactic acidosis.     According to patient, he has had chronic bilateral lower extremity swelling and edema with leg discoloration for the past several years.  However, he has begun to experience open sores on the anterior aspect of both lower extremities.  He also endorses increased level of pain that is sharp, persistent, in the back of the legs, and intensifies upon standing for more than 5 minutes.  The pain is constant during ambulation and escalates when stationary.  It is accompanied by stinging and burning.  He has been treating with zinc oxide     Upon presentation to the ER, bilateral leg edema and darkened skin was noted.  ER provider reports arterial pulses in both legs, and he is on Eliquis.  Initial lactic acid was 3.1 which down trended to 1.1.  EMILY was noted with a creatinine of 2.2.  Base was 1.4.  His pro BNP is 703 which is chronic and his troponin elevation is also chronic.  His baseline is between 82 and 87.   strengthening      Plan  Physical Therapy Plan  General Plan: 5-7 times per week  Current Treatment Recommendations: Strengthening, Balance training, Functional mobility training, Transfer training, Gait training, Stair training, Neuromuscular re-education, Therapeutic activities   Safety Devices  Type of Devices: Call light within reach, Gait belt, Patient at risk for falls, Left in bed, Bed alarm in place, Nurse notified  Restraints  Restraints Initially in Place: No          04/09/25 1100   PT Individual Minutes   Time In 0916   Time Out 0955   Minutes 39           Electronically signed by Rosalee Marrero PTA on 4/9/25 at 11:59 AM EDT

## 2025-04-09 NOTE — PLAN OF CARE
Problem: Chronic Conditions and Co-morbidities  Goal: Patient's chronic conditions and co-morbidity symptoms are monitored and maintained or improved  4/9/2025 1305 by Brenda Valdez RN  Outcome: Adequate for Discharge  Flowsheets (Taken 4/9/2025 0845)  Care Plan - Patient's Chronic Conditions and Co-Morbidity Symptoms are Monitored and Maintained or Improved: Monitor and assess patient's chronic conditions and comorbid symptoms for stability, deterioration, or improvement  4/9/2025 0606 by Russell Rosales RN  Outcome: Progressing     Problem: Discharge Planning  Goal: Discharge to home or other facility with appropriate resources  4/9/2025 1305 by Brenda Valdez RN  Outcome: Adequate for Discharge  Flowsheets (Taken 4/9/2025 0845)  Discharge to home or other facility with appropriate resources: Identify barriers to discharge with patient and caregiver  4/9/2025 0606 by Russell Rosales RN  Outcome: Progressing     Problem: Pain  Goal: Verbalizes/displays adequate comfort level or baseline comfort level  4/9/2025 1305 by Brenda Valdez RN  Outcome: Adequate for Discharge  4/9/2025 0606 by Russell Rosales RN  Outcome: Progressing  Note: Pt medicated with pain medication prn.  Assessed all pain characteristics including level, type, location, frequency, and onset.  Non-pharmacologic interventions offered to pt as well.  Pt states pain is tolerable at this time.        Problem: Skin/Tissue Integrity  Goal: Skin integrity remains intact  Description: 1.  Monitor for areas of redness and/or skin breakdown  2.  Assess vascular access sites hourly  3.  Every 4-6 hours minimum:  Change oxygen saturation probe site  4.  Every 4-6 hours:  If on nasal continuous positive airway pressure, respiratory therapy assess nares and determine need for appliance change or resting period  4/9/2025 1305 by Brenda Valdez RN  Outcome: Adequate for Discharge  Flowsheets (Taken 4/9/2025 0845)  Skin Integrity Remains Intact: Monitor for areas of

## 2025-04-09 NOTE — CARE COORDINATION
Continuity of Care Form    Patient Name: Taqueria Gilbert   :  1945  MRN:  [id number]    Admit date:  2025  Discharge date:  25    Code Status Order: Full Code   Advance Directives:     Admitting Physician:  Chris Paul MD  PCP: Gilma Barba MD    Discharging Nurse: ESTEFANIA Gutierrez  Discharging Hospital Unit/Room#: 2105/2105-01  Discharging Unit Phone Number: [phone]    Emergency Contact:   Extended Emergency Contact Information  Primary Emergency Contact: Rosita Gilbert  Address: [address] [place] [address]  Home Phone: [phone]  Work Phone: [phone]  Mobile Phone: [phone]  Relation: Spouse  Secondary Emergency Contact: Dolores Flores  Mobile Phone: [phone]  Relation: Niece/Nephew    Past Surgical History:  Past Surgical History:   Procedure Laterality Date    ABLATION OF DYSRHYTHMIC FOCUS  2018    afib ablation    ABLATION OF DYSRHYTHMIC FOCUS  2019    ATRIAL FIB  /  DR BYRNE    CARDIAC CATHETERIZATION  2020    Dr. Seven Elder, [place]    CARDIOVERSION  2017    COLONOSCOPY  ,     COLONOSCOPY  2016    polyp,bx    COLONOSCOPY N/A 2022    COLONOSCOPY WITH RANDOM COLON BIOPSIES AND CLIPPING AT DISTAL TRANSVERSE COLON performed by Efrain Cabral MD at [facility] ENDO    COLONOSCOPY N/A 2022    COLONOSCOPY POLYPECTOMY SNARE/COLD BIOPSY performed by Isha Osorio MD at [facility] ENDO    CYST REMOVAL Left 2016    excision cyst anterior chest    FEMUR FRACTURE SURGERY Left 2023    RETROGRADE FEMORAL NAIL WITH CORTICOSTEROID INJECTION RIGHT HIP performed by Tad Danielle DO at [facility] OR    HAMMER TOE SURGERY Bilateral     KNEE SURGERY Left     repair of torn ligaments    UT ARTHRP ACETBLR/PROX FEM PROSTC AGRFT/ALGRFT Left 05/15/2018    HIP TOTAL ARTHROPLASTY MINIMALLY INVASIVE ASI WITH GPS performed by Kieran Nicholson MD at [facility] OR    UT COLON CA SCRN NOT HI RSK IND N/A    Colostomy/Ileostomy/Ileal Conduit: No       Date of Last BM: 4/6    Intake/Output Summary (Last 24 hours) at 4/8/2025 1325  Last data filed at 4/8/2025 0829  Gross per 24 hour   Intake 1040.05 ml   Output 1050 ml   Net -9.95 ml     I/O last 3 completed shifts:  In: 240 [P.O.:240]  Out: 1050 [Urine:1050]    Safety Concerns:     At Risk for Falls    Impairments/Disabilities:      None    Nutrition Therapy:  Current Nutrition Therapy:   - Oral Diet:  Carb Control 3 carbs/meal (1500kcals/day)    Routes of Feeding: Oral  Liquids: No Restrictions  Daily Fluid Restriction: no  Last Modified Barium Swallow with Video (Video Swallowing Test): not done    Treatments at the Time of Hospital Discharge:   Respiratory Treatments: Cpap at night  Oxygen Therapy:  Cpap at night  Ventilator:    - No ventilator support    Rehab Therapies: Physical Therapy and Occupational Therapy  Weight Bearing Status/Restrictions: No weight bearing restrictions  Other Medical Equipment (for information only, NOT a DME order):  Walker, WC, Quad Cane, Reacher, CPAP  Other Treatments: Skilled Nursing assessment and monitoring. Medication education and monitoring per protocol.  Dressing changes:    Patient's personal belongings (please select all that are sent with patient):  Glasses    RN SIGNATURE:  Electronically signed by Brenda Valdez RN on 4/9/25 at 12:22 PM EDT    CASE MANAGEMENT/SOCIAL WORK SECTION    Inpatient Status Date: 4/7/25    Readmission Risk Assessment Score:  [facility] RISK OF UNPLANNED READMISSION 2.0             17.8 Total Score        Discharging to Facility/ Agency   [facility]  [address] [place] [address]  P: [phone]   F: [phone]     Dialysis Facility (if applicable)   Name:  Address:  Dialysis Schedule:  Phone:  Fax:    / signature: Electronically signed by Marjorie Bonds RN on 4/8/25 at 1:26 PM EDT    PHYSICIAN SECTION    Prognosis: Good    Condition at Discharge: Stable    Rehab

## 2025-04-09 NOTE — PROGRESS NOTES
[facility]   Pharmacy Pharmacokinetic Monitoring Service - Vancomycin    Consulting Provider: ARIA Rodriguez  Indication: SSTI  Target Concentration: Goal AUC/DEBBIE 400-600 mg*hr/L  Day of Therapy: 3  Additional Antimicrobials: none    Pertinent Laboratory Values:   Wt Readings from Last 1 Encounters:   04/08/25 (!) 138.6 kg (305 lb 8.9 oz)     Temp Readings from Last 1 Encounters:   04/09/25 97.3 °F (36.3 °C) (Axillary)     Estimated Creatinine Clearance: 48 mL/min (A) (based on SCr of 1.7 mg/dL (H)).  Recent Labs     04/08/25  0536 04/09/25  0552   CREATININE 1.8* 1.7*   BUN 45* 38*   WBC 4.9 5.6         Pertinent Cultures: see micro  MRSA Nasal Swab: N/A. Non-respiratory infection.    Recent vancomycin administrations                     vancomycin (VANCOCIN) 1,000 mg in sodium chloride 0.9 % 250 mL IVPB ([id number]) (mg) 1,000 mg New Bag 04/08/25 1824    vancomycin (VANCOCIN) 2,000 mg in sodium chloride 0.9 % 500 mL IVPB ()  Restarted 04/07/25 2016     2,000 mg New Bag  1834                    Assessment:  Date/Time Current Dose Concentration Timing of Concentration (h) AUC   04/09 0552 1000 mg iv q24h 13.1 11h28 min 383   Note: Serum concentrations collected for AUC dosing may appear elevated if collected in close proximity to the dose administered, this is not necessarily an indication of toxicity    Plan:  Current dosing regimen is sub-therapeutic  Increase dose to 1250 mg iv q24h (see below)  Repeat vancomycin concentration ordered for 04/10 @ 0600   Pharmacy will continue to monitor patient and adjust therapy as indicated    Loading dose: N/A  Regimen: 1250 mg IV every 24 hours.  Start time: 18:24 on 04/09/2025  Exposure target: AUC24 (range)400-600 mg/L.hr   [id number]: 441 mg/L.hr  AUC24,ss: 526 mg/L.hr  Probability of AUC24 > 400: 100 %  Ctrough,ss: 17 mg/L  Probability of Ctrough,ss > 20: 11 %    Thank you for the consult,  Nica Louis [facility]  4/9/2025 7:00 AM

## 2025-04-09 NOTE — DISCHARGE SUMMARY
IN-PATIENT SERVICE   [facility] Internal Medicine    Discharge Summary     Patient ID: Taqueria Gilbert  :  1945   MRN: [id number]     ACCOUNT:  [id number]   Patient's PCP: Gilma Barba MD  Admit Date: 2025   Discharge Date: 2025    Length of Stay: 2  Code Status:  Full Code  Admitting Physician: Chris Paul MD  Discharge Physician: Chris Paul MD     Active Discharge Diagnoses:     Primary Problem  Lactic acidosis      Hospital Problems  Active Hospital Problems    Diagnosis Date Noted    Lower leg edema [R60.0] 2025    Thrombocytopenia [D69.6] 2025    Cellulitis [L03.90] 2025    Lactic acidosis [E87.20] 2025       Admission Condition:  fair     Discharged Condition: fair              Hospital Stay:     Hospital Course:  Taqueria Gilbert is a 80 y.o. male who was admitted for the management of Lactic acidosis , presented to ER with Foot Injury (Bilateral foot swelling)  1-year-old  gentleman morbidly obese BMI 42.62 with a history of sleep apnea on CPAP history of diastolic congestive heart failure  History of osteoarthritis bilateral hip replacement with poor mobility uses a walker unable to walk upstairs  Lives in his two-story house with wife  History of diastolic congestive heart failure worsening lower limb edema with weeping  Admitted for skin discoloration and weeping of the lower extremity  Patient does have history of venous stripping history of leg edema claims he has leg pumps at home and has been using zinc oxide with some honey cream    Extensive rash both lower extremity with skin dilatation in the right leg  Suspected cellulitis IV vancomycin  Decubitus pressure sores noted in the buttocks as above in the pictures  Vascular surgery consulted  Patient already on oral Eliquis  Acute kidney injury baseline creatinine 1.0 admitted with a 2.2  Avoid NSAIDs differential diagnosis cardiorenal syndrome gentle

## 2025-04-09 NOTE — PROGRESS NOTES
CLINICAL PHARMACY NOTE: MEDS TO BEDS    Total # of Prescriptions Filled: 2   The following medications were delivered to the patient:  Bactrim -160MG Tablets   Cephalexin 500MG Capsules    Additional Documentation:  Delivered to the PT at 12:45PM 4/9/25

## 2025-04-09 NOTE — CARE COORDINATION
ONGOING DISCHARGE PLAN:    Patient is alert and oriented x4.    Spoke with patient regarding discharge plan and patient confirms that plan is still home with [facility].    Vascular/HemOc/Cardiac Consults. Wound Care following. Vascular lower extremity arterial segmental pressures w PPG ordered. IV Vanco. Cr 2.2 now 1.7.     PT/OT On.    IMM letter provided to patient.  Patient offered four hours to make informed decision regarding appeal process; patient agreeable to discharge.        Will continue to follow for additional discharge needs.    If patient is discharged prior to next notation, then this note serves as note for discharge by case management.    Electronically signed by Meagan Bonner RN on 4/9/2025 at 12:09 PM

## 2025-04-09 NOTE — PROGRESS NOTES
[facility]   Progress Note                   Date:   4/9/2025  Patient name: Taqueria Gilbert  Date of admission:  4/7/2025  2:53 PM  MRN:   [id number]  YOB: 1945  PCP: Gilma Barba MD    Reason for Admission: Lactic acidosis [E87.20]    Subjective:       Clinical Changes / Abnormalities:Pt seen and examined in the room.  Patient resting in bed. Pt denies any CP or sob or dizziness.  Labs, vitals and tele reviewed    Medications:   Scheduled Meds:   vancomycin  1,250 mg IntraVENous Q24H    vancomycin (VANCOCIN) intermittent dosing (placeholder)   Other RX Placeholder    allopurinol  300 mg Oral BID    atorvastatin  20 mg Oral Daily    budesonide  9 mg Oral Daily    apixaban  5 mg Oral BID    empagliflozin  10 mg Oral Daily    levothyroxine  25 mcg Oral Daily    lisinopril  5 mg Oral Daily    metoprolol tartrate  25 mg Oral BID    spironolactone  25 mg Oral Daily    trospium  20 mg Oral BID AC    sodium chloride flush  5-40 mL IntraVENous 2 times per day    insulin lispro  0-4 Units SubCUTAneous 4x Daily AC & HS     Continuous Infusions:   sodium chloride       CBC:   Recent Labs     04/07/25  1517 04/08/25  0536 04/09/25  0552   WBC 6.3 4.9 5.6   HGB 15.9 14.6 14.3    128* 133*     BMP:    Recent Labs     04/07/25  1517 04/08/25  0536 04/09/25  0552    138 141   K 4.6 4.9 5.2   CL 98 101 104   CO2 29 28 30   BUN 52* 45* 38*   CREATININE 2.2* 1.8* 1.7*   GLUCOSE 150* 122* 122*     Hepatic:   Recent Labs     04/07/25  1517   AST 20   ALT 32   BILITOT 0.6   ALKPHOS 69     Troponin:   Recent Labs     04/07/25  1517   TROPHS 74*     BNP: No results for input(s): \"BNP\" in the last 72 hours.  Lipids: No results for input(s): \"CHOL\", \"HDL\" in the last 72 hours.    Invalid input(s): \"LDLCALCU\"  INR: No results for input(s): \"INR\" in the last 72 hours.  EKG: Atrial fibrillation  Left axis deviation  Low voltage QRS  Cannot rule out Inferior infarct , age  flutter  Diastolic heart failure  Elevated creatinine   Plan of Treatment:   HR stable. Continue Eliquis and BB  Patient without chest pain- reports shortness fo breath with exertion.   Previous ECHO and stress test reviewed- no plans for further testing while inpatient   Continue GDMT as tolerated. Continue Jardiance, lisinopril, BB and aldactone. Received 1 dose IV lasix. Resume PO when creatinine improves  Will continue to monitor.     Electronically signed by DONALDO Martinez NP on 4/9/2025 at 10:28 AM  [facility]  [phone]

## 2025-04-10 ENCOUNTER — TELEPHONE (OUTPATIENT)
Dept: PRIMARY CARE CLINIC | Age: 80
End: 2025-04-10

## 2025-04-10 NOTE — TELEPHONE ENCOUNTER
Care Transitions Initial Follow Up Call    Outreach made within 2 business days of discharge: Yes    Patient: Taqueria Gilbert Patient : 1945   MRN: [id number]  Reason for Admission: lactic acidosis  Discharge Date: 25       Spoke with: meño     Discharge department/facility: [facility] Interactive Patient Contact:  Was patient able to fill all prescriptions: Yes  Was patient instructed to bring all medications to the follow-up visit: Yes  Is patient taking all medications as directed in the discharge summary? Yes  Does patient understand their discharge instructions: Yes  Does patient have questions or concerns that need addressed prior to 7-14 day follow up office visit: no    Additional needs identified to be addressed with provider  No needs identified             Scheduled appointment with PCP within 7-14 days    Follow Up  Future Appointments   Date Time Provider Department Center   2025  2:15 PM Skyler Trivedi MD [facility]   2025 11:30 AM Lakshmi Glynn PA-C [facility]   2025 11:30 AM Sebastien Amezcua MD [facility]   2025  3:00 PM Gilma Barba MD [facility]       JOSE MARK MA

## 2025-04-11 NOTE — PROGRESS NOTES
Physician Progress Note      PATIENT:               MOLINA DOE  CSN #:                  [id number]  :                       1945  ADMIT DATE:       2025 2:53 PM  DISCH DATE:        2025 5:10 PM  RESPONDING  PROVIDER #:        Chris PAUL MD          QUERY TEXT:    Pressure ulcer of decubitus ulcer. is documented in the medical record   Consults 2025 by Laurie Padilla. Please specify the present on admission   status and/or stage and laterality    The clinical indicators include:  age 80yr, cellulitis, pressure ulcer, acidosis, obesity    Consults 2025 -by  Randy Le MD-Patient also has decubitus ulcer    Discharge Summary 2025 - Chris Paul MD  80 y.o. male who was admitted for the management of Lactic acidosis ,   presented to ER with Foot Injury (Bilateral foot swelling)  -Decubitus pressure sores noted in the buttocks  -Suspected cellulitis IV vancomycin    IV vancomycin, Vascular surgery consulted, zinc oxide, oral Eliquis    Thank you  TITA Olea CDS  Options provided:  -- Stage 1 Pressure Ulcer of buttock present on admission  -- Stage 2 Pressure Ulcer of buttock present on admission  -- Stage 3 Pressure Ulcer of buttock present on admission  -- Stage 4 Pressure Ulcer of buttock present on admission  -- Unstageable Pressure Ulcer of buttock present on admission  -- Other - I will add my own diagnosis  -- Disagree - Not applicable / Not valid  -- Disagree - Clinically unable to determine / Unknown  -- Refer to Clinical Documentation Reviewer    PROVIDER RESPONSE TEXT:    This patient has a Stage 2 pressure ulcer of buttock which was present on   admission.    Query created by: Summer Castañeda on 4/10/2025 11:44 PM      Electronically signed by:  Chris PAUL MD 2025 10:13 AM

## 2025-04-14 ENCOUNTER — OFFICE VISIT (OUTPATIENT)
Dept: PRIMARY CARE CLINIC | Age: 80
End: 2025-04-14

## 2025-04-14 VITALS
HEIGHT: 71 IN | HEART RATE: 86 BPM | DIASTOLIC BLOOD PRESSURE: 68 MMHG | SYSTOLIC BLOOD PRESSURE: 118 MMHG | OXYGEN SATURATION: 94 % | WEIGHT: 306.2 LBS | BODY MASS INDEX: 42.87 KG/M2

## 2025-04-14 DIAGNOSIS — R60.0 LOWER LEG EDEMA: ICD-10-CM

## 2025-04-14 DIAGNOSIS — I89.0 CHRONIC ACQUIRED LYMPHEDEMA: Primary | ICD-10-CM

## 2025-04-14 DIAGNOSIS — I10 PRIMARY HYPERTENSION: ICD-10-CM

## 2025-04-14 DIAGNOSIS — S81.802D WOUND OF LEFT LOWER EXTREMITY, SUBSEQUENT ENCOUNTER: ICD-10-CM

## 2025-04-14 DIAGNOSIS — S31.809D WOUND OF BUTTOCK, UNSPECIFIED LATERALITY, SUBSEQUENT ENCOUNTER: ICD-10-CM

## 2025-04-14 DIAGNOSIS — Z09 HOSPITAL DISCHARGE FOLLOW-UP: ICD-10-CM

## 2025-04-14 NOTE — PROGRESS NOTES
[facility]  [facility]  [address] [facility] [address]  Dept: [phone]       Post-Discharge Transitional Care  Follow Up      Taqueria Gilbert   YOB: 1945    Date of Office Visit:  4/14/2025  Date of Hospital Admission: 4/7/25  Date of Hospital Discharge: 4/9/25  Risk of hospital readmission (high >=14%. Medium >=10%) :Readmission Risk Score: 19.3      Care management risk score Rising risk (score 2-5) and Complex Care (Scores >=6): No Risk Score On File     Non face to face  following discharge, date last encounter closed (first attempt may have been earlier): 04/10/2025    Call initiated 2 business days of discharge: Yes    ASSESSMENT/PLAN:   Chronic acquired lymphedema  Primary hypertension  Lower leg edema  Wound of left lower extremity, subsequent encounter  Wound of buttock, unspecified laterality, subsequent encounter  Hospital discharge follow-up  -     OK DISCHARGE MEDS RECONCILED W/ CURRENT OUTPATIENT MED LIST      Medical Decision Making: moderate complexity  Return for MWV.           Subjective:   HPI:  Follow up of Hospital problems/diagnosis(es): Difficulty with ambulation due to pain necessitates the use of a wheelchair for longer distances. A home health nurse visited today and applied bandages to his legs. A follow-up appointment with the wound care clinic is scheduled for Thursday. Concerns are expressed about bleeding from the open area on the right leg, and the area below the buttocks is noted to be erythematous. A horseshoe pillow with memory foam has been beneficial. During his hospital stay, he was positioned on his side with a wedge for 2 hours before being repositioned, although there was an instance where he remained in the same position for 4 hours, resulting in back pain. An air mattress was provided during his hospital stay. The need for a hospital bed and special mattress is questioned if his condition does not improve.

## 2025-04-14 NOTE — DISCHARGE INSTRUCTIONS
[facility] WOUND and HYPERBARIC TREATMENT  CENTER      Visit  Discharge Instructions / Physician Orders  DATE:4/17/25     Home Care:     SUPPLIES ORDERED THRU:                     DATE LAST SUPPLIED     Wound Location:  Bilat lower legs     Cleanse with: Liquid antibacterial soap and water, rinse well      Dressing Orders:  Primary dressing                       Secondary dressing                           secure with           x 30days     Frequency:       Additional Orders: Increase protein to diet (meat, cheese, eggs, fish, peanut butter, nuts and beans)  Multivitamin daily  Elevate legs if swollen or wearing compression when sitting  OFFLOADING [] YES  TYPE:                  [] NA    Weekly wound care visits until determined otherwise.    Antibiotic therapy-wound care related YES [] NO [] NA[]  DRUG-                                                             Duration-             Script sent to-                      on (date)  MY CHART []     Smart Device  []     HYPERBARIC TREATMENT-                TREATMENT #                          Your next appointment with the Wound Care Center is in 1 week                                                                                                   (Please note your next appointment above and if you are unable to keep, kindly give a 24 hour notice. Thank you.)  If more than 15 min late we cannot guarantee you will be seen due to clinician schedule    Per Policy,  3 or more cancellations or no show may result in dismissal from program  If you experience any of the following, please call the Wound Care Center during business hours:  [phone]     * Increase in Pain  * Temperature over 101  * Increase in drainage from your wound  * Drainage with a foul odor  * Bleeding  * Increase in swelling  * Need for compression bandage changes due to slippage, breakthrough drainage.     If you need medical attention outside of the business hours of the Wound Care Centers

## 2025-04-15 ENCOUNTER — APPOINTMENT (OUTPATIENT)
Dept: GENERAL RADIOLOGY | Age: 80
DRG: 683 | End: 2025-04-15
Payer: MEDICARE

## 2025-04-15 ENCOUNTER — HOSPITAL ENCOUNTER (INPATIENT)
Age: 80
LOS: 4 days | Discharge: NURSING, REHAB OR LONG-TERM CARE | DRG: 683 | End: 2025-04-19
Attending: STUDENT IN AN ORGANIZED HEALTH CARE EDUCATION/TRAINING PROGRAM | Admitting: INTERNAL MEDICINE
Payer: MEDICARE

## 2025-04-15 DIAGNOSIS — N17.9 AKI (ACUTE KIDNEY INJURY): ICD-10-CM

## 2025-04-15 DIAGNOSIS — E87.5 HYPERKALEMIA: ICD-10-CM

## 2025-04-15 DIAGNOSIS — R53.1 GENERALIZED WEAKNESS: Primary | ICD-10-CM

## 2025-04-15 DIAGNOSIS — L03.119 CELLULITIS OF LOWER EXTREMITY, UNSPECIFIED LATERALITY: ICD-10-CM

## 2025-04-15 LAB
ANION GAP SERPL CALCULATED.3IONS-SCNC: 13 MMOL/L (ref 9–16)
BACTERIA URNS QL MICRO: ABNORMAL
BASOPHILS # BLD: 0.1 K/UL (ref 0–0.2)
BASOPHILS NFR BLD: 1 % (ref 0–2)
BILIRUB UR QL STRIP: NEGATIVE
BNP SERPL-MCNC: 838 PG/ML (ref 0–300)
BUN SERPL-MCNC: 74 MG/DL (ref 8–23)
CALCIUM SERPL-MCNC: 9.5 MG/DL (ref 8.6–10.4)
CASTS #/AREA URNS LPF: ABNORMAL /LPF
CHLORIDE SERPL-SCNC: 99 MMOL/L (ref 98–107)
CK SERPL-CCNC: 42 U/L (ref 39–308)
CLARITY UR: CLEAR
CO2 SERPL-SCNC: 24 MMOL/L (ref 20–31)
COLOR UR: YELLOW
CREAT SERPL-MCNC: 3.5 MG/DL (ref 0.7–1.2)
EOSINOPHIL # BLD: 0 K/UL (ref 0–0.4)
EOSINOPHILS RELATIVE PERCENT: 0 % (ref 0–4)
EPI CELLS #/AREA URNS HPF: ABNORMAL /HPF
ERYTHROCYTE [DISTWIDTH] IN BLOOD BY AUTOMATED COUNT: 16.9 % (ref 11.5–14.9)
GFR, ESTIMATED: 17 ML/MIN/1.73M2
GLUCOSE SERPL-MCNC: 167 MG/DL (ref 74–99)
GLUCOSE UR STRIP-MCNC: NEGATIVE MG/DL
HCT VFR BLD AUTO: 48.7 % (ref 41–53)
HGB BLD-MCNC: 15.9 G/DL (ref 13.5–17.5)
HGB UR QL STRIP.AUTO: NEGATIVE
KETONES UR STRIP-MCNC: NEGATIVE MG/DL
LEUKOCYTE ESTERASE UR QL STRIP: NEGATIVE
LYMPHOCYTES NFR BLD: 0.5 K/UL (ref 1–4.8)
LYMPHOCYTES RELATIVE PERCENT: 10 % (ref 24–44)
MCH RBC QN AUTO: 33 PG (ref 26–34)
MCHC RBC AUTO-ENTMCNC: 32.7 G/DL (ref 31–37)
MCV RBC AUTO: 101 FL (ref 80–100)
MONOCYTES NFR BLD: 0.3 K/UL (ref 0.1–1.3)
MONOCYTES NFR BLD: 6 % (ref 1–7)
MYOGLOBIN SERPL-MCNC: 99 NG/ML (ref 28–72)
NEUTROPHILS NFR BLD: 83 % (ref 36–66)
NEUTS SEG NFR BLD: 4.4 K/UL (ref 1.3–9.1)
NITRITE UR QL STRIP: NEGATIVE
PH UR STRIP: 5.5 [PH] (ref 5–8)
PLATELET # BLD AUTO: 198 K/UL (ref 150–450)
PMV BLD AUTO: 7.3 FL (ref 6–12)
POTASSIUM SERPL-SCNC: 7.1 MMOL/L (ref 3.7–5.3)
PROT UR STRIP-MCNC: NEGATIVE MG/DL
RBC # BLD AUTO: 4.83 M/UL (ref 4.5–5.9)
RBC #/AREA URNS HPF: ABNORMAL /HPF
SODIUM SERPL-SCNC: 136 MMOL/L (ref 136–145)
SP GR UR STRIP: 1.02 (ref 1–1.03)
TROPONIN I SERPL HS-MCNC: 56 NG/L (ref 0–22)
TROPONIN I SERPL HS-MCNC: 63 NG/L (ref 0–22)
UROBILINOGEN UR STRIP-ACNC: NORMAL EU/DL (ref 0–1)
WBC #/AREA URNS HPF: ABNORMAL /HPF
WBC OTHER # BLD: 5.4 K/UL (ref 3.5–11)

## 2025-04-15 PROCEDURE — 99285 EMERGENCY DEPT VISIT HI MDM: CPT

## 2025-04-15 PROCEDURE — 6370000000 HC RX 637 (ALT 250 FOR IP)

## 2025-04-15 PROCEDURE — 93005 ELECTROCARDIOGRAM TRACING: CPT

## 2025-04-15 PROCEDURE — 84484 ASSAY OF TROPONIN QUANT: CPT

## 2025-04-15 PROCEDURE — 2060000000 HC ICU INTERMEDIATE R&B

## 2025-04-15 PROCEDURE — 82570 ASSAY OF URINE CREATININE: CPT

## 2025-04-15 PROCEDURE — 36415 COLL VENOUS BLD VENIPUNCTURE: CPT

## 2025-04-15 PROCEDURE — 82550 ASSAY OF CK (CPK): CPT

## 2025-04-15 PROCEDURE — 83880 ASSAY OF NATRIURETIC PEPTIDE: CPT

## 2025-04-15 PROCEDURE — 6360000002 HC RX W HCPCS

## 2025-04-15 PROCEDURE — 81001 URINALYSIS AUTO W/SCOPE: CPT

## 2025-04-15 PROCEDURE — 96375 TX/PRO/DX INJ NEW DRUG ADDON: CPT

## 2025-04-15 PROCEDURE — 87205 SMEAR GRAM STAIN: CPT

## 2025-04-15 PROCEDURE — 96365 THER/PROPH/DIAG IV INF INIT: CPT

## 2025-04-15 PROCEDURE — 85025 COMPLETE CBC W/AUTO DIFF WBC: CPT

## 2025-04-15 PROCEDURE — 71045 X-RAY EXAM CHEST 1 VIEW: CPT

## 2025-04-15 PROCEDURE — 2580000003 HC RX 258

## 2025-04-15 PROCEDURE — 83874 ASSAY OF MYOGLOBIN: CPT

## 2025-04-15 PROCEDURE — 84300 ASSAY OF URINE SODIUM: CPT

## 2025-04-15 PROCEDURE — 82947 ASSAY GLUCOSE BLOOD QUANT: CPT

## 2025-04-15 PROCEDURE — 80048 BASIC METABOLIC PNL TOTAL CA: CPT

## 2025-04-15 RX ORDER — SODIUM CHLORIDE 9 MG/ML
INJECTION, SOLUTION INTRAVENOUS CONTINUOUS
Status: ACTIVE | OUTPATIENT
Start: 2025-04-15 | End: 2025-04-15

## 2025-04-15 RX ORDER — CALCIUM GLUCONATE 20 MG/ML
1000 INJECTION, SOLUTION INTRAVENOUS ONCE
Status: COMPLETED | OUTPATIENT
Start: 2025-04-15 | End: 2025-04-15

## 2025-04-15 RX ORDER — LIDOCAINE HYDROCHLORIDE 20 MG/ML
JELLY TOPICAL ONCE
Status: COMPLETED | OUTPATIENT
Start: 2025-04-15 | End: 2025-04-15

## 2025-04-15 RX ADMIN — DEXTROSE 250 ML: 10 SOLUTION INTRAVENOUS at 22:41

## 2025-04-15 RX ADMIN — CALCIUM GLUCONATE 1000 MG: 20 INJECTION, SOLUTION INTRAVENOUS at 22:34

## 2025-04-15 RX ADMIN — INSULIN HUMAN 10 UNITS: 100 INJECTION, SOLUTION PARENTERAL at 22:42

## 2025-04-15 RX ADMIN — SODIUM ZIRCONIUM CYCLOSILICATE 30 G: 5 POWDER, FOR SUSPENSION ORAL at 22:53

## 2025-04-15 RX ADMIN — LIDOCAINE HYDROCHLORIDE: 20 JELLY TOPICAL at 23:51

## 2025-04-15 ASSESSMENT — PAIN DESCRIPTION - LOCATION: LOCATION: LEG;BACK

## 2025-04-15 ASSESSMENT — ENCOUNTER SYMPTOMS
ABDOMINAL PAIN: 0
SHORTNESS OF BREATH: 0
COUGH: 0

## 2025-04-15 ASSESSMENT — PAIN SCALES - GENERAL: PAINLEVEL_OUTOF10: 2

## 2025-04-15 ASSESSMENT — PAIN DESCRIPTION - FREQUENCY: FREQUENCY: CONTINUOUS

## 2025-04-15 ASSESSMENT — PAIN DESCRIPTION - ORIENTATION: ORIENTATION: RIGHT

## 2025-04-15 ASSESSMENT — PAIN DESCRIPTION - PAIN TYPE: TYPE: CHRONIC PAIN

## 2025-04-15 ASSESSMENT — PAIN - FUNCTIONAL ASSESSMENT: PAIN_FUNCTIONAL_ASSESSMENT: 0-10

## 2025-04-16 LAB
ANION GAP SERPL CALCULATED.3IONS-SCNC: 10 MMOL/L (ref 9–16)
BASOPHILS # BLD: 0 K/UL (ref 0–0.2)
BASOPHILS NFR BLD: 0 % (ref 0–2)
BUN SERPL-MCNC: 71 MG/DL (ref 8–23)
CALCIUM SERPL-MCNC: 9 MG/DL (ref 8.6–10.4)
CHLORIDE SERPL-SCNC: 102 MMOL/L (ref 98–107)
CO2 SERPL-SCNC: 23 MMOL/L (ref 20–31)
CREAT SERPL-MCNC: 2.9 MG/DL (ref 0.7–1.2)
CREAT UR-MCNC: 94.7 MG/DL (ref 39–259)
EOSINOPHIL # BLD: 0 K/UL (ref 0–0.4)
EOSINOPHIL,URINE: NORMAL
EOSINOPHILS RELATIVE PERCENT: 0 % (ref 0–4)
ERYTHROCYTE [DISTWIDTH] IN BLOOD BY AUTOMATED COUNT: 17.4 % (ref 11.5–14.9)
GFR, ESTIMATED: 21 ML/MIN/1.73M2
GLUCOSE BLD-MCNC: 108 MG/DL (ref 75–110)
GLUCOSE BLD-MCNC: 167 MG/DL (ref 75–110)
GLUCOSE BLD-MCNC: 86 MG/DL (ref 75–110)
GLUCOSE BLD-MCNC: 89 MG/DL (ref 75–110)
GLUCOSE SERPL-MCNC: 158 MG/DL (ref 74–99)
HCT VFR BLD AUTO: 44.1 % (ref 41–53)
HGB BLD-MCNC: 14 G/DL (ref 13.5–17.5)
LYMPHOCYTES NFR BLD: 0.7 K/UL (ref 1–4.8)
LYMPHOCYTES RELATIVE PERCENT: 12 % (ref 24–44)
MCH RBC QN AUTO: 32.7 PG (ref 26–34)
MCHC RBC AUTO-ENTMCNC: 31.6 G/DL (ref 31–37)
MCV RBC AUTO: 103.2 FL (ref 80–100)
MONOCYTES NFR BLD: 0.5 K/UL (ref 0.1–1.3)
MONOCYTES NFR BLD: 9 % (ref 1–7)
NEUTROPHILS NFR BLD: 79 % (ref 36–66)
NEUTS SEG NFR BLD: 4.4 K/UL (ref 1.3–9.1)
PLATELET # BLD AUTO: 173 K/UL (ref 150–450)
PMV BLD AUTO: 7.5 FL (ref 6–12)
POTASSIUM SERPL-SCNC: 5.4 MMOL/L (ref 3.7–5.3)
RBC # BLD AUTO: 4.27 M/UL (ref 4.5–5.9)
SODIUM SERPL-SCNC: 135 MMOL/L (ref 136–145)
SODIUM UR-SCNC: 69 MMOL/L
TROPONIN I SERPL HS-MCNC: 55 NG/L (ref 0–22)
WBC OTHER # BLD: 5.7 K/UL (ref 3.5–11)

## 2025-04-16 PROCEDURE — 97116 GAIT TRAINING THERAPY: CPT

## 2025-04-16 PROCEDURE — 84484 ASSAY OF TROPONIN QUANT: CPT

## 2025-04-16 PROCEDURE — 2500000003 HC RX 250 WO HCPCS

## 2025-04-16 PROCEDURE — 6370000000 HC RX 637 (ALT 250 FOR IP)

## 2025-04-16 PROCEDURE — 36415 COLL VENOUS BLD VENIPUNCTURE: CPT

## 2025-04-16 PROCEDURE — 97530 THERAPEUTIC ACTIVITIES: CPT

## 2025-04-16 PROCEDURE — 99223 1ST HOSP IP/OBS HIGH 75: CPT | Performed by: INTERNAL MEDICINE

## 2025-04-16 PROCEDURE — 97166 OT EVAL MOD COMPLEX 45 MIN: CPT

## 2025-04-16 PROCEDURE — 6370000000 HC RX 637 (ALT 250 FOR IP): Performed by: INTERNAL MEDICINE

## 2025-04-16 PROCEDURE — 85025 COMPLETE CBC W/AUTO DIFF WBC: CPT

## 2025-04-16 PROCEDURE — 99213 OFFICE O/P EST LOW 20 MIN: CPT

## 2025-04-16 PROCEDURE — 2060000000 HC ICU INTERMEDIATE R&B

## 2025-04-16 PROCEDURE — 97162 PT EVAL MOD COMPLEX 30 MIN: CPT

## 2025-04-16 PROCEDURE — 80048 BASIC METABOLIC PNL TOTAL CA: CPT

## 2025-04-16 RX ORDER — ONDANSETRON 4 MG/1
4 TABLET, ORALLY DISINTEGRATING ORAL EVERY 8 HOURS PRN
Status: DISCONTINUED | OUTPATIENT
Start: 2025-04-16 | End: 2025-04-19 | Stop reason: HOSPADM

## 2025-04-16 RX ORDER — LISINOPRIL 5 MG/1
5 TABLET ORAL DAILY
Status: DISCONTINUED | OUTPATIENT
Start: 2025-04-16 | End: 2025-04-17

## 2025-04-16 RX ORDER — FUROSEMIDE 40 MG/1
40 TABLET ORAL DAILY
Status: DISCONTINUED | OUTPATIENT
Start: 2025-04-16 | End: 2025-04-17

## 2025-04-16 RX ORDER — BISACODYL 10 MG
10 SUPPOSITORY, RECTAL RECTAL DAILY PRN
Status: DISCONTINUED | OUTPATIENT
Start: 2025-04-16 | End: 2025-04-19 | Stop reason: HOSPADM

## 2025-04-16 RX ORDER — ONDANSETRON 2 MG/ML
4 INJECTION INTRAMUSCULAR; INTRAVENOUS EVERY 6 HOURS PRN
Status: DISCONTINUED | OUTPATIENT
Start: 2025-04-16 | End: 2025-04-19 | Stop reason: HOSPADM

## 2025-04-16 RX ORDER — FLUTICASONE PROPIONATE 50 MCG
1 SPRAY, SUSPENSION (ML) NASAL DAILY PRN
Status: DISCONTINUED | OUTPATIENT
Start: 2025-04-16 | End: 2025-04-19 | Stop reason: HOSPADM

## 2025-04-16 RX ORDER — ALLOPURINOL 300 MG/1
300 TABLET ORAL 2 TIMES DAILY
Status: DISCONTINUED | OUTPATIENT
Start: 2025-04-16 | End: 2025-04-19 | Stop reason: HOSPADM

## 2025-04-16 RX ORDER — ASCORBIC ACID 500 MG
250 TABLET ORAL DAILY
Status: DISCONTINUED | OUTPATIENT
Start: 2025-04-16 | End: 2025-04-19 | Stop reason: HOSPADM

## 2025-04-16 RX ORDER — SODIUM CHLORIDE 0.9 % (FLUSH) 0.9 %
10 SYRINGE (ML) INJECTION PRN
Status: DISCONTINUED | OUTPATIENT
Start: 2025-04-16 | End: 2025-04-19 | Stop reason: HOSPADM

## 2025-04-16 RX ORDER — BUDESONIDE 3 MG/1
9 CAPSULE, COATED PELLETS ORAL DAILY
Status: DISCONTINUED | OUTPATIENT
Start: 2025-04-16 | End: 2025-04-19 | Stop reason: HOSPADM

## 2025-04-16 RX ORDER — SODIUM CHLORIDE 9 MG/ML
INJECTION, SOLUTION INTRAVENOUS PRN
Status: DISCONTINUED | OUTPATIENT
Start: 2025-04-16 | End: 2025-04-19 | Stop reason: HOSPADM

## 2025-04-16 RX ORDER — ATORVASTATIN CALCIUM 20 MG/1
20 TABLET, FILM COATED ORAL DAILY
Status: DISCONTINUED | OUTPATIENT
Start: 2025-04-16 | End: 2025-04-19 | Stop reason: HOSPADM

## 2025-04-16 RX ORDER — POLYETHYLENE GLYCOL 3350 17 G/17G
17 POWDER, FOR SOLUTION ORAL DAILY PRN
Status: DISCONTINUED | OUTPATIENT
Start: 2025-04-16 | End: 2025-04-19 | Stop reason: HOSPADM

## 2025-04-16 RX ORDER — METOPROLOL TARTRATE 25 MG/1
25 TABLET, FILM COATED ORAL 2 TIMES DAILY
Status: DISCONTINUED | OUTPATIENT
Start: 2025-04-16 | End: 2025-04-19 | Stop reason: HOSPADM

## 2025-04-16 RX ORDER — ACETAMINOPHEN 500 MG
500 TABLET ORAL EVERY 6 HOURS PRN
Status: DISCONTINUED | OUTPATIENT
Start: 2025-04-16 | End: 2025-04-19 | Stop reason: HOSPADM

## 2025-04-16 RX ORDER — SODIUM CHLORIDE 0.9 % (FLUSH) 0.9 %
5-40 SYRINGE (ML) INJECTION EVERY 12 HOURS SCHEDULED
Status: DISCONTINUED | OUTPATIENT
Start: 2025-04-16 | End: 2025-04-19 | Stop reason: HOSPADM

## 2025-04-16 RX ORDER — SPIRONOLACTONE 25 MG/1
25 TABLET ORAL DAILY
Status: DISCONTINUED | OUTPATIENT
Start: 2025-04-16 | End: 2025-04-17

## 2025-04-16 RX ORDER — ACETAMINOPHEN 650 MG/1
650 SUPPOSITORY RECTAL EVERY 6 HOURS PRN
Status: DISCONTINUED | OUTPATIENT
Start: 2025-04-16 | End: 2025-04-19 | Stop reason: HOSPADM

## 2025-04-16 RX ORDER — LEVOTHYROXINE SODIUM 25 UG/1
25 TABLET ORAL DAILY
Status: DISCONTINUED | OUTPATIENT
Start: 2025-04-16 | End: 2025-04-19 | Stop reason: HOSPADM

## 2025-04-16 RX ORDER — ACETAMINOPHEN 325 MG/1
650 TABLET ORAL EVERY 6 HOURS PRN
Status: DISCONTINUED | OUTPATIENT
Start: 2025-04-16 | End: 2025-04-19 | Stop reason: HOSPADM

## 2025-04-16 RX ADMIN — METOPROLOL TARTRATE 25 MG: 25 TABLET, FILM COATED ORAL at 09:56

## 2025-04-16 RX ADMIN — METOPROLOL TARTRATE 25 MG: 25 TABLET, FILM COATED ORAL at 20:59

## 2025-04-16 RX ADMIN — EMPAGLIFLOZIN 10 MG: 10 TABLET, FILM COATED ORAL at 09:56

## 2025-04-16 RX ADMIN — OXYCODONE HYDROCHLORIDE AND ACETAMINOPHEN 250 MG: 500 TABLET ORAL at 09:55

## 2025-04-16 RX ADMIN — ATORVASTATIN CALCIUM 20 MG: 20 TABLET, FILM COATED ORAL at 09:56

## 2025-04-16 RX ADMIN — APIXABAN 2.5 MG: 2.5 TABLET, FILM COATED ORAL at 03:03

## 2025-04-16 RX ADMIN — LEVOTHYROXINE SODIUM 25 MCG: 0.03 TABLET ORAL at 05:41

## 2025-04-16 RX ADMIN — METOPROLOL TARTRATE 25 MG: 25 TABLET, FILM COATED ORAL at 03:03

## 2025-04-16 RX ADMIN — ALLOPURINOL 300 MG: 300 TABLET ORAL at 20:58

## 2025-04-16 RX ADMIN — ALLOPURINOL 300 MG: 300 TABLET ORAL at 03:03

## 2025-04-16 RX ADMIN — APIXABAN 2.5 MG: 2.5 TABLET, FILM COATED ORAL at 20:59

## 2025-04-16 RX ADMIN — SODIUM CHLORIDE, PRESERVATIVE FREE 10 ML: 5 INJECTION INTRAVENOUS at 09:57

## 2025-04-16 RX ADMIN — ACETAMINOPHEN 500 MG: 500 TABLET ORAL at 03:05

## 2025-04-16 RX ADMIN — SODIUM ZIRCONIUM CYCLOSILICATE 5 G: 5 POWDER, FOR SUSPENSION ORAL at 18:49

## 2025-04-16 RX ADMIN — SODIUM CHLORIDE, PRESERVATIVE FREE 10 ML: 5 INJECTION INTRAVENOUS at 20:59

## 2025-04-16 RX ADMIN — APIXABAN 2.5 MG: 2.5 TABLET, FILM COATED ORAL at 09:55

## 2025-04-16 RX ADMIN — ALLOPURINOL 300 MG: 300 TABLET ORAL at 09:56

## 2025-04-16 ASSESSMENT — PAIN DESCRIPTION - ORIENTATION
ORIENTATION: RIGHT;LEFT;LOWER

## 2025-04-16 ASSESSMENT — PAIN - FUNCTIONAL ASSESSMENT
PAIN_FUNCTIONAL_ASSESSMENT: ACTIVITIES ARE NOT PREVENTED

## 2025-04-16 ASSESSMENT — PAIN DESCRIPTION - DESCRIPTORS
DESCRIPTORS: ACHING

## 2025-04-16 ASSESSMENT — PAIN DESCRIPTION - ONSET
ONSET: ON-GOING

## 2025-04-16 ASSESSMENT — PAIN DESCRIPTION - LOCATION
LOCATION: LEG;BACK

## 2025-04-16 ASSESSMENT — PAIN SCALES - GENERAL
PAINLEVEL_OUTOF10: 1
PAINLEVEL_OUTOF10: 4
PAINLEVEL_OUTOF10: 3

## 2025-04-16 NOTE — DISCHARGE INSTR - COC
Continuity of Care Form    Patient Name: Taqueria Gilbert   :  1945  MRN:  [id number]    Admit date:  4/15/2025  Discharge date:  25    Code Status Order: Full Code   Advance Directives:     Admitting Physician:  Chris Paul MD  PCP: Gilma Barba MD    Discharging Nurse: ESTEFANIA Hankins  Discharging Hospital Unit/Room#: 2107/2107-01  Discharging Unit Phone Number: [phone]    Emergency Contact:   Extended Emergency Contact Information  Primary Emergency Contact: Rosita Gilbert  Address: [address] [place] [address]  Home Phone: [phone]  Work Phone: [phone]  Mobile Phone: [phone]  Relation: Spouse  Secondary Emergency Contact: Dolores Flores  Mobile Phone: [phone]  Relation: Niece/Nephew    Past Surgical History:  Past Surgical History:   Procedure Laterality Date    ABLATION OF DYSRHYTHMIC FOCUS  2018    afib ablation    ABLATION OF DYSRHYTHMIC FOCUS  2019    ATRIAL FIB  /  DR BYRNE    CARDIAC CATHETERIZATION  2020    Dr. Seven [place], [place]    CARDIOVERSION  2017    COLONOSCOPY  ,     COLONOSCOPY  2016    polyp,bx    COLONOSCOPY N/A 2022    COLONOSCOPY WITH RANDOM COLON BIOPSIES AND CLIPPING AT DISTAL TRANSVERSE COLON performed by Efrain Cabral MD at [facility] ENDO    COLONOSCOPY N/A 2022    COLONOSCOPY POLYPECTOMY SNARE/COLD BIOPSY performed by Isha Osorio MD at [facility] ENDO    CYST REMOVAL Left 2016    excision cyst anterior chest    FEMUR FRACTURE SURGERY Left 2023    RETROGRADE FEMORAL NAIL WITH CORTICOSTEROID INJECTION RIGHT HIP performed by Tad Danielle DO at [facility] OR    HAMMER TOE SURGERY Bilateral     KNEE SURGERY Left     repair of torn ligaments    MI ARTHRP ACETBLR/PROX FEM PROSTC AGRFT/ALGRFT Left 05/15/2018    HIP TOTAL ARTHROPLASTY MINIMALLY INVASIVE ASI WITH GPS performed by Kieran Nicholson MD at [facility] OR    MI COLON CA SCRN NOT HI RSK IND N/A  Healing % 100 04/16/25 1521   Wound Assessment Dry 04/16/25 0410   Drainage Amount Other (Comment) 04/16/25 0410   Drainage Description Other (Comment) 04/16/25 0410   Odor None 04/16/25 0410   Natasha-wound Assessment Dry/flaky;Hemosiderin staining (brown yellow) 04/16/25 0410   Margins Flat/open edges 04/08/25 1159   Number of days: 8       Wound 04/07/25 Buttocks Other (Comment) purple discoloration (blanchable) (Active)   Wound Image   04/16/25 0204   Dressing Status Clean;Dry 04/16/25 0410   Wound Cleansed Soap and water 04/16/25 0204   Dressing/Treatment Foam 04/16/25 0410   Wound Assessment Purple/maroon;Pink/red 04/16/25 0410   Drainage Amount None (dry) 04/16/25 0410   Drainage Description Other (Comment) 04/16/25 0410   Odor None 04/16/25 0410   Natasha-wound Assessment Intact 04/16/25 0410   Margins Other (Comment) 04/07/25 2016   Number of days: 8       Incision 07/16/24 Hip Right (Active)   Number of days: 274     Right pretib: Cleanse with saline, pat dry. Apply oil emulsion dressing to wound, cover with ABD, wrap with roll gauze and ace wrap. Change daily and as needed if loose or soiled.     Left lower leg: Wrap with ace wrap (4\" ace wrap from toe to ankle, 6\" ace wrap from ankle to knee).      Elimination:  Continence:   Bowel: Yes  Bladder: Yes and No  Urinary Catheter: None   Colostomy/Ileostomy/Ileal Conduit: No       Date of Last BM: 4/18/25    Intake/Output Summary (Last 24 hours) at 4/16/2025 1541  Last data filed at 4/16/2025 1136  Gross per 24 hour   Intake 240 ml   Output 1500 ml   Net -1260 ml     I/O last 3 completed shifts:  In: 240 [P.O.:240]  Out: 850 [Urine:850]    Safety Concerns:     At Risk for Falls    Impairments/Disabilities:      None    Nutrition Therapy:  Current Nutrition Therapy:   - Oral Diet:  General, Low potassium (less than 3000 mg/day)    Routes of Feeding: Oral  Liquids: Thin Liquids  Daily Fluid Restriction: no  Last Modified Barium Swallow with Video (Video Swallowing

## 2025-04-16 NOTE — PLAN OF CARE
Problem: Safety - Adult  Goal: Free from fall injury  Outcome: Progressing  Note: Pt assessed as a fall risk this shift. Remains free from falls and accidental injury at this time. Fall precautions in place. Floor free from obstacles, and bed is locked and in lowest position. Adequate lighting provided.  Pt encouraged to call before getting OOB for any need.  Bed alarm activated. Will continue to monitor needs during hourly rounding, and reinforce education on use of call light.      Problem: Chronic Conditions and Co-morbidities  Goal: Patient's chronic conditions and co-morbidity symptoms are monitored and maintained or improved  Outcome: Progressing     Problem: Pain  Goal: Verbalizes/displays adequate comfort level or baseline comfort level  Outcome: Progressing  Note: Pt medicated with pain medication prn.  Assessed all pain characteristics including level, type, location, frequency, and onset.  Non-pharmacologic interventions offered to pt as well.  Pt states pain is tolerable at this time.       Problem: Skin/Tissue Integrity  Goal: Skin integrity remains intact  Description: 1.  Monitor for areas of redness and/or skin breakdown2.  Assess vascular access sites hourly3.  Every 4-6 hours minimum:  Change oxygen saturation probe site4.  Every 4-6 hours:  If on nasal continuous positive airway pressure, respiratory therapy assess nares and determine need for appliance change or resting period  Outcome: Progressing  Note: Skin assessment complete. Coccyx reddened. Sensicare applied PRN. Turned and repositioned every two hours.  Area kept free from moisture. Proper nourishment and fluids encouraged, as appropriate. Will continue to monitor for additional needs and changes in skin breakdown.      Problem: ABCDS Injury Assessment  Goal: Absence of physical injury  Outcome: Progressing

## 2025-04-16 NOTE — PROGRESS NOTES
Physical Therapy  [facility]   Physical Therapy Evaluation  Date: 25  Patient Name: Taqueria Gilbert       Room: 7/2107-01  MRN: [id number]  Account: [id number]   : 1945  (80 y.o.) Gender: male     Discharge Recommendations:  Discharge Recommendations: Continue to assess pending progress, Patient would benefit from continued therapy after discharge, Therapy recommended at discharge     PT Equipment Recommendations  Equipment Needed:  (TBD)     Past Medical History:  has a past medical history of Adenocarcinoma of prostate (HCC), Anemia, Anesthesia, Atrial flutter (HCC), Cellulitis of lower extremity, Cerebral artery occlusion with cerebral infarction (HCC), CHF (congestive heart failure) (HCC), Chronic acquired lymphedema, Clavicle fracture, Hernia, abdominal, History of blood transfusion, History of CVA (cerebrovascular accident), Hx of blood clots, Hyperglycemia, Hyperlipidemia, Hypertension, Hypothyroid, Ileus, postoperative (HCC), Mild renal insufficiency, Morbid obesity, Non-healing wound of lower extremity, Osteoarthritis, Phlebitis, Prediabetes, Prolonged emergence from general anesthesia, Prostate CA (HCC), Prostate cancer (HCC), PVD (peripheral vascular disease), Sleep apnea, SVT (supraventricular tachycardia), Uric acid kidney stone, Wears glasses, and Wrist fracture.  Past Surgical History:   has a past surgical history that includes tumor excision; knee surgery (Left, ); Hammer toe surgery (Bilateral); UPPP (90'S); Prostatectomy (10/21/2015); cyst removal (Left, 2016); Total knee arthroplasty (Bilateral, LT-, RT-); Varicose vein surgery (Bilateral, 80's); pr colon ca scrn not hi rsk ind (N/A, 2017); Cardioversion (2017); transesophageal echocardiogram (2017); ablation of dysrhythmic focus (2018); Study possible ablation (2018); Colonoscopy (, ); Colonoscopy (2016); pr arthrp acetblr/prox fem prostc

## 2025-04-16 NOTE — ED NOTES
Situation  Name: Taqueria Gilbert  Admitting: Dx Hyperkalemia [E87.5]  Isolation Precautions No active isolations  Code Status: Full Code  Alerts: N/A  Where is the patient from? Home  HPI: Patient came for generalized weakness at home, he couldn't get up from the bathroom earlier. Usually uses a walker for ambulation. Patient is being treated today for EMILY and hyperkalemia.    Background  PMH:   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    Atrial flutter ([facility])     Cellulitis of lower extremity     right     Cerebral artery occlusion with cerebral infarction ([facility])     1996    CHF (congestive heart failure) ([facility])     Chronic acquired lymphedema     Clavicle fracture     in high school    Hernia, abdominal     History of blood transfusion 2003 7 2006    AUTOTRANSFUSION DURING SURGERY    History of CVA (cerebrovascular accident) 1996    DEFECIT MILD MEMORY AND SPEECH    Hx of blood clots     DVT    Hyperglycemia 12/20/2011    Hyperlipidemia     Hypertension     Hypothyroid 09/2015    Ileus, postoperative ([facility])     Mild renal insufficiency     Morbid obesity     Non-healing wound of lower extremity     HISTORY OF, WAS SEEN IN WOUND CLINIC FOR COUPLE YRS.    Osteoarthritis     Phlebitis     HX. OF     Prediabetes 11/20/2023    Prolonged emergence from general anesthesia     x1 had to be put back on vent, after prostate surgery, had to be hospitalized x12 days.    Prostate CA (HCC)     Prostate cancer (HCC) 10/21/2015    PVD (peripheral vascular disease)     Sleep apnea     C-PAP NIGHTLY-PT INSTRUCTED TO BRING    SVT (supraventricular tachycardia)     Uric acid kidney stone 12/2014    HAD 6 PASSSED ONE ON OWN, OTHER 5 IS BEING MONITORED    Wears glasses     Wrist fracture rt,     Allergies:   Allergies   Allergen Reactions    Adhesive Tape Itching and Rash    Doxycycline Rash     Diet: No diet orders on

## 2025-04-16 NOTE — PROGRESS NOTES
Spiritual Health History and Assessment/Progress Note  [facility]    (P) Palliative Care,  ,  ,      Name: Taqueria Gilbert MRN: [id number]    Age: 80 y.o.     Sex: male   Language: English   Holiness: Caodaism   Hyperkalemia     Date: 4/16/2025            Total Time Calculated: (P) 25 min              Spiritual Assessment began in [facility] PROGRESSIVE CARE        Referral/Consult From: (P) Nurse   Encounter Overview/Reason: (P) Palliative Care  Service Provided For: (P) Patient    Patient shared his life story with , noting it was work focused, and his former vocation as an  had really caused a lot of physical injury and strain on his body over the years. He is concerned about how he and wife will transition from their two story home which has become difficult to manage since they have no children and rely on extended family for help. Patient liked idea of additional social work consult to help address more long term planning; and with his consent,  brought that consult request to nurse's attention and we all had brief conversation together about it.       Sara, Belief, Meaning:   Patient identifies as spiritual and is connected with a sara tradition or spiritual practice  Family/Friends No family/friends present      Importance and Influence:  Patient unable to assess at this time  Family/Friends No family/friends present    Community:  Patient is connected with a spiritual community and feels well-supported. Support system includes: Spouse/Partner, Sara Community, and Extended family  Family/Friends No family/friends present    Assessment and Plan of Care:     Patient Interventions include: Facilitated expression of thoughts and feelings, Explored spiritual coping/struggle/distress, and Affirmed coping skills/support systems  Family/Friends Interventions include: No family/friends present    Patient Plan of Care: No spiritual needs identified for follow-up and Spiritual

## 2025-04-16 NOTE — H&P
[facility] Internal Medicine   Chris Paul MD; MD Courtney Garcia MD, MD , Chay Steve MD    [facility] Internal Medicine   IN-PATIENT SERVICE   [facility]    HISTORY AND PHYSICAL EXAMINATION            Date:   4/16/2025  Patient name:  Taqueria Gilbert  Date of admission:  4/15/2025  9:22 PM  MRN:   [id number]  Account:  [id number]  YOB: 1945  PCP:    Gilma Barba MD  Room:   2107/2107-01  Code Status:    Full Code    Chief Complaint:     Chief Complaint   Patient presents with    Fatigue     Bilateral leg weakness, could not able to get up from the bathroom, legs felt weak, uses a walker for ambulation   Wil  Hyperkalemia      History Obtained From:     Pt medical record and nursing staff    History of Present Illness:     Taqueria Gilbert is a 80 y.o. Declined male who presents with Fatigue (Bilateral leg weakness, could not able to get up from the bathroom, legs felt weak, uses a walker for ambulation)   and is admitted to the hospital for the management of Hyperkalemia.    Taqueria Gilbert is a 80 y.o. Declined male with a history of prostate cancer, a flutter, cellulitis, CHF, chronic acquired lymphedema, DVT anticoagulated with Eliquis, hyperglycemia, hyperlipidemia, hypertension, CKD stage III, and obstructive sleep apnea who presents with Fatigue (Bilateral leg weakness, could not able to get up from the bathroom, legs felt weak, uses a walker for ambulation)   and is admitted to the hospital for the management of Hyperkalemia.     According to patient, he had to call EMS because he was too weak to get off the toilet.  He has a pertinent recent history of being admitted to this facility due to bilateral foot swelling and lactic acidosis.  The bilateral lower extremity edema and cellulitis appears to be improved from the previous admission with only mild discoloration and a healing ulcer noted on the     Epithelial Cells, UA 0 TO 2 /HPF    Bacteria, UA None None   SODIUM, URINE, RANDOM    Collection Time: 04/15/25 11:44 PM   Result Value Ref Range    Sodium, Ur 69 mmol/L   EOSINOPHILS, URINE    Collection Time: 04/15/25 11:44 PM   Result Value Ref Range    Eosinophil, Ur None Seen    CREATININE, RANDOM URINE    Collection Time: 04/15/25 11:44 PM   Result Value Ref Range    Creatinine, Ur 94.7 39.0 - 259.0 mg/dL   POC Glucose Fingerstick    Collection Time: 04/15/25 11:54 PM   Result Value Ref Range    POC Glucose 86 75 - 110 mg/dL   POC Glucose Fingerstick    Collection Time: 04/16/25  2:06 AM   Result Value Ref Range    POC Glucose 89 75 - 110 mg/dL   Basic Metabolic Panel w/ Reflex to MG    Collection Time: 04/16/25  4:59 AM   Result Value Ref Range    Sodium 135 (L) 136 - 145 mmol/L    Potassium 5.4 (H) 3.7 - 5.3 mmol/L    Chloride 102 98 - 107 mmol/L    CO2 23 20 - 31 mmol/L    Anion Gap 10 9 - 16 mmol/L    Glucose 158 (H) 74 - 99 mg/dL    BUN 71 (H) 8 - 23 mg/dL    Creatinine 2.9 (H) 0.7 - 1.2 mg/dL    Est, Glom Filt Rate 21 (L) >60 mL/min/1.73m2    Calcium 9.0 8.6 - 10.4 mg/dL   CBC with auto differential    Collection Time: 04/16/25  4:59 AM   Result Value Ref Range    WBC 5.7 3.5 - 11.0 k/uL    RBC 4.27 (L) 4.5 - 5.9 m/uL    Hemoglobin 14.0 13.5 - 17.5 g/dL    Hematocrit 44.1 41 - 53 %    .2 (H) 80 - 100 fL    MCH 32.7 26 - 34 pg    MCHC 31.6 31 - 37 g/dL    RDW 17.4 (H) 11.5 - 14.9 %    Platelets 173 150 - 450 k/uL    MPV 7.5 6.0 - 12.0 fL    Neutrophils % 79 (H) 36 - 66 %    Lymphocytes % 12 (L) 24 - 44 %    Monocytes % 9 (H) 1 - 7 %    Eosinophils % 0 0 - 4 %    Basophils % 0 0 - 2 %    Neutrophils Absolute 4.40 1.3 - 9.1 k/uL    Lymphocytes Absolute 0.70 (L) 1.0 - 4.8 k/uL    Monocytes Absolute 0.50 0.1 - 1.3 k/uL    Eosinophils Absolute 0.00 0.0 - 0.4 k/uL    Basophils Absolute 0.00 0.0 - 0.2 k/uL   Troponin    Collection Time: 04/16/25  4:59 AM   Result Value Ref Range    Troponin, High

## 2025-04-16 NOTE — PROGRESS NOTES
[facility] Internal Medicine  Robinson Solorzano MD; Earl Zazueta MD; Chris Paul MD; MD Holly Garcia MD; Ritiak Coppola MD  [facility] Internal Medicine   IN-PATIENT SERVICE  [facility]                 Date:   4/15/2025  Patientname:  Taqueria Gilbert  Date of admission:  4/15/2025  9:22 PM  MRN:   [id number]  Account:  [id number]  YOB: 1945  PCP:    Gilma Barba MD  Room:   09/09  Code Status:    Full      Chief Complaint:     Chief Complaint   Patient presents with    Fatigue     Bilateral leg weakness, could not able to get up from the bathroom, legs felt weak, uses a walker for ambulation       History of Present Illness:     Taqueria Gilbert is a 80 y.o. Declined male with a history of prostate cancer, a flutter, cellulitis, CHF, chronic acquired lymphedema, DVT anticoagulated with Eliquis, hyperglycemia, hyperlipidemia, hypertension, CKD stage III, and obstructive sleep apnea who presents with Fatigue (Bilateral leg weakness, could not able to get up from the bathroom, legs felt weak, uses a walker for ambulation)   and is admitted to the hospital for the management of Hyperkalemia.    According to patient, he had to call EMS because he was too weak to get off the toilet.  He has a pertinent recent history of being admitted to this facility due to bilateral foot swelling and lactic acidosis.  The bilateral lower extremity edema and cellulitis appears to be improved from the previous admission with only mild discoloration and a healing ulcer noted on the right lower extremity. EMILY was noted.  Thrombocytopenia noted.  Heme-onc consulted who endorsed thrombocytopenia was due to acute illness. Vascular consulted; recommended outpatient UNNA boot therapy.    Upon presentation to the ER the patient was hyperkalemic with a potassium of 7.1.  Treatment with insulin dextrose and Lokelma initiated.  EMILY noted with a BUN of 3.5.  Baseline noted to be

## 2025-04-16 NOTE — FLOWSHEET NOTE
04/16/25 0611   Treatment Team Notification   Reason for Communication Review case  (latest Potassium, GFR and troponin results)   Name of Team Member Notified Judy Rivas NP   Treatment Team Role Advanced Practice Nurse   Method of Communication Secure Message   Response No new orders   Notification Time 0627     Judy Rivas NP was notified of patient's latest lab result.

## 2025-04-16 NOTE — ED PROVIDER NOTES
EMERGENCY DEPARTMENT ENCOUNTER   ATTENDING ATTESTATION     Pt Name: Taqueria Gilbert  MRN: [id number]  Birthdate 1945  Date of evaluation: 4/15/25       Taqueria Gilbert is a 80 y.o. male who presents with Fatigue (Bilateral leg weakness, could not able to get up from the bathroom, legs felt weak, uses a walker for ambulation)    General weakness. Difficulty getting off toilet. No fall    MDM:     Patient found to have EMILY with hyperkalemia    Treated medically    Consulted nephrology    Admitted to internal medicine with nephrology consultation    Vitals:   Vitals:    04/15/25 2126   BP: 118/75   Pulse: 91   Resp: 13   Temp: 97.5 °F (36.4 °C)   TempSrc: Oral   SpO2: 96%   Weight: (!) 138.3 kg (305 lb)   Height: 1.803 m (5' 11\")         I personally saw and examined the patient. I have reviewed and agree with the resident's findings, including all diagnostic interpretations and treatment plan as written. I was present for the key portions of any procedures performed and the inclusive time noted for any critical care statement.    Carson Cee MD  Attending Emergency Physician            Carson Cee MD  04/15/25 [id number]

## 2025-04-16 NOTE — CONSULTS
INSTRUCTED TO BRING    SVT (supraventricular tachycardia)     Uric acid kidney stone 12/2014    HAD 6 PASSSED ONE ON OWN, OTHER 5 IS BEING MONITORED    Wears glasses     Wrist fracture rt,       PAST SURGICAL HISTORY    Past Surgical History:   Procedure Laterality Date    ABLATION OF DYSRHYTHMIC FOCUS  03/16/2018    afib ablation    ABLATION OF DYSRHYTHMIC FOCUS  08/22/2019    ATRIAL FIB  /  DR BYRNE    CARDIAC CATHETERIZATION  07/30/2020    Dr. Seven Elder, [place]    CARDIOVERSION  11/17/2017    COLONOSCOPY  2002, 2007    COLONOSCOPY  07/11/2016    polyp,bx    COLONOSCOPY N/A 05/02/2022    COLONOSCOPY WITH RANDOM COLON BIOPSIES AND CLIPPING AT DISTAL TRANSVERSE COLON performed by Efrain Cabral MD at [facility] ENDO    COLONOSCOPY N/A 08/09/2022    COLONOSCOPY POLYPECTOMY SNARE/COLD BIOPSY performed by Isha Osorio MD at [facility] ENDO    CYST REMOVAL Left 08/05/2016    excision cyst anterior chest    FEMUR FRACTURE SURGERY Left 7/20/2023    RETROGRADE FEMORAL NAIL WITH CORTICOSTEROID INJECTION RIGHT HIP performed by Tad Danielle DO at [facility] OR    HAMMER TOE SURGERY Bilateral     KNEE SURGERY Left 1985    repair of torn ligaments    NH ARTHRP ACETBLR/PROX FEM PROSTC AGRFT/ALGRFT Left 05/15/2018    HIP TOTAL ARTHROPLASTY MINIMALLY INVASIVE ASI WITH GPS performed by Kieran Nicholson MD at [facility] OR    NH COLON CA SCRN NOT HI RSK IND N/A 07/31/2017    COLONOSCOPY performed by Efrain Cabral MD at [facility] OR    PROSTATECTOMY  10/21/2015    robotic    SHOULDER ARTHROPLASTY Bilateral     STUDY POSS ABLATION  04/02/2018         TONSILLECTOMY      TOTAL HIP ARTHROPLASTY Right 7/16/2024    HIP TOTAL ARTHROPLASTY ASI RIGHT performed by Kieran Nicholson MD at [facility] OR    TOTAL KNEE ARTHROPLASTY Bilateral LT-2003, RT-2006    TRANSESOPHAGEAL ECHOCARDIOGRAM  11/17/2017    TRANSESOPHAGEAL ECHOCARDIOGRAM  08/22/2019    TUMOR EXCISION      Throat/nose D/T benign tumor    UPPP UVULOPALATOPHARYGOPLASTY  90'S    VARICOSE VEIN SURGERY  capsule by mouth daily 1/21/25  Yes Lakshmi Glynn PA-C   PENTASA 250 MG extended release capsule TAKE 1 CAPSULE BY MOUTH 3 TIMES  DAILY 1/2/25  Yes Lakshmi Glynn PA-C   atorvastatin (LIPITOR) 20 MG tablet Take 1 tablet by mouth daily 10/28/24  Yes Julio Gonzalez MD   lisinopril (PRINIVIL;ZESTRIL) 5 MG tablet TAKE 1 TABLET BY MOUTH DAILY 10/23/24  Yes Gilma Barba MD   pantoprazole (PROTONIX) 40 MG tablet TAKE 1 TABLET BY MOUTH IN THE  MORNING BEFORE BREAKFAST 10/11/24  Yes Massimo Simon DO   ELIQUIS 5 MG TABS tablet TAKE 1 TABLET BY MOUTH TWICE  DAILY 10/3/24  Yes Massimo Simon DO   furosemide (LASIX) 40 MG tablet Take 1 tablet by mouth daily 9/9/24  Yes Gilma Barba MD   JARDIANCE 10 MG tablet TAKE 1 TABLET BY MOUTH DAILY 6/18/24  Yes Massimo Simon DO   vitamin B-12 (CYANOCOBALAMIN) 100 MCG tablet Take 1 tablet by mouth daily   Yes Lizeth Davenport MD   Ascorbic Acid (VITAMIN C) 250 MG tablet Take 1 tablet by mouth daily   Yes Lizeth Davenport MD   Melatonin 10 MG TABS Take 1 tablet by mouth nightly as needed   Yes Lizeth Davenport MD   zinc 50 MG CAPS Take 50 mg by mouth daily 3/29/21  Yes Gilma Barba MD   Cinnamon 500 MG CAPS Take 2 capsules by mouth daily   Yes Lizeth Davenport MD   Multiple Vitamins-Minerals (OCUVITE PO) Take 1 tablet by mouth daily   Yes Lizeth Davenport MD   Cholecalciferol (VITAMIN D-3) 25 MCG (1000 UT) CAPS Take 2 capsules by mouth daily   Yes Lizeth Davenport MD   glucose monitoring kit 1 kit by Does not apply route daily 3/20/25   Gilma Barba MD   blood glucose test strips (ASCENSIA AUTODISC VI;ONE TOUCH ULTRA TEST VI) strip 1 each by In Vitro route daily As needed. 3/20/25   Gilma Barba MD   Misc. Devices (CPAP MACHINE) MISC by Does not apply route    Lizeth Davenport MD   Elastic Bandages & Supports (JOBST KNEE HIGH COMPRESSION SM) MISC 1 each by Does not apply route daily as needed

## 2025-04-16 NOTE — PROGRESS NOTES
Physician Progress Note      PATIENT:               MOLINA DOE  CSN #:                  [id number]  :                       1945  ADMIT DATE:       2025 2:53 PM  DISCH DATE:        2025 5:10 PM  RESPONDING  PROVIDER #:        Chris PAUL MD          QUERY TEXT:    lactic acidosis is documented in the medical record discharge summary by   Chris Paul. Please provide additional clinical indicators supportive of   your documentation. Or please document if the diagnosis of lactic acidosis has   been ruled out after study.    The clinical indicators include:  age 80 yr, right leg cellulitis, EMILY    Discharge Summary 2025 by Chris Paul.- 80 y.o. male who was   admitted for the management of Lactic acidosis  -Elevated lactic acid on admission 3.1 likely secondary to poor perfusion   improved to 1.1 this morning    H&P 2025 -GASTROINTESTINAL:  negative for nausea, vomiting    Consults 2025 -by  Kenzie Simon-Reports shortness of breath with   exertion.  lab result-  lactic acid- 14-3.1, 1.1    - IV vancomycin,potassium bicarb-citric acid (EFFER-K) effervescent tablet 40,  vancomycin IV    Thank you  TITA Olea CDS  Options provided:  -- lactic acidosis presents as evidenced by, Please document evidence.  -- lactic acidosis was ruled out  -- Other - I will add my own diagnosis  -- Disagree - Not applicable / Not valid  -- Disagree - Clinically unable to determine / Unknown  -- Refer to Clinical Documentation Reviewer    PROVIDER RESPONSE TEXT:    lactic acidosis is present as evidenced by elevated at 3.1    Query created by: Summer Castañeda on 2025 5:28 AM      Electronically signed by:  Chris PAUL MD 2025 10:58 AM

## 2025-04-16 NOTE — CONSULTS
..  Palliative Care Inpatient Consult    NAME:  Taqueria Gilbert  MEDICAL RECORD NUMBER:  [id number]  AGE: 80 y.o.   GENDER: male  : 1945  TODAY'S DATE:  2025    Reasons for Consultation:    Provision of information regarding PC and/or hospice philosophies  Complex, time-intensive communication and interdisciplinary psychosocial support  Clarification of goals of care and/or assistance with difficult decision-making  Guidance in regards to resources and transition(s)    Code Status: Full Code    Members of PC team contributing to this consultation are :  Arelis Garber RN    PLAN:  -Pt c/o pain to tailbone, shooting down legs. Rates pain 3/10-tolerable  -Pt expresses wishes for full treatment  -Pt's niece Dolores is his DPOA. Pt is considering changing DPOA as he hasn't talked to his niece in a while.  -Pt's goal is to return home and resume his normal activities. Pt feels he has a good quality of life at home.  -Will follow along for support.     History of Present Illness     The patient is a 80 y.o.  Declined male who presents with Fatigue (Bilateral leg weakness, could not able to get up from the bathroom, legs felt weak, uses a walker for ambulation)    Referred to Palliative Care by   [x] Physician   [] Nursing  [] Family Request   [] Other:       He was admitted to the primary service for Hyperkalemia [E87.5]  Generalized weakness [R53.1]  EMILY (acute kidney injury) [N17.9]. His hospital course has been associated with Hyperkalemia. The patient has a complicated medical history and has been hospitalized since 4/15/2025  9:22 PM.    Active Hospital Problems    Diagnosis Date Noted    Hyperkalemia [E87.5] 04/15/2025       Data        Code Status: Full Code     ADVANCED CARE PLANNING:  Patient has capacity for medical decisions: yes  Health Care Power of : yes  Living Will: no     Personal, Social, and Family History  Marital Status:   Living situation:with family:   toilet. His legs then became numb and they had to call 911. Pt reports his potassium was high and his kidneys were failing. He reports that the kidney doctor attributes this to his diuretics. He states the doctor told him his labs were improving since yesterday and they were hopeful he would not need HD. Pt states his mother was on HD for 5 years and  while getting a new AV fistula. Pt is agreeable to HD if needed but is hoping he does not need it.     Pt states he lives at home with his wife. They have no children. He states his wife also has medical problems including COPD. Pt states he uses a walker at home and can get to his doctor's appointments with the assistance of his wife. He states he hasn't drove a car in 2 years. Pt does c/o pain in his tailbone with shooting pain down the back of both legs. He is rating his pain 3/10 but states with certain movements, the pain increases. He has told Dr. Paul about this and was told they will investigate it. Pt also has both legs wrapped and states that he has dealt with weeping edema for a few years now. Pt's toes are dusky in color. He states this has been an ongoing problem for years and denies any wounds except one that is healing on his shin.   When asked, patient feels his quality of life at home is good. They utilize [facility] home care. He states the nurse wraps his legs for him. Besides his pain in his tailbone which shoots down his legs, he has no other complaints. He denies taking anything for pain at this time, except tylenol when needed.     Pt has a DPOA on file listing his niece Dolores. He states she is a nurse but got burnt out from COVID and no longer works in the hospital. He states he has not talked to her a in couple of months and that Dolores stopped calling them. He mentions he has thought about electing someone different as his DPOA. I told him he can do that while he is here, if he so chose. He will think about it.     During our

## 2025-04-16 NOTE — ED NOTES
Mode of arrival (squad #, walk in, police, etc) : EMS        Chief complaint(s): bilateral leg swelling        Arrival Note (brief scenario, treatment PTA, etc).: Patient states he was in the bathroom at home when he could not get up, his legs feel weak. This incident happened to him few times in the last week. Patient ambulates by the use of a walker. Patient denies fall.        C= \"Have you ever felt that you should Cut down on your drinking?\"  No  A= \"Have people Annoyed you by criticizing your drinking?\"  No  G= \"Have you ever felt bad or Guilty about your drinking?\"  No  E= \"Have you ever had a drink as an Eye-opener first thing in the morning to steady your nerves or to help a hangover?\"  No      Deferred []      Reason for deferring: N/A    *If yes to two or more: probable alcohol abuse.*

## 2025-04-16 NOTE — CONSULTS
NEPHROLOGY CONSULT     Patient :  Taqueria Gilbert; 80 y.o. MRN# [id number]  Location:  2107/2107-01  Attending:  Chris Paul MD  Admit Date:  4/15/2025   Hospital Day: 1      Reason for Consult: Acute kidney injury on CKD, hyperkalemia      Chief Complaint: Low extremity leg weakness and pain in the back radiating to the legs  History Obtained From:  patient    History of Present Illness:    This is a 80 y.o. male with past medical history of nephrolithiasis, prostate adenocarcinoma, status post prostatectomy, essential hypertension, CVA 1996, history of atrial flutter requiring ablation, CKD stage IIIb with baseline serum creatinine of 1.2 to 1.7 mg/dL was following up with [facility].  Patient presented to the hospital with complaints of lower extremity weakness back pain which is radiating to both legs back of the legs.  Patient yesterday went to the restroom and was unable to get up.  Patient denies any difficulty urination.  On initial evaluation in the ER labs showed potassium of 7.1 serum creatinine 3.5 mg/dL BUN of 74 mg/dL  Patient was treated with insulin D50 Kayexalate calcium gluconate   In the ER patient noted to have urinary retention and Juárez catheter was placed, PVR of 400 mL  Patient patient also have bilateral lower extremity wounds and patient was started on Bactrim recently last week.  Other medications includes use of lisinopril, Aldactone, Lasix  Pt denies any history of  prolonged NSAID use.    There has been no recent exposure to IV contrast.   There is no history  of paraprotein disease.       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

## 2025-04-16 NOTE — PROGRESS NOTES
RN called to ask Dr. Rivera about the melgoza catheter. It was placed in ER and is documented as it is for fluid volume management. He stated that it is for retention and that it needs to stay for now. He also requested a urology consult.

## 2025-04-16 NOTE — ED PROVIDER NOTES
[facility] EMERGENCY DEPARTMENT  Emergency Department Encounter  Emergency Medicine Resident     Pt Name:Taqueria Gilbert  MRN: [id number]  Birthdate 1945  Date of evaluation: 4/15/25  PCP:  Gilma Barba MD  Note Started: 11:26 PM EDT      CHIEF COMPLAINT       Chief Complaint   Patient presents with    Fatigue     Bilateral leg weakness, could not able to get up from the bathroom, legs felt weak, uses a walker for ambulation       HISTORY OF PRESENT ILLNESS  (Location/Symptom, Timing/Onset, Context/Setting, Quality, Duration, Modifying Factors, Severity.)      Taqueria iGlbert is a 80 y.o. male who presents with generalized weakness.  He reports that he was on the toilet when he was unable to get up.  Reports that the weakness in his bilateral lower extremities worse in the upper extremities.  On Lasix and spironolactone and reports compliance.  He denies any falls.  Called 911 from the toilet seat.  Uses a walker at baseline.  He denies any chest pain, shortness of breath, abdominal pain, nausea, vomiting, dysuria, frequency, urgency, decreased urinary volume, change in bowel habits.    Recently on Bactrim for cellulitis of lower extremities.  Started on/9/2025.    PAST MEDICAL / SURGICAL / SOCIAL / FAMILY HISTORY      has a past medical history of Adenocarcinoma of prostate (HCC), Anemia, Anesthesia, Atrial flutter (HCC), Cellulitis of lower extremity, Cerebral artery occlusion with cerebral infarction (HCC), CHF (congestive heart failure) (HCC), Chronic acquired lymphedema, Clavicle fracture, Hernia, abdominal, History of blood transfusion, History of CVA (cerebrovascular accident), Hx of blood clots, Hyperglycemia, Hyperlipidemia, Hypertension, Hypothyroid, Ileus, postoperative (HCC), Mild renal insufficiency, Morbid obesity, Non-healing wound of lower extremity, Osteoarthritis, Phlebitis, Prediabetes, Prolonged emergence from general anesthesia, Prostate CA (HCC), Prostate cancer (HCC),

## 2025-04-16 NOTE — ACP (ADVANCE CARE PLANNING)
Advance Care Planning     Advance Care Planning Activator (Inpatient)  Conversation Note      Date of ACP Conversation: 4/16/2025     Conversation Conducted with: Patient with Decision Making Capacity    ACP Activator: Meagan Bonner RN    Health Care Decision Maker:     Current Designated Health Care Decision Maker:     Primary Decision Maker: MatthewDolores ho - Niece/Nephew - [phone]  Click here to complete Healthcare Decision Makers including section of the Healthcare Decision Maker Relationship (ie \"Primary\")  Today we documented Decision Maker(s) consistent with ACP documents on file.    Care Preferences    Ventilation:  \"If you were in your present state of health and suddenly became very ill and were unable to breathe on your own, what would your preference be about the use of a ventilator (breathing machine) if it were available to you?\"      Would the patient desire the use of ventilator (breathing machine)?: yes    \"If your health worsens and it becomes clear that your chance of recovery is unlikely, what would your preference be about the use of a ventilator (breathing machine) if it were available to you?\"     Would the patient desire the use of ventilator (breathing machine)?: Yes      Resuscitation  \"CPR works best to restart the heart when there is a sudden event, like a heart attack, in someone who is otherwise healthy. Unfortunately, CPR does not typically restart the heart for people who have serious health conditions or who are very sick.\"    \"In the event your heart stopped as a result of an underlying serious health condition, would you want attempts to be made to restart your heart (answer \"yes\" for attempt to resuscitate) or would you prefer a natural death (answer \"no\" for do not attempt to resuscitate)?\" yes       [] Yes   [] No   Educated Patient / Decision Maker regarding differences between Advance Directives and portable DNR orders.    Length of ACP Conversation in minutes:

## 2025-04-17 ENCOUNTER — HOSPITAL ENCOUNTER (OUTPATIENT)
Dept: WOUND CARE | Age: 80
Discharge: HOME | End: 2025-04-17

## 2025-04-17 ENCOUNTER — APPOINTMENT (OUTPATIENT)
Dept: GENERAL RADIOLOGY | Age: 80
DRG: 683 | End: 2025-04-17
Payer: MEDICARE

## 2025-04-17 LAB
ANION GAP SERPL CALCULATED.3IONS-SCNC: 8 MMOL/L (ref 9–16)
BASOPHILS # BLD: 0 K/UL (ref 0–0.2)
BASOPHILS NFR BLD: 1 % (ref 0–2)
BUN SERPL-MCNC: 51 MG/DL (ref 8–23)
CALCIUM SERPL-MCNC: 9 MG/DL (ref 8.6–10.4)
CHLORIDE SERPL-SCNC: 101 MMOL/L (ref 98–107)
CO2 SERPL-SCNC: 26 MMOL/L (ref 20–31)
CREAT SERPL-MCNC: 1.8 MG/DL (ref 0.7–1.2)
EKG ATRIAL RATE: 108 BPM
EKG P-R INTERVAL: 176 MS
EKG Q-T INTERVAL: 338 MS
EKG QRS DURATION: 84 MS
EKG QTC CALCULATION (BAZETT): 467 MS
EKG R AXIS: -20 DEGREES
EKG T AXIS: 2 DEGREES
EKG VENTRICULAR RATE: 115 BPM
EOSINOPHIL # BLD: 0.1 K/UL (ref 0–0.4)
EOSINOPHILS RELATIVE PERCENT: 2 % (ref 0–4)
ERYTHROCYTE [DISTWIDTH] IN BLOOD BY AUTOMATED COUNT: 17 % (ref 11.5–14.9)
GFR, ESTIMATED: 37 ML/MIN/1.73M2
GLUCOSE BLD-MCNC: 109 MG/DL (ref 75–110)
GLUCOSE BLD-MCNC: 134 MG/DL (ref 75–110)
GLUCOSE SERPL-MCNC: 106 MG/DL (ref 74–99)
HCT VFR BLD AUTO: 44.3 % (ref 41–53)
HGB BLD-MCNC: 14 G/DL (ref 13.5–17.5)
LYMPHOCYTES NFR BLD: 1.4 K/UL (ref 1–4.8)
LYMPHOCYTES RELATIVE PERCENT: 29 % (ref 24–44)
MCH RBC QN AUTO: 32.4 PG (ref 26–34)
MCHC RBC AUTO-ENTMCNC: 31.7 G/DL (ref 31–37)
MCV RBC AUTO: 102.2 FL (ref 80–100)
MONOCYTES NFR BLD: 0.4 K/UL (ref 0.1–1.3)
MONOCYTES NFR BLD: 9 % (ref 1–7)
NEUTROPHILS NFR BLD: 59 % (ref 36–66)
NEUTS SEG NFR BLD: 2.8 K/UL (ref 1.3–9.1)
PLATELET # BLD AUTO: 165 K/UL (ref 150–450)
PMV BLD AUTO: 7.1 FL (ref 6–12)
POTASSIUM SERPL-SCNC: 5.2 MMOL/L (ref 3.7–5.3)
RBC # BLD AUTO: 4.33 M/UL (ref 4.5–5.9)
SODIUM SERPL-SCNC: 135 MMOL/L (ref 136–145)
WBC OTHER # BLD: 4.7 K/UL (ref 3.5–11)

## 2025-04-17 PROCEDURE — 85025 COMPLETE CBC W/AUTO DIFF WBC: CPT

## 2025-04-17 PROCEDURE — 6370000000 HC RX 637 (ALT 250 FOR IP)

## 2025-04-17 PROCEDURE — 72100 X-RAY EXAM L-S SPINE 2/3 VWS: CPT

## 2025-04-17 PROCEDURE — 82947 ASSAY GLUCOSE BLOOD QUANT: CPT

## 2025-04-17 PROCEDURE — 97110 THERAPEUTIC EXERCISES: CPT

## 2025-04-17 PROCEDURE — 2060000000 HC ICU INTERMEDIATE R&B

## 2025-04-17 PROCEDURE — 36415 COLL VENOUS BLD VENIPUNCTURE: CPT

## 2025-04-17 PROCEDURE — 2500000003 HC RX 250 WO HCPCS

## 2025-04-17 PROCEDURE — 99233 SBSQ HOSP IP/OBS HIGH 50: CPT | Performed by: INTERNAL MEDICINE

## 2025-04-17 PROCEDURE — 84153 ASSAY OF PSA TOTAL: CPT

## 2025-04-17 PROCEDURE — 6370000000 HC RX 637 (ALT 250 FOR IP): Performed by: INTERNAL MEDICINE

## 2025-04-17 PROCEDURE — 51798 US URINE CAPACITY MEASURE: CPT

## 2025-04-17 PROCEDURE — 80048 BASIC METABOLIC PNL TOTAL CA: CPT

## 2025-04-17 PROCEDURE — 97116 GAIT TRAINING THERAPY: CPT

## 2025-04-17 PROCEDURE — 97530 THERAPEUTIC ACTIVITIES: CPT

## 2025-04-17 RX ORDER — FUROSEMIDE 40 MG/1
40 TABLET ORAL DAILY
Status: DISCONTINUED | OUTPATIENT
Start: 2025-04-17 | End: 2025-04-19 | Stop reason: HOSPADM

## 2025-04-17 RX ADMIN — APIXABAN 2.5 MG: 2.5 TABLET, FILM COATED ORAL at 11:14

## 2025-04-17 RX ADMIN — ATORVASTATIN CALCIUM 20 MG: 20 TABLET, FILM COATED ORAL at 11:14

## 2025-04-17 RX ADMIN — ACETAMINOPHEN 500 MG: 500 TABLET ORAL at 01:07

## 2025-04-17 RX ADMIN — APIXABAN 2.5 MG: 2.5 TABLET, FILM COATED ORAL at 21:37

## 2025-04-17 RX ADMIN — EMPAGLIFLOZIN 10 MG: 10 TABLET, FILM COATED ORAL at 11:14

## 2025-04-17 RX ADMIN — SODIUM CHLORIDE, PRESERVATIVE FREE 10 ML: 5 INJECTION INTRAVENOUS at 20:00

## 2025-04-17 RX ADMIN — METOPROLOL TARTRATE 25 MG: 25 TABLET, FILM COATED ORAL at 11:14

## 2025-04-17 RX ADMIN — OXYCODONE HYDROCHLORIDE AND ACETAMINOPHEN 250 MG: 500 TABLET ORAL at 11:14

## 2025-04-17 RX ADMIN — METOPROLOL TARTRATE 25 MG: 25 TABLET, FILM COATED ORAL at 21:37

## 2025-04-17 RX ADMIN — Medication 9 MG: at 21:45

## 2025-04-17 RX ADMIN — ALLOPURINOL 300 MG: 300 TABLET ORAL at 21:46

## 2025-04-17 RX ADMIN — SODIUM ZIRCONIUM CYCLOSILICATE 5 G: 5 POWDER, FOR SUSPENSION ORAL at 11:14

## 2025-04-17 RX ADMIN — SODIUM CHLORIDE, PRESERVATIVE FREE 10 ML: 5 INJECTION INTRAVENOUS at 11:19

## 2025-04-17 RX ADMIN — LEVOTHYROXINE SODIUM 25 MCG: 0.03 TABLET ORAL at 05:42

## 2025-04-17 RX ADMIN — ALLOPURINOL 300 MG: 300 TABLET ORAL at 11:14

## 2025-04-17 RX ADMIN — FUROSEMIDE 40 MG: 40 TABLET ORAL at 11:23

## 2025-04-17 ASSESSMENT — PAIN DESCRIPTION - ONSET
ONSET: ON-GOING

## 2025-04-17 ASSESSMENT — PAIN DESCRIPTION - ORIENTATION
ORIENTATION: RIGHT;LEFT;LOWER

## 2025-04-17 ASSESSMENT — PAIN SCALES - GENERAL
PAINLEVEL_OUTOF10: 4
PAINLEVEL_OUTOF10: 0
PAINLEVEL_OUTOF10: 3
PAINLEVEL_OUTOF10: 5

## 2025-04-17 ASSESSMENT — PAIN - FUNCTIONAL ASSESSMENT
PAIN_FUNCTIONAL_ASSESSMENT: ACTIVITIES ARE NOT PREVENTED

## 2025-04-17 ASSESSMENT — PAIN DESCRIPTION - DESCRIPTORS
DESCRIPTORS: ACHING

## 2025-04-17 ASSESSMENT — PAIN DESCRIPTION - LOCATION
LOCATION: LEG;BACK
LOCATION: LEG;BACK
LOCATION: BACK;LEG

## 2025-04-17 NOTE — PROGRESS NOTES
[facility]   Chris Paul MD; MD Courtney Garcia MD, MD , Chay Steve MD    [facility]   IN-PATIENT SERVICE   [facility]    Progress note              Date:   4/17/2025  Patient name:  Taqueria Gilbert  Date of admission:  4/15/2025  9:22 PM  MRN:   [id number]  Account:  [id number]  YOB: 1945  PCP:    Gilma Barba MD  Room:   2107/2107-01  Code Status:    Full Code    Chief Complaint:     Chief Complaint   Patient presents with    Fatigue     Bilateral leg weakness, could not able to get up from the bathroom, legs felt weak, uses a walker for ambulation   Wil  Hyperkalemia      History Obtained From:     Pt medical record and nursing staff    History of Present Illness:     Taqueria Gilbert is a 80 y.o. Declined male who presents with Fatigue (Bilateral leg weakness, could not able to get up from the bathroom, legs felt weak, uses a walker for ambulation)   and is admitted to the hospital for the management of Hyperkalemia.    Taqueria Gilbert is a 80 y.o. Declined male with a history of prostate cancer, a flutter, cellulitis, CHF, chronic acquired lymphedema, DVT anticoagulated with Eliquis, hyperglycemia, hyperlipidemia, hypertension, CKD stage III, and obstructive sleep apnea who presents with Fatigue (Bilateral leg weakness, could not able to get up from the bathroom, legs felt weak, uses a walker for ambulation)   and is admitted to the hospital for the management of Hyperkalemia.     According to patient, he had to call EMS because he was too weak to get off the toilet.  He has a pertinent recent history of being admitted to this facility due to bilateral foot swelling and lactic acidosis.  The bilateral lower extremity edema and cellulitis appears to be improved from the previous admission with only mild discoloration and a healing ulcer noted on the right lower

## 2025-04-17 NOTE — PROGRESS NOTES
[facility]   INPATIENT OCCUPATIONAL THERAPY  PROGRESS NOTE  Date: 2025  Patient Name: Taqueria Gilbert       Room:   MRN: [id number]    : 1945  (80 y.o.)  Gender: male   Referring Practitioner: Amina Rodriguez APRN - NP  Diagnosis: hyperkalemia      Discharge Recommendations:  Further Occupational Therapy is recommended upon facility discharge.    OT Equipment Recommendations  Other: TBD    Restrictions/Precautions  Restrictions/Precautions  Restrictions/Precautions: Bed Alarm;Fall Risk (IV right antecubital and left dorsal forearm, urinary catheter, troponins 63 on 4-)  Activity Level: Up as Tolerated;Up with Assist (use lift equipment)  Required Braces or Orthoses?: No  Implants Present? : Metal implants (B TKR, B THR, left femur ORIF (corby), bilateral shoulder surgeries)    O2 Device: None (Room air)    Subjective  Subjective  Subjective: \"Oh no I smell.\" Pt was pleasant and agreeable to OT/PT treatment with PTA Rosalee  Pain  Pre-Pain: 4 (B feet)  Comments: Ok per RN for OT/PT session    Objective  Orientation  Overall Orientation Status: Within Functional Limits  Cognition  Overall Cognitive Status: WFL    Activities of Daily Living  Putting On/Taking Off Footwear: Dependent/Total  Putting On/Taking Off Footwear Skilled Clinical Factors: TA to don B shoes  Additional Comments: pt presents with impaired balance, decreased activity tolerance, decreased strength, and decreased functional endurance which impacts pt's ability to safely and independently complete self care and mobility at [facility].    Balance  Balance  Sitting Balance: Stand by assistance  Standing Balance: Minimal assistance (Min A- CGA)  Standing Balance  Time: ~8 minutes  Activity: functional transfer and mobility with RW    Transfers/Mobility  Bed mobility  Supine to Sit: Substantial/Maximal assistance (assist at trunk)  Sit to Supine: Substantial/Maximal assistance;2 Person assistance  Scooting:

## 2025-04-17 NOTE — CONSULTS
Department of Urology  Urology Consult Note    Patient:  Taqueria Gilbert  MRN: [id number]  YOB: 1945    Reason for Consult:  urinary retention/lynn   Requesting Physician:  Dr. Rivera    CHIEF COMPLAINT:    Chief Complaint   Patient presents with    Fatigue     Bilateral leg weakness, could not able to get up from the bathroom, legs felt weak, uses a walker for ambulation       History Obtained From:   patient, electronic medical record    HISTORY OF PRESENT ILLNESS:    The patient is a 80 y.o. male presenting with fatigue, admitted for management of hyperkalemia. Urology asked to evaluate the patient for urinary retention. PMH of prostate ca, s/p RALP (Dr. Quarles, 2015 with PSA <0.03  4/3/24). He was found to be in retention, retaining 400cc of urine, melgoza placed. UA was unremarkable. LYNN improving today. Tolerating melgoza, no hematuria or dysuria, no flank pain, fever or chills. He states he normally voids on his own without urinary hesitation, stream is moderate and feels he empties without difficulty. He does have baseline urgency/frequency with UI s/p RALP and symptoms were being managed with trospium (although I do not see this on his home med list). He tells me that during his last admission, medication was decreased to once a day- but leakage got worse when he was at home, so he increased it back to twice a day which helped. Follows routinely with Dr. Llanes.    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

## 2025-04-17 NOTE — PROGRESS NOTES
NEPHROLOGY progress    Patient :  Taqueria Gilbert; 80 y.o. MRN# [id number]  Location:  2107/2107-01  Attending:  Chris Paul MD  Admit Date:  4/15/2025   Hospital Day: 2      Reason for Consult: Acute kidney injury on CKD, hyperkalemia      Chief Complaint: Low extremity leg weakness and pain in the back radiating to the legs    Subjective/interval history.  Patient seen and examined, had loose stool patient is on Lokelma.  Potassium improved.  Serum creatinine improved to 1.8 mg/dL  Urine output 3.6 L yesterday in 24 hours.  Urine output today 1.2 L  Indwelling Juárez catheter noted    History of Present Illness:    This is a 80 y.o. male with past medical history of nephrolithiasis, prostate adenocarcinoma, status post prostatectomy, essential hypertension, CVA 1996, history of atrial flutter requiring ablation, CKD stage IIIb with baseline serum creatinine of 1.2 to 1.7 mg/dL was following up with [facility].  Patient presented to the hospital with complaints of lower extremity weakness back pain which is radiating to both legs back of the legs.  Patient yesterday went to the restroom and was unable to get up.  Patient denies any difficulty urination.  On initial evaluation in the ER labs showed potassium of 7.1 serum creatinine 3.5 mg/dL BUN of 74 mg/dL  Patient was treated with insulin D50 Kayexalate calcium gluconate   In the ER patient noted to have urinary retention and Juárez catheter was placed, PVR of 400 mL  Patient patient also have bilateral lower extremity wounds and patient was started on Bactrim recently last week.  Other medications includes use of lisinopril, Aldactone, Lasix  Pt denies any history of  prolonged NSAID use.    There has been no recent exposure to IV contrast.   There is no history  of paraprotein disease.       Past Medical History:        Diagnosis Date    Adenocarcinoma of prostate (HCC) 10/30/2015    Anemia     Anesthesia     diaphragm paralyzed with

## 2025-04-17 NOTE — PROGRESS NOTES
Physical Therapy  [facility]   Physical Therapy Treatment  Date: 25  Patient Name: Taqueria Gilbert       Room: 7-  MRN: [id number]  Account: [id number]   : 1945  (80 y.o.) Gender: male     Discharge Recommendations:  Discharge Recommendations: Continue to assess pending progress, Patient would benefit from continued therapy after discharge, Therapy recommended at discharge     PT Equipment Recommendations  Equipment Needed:  (TBD)    General  Chart Reviewed: Yes  Additional Pertinent Hx: Per H & P note: Taqueria Gilbert is a 80 y.o. Declined male who presents with Fatigue (Bilateral leg weakness, could not able to get up from the bathroom, legs felt weak, uses a walker for ambulation)   and is admitted to the hospital for the management of Hyperkalemia.     Taqueria Gilbert is a 80 y.o. Declined male with a history of prostate cancer, a flutter, cellulitis, CHF, chronic acquired lymphedema, DVT anticoagulated with Eliquis, hyperglycemia, hyperlipidemia, hypertension, CKD stage III, and obstructive sleep apnea who presents with Fatigue (Bilateral leg weakness, could not able to get up from the bathroom, legs felt weak, uses a walker for ambulation)   and is admitted to the hospital for the management of Hyperkalemia.     According to patient, he had to call EMS because he was too weak to get off the toilet.  He has a pertinent recent history of being admitted to this facility due to bilateral foot swelling and lactic acidosis.  The bilateral lower extremity edema and cellulitis appears to be improved from the previous admission with only mild discoloration and a healing ulcer noted on the right lower extremity. EMILY was noted.  Thrombocytopenia noted.  Heme-onc consulted who endorsed thrombocytopenia was due to acute illness. Vascular consulted; recommended outpatient UNNA boot therapy.     Upon presentation to the ER the patient was hyperkalemic with a potassium of 7.1.   Goal 5: Pt to improve standing balance to fair dynamic to reduce fall risk using a wheeled walker  Short Term Goal 6: Pt to negotiate 4 - 6\" platform  steps with UE support and min x 2      Plan  Physical Therapy Plan  General Plan: 5-7 times per week  Specific Instructions for Next Treatment: advance gait distance using a wheeled walker, instruct in exercise program and advance to platform steps  Current Treatment Recommendations: Strengthening, Balance training, Transfer training, Functional mobility training, Gait training, Stair training, Endurance training, Safety education & training, Home exercise program, Patient/Caregiver education & training, Equipment evaluation, education, & procurement, Positioning, Therapeutic activities, Co-Treatment   Safety Devices  Type of Devices: All fall risk precautions in place, Bed alarm in place, Call light within reach, Gait belt, Patient at risk for falls, Left in bed, Nurse notified  Restraints  Restraints Initially in Place: No        04/17/25 1400   Time Code Minutes   Timed Code Treatment Minutes 23 Minutes   PT Individual Minutes   Time In 1119   Time Out 1157   Minutes 38           Electronically signed by Rosalee Marrero PTA on 4/17/25 at 2:47 PM EDT

## 2025-04-17 NOTE — PLAN OF CARE
Problem: Safety - Adult  Goal: Free from fall injury  Outcome: Progressing  Note: No falls noted this shift. Bed kept in low position. Safe environment maintained. Bedside table & call light in reach. Uses call light appropriately when needing assistance.       Problem: Pain  Goal: Verbalizes/displays adequate comfort level or baseline comfort level  Outcome: Progressing  Note: Pt medicated with pain medication prn.  Assessed all pain characteristics including level, type, location, frequency, and onset.  Non-pharmacologic interventions offered to pt as well.  Pt states pain is tolerable at this time.       Problem: Discharge Planning  Goal: Discharge to home or other facility with appropriate resources  Outcome: Progressing     Problem: Chronic Conditions and Co-morbidities  Goal: Patient's chronic conditions and co-morbidity symptoms are monitored and maintained or improved  Outcome: Progressing     Problem: Skin/Tissue Integrity  Goal: Skin integrity remains intact  Description: 1.  Monitor for areas of redness and/or skin breakdown2.  Assess vascular access sites hourly3.  Every 4-6 hours minimum:  Change oxygen saturation probe site4.  Every 4-6 hours:  If on nasal continuous positive airway pressure, respiratory therapy assess nares and determine need for appliance change or resting period  Outcome: Progressing

## 2025-04-17 NOTE — PROGRESS NOTES
PALLIATIVE CARE NURSING ASSESSMENT    Patient: Taqueria Gilbert  Room: 2107/2107-01    Reason For Consult   Goals of care evaluation  Distress management  Guidance and support  Facilitate communications  Assistance in coordinating care    Code Status: full code    Summary:  Palliative Care visit to bedside.   Introduced pc outpatient services.   [facility]  Explained that patient qualifies for PC and [facility] has a pc program.    Offered referral.  Answered questions.   No referral requested at this time.   Pt has information on [facility] PC.   Pt is not changing HCPOA at this time but knows new document needs to be completed if he does not want to continue with his niece being his agent.   Palliative care will continue to follow.       Impression: Taqueria Gilbert is a 80 y.o. year old male  has a past medical history of Adenocarcinoma of prostate (HCC), Anemia, Anesthesia, Atrial flutter (HCC), Cellulitis of lower extremity, Cerebral artery occlusion with cerebral infarction (HCC), CHF (congestive heart failure) (HCC), Chronic acquired lymphedema, Clavicle fracture, Hernia, abdominal, History of blood transfusion, History of CVA (cerebrovascular accident), Hx of blood clots, Hyperglycemia, Hyperlipidemia, Hypertension, Hypothyroid, Ileus, postoperative (HCC), Mild renal insufficiency, Morbid obesity, Non-healing wound of lower extremity, Osteoarthritis, Phlebitis, Prediabetes, Prolonged emergence from general anesthesia, Prostate CA (HCC), Prostate cancer (HCC), PVD (peripheral vascular disease), Sleep apnea, SVT (supraventricular tachycardia), Uric acid kidney stone, Wears glasses, and Wrist fracture..  Currently hospitalized for the management of hyperkalemia.  The Palliative Care Team is following to assist with patient and family support and goals of care.     Vital Signs  Blood pressure (!) 147/73, pulse 95, temperature 98.4 °F (36.9 °C), temperature source Oral, resp. rate 18, height

## 2025-04-17 NOTE — PROGRESS NOTES
RN spoke to Dr. Paul regarding melgoza, notified of urology's recommendations. Per Dr. Paul patient will discharge with melgoza and void trial can be attempted at SNF.

## 2025-04-17 NOTE — PROGRESS NOTES
Spiritual Health History and Assessment/Progress Note  [facility]    (P) Palliative Care,  ,  ,      Name: Taqueria Gilbert MRN: [id number]    Age: 80 y.o.     Sex: male   Language: English   Rastafari: Jewish   Hyperkalemia     Date: 4/17/2025            Total Time Calculated: (P) 15 min              Spiritual Assessment continued in [facility]        Referral/Consult From: (P) Palliative Care   Encounter Overview/Reason: (P) Palliative Care  Service Provided For: (P) Patient    Sara, Belief, Meaning:   Patient identifies as spiritual, is connected with a sara tradition or spiritual practice, and has beliefs or practices that help with coping during difficult times  Family/Friends No family/friends present      Importance and Influence:  Patient has spiritual/personal beliefs that influence decisions regarding their health  Family/Friends No family/friends present    Community:  Patient feels well-supported. Support system includes: Spouse/Partner, Children, Sara Community, and Friends  Family/Friends No family/friends present    Assessment and Plan of Care:     Patient Interventions include: Facilitated expression of thoughts and feelings and Provided sacramental/Scientologist ritual  Family/Friends Interventions include: No family/friends present    Patient Plan of Care: Spiritual Care available upon further referral  Family/Friends Plan of Care: No family/friends present    Electronically signed by Chaplain Angela on 4/17/2025 at 4:08 PM       04/17/25 1603   Encounter Summary   Encounter Overview/Reason Palliative Care   Service Provided For Patient   Referral/Consult From Palliative Care   Support System Spouse;Family members   Last Encounter  04/17/25   Complexity of Encounter Moderate   Begin Time 1545   End Time  1600   Total Time Calculated 15 min   Palliative Care   Type Palliative Care, Follow-up   Assessment/Intervention/Outcome   Assessment Calm;Coping;Hopeful;Peaceful

## 2025-04-17 NOTE — PROGRESS NOTES
Notified by staff pt c/o scrotal pain and swelling.   Scrotal us ordered (Verbal orders given).   PSA is pending still.

## 2025-04-18 ENCOUNTER — APPOINTMENT (OUTPATIENT)
Dept: ULTRASOUND IMAGING | Age: 80
DRG: 683 | End: 2025-04-18
Payer: MEDICARE

## 2025-04-18 LAB
ANION GAP SERPL CALCULATED.3IONS-SCNC: 7 MMOL/L (ref 9–16)
BASOPHILS # BLD: 0 K/UL (ref 0–0.2)
BASOPHILS NFR BLD: 1 % (ref 0–2)
BUN SERPL-MCNC: 46 MG/DL (ref 8–23)
CALCIUM SERPL-MCNC: 9.4 MG/DL (ref 8.6–10.4)
CHLORIDE SERPL-SCNC: 101 MMOL/L (ref 98–107)
CO2 SERPL-SCNC: 30 MMOL/L (ref 20–31)
CREAT SERPL-MCNC: 1.6 MG/DL (ref 0.7–1.2)
EOSINOPHIL # BLD: 0.2 K/UL (ref 0–0.4)
EOSINOPHILS RELATIVE PERCENT: 4 % (ref 0–4)
ERYTHROCYTE [DISTWIDTH] IN BLOOD BY AUTOMATED COUNT: 17.2 % (ref 11.5–14.9)
GFR, ESTIMATED: 43 ML/MIN/1.73M2
GLUCOSE BLD-MCNC: 105 MG/DL (ref 75–110)
GLUCOSE BLD-MCNC: 137 MG/DL (ref 75–110)
GLUCOSE BLD-MCNC: 148 MG/DL (ref 75–110)
GLUCOSE BLD-MCNC: 169 MG/DL (ref 75–110)
GLUCOSE SERPL-MCNC: 119 MG/DL (ref 74–99)
HCT VFR BLD AUTO: 44.9 % (ref 41–53)
HGB BLD-MCNC: 15 G/DL (ref 13.5–17.5)
LYMPHOCYTES NFR BLD: 1.5 K/UL (ref 1–4.8)
LYMPHOCYTES RELATIVE PERCENT: 31 % (ref 24–44)
MCH RBC QN AUTO: 33.2 PG (ref 26–34)
MCHC RBC AUTO-ENTMCNC: 33.4 G/DL (ref 31–37)
MCV RBC AUTO: 99.4 FL (ref 80–100)
MONOCYTES NFR BLD: 0.5 K/UL (ref 0.1–1.3)
MONOCYTES NFR BLD: 10 % (ref 1–7)
NEUTROPHILS NFR BLD: 54 % (ref 36–66)
NEUTS SEG NFR BLD: 2.7 K/UL (ref 1.3–9.1)
PLATELET # BLD AUTO: 173 K/UL (ref 150–450)
PMV BLD AUTO: 6.9 FL (ref 6–12)
POTASSIUM SERPL-SCNC: 5.3 MMOL/L (ref 3.7–5.3)
PSA SERPL-MCNC: <0.03 NG/ML (ref 0–4)
RBC # BLD AUTO: 4.52 M/UL (ref 4.5–5.9)
SODIUM SERPL-SCNC: 138 MMOL/L (ref 136–145)
WBC OTHER # BLD: 4.8 K/UL (ref 3.5–11)

## 2025-04-18 PROCEDURE — 6370000000 HC RX 637 (ALT 250 FOR IP)

## 2025-04-18 PROCEDURE — 97530 THERAPEUTIC ACTIVITIES: CPT

## 2025-04-18 PROCEDURE — 36415 COLL VENOUS BLD VENIPUNCTURE: CPT

## 2025-04-18 PROCEDURE — 76870 US EXAM SCROTUM: CPT

## 2025-04-18 PROCEDURE — 6370000000 HC RX 637 (ALT 250 FOR IP): Performed by: INTERNAL MEDICINE

## 2025-04-18 PROCEDURE — 80048 BASIC METABOLIC PNL TOTAL CA: CPT

## 2025-04-18 PROCEDURE — 85025 COMPLETE CBC W/AUTO DIFF WBC: CPT

## 2025-04-18 PROCEDURE — 82947 ASSAY GLUCOSE BLOOD QUANT: CPT

## 2025-04-18 PROCEDURE — 97116 GAIT TRAINING THERAPY: CPT

## 2025-04-18 PROCEDURE — 2500000003 HC RX 250 WO HCPCS

## 2025-04-18 PROCEDURE — 99233 SBSQ HOSP IP/OBS HIGH 50: CPT | Performed by: INTERNAL MEDICINE

## 2025-04-18 PROCEDURE — 1200000000 HC SEMI PRIVATE

## 2025-04-18 RX ADMIN — METOPROLOL TARTRATE 25 MG: 25 TABLET, FILM COATED ORAL at 10:30

## 2025-04-18 RX ADMIN — ATORVASTATIN CALCIUM 20 MG: 20 TABLET, FILM COATED ORAL at 10:30

## 2025-04-18 RX ADMIN — SODIUM CHLORIDE, PRESERVATIVE FREE 10 ML: 5 INJECTION INTRAVENOUS at 10:35

## 2025-04-18 RX ADMIN — SODIUM CHLORIDE, PRESERVATIVE FREE 10 ML: 5 INJECTION INTRAVENOUS at 19:59

## 2025-04-18 RX ADMIN — METOPROLOL TARTRATE 25 MG: 25 TABLET, FILM COATED ORAL at 19:59

## 2025-04-18 RX ADMIN — SODIUM ZIRCONIUM CYCLOSILICATE 5 G: 5 POWDER, FOR SUSPENSION ORAL at 10:31

## 2025-04-18 RX ADMIN — ALLOPURINOL 300 MG: 300 TABLET ORAL at 10:30

## 2025-04-18 RX ADMIN — OXYCODONE HYDROCHLORIDE AND ACETAMINOPHEN 250 MG: 500 TABLET ORAL at 10:29

## 2025-04-18 RX ADMIN — LEVOTHYROXINE SODIUM 25 MCG: 0.03 TABLET ORAL at 05:24

## 2025-04-18 RX ADMIN — TIZANIDINE 4 MG: 4 TABLET ORAL at 10:30

## 2025-04-18 RX ADMIN — BUDESONIDE 9 MG: 3 CAPSULE ORAL at 10:35

## 2025-04-18 RX ADMIN — APIXABAN 2.5 MG: 2.5 TABLET, FILM COATED ORAL at 19:59

## 2025-04-18 RX ADMIN — FUROSEMIDE 40 MG: 40 TABLET ORAL at 10:30

## 2025-04-18 RX ADMIN — APIXABAN 2.5 MG: 2.5 TABLET, FILM COATED ORAL at 10:30

## 2025-04-18 RX ADMIN — ALLOPURINOL 300 MG: 300 TABLET ORAL at 19:59

## 2025-04-18 RX ADMIN — EMPAGLIFLOZIN 10 MG: 10 TABLET, FILM COATED ORAL at 10:30

## 2025-04-18 ASSESSMENT — PAIN SCALES - GENERAL
PAINLEVEL_OUTOF10: 1
PAINLEVEL_OUTOF10: 3

## 2025-04-18 ASSESSMENT — PAIN DESCRIPTION - LOCATION: LOCATION: LEG;COCCYX

## 2025-04-18 ASSESSMENT — PAIN DESCRIPTION - ORIENTATION: ORIENTATION: RIGHT

## 2025-04-18 NOTE — PLAN OF CARE
Problem: Safety - Adult  Goal: Free from fall injury  Outcome: Progressing  Flowsheets (Taken 4/17/2025 1957)  Free From Fall Injury: Instruct family/caregiver on patient safety  Note: The patient remained free from falls this shift, call light within reach, bed in locked and lowest position.  Side rails up x2.  Continue to monitor closely.       Problem: Pain  Goal: Verbalizes/displays adequate comfort level or baseline comfort level  Outcome: Progressing  Flowsheets (Taken 4/17/2025 1957)  Verbalizes/displays adequate comfort level or baseline comfort level:   Assess pain using appropriate pain scale   Implement non-pharmacological measures as appropriate and evaluate response  Note: Patient's pain has been well controlled throughout the entire shift, please see MAR.         Problem: Discharge Planning  Goal: Discharge to home or other facility with appropriate resources  Outcome: Progressing  Flowsheets (Taken 4/17/2025 1957)  Discharge to home or other facility with appropriate resources:   Identify barriers to discharge with patient and caregiver   Identify discharge learning needs (meds, wound care, etc)   Refer to discharge planning if patient needs post-hospital services based on physician order or complex needs related to functional status, cognitive ability or social support system     Problem: Chronic Conditions and Co-morbidities  Goal: Patient's chronic conditions and co-morbidity symptoms are monitored and maintained or improved  Outcome: Progressing  Flowsheets (Taken 4/17/2025 1957)  Care Plan - Patient's Chronic Conditions and Co-Morbidity Symptoms are Monitored and Maintained or Improved:   Monitor and assess patient's chronic conditions and comorbid symptoms for stability, deterioration, or improvement   Collaborate with multidisciplinary team to address chronic and comorbid conditions and prevent exacerbation or deterioration   Update acute care plan with appropriate goals if chronic or

## 2025-04-18 NOTE — PROGRESS NOTES
[facility]   INPATIENT OCCUPATIONAL THERAPY  PROGRESS NOTE  Date: 2025  Patient Name: Taqueria Gilbert       Room:   MRN: [id number]    : 1945  (80 y.o.)  Gender: male   Referring Practitioner: Amina Rodriguez APRN - NP  Diagnosis: hyperkalemia      Discharge Recommendations:  Further Occupational Therapy is recommended upon facility discharge.    OT Equipment Recommendations  Other: CTA    Restrictions/Precautions  Restrictions/Precautions  Restrictions/Precautions: Bed Alarm;Fall Risk (external urinary catheter.)  Activity Level: Up as Tolerated;Up with Assist  Required Braces or Orthoses?: No  Implants Present? : Metal implants    O2 Device: None (Room air)    Subjective  Subjective  Subjective: Pt in elevated supine position upon arrival into room. seen with PT this afternoon. pt reports that he will be having CG to come in to complete LB self care and will continue to sponge bathe when he returns home. pt stated that he has a good safety frame on the main level of his home to go over his toilet and will continue to use that equipment.  Pain  Pre-Pain: 2  Post-Pain: 2  Pain Location: Other (Comment) (back side of both legs.)  Pain Descriptor: Aching  Pain Interventions:  (pt reported that his pain went up to a 8/10 while standing at the sink longer than one minute.)  Comments: ESTEFANIA espinoza'd pt to be seen by therapy this date.    Objective  Orientation  Overall Orientation Status: Within Functional Limits  Orientation Level: Oriented to place;Oriented to time;Oriented to situation;Oriented to person;Oriented X4  Cognition  Overall Cognitive Status: Exceptions  Safety Judgement: Decreased awareness of need for assistance  Problem Solving: Assistance required to implement solutions    Activities of Daily Living  LE Bathing: Maximum assistance  LE Bathing Skilled Clinical Factors: pt required assist for buttock area hygiene while standing at the sink with the

## 2025-04-18 NOTE — PROGRESS NOTES
[facility] Internal Medicine   Chris Paul MD; MD Courtney Garcia MD, MD , Chay Steve MD    [facility] Internal Medicine   IN-PATIENT SERVICE   [facility]    Progress note              Date:   4/18/2025  Patient name:  Taqueria Gilbert  Date of admission:  4/15/2025  9:22 PM  MRN:   [id number]  Account:  [id number]  YOB: 1945  PCP:    Gilma Barba MD  Room:   2107/2107-01  Code Status:    Full Code    Chief Complaint:     Chief Complaint   Patient presents with    Fatigue     Bilateral leg weakness, could not able to get up from the bathroom, legs felt weak, uses a walker for ambulation   Wil  Hyperkalemia      History Obtained From:     Pt medical record and nursing staff    History of Present Illness:     Taqueria Gilbert is a 80 y.o. Declined male who presents with Fatigue (Bilateral leg weakness, could not able to get up from the bathroom, legs felt weak, uses a walker for ambulation)   and is admitted to the hospital for the management of Hyperkalemia.    Taqueria Gilbert is a 80 y.o. Declined male with a history of prostate cancer, a flutter, cellulitis, CHF, chronic acquired lymphedema, DVT anticoagulated with Eliquis, hyperglycemia, hyperlipidemia, hypertension, CKD stage III, and obstructive sleep apnea who presents with Fatigue (Bilateral leg weakness, could not able to get up from the bathroom, legs felt weak, uses a walker for ambulation)   and is admitted to the hospital for the management of Hyperkalemia.     According to patient, he had to call EMS because he was too weak to get off the toilet.  He has a pertinent recent history of being admitted to this facility due to bilateral foot swelling and lactic acidosis.  The bilateral lower extremity edema and cellulitis appears to be improved from the previous admission with only mild discoloration and a healing ulcer noted on the right lower

## 2025-04-18 NOTE — PLAN OF CARE
Problem: Safety - Adult  Goal: Free from fall injury  Outcome: Progressing  Flowsheets (Taken 4/18/2025 1755)  Free From Fall Injury: Instruct family/caregiver on patient safety  Note: The patient remained free from falls this shift, call light within reach, bed in locked and lowest position.  Side rails up x2.  Continue to monitor closely.       Problem: Pain  Goal: Verbalizes/displays adequate comfort level or baseline comfort level  Outcome: Progressing  Flowsheets (Taken 4/17/2025 1957)  Verbalizes/displays adequate comfort level or baseline comfort level:   Assess pain using appropriate pain scale   Implement non-pharmacological measures as appropriate and evaluate response  Note: Patient's pain has been well controlled throughout the entire shift, please see MAR.       Problem: Discharge Planning  Goal: Discharge to home or other facility with appropriate resources  Outcome: Progressing  Flowsheets (Taken 4/17/2025 1957)  Discharge to home or other facility with appropriate resources:   Identify barriers to discharge with patient and caregiver   Identify discharge learning needs (meds, wound care, etc)   Refer to discharge planning if patient needs post-hospital services based on physician order or complex needs related to functional status, cognitive ability or social support system     Problem: Chronic Conditions and Co-morbidities  Goal: Patient's chronic conditions and co-morbidity symptoms are monitored and maintained or improved  Outcome: Progressing  Flowsheets (Taken 4/17/2025 1957)  Care Plan - Patient's Chronic Conditions and Co-Morbidity Symptoms are Monitored and Maintained or Improved:   Monitor and assess patient's chronic conditions and comorbid symptoms for stability, deterioration, or improvement   Collaborate with multidisciplinary team to address chronic and comorbid conditions and prevent exacerbation or deterioration   Update acute care plan with appropriate goals if chronic or comorbid

## 2025-04-18 NOTE — PROGRESS NOTES
Spiritual Health History and Assessment/Progress Note  [facility]    (P) Palliative Care,  ,  ,      Name: Taqueria Gilbert MRN: [id number]    Age: 80 y.o.     Sex: male   Language: English   Yarsani: [employer]   Hyperkalemia     Date: 4/18/2025            Total Time Calculated: (P) 13 min              Spiritual Assessment continued in [facility]        Referral/Consult From: (P) Palliative Care   Encounter Overview/Reason: (P) Palliative Care  Service Provided For: (P) Patient     engaged in active listening and found patient to be less jovial than last visit and more concerned about transition plans as they were not coming along as he would have hoped.  Discussed those and circumstances in general, allowing him to share more deeply and affirming that he doesn't have to always put on the brave face for everyone as we are here to support him with what he needs.  Patient appreciated the support and we prayed over his concerns. Also left him with devotional for additional inspiration as he and his wife do like to read those.    Sara, Belief, Meaning:   Patient identifies as spiritual, is connected with a sara tradition or spiritual practice, and has beliefs or practices that help with coping during difficult times  Family/Friends No family/friends present      Importance and Influence:  Patient has no beliefs influential to healthcare decision-making identified during this visit  Family/Friends No family/friends present    Community:  Patient is connected with a spiritual community  Family/Friends No family/friends present    Assessment and Plan of Care:     Patient Interventions include: Facilitated expression of thoughts and feelings, Explored spiritual coping/struggle/distress, and Affirmed coping skills/support systems  Family/Friends Interventions include: No family/friends present    Patient Plan of Care: No spiritual needs identified for follow-up and Spiritual Care

## 2025-04-18 NOTE — PROGRESS NOTES
PRN  ondansetron (ZOFRAN-ODT) disintegrating tablet 4 mg, Q8H PRN   Or  ondansetron (ZOFRAN) injection 4 mg, Q6H PRN  polyethylene glycol (GLYCOLAX) packet 17 g, Daily PRN  bisacodyl (DULCOLAX) suppository 10 mg, Daily PRN  acetaminophen (TYLENOL) tablet 650 mg, Q6H PRN   Or  acetaminophen (TYLENOL) suppository 650 mg, Q6H PRN        Allergies:  Adhesive tape and Doxycycline    Social History:   Social History     Socioeconomic History    Marital status:      Spouse name: Not on file    Number of children: Not on file    Years of education: Not on file    Highest education level: Not on file   Occupational History    Not on file   Tobacco Use    Smoking status: Former     Current packs/day: 0.00     Average packs/day: 2.0 packs/day for 13.0 years (26.0 ttl pk-yrs)     Types: Cigarettes     Start date: 1957     Quit date: 1970     Years since quittin.3    Smokeless tobacco: Never   Vaping Use    Vaping status: Never Used   Substance and Sexual Activity    Alcohol use: Yes     Comment: 2 per month    Drug use: No    Sexual activity: Not on file   Other Topics Concern    Not on file   Social History Narrative    Not on file     Social Drivers of Health     Financial Resource Strain: Low Risk  (2024)    Overall Financial Resource Strain (CARDIA)     Difficulty of Paying Living Expenses: Not hard at all   Food Insecurity: No Food Insecurity (2025)    Hunger Vital Sign     Worried About Running Out of Food in the Last Year: Never true     Ran Out of Food in the Last Year: Never true   Transportation Needs: No Transportation Needs (2025)    PRAPARE - Transportation     Lack of Transportation (Medical): No     Lack of Transportation (Non-Medical): No   Recent Concern: Transportation Needs - Unmet Transportation Needs (2025)    PRAPARE - Transportation     Lack of Transportation (Medical): Yes     Lack of Transportation (Non-Medical): Yes   Physical Activity: Inactive (2024)     32.7 32.4 33.2   MCHC 31.6 31.7 33.4   RDW 17.4* 17.0* 17.2*    165 173   MPV 7.5 7.1 6.9      BMP:   Recent Labs     04/16/25  0459 04/17/25  0534 04/18/25  0544   * 135* 138   K 5.4* 5.2 5.3    101 101   CO2 23 26 30   BUN 71* 51* 46*   CREATININE 2.9* 1.8* 1.6*   GLUCOSE 158* 106* 119*   CALCIUM 9.0 9.0 9.4        Urinalysis:    Recent Labs     04/15/25  2344   NITRU NEGATIVE   COLORU Yellow   PHUR 5.5   WBCUA 0 TO 2*   RBCUA 0 TO 2   BACTERIA None   LEUKOCYTESUR NEGATIVE   UROBILINOGEN Normal   BILIRUBINUR NEGATIVE   GLUCOSEU NEGATIVE   KETUA NEGATIVE         Radiology:  Reviewed as available.    Assessment:   EMILY on CKD nonoliguric most likely cause includes urinary retention status post Melgoza catheter placement in combination with the use of Lasix lisinopril Aldactone and Bactrim, serum creatinine peaked to 3.5 mg/dL, BUN 74 mg/dL check urine eosinophils UA urine electrolytes kidney ultrasound== scr improved to 1.6 mg/dl which baseline  Urinary retention- melgoza removed. Check void trial PVR  Hyperkalemia most likely secondary to EMILY due to decrease in potassium renal excretion, other factors includes use of Bactrim use of Aldactone lisinopril  CKD stage IIIb with baseline creatinine 1.3 to 1.7 mg/dL  History of prostate adenocarcinoma status post prostatectomy  History of essential hypertension  CHF with preserved EF moderate LVH diastolic dysfunction, mild to moderate MR mild pulmonary hypertension       Plan:    Lokelma 5 g daily  continue Lasix  which will also increase potassium renal excretion.  Jardiance resumed yesterday  BMP in the morning  Low potassium diet  Void trial  No objections on discharge      Thank you for the consultation.      Electronically signed by Jordan Rivera MD on 4/18/2025 at 1:43 PM

## 2025-04-18 NOTE — PROGRESS NOTES
Report called to [facility] on [address]. All questions asked answered. Pt is alert and oriented. No concerns noted.

## 2025-04-18 NOTE — PROGRESS NOTES
Physical Therapy  [facility]   Physical Therapy Treatment  Date: 25  Patient Name: Taqueria Gilbert       Room: 7-  MRN: [id number]  Account: [id number]   : 1945  (80 y.o.) Gender: male     Discharge Recommendations:  Discharge Recommendations: Continue to assess pending progress, Patient would benefit from continued therapy after discharge, Therapy recommended at discharge     PT Equipment Recommendations  Equipment Needed:  (TBD)    General  Chart Reviewed: Yes  Patient assessed for rehabilitation services?: Yes  Additional Pertinent Hx: Per H & P note: Taqueria Gilbert is a 80 y.o. Declined male who presents with Fatigue (Bilateral leg weakness, could not able to get up from the bathroom, legs felt weak, uses a walker for ambulation)   and is admitted to the hospital for the management of Hyperkalemia.     Taqueria Gilbert is a 80 y.o. Declined male with a history of prostate cancer, a flutter, cellulitis, CHF, chronic acquired lymphedema, DVT anticoagulated with Eliquis, hyperglycemia, hyperlipidemia, hypertension, CKD stage III, and obstructive sleep apnea who presents with Fatigue (Bilateral leg weakness, could not able to get up from the bathroom, legs felt weak, uses a walker for ambulation)   and is admitted to the hospital for the management of Hyperkalemia.     According to patient, he had to call EMS because he was too weak to get off the toilet.  He has a pertinent recent history of being admitted to this facility due to bilateral foot swelling and lactic acidosis.  The bilateral lower extremity edema and cellulitis appears to be improved from the previous admission with only mild discoloration and a healing ulcer noted on the right lower extremity. EMILY was noted.  Thrombocytopenia noted.  Heme-onc consulted who endorsed thrombocytopenia was due to acute illness. Vascular consulted; recommended outpatient [facility] boot therapy.     Upon presentation to the ER the

## 2025-04-19 VITALS
SYSTOLIC BLOOD PRESSURE: 143 MMHG | RESPIRATION RATE: 18 BRPM | WEIGHT: 302.25 LBS | BODY MASS INDEX: 42.31 KG/M2 | HEIGHT: 71 IN | HEART RATE: 87 BPM | TEMPERATURE: 98.2 F | OXYGEN SATURATION: 91 % | DIASTOLIC BLOOD PRESSURE: 89 MMHG

## 2025-04-19 LAB
ANION GAP SERPL CALCULATED.3IONS-SCNC: 9 MMOL/L (ref 9–16)
BUN SERPL-MCNC: 43 MG/DL (ref 8–23)
CALCIUM SERPL-MCNC: 9.7 MG/DL (ref 8.6–10.4)
CHLORIDE SERPL-SCNC: 99 MMOL/L (ref 98–107)
CO2 SERPL-SCNC: 30 MMOL/L (ref 20–31)
CREAT SERPL-MCNC: 1.3 MG/DL (ref 0.7–1.2)
GFR, ESTIMATED: 55 ML/MIN/1.73M2
GLUCOSE BLD-MCNC: 116 MG/DL (ref 75–110)
GLUCOSE BLD-MCNC: 133 MG/DL (ref 75–110)
GLUCOSE BLD-MCNC: 151 MG/DL (ref 75–110)
GLUCOSE SERPL-MCNC: 153 MG/DL (ref 74–99)
POTASSIUM SERPL-SCNC: 4.8 MMOL/L (ref 3.7–5.3)
SODIUM SERPL-SCNC: 138 MMOL/L (ref 136–145)

## 2025-04-19 PROCEDURE — 6370000000 HC RX 637 (ALT 250 FOR IP)

## 2025-04-19 PROCEDURE — 97110 THERAPEUTIC EXERCISES: CPT

## 2025-04-19 PROCEDURE — 99239 HOSP IP/OBS DSCHRG MGMT >30: CPT | Performed by: INTERNAL MEDICINE

## 2025-04-19 PROCEDURE — 80048 BASIC METABOLIC PNL TOTAL CA: CPT

## 2025-04-19 PROCEDURE — 2500000003 HC RX 250 WO HCPCS

## 2025-04-19 PROCEDURE — 82947 ASSAY GLUCOSE BLOOD QUANT: CPT

## 2025-04-19 PROCEDURE — 6370000000 HC RX 637 (ALT 250 FOR IP): Performed by: INTERNAL MEDICINE

## 2025-04-19 PROCEDURE — 97530 THERAPEUTIC ACTIVITIES: CPT

## 2025-04-19 PROCEDURE — 36415 COLL VENOUS BLD VENIPUNCTURE: CPT

## 2025-04-19 PROCEDURE — 97116 GAIT TRAINING THERAPY: CPT

## 2025-04-19 RX ADMIN — LEVOTHYROXINE SODIUM 25 MCG: 0.03 TABLET ORAL at 06:33

## 2025-04-19 RX ADMIN — APIXABAN 2.5 MG: 2.5 TABLET, FILM COATED ORAL at 09:20

## 2025-04-19 RX ADMIN — OXYCODONE HYDROCHLORIDE AND ACETAMINOPHEN 250 MG: 500 TABLET ORAL at 09:20

## 2025-04-19 RX ADMIN — ATORVASTATIN CALCIUM 20 MG: 20 TABLET, FILM COATED ORAL at 09:20

## 2025-04-19 RX ADMIN — ALLOPURINOL 300 MG: 300 TABLET ORAL at 09:26

## 2025-04-19 RX ADMIN — METOPROLOL TARTRATE 25 MG: 25 TABLET, FILM COATED ORAL at 09:20

## 2025-04-19 RX ADMIN — BUDESONIDE 9 MG: 3 CAPSULE ORAL at 09:26

## 2025-04-19 RX ADMIN — SODIUM CHLORIDE, PRESERVATIVE FREE 10 ML: 5 INJECTION INTRAVENOUS at 09:26

## 2025-04-19 RX ADMIN — SODIUM ZIRCONIUM CYCLOSILICATE 5 G: 5 POWDER, FOR SUSPENSION ORAL at 09:18

## 2025-04-19 RX ADMIN — EMPAGLIFLOZIN 10 MG: 10 TABLET, FILM COATED ORAL at 09:26

## 2025-04-19 RX ADMIN — FUROSEMIDE 40 MG: 40 TABLET ORAL at 09:20

## 2025-04-19 NOTE — PLAN OF CARE
Problem: Safety - Adult  Goal: Free from fall injury  Recent Flowsheet Documentation  Taken 4/19/2025 0830 by Sue Valderrama RN  Free From Fall Injury:   Instruct family/caregiver on patient safety   Based on caregiver fall risk screen, instruct family/caregiver to ask for assistance with transferring infant if caregiver noted to have fall risk factors     Problem: Safety - Adult  Goal: Free from fall injury  Outcome: Completed  Flowsheets (Taken 4/19/2025 0830)  Free From Fall Injury:   Instruct family/caregiver on patient safety   Based on caregiver fall risk screen, instruct family/caregiver to ask for assistance with transferring infant if caregiver noted to have fall risk factors     Problem: Chronic Conditions and Co-morbidities  Goal: Patient's chronic conditions and co-morbidity symptoms are monitored and maintained or improved  Outcome: Completed     Problem: Discharge Planning  Goal: Discharge to home or other facility with appropriate resources  Outcome: Completed     Problem: Pain  Goal: Verbalizes/displays adequate comfort level or baseline comfort level  Outcome: Completed     Problem: Skin/Tissue Integrity  Goal: Skin integrity remains intact  Description: 1.  Monitor for areas of redness and/or skin breakdown2.  Assess vascular access sites hourly3.  Every 4-6 hours minimum:  Change oxygen saturation probe site4.  Every 4-6 hours:  If on nasal continuous positive airway pressure, respiratory therapy assess nares and determine need for appliance change or resting period  Recent Flowsheet Documentation  Taken 4/19/2025 0830 by Sue Valderrama RN  Skin Integrity Remains Intact:   Assess vascular access sites hourly   Monitor for areas of redness and/or skin breakdown   Every 4-6 hours minimum:  Change oxygen saturation probe site   Every 4-6 hours:  If on nasal continuous positive airway pressure, assess nares and determine need for appliance change or resting period   Turn and reposition as

## 2025-04-19 NOTE — CARE COORDINATION
DISCHARGE PLANNING NOTE:    Call placed to Mary with [facility], getting ready to review referral now, pt has been to facility in the past.    Electronically signed by ANDREW Wynn on 4/18/2025 at 2:51 PM    
DISCHARGE PLANNING NOTE:    Call placed to pt spouse Rosita for update on acceptance at [facility]. Rosita states she spoke to this pt and he states that he is able to use his lift chair at home, but concerns for stairs entering the home. Pt requests therapy to complete stairs with him, 3 steps to enter the home.    [employer] message sent to all staff physical therapy signed in at this time requesting pt to be seen for steps.    Electronically signed by ANDREW Wynn on 4/18/2025 at 5:04 PM    
DISCHARGE PLANNING NOTE:    Called Suzanne with [facility] to check on referral that was sent. No answer and VM full. HIPAA compliant text sent to call this writer back regarding a referral. Awaiting to hear back.    Electronically signed by Meagan Bonner RN on 4/17/2025 at 2:38 PM    Suzanne reached out and stated she would check on referral.    Electronically signed by Meagan Bonner RN on 4/17/2025 at 4:21 PM    
DISCHARGE PLANNING NOTE:    LSW following for potential discharge to SNF. Patient has been accepted at [facility]. Insurance authorization submitted on 4/18. Approved through 4/22 per [employer] online portal.     Bed available on Monday 4/21     Writer spoke with spouse Rosita, who is agreeable to patient returning home. Rosita states she assists patient at home and they have 3 steps to enter their home with railings. Rosita will transport patient home this afternoon. Patient is current with [facility]. VM left to notify of patient being discharged today.   
DISCHARGE PLANNING NOTE:    [facility] is out of network with pt insurance. [facility] is in network, semi private male room available. Writer met with pt for discussion, pt states he feels he could return home at this time. Writer reviewed therapy notes with pt, requiring maximum 2 assistance for some movements.    Writer spoke to Linda with PT regarding this pt being seen for treatment to evaluate return home.    Call placed to pt spouse Rosita for update and to discuss, not agreeable to pt return home if unable to get up per self. Requests referral sent to [facility], [facility] is too far from home.  Electronically signed by ANDREW Wynn on 4/18/2025 at 12:30 PM    
DISCHARGE PLANNING NOTE:    Writer is following for potential discharge placement. Message placed to [facility] to check referral status. Currently in review.    Electronically signed by ANDREW Wynn on 4/18/2025 at 10:36 AM    
DISCHARGE PLANNING NOTE:    Writer received call from Mary with [facility], facility can accept this pt, bed available Monday.     Authorization submitted through [employer].    Auth ID: [id number]   Electronically signed by ANDREW Wynn on 4/18/2025 at 4:37 PM    
ONGOING DISCHARGE PLAN:    Patient is alert and oriented x4.    Spoke with patient regarding discharge plan and patient is agreeable to SNF. Patient wants [facility], referral sent.    Palliative Consult.     Nephro/Urology Consults. K 7.1 now 5.2. Cr 3.5 now 1.8.     PT/OT on.     Will continue to follow for additional discharge needs.    If patient is discharged prior to next notation, then this note serves as note for discharge by case management.    Electronically signed by Meagan Bonner RN on 4/17/2025 at 12:19 PM    
Management to discuss the discharge plan with any other family members/significant others, and if so, who? No  Plans to Return to Present Housing: Yes  Other Identified Issues/Barriers to RETURNING to current housing: weakness  Potential Assistance needed at discharge: Skilled Nursing Facility            Potential DME:    Patient expects to discharge to: House  Plan for transportation at discharge: Family    Financial    Payor: [facility] MEDICARE / Plan: [facility] MEDICARE COMPLETE / Product Type: *No Product type* /     Does insurance require precert for SNF: Yes    Potential assistance Purchasing Medications:    Meds-to-Beds request:        [facility] - [place], [place] - [address] - P [phone] - F [phone]  [address]  [address] [address]  [facility] [address]  Phone: [phone] Fax: [phone]    [id number] [place], [place] - [address] - P [phone] - F [phone]  [address]  [place] [address]  Phone: [phone] Fax: [phone]      Notes:    Factors facilitating achievement of predicted outcomes: Family support, Cooperative, and Pleasant    Barriers to discharge: Medical complications    Additional Case Management Notes: 4/16 [facility] MEDICARE. (READMIT) Pt from 2 story [place] (14 steps), lives on 1st floor w/wife. Shower upstairs. Eliquis PTA. DME [place], GB, Cpap, Neb, [place], Rollator, Quad Cane, WC. [facility], current. If SNF is needed would like [facility]. Nephro Consult. K 7.1 now 5.4. Cr 3.5 now 2.9. Trops 55. PT/OT on. Will follow for needs. NANDO NEEDS SIGNED/COMPLETED. //AM     The Plan for Transition of Care is related to the following treatment goals of Hyperkalemia [E87.5]  Generalized weakness [R53.1]  EMILY (acute kidney injury) [N17.9]    IF APPLICABLE: The Patient and/or patient representative Taqueria and his family were provided with a choice of provider and agrees with the discharge plan. Freedom of choice list with basic

## 2025-04-19 NOTE — PROGRESS NOTES
Pt discharged at this time via wheelchair and accompanied by PCT and his wife. Pt was A/O x4 and in no distress. AVS explained and given to wife. Pt requesting zanaflex to be continued at home. Dr. Vaca notified and is sending it to [facility] in [place] as requested by patient. All belongings including patient's cell phone and glasses taken with patient.

## 2025-04-19 NOTE — PROGRESS NOTES
NEPHROLOGY progress    Patient :  Taqueria Gilbert; 80 y.o. MRN# [id number]  Location:  2045/2045-01  Attending:  Chris Paul MD  Admit Date:  4/15/2025   Hospital Day: 4      Reason for Consult: Acute kidney injury on CKD, hyperkalemia      Chief Complaint: Low extremity leg weakness and pain in the back radiating to the legs    Subjective/interval history.  Patient seen and examined, had loose stool patient is on Lokelma.  Potassium improved.  Serum creatinine improved to 1.3 mg/dL  Urine output 2.3 L yesterday in 24 hours.  K 4.8 on lokelma  BP controlled.    History of Present Illness:    This is a 80 y.o. male with past medical history of nephrolithiasis, prostate adenocarcinoma, status post prostatectomy, essential hypertension, CVA 1996, history of atrial flutter requiring ablation, CKD stage IIIb with baseline serum creatinine of 1.2 to 1.7 mg/dL was following up with [facility].  Patient presented to the hospital with complaints of lower extremity weakness back pain which is radiating to both legs back of the legs.  Patient yesterday went to the restroom and was unable to get up.  Patient denies any difficulty urination.  On initial evaluation in the ER labs showed potassium of 7.1 serum creatinine 3.5 mg/dL BUN of 74 mg/dL  Patient was treated with insulin D50 Kayexalate calcium gluconate   In the ER patient noted to have urinary retention and Juárez catheter was placed, PVR of 400 mL  Patient patient also have bilateral lower extremity wounds and patient was started on Bactrim recently last week.  Other medications includes use of lisinopril, Aldactone, Lasix  Pt denies any history of  prolonged NSAID use.    There has been no recent exposure to IV contrast.   There is no history  of paraprotein disease.       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     (ZOFRAN-ODT) disintegrating tablet 4 mg, Q8H PRN   Or  ondansetron (ZOFRAN) injection 4 mg, Q6H PRN  polyethylene glycol (GLYCOLAX) packet 17 g, Daily PRN  bisacodyl (DULCOLAX) suppository 10 mg, Daily PRN  acetaminophen (TYLENOL) tablet 650 mg, Q6H PRN   Or  acetaminophen (TYLENOL) suppository 650 mg, Q6H PRN        Allergies:  Adhesive tape and Doxycycline    Social History:   Social History     Socioeconomic History    Marital status:      Spouse name: Not on file    Number of children: Not on file    Years of education: Not on file    Highest education level: Not on file   Occupational History    Not on file   Tobacco Use    Smoking status: Former     Current packs/day: 0.00     Average packs/day: 2.0 packs/day for 13.0 years (26.0 ttl pk-yrs)     Types: Cigarettes     Start date: 1957     Quit date: 1970     Years since quittin.3    Smokeless tobacco: Never   Vaping Use    Vaping status: Never Used   Substance and Sexual Activity    Alcohol use: Yes     Comment: 2 per month    Drug use: No    Sexual activity: Not on file   Other Topics Concern    Not on file   Social History Narrative    Not on file     Social Drivers of Health     Financial Resource Strain: Low Risk  (2024)    Overall Financial Resource Strain (CARDIA)     Difficulty of Paying Living Expenses: Not hard at all   Food Insecurity: No Food Insecurity (2025)    Hunger Vital Sign     Worried About Running Out of Food in the Last Year: Never true     Ran Out of Food in the Last Year: Never true   Transportation Needs: No Transportation Needs (2025)    PRAPARE - Transportation     Lack of Transportation (Medical): No     Lack of Transportation (Non-Medical): No   Recent Concern: Transportation Needs - Unmet Transportation Needs (2025)    PRAPARE - Transportation     Lack of Transportation (Medical): Yes     Lack of Transportation (Non-Medical): Yes   Physical Activity: Inactive (2024)    Exercise Vital

## 2025-04-19 NOTE — PROGRESS NOTES
Physical Therapy  [facility]   Physical Therapy Treatment  Date: 25  Patient Name: Taqueria Gilbert       Room: -  MRN: [id number]  Account: [id number]   : 1945  (80 y.o.) Gender: male     Discharge Recommendations:  Discharge Recommendations: Continue to assess pending progress, Patient would benefit from continued therapy after discharge, Therapy recommended at discharge     PT Equipment Recommendations  Equipment Needed:  (TBD)    General  Chart Reviewed: Yes  Additional Pertinent Hx: Per H & P note: Taqueria Gilbert is a 80 y.o. Declined male who presents with Fatigue (Bilateral leg weakness, could not able to get up from the bathroom, legs felt weak, uses a walker for ambulation)   and is admitted to the hospital for the management of Hyperkalemia.     Taqueria Gilbert is a 80 y.o. Declined male with a history of prostate cancer, a flutter, cellulitis, CHF, chronic acquired lymphedema, DVT anticoagulated with Eliquis, hyperglycemia, hyperlipidemia, hypertension, CKD stage III, and obstructive sleep apnea who presents with Fatigue (Bilateral leg weakness, could not able to get up from the bathroom, legs felt weak, uses a walker for ambulation)   and is admitted to the hospital for the management of Hyperkalemia.     According to patient, he had to call EMS because he was too weak to get off the toilet.  He has a pertinent recent history of being admitted to this facility due to bilateral foot swelling and lactic acidosis.  The bilateral lower extremity edema and cellulitis appears to be improved from the previous admission with only mild discoloration and a healing ulcer noted on the right lower extremity. EMILY was noted.  Thrombocytopenia noted.  Heme-onc consulted who endorsed thrombocytopenia was due to acute illness. Vascular consulted; recommended outpatient UNNA boot therapy.     Upon presentation to the ER the patient was hyperkalemic with a potassium of 7.1.

## 2025-04-19 NOTE — DISCHARGE SUMMARY
IN-PATIENT SERVICE   [facility] Internal Medicine    Discharge Summary     Patient ID: Taqueria Gilbert  :  1945   MRN: [id number]     ACCOUNT:  [id number]   Patient's PCP: Gilma Barba MD  Admit Date: 4/15/2025   Discharge Date: 2025    Length of Stay: 4  Code Status:  Full Code  Admitting Physician: Chris Paul MD  Discharge Physician: Courtney Vaca MD     Active Discharge Diagnoses:     Primary Problem  Hyperkalemia      Hospital Problems  Active Hospital Problems    Diagnosis Date Noted    Hyperkalemia [E87.5] 04/15/2025       Admission Condition:  fair     Discharged Condition: fair    Hospital Stay:     Hospital Course:  Taqueria Gilbert is a 80 y.o. male who was admitted for the management of Hyperkalemia , presented to ER with Fatigue (Bilateral leg weakness, could not able to get up from the bathroom, legs felt weak, uses a walker for ambulation)        Significant therapeutic interventions:     Significant Diagnostic Studies:   Labs / Micro:        Radiology:    US SCROTUM W LIMITED DUPLEX  Result Date: 2025  EXAMINATION: ULTRASOUND OF THE SCROTUM/TESTICLES WITH COLOR DOPPLER FLOW EVALUATION 2025 TECHNIQUE: Duplex ultrasound using B-mode/gray scaled imaging, Doppler spectral analysis and color flow Doppler was obtained of the testicles. COMPARISON: None. HISTORY: ORDERING SYSTEM PROVIDED HISTORY: enlarged scrotum TECHNOLOGIST PROVIDED HISTORY: enlarged scrotum Scrotal swelling FINDINGS: Measurements: Right Testicle: 3.4 x 2.5 x 3.1 cm Left Testicle: 3.5 x 2.2 x 2.6 cm Right: Grey Scale: The right testicle demonstrates normal homogeneous echotexture without focal lesion.  No evidence of testicular microlithiasis. Doppler Evaluation: There is normal arterial and venous Doppler flow within the testicle. Scrotal Sac: There is a mild right hydrocele. Epididymis: No acute abnormality. Left: Grey Scale: The left testicle demonstrates normal homogeneous

## 2025-04-19 NOTE — PROGRESS NOTES
[facility] Internal Medicine   Chris Paul MD; MD Courtney Garcia MD, MD , Chay Steve MD    [facility] Internal Medicine   IN-PATIENT SERVICE   [facility]    Progress note              Date:   4/19/2025  Patient name:  Taqueria Gilbert  Date of admission:  4/15/2025  9:22 PM  MRN:   [id number]  Account:  [id number]  YOB: 1945  PCP:    Gilma Barba MD  Room:   2045/2045-01  Code Status:    Full Code    Chief Complaint:     Chief Complaint   Patient presents with    Fatigue     Bilateral leg weakness, could not able to get up from the bathroom, legs felt weak, uses a walker for ambulation   Wil  Hyperkalemia      History Obtained From:     Pt medical record and nursing staff    History of Present Illness:     Taqueria Gilbert is a 80 y.o. Declined male who presents with Fatigue (Bilateral leg weakness, could not able to get up from the bathroom, legs felt weak, uses a walker for ambulation)   and is admitted to the hospital for the management of Hyperkalemia.    Taqueria Gilbert is a 80 y.o. Declined male with a history of prostate cancer, a flutter, cellulitis, CHF, chronic acquired lymphedema, DVT anticoagulated with Eliquis, hyperglycemia, hyperlipidemia, hypertension, CKD stage III, and obstructive sleep apnea who presents with Fatigue (Bilateral leg weakness, could not able to get up from the bathroom, legs felt weak, uses a walker for ambulation)   and is admitted to the hospital for the management of Hyperkalemia.     According to patient, he had to call EMS because he was too weak to get off the toilet.  He has a pertinent recent history of being admitted to this facility due to bilateral foot swelling and lactic acidosis.  The bilateral lower extremity edema and cellulitis appears to be improved from the previous admission with only mild discoloration and a healing ulcer noted on the right lower  Negative as described in HPI.    CONSTITUTIONAL:  negative for fevers, chills, sweats, fatigue, weight loss  HEENT:  negative for vision, hearing changes, runny nose, throat pain  RESPIRATORY:  negative for shortness of breath, cough, congestion, wheezing  CARDIOVASCULAR: Exertional fatigue GASTROINTESTINAL:  negative for nausea, vomiting, diarrhea, constipation, change in bowel habits, abdominal pain   GENITOURINARY:  negative for difficulty of urination, burning with urination, frequency   INTEGUMENT:  negative for rash, skin lesions, easy bruising   HEMATOLOGIC/LYMPHATIC:  negative for swelling/edema   ALLERGIC/IMMUNOLOGIC:  negative for urticaria , itching  ENDOCRINE:  negative increase in drinking, increase in urination, hot or cold intolerance  MUSCULOSKELETAL: Leg edema negative joint pains, muscle aches, swelling of joints  NEUROLOGICAL:  negative for headaches, dizziness, lightheadedness, numbness, pain, tingling extremities  BEHAVIOR/PSYCH:  negative for depression, anxiety    Physical Exam:   BP (!) 143/89   Pulse 87   Temp 98.2 °F (36.8 °C) (Oral)   Resp 18   Ht 1.803 m (5' 11\")   Wt (!) 137.1 kg (302 lb 4 oz)   SpO2 91%   BMI 42.16 kg/m²   Temp (24hrs), Av.1 °F (36.7 °C), Min:97.9 °F (36.6 °C), Max:98.2 °F (36.8 °C)    Recent Labs     25  1617 25  1945 25  0558 25  1102   POCGLU 169* 148* 116* 133*       Intake/Output Summary (Last 24 hours) at 2025 1246  Last data filed at 2025 0814  Gross per 24 hour   Intake --   Output 2925 ml   Net -2925 ml     Morbidly obese BMI is 46.61 weighs 3 and 34 pound  General Appearance: alert, well appearing, and in no acute distress  Mental status: oriented to person, place, and time  Head: normocephalic, atraumatic  Eye: no icterus, redness, pupils equal and reactive, extraocular eye movements intact, conjunctiva clear  Ear: normal external ear, no discharge, hearing intact  Nose: no drainage noted  Mouth: mucous membranes

## 2025-04-28 ENCOUNTER — OFFICE VISIT (OUTPATIENT)
Dept: VASCULAR SURGERY | Age: 80
End: 2025-04-28
Payer: MEDICARE

## 2025-04-28 VITALS
OXYGEN SATURATION: 95 % | DIASTOLIC BLOOD PRESSURE: 70 MMHG | HEIGHT: 71 IN | SYSTOLIC BLOOD PRESSURE: 115 MMHG | BODY MASS INDEX: 43.05 KG/M2 | WEIGHT: 307.5 LBS | HEART RATE: 81 BPM

## 2025-04-28 DIAGNOSIS — I87.331 CHRONIC VENOUS HYPERTENSION WITH ULCER AND INFLAMMATION INVOLVING RIGHT SIDE (HCC): Primary | ICD-10-CM

## 2025-04-28 DIAGNOSIS — L03.119 CELLULITIS OF LOWER EXTREMITY, UNSPECIFIED LATERALITY: ICD-10-CM

## 2025-04-28 PROCEDURE — G8417 CALC BMI ABV UP PARAM F/U: HCPCS | Performed by: SURGERY

## 2025-04-28 PROCEDURE — 29580 STRAPPING UNNA BOOT: CPT | Performed by: SURGERY

## 2025-04-28 PROCEDURE — 1159F MED LIST DOCD IN RCRD: CPT | Performed by: SURGERY

## 2025-04-28 PROCEDURE — 3074F SYST BP LT 130 MM HG: CPT | Performed by: SURGERY

## 2025-04-28 PROCEDURE — 1123F ACP DISCUSS/DSCN MKR DOCD: CPT | Performed by: SURGERY

## 2025-04-28 PROCEDURE — 1036F TOBACCO NON-USER: CPT | Performed by: SURGERY

## 2025-04-28 PROCEDURE — 99203 OFFICE O/P NEW LOW 30 MIN: CPT | Performed by: SURGERY

## 2025-04-28 PROCEDURE — 1111F DSCHRG MED/CURRENT MED MERGE: CPT | Performed by: SURGERY

## 2025-04-28 PROCEDURE — G8427 DOCREV CUR MEDS BY ELIG CLIN: HCPCS | Performed by: SURGERY

## 2025-04-28 PROCEDURE — 3078F DIAST BP <80 MM HG: CPT | Performed by: SURGERY

## 2025-04-28 NOTE — DISCHARGE INSTRUCTIONS
go to the nearest emergency room.     The information contained in the After Visit Summary has been reviewed with me, the patient and/or responsible adult, by my health care provider(s). I had the opportunity to ask questions regarding this information. I have elected to receive;      []After Visit Summary  [x]Comprehensive Discharge Instruction      Patient signature______________________________________Date:________

## 2025-04-28 NOTE — PROGRESS NOTES
[facility] HEART AND VASCULAR  [address] [facility] [address]  Dept: [phone]     Patient: Taqueria Gilbert  : 1945  MRN: [id number]  DOS: 2025    Referring provider:  No ref. provider found         HPI:  Taqueria Gilbert is a 80 y.o. male who comes to the office for the first time regarding his lower extremity venous stasis ulceration on the right and venous stasis changes bilaterally as well as lymphatic edema.  He has CHF as well as a history of atrial flutter.  He is mainly confined to a wheelchair however he does walk with a walker at times.  He has bilateral lower extremity edema which is being treated at home by home health.  A piece of petroleum gauze is being placed on the right side with compression.  The left is just compression.  He is using Ace wraps for compression currently.  He does have compression stockings however he cannot get them on.  He is morbidly obese.  He comes today for bilateral lower extremity edema with ulceration on the right side only.  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    Atrial flutter ([facility])     Cellulitis of lower extremity     right     Cerebral artery occlusion with cerebral infarction ([facility])         CHF (congestive heart failure) ([facility])     Chronic acquired lymphedema     Clavicle fracture     in high school    Hernia, abdominal     History of blood transfusion 2003    AUTOTRANSFUSION DURING SURGERY    History of CVA (cerebrovascular accident)     DEFECIT MILD MEMORY AND SPEECH    Hx of blood clots     DVT    Hyperglycemia 2011    Hyperlipidemia     Hypertension     Hypothyroid 2015    Ileus, postoperative ([facility])     Mild renal insufficiency     Morbid obesity ([facility])     Non-healing wound of lower extremity     HISTORY OF, WAS SEEN IN WOUND

## 2025-04-29 ENCOUNTER — RESULTS FOLLOW-UP (OUTPATIENT)
Dept: PRIMARY CARE CLINIC | Age: 80
End: 2025-04-29

## 2025-04-29 DIAGNOSIS — N17.9 ACUTE KIDNEY INJURY: Primary | ICD-10-CM

## 2025-04-29 DIAGNOSIS — E87.5 HYPERKALEMIA: ICD-10-CM

## 2025-04-29 NOTE — RESULT ENCOUNTER NOTE
Adv pt his potassium is still slightly high- make sure he has a list of high potassium foods, so he know what to avoid- mail them a list.

## 2025-04-30 ENCOUNTER — HOSPITAL ENCOUNTER (OUTPATIENT)
Dept: WOUND CARE | Age: 80
Discharge: HOME | End: 2025-04-30

## 2025-05-01 NOTE — TELEPHONE ENCOUNTER
----- Message from Dav Clements MD sent at 10/23/2017  7:05 PM EDT -----  Marthann Essex
External genitalia is normal. Circumcised. Testicles with normal lie, without swelling. No testicular TTP.

## 2025-05-04 NOTE — DISCHARGE INSTRUCTIONS
[facility] WOUND and HYPERBARIC TREATMENT  CENTER                            Visit  Discharge Instructions / Physician Orders  DATE: 5/6/25     Home Care: [place] [facility]      SUPPLIES ORDERED THRU:       velcro compression stockings to be ordered next week             DATE LAST SUPPLIED     Wound Location:  Left lower leg     Cleanse with: Leave dry and intact     Dressing Orders:  fibracol, silvercel, zinc unna boot to bilateral lower legs     Frequency:  Leave dry and intact     Additional Orders: Increase protein to diet (meat, cheese, eggs, fish, peanut butter, nuts and beans)  Multivitamin daily  Elevate legs if swollen or wearing compression when sitting    Referral placed for lymphedema clinic today 5/6/25    OFFLOADING [] YES  TYPE:                  [] NA     Weekly wound care visits until determined otherwise.     Antibiotic therapy-wound care related YES [] NO [x] NA[]  DRUG-                                                             Duration-             Script sent to-                      on (date)  MY CHART []     Smart Device  []                         Your next appointment with the Wound Care Center is in 1 week                                                                                                   (Please note your next appointment above and if you are unable to keep, kindly give a 24 hour notice. Thank you.)  If more than 15 min late we cannot guarantee you will be seen due to clinician schedule     Per Policy,  3 or more cancellations or no show may result in dismissal from program  If you experience any of the following, please call the Wound Care Center during business hours:  [phone]     * Increase in Pain  * Temperature over 101  * Increase in drainage from your wound  * Drainage with a foul odor  * Bleeding  * Increase in swelling  * Need for compression bandage changes due to slippage, breakthrough drainage.     If you need medical attention outside of the business hours of

## 2025-05-06 ENCOUNTER — HOSPITAL ENCOUNTER (OUTPATIENT)
Dept: WOUND CARE | Age: 80
Discharge: HOME | End: 2025-05-06
Payer: MEDICARE

## 2025-05-06 VITALS
HEART RATE: 80 BPM | DIASTOLIC BLOOD PRESSURE: 65 MMHG | HEIGHT: 71 IN | BODY MASS INDEX: 43.26 KG/M2 | WEIGHT: 309 LBS | RESPIRATION RATE: 18 BRPM | TEMPERATURE: 97.5 F | SYSTOLIC BLOOD PRESSURE: 155 MMHG

## 2025-05-06 DIAGNOSIS — I83.019 VENOUS ULCER OF RIGHT LEG (HCC): Primary | ICD-10-CM

## 2025-05-06 DIAGNOSIS — R60.0 LOWER LEG EDEMA: ICD-10-CM

## 2025-05-06 DIAGNOSIS — L97.919 VENOUS ULCER OF RIGHT LEG (HCC): Primary | ICD-10-CM

## 2025-05-06 PROCEDURE — 99203 OFFICE O/P NEW LOW 30 MIN: CPT | Performed by: SURGERY

## 2025-05-06 PROCEDURE — 29580 STRAPPING UNNA BOOT: CPT

## 2025-05-06 PROCEDURE — 99213 OFFICE O/P EST LOW 20 MIN: CPT

## 2025-05-06 RX ORDER — LIDOCAINE HYDROCHLORIDE 40 MG/ML
SOLUTION TOPICAL PRN
Status: CANCELLED | OUTPATIENT
Start: 2025-05-06

## 2025-05-06 RX ORDER — LIDOCAINE HYDROCHLORIDE 20 MG/ML
JELLY TOPICAL PRN
Status: CANCELLED | OUTPATIENT
Start: 2025-05-06

## 2025-05-06 RX ORDER — LIDOCAINE HYDROCHLORIDE 20 MG/ML
JELLY TOPICAL PRN
OUTPATIENT
Start: 2025-05-06

## 2025-05-06 RX ORDER — LIDOCAINE 40 MG/G
CREAM TOPICAL PRN
OUTPATIENT
Start: 2025-05-06

## 2025-05-06 RX ORDER — LIDOCAINE HYDROCHLORIDE 40 MG/ML
SOLUTION TOPICAL PRN
OUTPATIENT
Start: 2025-05-06

## 2025-05-06 RX ORDER — LIDOCAINE 40 MG/G
CREAM TOPICAL PRN
Status: CANCELLED | OUTPATIENT
Start: 2025-05-06

## 2025-05-06 ASSESSMENT — PAIN - FUNCTIONAL ASSESSMENT: PAIN_FUNCTIONAL_ASSESSMENT: ACTIVITIES ARE NOT PREVENTED

## 2025-05-06 ASSESSMENT — ENCOUNTER SYMPTOMS
RESPIRATORY NEGATIVE: 1
GASTROINTESTINAL NEGATIVE: 1
COLOR CHANGE: 1
EYES NEGATIVE: 1
ALLERGIC/IMMUNOLOGIC NEGATIVE: 1

## 2025-05-06 ASSESSMENT — PAIN DESCRIPTION - ONSET: ONSET: ON-GOING

## 2025-05-06 ASSESSMENT — PAIN DESCRIPTION - DESCRIPTORS: DESCRIPTORS: OTHER (COMMENT)

## 2025-05-06 ASSESSMENT — PAIN DESCRIPTION - LOCATION: LOCATION: LEG

## 2025-05-06 ASSESSMENT — PAIN DESCRIPTION - PAIN TYPE: TYPE: CHRONIC PAIN

## 2025-05-06 ASSESSMENT — PAIN DESCRIPTION - ORIENTATION: ORIENTATION: RIGHT

## 2025-05-06 ASSESSMENT — PAIN SCALES - GENERAL: PAINLEVEL_OUTOF10: 2

## 2025-05-06 ASSESSMENT — PAIN DESCRIPTION - FREQUENCY: FREQUENCY: INTERMITTENT

## 2025-05-06 NOTE — PLAN OF CARE
Problem: Pain  Goal: Verbalizes/displays adequate comfort level or baseline comfort level  Outcome: Progressing     Problem: ABCDS Injury Assessment  Goal: Absence of physical injury  Outcome: Progressing     Problem: Wound:  Goal: Will show signs of wound healing; wound closure and no evidence of infection  Description: Will show signs of wound healing; wound closure and no evidence of infection   Outcome: Progressing     Problem: Falls - Risk of:  Goal: Will remain free from falls  Description: Will remain free from falls   Outcome: Progressing     Problem: Cognitive:  Goal: Knowledge of wound care  Description: Knowledge of wound care  Outcome: Progressing

## 2025-05-06 NOTE — PROGRESS NOTES
[facility] Wound Care Center   Progress Note and Procedure Note      Taqueria Gilbert  MEDICAL RECORD NUMBER:  [id number]  AGE: 80 y.o.   GENDER: male  : 1945  EPISODE DATE:  2025    Subjective:     Chief Complaint   Patient presents with    Wound Check     Right Lower Extremity         HISTORY of PRESENT ILLNESS HPI     Taqueria Gilbert is a 80 y.o. male who presents today for wound/ulcer evaluation.   History of Wound Context: RLE pre-tibial wound in the setting of venous HTN b/l LE.  Seen by vascular.  Arterial studies WNL b/l.  Unna boot to R while ulceration healing.  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    Atrial flutter ([facility])     Cellulitis of lower extremity     right     Cerebral artery occlusion with cerebral infarction ([facility])         CHF (congestive heart failure) ([facility])     Chronic acquired lymphedema     Clavicle fracture     in high school    Hernia, abdominal     History of blood transfusion 2003    AUTOTRANSFUSION DURING SURGERY    History of CVA (cerebrovascular accident)     DEFECIT MILD MEMORY AND SPEECH    Hx of blood clots     DVT    Hyperglycemia 2011    Hyperlipidemia     Hypertension     Hypothyroid 2015    Ileus, postoperative (HCC)     Mild renal insufficiency     Morbid obesity (HCC)     Non-healing wound of lower extremity     HISTORY OF, WAS SEEN IN WOUND CLINIC FOR COUPLE YRS.    Osteoarthritis     Phlebitis     HX. OF

## 2025-05-06 NOTE — WOUND CARE
Unna Boot Application   Below Knee    NAME:  Taqueria Gilbert  YOB: 1945  MEDICAL RECORD NUMBER:  [id number]  DATE:  5/6/2025    [x] Removed old Unna boot if indicated and wash leg with mild soap and water.  [x] Applied moisturizing agent to dry skin as needed.   [x] Appied primary and secondary dressing as ordered    [x] Applied Unna roll from toes to knee overlapping each time.   [x] Applied ace wrap or coban from toes to below the knee.   [x] Secured with tape and/or metal clips covered with tape.   [x] Instructed patient/caregiver to keep dressing dry and intact. DO NOT REMOVE DRESSING.   [x] Instructed pt/family/caregiver to report excessive draining, loose bandage, wet dressing, severe pain or tingling in toes.  [x] Applied Unna Boot dressing below the knee to Bilateral lower leg(s)       Unna Boot(s) were applied per  Guidelines.     fibracol, silvercel, zinc unna boot to bilateral lower legs    Electronically signed by Hilary Amaya RN on 5/6/2025 at 2:55 PM

## 2025-05-07 PROBLEM — E11.65 HYPERGLYCEMIA DUE TO TYPE 2 DIABETES MELLITUS (HCC): Status: ACTIVE | Noted: 2023-07-26

## 2025-05-07 PROBLEM — M25.519 SHOULDER JOINT PAIN: Status: ACTIVE | Noted: 2023-07-26

## 2025-05-07 PROBLEM — Z47.33 ENCOUNTER FOR INSERTION OF PROSTHETIC KNEE AFTER PRIOR REMOVAL OF KNEE PROSTHESIS: Status: ACTIVE | Noted: 2023-07-26

## 2025-05-07 PROBLEM — J96.01 ACUTE HYPOXEMIC RESPIRATORY FAILURE (HCC): Status: ACTIVE | Noted: 2023-07-26

## 2025-05-07 PROBLEM — G47.00 INSOMNIA: Status: ACTIVE | Noted: 2023-07-26

## 2025-05-07 PROBLEM — J98.11 ATELECTASIS: Status: ACTIVE | Noted: 2023-07-26

## 2025-05-07 PROBLEM — N32.81 OVERACTIVE BLADDER: Status: ACTIVE | Noted: 2023-07-26

## 2025-05-07 PROBLEM — N18.31 STAGE 3A CHRONIC KIDNEY DISEASE (HCC): Status: RESOLVED | Noted: 2021-12-29 | Resolved: 2025-05-07

## 2025-05-07 PROBLEM — R06.02 SHORTNESS OF BREATH: Status: ACTIVE | Noted: 2025-05-07

## 2025-05-07 PROBLEM — I13.0 HYPERTENSIVE HEART AND RENAL DISEASE WITH (CONGESTIVE) HEART FAILURE (HCC): Status: ACTIVE | Noted: 2023-07-26

## 2025-05-07 PROBLEM — K59.00 CONSTIPATION: Status: ACTIVE | Noted: 2023-07-26

## 2025-05-07 PROBLEM — K63.9 DISORDER OF INTESTINE: Status: ACTIVE | Noted: 2023-07-26

## 2025-05-07 PROBLEM — G47.31 CENTRAL SLEEP APNEA SYNDROME: Status: ACTIVE | Noted: 2023-07-26

## 2025-05-07 PROBLEM — N18.32 STAGE 3B CHRONIC KIDNEY DISEASE (HCC): Status: ACTIVE | Noted: 2025-05-07

## 2025-05-07 PROBLEM — M19.019 LOCALIZED, PRIMARY OSTEOARTHRITIS OF SHOULDER REGION: Status: ACTIVE | Noted: 2023-07-26

## 2025-05-09 NOTE — DISCHARGE INSTRUCTIONS
of the Wound Care Centers please contact your PCP or go to the nearest emergency room.     The information contained in the After Visit Summary has been reviewed with me, the patient and/or responsible adult, by my health care provider(s). I had the opportunity to ask questions regarding this information. I have elected to receive;      []After Visit Summary  [x]Comprehensive Discharge Instruction        Patient signature______________________________________Date:________

## 2025-05-10 ENCOUNTER — APPOINTMENT (OUTPATIENT)
Dept: GENERAL RADIOLOGY | Age: 80
DRG: 640 | End: 2025-05-10
Payer: MEDICARE

## 2025-05-10 ENCOUNTER — HOSPITAL ENCOUNTER (INPATIENT)
Age: 80
LOS: 15 days | Discharge: NURSING, REHAB OR LONG-TERM CARE | DRG: 640 | End: 2025-05-25
Attending: EMERGENCY MEDICINE | Admitting: INTERNAL MEDICINE
Payer: MEDICARE

## 2025-05-10 DIAGNOSIS — R53.1 GENERALIZED WEAKNESS: Primary | ICD-10-CM

## 2025-05-10 DIAGNOSIS — E87.70 HYPERVOLEMIA, UNSPECIFIED HYPERVOLEMIA TYPE: ICD-10-CM

## 2025-05-10 DIAGNOSIS — E87.6 HYPOKALEMIA: ICD-10-CM

## 2025-05-10 DIAGNOSIS — R53.83 OTHER FATIGUE: ICD-10-CM

## 2025-05-10 DIAGNOSIS — R53.81 DEBILITY: ICD-10-CM

## 2025-05-10 LAB
ALBUMIN SERPL-MCNC: 3.6 G/DL (ref 3.5–5.2)
ALP SERPL-CCNC: 65 U/L (ref 40–129)
ALT SERPL-CCNC: 26 U/L (ref 10–50)
ANION GAP SERPL CALCULATED.3IONS-SCNC: 10 MMOL/L (ref 9–16)
AST SERPL-CCNC: 20 U/L (ref 10–50)
BASOPHILS # BLD: <0.03 K/UL (ref 0–0.2)
BASOPHILS NFR BLD: 0 % (ref 0–2)
BILIRUB SERPL-MCNC: 0.7 MG/DL (ref 0–1.2)
BILIRUB UR QL STRIP: NEGATIVE
BNP SERPL-MCNC: 1111 PG/ML (ref 0–300)
BUN SERPL-MCNC: 37 MG/DL (ref 8–23)
CALCIUM SERPL-MCNC: 9.3 MG/DL (ref 8.6–10.4)
CHLORIDE SERPL-SCNC: 104 MMOL/L (ref 98–107)
CLARITY UR: CLEAR
CO2 SERPL-SCNC: 32 MMOL/L (ref 20–31)
COLOR UR: YELLOW
COMMENT: ABNORMAL
CREAT SERPL-MCNC: 1.4 MG/DL (ref 0.7–1.2)
EOSINOPHIL # BLD: 0.04 K/UL (ref 0–0.44)
EOSINOPHILS RELATIVE PERCENT: 1 % (ref 0–4)
ERYTHROCYTE [DISTWIDTH] IN BLOOD BY AUTOMATED COUNT: 16.4 % (ref 11.5–14.9)
FLUAV RNA RESP QL NAA+PROBE: NOT DETECTED
FLUBV RNA RESP QL NAA+PROBE: NOT DETECTED
GFR, ESTIMATED: 50 ML/MIN/1.73M2
GLUCOSE SERPL-MCNC: 121 MG/DL (ref 74–99)
GLUCOSE UR STRIP-MCNC: ABNORMAL MG/DL
HCT VFR BLD AUTO: 47.7 % (ref 41–53)
HGB BLD-MCNC: 14.6 G/DL (ref 13.5–17.5)
HGB UR QL STRIP.AUTO: NEGATIVE
IMM GRANULOCYTES # BLD AUTO: 0.04 K/UL (ref 0–0.3)
IMM GRANULOCYTES NFR BLD: 1 %
KETONES UR STRIP-MCNC: NEGATIVE MG/DL
LEUKOCYTE ESTERASE UR QL STRIP: NEGATIVE
LYMPHOCYTES NFR BLD: 0.8 K/UL (ref 1.1–3.7)
LYMPHOCYTES RELATIVE PERCENT: 14 % (ref 24–44)
MAGNESIUM SERPL-MCNC: 2 MG/DL (ref 1.6–2.4)
MCH RBC QN AUTO: 32.2 PG (ref 26–34)
MCHC RBC AUTO-ENTMCNC: 30.6 G/DL (ref 31–37)
MCV RBC AUTO: 105.3 FL (ref 80–100)
MONOCYTES NFR BLD: 0.43 K/UL (ref 0.1–1.2)
MONOCYTES NFR BLD: 8 % (ref 3–12)
NEUTROPHILS NFR BLD: 76 % (ref 36–66)
NEUTS SEG NFR BLD: 4.21 K/UL (ref 1.5–8.1)
NITRITE UR QL STRIP: NEGATIVE
NRBC BLD-RTO: 0.4 PER 100 WBC
PH UR STRIP: 6.5 [PH] (ref 5–8)
PLATELET # BLD AUTO: 164 K/UL (ref 150–450)
PMV BLD AUTO: 9.6 FL (ref 8–13.5)
POTASSIUM SERPL-SCNC: 3.5 MMOL/L (ref 3.7–5.3)
PROT SERPL-MCNC: 5.8 G/DL (ref 6.6–8.7)
PROT UR STRIP-MCNC: NEGATIVE MG/DL
RBC # BLD AUTO: 4.53 M/UL (ref 4.21–5.77)
SARS-COV-2 RNA RESP QL NAA+PROBE: NOT DETECTED
SODIUM SERPL-SCNC: 146 MMOL/L (ref 136–145)
SOURCE: NORMAL
SP GR UR STRIP: 1.01 (ref 1–1.03)
SPECIMEN DESCRIPTION: NORMAL
TROPONIN I SERPL HS-MCNC: 74 NG/L (ref 0–22)
TROPONIN I SERPL HS-MCNC: 75 NG/L (ref 0–22)
UROBILINOGEN UR STRIP-ACNC: NORMAL EU/DL (ref 0–1)
WBC OTHER # BLD: 5.5 K/UL (ref 3.5–11)

## 2025-05-10 PROCEDURE — 83880 ASSAY OF NATRIURETIC PEPTIDE: CPT

## 2025-05-10 PROCEDURE — 87186 SC STD MICRODIL/AGAR DIL: CPT

## 2025-05-10 PROCEDURE — 71045 X-RAY EXAM CHEST 1 VIEW: CPT

## 2025-05-10 PROCEDURE — 6370000000 HC RX 637 (ALT 250 FOR IP): Performed by: EMERGENCY MEDICINE

## 2025-05-10 PROCEDURE — 87636 SARSCOV2 & INF A&B AMP PRB: CPT

## 2025-05-10 PROCEDURE — 36415 COLL VENOUS BLD VENIPUNCTURE: CPT

## 2025-05-10 PROCEDURE — 2500000003 HC RX 250 WO HCPCS

## 2025-05-10 PROCEDURE — 80053 COMPREHEN METABOLIC PANEL: CPT

## 2025-05-10 PROCEDURE — 84484 ASSAY OF TROPONIN QUANT: CPT

## 2025-05-10 PROCEDURE — 5A09457 ASSISTANCE WITH RESPIRATORY VENTILATION, 24-96 CONSECUTIVE HOURS, CONTINUOUS POSITIVE AIRWAY PRESSURE: ICD-10-PCS

## 2025-05-10 PROCEDURE — 94660 CPAP INITIATION&MGMT: CPT

## 2025-05-10 PROCEDURE — 87086 URINE CULTURE/COLONY COUNT: CPT

## 2025-05-10 PROCEDURE — 94761 N-INVAS EAR/PLS OXIMETRY MLT: CPT

## 2025-05-10 PROCEDURE — 87181 SC STD AGAR DILUTION PER AGT: CPT

## 2025-05-10 PROCEDURE — 81003 URINALYSIS AUTO W/O SCOPE: CPT

## 2025-05-10 PROCEDURE — 87077 CULTURE AEROBIC IDENTIFY: CPT

## 2025-05-10 PROCEDURE — 83735 ASSAY OF MAGNESIUM: CPT

## 2025-05-10 PROCEDURE — 1200000000 HC SEMI PRIVATE

## 2025-05-10 PROCEDURE — 85025 COMPLETE CBC W/AUTO DIFF WBC: CPT

## 2025-05-10 PROCEDURE — 93005 ELECTROCARDIOGRAM TRACING: CPT | Performed by: EMERGENCY MEDICINE

## 2025-05-10 PROCEDURE — 99285 EMERGENCY DEPT VISIT HI MDM: CPT

## 2025-05-10 PROCEDURE — 2700000000 HC OXYGEN THERAPY PER DAY

## 2025-05-10 RX ORDER — ONDANSETRON 4 MG/1
4 TABLET, ORALLY DISINTEGRATING ORAL EVERY 8 HOURS PRN
Status: DISCONTINUED | OUTPATIENT
Start: 2025-05-10 | End: 2025-05-25 | Stop reason: HOSPADM

## 2025-05-10 RX ORDER — POTASSIUM CHLORIDE 7.45 MG/ML
10 INJECTION INTRAVENOUS PRN
Status: DISCONTINUED | OUTPATIENT
Start: 2025-05-10 | End: 2025-05-25 | Stop reason: HOSPADM

## 2025-05-10 RX ORDER — POLYETHYLENE GLYCOL 3350 17 G/17G
17 POWDER, FOR SOLUTION ORAL DAILY PRN
Status: DISCONTINUED | OUTPATIENT
Start: 2025-05-10 | End: 2025-05-25 | Stop reason: HOSPADM

## 2025-05-10 RX ORDER — ONDANSETRON 2 MG/ML
4 INJECTION INTRAMUSCULAR; INTRAVENOUS EVERY 6 HOURS PRN
Status: DISCONTINUED | OUTPATIENT
Start: 2025-05-10 | End: 2025-05-25 | Stop reason: HOSPADM

## 2025-05-10 RX ORDER — MAGNESIUM SULFATE HEPTAHYDRATE 40 MG/ML
2000 INJECTION, SOLUTION INTRAVENOUS PRN
Status: DISCONTINUED | OUTPATIENT
Start: 2025-05-10 | End: 2025-05-25 | Stop reason: HOSPADM

## 2025-05-10 RX ORDER — ACETAMINOPHEN 650 MG/1
650 SUPPOSITORY RECTAL EVERY 6 HOURS PRN
Status: DISCONTINUED | OUTPATIENT
Start: 2025-05-10 | End: 2025-05-25 | Stop reason: HOSPADM

## 2025-05-10 RX ORDER — SODIUM CHLORIDE 0.9 % (FLUSH) 0.9 %
5-40 SYRINGE (ML) INJECTION EVERY 12 HOURS SCHEDULED
Status: DISCONTINUED | OUTPATIENT
Start: 2025-05-10 | End: 2025-05-25 | Stop reason: HOSPADM

## 2025-05-10 RX ORDER — BISACODYL 10 MG
10 SUPPOSITORY, RECTAL RECTAL DAILY PRN
Status: DISCONTINUED | OUTPATIENT
Start: 2025-05-10 | End: 2025-05-25 | Stop reason: HOSPADM

## 2025-05-10 RX ORDER — ACETAMINOPHEN 325 MG/1
650 TABLET ORAL EVERY 6 HOURS PRN
Status: DISCONTINUED | OUTPATIENT
Start: 2025-05-10 | End: 2025-05-25 | Stop reason: HOSPADM

## 2025-05-10 RX ORDER — SODIUM CHLORIDE 0.9 % (FLUSH) 0.9 %
10 SYRINGE (ML) INJECTION PRN
Status: DISCONTINUED | OUTPATIENT
Start: 2025-05-10 | End: 2025-05-25 | Stop reason: HOSPADM

## 2025-05-10 RX ORDER — ENOXAPARIN SODIUM 100 MG/ML
30 INJECTION SUBCUTANEOUS 2 TIMES DAILY
Status: DISCONTINUED | OUTPATIENT
Start: 2025-05-11 | End: 2025-05-11

## 2025-05-10 RX ORDER — POTASSIUM CHLORIDE 1500 MG/1
40 TABLET, EXTENDED RELEASE ORAL PRN
Status: DISCONTINUED | OUTPATIENT
Start: 2025-05-10 | End: 2025-05-25 | Stop reason: HOSPADM

## 2025-05-10 RX ORDER — SODIUM CHLORIDE 9 MG/ML
INJECTION, SOLUTION INTRAVENOUS PRN
Status: DISCONTINUED | OUTPATIENT
Start: 2025-05-10 | End: 2025-05-25 | Stop reason: HOSPADM

## 2025-05-10 RX ORDER — POTASSIUM CHLORIDE 1500 MG/1
40 TABLET, EXTENDED RELEASE ORAL ONCE
Status: COMPLETED | OUTPATIENT
Start: 2025-05-10 | End: 2025-05-10

## 2025-05-10 RX ADMIN — POTASSIUM CHLORIDE 40 MEQ: 1500 TABLET, EXTENDED RELEASE ORAL at 19:20

## 2025-05-10 RX ADMIN — SODIUM CHLORIDE, PRESERVATIVE FREE 10 ML: 5 INJECTION INTRAVENOUS at 21:49

## 2025-05-10 ASSESSMENT — PAIN SCALES - GENERAL
PAINLEVEL_OUTOF10: 3
PAINLEVEL_OUTOF10: 3

## 2025-05-10 ASSESSMENT — PAIN DESCRIPTION - LOCATION: LOCATION: BACK

## 2025-05-10 ASSESSMENT — PAIN DESCRIPTION - PAIN TYPE: TYPE: CHRONIC PAIN

## 2025-05-10 ASSESSMENT — PAIN - FUNCTIONAL ASSESSMENT: PAIN_FUNCTIONAL_ASSESSMENT: 0-10

## 2025-05-10 NOTE — ED PROVIDER NOTES
on vent, after prostate surgery, had to be hospitalized x12 days.    Prostate CA (HCC)     Prostate cancer (HCC) 10/21/2015    PVD (peripheral vascular disease)     Sleep apnea     C-PAP NIGHTLY-PT INSTRUCTED TO BRING    SVT (supraventricular tachycardia)     Uric acid kidney stone 12/2014    HAD 6 PASSSED ONE ON OWN, OTHER 5 IS BEING MONITORED    Wears glasses     Wrist fracture rt,     Past Problem List  Patient Active Problem List   Diagnosis Code    Hypertension I10    Chronic acquired lymphedema I89.0    Sleep apnea G47.30    History of CVA (cerebrovascular accident) Z86.73    Hyperlipidemia E78.5    Chronic diastolic congestive heart failure (HCC) I50.32    Osteoarthritis M19.90    Renal insufficiency N28.9    Anemia D64.9    Hyperglycemia R73.9    Acquired hypothyroidism E03.9    Arthritis of left hip M16.12    Lumbar radiculopathy M54.16    Spinal stenosis of lumbar region M48.061    Primary osteoarthritis of left hip M16.12    Class 3 obesity due to excess calories with serious comorbidity and body mass index (BMI) of 40.0 to 44.9 in adult [id number]    Osteoarthritis of right glenohumeral joint M19.011    Osteoarthritis of left glenohumeral joint M19.012    History of prostate cancer Z85.46    Atrial flutter (HCC) I48.92    Morbid obesity with BMI of 40.0-44.9, adult (HCC) E66.01, Z68.41    Secondary hyperparathyroidism of renal origin N25.81    Chronic right shoulder pain M25.511, G89.29    Localized edema R60.0    Colitis K52.9    Periprosthetic fracture of shaft of femur M97.8XXA, Z96.649    COVID-19 U07.1    Hypoxia R09.02    Prediabetes R73.03    Wound of buttock S31.809A    Leg wound, left S81.802A    Primary osteoarthritis of right hip M16.11    Acute kidney injury N17.9    Weakness of both lower extremities R29.898    Type 2 diabetes mellitus without complication (HCC) E11.9    Pain in both lower extremities M79.604, M79.605    Lactic acidosis E87.20    Lower leg edema R60.0    Thrombocytopenia D69.6 
95.3

## 2025-05-11 LAB
ANION GAP SERPL CALCULATED.3IONS-SCNC: 7 MMOL/L (ref 9–16)
BASOPHILS # BLD: 0.03 K/UL (ref 0–0.2)
BASOPHILS NFR BLD: 1 % (ref 0–2)
BUN SERPL-MCNC: 34 MG/DL (ref 8–23)
CALCIUM SERPL-MCNC: 9.5 MG/DL (ref 8.6–10.4)
CHLORIDE SERPL-SCNC: 104 MMOL/L (ref 98–107)
CO2 SERPL-SCNC: 36 MMOL/L (ref 20–31)
CREAT SERPL-MCNC: 1.4 MG/DL (ref 0.7–1.2)
CRP SERPL HS-MCNC: <3 MG/L (ref 0–5)
EOSINOPHIL # BLD: 0.1 K/UL (ref 0–0.44)
EOSINOPHILS RELATIVE PERCENT: 2 % (ref 0–4)
ERYTHROCYTE [DISTWIDTH] IN BLOOD BY AUTOMATED COUNT: 16.2 % (ref 11.5–14.9)
ERYTHROCYTE [SEDIMENTATION RATE] IN BLOOD BY PHOTOMETRIC METHOD: 1 MM/HR (ref 0–20)
GFR, ESTIMATED: 50 ML/MIN/1.73M2
GLUCOSE SERPL-MCNC: 121 MG/DL (ref 74–99)
HCT VFR BLD AUTO: 48.5 % (ref 41–53)
HGB BLD-MCNC: 15 G/DL (ref 13.5–17.5)
IMM GRANULOCYTES # BLD AUTO: <0.03 K/UL (ref 0–0.3)
IMM GRANULOCYTES NFR BLD: 0 %
LYMPHOCYTES NFR BLD: 1.42 K/UL (ref 1.1–3.7)
LYMPHOCYTES RELATIVE PERCENT: 23 % (ref 24–44)
MCH RBC QN AUTO: 32.5 PG (ref 26–34)
MCHC RBC AUTO-ENTMCNC: 30.9 G/DL (ref 31–37)
MCV RBC AUTO: 105 FL (ref 80–100)
MONOCYTES NFR BLD: 0.52 K/UL (ref 0.1–1.2)
MONOCYTES NFR BLD: 9 % (ref 3–12)
NEUTROPHILS NFR BLD: 65 % (ref 36–66)
NEUTS SEG NFR BLD: 3.99 K/UL (ref 1.5–8.1)
NRBC BLD-RTO: 0 PER 100 WBC
PLATELET # BLD AUTO: 155 K/UL (ref 150–450)
PMV BLD AUTO: 9.1 FL (ref 8–13.5)
POTASSIUM SERPL-SCNC: 4.6 MMOL/L (ref 3.7–5.3)
RBC # BLD AUTO: 4.62 M/UL (ref 4.21–5.77)
SODIUM SERPL-SCNC: 147 MMOL/L (ref 136–145)
WBC OTHER # BLD: 6.1 K/UL (ref 3.5–11)

## 2025-05-11 PROCEDURE — 94660 CPAP INITIATION&MGMT: CPT

## 2025-05-11 PROCEDURE — 80048 BASIC METABOLIC PNL TOTAL CA: CPT

## 2025-05-11 PROCEDURE — 2500000003 HC RX 250 WO HCPCS

## 2025-05-11 PROCEDURE — 94760 N-INVAS EAR/PLS OXIMETRY 1: CPT

## 2025-05-11 PROCEDURE — 1200000000 HC SEMI PRIVATE

## 2025-05-11 PROCEDURE — 36415 COLL VENOUS BLD VENIPUNCTURE: CPT

## 2025-05-11 PROCEDURE — 99223 1ST HOSP IP/OBS HIGH 75: CPT

## 2025-05-11 PROCEDURE — 6370000000 HC RX 637 (ALT 250 FOR IP): Performed by: STUDENT IN AN ORGANIZED HEALTH CARE EDUCATION/TRAINING PROGRAM

## 2025-05-11 PROCEDURE — 85025 COMPLETE CBC W/AUTO DIFF WBC: CPT

## 2025-05-11 PROCEDURE — 86140 C-REACTIVE PROTEIN: CPT

## 2025-05-11 PROCEDURE — 6370000000 HC RX 637 (ALT 250 FOR IP)

## 2025-05-11 PROCEDURE — 85652 RBC SED RATE AUTOMATED: CPT

## 2025-05-11 RX ORDER — BUDESONIDE 3 MG/1
9 CAPSULE, COATED PELLETS ORAL DAILY
Status: DISCONTINUED | OUTPATIENT
Start: 2025-05-11 | End: 2025-05-25 | Stop reason: HOSPADM

## 2025-05-11 RX ORDER — FUROSEMIDE 40 MG/1
40 TABLET ORAL DAILY
Status: DISCONTINUED | OUTPATIENT
Start: 2025-05-11 | End: 2025-05-18

## 2025-05-11 RX ORDER — UBIDECARENONE 75 MG
100 CAPSULE ORAL DAILY
Status: DISCONTINUED | OUTPATIENT
Start: 2025-05-11 | End: 2025-05-25 | Stop reason: HOSPADM

## 2025-05-11 RX ORDER — ZINC SULFATE 50(220)MG
50 CAPSULE ORAL DAILY
Status: DISCONTINUED | OUTPATIENT
Start: 2025-05-11 | End: 2025-05-25 | Stop reason: HOSPADM

## 2025-05-11 RX ORDER — ATORVASTATIN CALCIUM 20 MG/1
20 TABLET, FILM COATED ORAL DAILY
Status: DISCONTINUED | OUTPATIENT
Start: 2025-05-11 | End: 2025-05-25 | Stop reason: HOSPADM

## 2025-05-11 RX ORDER — LEVOTHYROXINE SODIUM 25 UG/1
25 TABLET ORAL DAILY
Status: DISCONTINUED | OUTPATIENT
Start: 2025-05-11 | End: 2025-05-25 | Stop reason: HOSPADM

## 2025-05-11 RX ORDER — METOPROLOL TARTRATE 25 MG/1
25 TABLET, FILM COATED ORAL 2 TIMES DAILY
Status: DISCONTINUED | OUTPATIENT
Start: 2025-05-11 | End: 2025-05-21

## 2025-05-11 RX ORDER — ALLOPURINOL 300 MG/1
300 TABLET ORAL 2 TIMES DAILY
Status: DISCONTINUED | OUTPATIENT
Start: 2025-05-11 | End: 2025-05-25 | Stop reason: HOSPADM

## 2025-05-11 RX ORDER — FLUTICASONE PROPIONATE 50 MCG
1 SPRAY, SUSPENSION (ML) NASAL DAILY PRN
Status: DISCONTINUED | OUTPATIENT
Start: 2025-05-11 | End: 2025-05-25 | Stop reason: HOSPADM

## 2025-05-11 RX ORDER — OSTOMY ADHESIVE
2 STRIP MISCELLANEOUS ONCE
Status: COMPLETED | OUTPATIENT
Start: 2025-05-11 | End: 2025-05-11

## 2025-05-11 RX ORDER — PANTOPRAZOLE SODIUM 40 MG/1
40 TABLET, DELAYED RELEASE ORAL
Status: DISCONTINUED | OUTPATIENT
Start: 2025-05-11 | End: 2025-05-25 | Stop reason: HOSPADM

## 2025-05-11 RX ADMIN — ATORVASTATIN CALCIUM 20 MG: 20 TABLET, FILM COATED ORAL at 08:31

## 2025-05-11 RX ADMIN — METOPROLOL TARTRATE 25 MG: 25 TABLET, FILM COATED ORAL at 08:31

## 2025-05-11 RX ADMIN — FUROSEMIDE 40 MG: 40 TABLET ORAL at 08:32

## 2025-05-11 RX ADMIN — Medication 2 EACH: at 10:07

## 2025-05-11 RX ADMIN — LEVOTHYROXINE SODIUM 25 MCG: 0.03 TABLET ORAL at 05:30

## 2025-05-11 RX ADMIN — ACETAMINOPHEN 650 MG: 325 TABLET ORAL at 08:26

## 2025-05-11 RX ADMIN — ALLOPURINOL 300 MG: 300 TABLET ORAL at 21:42

## 2025-05-11 RX ADMIN — METOPROLOL TARTRATE 25 MG: 25 TABLET, FILM COATED ORAL at 01:04

## 2025-05-11 RX ADMIN — ALLOPURINOL 300 MG: 300 TABLET ORAL at 08:31

## 2025-05-11 RX ADMIN — SODIUM CHLORIDE, PRESERVATIVE FREE 10 ML: 5 INJECTION INTRAVENOUS at 08:28

## 2025-05-11 RX ADMIN — EMPAGLIFLOZIN 10 MG: 10 TABLET, FILM COATED ORAL at 08:31

## 2025-05-11 RX ADMIN — APIXABAN 5 MG: 5 TABLET, FILM COATED ORAL at 21:42

## 2025-05-11 RX ADMIN — METOPROLOL TARTRATE 25 MG: 25 TABLET, FILM COATED ORAL at 21:42

## 2025-05-11 RX ADMIN — PANTOPRAZOLE SODIUM 40 MG: 40 TABLET, DELAYED RELEASE ORAL at 05:30

## 2025-05-11 RX ADMIN — Medication 50 MG: at 08:27

## 2025-05-11 RX ADMIN — BUDESONIDE 9 MG: 3 CAPSULE, GELATIN COATED ORAL at 08:27

## 2025-05-11 RX ADMIN — APIXABAN 5 MG: 5 TABLET, FILM COATED ORAL at 01:04

## 2025-05-11 RX ADMIN — ALLOPURINOL 300 MG: 300 TABLET ORAL at 01:04

## 2025-05-11 RX ADMIN — APIXABAN 5 MG: 5 TABLET, FILM COATED ORAL at 08:32

## 2025-05-11 RX ADMIN — SODIUM CHLORIDE, PRESERVATIVE FREE 10 ML: 5 INJECTION INTRAVENOUS at 21:42

## 2025-05-11 RX ADMIN — VITAM B12 100 MCG: 100 TAB at 08:27

## 2025-05-11 ASSESSMENT — PAIN - FUNCTIONAL ASSESSMENT: PAIN_FUNCTIONAL_ASSESSMENT: ACTIVITIES ARE NOT PREVENTED

## 2025-05-11 ASSESSMENT — PAIN SCALES - GENERAL
PAINLEVEL_OUTOF10: 1
PAINLEVEL_OUTOF10: 3

## 2025-05-11 ASSESSMENT — PAIN DESCRIPTION - ORIENTATION: ORIENTATION: LOWER

## 2025-05-11 ASSESSMENT — PAIN DESCRIPTION - DESCRIPTORS: DESCRIPTORS: ACHING

## 2025-05-11 ASSESSMENT — PAIN DESCRIPTION - LOCATION: LOCATION: BACK

## 2025-05-11 NOTE — ED NOTES
Report given to ESTEFANIA Light from [facility].   Report method by phone   The following was reviewed with receiving RN:   Current vital signs:  /64   Pulse 80   Temp 98.1 °F (36.7 °C) (Oral)   Resp 14   Ht 1.803 m (5' 11\")   Wt (!) 140.2 kg (309 lb)   SpO2 97%   BMI 43.10 kg/m²                MEWS Score: 1     Any medication or safety alerts were reviewed. Any pending diagnostics and notifications were also reviewed, as well as any safety concerns or issues, abnormal labs, abnormal imaging, and abnormal assessment findings. Questions were answered.

## 2025-05-11 NOTE — H&P
[facility] Internal Medicine   Chris Paul MD; MD Courtney Garcia MD, MD , Chay Steve MD    [facility] Internal Medicine   IN-PATIENT SERVICE   [facility]    HISTORY AND PHYSICAL EXAMINATION            Date:   5/11/2025  Patient name:  Taqueria Gilbert  Date of admission:  5/10/2025  3:48 PM  MRN:   [id number]  Account:  [id number]  YOB: 1945  PCP:    Gilma Barba MD  Room:   2072/2072-01  Code Status:    Full Code    Chief Complaint:     Chief Complaint   Patient presents with   • Extremity Weakness       History Obtained From:     patient, electronic medical record    History of Present Illness:     Taqueria Gilbert is a 80 y.o. Declined male who presents with Extremity Weakness   and is admitted to the hospital for the management of Hypokalemia.    Taqueria Gilbert is a 80 y.o. Declined male with a history of atrial fibrillation on Eliquis, chronic diastolic heart failure, prostate cancer, anemia, atrial flutter, right lower extremity cellulitis Adriana cerebral infarction, CHF, blood clots, hyperglycemia hyperlipidemia, hypertension, hypothyroidism, SVT, and prediabetes who presents with Extremity Weakness   and is admitted to the hospital for the management of Hypokalemia.     According to patient, he has been feeling dyspneic, weak, and report increased diuretic use.  He is urinating excessively and is concerned that his potassium is low.  He wears bilateral Unna boots and endorses constant purple discoloration of his toes.  There is concern for a blister on one of his feet.  He is concerned that he is too weak to ambulate.     Upon presentation to the ER purple toes were noted, but capillary refill was less than 2 seconds.  Ventral hernia noted on the abdomen, and a small 2 cm blister on the dorsal aspect of his right foot was noted as well.  He has an open blister on the dorsal aspect of his left foot.

## 2025-05-11 NOTE — DISCHARGE INSTR - COC
Continuity of Care Form    Patient Name: Taqueria Gilbert   :  1945  MRN:  [id number]    Admit date:  5/10/2025  Discharge date:  2025    Code Status Order: Full Code   Advance Directives:     Admitting Physician:  Chris Paul MD  PCP: Gilma Barba MD    Discharging Nurse: Valentine LYONS  Discharging Hospital Unit/Room#:   Discharging Unit Phone Number: [phone]    Emergency Contact:   Extended Emergency Contact Information  Primary Emergency Contact: Rosita Gilbert  Address: [address] [place] [address]  Home Phone: [phone]  Work Phone: [phone]  Mobile Phone: [phone]  Relation: Spouse  Secondary Emergency Contact: Dolores Flores  Mobile Phone: [phone]  Relation: Niece/Nephew    Past Surgical History:  Past Surgical History:   Procedure Laterality Date    ABLATION OF DYSRHYTHMIC FOCUS  2018    afib ablation    ABLATION OF DYSRHYTHMIC FOCUS  2019    ATRIAL FIB  /  DR BYRNE    CARDIAC CATHETERIZATION  2020    Dr. Seven Elder, [place]    CARDIOVERSION  2017    COLONOSCOPY  ,     COLONOSCOPY  2016    polyp,bx    COLONOSCOPY N/A 2022    COLONOSCOPY WITH RANDOM COLON BIOPSIES AND CLIPPING AT DISTAL TRANSVERSE COLON performed by Efrain Cabral MD at [facility] ENDO    COLONOSCOPY N/A 2022    COLONOSCOPY POLYPECTOMY SNARE/COLD BIOPSY performed by Isha Osorio MD at [facility] ENDO    CYST REMOVAL Left 2016    excision cyst anterior chest    FEMUR FRACTURE SURGERY Left 2023    RETROGRADE FEMORAL NAIL WITH CORTICOSTEROID INJECTION RIGHT HIP performed by Tad Danielle DO at [facility] OR    HAMMER TOE SURGERY Bilateral     KNEE SURGERY Left     repair of torn ligaments    RI ARTHRP ACETBLR/PROX FEM PROSTC AGRFT/ALGRFT Left 05/15/2018    HIP TOTAL ARTHROPLASTY MINIMALLY INVASIVE ASI WITH GPS performed by Kieran Nicholson MD at [facility] OR    RI COLON CA SCRN NOT HI RSK IND N/A

## 2025-05-11 NOTE — ACP (ADVANCE CARE PLANNING)
Advance Care Planning     Advance Care Planning Activator (Inpatient)  Conversation Note      Date of ACP Conversation: 5/11/2025     Conversation Conducted with: Patient with Decision Making Capacity    ACP Activator: Marjorie Bonds RN        Health Care Decision Maker:     Current Designated Health Care Decision Maker:     Primary Decision Maker: Rosita Gilbert - Spouse - [phone]    Primary Decision Maker: Dolores Flores - Niece/Nephew - [phone]        Care Preferences    Ventilation:  \"If you were in your present state of health and suddenly became very ill and were unable to breathe on your own, what would your preference be about the use of a ventilator (breathing machine) if it were available to you?\"      Would the patient desire the use of ventilator (breathing machine)?: yes    \"If your health worsens and it becomes clear that your chance of recovery is unlikely, what would your preference be about the use of a ventilator (breathing machine) if it were available to you?\"     Would the patient desire the use of ventilator (breathing machine)?: Yes      Resuscitation  \"CPR works best to restart the heart when there is a sudden event, like a heart attack, in someone who is otherwise healthy. Unfortunately, CPR does not typically restart the heart for people who have serious health conditions or who are very sick.\"    \"In the event your heart stopped as a result of an underlying serious health condition, would you want attempts to be made to restart your heart (answer \"yes\" for attempt to resuscitate) or would you prefer a natural death (answer \"no\" for do not attempt to resuscitate)?\" yes       [] Yes   [] No   Educated Patient / Decision Maker regarding differences between Advance Directives and portable DNR orders.    Length of ACP Conversation in minutes:      Conversation Outcomes:  ACP discussion completed    Follow-up plan:    [] Schedule follow-up conversation to continue planning  []

## 2025-05-12 LAB
ANION GAP SERPL CALCULATED.3IONS-SCNC: 5 MMOL/L (ref 9–16)
BASOPHILS # BLD: <0.03 K/UL (ref 0–0.2)
BASOPHILS NFR BLD: 0 % (ref 0–2)
BUN SERPL-MCNC: 28 MG/DL (ref 8–23)
CALCIUM SERPL-MCNC: 9.4 MG/DL (ref 8.6–10.4)
CHLORIDE SERPL-SCNC: 103 MMOL/L (ref 98–107)
CO2 SERPL-SCNC: 37 MMOL/L (ref 20–31)
CREAT SERPL-MCNC: 1.2 MG/DL (ref 0.7–1.2)
EKG ATRIAL RATE: 73 BPM
EKG Q-T INTERVAL: 396 MS
EKG QRS DURATION: 92 MS
EKG QTC CALCULATION (BAZETT): 439 MS
EKG R AXIS: -17 DEGREES
EKG T AXIS: 13 DEGREES
EKG VENTRICULAR RATE: 74 BPM
EOSINOPHIL # BLD: 0.08 K/UL (ref 0–0.44)
EOSINOPHILS RELATIVE PERCENT: 1 % (ref 0–4)
ERYTHROCYTE [DISTWIDTH] IN BLOOD BY AUTOMATED COUNT: 16.4 % (ref 11.5–14.9)
GFR, ESTIMATED: 60 ML/MIN/1.73M2
GLUCOSE SERPL-MCNC: 120 MG/DL (ref 74–99)
HCT VFR BLD AUTO: 48.2 % (ref 41–53)
HGB BLD-MCNC: 15 G/DL (ref 13.5–17.5)
IMM GRANULOCYTES # BLD AUTO: 0.05 K/UL (ref 0–0.3)
IMM GRANULOCYTES NFR BLD: 1 %
LYMPHOCYTES NFR BLD: 1.21 K/UL (ref 1.1–3.7)
LYMPHOCYTES RELATIVE PERCENT: 16 % (ref 24–44)
MCH RBC QN AUTO: 33 PG (ref 26–34)
MCHC RBC AUTO-ENTMCNC: 31.1 G/DL (ref 31–37)
MCV RBC AUTO: 105.9 FL (ref 80–100)
MONOCYTES NFR BLD: 0.6 K/UL (ref 0.1–1.2)
MONOCYTES NFR BLD: 8 % (ref 3–12)
NEUTROPHILS NFR BLD: 74 % (ref 36–66)
NEUTS SEG NFR BLD: 5.52 K/UL (ref 1.5–8.1)
NRBC BLD-RTO: 0 PER 100 WBC
PLATELET # BLD AUTO: 140 K/UL (ref 150–450)
PMV BLD AUTO: 9.3 FL (ref 8–13.5)
POTASSIUM SERPL-SCNC: 4.4 MMOL/L (ref 3.7–5.3)
RBC # BLD AUTO: 4.55 M/UL (ref 4.21–5.77)
SODIUM SERPL-SCNC: 145 MMOL/L (ref 136–145)
WBC OTHER # BLD: 7.5 K/UL (ref 3.5–11)

## 2025-05-12 PROCEDURE — 99214 OFFICE O/P EST MOD 30 MIN: CPT

## 2025-05-12 PROCEDURE — 2500000003 HC RX 250 WO HCPCS: Performed by: INTERNAL MEDICINE

## 2025-05-12 PROCEDURE — 97535 SELF CARE MNGMENT TRAINING: CPT

## 2025-05-12 PROCEDURE — 2500000003 HC RX 250 WO HCPCS

## 2025-05-12 PROCEDURE — 97116 GAIT TRAINING THERAPY: CPT

## 2025-05-12 PROCEDURE — 93010 ELECTROCARDIOGRAM REPORT: CPT | Performed by: INTERNAL MEDICINE

## 2025-05-12 PROCEDURE — 6370000000 HC RX 637 (ALT 250 FOR IP)

## 2025-05-12 PROCEDURE — 97166 OT EVAL MOD COMPLEX 45 MIN: CPT

## 2025-05-12 PROCEDURE — 6370000000 HC RX 637 (ALT 250 FOR IP): Performed by: NURSE PRACTITIONER

## 2025-05-12 PROCEDURE — 6360000002 HC RX W HCPCS: Performed by: INTERNAL MEDICINE

## 2025-05-12 PROCEDURE — 6370000000 HC RX 637 (ALT 250 FOR IP): Performed by: INTERNAL MEDICINE

## 2025-05-12 PROCEDURE — 36415 COLL VENOUS BLD VENIPUNCTURE: CPT

## 2025-05-12 PROCEDURE — 80048 BASIC METABOLIC PNL TOTAL CA: CPT

## 2025-05-12 PROCEDURE — 1200000000 HC SEMI PRIVATE

## 2025-05-12 PROCEDURE — 85025 COMPLETE CBC W/AUTO DIFF WBC: CPT

## 2025-05-12 PROCEDURE — 99232 SBSQ HOSP IP/OBS MODERATE 35: CPT | Performed by: INTERNAL MEDICINE

## 2025-05-12 PROCEDURE — 97162 PT EVAL MOD COMPLEX 30 MIN: CPT

## 2025-05-12 RX ORDER — BUTALBITAL, ACETAMINOPHEN AND CAFFEINE 300; 40; 50 MG/1; MG/1; MG/1
1 CAPSULE ORAL EVERY 6 HOURS PRN
Status: DISCONTINUED | OUTPATIENT
Start: 2025-05-12 | End: 2025-05-25 | Stop reason: HOSPADM

## 2025-05-12 RX ORDER — CETIRIZINE HYDROCHLORIDE 10 MG/1
5 TABLET ORAL DAILY
Status: DISCONTINUED | OUTPATIENT
Start: 2025-05-12 | End: 2025-05-25 | Stop reason: HOSPADM

## 2025-05-12 RX ADMIN — METOPROLOL TARTRATE 25 MG: 25 TABLET, FILM COATED ORAL at 20:39

## 2025-05-12 RX ADMIN — ATORVASTATIN CALCIUM 20 MG: 20 TABLET, FILM COATED ORAL at 08:45

## 2025-05-12 RX ADMIN — SODIUM CHLORIDE, PRESERVATIVE FREE 10 ML: 5 INJECTION INTRAVENOUS at 08:45

## 2025-05-12 RX ADMIN — Medication 50 MG: at 08:48

## 2025-05-12 RX ADMIN — VITAM B12 100 MCG: 100 TAB at 08:48

## 2025-05-12 RX ADMIN — APIXABAN 5 MG: 5 TABLET, FILM COATED ORAL at 08:45

## 2025-05-12 RX ADMIN — ACETAMINOPHEN 650 MG: 325 TABLET ORAL at 08:56

## 2025-05-12 RX ADMIN — BUTALBITA,ACETAMINOPHEN AND CAFFEINE 1 CAPSULE: 50; 300; 40 CAPSULE ORAL at 21:43

## 2025-05-12 RX ADMIN — METOPROLOL TARTRATE 25 MG: 25 TABLET, FILM COATED ORAL at 08:45

## 2025-05-12 RX ADMIN — PANTOPRAZOLE SODIUM 40 MG: 40 TABLET, DELAYED RELEASE ORAL at 05:59

## 2025-05-12 RX ADMIN — SODIUM CHLORIDE, PRESERVATIVE FREE 10 ML: 5 INJECTION INTRAVENOUS at 20:39

## 2025-05-12 RX ADMIN — FLUTICASONE PROPIONATE 1 SPRAY: 50 SPRAY, METERED NASAL at 10:18

## 2025-05-12 RX ADMIN — LEVOTHYROXINE SODIUM 25 MCG: 0.03 TABLET ORAL at 05:59

## 2025-05-12 RX ADMIN — BUDESONIDE 9 MG: 3 CAPSULE, GELATIN COATED ORAL at 08:48

## 2025-05-12 RX ADMIN — ACETAMINOPHEN 650 MG: 325 TABLET ORAL at 14:43

## 2025-05-12 RX ADMIN — CETIRIZINE HYDROCHLORIDE 5 MG: 10 TABLET, FILM COATED ORAL at 11:22

## 2025-05-12 RX ADMIN — WATER 1000 MG: 1 INJECTION INTRAMUSCULAR; INTRAVENOUS; SUBCUTANEOUS at 11:22

## 2025-05-12 RX ADMIN — BUTALBITA,ACETAMINOPHEN AND CAFFEINE 1 CAPSULE: 50; 300; 40 CAPSULE ORAL at 13:38

## 2025-05-12 RX ADMIN — BENZOCAINE 6 MG-MENTHOL 10 MG LOZENGES 1 LOZENGE: at 21:38

## 2025-05-12 RX ADMIN — ALLOPURINOL 300 MG: 300 TABLET ORAL at 08:45

## 2025-05-12 RX ADMIN — EMPAGLIFLOZIN 10 MG: 10 TABLET, FILM COATED ORAL at 08:45

## 2025-05-12 RX ADMIN — ALLOPURINOL 300 MG: 300 TABLET ORAL at 20:39

## 2025-05-12 RX ADMIN — FUROSEMIDE 40 MG: 40 TABLET ORAL at 08:45

## 2025-05-12 RX ADMIN — APIXABAN 5 MG: 5 TABLET, FILM COATED ORAL at 20:39

## 2025-05-12 ASSESSMENT — PAIN DESCRIPTION - LOCATION
LOCATION: HEAD
LOCATION: THROAT;BACK

## 2025-05-12 ASSESSMENT — PAIN SCALES - GENERAL
PAINLEVEL_OUTOF10: 5
PAINLEVEL_OUTOF10: 2
PAINLEVEL_OUTOF10: 0
PAINLEVEL_OUTOF10: 3
PAINLEVEL_OUTOF10: 8
PAINLEVEL_OUTOF10: 2
PAINLEVEL_OUTOF10: 6
PAINLEVEL_OUTOF10: 6

## 2025-05-12 ASSESSMENT — PAIN DESCRIPTION - DESCRIPTORS
DESCRIPTORS: ACHING

## 2025-05-12 NOTE — DISCHARGE INSTRUCTIONS
Your information:  Name: Taqueria Gilbert  : 1945    Your instructions:    ***    What to do after you leave the hospital:    Recommended diet: low fat, low cholesterol diet and renal diet    Recommended activity: activity as tolerated        Your personal items were sent home with you at discharge.    Belongings  Dental Appliances: None  Vision - Corrective Lenses: Eyeglasses, At bedside  Hearing Aid: None  Clothing: Pants, Shirt, Footwear, At bedside  Jewelry: Watch, At bedside  Electronic Devices: Cell Phone, At bedside  Weapons (Notify Protective Services/Security): None  Home Medications: None  Valuables Given To: Patient  Provide Name(s) of Who Valuable(s) Were Given To: n.a    If any problems occur once I leave the hospital I am to contact my physician or go to the emergency room.

## 2025-05-13 ENCOUNTER — HOSPITAL ENCOUNTER (OUTPATIENT)
Dept: WOUND CARE | Age: 80
Discharge: HOME | End: 2025-05-13

## 2025-05-13 LAB
ANION GAP SERPL CALCULATED.3IONS-SCNC: 8 MMOL/L (ref 9–16)
BASOPHILS # BLD: 0.06 K/UL (ref 0–0.2)
BASOPHILS NFR BLD: 1 % (ref 0–2)
BUN SERPL-MCNC: 19 MG/DL (ref 8–23)
CALCIUM SERPL-MCNC: 9.1 MG/DL (ref 8.6–10.4)
CHLORIDE SERPL-SCNC: 97 MMOL/L (ref 98–107)
CO2 SERPL-SCNC: 34 MMOL/L (ref 20–31)
CREAT SERPL-MCNC: 1.1 MG/DL (ref 0.7–1.2)
EOSINOPHIL # BLD: 0 K/UL (ref 0–0.4)
EOSINOPHILS RELATIVE PERCENT: 0 % (ref 0–4)
ERYTHROCYTE [DISTWIDTH] IN BLOOD BY AUTOMATED COUNT: 17.7 % (ref 11.5–14.9)
GFR, ESTIMATED: 67 ML/MIN/1.73M2
GLUCOSE SERPL-MCNC: 147 MG/DL (ref 74–99)
HCT VFR BLD AUTO: 45.5 % (ref 41–53)
HGB BLD-MCNC: 15 G/DL (ref 13.5–17.5)
IMM GRANULOCYTES # BLD AUTO: 0 K/UL (ref 0–0.3)
IMM GRANULOCYTES NFR BLD: 0 %
LYMPHOCYTES NFR BLD: 0.67 K/UL (ref 1–4.8)
LYMPHOCYTES RELATIVE PERCENT: 11 % (ref 24–44)
MCH RBC QN AUTO: 33.1 PG (ref 26–34)
MCHC RBC AUTO-ENTMCNC: 33 G/DL (ref 31–37)
MCV RBC AUTO: 100.3 FL (ref 80–100)
MONOCYTES NFR BLD: 0.06 K/UL (ref 0.1–1.3)
MONOCYTES NFR BLD: 1 % (ref 1–7)
MORPHOLOGY: ABNORMAL
NEUTROPHILS NFR BLD: 87 % (ref 36–66)
NEUTS SEG NFR BLD: 5.31 K/UL (ref 1.3–9.1)
PLATELET # BLD AUTO: 122 K/UL (ref 150–450)
PMV BLD AUTO: 7.9 FL (ref 6–12)
POTASSIUM SERPL-SCNC: 3.8 MMOL/L (ref 3.7–5.3)
RBC # BLD AUTO: 4.54 M/UL (ref 4.5–5.9)
SODIUM SERPL-SCNC: 139 MMOL/L (ref 136–145)
WBC OTHER # BLD: 6.1 K/UL (ref 3.5–11)

## 2025-05-13 PROCEDURE — 97530 THERAPEUTIC ACTIVITIES: CPT

## 2025-05-13 PROCEDURE — 99211 OFF/OP EST MAY X REQ PHY/QHP: CPT

## 2025-05-13 PROCEDURE — 2500000003 HC RX 250 WO HCPCS

## 2025-05-13 PROCEDURE — 6360000002 HC RX W HCPCS: Performed by: INTERNAL MEDICINE

## 2025-05-13 PROCEDURE — 36415 COLL VENOUS BLD VENIPUNCTURE: CPT

## 2025-05-13 PROCEDURE — 97110 THERAPEUTIC EXERCISES: CPT

## 2025-05-13 PROCEDURE — 1200000000 HC SEMI PRIVATE

## 2025-05-13 PROCEDURE — 6370000000 HC RX 637 (ALT 250 FOR IP): Performed by: INTERNAL MEDICINE

## 2025-05-13 PROCEDURE — 99232 SBSQ HOSP IP/OBS MODERATE 35: CPT | Performed by: INTERNAL MEDICINE

## 2025-05-13 PROCEDURE — 85025 COMPLETE CBC W/AUTO DIFF WBC: CPT

## 2025-05-13 PROCEDURE — 2500000003 HC RX 250 WO HCPCS: Performed by: INTERNAL MEDICINE

## 2025-05-13 PROCEDURE — 97116 GAIT TRAINING THERAPY: CPT

## 2025-05-13 PROCEDURE — 6370000000 HC RX 637 (ALT 250 FOR IP)

## 2025-05-13 PROCEDURE — 6370000000 HC RX 637 (ALT 250 FOR IP): Performed by: NURSE PRACTITIONER

## 2025-05-13 PROCEDURE — 80048 BASIC METABOLIC PNL TOTAL CA: CPT

## 2025-05-13 RX ADMIN — SODIUM CHLORIDE, PRESERVATIVE FREE 10 ML: 5 INJECTION INTRAVENOUS at 20:33

## 2025-05-13 RX ADMIN — METOPROLOL TARTRATE 25 MG: 25 TABLET, FILM COATED ORAL at 10:04

## 2025-05-13 RX ADMIN — PANTOPRAZOLE SODIUM 40 MG: 40 TABLET, DELAYED RELEASE ORAL at 05:26

## 2025-05-13 RX ADMIN — ALLOPURINOL 300 MG: 300 TABLET ORAL at 10:04

## 2025-05-13 RX ADMIN — BUTALBITA,ACETAMINOPHEN AND CAFFEINE 1 CAPSULE: 50; 300; 40 CAPSULE ORAL at 13:33

## 2025-05-13 RX ADMIN — Medication 50 MG: at 10:03

## 2025-05-13 RX ADMIN — VITAM B12 100 MCG: 100 TAB at 10:03

## 2025-05-13 RX ADMIN — EMPAGLIFLOZIN 10 MG: 10 TABLET, FILM COATED ORAL at 10:04

## 2025-05-13 RX ADMIN — APIXABAN 5 MG: 5 TABLET, FILM COATED ORAL at 20:33

## 2025-05-13 RX ADMIN — SODIUM CHLORIDE, PRESERVATIVE FREE 10 ML: 5 INJECTION INTRAVENOUS at 10:05

## 2025-05-13 RX ADMIN — FUROSEMIDE 40 MG: 40 TABLET ORAL at 10:04

## 2025-05-13 RX ADMIN — BUDESONIDE 9 MG: 3 CAPSULE, GELATIN COATED ORAL at 10:02

## 2025-05-13 RX ADMIN — ALLOPURINOL 300 MG: 300 TABLET ORAL at 20:33

## 2025-05-13 RX ADMIN — METOPROLOL TARTRATE 25 MG: 25 TABLET, FILM COATED ORAL at 20:33

## 2025-05-13 RX ADMIN — LEVOTHYROXINE SODIUM 25 MCG: 0.03 TABLET ORAL at 05:26

## 2025-05-13 RX ADMIN — APIXABAN 5 MG: 5 TABLET, FILM COATED ORAL at 10:04

## 2025-05-13 RX ADMIN — WATER 1000 MG: 1 INJECTION INTRAMUSCULAR; INTRAVENOUS; SUBCUTANEOUS at 13:14

## 2025-05-13 RX ADMIN — CETIRIZINE HYDROCHLORIDE 5 MG: 10 TABLET, FILM COATED ORAL at 10:04

## 2025-05-13 RX ADMIN — BENZOCAINE 6 MG-MENTHOL 10 MG LOZENGES 1 LOZENGE: at 03:59

## 2025-05-13 RX ADMIN — FLUTICASONE PROPIONATE 1 SPRAY: 50 SPRAY, METERED NASAL at 10:03

## 2025-05-13 RX ADMIN — BENZOCAINE 6 MG-MENTHOL 10 MG LOZENGES 1 LOZENGE: at 20:33

## 2025-05-13 RX ADMIN — ATORVASTATIN CALCIUM 20 MG: 20 TABLET, FILM COATED ORAL at 10:04

## 2025-05-13 ASSESSMENT — PAIN DESCRIPTION - LOCATION
LOCATION: BACK;LEG
LOCATION: HEAD

## 2025-05-13 ASSESSMENT — PAIN DESCRIPTION - ORIENTATION: ORIENTATION: RIGHT;LEFT

## 2025-05-13 ASSESSMENT — PAIN SCALES - GENERAL
PAINLEVEL_OUTOF10: 5
PAINLEVEL_OUTOF10: 0
PAINLEVEL_OUTOF10: 4

## 2025-05-13 ASSESSMENT — PAIN DESCRIPTION - DESCRIPTORS: DESCRIPTORS: ACHING

## 2025-05-14 PROCEDURE — 99211 OFF/OP EST MAY X REQ PHY/QHP: CPT

## 2025-05-14 PROCEDURE — 6370000000 HC RX 637 (ALT 250 FOR IP)

## 2025-05-14 PROCEDURE — 2500000003 HC RX 250 WO HCPCS: Performed by: INTERNAL MEDICINE

## 2025-05-14 PROCEDURE — 6360000002 HC RX W HCPCS: Performed by: INTERNAL MEDICINE

## 2025-05-14 PROCEDURE — 6370000000 HC RX 637 (ALT 250 FOR IP): Performed by: INTERNAL MEDICINE

## 2025-05-14 PROCEDURE — 94640 AIRWAY INHALATION TREATMENT: CPT

## 2025-05-14 PROCEDURE — 99232 SBSQ HOSP IP/OBS MODERATE 35: CPT | Performed by: INTERNAL MEDICINE

## 2025-05-14 PROCEDURE — 0202U NFCT DS 22 TRGT SARS-COV-2: CPT

## 2025-05-14 PROCEDURE — 2500000003 HC RX 250 WO HCPCS

## 2025-05-14 PROCEDURE — 1200000000 HC SEMI PRIVATE

## 2025-05-14 PROCEDURE — 94664 DEMO&/EVAL PT USE INHALER: CPT

## 2025-05-14 PROCEDURE — 94761 N-INVAS EAR/PLS OXIMETRY MLT: CPT

## 2025-05-14 RX ORDER — IPRATROPIUM BROMIDE AND ALBUTEROL SULFATE 2.5; .5 MG/3ML; MG/3ML
1 SOLUTION RESPIRATORY (INHALATION)
Status: DISCONTINUED | OUTPATIENT
Start: 2025-05-14 | End: 2025-05-25 | Stop reason: HOSPADM

## 2025-05-14 RX ORDER — GUAIFENESIN 600 MG/1
600 TABLET, EXTENDED RELEASE ORAL 2 TIMES DAILY
Status: DISCONTINUED | OUTPATIENT
Start: 2025-05-14 | End: 2025-05-25 | Stop reason: HOSPADM

## 2025-05-14 RX ADMIN — METOPROLOL TARTRATE 25 MG: 25 TABLET, FILM COATED ORAL at 20:15

## 2025-05-14 RX ADMIN — SODIUM CHLORIDE, PRESERVATIVE FREE 10 ML: 5 INJECTION INTRAVENOUS at 09:01

## 2025-05-14 RX ADMIN — FUROSEMIDE 40 MG: 40 TABLET ORAL at 08:42

## 2025-05-14 RX ADMIN — METOPROLOL TARTRATE 25 MG: 25 TABLET, FILM COATED ORAL at 08:42

## 2025-05-14 RX ADMIN — SODIUM CHLORIDE, PRESERVATIVE FREE 10 ML: 5 INJECTION INTRAVENOUS at 20:16

## 2025-05-14 RX ADMIN — BUTALBITA,ACETAMINOPHEN AND CAFFEINE 1 CAPSULE: 50; 300; 40 CAPSULE ORAL at 20:14

## 2025-05-14 RX ADMIN — IPRATROPIUM BROMIDE AND ALBUTEROL SULFATE 1 DOSE: 2.5; .5 SOLUTION RESPIRATORY (INHALATION) at 15:07

## 2025-05-14 RX ADMIN — ACETAMINOPHEN 650 MG: 325 TABLET ORAL at 12:56

## 2025-05-14 RX ADMIN — BUDESONIDE 9 MG: 3 CAPSULE, GELATIN COATED ORAL at 08:45

## 2025-05-14 RX ADMIN — LEVOTHYROXINE SODIUM 25 MCG: 0.03 TABLET ORAL at 05:38

## 2025-05-14 RX ADMIN — WATER 1000 MG: 1 INJECTION INTRAMUSCULAR; INTRAVENOUS; SUBCUTANEOUS at 11:08

## 2025-05-14 RX ADMIN — PANTOPRAZOLE SODIUM 40 MG: 40 TABLET, DELAYED RELEASE ORAL at 05:38

## 2025-05-14 RX ADMIN — ALLOPURINOL 300 MG: 300 TABLET ORAL at 20:15

## 2025-05-14 RX ADMIN — ATORVASTATIN CALCIUM 20 MG: 20 TABLET, FILM COATED ORAL at 08:42

## 2025-05-14 RX ADMIN — Medication 50 MG: at 08:45

## 2025-05-14 RX ADMIN — GUAIFENESIN 600 MG: 600 TABLET, MULTILAYER, EXTENDED RELEASE ORAL at 15:55

## 2025-05-14 RX ADMIN — APIXABAN 5 MG: 5 TABLET, FILM COATED ORAL at 20:15

## 2025-05-14 RX ADMIN — CETIRIZINE HYDROCHLORIDE 5 MG: 10 TABLET, FILM COATED ORAL at 08:42

## 2025-05-14 RX ADMIN — IPRATROPIUM BROMIDE AND ALBUTEROL SULFATE 1 DOSE: 2.5; .5 SOLUTION RESPIRATORY (INHALATION) at 19:18

## 2025-05-14 RX ADMIN — EMPAGLIFLOZIN 10 MG: 10 TABLET, FILM COATED ORAL at 08:42

## 2025-05-14 RX ADMIN — GUAIFENESIN 600 MG: 600 TABLET, MULTILAYER, EXTENDED RELEASE ORAL at 20:15

## 2025-05-14 RX ADMIN — SODIUM CHLORIDE, PRESERVATIVE FREE 10 ML: 5 INJECTION INTRAVENOUS at 11:09

## 2025-05-14 RX ADMIN — APIXABAN 5 MG: 5 TABLET, FILM COATED ORAL at 08:42

## 2025-05-14 RX ADMIN — ALLOPURINOL 300 MG: 300 TABLET ORAL at 08:42

## 2025-05-14 RX ADMIN — VITAM B12 100 MCG: 100 TAB at 08:45

## 2025-05-14 ASSESSMENT — PAIN DESCRIPTION - FREQUENCY: FREQUENCY: INTERMITTENT

## 2025-05-14 ASSESSMENT — PAIN DESCRIPTION - PAIN TYPE: TYPE: ACUTE PAIN

## 2025-05-14 ASSESSMENT — PAIN SCALES - GENERAL
PAINLEVEL_OUTOF10: 4
PAINLEVEL_OUTOF10: 8
PAINLEVEL_OUTOF10: 0

## 2025-05-14 ASSESSMENT — PAIN DESCRIPTION - ONSET: ONSET: GRADUAL

## 2025-05-14 ASSESSMENT — PAIN - FUNCTIONAL ASSESSMENT: PAIN_FUNCTIONAL_ASSESSMENT: ACTIVITIES ARE NOT PREVENTED

## 2025-05-14 ASSESSMENT — PAIN DESCRIPTION - ORIENTATION: ORIENTATION: ANTERIOR

## 2025-05-14 ASSESSMENT — PAIN DESCRIPTION - LOCATION
LOCATION: HEAD
LOCATION: BACK;LEG

## 2025-05-14 ASSESSMENT — PAIN DESCRIPTION - DESCRIPTORS: DESCRIPTORS: DISCOMFORT;PRESSURE

## 2025-05-14 ASSESSMENT — PAIN DESCRIPTION - DIRECTION: RADIATING_TOWARDS: DENIES

## 2025-05-15 LAB
B PARAP IS1001 DNA NPH QL NAA+NON-PROBE: NOT DETECTED
B PERT DNA SPEC QL NAA+PROBE: NOT DETECTED
C PNEUM DNA NPH QL NAA+NON-PROBE: NOT DETECTED
FLUAV H1 2009 PAN RNA NPH NAA+NON-PROBE: DETECTED
FLUAV RNA NPH QL NAA+NON-PROBE: DETECTED
FLUBV RNA NPH QL NAA+NON-PROBE: NOT DETECTED
HADV DNA NPH QL NAA+NON-PROBE: NOT DETECTED
HCOV 229E RNA NPH QL NAA+NON-PROBE: NOT DETECTED
HCOV HKU1 RNA NPH QL NAA+NON-PROBE: NOT DETECTED
HCOV NL63 RNA NPH QL NAA+NON-PROBE: NOT DETECTED
HCOV OC43 RNA NPH QL NAA+NON-PROBE: NOT DETECTED
HMPV RNA NPH QL NAA+NON-PROBE: NOT DETECTED
HPIV1 RNA NPH QL NAA+NON-PROBE: NOT DETECTED
HPIV2 RNA NPH QL NAA+NON-PROBE: NOT DETECTED
HPIV3 RNA NPH QL NAA+NON-PROBE: NOT DETECTED
HPIV4 RNA NPH QL NAA+NON-PROBE: NOT DETECTED
M PNEUMO DNA NPH QL NAA+NON-PROBE: NOT DETECTED
RSV RNA NPH QL NAA+NON-PROBE: NOT DETECTED
RV+EV RNA NPH QL NAA+NON-PROBE: NOT DETECTED
SARS-COV-2 RNA NPH QL NAA+NON-PROBE: NOT DETECTED
SPECIMEN DESCRIPTION: ABNORMAL

## 2025-05-15 PROCEDURE — 94669 MECHANICAL CHEST WALL OSCILL: CPT

## 2025-05-15 PROCEDURE — 6370000000 HC RX 637 (ALT 250 FOR IP): Performed by: NURSE PRACTITIONER

## 2025-05-15 PROCEDURE — 97110 THERAPEUTIC EXERCISES: CPT

## 2025-05-15 PROCEDURE — 1200000000 HC SEMI PRIVATE

## 2025-05-15 PROCEDURE — 99233 SBSQ HOSP IP/OBS HIGH 50: CPT | Performed by: INTERNAL MEDICINE

## 2025-05-15 PROCEDURE — 6370000000 HC RX 637 (ALT 250 FOR IP)

## 2025-05-15 PROCEDURE — 94664 DEMO&/EVAL PT USE INHALER: CPT

## 2025-05-15 PROCEDURE — 2500000003 HC RX 250 WO HCPCS: Performed by: INTERNAL MEDICINE

## 2025-05-15 PROCEDURE — 2500000003 HC RX 250 WO HCPCS

## 2025-05-15 PROCEDURE — 6360000002 HC RX W HCPCS: Performed by: INTERNAL MEDICINE

## 2025-05-15 PROCEDURE — 6370000000 HC RX 637 (ALT 250 FOR IP): Performed by: INTERNAL MEDICINE

## 2025-05-15 PROCEDURE — 97530 THERAPEUTIC ACTIVITIES: CPT

## 2025-05-15 PROCEDURE — 94761 N-INVAS EAR/PLS OXIMETRY MLT: CPT

## 2025-05-15 PROCEDURE — 99211 OFF/OP EST MAY X REQ PHY/QHP: CPT

## 2025-05-15 PROCEDURE — 97116 GAIT TRAINING THERAPY: CPT

## 2025-05-15 PROCEDURE — 94640 AIRWAY INHALATION TREATMENT: CPT

## 2025-05-15 RX ORDER — OSELTAMIVIR PHOSPHATE 75 MG/1
75 CAPSULE ORAL 2 TIMES DAILY
Status: COMPLETED | OUTPATIENT
Start: 2025-05-15 | End: 2025-05-19

## 2025-05-15 RX ORDER — PREDNISONE 20 MG/1
40 TABLET ORAL DAILY
Status: DISCONTINUED | OUTPATIENT
Start: 2025-05-15 | End: 2025-05-17

## 2025-05-15 RX ADMIN — SODIUM CHLORIDE, PRESERVATIVE FREE 10 ML: 5 INJECTION INTRAVENOUS at 07:59

## 2025-05-15 RX ADMIN — IPRATROPIUM BROMIDE AND ALBUTEROL SULFATE 1 DOSE: 2.5; .5 SOLUTION RESPIRATORY (INHALATION) at 19:53

## 2025-05-15 RX ADMIN — GUAIFENESIN 600 MG: 600 TABLET, MULTILAYER, EXTENDED RELEASE ORAL at 08:22

## 2025-05-15 RX ADMIN — ALLOPURINOL 300 MG: 300 TABLET ORAL at 20:34

## 2025-05-15 RX ADMIN — OSELTAMIVIR PHOSPHATE 75 MG: 75 CAPSULE ORAL at 20:34

## 2025-05-15 RX ADMIN — CETIRIZINE HYDROCHLORIDE 5 MG: 10 TABLET, FILM COATED ORAL at 07:59

## 2025-05-15 RX ADMIN — BUTALBITA,ACETAMINOPHEN AND CAFFEINE 1 CAPSULE: 50; 300; 40 CAPSULE ORAL at 08:23

## 2025-05-15 RX ADMIN — BENZOCAINE 6 MG-MENTHOL 10 MG LOZENGES 1 LOZENGE: at 08:23

## 2025-05-15 RX ADMIN — PANTOPRAZOLE SODIUM 40 MG: 40 TABLET, DELAYED RELEASE ORAL at 05:27

## 2025-05-15 RX ADMIN — ACETAMINOPHEN 650 MG: 325 TABLET ORAL at 08:22

## 2025-05-15 RX ADMIN — Medication 50 MG: at 08:00

## 2025-05-15 RX ADMIN — IPRATROPIUM BROMIDE AND ALBUTEROL SULFATE 1 DOSE: 2.5; .5 SOLUTION RESPIRATORY (INHALATION) at 14:51

## 2025-05-15 RX ADMIN — APIXABAN 5 MG: 5 TABLET, FILM COATED ORAL at 07:59

## 2025-05-15 RX ADMIN — ATORVASTATIN CALCIUM 20 MG: 20 TABLET, FILM COATED ORAL at 07:59

## 2025-05-15 RX ADMIN — PREDNISONE 40 MG: 20 TABLET ORAL at 12:23

## 2025-05-15 RX ADMIN — EMPAGLIFLOZIN 10 MG: 10 TABLET, FILM COATED ORAL at 07:59

## 2025-05-15 RX ADMIN — IPRATROPIUM BROMIDE AND ALBUTEROL SULFATE 1 DOSE: 2.5; .5 SOLUTION RESPIRATORY (INHALATION) at 07:33

## 2025-05-15 RX ADMIN — IPRATROPIUM BROMIDE AND ALBUTEROL SULFATE 1 DOSE: 2.5; .5 SOLUTION RESPIRATORY (INHALATION) at 11:03

## 2025-05-15 RX ADMIN — SODIUM CHLORIDE, PRESERVATIVE FREE 10 ML: 5 INJECTION INTRAVENOUS at 20:34

## 2025-05-15 RX ADMIN — VITAM B12 100 MCG: 100 TAB at 08:00

## 2025-05-15 RX ADMIN — GUAIFENESIN 600 MG: 600 TABLET, MULTILAYER, EXTENDED RELEASE ORAL at 20:34

## 2025-05-15 RX ADMIN — ALLOPURINOL 300 MG: 300 TABLET ORAL at 07:59

## 2025-05-15 RX ADMIN — METOPROLOL TARTRATE 25 MG: 25 TABLET, FILM COATED ORAL at 07:58

## 2025-05-15 RX ADMIN — LEVOTHYROXINE SODIUM 25 MCG: 0.03 TABLET ORAL at 05:27

## 2025-05-15 RX ADMIN — WATER 1000 MG: 1 INJECTION INTRAMUSCULAR; INTRAVENOUS; SUBCUTANEOUS at 12:23

## 2025-05-15 RX ADMIN — BUDESONIDE 9 MG: 3 CAPSULE, GELATIN COATED ORAL at 08:00

## 2025-05-15 RX ADMIN — OSELTAMIVIR PHOSPHATE 75 MG: 75 CAPSULE ORAL at 12:34

## 2025-05-15 RX ADMIN — APIXABAN 5 MG: 5 TABLET, FILM COATED ORAL at 20:34

## 2025-05-15 RX ADMIN — FUROSEMIDE 40 MG: 40 TABLET ORAL at 07:58

## 2025-05-15 RX ADMIN — METOPROLOL TARTRATE 25 MG: 25 TABLET, FILM COATED ORAL at 20:34

## 2025-05-15 ASSESSMENT — PAIN DESCRIPTION - LOCATION: LOCATION: HEAD

## 2025-05-15 ASSESSMENT — PAIN SCALES - GENERAL
PAINLEVEL_OUTOF10: 8
PAINLEVEL_OUTOF10: 0

## 2025-05-16 LAB
ANION GAP SERPL CALCULATED.3IONS-SCNC: 9 MMOL/L (ref 9–16)
BUN SERPL-MCNC: 26 MG/DL (ref 8–23)
CALCIUM SERPL-MCNC: 9.2 MG/DL (ref 8.6–10.4)
CHLORIDE SERPL-SCNC: 97 MMOL/L (ref 98–107)
CO2 SERPL-SCNC: 35 MMOL/L (ref 20–31)
CREAT SERPL-MCNC: 1.2 MG/DL (ref 0.7–1.2)
GFR, ESTIMATED: 60 ML/MIN/1.73M2
GLUCOSE SERPL-MCNC: 133 MG/DL (ref 74–99)
MICROORGANISM SPEC CULT: ABNORMAL
MICROORGANISM SPEC CULT: ABNORMAL
POTASSIUM SERPL-SCNC: 4.5 MMOL/L (ref 3.7–5.3)
SODIUM SERPL-SCNC: 141 MMOL/L (ref 136–145)
SPECIMEN DESCRIPTION: ABNORMAL

## 2025-05-16 PROCEDURE — 97530 THERAPEUTIC ACTIVITIES: CPT

## 2025-05-16 PROCEDURE — 97116 GAIT TRAINING THERAPY: CPT

## 2025-05-16 PROCEDURE — 6370000000 HC RX 637 (ALT 250 FOR IP): Performed by: INTERNAL MEDICINE

## 2025-05-16 PROCEDURE — 2500000003 HC RX 250 WO HCPCS

## 2025-05-16 PROCEDURE — 1200000000 HC SEMI PRIVATE

## 2025-05-16 PROCEDURE — 94640 AIRWAY INHALATION TREATMENT: CPT

## 2025-05-16 PROCEDURE — 97110 THERAPEUTIC EXERCISES: CPT

## 2025-05-16 PROCEDURE — 99232 SBSQ HOSP IP/OBS MODERATE 35: CPT | Performed by: INTERNAL MEDICINE

## 2025-05-16 PROCEDURE — 6370000000 HC RX 637 (ALT 250 FOR IP)

## 2025-05-16 PROCEDURE — 80048 BASIC METABOLIC PNL TOTAL CA: CPT

## 2025-05-16 PROCEDURE — 36415 COLL VENOUS BLD VENIPUNCTURE: CPT

## 2025-05-16 PROCEDURE — 94761 N-INVAS EAR/PLS OXIMETRY MLT: CPT

## 2025-05-16 RX ORDER — LEVOFLOXACIN 500 MG/1
500 TABLET, FILM COATED ORAL DAILY
Status: COMPLETED | OUTPATIENT
Start: 2025-05-16 | End: 2025-05-20

## 2025-05-16 RX ADMIN — LEVOTHYROXINE SODIUM 25 MCG: 0.03 TABLET ORAL at 05:10

## 2025-05-16 RX ADMIN — SODIUM CHLORIDE, PRESERVATIVE FREE 10 ML: 5 INJECTION INTRAVENOUS at 08:20

## 2025-05-16 RX ADMIN — LEVOFLOXACIN 500 MG: 500 TABLET, FILM COATED ORAL at 11:02

## 2025-05-16 RX ADMIN — PREDNISONE 40 MG: 20 TABLET ORAL at 08:18

## 2025-05-16 RX ADMIN — IPRATROPIUM BROMIDE AND ALBUTEROL SULFATE 1 DOSE: 2.5; .5 SOLUTION RESPIRATORY (INHALATION) at 07:22

## 2025-05-16 RX ADMIN — ALLOPURINOL 300 MG: 300 TABLET ORAL at 21:34

## 2025-05-16 RX ADMIN — GUAIFENESIN 600 MG: 600 TABLET, MULTILAYER, EXTENDED RELEASE ORAL at 08:18

## 2025-05-16 RX ADMIN — IPRATROPIUM BROMIDE AND ALBUTEROL SULFATE 1 DOSE: 2.5; .5 SOLUTION RESPIRATORY (INHALATION) at 11:24

## 2025-05-16 RX ADMIN — BUDESONIDE 9 MG: 3 CAPSULE, GELATIN COATED ORAL at 08:20

## 2025-05-16 RX ADMIN — EMPAGLIFLOZIN 10 MG: 10 TABLET, FILM COATED ORAL at 08:18

## 2025-05-16 RX ADMIN — OSELTAMIVIR PHOSPHATE 75 MG: 75 CAPSULE ORAL at 21:35

## 2025-05-16 RX ADMIN — IPRATROPIUM BROMIDE AND ALBUTEROL SULFATE 1 DOSE: 2.5; .5 SOLUTION RESPIRATORY (INHALATION) at 15:12

## 2025-05-16 RX ADMIN — FUROSEMIDE 40 MG: 40 TABLET ORAL at 08:18

## 2025-05-16 RX ADMIN — ATORVASTATIN CALCIUM 20 MG: 20 TABLET, FILM COATED ORAL at 08:18

## 2025-05-16 RX ADMIN — METOPROLOL TARTRATE 25 MG: 25 TABLET, FILM COATED ORAL at 21:34

## 2025-05-16 RX ADMIN — VITAM B12 100 MCG: 100 TAB at 08:20

## 2025-05-16 RX ADMIN — GUAIFENESIN 600 MG: 600 TABLET, MULTILAYER, EXTENDED RELEASE ORAL at 21:34

## 2025-05-16 RX ADMIN — OSELTAMIVIR PHOSPHATE 75 MG: 75 CAPSULE ORAL at 08:20

## 2025-05-16 RX ADMIN — METOPROLOL TARTRATE 25 MG: 25 TABLET, FILM COATED ORAL at 08:18

## 2025-05-16 RX ADMIN — APIXABAN 5 MG: 5 TABLET, FILM COATED ORAL at 08:18

## 2025-05-16 RX ADMIN — SODIUM CHLORIDE, PRESERVATIVE FREE 10 ML: 5 INJECTION INTRAVENOUS at 21:34

## 2025-05-16 RX ADMIN — ALLOPURINOL 300 MG: 300 TABLET ORAL at 08:18

## 2025-05-16 RX ADMIN — CETIRIZINE HYDROCHLORIDE 5 MG: 10 TABLET, FILM COATED ORAL at 08:18

## 2025-05-16 RX ADMIN — Medication 50 MG: at 08:20

## 2025-05-16 RX ADMIN — ACETAMINOPHEN 650 MG: 325 TABLET ORAL at 08:23

## 2025-05-16 RX ADMIN — IPRATROPIUM BROMIDE AND ALBUTEROL SULFATE 1 DOSE: 2.5; .5 SOLUTION RESPIRATORY (INHALATION) at 19:10

## 2025-05-16 RX ADMIN — PANTOPRAZOLE SODIUM 40 MG: 40 TABLET, DELAYED RELEASE ORAL at 05:10

## 2025-05-16 RX ADMIN — APIXABAN 5 MG: 5 TABLET, FILM COATED ORAL at 21:34

## 2025-05-16 ASSESSMENT — PAIN - FUNCTIONAL ASSESSMENT: PAIN_FUNCTIONAL_ASSESSMENT: ACTIVITIES ARE NOT PREVENTED

## 2025-05-16 ASSESSMENT — PAIN DESCRIPTION - LOCATION: LOCATION: LEG

## 2025-05-16 ASSESSMENT — PAIN DESCRIPTION - ORIENTATION: ORIENTATION: RIGHT;LEFT

## 2025-05-16 ASSESSMENT — PAIN DESCRIPTION - DESCRIPTORS: DESCRIPTORS: ACHING;GNAWING

## 2025-05-16 ASSESSMENT — PAIN SCALES - GENERAL: PAINLEVEL_OUTOF10: 5

## 2025-05-17 ENCOUNTER — APPOINTMENT (OUTPATIENT)
Dept: GENERAL RADIOLOGY | Age: 80
DRG: 640 | End: 2025-05-17
Attending: INTERNAL MEDICINE
Payer: MEDICARE

## 2025-05-17 LAB
ANION GAP SERPL CALCULATED.3IONS-SCNC: 6 MMOL/L (ref 9–16)
BASOPHILS # BLD: <0.03 K/UL (ref 0–0.2)
BASOPHILS NFR BLD: 0 % (ref 0–2)
BUN SERPL-MCNC: 33 MG/DL (ref 8–23)
CALCIUM SERPL-MCNC: 9 MG/DL (ref 8.6–10.4)
CHLORIDE SERPL-SCNC: 97 MMOL/L (ref 98–107)
CO2 SERPL-SCNC: 36 MMOL/L (ref 20–31)
CREAT SERPL-MCNC: 1.2 MG/DL (ref 0.7–1.2)
EOSINOPHIL # BLD: <0.03 K/UL (ref 0–0.44)
EOSINOPHILS RELATIVE PERCENT: 0 % (ref 0–4)
ERYTHROCYTE [DISTWIDTH] IN BLOOD BY AUTOMATED COUNT: 16.2 % (ref 11.5–14.9)
GFR, ESTIMATED: 60 ML/MIN/1.73M2
GLUCOSE SERPL-MCNC: 120 MG/DL (ref 74–99)
HCT VFR BLD AUTO: 43.8 % (ref 41–53)
HGB BLD-MCNC: 14 G/DL (ref 13.5–17.5)
IMM GRANULOCYTES # BLD AUTO: 0.06 K/UL (ref 0–0.3)
IMM GRANULOCYTES NFR BLD: 1 %
LYMPHOCYTES NFR BLD: 0.8 K/UL (ref 1.1–3.7)
LYMPHOCYTES RELATIVE PERCENT: 11 % (ref 24–44)
MCH RBC QN AUTO: 33.5 PG (ref 26–34)
MCHC RBC AUTO-ENTMCNC: 32 G/DL (ref 31–37)
MCV RBC AUTO: 104.8 FL (ref 80–100)
MONOCYTES NFR BLD: 0.52 K/UL (ref 0.1–1.2)
MONOCYTES NFR BLD: 7 % (ref 3–12)
NEUTROPHILS NFR BLD: 81 % (ref 36–66)
NEUTS SEG NFR BLD: 5.96 K/UL (ref 1.5–8.1)
NRBC BLD-RTO: 0 PER 100 WBC
PLATELET # BLD AUTO: 139 K/UL (ref 150–450)
PMV BLD AUTO: 9.9 FL (ref 8–13.5)
POTASSIUM SERPL-SCNC: 4.9 MMOL/L (ref 3.7–5.3)
RBC # BLD AUTO: 4.18 M/UL (ref 4.21–5.77)
SODIUM SERPL-SCNC: 139 MMOL/L (ref 136–145)
WBC OTHER # BLD: 7.4 K/UL (ref 3.5–11)

## 2025-05-17 PROCEDURE — 2500000003 HC RX 250 WO HCPCS: Performed by: INTERNAL MEDICINE

## 2025-05-17 PROCEDURE — 1200000000 HC SEMI PRIVATE

## 2025-05-17 PROCEDURE — 6370000000 HC RX 637 (ALT 250 FOR IP): Performed by: INTERNAL MEDICINE

## 2025-05-17 PROCEDURE — 36415 COLL VENOUS BLD VENIPUNCTURE: CPT

## 2025-05-17 PROCEDURE — 94640 AIRWAY INHALATION TREATMENT: CPT

## 2025-05-17 PROCEDURE — 2500000003 HC RX 250 WO HCPCS

## 2025-05-17 PROCEDURE — 71045 X-RAY EXAM CHEST 1 VIEW: CPT

## 2025-05-17 PROCEDURE — 6360000002 HC RX W HCPCS: Performed by: INTERNAL MEDICINE

## 2025-05-17 PROCEDURE — 6370000000 HC RX 637 (ALT 250 FOR IP): Performed by: NURSE PRACTITIONER

## 2025-05-17 PROCEDURE — 94669 MECHANICAL CHEST WALL OSCILL: CPT

## 2025-05-17 PROCEDURE — 94761 N-INVAS EAR/PLS OXIMETRY MLT: CPT

## 2025-05-17 PROCEDURE — 51701 INSERT BLADDER CATHETER: CPT

## 2025-05-17 PROCEDURE — 6370000000 HC RX 637 (ALT 250 FOR IP)

## 2025-05-17 PROCEDURE — 85025 COMPLETE CBC W/AUTO DIFF WBC: CPT

## 2025-05-17 PROCEDURE — 80048 BASIC METABOLIC PNL TOTAL CA: CPT

## 2025-05-17 PROCEDURE — 51798 US URINE CAPACITY MEASURE: CPT

## 2025-05-17 PROCEDURE — 99233 SBSQ HOSP IP/OBS HIGH 50: CPT | Performed by: INTERNAL MEDICINE

## 2025-05-17 RX ADMIN — METOPROLOL TARTRATE 25 MG: 25 TABLET, FILM COATED ORAL at 08:57

## 2025-05-17 RX ADMIN — METOPROLOL TARTRATE 25 MG: 25 TABLET, FILM COATED ORAL at 19:53

## 2025-05-17 RX ADMIN — Medication 50 MG: at 08:57

## 2025-05-17 RX ADMIN — VITAM B12 100 MCG: 100 TAB at 08:57

## 2025-05-17 RX ADMIN — FUROSEMIDE 40 MG: 40 TABLET ORAL at 08:57

## 2025-05-17 RX ADMIN — ALLOPURINOL 300 MG: 300 TABLET ORAL at 19:54

## 2025-05-17 RX ADMIN — EMPAGLIFLOZIN 10 MG: 10 TABLET, FILM COATED ORAL at 08:57

## 2025-05-17 RX ADMIN — LEVOTHYROXINE SODIUM 25 MCG: 0.03 TABLET ORAL at 08:56

## 2025-05-17 RX ADMIN — SODIUM CHLORIDE, PRESERVATIVE FREE 10 ML: 5 INJECTION INTRAVENOUS at 08:59

## 2025-05-17 RX ADMIN — IPRATROPIUM BROMIDE AND ALBUTEROL SULFATE 1 DOSE: 2.5; .5 SOLUTION RESPIRATORY (INHALATION) at 20:20

## 2025-05-17 RX ADMIN — BENZOCAINE 6 MG-MENTHOL 10 MG LOZENGES 1 LOZENGE: at 03:14

## 2025-05-17 RX ADMIN — SODIUM CHLORIDE, PRESERVATIVE FREE 10 ML: 5 INJECTION INTRAVENOUS at 19:55

## 2025-05-17 RX ADMIN — IPRATROPIUM BROMIDE AND ALBUTEROL SULFATE 1 DOSE: 2.5; .5 SOLUTION RESPIRATORY (INHALATION) at 15:22

## 2025-05-17 RX ADMIN — IPRATROPIUM BROMIDE AND ALBUTEROL SULFATE 1 DOSE: 2.5; .5 SOLUTION RESPIRATORY (INHALATION) at 11:25

## 2025-05-17 RX ADMIN — GUAIFENESIN 600 MG: 600 TABLET, MULTILAYER, EXTENDED RELEASE ORAL at 19:54

## 2025-05-17 RX ADMIN — ACETAMINOPHEN 650 MG: 325 TABLET ORAL at 08:56

## 2025-05-17 RX ADMIN — IPRATROPIUM BROMIDE AND ALBUTEROL SULFATE 1 DOSE: 2.5; .5 SOLUTION RESPIRATORY (INHALATION) at 07:07

## 2025-05-17 RX ADMIN — OSELTAMIVIR PHOSPHATE 75 MG: 75 CAPSULE ORAL at 20:10

## 2025-05-17 RX ADMIN — APIXABAN 5 MG: 5 TABLET, FILM COATED ORAL at 19:54

## 2025-05-17 RX ADMIN — APIXABAN 5 MG: 5 TABLET, FILM COATED ORAL at 08:57

## 2025-05-17 RX ADMIN — OSELTAMIVIR PHOSPHATE 75 MG: 75 CAPSULE ORAL at 08:57

## 2025-05-17 RX ADMIN — CETIRIZINE HYDROCHLORIDE 5 MG: 10 TABLET, FILM COATED ORAL at 08:57

## 2025-05-17 RX ADMIN — LEVOFLOXACIN 500 MG: 500 TABLET, FILM COATED ORAL at 08:57

## 2025-05-17 RX ADMIN — PANTOPRAZOLE SODIUM 40 MG: 40 TABLET, DELAYED RELEASE ORAL at 08:56

## 2025-05-17 RX ADMIN — PREDNISONE 40 MG: 20 TABLET ORAL at 08:57

## 2025-05-17 RX ADMIN — ALLOPURINOL 300 MG: 300 TABLET ORAL at 08:57

## 2025-05-17 RX ADMIN — WATER 40 MG: 1 INJECTION INTRAMUSCULAR; INTRAVENOUS; SUBCUTANEOUS at 11:19

## 2025-05-17 RX ADMIN — BUDESONIDE 9 MG: 3 CAPSULE, GELATIN COATED ORAL at 08:57

## 2025-05-17 RX ADMIN — GUAIFENESIN 600 MG: 600 TABLET, MULTILAYER, EXTENDED RELEASE ORAL at 08:57

## 2025-05-17 RX ADMIN — WATER 40 MG: 1 INJECTION INTRAMUSCULAR; INTRAVENOUS; SUBCUTANEOUS at 18:06

## 2025-05-17 RX ADMIN — ATORVASTATIN CALCIUM 20 MG: 20 TABLET, FILM COATED ORAL at 08:57

## 2025-05-17 ASSESSMENT — PAIN DESCRIPTION - LOCATION
LOCATION: THROAT
LOCATION: BACK

## 2025-05-17 ASSESSMENT — PAIN SCALES - GENERAL
PAINLEVEL_OUTOF10: 4
PAINLEVEL_OUTOF10: 6

## 2025-05-17 ASSESSMENT — PAIN DESCRIPTION - ORIENTATION: ORIENTATION: MID

## 2025-05-17 ASSESSMENT — PAIN DESCRIPTION - DESCRIPTORS: DESCRIPTORS: GNAWING;ACHING

## 2025-05-17 ASSESSMENT — PAIN - FUNCTIONAL ASSESSMENT: PAIN_FUNCTIONAL_ASSESSMENT: ACTIVITIES ARE NOT PREVENTED

## 2025-05-18 LAB
ARTERIAL PATENCY WRIST A: ABNORMAL
BDY SITE: ABNORMAL
BODY TEMPERATURE: 37
COHGB MFR BLD: 1.8 % (ref 0–5)
GAS FLOW.O2 O2 DELIVERY SYS: ABNORMAL L/MIN
GLUCOSE BLD-MCNC: 308 MG/DL (ref 75–110)
HCO3 ARTERIAL: 35.5 MMOL/L (ref 22–26)
METHEMOGLOBIN: 0.3 % (ref 0–1.9)
O2 SAT, ARTERIAL: 88.2 % (ref 95–98)
PCO2 ARTERIAL: 53.9 MMHG (ref 35–45)
PH ARTERIAL: 7.44 (ref 7.35–7.45)
PO2 ARTERIAL: 57.7 MMHG (ref 80–100)
POSITIVE BASE EXCESS, ART: 9.3 MMOL/L (ref 0–2)
PROCALCITONIN SERPL-MCNC: 0.1 NG/ML (ref 0–0.09)
PT. POSITION: ABNORMAL
RESPIRATORY RATE: 18

## 2025-05-18 PROCEDURE — 6370000000 HC RX 637 (ALT 250 FOR IP)

## 2025-05-18 PROCEDURE — 1200000000 HC SEMI PRIVATE

## 2025-05-18 PROCEDURE — 36600 WITHDRAWAL OF ARTERIAL BLOOD: CPT

## 2025-05-18 PROCEDURE — 99233 SBSQ HOSP IP/OBS HIGH 50: CPT | Performed by: INTERNAL MEDICINE

## 2025-05-18 PROCEDURE — 82805 BLOOD GASES W/O2 SATURATION: CPT

## 2025-05-18 PROCEDURE — 6360000002 HC RX W HCPCS: Performed by: INTERNAL MEDICINE

## 2025-05-18 PROCEDURE — 6370000000 HC RX 637 (ALT 250 FOR IP): Performed by: INTERNAL MEDICINE

## 2025-05-18 PROCEDURE — 82947 ASSAY GLUCOSE BLOOD QUANT: CPT

## 2025-05-18 PROCEDURE — 2500000003 HC RX 250 WO HCPCS

## 2025-05-18 PROCEDURE — 36415 COLL VENOUS BLD VENIPUNCTURE: CPT

## 2025-05-18 PROCEDURE — 94640 AIRWAY INHALATION TREATMENT: CPT

## 2025-05-18 PROCEDURE — 97110 THERAPEUTIC EXERCISES: CPT

## 2025-05-18 PROCEDURE — 84145 PROCALCITONIN (PCT): CPT

## 2025-05-18 PROCEDURE — 94761 N-INVAS EAR/PLS OXIMETRY MLT: CPT

## 2025-05-18 PROCEDURE — 2700000000 HC OXYGEN THERAPY PER DAY

## 2025-05-18 PROCEDURE — 2500000003 HC RX 250 WO HCPCS: Performed by: INTERNAL MEDICINE

## 2025-05-18 PROCEDURE — 97116 GAIT TRAINING THERAPY: CPT

## 2025-05-18 RX ORDER — FUROSEMIDE 10 MG/ML
40 INJECTION INTRAMUSCULAR; INTRAVENOUS 2 TIMES DAILY
Status: DISCONTINUED | OUTPATIENT
Start: 2025-05-18 | End: 2025-05-21

## 2025-05-18 RX ADMIN — METOPROLOL TARTRATE 25 MG: 25 TABLET, FILM COATED ORAL at 19:44

## 2025-05-18 RX ADMIN — APIXABAN 5 MG: 5 TABLET, FILM COATED ORAL at 08:56

## 2025-05-18 RX ADMIN — LEVOTHYROXINE SODIUM 25 MCG: 0.03 TABLET ORAL at 08:56

## 2025-05-18 RX ADMIN — ALLOPURINOL 300 MG: 300 TABLET ORAL at 08:56

## 2025-05-18 RX ADMIN — CETIRIZINE HYDROCHLORIDE 5 MG: 10 TABLET, FILM COATED ORAL at 08:56

## 2025-05-18 RX ADMIN — Medication 50 MG: at 08:58

## 2025-05-18 RX ADMIN — GUAIFENESIN 600 MG: 600 TABLET, MULTILAYER, EXTENDED RELEASE ORAL at 08:56

## 2025-05-18 RX ADMIN — PANTOPRAZOLE SODIUM 40 MG: 40 TABLET, DELAYED RELEASE ORAL at 08:56

## 2025-05-18 RX ADMIN — WATER 40 MG: 1 INJECTION INTRAMUSCULAR; INTRAVENOUS; SUBCUTANEOUS at 03:24

## 2025-05-18 RX ADMIN — APIXABAN 5 MG: 5 TABLET, FILM COATED ORAL at 19:44

## 2025-05-18 RX ADMIN — IPRATROPIUM BROMIDE AND ALBUTEROL SULFATE 1 DOSE: 2.5; .5 SOLUTION RESPIRATORY (INHALATION) at 20:08

## 2025-05-18 RX ADMIN — BUDESONIDE 9 MG: 3 CAPSULE, GELATIN COATED ORAL at 08:58

## 2025-05-18 RX ADMIN — OSELTAMIVIR PHOSPHATE 75 MG: 75 CAPSULE ORAL at 08:59

## 2025-05-18 RX ADMIN — GUAIFENESIN 600 MG: 600 TABLET, MULTILAYER, EXTENDED RELEASE ORAL at 19:44

## 2025-05-18 RX ADMIN — WATER 40 MG: 1 INJECTION INTRAMUSCULAR; INTRAVENOUS; SUBCUTANEOUS at 08:57

## 2025-05-18 RX ADMIN — IPRATROPIUM BROMIDE AND ALBUTEROL SULFATE 1 DOSE: 2.5; .5 SOLUTION RESPIRATORY (INHALATION) at 11:04

## 2025-05-18 RX ADMIN — VITAM B12 100 MCG: 100 TAB at 08:58

## 2025-05-18 RX ADMIN — ACETAMINOPHEN 650 MG: 325 TABLET ORAL at 08:57

## 2025-05-18 RX ADMIN — FUROSEMIDE 40 MG: 40 TABLET ORAL at 08:56

## 2025-05-18 RX ADMIN — IPRATROPIUM BROMIDE AND ALBUTEROL SULFATE 1 DOSE: 2.5; .5 SOLUTION RESPIRATORY (INHALATION) at 07:17

## 2025-05-18 RX ADMIN — ALLOPURINOL 300 MG: 300 TABLET ORAL at 19:44

## 2025-05-18 RX ADMIN — WATER 40 MG: 1 INJECTION INTRAMUSCULAR; INTRAVENOUS; SUBCUTANEOUS at 17:39

## 2025-05-18 RX ADMIN — IPRATROPIUM BROMIDE AND ALBUTEROL SULFATE 1 DOSE: 2.5; .5 SOLUTION RESPIRATORY (INHALATION) at 14:50

## 2025-05-18 RX ADMIN — METOPROLOL TARTRATE 25 MG: 25 TABLET, FILM COATED ORAL at 08:56

## 2025-05-18 RX ADMIN — LEVOFLOXACIN 500 MG: 500 TABLET, FILM COATED ORAL at 08:56

## 2025-05-18 RX ADMIN — OSELTAMIVIR PHOSPHATE 75 MG: 75 CAPSULE ORAL at 19:44

## 2025-05-18 RX ADMIN — SODIUM CHLORIDE, PRESERVATIVE FREE 10 ML: 5 INJECTION INTRAVENOUS at 08:59

## 2025-05-18 RX ADMIN — FUROSEMIDE 40 MG: 10 INJECTION, SOLUTION INTRAMUSCULAR; INTRAVENOUS at 19:44

## 2025-05-18 RX ADMIN — ATORVASTATIN CALCIUM 20 MG: 20 TABLET, FILM COATED ORAL at 08:56

## 2025-05-18 RX ADMIN — SODIUM CHLORIDE, PRESERVATIVE FREE 10 ML: 5 INJECTION INTRAVENOUS at 19:45

## 2025-05-18 ASSESSMENT — PAIN - FUNCTIONAL ASSESSMENT: PAIN_FUNCTIONAL_ASSESSMENT: ACTIVITIES ARE NOT PREVENTED

## 2025-05-18 ASSESSMENT — PAIN SCALES - GENERAL
PAINLEVEL_OUTOF10: 0
PAINLEVEL_OUTOF10: 5

## 2025-05-18 ASSESSMENT — PAIN DESCRIPTION - DESCRIPTORS: DESCRIPTORS: ACHING;GNAWING

## 2025-05-18 ASSESSMENT — PAIN DESCRIPTION - ORIENTATION: ORIENTATION: MID

## 2025-05-18 ASSESSMENT — PAIN DESCRIPTION - LOCATION: LOCATION: BACK

## 2025-05-19 ENCOUNTER — APPOINTMENT (OUTPATIENT)
Dept: GENERAL RADIOLOGY | Age: 80
DRG: 640 | End: 2025-05-19
Payer: MEDICARE

## 2025-05-19 LAB
ANION GAP SERPL CALCULATED.3IONS-SCNC: 9 MMOL/L (ref 9–16)
BASOPHILS # BLD: 0 K/UL (ref 0–0.2)
BASOPHILS NFR BLD: 0 % (ref 0–2)
BUN SERPL-MCNC: 47 MG/DL (ref 8–23)
CALCIUM SERPL-MCNC: 9.3 MG/DL (ref 8.6–10.4)
CHLORIDE SERPL-SCNC: 94 MMOL/L (ref 98–107)
CO2 SERPL-SCNC: 36 MMOL/L (ref 20–31)
CREAT SERPL-MCNC: 1.3 MG/DL (ref 0.7–1.2)
EOSINOPHIL # BLD: 0 K/UL (ref 0–0.44)
EOSINOPHILS RELATIVE PERCENT: 0 % (ref 0–4)
ERYTHROCYTE [DISTWIDTH] IN BLOOD BY AUTOMATED COUNT: 15.6 % (ref 11.5–14.9)
EST. AVERAGE GLUCOSE BLD GHB EST-MCNC: 131 MG/DL
GFR, ESTIMATED: 55 ML/MIN/1.73M2
GLUCOSE BLD-MCNC: 195 MG/DL (ref 75–110)
GLUCOSE BLD-MCNC: 223 MG/DL (ref 75–110)
GLUCOSE BLD-MCNC: 224 MG/DL (ref 75–110)
GLUCOSE BLD-MCNC: 234 MG/DL (ref 75–110)
GLUCOSE BLD-MCNC: 361 MG/DL (ref 75–110)
GLUCOSE BLD-MCNC: 363 MG/DL (ref 75–110)
GLUCOSE SERPL-MCNC: 241 MG/DL (ref 74–99)
HBA1C MFR BLD: 6.2 % (ref 4–6)
HCT VFR BLD AUTO: 48.5 % (ref 41–53)
HGB BLD-MCNC: 15.2 G/DL (ref 13.5–17.5)
IMM GRANULOCYTES # BLD AUTO: 0.31 K/UL (ref 0–0.3)
IMM GRANULOCYTES NFR BLD: 4 %
LYMPHOCYTES NFR BLD: 0.54 K/UL (ref 1.1–3.7)
LYMPHOCYTES RELATIVE PERCENT: 7 % (ref 24–44)
MCH RBC QN AUTO: 32.5 PG (ref 26–34)
MCHC RBC AUTO-ENTMCNC: 31.3 G/DL (ref 31–37)
MCV RBC AUTO: 103.6 FL (ref 80–100)
MONOCYTES NFR BLD: 0.23 K/UL (ref 0.1–1.2)
MONOCYTES NFR BLD: 3 % (ref 3–12)
MORPHOLOGY: ABNORMAL
NEUTROPHILS NFR BLD: 86 % (ref 36–66)
NEUTS SEG NFR BLD: 6.62 K/UL (ref 1.5–8.1)
NRBC BLD-RTO: 0.5 PER 100 WBC
PLATELET # BLD AUTO: 184 K/UL (ref 150–450)
PMV BLD AUTO: 10.1 FL (ref 8–13.5)
POTASSIUM SERPL-SCNC: 4.8 MMOL/L (ref 3.7–5.3)
RBC # BLD AUTO: 4.68 M/UL (ref 4.21–5.77)
SODIUM SERPL-SCNC: 139 MMOL/L (ref 136–145)
WBC OTHER # BLD: 7.7 K/UL (ref 3.5–11)

## 2025-05-19 PROCEDURE — 82947 ASSAY GLUCOSE BLOOD QUANT: CPT

## 2025-05-19 PROCEDURE — 36415 COLL VENOUS BLD VENIPUNCTURE: CPT

## 2025-05-19 PROCEDURE — 97110 THERAPEUTIC EXERCISES: CPT

## 2025-05-19 PROCEDURE — 1200000000 HC SEMI PRIVATE

## 2025-05-19 PROCEDURE — 99233 SBSQ HOSP IP/OBS HIGH 50: CPT | Performed by: INTERNAL MEDICINE

## 2025-05-19 PROCEDURE — 6360000002 HC RX W HCPCS: Performed by: INTERNAL MEDICINE

## 2025-05-19 PROCEDURE — 97530 THERAPEUTIC ACTIVITIES: CPT

## 2025-05-19 PROCEDURE — 6370000000 HC RX 637 (ALT 250 FOR IP): Performed by: INTERNAL MEDICINE

## 2025-05-19 PROCEDURE — 2700000000 HC OXYGEN THERAPY PER DAY

## 2025-05-19 PROCEDURE — 80048 BASIC METABOLIC PNL TOTAL CA: CPT

## 2025-05-19 PROCEDURE — 94640 AIRWAY INHALATION TREATMENT: CPT

## 2025-05-19 PROCEDURE — 83036 HEMOGLOBIN GLYCOSYLATED A1C: CPT

## 2025-05-19 PROCEDURE — 85025 COMPLETE CBC W/AUTO DIFF WBC: CPT

## 2025-05-19 PROCEDURE — 2500000003 HC RX 250 WO HCPCS

## 2025-05-19 PROCEDURE — 71045 X-RAY EXAM CHEST 1 VIEW: CPT

## 2025-05-19 PROCEDURE — 99211 OFF/OP EST MAY X REQ PHY/QHP: CPT

## 2025-05-19 PROCEDURE — 2500000003 HC RX 250 WO HCPCS: Performed by: INTERNAL MEDICINE

## 2025-05-19 PROCEDURE — 6370000000 HC RX 637 (ALT 250 FOR IP)

## 2025-05-19 RX ORDER — INSULIN LISPRO 100 [IU]/ML
5 INJECTION, SOLUTION INTRAVENOUS; SUBCUTANEOUS ONCE
Status: COMPLETED | OUTPATIENT
Start: 2025-05-19 | End: 2025-05-19

## 2025-05-19 RX ORDER — INSULIN LISPRO 100 [IU]/ML
0-4 INJECTION, SOLUTION INTRAVENOUS; SUBCUTANEOUS
Status: DISCONTINUED | OUTPATIENT
Start: 2025-05-19 | End: 2025-05-25 | Stop reason: HOSPADM

## 2025-05-19 RX ORDER — DEXTROSE MONOHYDRATE 100 MG/ML
INJECTION, SOLUTION INTRAVENOUS CONTINUOUS PRN
Status: DISCONTINUED | OUTPATIENT
Start: 2025-05-19 | End: 2025-05-25 | Stop reason: HOSPADM

## 2025-05-19 RX ORDER — ACETYLCYSTEINE 200 MG/ML
600 SOLUTION ORAL; RESPIRATORY (INHALATION)
Status: DISCONTINUED | OUTPATIENT
Start: 2025-05-19 | End: 2025-05-24

## 2025-05-19 RX ORDER — INSULIN GLARGINE 100 [IU]/ML
10 INJECTION, SOLUTION SUBCUTANEOUS DAILY
Status: DISCONTINUED | OUTPATIENT
Start: 2025-05-19 | End: 2025-05-20

## 2025-05-19 RX ADMIN — WATER 40 MG: 1 INJECTION INTRAMUSCULAR; INTRAVENOUS; SUBCUTANEOUS at 10:04

## 2025-05-19 RX ADMIN — INSULIN GLARGINE 10 UNITS: 100 INJECTION, SOLUTION SUBCUTANEOUS at 21:42

## 2025-05-19 RX ADMIN — ALLOPURINOL 300 MG: 300 TABLET ORAL at 20:26

## 2025-05-19 RX ADMIN — APIXABAN 5 MG: 5 TABLET, FILM COATED ORAL at 10:03

## 2025-05-19 RX ADMIN — ATORVASTATIN CALCIUM 20 MG: 20 TABLET, FILM COATED ORAL at 10:03

## 2025-05-19 RX ADMIN — OSELTAMIVIR PHOSPHATE 75 MG: 75 CAPSULE ORAL at 20:27

## 2025-05-19 RX ADMIN — ACETYLCYSTEINE 600 MG: 200 INHALANT RESPIRATORY (INHALATION) at 19:14

## 2025-05-19 RX ADMIN — IPRATROPIUM BROMIDE AND ALBUTEROL SULFATE 1 DOSE: 2.5; .5 SOLUTION RESPIRATORY (INHALATION) at 19:14

## 2025-05-19 RX ADMIN — APIXABAN 5 MG: 5 TABLET, FILM COATED ORAL at 20:26

## 2025-05-19 RX ADMIN — GUAIFENESIN 600 MG: 600 TABLET, MULTILAYER, EXTENDED RELEASE ORAL at 10:03

## 2025-05-19 RX ADMIN — FUROSEMIDE 40 MG: 10 INJECTION, SOLUTION INTRAMUSCULAR; INTRAVENOUS at 17:55

## 2025-05-19 RX ADMIN — SODIUM CHLORIDE, PRESERVATIVE FREE 10 ML: 5 INJECTION INTRAVENOUS at 20:29

## 2025-05-19 RX ADMIN — LEVOTHYROXINE SODIUM 25 MCG: 0.03 TABLET ORAL at 06:12

## 2025-05-19 RX ADMIN — ALLOPURINOL 300 MG: 300 TABLET ORAL at 10:03

## 2025-05-19 RX ADMIN — OSELTAMIVIR PHOSPHATE 75 MG: 75 CAPSULE ORAL at 10:10

## 2025-05-19 RX ADMIN — METOPROLOL TARTRATE 25 MG: 25 TABLET, FILM COATED ORAL at 10:03

## 2025-05-19 RX ADMIN — INSULIN LISPRO 4 UNITS: 100 INJECTION, SOLUTION INTRAVENOUS; SUBCUTANEOUS at 21:42

## 2025-05-19 RX ADMIN — SODIUM CHLORIDE, PRESERVATIVE FREE 10 ML: 5 INJECTION INTRAVENOUS at 10:17

## 2025-05-19 RX ADMIN — LEVOFLOXACIN 500 MG: 500 TABLET, FILM COATED ORAL at 10:03

## 2025-05-19 RX ADMIN — INSULIN LISPRO 1 UNITS: 100 INJECTION, SOLUTION INTRAVENOUS; SUBCUTANEOUS at 17:55

## 2025-05-19 RX ADMIN — VITAM B12 100 MCG: 100 TAB at 10:10

## 2025-05-19 RX ADMIN — IPRATROPIUM BROMIDE AND ALBUTEROL SULFATE 1 DOSE: 2.5; .5 SOLUTION RESPIRATORY (INHALATION) at 15:28

## 2025-05-19 RX ADMIN — CETIRIZINE HYDROCHLORIDE 5 MG: 10 TABLET, FILM COATED ORAL at 10:03

## 2025-05-19 RX ADMIN — Medication 50 MG: at 10:10

## 2025-05-19 RX ADMIN — WATER 40 MG: 1 INJECTION INTRAMUSCULAR; INTRAVENOUS; SUBCUTANEOUS at 03:56

## 2025-05-19 RX ADMIN — WATER 40 MG: 1 INJECTION INTRAMUSCULAR; INTRAVENOUS; SUBCUTANEOUS at 17:55

## 2025-05-19 RX ADMIN — INSULIN LISPRO 1 UNITS: 100 INJECTION, SOLUTION INTRAVENOUS; SUBCUTANEOUS at 12:22

## 2025-05-19 RX ADMIN — GUAIFENESIN 600 MG: 600 TABLET, MULTILAYER, EXTENDED RELEASE ORAL at 20:26

## 2025-05-19 RX ADMIN — BUDESONIDE 9 MG: 3 CAPSULE, GELATIN COATED ORAL at 10:10

## 2025-05-19 RX ADMIN — IPRATROPIUM BROMIDE AND ALBUTEROL SULFATE 1 DOSE: 2.5; .5 SOLUTION RESPIRATORY (INHALATION) at 07:14

## 2025-05-19 RX ADMIN — ACETYLCYSTEINE 600 MG: 200 INHALANT RESPIRATORY (INHALATION) at 11:23

## 2025-05-19 RX ADMIN — INSULIN LISPRO 5 UNITS: 100 INJECTION, SOLUTION INTRAVENOUS; SUBCUTANEOUS at 23:07

## 2025-05-19 RX ADMIN — IPRATROPIUM BROMIDE AND ALBUTEROL SULFATE 1 DOSE: 2.5; .5 SOLUTION RESPIRATORY (INHALATION) at 11:23

## 2025-05-19 RX ADMIN — FUROSEMIDE 40 MG: 10 INJECTION, SOLUTION INTRAMUSCULAR; INTRAVENOUS at 10:03

## 2025-05-19 RX ADMIN — PANTOPRAZOLE SODIUM 40 MG: 40 TABLET, DELAYED RELEASE ORAL at 06:12

## 2025-05-19 RX ADMIN — METOPROLOL TARTRATE 25 MG: 25 TABLET, FILM COATED ORAL at 20:24

## 2025-05-19 ASSESSMENT — PAIN SCALES - GENERAL: PAINLEVEL_OUTOF10: 3

## 2025-05-20 LAB
ANION GAP SERPL CALCULATED.3IONS-SCNC: 10 MMOL/L (ref 9–16)
ANION GAP SERPL CALCULATED.3IONS-SCNC: 11 MMOL/L (ref 9–16)
BUN SERPL-MCNC: 53 MG/DL (ref 8–23)
BUN SERPL-MCNC: 55 MG/DL (ref 8–23)
CALCIUM SERPL-MCNC: 9.3 MG/DL (ref 8.6–10.4)
CALCIUM SERPL-MCNC: 9.4 MG/DL (ref 8.6–10.4)
CHLORIDE SERPL-SCNC: 92 MMOL/L (ref 98–107)
CHLORIDE SERPL-SCNC: 92 MMOL/L (ref 98–107)
CO2 SERPL-SCNC: 33 MMOL/L (ref 20–31)
CO2 SERPL-SCNC: 35 MMOL/L (ref 20–31)
CREAT SERPL-MCNC: 1.4 MG/DL (ref 0.7–1.2)
CREAT SERPL-MCNC: 1.5 MG/DL (ref 0.7–1.2)
GFR, ESTIMATED: 46 ML/MIN/1.73M2
GFR, ESTIMATED: 50 ML/MIN/1.73M2
GLUCOSE BLD-MCNC: 237 MG/DL (ref 75–110)
GLUCOSE BLD-MCNC: 266 MG/DL (ref 75–110)
GLUCOSE BLD-MCNC: 292 MG/DL (ref 75–110)
GLUCOSE BLD-MCNC: 329 MG/DL (ref 75–110)
GLUCOSE SERPL-MCNC: 275 MG/DL (ref 74–99)
GLUCOSE SERPL-MCNC: 333 MG/DL (ref 74–99)
POTASSIUM SERPL-SCNC: 5 MMOL/L (ref 3.7–5.3)
POTASSIUM SERPL-SCNC: 5.3 MMOL/L (ref 3.7–5.3)
SODIUM SERPL-SCNC: 136 MMOL/L (ref 136–145)
SODIUM SERPL-SCNC: 137 MMOL/L (ref 136–145)

## 2025-05-20 PROCEDURE — 2500000003 HC RX 250 WO HCPCS

## 2025-05-20 PROCEDURE — 6370000000 HC RX 637 (ALT 250 FOR IP): Performed by: INTERNAL MEDICINE

## 2025-05-20 PROCEDURE — 2500000003 HC RX 250 WO HCPCS: Performed by: INTERNAL MEDICINE

## 2025-05-20 PROCEDURE — 82947 ASSAY GLUCOSE BLOOD QUANT: CPT

## 2025-05-20 PROCEDURE — 36415 COLL VENOUS BLD VENIPUNCTURE: CPT

## 2025-05-20 PROCEDURE — 99233 SBSQ HOSP IP/OBS HIGH 50: CPT | Performed by: INTERNAL MEDICINE

## 2025-05-20 PROCEDURE — 80048 BASIC METABOLIC PNL TOTAL CA: CPT

## 2025-05-20 PROCEDURE — 97110 THERAPEUTIC EXERCISES: CPT

## 2025-05-20 PROCEDURE — 99214 OFFICE O/P EST MOD 30 MIN: CPT

## 2025-05-20 PROCEDURE — 2700000000 HC OXYGEN THERAPY PER DAY

## 2025-05-20 PROCEDURE — 51798 US URINE CAPACITY MEASURE: CPT

## 2025-05-20 PROCEDURE — 6370000000 HC RX 637 (ALT 250 FOR IP)

## 2025-05-20 PROCEDURE — 6360000002 HC RX W HCPCS: Performed by: INTERNAL MEDICINE

## 2025-05-20 PROCEDURE — 94761 N-INVAS EAR/PLS OXIMETRY MLT: CPT

## 2025-05-20 PROCEDURE — 94640 AIRWAY INHALATION TREATMENT: CPT

## 2025-05-20 PROCEDURE — 1200000000 HC SEMI PRIVATE

## 2025-05-20 RX ORDER — INSULIN GLARGINE 100 [IU]/ML
15 INJECTION, SOLUTION SUBCUTANEOUS DAILY
Status: DISCONTINUED | OUTPATIENT
Start: 2025-05-20 | End: 2025-05-25 | Stop reason: HOSPADM

## 2025-05-20 RX ORDER — HYDROCODONE BITARTRATE AND ACETAMINOPHEN 5; 325 MG/1; MG/1
1 TABLET ORAL EVERY 6 HOURS PRN
Refills: 0 | Status: DISCONTINUED | OUTPATIENT
Start: 2025-05-20 | End: 2025-05-25 | Stop reason: HOSPADM

## 2025-05-20 RX ADMIN — ACETYLCYSTEINE 600 MG: 200 INHALANT RESPIRATORY (INHALATION) at 19:59

## 2025-05-20 RX ADMIN — APIXABAN 5 MG: 5 TABLET, FILM COATED ORAL at 09:25

## 2025-05-20 RX ADMIN — IPRATROPIUM BROMIDE AND ALBUTEROL SULFATE 1 DOSE: 2.5; .5 SOLUTION RESPIRATORY (INHALATION) at 15:33

## 2025-05-20 RX ADMIN — Medication 50 MG: at 09:36

## 2025-05-20 RX ADMIN — WATER 40 MG: 1 INJECTION INTRAMUSCULAR; INTRAVENOUS; SUBCUTANEOUS at 20:21

## 2025-05-20 RX ADMIN — SODIUM CHLORIDE, PRESERVATIVE FREE 10 ML: 5 INJECTION INTRAVENOUS at 09:39

## 2025-05-20 RX ADMIN — LEVOTHYROXINE SODIUM 25 MCG: 0.03 TABLET ORAL at 09:40

## 2025-05-20 RX ADMIN — GUAIFENESIN 600 MG: 600 TABLET, MULTILAYER, EXTENDED RELEASE ORAL at 09:25

## 2025-05-20 RX ADMIN — CETIRIZINE HYDROCHLORIDE 5 MG: 10 TABLET, FILM COATED ORAL at 09:25

## 2025-05-20 RX ADMIN — HYDROCODONE BITARTRATE AND ACETAMINOPHEN 1 TABLET: 5; 325 TABLET ORAL at 22:29

## 2025-05-20 RX ADMIN — INSULIN LISPRO 2 UNITS: 100 INJECTION, SOLUTION INTRAVENOUS; SUBCUTANEOUS at 22:29

## 2025-05-20 RX ADMIN — LEVOFLOXACIN 500 MG: 500 TABLET, FILM COATED ORAL at 09:25

## 2025-05-20 RX ADMIN — INSULIN LISPRO 1 UNITS: 100 INJECTION, SOLUTION INTRAVENOUS; SUBCUTANEOUS at 09:27

## 2025-05-20 RX ADMIN — ALLOPURINOL 300 MG: 300 TABLET ORAL at 09:25

## 2025-05-20 RX ADMIN — HYDROCODONE BITARTRATE AND ACETAMINOPHEN 1 TABLET: 5; 325 TABLET ORAL at 12:20

## 2025-05-20 RX ADMIN — INSULIN LISPRO 2 UNITS: 100 INJECTION, SOLUTION INTRAVENOUS; SUBCUTANEOUS at 12:21

## 2025-05-20 RX ADMIN — ACETYLCYSTEINE 600 MG: 200 INHALANT RESPIRATORY (INHALATION) at 07:48

## 2025-05-20 RX ADMIN — BUDESONIDE 9 MG: 3 CAPSULE, GELATIN COATED ORAL at 09:36

## 2025-05-20 RX ADMIN — ALLOPURINOL 300 MG: 300 TABLET ORAL at 20:22

## 2025-05-20 RX ADMIN — FUROSEMIDE 40 MG: 10 INJECTION, SOLUTION INTRAMUSCULAR; INTRAVENOUS at 17:38

## 2025-05-20 RX ADMIN — IPRATROPIUM BROMIDE AND ALBUTEROL SULFATE 1 DOSE: 2.5; .5 SOLUTION RESPIRATORY (INHALATION) at 11:10

## 2025-05-20 RX ADMIN — FUROSEMIDE 40 MG: 10 INJECTION, SOLUTION INTRAMUSCULAR; INTRAVENOUS at 09:27

## 2025-05-20 RX ADMIN — INSULIN GLARGINE 15 UNITS: 100 INJECTION, SOLUTION SUBCUTANEOUS at 20:23

## 2025-05-20 RX ADMIN — POLYETHYLENE GLYCOL 3350 17 G: 17 POWDER, FOR SOLUTION ORAL at 09:36

## 2025-05-20 RX ADMIN — PANTOPRAZOLE SODIUM 40 MG: 40 TABLET, DELAYED RELEASE ORAL at 09:40

## 2025-05-20 RX ADMIN — METOPROLOL TARTRATE 25 MG: 25 TABLET, FILM COATED ORAL at 09:25

## 2025-05-20 RX ADMIN — METOPROLOL TARTRATE 25 MG: 25 TABLET, FILM COATED ORAL at 20:22

## 2025-05-20 RX ADMIN — WATER 40 MG: 1 INJECTION INTRAMUSCULAR; INTRAVENOUS; SUBCUTANEOUS at 02:15

## 2025-05-20 RX ADMIN — VITAM B12 100 MCG: 100 TAB at 09:36

## 2025-05-20 RX ADMIN — IPRATROPIUM BROMIDE AND ALBUTEROL SULFATE 1 DOSE: 2.5; .5 SOLUTION RESPIRATORY (INHALATION) at 07:48

## 2025-05-20 RX ADMIN — WATER 40 MG: 1 INJECTION INTRAMUSCULAR; INTRAVENOUS; SUBCUTANEOUS at 09:30

## 2025-05-20 RX ADMIN — INSULIN LISPRO 2 UNITS: 100 INJECTION, SOLUTION INTRAVENOUS; SUBCUTANEOUS at 17:39

## 2025-05-20 RX ADMIN — IPRATROPIUM BROMIDE AND ALBUTEROL SULFATE 1 DOSE: 2.5; .5 SOLUTION RESPIRATORY (INHALATION) at 19:59

## 2025-05-20 RX ADMIN — SODIUM CHLORIDE, PRESERVATIVE FREE 10 ML: 5 INJECTION INTRAVENOUS at 20:24

## 2025-05-20 RX ADMIN — GUAIFENESIN 600 MG: 600 TABLET, MULTILAYER, EXTENDED RELEASE ORAL at 20:22

## 2025-05-20 RX ADMIN — ATORVASTATIN CALCIUM 20 MG: 20 TABLET, FILM COATED ORAL at 09:25

## 2025-05-20 RX ADMIN — APIXABAN 5 MG: 5 TABLET, FILM COATED ORAL at 20:22

## 2025-05-20 ASSESSMENT — PAIN SCALES - GENERAL
PAINLEVEL_OUTOF10: 4
PAINLEVEL_OUTOF10: 0
PAINLEVEL_OUTOF10: 5
PAINLEVEL_OUTOF10: 2

## 2025-05-20 ASSESSMENT — PAIN DESCRIPTION - DESCRIPTORS
DESCRIPTORS: JABBING;SHARP;PRESSURE
DESCRIPTORS: ACHING

## 2025-05-20 ASSESSMENT — PAIN DESCRIPTION - LOCATION
LOCATION: LEG
LOCATION: BACK

## 2025-05-20 ASSESSMENT — PAIN DESCRIPTION - ORIENTATION
ORIENTATION: LEFT;RIGHT
ORIENTATION: LOWER

## 2025-05-20 ASSESSMENT — PAIN - FUNCTIONAL ASSESSMENT
PAIN_FUNCTIONAL_ASSESSMENT: ACTIVITIES ARE NOT PREVENTED
PAIN_FUNCTIONAL_ASSESSMENT: PREVENTS OR INTERFERES SOME ACTIVE ACTIVITIES AND ADLS

## 2025-05-21 LAB
ANION GAP SERPL CALCULATED.3IONS-SCNC: 9 MMOL/L (ref 9–16)
BUN SERPL-MCNC: 58 MG/DL (ref 8–23)
CALCIUM SERPL-MCNC: 9.2 MG/DL (ref 8.6–10.4)
CHLORIDE SERPL-SCNC: 91 MMOL/L (ref 98–107)
CO2 SERPL-SCNC: 35 MMOL/L (ref 20–31)
CREAT SERPL-MCNC: 1.3 MG/DL (ref 0.7–1.2)
GFR, ESTIMATED: 55 ML/MIN/1.73M2
GLUCOSE BLD-MCNC: 223 MG/DL (ref 75–110)
GLUCOSE BLD-MCNC: 280 MG/DL (ref 75–110)
GLUCOSE BLD-MCNC: 294 MG/DL (ref 75–110)
GLUCOSE BLD-MCNC: 381 MG/DL (ref 75–110)
GLUCOSE SERPL-MCNC: 263 MG/DL (ref 74–99)
POTASSIUM SERPL-SCNC: 5.1 MMOL/L (ref 3.7–5.3)
SODIUM SERPL-SCNC: 135 MMOL/L (ref 136–145)

## 2025-05-21 PROCEDURE — 36415 COLL VENOUS BLD VENIPUNCTURE: CPT

## 2025-05-21 PROCEDURE — 97116 GAIT TRAINING THERAPY: CPT

## 2025-05-21 PROCEDURE — 6360000002 HC RX W HCPCS: Performed by: INTERNAL MEDICINE

## 2025-05-21 PROCEDURE — 6370000000 HC RX 637 (ALT 250 FOR IP)

## 2025-05-21 PROCEDURE — 82947 ASSAY GLUCOSE BLOOD QUANT: CPT

## 2025-05-21 PROCEDURE — 2500000003 HC RX 250 WO HCPCS

## 2025-05-21 PROCEDURE — 80048 BASIC METABOLIC PNL TOTAL CA: CPT

## 2025-05-21 PROCEDURE — 94640 AIRWAY INHALATION TREATMENT: CPT

## 2025-05-21 PROCEDURE — 99232 SBSQ HOSP IP/OBS MODERATE 35: CPT | Performed by: INTERNAL MEDICINE

## 2025-05-21 PROCEDURE — 94669 MECHANICAL CHEST WALL OSCILL: CPT

## 2025-05-21 PROCEDURE — 6370000000 HC RX 637 (ALT 250 FOR IP): Performed by: INTERNAL MEDICINE

## 2025-05-21 PROCEDURE — 97110 THERAPEUTIC EXERCISES: CPT

## 2025-05-21 PROCEDURE — 1200000000 HC SEMI PRIVATE

## 2025-05-21 PROCEDURE — 2500000003 HC RX 250 WO HCPCS: Performed by: INTERNAL MEDICINE

## 2025-05-21 PROCEDURE — 94761 N-INVAS EAR/PLS OXIMETRY MLT: CPT

## 2025-05-21 RX ORDER — PREDNISONE 20 MG/1
40 TABLET ORAL DAILY
Status: DISCONTINUED | OUTPATIENT
Start: 2025-05-21 | End: 2025-05-22

## 2025-05-21 RX ORDER — FUROSEMIDE 40 MG/1
40 TABLET ORAL DAILY
Status: DISCONTINUED | OUTPATIENT
Start: 2025-05-21 | End: 2025-05-25 | Stop reason: HOSPADM

## 2025-05-21 RX ORDER — METOPROLOL TARTRATE 50 MG
50 TABLET ORAL 2 TIMES DAILY
Status: DISCONTINUED | OUTPATIENT
Start: 2025-05-21 | End: 2025-05-25 | Stop reason: HOSPADM

## 2025-05-21 RX ADMIN — ATORVASTATIN CALCIUM 20 MG: 20 TABLET, FILM COATED ORAL at 08:56

## 2025-05-21 RX ADMIN — CETIRIZINE HYDROCHLORIDE 5 MG: 10 TABLET, FILM COATED ORAL at 08:55

## 2025-05-21 RX ADMIN — APIXABAN 5 MG: 5 TABLET, FILM COATED ORAL at 21:22

## 2025-05-21 RX ADMIN — VITAM B12 100 MCG: 100 TAB at 08:57

## 2025-05-21 RX ADMIN — APIXABAN 5 MG: 5 TABLET, FILM COATED ORAL at 08:55

## 2025-05-21 RX ADMIN — IPRATROPIUM BROMIDE AND ALBUTEROL SULFATE 1 DOSE: 2.5; .5 SOLUTION RESPIRATORY (INHALATION) at 11:08

## 2025-05-21 RX ADMIN — LEVOTHYROXINE SODIUM 25 MCG: 0.03 TABLET ORAL at 05:47

## 2025-05-21 RX ADMIN — BUDESONIDE 9 MG: 3 CAPSULE, GELATIN COATED ORAL at 08:57

## 2025-05-21 RX ADMIN — HYDROCODONE BITARTRATE AND ACETAMINOPHEN 1 TABLET: 5; 325 TABLET ORAL at 17:17

## 2025-05-21 RX ADMIN — PANTOPRAZOLE SODIUM 40 MG: 40 TABLET, DELAYED RELEASE ORAL at 05:46

## 2025-05-21 RX ADMIN — ACETYLCYSTEINE 600 MG: 200 INHALANT RESPIRATORY (INHALATION) at 07:34

## 2025-05-21 RX ADMIN — INSULIN LISPRO 1 UNITS: 100 INJECTION, SOLUTION INTRAVENOUS; SUBCUTANEOUS at 08:57

## 2025-05-21 RX ADMIN — Medication 50 MG: at 08:57

## 2025-05-21 RX ADMIN — POLYETHYLENE GLYCOL 3350 17 G: 17 POWDER, FOR SOLUTION ORAL at 08:57

## 2025-05-21 RX ADMIN — ACETYLCYSTEINE 600 MG: 200 INHALANT RESPIRATORY (INHALATION) at 19:51

## 2025-05-21 RX ADMIN — GUAIFENESIN 600 MG: 600 TABLET, MULTILAYER, EXTENDED RELEASE ORAL at 21:22

## 2025-05-21 RX ADMIN — IPRATROPIUM BROMIDE AND ALBUTEROL SULFATE 1 DOSE: 2.5; .5 SOLUTION RESPIRATORY (INHALATION) at 07:34

## 2025-05-21 RX ADMIN — IPRATROPIUM BROMIDE AND ALBUTEROL SULFATE 1 DOSE: 2.5; .5 SOLUTION RESPIRATORY (INHALATION) at 14:53

## 2025-05-21 RX ADMIN — INSULIN LISPRO 2 UNITS: 100 INJECTION, SOLUTION INTRAVENOUS; SUBCUTANEOUS at 17:17

## 2025-05-21 RX ADMIN — METOPROLOL TARTRATE 50 MG: 50 TABLET, FILM COATED ORAL at 21:22

## 2025-05-21 RX ADMIN — WATER 40 MG: 1 INJECTION INTRAMUSCULAR; INTRAVENOUS; SUBCUTANEOUS at 08:57

## 2025-05-21 RX ADMIN — GUAIFENESIN 600 MG: 600 TABLET, MULTILAYER, EXTENDED RELEASE ORAL at 08:56

## 2025-05-21 RX ADMIN — FUROSEMIDE 40 MG: 10 INJECTION, SOLUTION INTRAMUSCULAR; INTRAVENOUS at 08:58

## 2025-05-21 RX ADMIN — IPRATROPIUM BROMIDE AND ALBUTEROL SULFATE 1 DOSE: 2.5; .5 SOLUTION RESPIRATORY (INHALATION) at 19:51

## 2025-05-21 RX ADMIN — SODIUM CHLORIDE, PRESERVATIVE FREE 10 ML: 5 INJECTION INTRAVENOUS at 21:21

## 2025-05-21 RX ADMIN — ALLOPURINOL 300 MG: 300 TABLET ORAL at 08:55

## 2025-05-21 RX ADMIN — ALLOPURINOL 300 MG: 300 TABLET ORAL at 21:22

## 2025-05-21 RX ADMIN — INSULIN LISPRO 2 UNITS: 100 INJECTION, SOLUTION INTRAVENOUS; SUBCUTANEOUS at 11:15

## 2025-05-21 RX ADMIN — METOPROLOL TARTRATE 25 MG: 25 TABLET, FILM COATED ORAL at 08:56

## 2025-05-21 RX ADMIN — HYDROCODONE BITARTRATE AND ACETAMINOPHEN 1 TABLET: 5; 325 TABLET ORAL at 08:56

## 2025-05-21 RX ADMIN — INSULIN GLARGINE 15 UNITS: 100 INJECTION, SOLUTION SUBCUTANEOUS at 21:21

## 2025-05-21 RX ADMIN — INSULIN LISPRO 4 UNITS: 100 INJECTION, SOLUTION INTRAVENOUS; SUBCUTANEOUS at 21:21

## 2025-05-21 ASSESSMENT — PAIN SCALES - GENERAL
PAINLEVEL_OUTOF10: 3
PAINLEVEL_OUTOF10: 5
PAINLEVEL_OUTOF10: 1
PAINLEVEL_OUTOF10: 6

## 2025-05-21 ASSESSMENT — PAIN DESCRIPTION - ORIENTATION: ORIENTATION: ANTERIOR;POSTERIOR

## 2025-05-21 ASSESSMENT — PAIN DESCRIPTION - LOCATION
LOCATION: BACK;LEG
LOCATION: BACK;LEG

## 2025-05-21 ASSESSMENT — PAIN - FUNCTIONAL ASSESSMENT: PAIN_FUNCTIONAL_ASSESSMENT: ACTIVITIES ARE NOT PREVENTED

## 2025-05-21 ASSESSMENT — PAIN DESCRIPTION - DESCRIPTORS: DESCRIPTORS: JABBING;DISCOMFORT;SHARP

## 2025-05-21 NOTE — ACP (ADVANCE CARE PLANNING)
..Advance Care Planning     Palliative Team Advance Care Planning (ACP) Conversation    Date of Conversation: 05/21/25    Individuals present for the conversation: Patient with decision making capacity     ACP documents on file prior to discussion:  -Power of  for Healthcare  -Living Will    Previously completed document/s not on file: Not discussed.    Healthcare Decision Maker:    Primary Decision Maker: Rosita Gilbert V - Spouse - [phone]     Conversation Summary:  The patient completes a julio cesar HCPOA, and he appoints his wife Rosita Gilbert , and no alternates. The document is signed and witnessed. I give Rosita the original and a copy. I place a copy in the paper chart.     Resuscitation Status:   Code Status: Full Code     Documentation Completed:  -Power of  for Healthcare and -Living Will      Rosio Godwin RN

## 2025-05-22 PROBLEM — R53.81 DEBILITY: Status: ACTIVE | Noted: 2025-05-22

## 2025-05-22 PROBLEM — R53.1 GENERALIZED WEAKNESS: Status: ACTIVE | Noted: 2025-05-22

## 2025-05-22 LAB
ANION GAP SERPL CALCULATED.3IONS-SCNC: 10 MMOL/L (ref 9–16)
BUN SERPL-MCNC: 55 MG/DL (ref 8–23)
CALCIUM SERPL-MCNC: 9.4 MG/DL (ref 8.6–10.4)
CHLORIDE SERPL-SCNC: 93 MMOL/L (ref 98–107)
CO2 SERPL-SCNC: 35 MMOL/L (ref 20–31)
CREAT SERPL-MCNC: 1.1 MG/DL (ref 0.7–1.2)
GFR, ESTIMATED: 67 ML/MIN/1.73M2
GLUCOSE BLD-MCNC: 156 MG/DL (ref 75–110)
GLUCOSE BLD-MCNC: 186 MG/DL (ref 75–110)
GLUCOSE BLD-MCNC: 262 MG/DL (ref 75–110)
GLUCOSE BLD-MCNC: 274 MG/DL (ref 75–110)
GLUCOSE BLD-MCNC: 277 MG/DL (ref 75–110)
GLUCOSE SERPL-MCNC: 181 MG/DL (ref 74–99)
POTASSIUM SERPL-SCNC: 4.5 MMOL/L (ref 3.7–5.3)
SODIUM SERPL-SCNC: 138 MMOL/L (ref 136–145)

## 2025-05-22 PROCEDURE — 97530 THERAPEUTIC ACTIVITIES: CPT

## 2025-05-22 PROCEDURE — 94640 AIRWAY INHALATION TREATMENT: CPT

## 2025-05-22 PROCEDURE — 6370000000 HC RX 637 (ALT 250 FOR IP): Performed by: NURSE PRACTITIONER

## 2025-05-22 PROCEDURE — 6370000000 HC RX 637 (ALT 250 FOR IP): Performed by: INTERNAL MEDICINE

## 2025-05-22 PROCEDURE — 97110 THERAPEUTIC EXERCISES: CPT

## 2025-05-22 PROCEDURE — 6370000000 HC RX 637 (ALT 250 FOR IP)

## 2025-05-22 PROCEDURE — 36415 COLL VENOUS BLD VENIPUNCTURE: CPT

## 2025-05-22 PROCEDURE — 99222 1ST HOSP IP/OBS MODERATE 55: CPT | Performed by: STUDENT IN AN ORGANIZED HEALTH CARE EDUCATION/TRAINING PROGRAM

## 2025-05-22 PROCEDURE — 80048 BASIC METABOLIC PNL TOTAL CA: CPT

## 2025-05-22 PROCEDURE — 2500000003 HC RX 250 WO HCPCS

## 2025-05-22 PROCEDURE — 99211 OFF/OP EST MAY X REQ PHY/QHP: CPT

## 2025-05-22 PROCEDURE — 82947 ASSAY GLUCOSE BLOOD QUANT: CPT

## 2025-05-22 PROCEDURE — 1200000000 HC SEMI PRIVATE

## 2025-05-22 PROCEDURE — 94761 N-INVAS EAR/PLS OXIMETRY MLT: CPT

## 2025-05-22 PROCEDURE — 99232 SBSQ HOSP IP/OBS MODERATE 35: CPT | Performed by: INTERNAL MEDICINE

## 2025-05-22 PROCEDURE — 94669 MECHANICAL CHEST WALL OSCILL: CPT

## 2025-05-22 RX ORDER — PREDNISONE 20 MG/1
40 TABLET ORAL DAILY
Status: DISCONTINUED | OUTPATIENT
Start: 2025-05-23 | End: 2025-05-24

## 2025-05-22 RX ADMIN — Medication 50 MG: at 08:28

## 2025-05-22 RX ADMIN — IPRATROPIUM BROMIDE AND ALBUTEROL SULFATE 1 DOSE: 2.5; .5 SOLUTION RESPIRATORY (INHALATION) at 07:22

## 2025-05-22 RX ADMIN — ACETYLCYSTEINE 600 MG: 200 INHALANT RESPIRATORY (INHALATION) at 07:22

## 2025-05-22 RX ADMIN — ALLOPURINOL 300 MG: 300 TABLET ORAL at 21:10

## 2025-05-22 RX ADMIN — IPRATROPIUM BROMIDE AND ALBUTEROL SULFATE 1 DOSE: 2.5; .5 SOLUTION RESPIRATORY (INHALATION) at 15:38

## 2025-05-22 RX ADMIN — SODIUM CHLORIDE, PRESERVATIVE FREE 10 ML: 5 INJECTION INTRAVENOUS at 21:13

## 2025-05-22 RX ADMIN — HYDROCODONE BITARTRATE AND ACETAMINOPHEN 1 TABLET: 5; 325 TABLET ORAL at 13:56

## 2025-05-22 RX ADMIN — ACETYLCYSTEINE 600 MG: 200 INHALANT RESPIRATORY (INHALATION) at 19:29

## 2025-05-22 RX ADMIN — FUROSEMIDE 40 MG: 40 TABLET ORAL at 08:16

## 2025-05-22 RX ADMIN — CETIRIZINE HYDROCHLORIDE 5 MG: 10 TABLET, FILM COATED ORAL at 08:16

## 2025-05-22 RX ADMIN — ATORVASTATIN CALCIUM 20 MG: 20 TABLET, FILM COATED ORAL at 08:16

## 2025-05-22 RX ADMIN — IPRATROPIUM BROMIDE AND ALBUTEROL SULFATE 1 DOSE: 2.5; .5 SOLUTION RESPIRATORY (INHALATION) at 19:29

## 2025-05-22 RX ADMIN — INSULIN LISPRO 2 UNITS: 100 INJECTION, SOLUTION INTRAVENOUS; SUBCUTANEOUS at 21:11

## 2025-05-22 RX ADMIN — GUAIFENESIN 600 MG: 600 TABLET, MULTILAYER, EXTENDED RELEASE ORAL at 21:10

## 2025-05-22 RX ADMIN — LEVOTHYROXINE SODIUM 25 MCG: 0.03 TABLET ORAL at 06:11

## 2025-05-22 RX ADMIN — HYDROCODONE BITARTRATE AND ACETAMINOPHEN 1 TABLET: 5; 325 TABLET ORAL at 21:10

## 2025-05-22 RX ADMIN — INSULIN LISPRO 1 UNITS: 100 INJECTION, SOLUTION INTRAVENOUS; SUBCUTANEOUS at 08:16

## 2025-05-22 RX ADMIN — PREDNISONE 40 MG: 20 TABLET ORAL at 08:16

## 2025-05-22 RX ADMIN — BENZOCAINE 6 MG-MENTHOL 10 MG LOZENGES 1 LOZENGE: at 21:10

## 2025-05-22 RX ADMIN — APIXABAN 5 MG: 5 TABLET, FILM COATED ORAL at 21:10

## 2025-05-22 RX ADMIN — IPRATROPIUM BROMIDE AND ALBUTEROL SULFATE 1 DOSE: 2.5; .5 SOLUTION RESPIRATORY (INHALATION) at 10:45

## 2025-05-22 RX ADMIN — METOPROLOL TARTRATE 50 MG: 50 TABLET, FILM COATED ORAL at 21:10

## 2025-05-22 RX ADMIN — INSULIN LISPRO 2 UNITS: 100 INJECTION, SOLUTION INTRAVENOUS; SUBCUTANEOUS at 17:35

## 2025-05-22 RX ADMIN — ALLOPURINOL 300 MG: 300 TABLET ORAL at 08:16

## 2025-05-22 RX ADMIN — BUTALBITA,ACETAMINOPHEN AND CAFFEINE 1 CAPSULE: 50; 300; 40 CAPSULE ORAL at 13:56

## 2025-05-22 RX ADMIN — INSULIN GLARGINE 15 UNITS: 100 INJECTION, SOLUTION SUBCUTANEOUS at 21:11

## 2025-05-22 RX ADMIN — METOPROLOL TARTRATE 50 MG: 50 TABLET, FILM COATED ORAL at 08:16

## 2025-05-22 RX ADMIN — HYDROCODONE BITARTRATE AND ACETAMINOPHEN 1 TABLET: 5; 325 TABLET ORAL at 08:16

## 2025-05-22 RX ADMIN — VITAM B12 100 MCG: 100 TAB at 08:28

## 2025-05-22 RX ADMIN — GUAIFENESIN 600 MG: 600 TABLET, MULTILAYER, EXTENDED RELEASE ORAL at 08:16

## 2025-05-22 RX ADMIN — BUDESONIDE 9 MG: 3 CAPSULE, GELATIN COATED ORAL at 08:28

## 2025-05-22 RX ADMIN — PANTOPRAZOLE SODIUM 40 MG: 40 TABLET, DELAYED RELEASE ORAL at 06:11

## 2025-05-22 RX ADMIN — APIXABAN 5 MG: 5 TABLET, FILM COATED ORAL at 08:16

## 2025-05-22 ASSESSMENT — PAIN DESCRIPTION - ORIENTATION
ORIENTATION: LOWER
ORIENTATION: LOWER

## 2025-05-22 ASSESSMENT — PAIN DESCRIPTION - DESCRIPTORS
DESCRIPTORS: ACHING

## 2025-05-22 ASSESSMENT — PAIN - FUNCTIONAL ASSESSMENT
PAIN_FUNCTIONAL_ASSESSMENT: PREVENTS OR INTERFERES WITH ALL ACTIVE AND SOME PASSIVE ACTIVITIES
PAIN_FUNCTIONAL_ASSESSMENT: PREVENTS OR INTERFERES SOME ACTIVE ACTIVITIES AND ADLS

## 2025-05-22 ASSESSMENT — PAIN SCALES - GENERAL
PAINLEVEL_OUTOF10: 7
PAINLEVEL_OUTOF10: 5
PAINLEVEL_OUTOF10: 0
PAINLEVEL_OUTOF10: 0
PAINLEVEL_OUTOF10: 4
PAINLEVEL_OUTOF10: 7

## 2025-05-22 ASSESSMENT — PAIN DESCRIPTION - LOCATION
LOCATION: BACK

## 2025-05-22 NOTE — CONSULTS
PULMONARY  CONSULT NOTE      Date of Admission: 5/10/2025  3:48 PM    Reason for Consult: resp failure, AMBER    Referring Physician: Dr Vaca  PCP: Gilma Barba MD     History of Present Illness:     80 years old patient with known AMBER admitted on 5/10/2025 due to weakness, lack of energy; has BL unna boots; requiring supplemental oxygen    Problem:  Principal Problem:     PMH:   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    Atrial flutter (HCC)     Cellulitis of lower extremity     right     Cerebral artery occlusion with cerebral infarction ([facility])     1996    CHF (congestive heart failure) ([facility])     Chronic acquired lymphedema     Clavicle fracture     in high school    Hernia, abdominal     History of blood transfusion 2003 7 2006    AUTOTRANSFUSION DURING SURGERY    History of CVA (cerebrovascular accident) 1996    DEFECIT MILD MEMORY AND SPEECH    Hx of blood clots     DVT    Hyperglycemia 12/20/2011    Hyperlipidemia     Hypertension     Hypothyroid 09/2015    Ileus, postoperative ([facility])     Mild renal insufficiency     Morbid obesity ([facility])     Non-healing wound of lower extremity     HISTORY OF, WAS SEEN IN WOUND CLINIC FOR COUPLE YRS.    Osteoarthritis     Phlebitis     HX. OF     Prediabetes 11/20/2023    Prolonged emergence from general anesthesia     x1 had to be put back on vent, after prostate surgery, had to be hospitalized x12 days.    Prostate CA (HCC)     Prostate cancer (HCC) 10/21/2015    PVD (peripheral vascular disease)     Sleep apnea     C-PAP NIGHTLY-PT INSTRUCTED TO BRING    SVT (supraventricular tachycardia)     Uric acid kidney stone 12/2014    HAD 6 PASSSED ONE ON OWN, OTHER 5 IS BEING MONITORED    Wears glasses     Wrist fracture rt,       PSH:   Past Surgical History:   Procedure Laterality Date    ABLATION OF DYSRHYTHMIC FOCUS  03/16/2018    afib ablation 
  Consult Note  Foot and Ankle Surgery    CC/Reason for consult:  bilateral foot wounds    HPI:    The patient is a 80 y.o. male seen at [place] [place] [facility] ED for extremity weakness. He states he went to the bathroom and was unable to stand up as his entire body felt generalized fatigue and weakness. He was seen recently in the ED for high potassium. He has followed with Dr. Trivedi in the past for bilateral leg edema and wounds as well as NP at the wound care center. He reports he does not follow with podiatry for his wounds or bilateral edema. He was getting weakly unna boot applications. He reports his left leg has increased redness than normal. He does have some pain to the lower extremities. He denies constitutional symptoms. States he lives with his wife at home. No other complaints.    Further work up reveals unremarkable inflammatory markers and white count. His wound to the right anterior leg is superficial. Most recent IVANA readings were within normal limits. Chest xray reveals mild bibasilar likely atelectasis and stable cardiomegaly. He has an extensive medical history listed below consisting of CHF, Atrial flutter, lymphedema, HTN, Hyperlipidemia.     Foot and ankle surgery was consulted for further recommendations of bilateral leg edema and wound.       PCP is Gilma Barba MD    ROS:   Review of Systems   Constitutional:  Positive for fatigue.   HENT: Negative.     Eyes: Negative.    Respiratory: Negative.     Cardiovascular:  Positive for leg swelling.   Gastrointestinal: Negative.    Endocrine: Negative.    Genitourinary: Negative.    Musculoskeletal:  Positive for gait problem and joint swelling.   Skin:  Positive for color change and wound.   Allergic/Immunologic: Negative.    Hematological: Negative.    Psychiatric/Behavioral: Negative.       Past Medical History  Past Medical History:   Diagnosis Date    Adenocarcinoma of prostate (HCC) 10/30/2015    Anemia     Anesthesia     diaphragm 
  NEPHROLOGY CONSULT     Patient :  Taqueria Gilbert; 80 y.o. MRN# [id number]  Location:  2072/2072-01  Attending:  Panfilo Steve MD  Admit Date:  5/10/2025   Hospital Day: 1      Reason for Consult:  Hypokalemia      Chief Complaint:  weakness, leg pain  History Obtained From:  patient    History of Present Illness:    This is a 80 y.o. male with past medical history of nephrolithiasis, prostate adenocarcinoma, status post prostatectomy, essential hypertension, CVA 1996, history of atrial flutter requiring ablation, CKD stage IIIb with baseline serum creatinine of 1.2 to 1.7 mg/dL was following up with [facility].  Patient presented to the hospital with complaints of lower extremity weakness and pain.  Labs showed K 3.5  Patient had Hyperkalemia in the previous hospitalization and was on Lokelma.  K has improved.  Patient is on jardiance, lasix      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    Hyperlipidemia     Hypertension     Hypothyroid 09/2015    Ileus, postoperative (HCC)     Mild renal insufficiency     Morbid obesity (HCC)     Non-healing wound of lower extremity     HISTORY OF, WAS SEEN IN WOUND CLINIC FOR COUPLE YRS.    Osteoarthritis     Phlebitis     HX. OF     Prediabetes 11/20/2023    Prolonged emergence from general anesthesia     x1 had to be put back on vent, after 
[facility]  Consult Note       NAME:  Taqueria Gilbert  MEDICAL RECORD NUMBER:  [id number]  AGE: 80 y.o.   GENDER: male  : 1945  TODAY'S DATE:  2025    Subjective:      Taqueria Gilbert is a 80 y.o. male with inpatient referral to [facility] for:  Bilateral leg wounds      Wound Identification:  Wound Type: venous and traumatic  Contributing Factors: edema, venous stasis, lymphedema, diabetes, and decreased tissue oxygenation    Wound History: Wound to right leg present for a few months, new blisters to dorsal feet/toes.   Current Wound Care Treatment:  unna boots    Patient Goal of Care:  [x] Wound Healing  [] Odor Control  [] Palliative Care  [] Pain Control   [] Other:         PAST MEDICAL HISTORY        Diagnosis Date    Adenocarcinoma of prostate (HCC) 10/30/2015    Anemia     Anesthesia     diaphragm paralyzed with nerve block to shoulder    Atrial flutter ([facility])     Cellulitis of lower extremity     right     Cerebral artery occlusion with cerebral infarction ([facility])         CHF (congestive heart failure) ([facility])     Chronic acquired lymphedema     Clavicle fracture     in high school    Hernia, abdominal     History of blood transfusion 2003    AUTOTRANSFUSION DURING SURGERY    History of CVA (cerebrovascular accident)     DEFECIT MILD MEMORY AND SPEECH    Hx of blood clots     DVT    Hyperglycemia 2011    Hyperlipidemia     Hypertension     Hypothyroid 2015    Ileus, postoperative ([facility])     Mild renal insufficiency     Morbid obesity ([facility])     Non-healing wound of lower extremity     HISTORY OF, WAS SEEN IN WOUND CLINIC FOR COUPLE YRS.    Osteoarthritis     Phlebitis     HX. OF     Prediabetes 2023    Prolonged emergence from general anesthesia     x1 had to be put back on vent, after prostate surgery, had to be hospitalized x12 days.    Prostate CA (HCC)     Prostate cancer (HCC) 10/21/2015    PVD (peripheral vascular disease)  
N/A 05/02/2022    COLONOSCOPY WITH RANDOM COLON BIOPSIES AND CLIPPING AT DISTAL TRANSVERSE COLON performed by Efrain Cabral MD at [facility] ENDO    COLONOSCOPY N/A 08/09/2022    COLONOSCOPY POLYPECTOMY SNARE/COLD BIOPSY performed by Isha Osorio MD at [facility] ENDO    CYST REMOVAL Left 08/05/2016    excision cyst anterior chest    FEMUR FRACTURE SURGERY Left 7/20/2023    RETROGRADE FEMORAL NAIL WITH CORTICOSTEROID INJECTION RIGHT HIP performed by Tad Danielle DO at [facility] OR    HAMMER TOE SURGERY Bilateral     KNEE SURGERY Left 1985    repair of torn ligaments    VT ARTHRP ACETBLR/PROX FEM PROSTC AGRFT/ALGRFT Left 05/15/2018    HIP TOTAL ARTHROPLASTY MINIMALLY INVASIVE ASI WITH GPS performed by Kieran Nicholson MD at [facility] OR    VT COLON CA SCRN NOT HI RSK IND N/A 07/31/2017    COLONOSCOPY performed by Efrain Cabral MD at [facility] OR    PROSTATECTOMY  10/21/2015    robotic    SHOULDER ARTHROPLASTY Bilateral     STUDY POSS ABLATION  04/02/2018         TONSILLECTOMY      TOTAL HIP ARTHROPLASTY Right 7/16/2024    HIP TOTAL ARTHROPLASTY ASI RIGHT performed by Kieran Nicholson MD at [facility] OR    TOTAL KNEE ARTHROPLASTY Bilateral LT-2003, RT-2006    TRANSESOPHAGEAL ECHOCARDIOGRAM  11/17/2017    TRANSESOPHAGEAL ECHOCARDIOGRAM  08/22/2019    TUMOR EXCISION      Throat/nose D/T benign tumor    UPPP UVULOPALATOPHARYGOPLASTY  90'S    VARICOSE VEIN SURGERY Bilateral 80's    x2       Allergies:    Allergies   Allergen Reactions    Adhesive Tape Itching and Rash    Doxycycline Rash        Current Medications:   Current Facility-Administered Medications: [START ON 5/23/2025] predniSONE (DELTASONE) tablet 40 mg, 40 mg, Oral, Daily  furosemide (LASIX) tablet 40 mg, 40 mg, Oral, Daily  metoprolol tartrate (LOPRESSOR) tablet 50 mg, 50 mg, Oral, BID  insulin glargine (LANTUS) injection vial 15 Units, 15 Units, SubCUTAneous, Daily  HYDROcodone-acetaminophen (NORCO) 5-325 MG per tablet 1 tablet, 1 tablet, Oral, Q6H PRN  insulin lispro 
independently    [x] Patient and Family    [] Family or Healthcare DPOA independently    [] Unable to discuss with patient, family/DPOA not present    Code Status  Full Code    Other recommendations:  Please call with any palliative questions or concerns.  Palliative Care Team is available via perfect serve or via phone - [phone].     Palliative Care will continue to follow Mr. Gilbert's care as needed.      Thank you for allowing Palliative Care to participate in the care of Mr. Gilbert .    Electronically signed by   Rosio Godwin RN  Palliative Care Team  on 5/20/2025 at 3:13 PM

## 2025-05-23 LAB
GLUCOSE BLD-MCNC: 149 MG/DL (ref 75–110)
GLUCOSE BLD-MCNC: 192 MG/DL (ref 75–110)
GLUCOSE BLD-MCNC: 257 MG/DL (ref 75–110)
GLUCOSE BLD-MCNC: 259 MG/DL (ref 75–110)

## 2025-05-23 PROCEDURE — 82947 ASSAY GLUCOSE BLOOD QUANT: CPT

## 2025-05-23 PROCEDURE — 6370000000 HC RX 637 (ALT 250 FOR IP): Performed by: INTERNAL MEDICINE

## 2025-05-23 PROCEDURE — 97110 THERAPEUTIC EXERCISES: CPT

## 2025-05-23 PROCEDURE — 97530 THERAPEUTIC ACTIVITIES: CPT

## 2025-05-23 PROCEDURE — 94640 AIRWAY INHALATION TREATMENT: CPT

## 2025-05-23 PROCEDURE — 6370000000 HC RX 637 (ALT 250 FOR IP)

## 2025-05-23 PROCEDURE — 6360000002 HC RX W HCPCS: Performed by: INTERNAL MEDICINE

## 2025-05-23 PROCEDURE — 2500000003 HC RX 250 WO HCPCS

## 2025-05-23 PROCEDURE — 1200000000 HC SEMI PRIVATE

## 2025-05-23 PROCEDURE — 99233 SBSQ HOSP IP/OBS HIGH 50: CPT

## 2025-05-23 PROCEDURE — 94761 N-INVAS EAR/PLS OXIMETRY MLT: CPT

## 2025-05-23 PROCEDURE — 6370000000 HC RX 637 (ALT 250 FOR IP): Performed by: NURSE PRACTITIONER

## 2025-05-23 PROCEDURE — 94669 MECHANICAL CHEST WALL OSCILL: CPT

## 2025-05-23 RX ADMIN — ACETYLCYSTEINE 600 MG: 200 INHALANT RESPIRATORY (INHALATION) at 07:02

## 2025-05-23 RX ADMIN — INSULIN LISPRO 2 UNITS: 100 INJECTION, SOLUTION INTRAVENOUS; SUBCUTANEOUS at 21:24

## 2025-05-23 RX ADMIN — HYDROCODONE BITARTRATE AND ACETAMINOPHEN 1 TABLET: 5; 325 TABLET ORAL at 21:25

## 2025-05-23 RX ADMIN — SODIUM CHLORIDE, PRESERVATIVE FREE 10 ML: 5 INJECTION INTRAVENOUS at 07:54

## 2025-05-23 RX ADMIN — APIXABAN 5 MG: 5 TABLET, FILM COATED ORAL at 07:55

## 2025-05-23 RX ADMIN — FUROSEMIDE 40 MG: 40 TABLET ORAL at 07:57

## 2025-05-23 RX ADMIN — Medication 50 MG: at 07:55

## 2025-05-23 RX ADMIN — CETIRIZINE HYDROCHLORIDE 5 MG: 10 TABLET, FILM COATED ORAL at 07:55

## 2025-05-23 RX ADMIN — INSULIN LISPRO 1 UNITS: 100 INJECTION, SOLUTION INTRAVENOUS; SUBCUTANEOUS at 17:13

## 2025-05-23 RX ADMIN — HYDROCODONE BITARTRATE AND ACETAMINOPHEN 1 TABLET: 5; 325 TABLET ORAL at 07:55

## 2025-05-23 RX ADMIN — SODIUM CHLORIDE, PRESERVATIVE FREE 10 ML: 5 INJECTION INTRAVENOUS at 21:27

## 2025-05-23 RX ADMIN — INSULIN LISPRO 2 UNITS: 100 INJECTION, SOLUTION INTRAVENOUS; SUBCUTANEOUS at 11:53

## 2025-05-23 RX ADMIN — METOPROLOL TARTRATE 50 MG: 50 TABLET, FILM COATED ORAL at 21:25

## 2025-05-23 RX ADMIN — BUDESONIDE 9 MG: 3 CAPSULE, GELATIN COATED ORAL at 07:55

## 2025-05-23 RX ADMIN — GUAIFENESIN 600 MG: 600 TABLET, MULTILAYER, EXTENDED RELEASE ORAL at 21:24

## 2025-05-23 RX ADMIN — BENZOCAINE 6 MG-MENTHOL 10 MG LOZENGES 1 LOZENGE: at 21:25

## 2025-05-23 RX ADMIN — ACETYLCYSTEINE 600 MG: 200 INHALANT RESPIRATORY (INHALATION) at 20:09

## 2025-05-23 RX ADMIN — GUAIFENESIN 600 MG: 600 TABLET, MULTILAYER, EXTENDED RELEASE ORAL at 07:55

## 2025-05-23 RX ADMIN — ATORVASTATIN CALCIUM 20 MG: 20 TABLET, FILM COATED ORAL at 07:55

## 2025-05-23 RX ADMIN — IPRATROPIUM BROMIDE AND ALBUTEROL SULFATE 1 DOSE: 2.5; .5 SOLUTION RESPIRATORY (INHALATION) at 15:00

## 2025-05-23 RX ADMIN — IPRATROPIUM BROMIDE AND ALBUTEROL SULFATE 1 DOSE: 2.5; .5 SOLUTION RESPIRATORY (INHALATION) at 07:02

## 2025-05-23 RX ADMIN — PREDNISONE 40 MG: 20 TABLET ORAL at 07:55

## 2025-05-23 RX ADMIN — INSULIN GLARGINE 15 UNITS: 100 INJECTION, SOLUTION SUBCUTANEOUS at 21:24

## 2025-05-23 RX ADMIN — BUTALBITA,ACETAMINOPHEN AND CAFFEINE 1 CAPSULE: 50; 300; 40 CAPSULE ORAL at 13:32

## 2025-05-23 RX ADMIN — APIXABAN 5 MG: 5 TABLET, FILM COATED ORAL at 21:25

## 2025-05-23 RX ADMIN — ALLOPURINOL 300 MG: 300 TABLET ORAL at 07:55

## 2025-05-23 RX ADMIN — METOPROLOL TARTRATE 50 MG: 50 TABLET, FILM COATED ORAL at 07:55

## 2025-05-23 RX ADMIN — IPRATROPIUM BROMIDE AND ALBUTEROL SULFATE 1 DOSE: 2.5; .5 SOLUTION RESPIRATORY (INHALATION) at 10:57

## 2025-05-23 RX ADMIN — VITAM B12 100 MCG: 100 TAB at 07:55

## 2025-05-23 RX ADMIN — LEVOTHYROXINE SODIUM 25 MCG: 0.03 TABLET ORAL at 06:20

## 2025-05-23 RX ADMIN — IPRATROPIUM BROMIDE AND ALBUTEROL SULFATE 1 DOSE: 2.5; .5 SOLUTION RESPIRATORY (INHALATION) at 20:09

## 2025-05-23 RX ADMIN — ALLOPURINOL 300 MG: 300 TABLET ORAL at 21:25

## 2025-05-23 RX ADMIN — HYDROCODONE BITARTRATE AND ACETAMINOPHEN 1 TABLET: 5; 325 TABLET ORAL at 13:32

## 2025-05-23 RX ADMIN — PANTOPRAZOLE SODIUM 40 MG: 40 TABLET, DELAYED RELEASE ORAL at 06:20

## 2025-05-23 ASSESSMENT — PAIN SCALES - GENERAL
PAINLEVEL_OUTOF10: 2
PAINLEVEL_OUTOF10: 3
PAINLEVEL_OUTOF10: 0
PAINLEVEL_OUTOF10: 7
PAINLEVEL_OUTOF10: 7
PAINLEVEL_OUTOF10: 4

## 2025-05-23 ASSESSMENT — PAIN - FUNCTIONAL ASSESSMENT
PAIN_FUNCTIONAL_ASSESSMENT: PREVENTS OR INTERFERES SOME ACTIVE ACTIVITIES AND ADLS
PAIN_FUNCTIONAL_ASSESSMENT: PREVENTS OR INTERFERES WITH MANY ACTIVE NOT PASSIVE ACTIVITIES

## 2025-05-23 ASSESSMENT — PAIN DESCRIPTION - DESCRIPTORS
DESCRIPTORS: ACHING;SHARP
DESCRIPTORS: ACHING
DESCRIPTORS: ACHING

## 2025-05-23 ASSESSMENT — PAIN DESCRIPTION - ORIENTATION
ORIENTATION: LOWER

## 2025-05-23 ASSESSMENT — PAIN DESCRIPTION - LOCATION
LOCATION: BACK

## 2025-05-24 LAB
GLUCOSE BLD-MCNC: 130 MG/DL (ref 75–110)
GLUCOSE BLD-MCNC: 223 MG/DL (ref 75–110)
GLUCOSE BLD-MCNC: 290 MG/DL (ref 75–110)
GLUCOSE BLD-MCNC: 369 MG/DL (ref 75–110)

## 2025-05-24 PROCEDURE — 6370000000 HC RX 637 (ALT 250 FOR IP)

## 2025-05-24 PROCEDURE — 94640 AIRWAY INHALATION TREATMENT: CPT

## 2025-05-24 PROCEDURE — 6370000000 HC RX 637 (ALT 250 FOR IP): Performed by: INTERNAL MEDICINE

## 2025-05-24 PROCEDURE — 2500000003 HC RX 250 WO HCPCS

## 2025-05-24 PROCEDURE — 1200000000 HC SEMI PRIVATE

## 2025-05-24 PROCEDURE — 82947 ASSAY GLUCOSE BLOOD QUANT: CPT

## 2025-05-24 PROCEDURE — 94761 N-INVAS EAR/PLS OXIMETRY MLT: CPT

## 2025-05-24 PROCEDURE — 99239 HOSP IP/OBS DSCHRG MGMT >30: CPT | Performed by: INTERNAL MEDICINE

## 2025-05-24 RX ORDER — METOPROLOL TARTRATE 50 MG
50 TABLET ORAL 2 TIMES DAILY
Qty: 60 TABLET | Refills: 3 | Status: SHIPPED | OUTPATIENT
Start: 2025-05-24

## 2025-05-24 RX ORDER — HYDROCODONE BITARTRATE AND ACETAMINOPHEN 5; 325 MG/1; MG/1
1 TABLET ORAL EVERY 8 HOURS PRN
Qty: 15 TABLET | Refills: 0 | Status: SHIPPED | OUTPATIENT
Start: 2025-05-24 | End: 2025-05-29

## 2025-05-24 RX ORDER — FLUTICASONE PROPIONATE 50 MCG
1 SPRAY, SUSPENSION (ML) NASAL DAILY PRN
Qty: 16 G | Refills: 3 | Status: SHIPPED | OUTPATIENT
Start: 2025-05-24

## 2025-05-24 RX ORDER — PREDNISONE 20 MG/1
10 TABLET ORAL DAILY
Qty: 3 TABLET | Refills: 0 | Status: SHIPPED | OUTPATIENT
Start: 2025-05-25 | End: 2025-05-30

## 2025-05-24 RX ORDER — PREDNISONE 20 MG/1
20 TABLET ORAL DAILY
Status: DISCONTINUED | OUTPATIENT
Start: 2025-05-25 | End: 2025-05-25 | Stop reason: HOSPADM

## 2025-05-24 RX ORDER — GUAIFENESIN 600 MG/1
600 TABLET, EXTENDED RELEASE ORAL 2 TIMES DAILY
Qty: 20 TABLET | Refills: 0 | Status: SHIPPED | OUTPATIENT
Start: 2025-05-24 | End: 2025-06-03

## 2025-05-24 RX ORDER — IPRATROPIUM BROMIDE AND ALBUTEROL SULFATE 2.5; .5 MG/3ML; MG/3ML
3 SOLUTION RESPIRATORY (INHALATION)
Qty: 360 ML | Refills: 0 | Status: SHIPPED | OUTPATIENT
Start: 2025-05-24

## 2025-05-24 RX ADMIN — PREDNISONE 40 MG: 20 TABLET ORAL at 09:12

## 2025-05-24 RX ADMIN — FUROSEMIDE 40 MG: 40 TABLET ORAL at 09:12

## 2025-05-24 RX ADMIN — IPRATROPIUM BROMIDE AND ALBUTEROL SULFATE 1 DOSE: 2.5; .5 SOLUTION RESPIRATORY (INHALATION) at 11:06

## 2025-05-24 RX ADMIN — METOPROLOL TARTRATE 50 MG: 50 TABLET, FILM COATED ORAL at 21:42

## 2025-05-24 RX ADMIN — IPRATROPIUM BROMIDE AND ALBUTEROL SULFATE 1 DOSE: 2.5; .5 SOLUTION RESPIRATORY (INHALATION) at 15:14

## 2025-05-24 RX ADMIN — INSULIN GLARGINE 15 UNITS: 100 INJECTION, SOLUTION SUBCUTANEOUS at 21:42

## 2025-05-24 RX ADMIN — INSULIN LISPRO 4 UNITS: 100 INJECTION, SOLUTION INTRAVENOUS; SUBCUTANEOUS at 17:09

## 2025-05-24 RX ADMIN — FLUTICASONE PROPIONATE 1 SPRAY: 50 SPRAY, METERED NASAL at 11:16

## 2025-05-24 RX ADMIN — BUDESONIDE 9 MG: 3 CAPSULE, GELATIN COATED ORAL at 11:16

## 2025-05-24 RX ADMIN — APIXABAN 5 MG: 5 TABLET, FILM COATED ORAL at 21:42

## 2025-05-24 RX ADMIN — Medication 50 MG: at 11:17

## 2025-05-24 RX ADMIN — ALLOPURINOL 300 MG: 300 TABLET ORAL at 21:42

## 2025-05-24 RX ADMIN — INSULIN LISPRO 2 UNITS: 100 INJECTION, SOLUTION INTRAVENOUS; SUBCUTANEOUS at 21:43

## 2025-05-24 RX ADMIN — HYDROCODONE BITARTRATE AND ACETAMINOPHEN 1 TABLET: 5; 325 TABLET ORAL at 21:48

## 2025-05-24 RX ADMIN — LEVOTHYROXINE SODIUM 25 MCG: 0.03 TABLET ORAL at 06:30

## 2025-05-24 RX ADMIN — PANTOPRAZOLE SODIUM 40 MG: 40 TABLET, DELAYED RELEASE ORAL at 06:30

## 2025-05-24 RX ADMIN — HYDROCODONE BITARTRATE AND ACETAMINOPHEN 1 TABLET: 5; 325 TABLET ORAL at 09:12

## 2025-05-24 RX ADMIN — INSULIN LISPRO 1 UNITS: 100 INJECTION, SOLUTION INTRAVENOUS; SUBCUTANEOUS at 11:16

## 2025-05-24 RX ADMIN — ATORVASTATIN CALCIUM 20 MG: 20 TABLET, FILM COATED ORAL at 09:12

## 2025-05-24 RX ADMIN — SODIUM CHLORIDE, PRESERVATIVE FREE 10 ML: 5 INJECTION INTRAVENOUS at 09:14

## 2025-05-24 RX ADMIN — ACETYLCYSTEINE 600 MG: 200 INHALANT RESPIRATORY (INHALATION) at 07:39

## 2025-05-24 RX ADMIN — ALLOPURINOL 300 MG: 300 TABLET ORAL at 09:13

## 2025-05-24 RX ADMIN — SODIUM CHLORIDE, PRESERVATIVE FREE 10 ML: 5 INJECTION INTRAVENOUS at 21:43

## 2025-05-24 RX ADMIN — GUAIFENESIN 600 MG: 600 TABLET, MULTILAYER, EXTENDED RELEASE ORAL at 21:42

## 2025-05-24 RX ADMIN — GUAIFENESIN 600 MG: 600 TABLET, MULTILAYER, EXTENDED RELEASE ORAL at 09:13

## 2025-05-24 RX ADMIN — VITAM B12 100 MCG: 100 TAB at 11:17

## 2025-05-24 RX ADMIN — APIXABAN 5 MG: 5 TABLET, FILM COATED ORAL at 09:13

## 2025-05-24 RX ADMIN — IPRATROPIUM BROMIDE AND ALBUTEROL SULFATE 1 DOSE: 2.5; .5 SOLUTION RESPIRATORY (INHALATION) at 19:44

## 2025-05-24 RX ADMIN — IPRATROPIUM BROMIDE AND ALBUTEROL SULFATE 1 DOSE: 2.5; .5 SOLUTION RESPIRATORY (INHALATION) at 07:39

## 2025-05-24 RX ADMIN — METOPROLOL TARTRATE 50 MG: 50 TABLET, FILM COATED ORAL at 09:13

## 2025-05-24 RX ADMIN — CETIRIZINE HYDROCHLORIDE 5 MG: 10 TABLET, FILM COATED ORAL at 09:13

## 2025-05-24 ASSESSMENT — PAIN DESCRIPTION - DESCRIPTORS
DESCRIPTORS: ACHING;DISCOMFORT
DESCRIPTORS: ACHING

## 2025-05-24 ASSESSMENT — PAIN - FUNCTIONAL ASSESSMENT
PAIN_FUNCTIONAL_ASSESSMENT: ACTIVITIES ARE NOT PREVENTED
PAIN_FUNCTIONAL_ASSESSMENT: ACTIVITIES ARE NOT PREVENTED

## 2025-05-24 ASSESSMENT — PAIN DESCRIPTION - LOCATION
LOCATION: BACK
LOCATION: BACK

## 2025-05-24 ASSESSMENT — PAIN SCALES - GENERAL
PAINLEVEL_OUTOF10: 5
PAINLEVEL_OUTOF10: 3
PAINLEVEL_OUTOF10: 5

## 2025-05-24 ASSESSMENT — PAIN DESCRIPTION - ORIENTATION: ORIENTATION: LOWER

## 2025-05-24 ASSESSMENT — PAIN SCALES - WONG BAKER: WONGBAKER_NUMERICALRESPONSE: NO HURT

## 2025-05-25 VITALS
DIASTOLIC BLOOD PRESSURE: 92 MMHG | WEIGHT: 294.31 LBS | SYSTOLIC BLOOD PRESSURE: 156 MMHG | RESPIRATION RATE: 17 BRPM | BODY MASS INDEX: 41.2 KG/M2 | OXYGEN SATURATION: 92 % | TEMPERATURE: 97.7 F | HEIGHT: 71 IN | HEART RATE: 77 BPM

## 2025-05-25 LAB
GLUCOSE BLD-MCNC: 159 MG/DL (ref 75–110)
GLUCOSE BLD-MCNC: 204 MG/DL (ref 75–110)
GLUCOSE BLD-MCNC: 214 MG/DL (ref 75–110)
SARS-COV-2 RDRP RESP QL NAA+PROBE: NOT DETECTED
SPECIMEN DESCRIPTION: NORMAL

## 2025-05-25 PROCEDURE — 94640 AIRWAY INHALATION TREATMENT: CPT

## 2025-05-25 PROCEDURE — 6370000000 HC RX 637 (ALT 250 FOR IP)

## 2025-05-25 PROCEDURE — 6370000000 HC RX 637 (ALT 250 FOR IP): Performed by: INTERNAL MEDICINE

## 2025-05-25 PROCEDURE — 97110 THERAPEUTIC EXERCISES: CPT

## 2025-05-25 PROCEDURE — 87635 SARS-COV-2 COVID-19 AMP PRB: CPT

## 2025-05-25 PROCEDURE — 94761 N-INVAS EAR/PLS OXIMETRY MLT: CPT

## 2025-05-25 PROCEDURE — 97530 THERAPEUTIC ACTIVITIES: CPT

## 2025-05-25 PROCEDURE — 82947 ASSAY GLUCOSE BLOOD QUANT: CPT

## 2025-05-25 RX ADMIN — ALLOPURINOL 300 MG: 300 TABLET ORAL at 09:56

## 2025-05-25 RX ADMIN — APIXABAN 5 MG: 5 TABLET, FILM COATED ORAL at 09:56

## 2025-05-25 RX ADMIN — PREDNISONE 20 MG: 20 TABLET ORAL at 09:56

## 2025-05-25 RX ADMIN — GUAIFENESIN 600 MG: 600 TABLET, MULTILAYER, EXTENDED RELEASE ORAL at 09:56

## 2025-05-25 RX ADMIN — Medication 50 MG: at 11:46

## 2025-05-25 RX ADMIN — ATORVASTATIN CALCIUM 20 MG: 20 TABLET, FILM COATED ORAL at 09:56

## 2025-05-25 RX ADMIN — FLUTICASONE PROPIONATE 1 SPRAY: 50 SPRAY, METERED NASAL at 11:46

## 2025-05-25 RX ADMIN — CETIRIZINE HYDROCHLORIDE 5 MG: 10 TABLET, FILM COATED ORAL at 09:56

## 2025-05-25 RX ADMIN — HYDROCODONE BITARTRATE AND ACETAMINOPHEN 1 TABLET: 5; 325 TABLET ORAL at 16:26

## 2025-05-25 RX ADMIN — LEVOTHYROXINE SODIUM 25 MCG: 0.03 TABLET ORAL at 06:32

## 2025-05-25 RX ADMIN — VITAM B12 100 MCG: 100 TAB at 11:46

## 2025-05-25 RX ADMIN — IPRATROPIUM BROMIDE AND ALBUTEROL SULFATE 1 DOSE: 2.5; .5 SOLUTION RESPIRATORY (INHALATION) at 11:21

## 2025-05-25 RX ADMIN — IPRATROPIUM BROMIDE AND ALBUTEROL SULFATE 1 DOSE: 2.5; .5 SOLUTION RESPIRATORY (INHALATION) at 15:25

## 2025-05-25 RX ADMIN — METOPROLOL TARTRATE 50 MG: 50 TABLET, FILM COATED ORAL at 09:56

## 2025-05-25 RX ADMIN — IPRATROPIUM BROMIDE AND ALBUTEROL SULFATE 1 DOSE: 2.5; .5 SOLUTION RESPIRATORY (INHALATION) at 07:15

## 2025-05-25 RX ADMIN — PANTOPRAZOLE SODIUM 40 MG: 40 TABLET, DELAYED RELEASE ORAL at 06:32

## 2025-05-25 RX ADMIN — FUROSEMIDE 40 MG: 40 TABLET ORAL at 09:56

## 2025-05-25 RX ADMIN — INSULIN LISPRO 1 UNITS: 100 INJECTION, SOLUTION INTRAVENOUS; SUBCUTANEOUS at 09:56

## 2025-05-25 RX ADMIN — BUDESONIDE 9 MG: 3 CAPSULE, GELATIN COATED ORAL at 11:46

## 2025-05-25 ASSESSMENT — PAIN DESCRIPTION - ORIENTATION: ORIENTATION: MID

## 2025-05-25 ASSESSMENT — PAIN SCALES - GENERAL
PAINLEVEL_OUTOF10: 8
PAINLEVEL_OUTOF10: 5

## 2025-05-25 ASSESSMENT — PAIN DESCRIPTION - LOCATION: LOCATION: BACK

## 2025-05-25 ASSESSMENT — PAIN DESCRIPTION - DESCRIPTORS: DESCRIPTORS: ACHING;DISCOMFORT

## 2025-05-25 ASSESSMENT — PAIN - FUNCTIONAL ASSESSMENT: PAIN_FUNCTIONAL_ASSESSMENT: ACTIVITIES ARE NOT PREVENTED

## 2025-05-25 NOTE — PROGRESS NOTES
[facility] Internal Medicine   Chris Palu MD; MD Courtney Garcia MD, MD , Chay Steve MD    [facility] Internal Medicine   IN-PATIENT SERVICE   [facility]    HISTORY AND PHYSICAL EXAMINATION            Date:   5/13/2025  Patient name:  Taqueria Gilbert  Date of admission:  5/10/2025  3:48 PM  MRN:   [id number]  Account:  [id number]  YOB: 1945  PCP:    Gilma Barba MD  Room:   2072/2072-01  Code Status:    Full Code    Chief Complaint:     Chief Complaint   Patient presents with    Extremity Weakness       History Obtained From:     patient, electronic medical record    History of Present Illness:     Taqueria Gilbert is a 80 y.o. Declined male who presents with Extremity Weakness   and is admitted to the hospital for the management of Hypokalemia.    Taqueria Gilbert is a 80 y.o. Declined male with a history of atrial fibrillation on Eliquis, chronic diastolic heart failure, prostate cancer, anemia, atrial flutter, right lower extremity cellulitis Adriana cerebral infarction, CHF, blood clots, hyperglycemia hyperlipidemia, hypertension, hypothyroidism, SVT, and prediabetes who presents with Extremity Weakness   and is admitted to the hospital for the management of Hypokalemia.     According to patient, he has been feeling dyspneic, weak, and report increased diuretic use.  He is urinating excessively and is concerned that his potassium is low.  He wears bilateral Unna boots and endorses constant purple discoloration of his toes.  There is concern for a blister on one of his feet.  He is concerned that he is too weak to ambulate.     Upon presentation to the ER purple toes were noted, but capillary refill was less than 2 seconds.  Ventral hernia noted on the abdomen, and a small 2 cm blister on the dorsal aspect of his right foot was noted as well.  He has an open blister on the dorsal aspect of his left foot.  
     [facility] Internal Medicine   Chris Paul MD; MD Courtney Garcia MD, MD , Chay Steve MD    [facility] Internal Medicine   IN-PATIENT SERVICE   [facility]    HISTORY AND PHYSICAL EXAMINATION            Date:   5/12/2025  Patient name:  Taqueria Gilbert  Date of admission:  5/10/2025  3:48 PM  MRN:   [id number]  Account:  [id number]  YOB: 1945  PCP:    Gilma Barba MD  Room:   2072/2072-01  Code Status:    Full Code    Chief Complaint:     Chief Complaint   Patient presents with    Extremity Weakness       History Obtained From:     patient, electronic medical record    History of Present Illness:     Taqueria Gilbert is a 80 y.o. Declined male who presents with Extremity Weakness   and is admitted to the hospital for the management of Hypokalemia.    Taqueria Gilbert is a 80 y.o. Declined male with a history of atrial fibrillation on Eliquis, chronic diastolic heart failure, prostate cancer, anemia, atrial flutter, right lower extremity cellulitis Adriana cerebral infarction, CHF, blood clots, hyperglycemia hyperlipidemia, hypertension, hypothyroidism, SVT, and prediabetes who presents with Extremity Weakness   and is admitted to the hospital for the management of Hypokalemia.     According to patient, he has been feeling dyspneic, weak, and report increased diuretic use.  He is urinating excessively and is concerned that his potassium is low.  He wears bilateral Unna boots and endorses constant purple discoloration of his toes.  There is concern for a blister on one of his feet.  He is concerned that he is too weak to ambulate.     Upon presentation to the ER purple toes were noted, but capillary refill was less than 2 seconds.  Ventral hernia noted on the abdomen, and a small 2 cm blister on the dorsal aspect of his right foot was noted as well.  He has an open blister on the dorsal aspect of his left foot.  
     [facility] Internal Medicine   Chris Paul MD; MD Courtney Garcia MD, MD , Chay Steve MD    [facility] Internal Medicine   IN-PATIENT SERVICE   [facility]    Progress note            Date:   5/20/2025  Patient name:  Taqueria Gilbert  Date of admission:  5/10/2025  3:48 PM  MRN:   [id number]  Account:  [id number]  YOB: 1945  PCP:    Gimla Barba MD  Room:   2072/2072-01  Code Status:    Full Code    Chief Complaint:     Chief Complaint   Patient presents with    Extremity Weakness       History Obtained From:     patient, electronic medical record    History of Present Illness:     Taqueria Gilbert is a 80 y.o. Declined male who presents with Extremity Weakness   and is admitted to the hospital for the management of Hypokalemia.    Taqueria Gilbert is a 80 y.o. Declined male with a history of atrial fibrillation on Eliquis, chronic diastolic heart failure, prostate cancer, anemia, atrial flutter, right lower extremity cellulitis Adriana cerebral infarction, CHF, blood clots, hyperglycemia hyperlipidemia, hypertension, hypothyroidism, SVT, and prediabetes who presents with Extremity Weakness   and is admitted to the hospital for the management of Hypokalemia.     According to patient, he has been feeling dyspneic, weak, and report increased diuretic use.  He is urinating excessively and is concerned that his potassium is low.  He wears bilateral Unna boots and endorses constant purple discoloration of his toes.  There is concern for a blister on one of his feet.  He is concerned that he is too weak to ambulate.     Upon presentation to the ER purple toes were noted, but capillary refill was less than 2 seconds.  Ventral hernia noted on the abdomen, and a small 2 cm blister on the dorsal aspect of his right foot was noted as well.  He has an open blister on the dorsal aspect of his left foot.  His arm and leg 
     [facility] Internal Medicine   Chris Paul MD; MD Courtney Garcia MD, MD , Chay Steve MD    [facility] Internal Medicine   IN-PATIENT SERVICE   [facility]    Progress note            Date:   5/18/2025  Patient name:  Taqueria Gilbert  Date of admission:  5/10/2025  3:48 PM  MRN:   [id number]  Account:  [id number]  YOB: 1945  PCP:    Gilma Barba MD  Room:   2072/2072-01  Code Status:    Full Code    Chief Complaint:     Chief Complaint   Patient presents with    Extremity Weakness       History Obtained From:     patient, electronic medical record    History of Present Illness:     Taqueria Gilbert is a 80 y.o. Declined male who presents with Extremity Weakness   and is admitted to the hospital for the management of Hypokalemia.    Taqueria Gilbert is a 80 y.o. Declined male with a history of atrial fibrillation on Eliquis, chronic diastolic heart failure, prostate cancer, anemia, atrial flutter, right lower extremity cellulitis Adriana cerebral infarction, CHF, blood clots, hyperglycemia hyperlipidemia, hypertension, hypothyroidism, SVT, and prediabetes who presents with Extremity Weakness   and is admitted to the hospital for the management of Hypokalemia.     According to patient, he has been feeling dyspneic, weak, and report increased diuretic use.  He is urinating excessively and is concerned that his potassium is low.  He wears bilateral Unna boots and endorses constant purple discoloration of his toes.  There is concern for a blister on one of his feet.  He is concerned that he is too weak to ambulate.     Upon presentation to the ER purple toes were noted, but capillary refill was less than 2 seconds.  Ventral hernia noted on the abdomen, and a small 2 cm blister on the dorsal aspect of his right foot was noted as well.  He has an open blister on the dorsal aspect of his left foot.  His arm and leg 
     [facility] Internal Medicine   Chris Paul MD; MD Courtney Garcia MD, MD , Chay Steve MD    [facility] Internal Medicine   IN-PATIENT SERVICE   [facility]    HISTORY AND PHYSICAL EXAMINATION            Date:   5/16/2025  Patient name:  Taqueria Gilbert  Date of admission:  5/10/2025  3:48 PM  MRN:   [id number]  Account:  [id number]  YOB: 1945  PCP:    Gilma Barba MD  Room:   2072/2072-01  Code Status:    Full Code    Chief Complaint:     Chief Complaint   Patient presents with    Extremity Weakness       History Obtained From:     patient, electronic medical record    History of Present Illness:     Taqueria Gilbert is a 80 y.o. Declined male who presents with Extremity Weakness   and is admitted to the hospital for the management of Hypokalemia.    Taqueria Gilbert is a 80 y.o. Declined male with a history of atrial fibrillation on Eliquis, chronic diastolic heart failure, prostate cancer, anemia, atrial flutter, right lower extremity cellulitis Adriana cerebral infarction, CHF, blood clots, hyperglycemia hyperlipidemia, hypertension, hypothyroidism, SVT, and prediabetes who presents with Extremity Weakness   and is admitted to the hospital for the management of Hypokalemia.     According to patient, he has been feeling dyspneic, weak, and report increased diuretic use.  He is urinating excessively and is concerned that his potassium is low.  He wears bilateral Unna boots and endorses constant purple discoloration of his toes.  There is concern for a blister on one of his feet.  He is concerned that he is too weak to ambulate.     Upon presentation to the ER purple toes were noted, but capillary refill was less than 2 seconds.  Ventral hernia noted on the abdomen, and a small 2 cm blister on the dorsal aspect of his right foot was noted as well.  He has an open blister on the dorsal aspect of his left foot.  
     [facility] Internal Medicine   Chris Paul MD; MD Courtney Garcia MD, MD , Chay Steve MD    [facility] Internal Medicine   IN-PATIENT SERVICE   [facility]    HISTORY AND PHYSICAL EXAMINATION            Date:   5/15/2025  Patient name:  Taqueria Gilbert  Date of admission:  5/10/2025  3:48 PM  MRN:   [id number]  Account:  [id number]  YOB: 1945  PCP:    Gilma Barba MD  Room:   2072/2072-01  Code Status:    Full Code    Chief Complaint:     Chief Complaint   Patient presents with   • Extremity Weakness       History Obtained From:     patient, electronic medical record    History of Present Illness:     Taqueria Gilbert is a 80 y.o. Declined male who presents with Extremity Weakness   and is admitted to the hospital for the management of Hypokalemia.    Taqueria Gilbert is a 80 y.o. Declined male with a history of atrial fibrillation on Eliquis, chronic diastolic heart failure, prostate cancer, anemia, atrial flutter, right lower extremity cellulitis Adriana cerebral infarction, CHF, blood clots, hyperglycemia hyperlipidemia, hypertension, hypothyroidism, SVT, and prediabetes who presents with Extremity Weakness   and is admitted to the hospital for the management of Hypokalemia.     According to patient, he has been feeling dyspneic, weak, and report increased diuretic use.  He is urinating excessively and is concerned that his potassium is low.  He wears bilateral Unna boots and endorses constant purple discoloration of his toes.  There is concern for a blister on one of his feet.  He is concerned that he is too weak to ambulate.     Upon presentation to the ER purple toes were noted, but capillary refill was less than 2 seconds.  Ventral hernia noted on the abdomen, and a small 2 cm blister on the dorsal aspect of his right foot was noted as well.  He has an open blister on the dorsal aspect of his left foot.  
     [facility] Internal Medicine   Chris Paul MD; MD Courtney Garcia MD, MD , Chay Steve MD    [facility] Internal Medicine   IN-PATIENT SERVICE   [facility]    HISTORY AND PHYSICAL EXAMINATION            Date:   5/14/2025  Patient name:  Taqueria Gilbert  Date of admission:  5/10/2025  3:48 PM  MRN:   [id number]  Account:  [id number]  YOB: 1945  PCP:    Gilma Barba MD  Room:   2072/2072-01  Code Status:    Full Code    Chief Complaint:     Chief Complaint   Patient presents with    Extremity Weakness       History Obtained From:     patient, electronic medical record    History of Present Illness:     Taqueria Gilbert is a 80 y.o. Declined male who presents with Extremity Weakness   and is admitted to the hospital for the management of Hypokalemia.    Taqueria Gilbert is a 80 y.o. Declined male with a history of atrial fibrillation on Eliquis, chronic diastolic heart failure, prostate cancer, anemia, atrial flutter, right lower extremity cellulitis Adriana cerebral infarction, CHF, blood clots, hyperglycemia hyperlipidemia, hypertension, hypothyroidism, SVT, and prediabetes who presents with Extremity Weakness   and is admitted to the hospital for the management of Hypokalemia.     According to patient, he has been feeling dyspneic, weak, and report increased diuretic use.  He is urinating excessively and is concerned that his potassium is low.  He wears bilateral Unna boots and endorses constant purple discoloration of his toes.  There is concern for a blister on one of his feet.  He is concerned that he is too weak to ambulate.     Upon presentation to the ER purple toes were noted, but capillary refill was less than 2 seconds.  Ventral hernia noted on the abdomen, and a small 2 cm blister on the dorsal aspect of his right foot was noted as well.  He has an open blister on the dorsal aspect of his left foot.  
     [facility] Internal Medicine   Chris Paul MD; MD Courtney Garcia MD, MD , Chay Steve MD    [facility] Internal Medicine   IN-PATIENT SERVICE   [facility]    Progress note            Date:   5/22/2025  Patient name:  Taqueria Gilbert  Date of admission:  5/10/2025  3:48 PM  MRN:   [id number]  Account:  [id number]  YOB: 1945  PCP:    Gilma Barba MD  Room:   2072/2072-01  Code Status:    Full Code    Chief Complaint:     Chief Complaint   Patient presents with    Extremity Weakness       History Obtained From:     patient, electronic medical record    History of Present Illness:     Taqueria Gilbert is a 80 y.o. Declined male who presents with Extremity Weakness   and is admitted to the hospital for the management of Hypokalemia.    Taqueria Gilbert is a 80 y.o. Declined male with a history of atrial fibrillation on Eliquis, chronic diastolic heart failure, prostate cancer, anemia, atrial flutter, right lower extremity cellulitis Adriana cerebral infarction, CHF, blood clots, hyperglycemia hyperlipidemia, hypertension, hypothyroidism, SVT, and prediabetes who presents with Extremity Weakness   and is admitted to the hospital for the management of Hypokalemia.     According to patient, he has been feeling dyspneic, weak, and report increased diuretic use.  He is urinating excessively and is concerned that his potassium is low.  He wears bilateral Unna boots and endorses constant purple discoloration of his toes.  There is concern for a blister on one of his feet.  He is concerned that he is too weak to ambulate.     Upon presentation to the ER purple toes were noted, but capillary refill was less than 2 seconds.  Ventral hernia noted on the abdomen, and a small 2 cm blister on the dorsal aspect of his right foot was noted as well.  He has an open blister on the dorsal aspect of his left foot.  His arm and leg 
     [facility] Internal Medicine   Chris Paul MD; MD Courtney Garcia MD, MD , Chay Steve MD    [facility] Internal Medicine   IN-PATIENT SERVICE   [facility]    Progress note            Date:   5/21/2025  Patient name:  Taqueria Gilbert  Date of admission:  5/10/2025  3:48 PM  MRN:   [id number]  Account:  [id number]  YOB: 1945  PCP:    Gilma Barba MD  Room:   2072/2072-01  Code Status:    Full Code    Chief Complaint:     Chief Complaint   Patient presents with    Extremity Weakness       History Obtained From:     patient, electronic medical record    History of Present Illness:     Taqueria Gilbert is a 80 y.o. Declined male who presents with Extremity Weakness   and is admitted to the hospital for the management of Hypokalemia.    Taqueria Gilbert is a 80 y.o. Declined male with a history of atrial fibrillation on Eliquis, chronic diastolic heart failure, prostate cancer, anemia, atrial flutter, right lower extremity cellulitis Adriana cerebral infarction, CHF, blood clots, hyperglycemia hyperlipidemia, hypertension, hypothyroidism, SVT, and prediabetes who presents with Extremity Weakness   and is admitted to the hospital for the management of Hypokalemia.     According to patient, he has been feeling dyspneic, weak, and report increased diuretic use.  He is urinating excessively and is concerned that his potassium is low.  He wears bilateral Unna boots and endorses constant purple discoloration of his toes.  There is concern for a blister on one of his feet.  He is concerned that he is too weak to ambulate.     Upon presentation to the ER purple toes were noted, but capillary refill was less than 2 seconds.  Ventral hernia noted on the abdomen, and a small 2 cm blister on the dorsal aspect of his right foot was noted as well.  He has an open blister on the dorsal aspect of his left foot.  His arm and leg 
    [facility] Internal Medicine  Chris Paul MD; Alec Ravi MD; Ritika Coppola MD;  Courtney Vaca; Panfilo Steve MD  [facility] Internal Medicine   IN-PATIENT SERVICE  [facility]                 Date:   5/10/2025  Patientname:  Taqueria Gilbert  Date of admission:  5/10/2025  3:48 PM  MRN:   [id number]  Account:  [id number]  YOB: 1945  PCP:    Gilma Barba MD  Room:   10/10  Code Status:    Full      Chief Complaint:     Chief Complaint   Patient presents with    Extremity Weakness       History of Present Illness:     Taqueria Gilbert is a 80 y.o. Declined male with a history of atrial fibrillation on Eliquis, chronic diastolic heart failure, prostate cancer, anemia, atrial flutter, right lower extremity cellulitis Adriana cerebral infarction, CHF, blood clots, hyperglycemia hyperlipidemia, hypertension, hypothyroidism, SVT, and prediabetes who presents with Extremity Weakness   and is admitted to the hospital for the management of Hypokalemia.    According to patient, he has been feeling dyspneic, weak, and report increased diuretic use.  He is urinating excessively and is concerned that his potassium is low.  He wears bilateral Unna boots and endorses constant purple discoloration of his toes.  There is concern for a blister on one of his feet.  He is concerned that he is too weak to ambulate.    Upon presentation to the ER purple toes were noted, but capillary refill was less than 2 seconds.  Ventral hernia noted on the abdomen, and a small 2 cm blister on the dorsal aspect of his right foot was noted as well.  He has an open blister on the dorsal aspect of his left foot.  His arm and leg strength were bilaterally equal.  He increased his daily dose of Lasix because his home health nurse was concerned with his weight gain.    His EKG showed atrial fibrillation, chest x-ray was concerning for atelectasis and stable cardiomegaly, his potassium was 2.5 which 
    PULMONARY PROGRESS NOTE:    REASON FOR VISIT: flu, chest congestion, AMBER  Interval History:    Shortness of Breath: +  Cough: ++, congestion +  Sputum: no          Hemoptysis: no  Chest Pain: no  Fever: no                   Swelling Feet: no  Headache: no                                           Nausea, Emesis, Abdominal Pain: no  Diarrhea: no         Constipation: no    Events since last visit: none    PAST MEDICAL HISTORY:      Scheduled Meds:   insulin lispro  0-4 Units SubCUTAneous 4x Daily AC & HS    insulin glargine  10 Units SubCUTAneous Daily    acetylcysteine  600 mg Inhalation BID RT    furosemide  40 mg IntraVENous BID    methylPREDNISolone  40 mg IntraVENous Q8H    guaiFENesin  600 mg Oral BID    ipratropium 0.5 mg-albuterol 2.5 mg  1 Dose Inhalation Q4H WA RT    cetirizine  5 mg Oral Daily    allopurinol  300 mg Oral BID    atorvastatin  20 mg Oral Daily    budesonide  9 mg Oral Daily    apixaban  5 mg Oral BID    [Held by provider] empagliflozin  10 mg Oral Daily    levothyroxine  25 mcg Oral Daily    metoprolol tartrate  25 mg Oral BID    pantoprazole  40 mg Oral QAM AC    vitamin B-12  100 mcg Oral Daily    zinc sulfate  50 mg Oral Daily    sodium chloride flush  5-40 mL IntraVENous 2 times per day     Continuous Infusions:   dextrose      sodium chloride       PRN Meds:glucose, dextrose bolus **OR** dextrose bolus, glucagon (rDNA), dextrose, butalbital-APAP-caffeine, Benzocaine-Menthol, fluticasone, sodium chloride flush, sodium chloride, potassium chloride **OR** potassium alternative oral replacement **OR** potassium chloride, magnesium sulfate, ondansetron **OR** ondansetron, melatonin, polyethylene glycol, bisacodyl, acetaminophen **OR** acetaminophen        PHYSICAL EXAMINATION:  BP (!) 141/92   Pulse 89   Temp 97.9 °F (36.6 °C) (Axillary)   Resp 18   Ht 1.803 m (5' 11\")   Wt (!) 138 kg (304 lb 3.8 oz)   SpO2 94%   BMI 42.43 kg/m²     General : Awake, alert, O2 2.5L  Neck - supple, 
    PULMONARY PROGRESS NOTE:    REASON FOR VISIT: flu, chest congestion, AMBER  Interval History:    Shortness of Breath: +  Cough: ++, congestion +  Sputum: no          Hemoptysis: no  Chest Pain: no  Fever: no                   Swelling Feet: no  Headache: no                                           Nausea, Emesis, Abdominal Pain: no  Diarrhea: no         Constipation: no    Events since last visit: none    PAST MEDICAL HISTORY:      Scheduled Meds:   insulin lispro  0-4 Units SubCUTAneous 4x Daily AC & HS    insulin glargine  10 Units SubCUTAneous Daily    furosemide  40 mg IntraVENous BID    methylPREDNISolone  40 mg IntraVENous Q8H    levoFLOXacin  500 mg Oral Daily    oseltamivir  75 mg Oral BID    guaiFENesin  600 mg Oral BID    ipratropium 0.5 mg-albuterol 2.5 mg  1 Dose Inhalation Q4H WA RT    cetirizine  5 mg Oral Daily    allopurinol  300 mg Oral BID    atorvastatin  20 mg Oral Daily    budesonide  9 mg Oral Daily    apixaban  5 mg Oral BID    [Held by provider] empagliflozin  10 mg Oral Daily    levothyroxine  25 mcg Oral Daily    metoprolol tartrate  25 mg Oral BID    pantoprazole  40 mg Oral QAM AC    vitamin B-12  100 mcg Oral Daily    zinc sulfate  50 mg Oral Daily    sodium chloride flush  5-40 mL IntraVENous 2 times per day     Continuous Infusions:   dextrose      sodium chloride       PRN Meds:glucose, dextrose bolus **OR** dextrose bolus, glucagon (rDNA), dextrose, butalbital-APAP-caffeine, Benzocaine-Menthol, fluticasone, sodium chloride flush, sodium chloride, potassium chloride **OR** potassium alternative oral replacement **OR** potassium chloride, magnesium sulfate, ondansetron **OR** ondansetron, melatonin, polyethylene glycol, bisacodyl, acetaminophen **OR** acetaminophen        PHYSICAL EXAMINATION:  BP (!) 147/99   Pulse 83   Temp 98.4 °F (36.9 °C) (Axillary)   Resp 18   Ht 1.803 m (5' 11\")   Wt (!) 138 kg (304 lb 3.8 oz)   SpO2 91%   BMI 42.43 kg/m²     General : Awake, alert, O2 
    PULMONARY PROGRESS NOTE:    REASON FOR VISIT: flu, chest congestion, AMBER  Interval History:    Shortness of Breath: +  Cough: ++, congestion +  Sputum: no          Hemoptysis: no  Chest Pain: no  Fever: no                   Swelling Feet: no  Headache: no                                           Nausea, Emesis, Abdominal Pain: no  Diarrhea: no         Constipation: no    Events since last visit: none    PAST MEDICAL HISTORY:      Scheduled Meds:   predniSONE  40 mg Oral Daily    furosemide  40 mg Oral Daily    metoprolol tartrate  50 mg Oral BID    insulin glargine  15 Units SubCUTAneous Daily    insulin lispro  0-4 Units SubCUTAneous 4x Daily AC & HS    acetylcysteine  600 mg Inhalation BID RT    guaiFENesin  600 mg Oral BID    ipratropium 0.5 mg-albuterol 2.5 mg  1 Dose Inhalation Q4H WA RT    cetirizine  5 mg Oral Daily    allopurinol  300 mg Oral BID    atorvastatin  20 mg Oral Daily    budesonide  9 mg Oral Daily    apixaban  5 mg Oral BID    [Held by provider] empagliflozin  10 mg Oral Daily    levothyroxine  25 mcg Oral Daily    pantoprazole  40 mg Oral QAM AC    vitamin B-12  100 mcg Oral Daily    zinc sulfate  50 mg Oral Daily    sodium chloride flush  5-40 mL IntraVENous 2 times per day     Continuous Infusions:   dextrose      sodium chloride       PRN Meds:HYDROcodone-acetaminophen, glucose, dextrose bolus **OR** dextrose bolus, glucagon (rDNA), dextrose, butalbital-APAP-caffeine, Benzocaine-Menthol, fluticasone, sodium chloride flush, sodium chloride, potassium chloride **OR** potassium alternative oral replacement **OR** potassium chloride, magnesium sulfate, ondansetron **OR** ondansetron, melatonin, polyethylene glycol, bisacodyl, acetaminophen **OR** acetaminophen        PHYSICAL EXAMINATION:  BP (!) 142/82   Pulse 82   Temp 97.9 °F (36.6 °C)   Resp 18   Ht 1.803 m (5' 10.98\")   Wt (!) 137 kg (302 lb 0.5 oz)   SpO2 93%   BMI 42.14 kg/m²     General : Awake, alert, O2 RA  Neck - supple, 
    PULMONARY PROGRESS NOTE:    REASON FOR VISIT: flu, chest congestion, AMBER  Interval History:    Shortness of Breath: +  Cough: ++, congestion +  Sputum: no          Hemoptysis: no  Chest Pain: no  Fever: no                   Swelling Feet: no  Headache: no                                           Nausea, Emesis, Abdominal Pain: no  Diarrhea: no         Constipation: no    Events since last visit: none    PAST MEDICAL HISTORY:      Scheduled Meds:   predniSONE  40 mg Oral Daily    furosemide  40 mg Oral Daily    metoprolol tartrate  50 mg Oral BID    insulin glargine  15 Units SubCUTAneous Daily    insulin lispro  0-4 Units SubCUTAneous 4x Daily AC & HS    acetylcysteine  600 mg Inhalation BID RT    guaiFENesin  600 mg Oral BID    ipratropium 0.5 mg-albuterol 2.5 mg  1 Dose Inhalation Q4H WA RT    cetirizine  5 mg Oral Daily    allopurinol  300 mg Oral BID    atorvastatin  20 mg Oral Daily    budesonide  9 mg Oral Daily    apixaban  5 mg Oral BID    [Held by provider] empagliflozin  10 mg Oral Daily    levothyroxine  25 mcg Oral Daily    pantoprazole  40 mg Oral QAM AC    vitamin B-12  100 mcg Oral Daily    zinc sulfate  50 mg Oral Daily    sodium chloride flush  5-40 mL IntraVENous 2 times per day     Continuous Infusions:   dextrose      sodium chloride       PRN Meds:HYDROcodone-acetaminophen, glucose, dextrose bolus **OR** dextrose bolus, glucagon (rDNA), dextrose, butalbital-APAP-caffeine, Benzocaine-Menthol, fluticasone, sodium chloride flush, sodium chloride, potassium chloride **OR** potassium alternative oral replacement **OR** potassium chloride, magnesium sulfate, ondansetron **OR** ondansetron, melatonin, polyethylene glycol, bisacodyl, acetaminophen **OR** acetaminophen        PHYSICAL EXAMINATION:  BP (!) 150/103 Comment: bp taken 2x, rn notified  Pulse 94   Temp 97.9 °F (36.6 °C) (Axillary)   Resp 20   Ht 1.803 m (5' 11\")   Wt (!) 138 kg (304 lb 3.8 oz)   SpO2 90%   BMI 42.43 kg/m² 
    PULMONARY PROGRESS NOTE:    REASON FOR VISIT: flu, chest congestion, AMBER  Interval History:    Shortness of Breath: +  Cough: +, congestion +  Sputum: no          Hemoptysis: no  Chest Pain: no  Fever: no                   Swelling Feet: no  Headache: no                                           Nausea, Emesis, Abdominal Pain: no  Diarrhea: no         Constipation: no    Events since last visit: none    PAST MEDICAL HISTORY:      Scheduled Meds:   predniSONE  20 mg Oral Daily    furosemide  40 mg Oral Daily    metoprolol tartrate  50 mg Oral BID    insulin glargine  15 Units SubCUTAneous Daily    insulin lispro  0-4 Units SubCUTAneous 4x Daily AC & HS    guaiFENesin  600 mg Oral BID    ipratropium 0.5 mg-albuterol 2.5 mg  1 Dose Inhalation Q4H WA RT    cetirizine  5 mg Oral Daily    allopurinol  300 mg Oral BID    atorvastatin  20 mg Oral Daily    budesonide  9 mg Oral Daily    apixaban  5 mg Oral BID    [Held by provider] empagliflozin  10 mg Oral Daily    levothyroxine  25 mcg Oral Daily    pantoprazole  40 mg Oral QAM AC    vitamin B-12  100 mcg Oral Daily    zinc sulfate  50 mg Oral Daily    sodium chloride flush  5-40 mL IntraVENous 2 times per day     Continuous Infusions:   dextrose      sodium chloride       PRN Meds:HYDROcodone-acetaminophen, glucose, dextrose bolus **OR** dextrose bolus, glucagon (rDNA), dextrose, butalbital-APAP-caffeine, Benzocaine-Menthol, fluticasone, sodium chloride flush, sodium chloride, potassium chloride **OR** potassium alternative oral replacement **OR** potassium chloride, magnesium sulfate, ondansetron **OR** ondansetron, melatonin, polyethylene glycol, bisacodyl, acetaminophen **OR** acetaminophen        PHYSICAL EXAMINATION:  BP (!) 156/92   Pulse 77   Temp 97.7 °F (36.5 °C) (Oral)   Resp 18   Ht 1.803 m (5' 10.98\")   Wt 133.5 kg (294 lb 5 oz)   SpO2 97%   BMI 41.07 kg/m²     General : Awake, alert, O2 RA  Neck - supple, no lymphadenopathy, JVD not raised  Heart - 
    PULMONARY PROGRESS NOTE:    REASON FOR VISIT: flu, chest congestion, AMBER  Interval History:    Shortness of Breath: +  Cough: +, congestion +  Sputum: no          Hemoptysis: no  Chest Pain: no  Fever: no                   Swelling Feet: no  Headache: no                                           Nausea, Emesis, Abdominal Pain: no  Diarrhea: no         Constipation: no    Events since last visit: none    PAST MEDICAL HISTORY:      Scheduled Meds:   predniSONE  40 mg Oral Daily    furosemide  40 mg Oral Daily    metoprolol tartrate  50 mg Oral BID    insulin glargine  15 Units SubCUTAneous Daily    insulin lispro  0-4 Units SubCUTAneous 4x Daily AC & HS    acetylcysteine  600 mg Inhalation BID RT    guaiFENesin  600 mg Oral BID    ipratropium 0.5 mg-albuterol 2.5 mg  1 Dose Inhalation Q4H WA RT    cetirizine  5 mg Oral Daily    allopurinol  300 mg Oral BID    atorvastatin  20 mg Oral Daily    budesonide  9 mg Oral Daily    apixaban  5 mg Oral BID    [Held by provider] empagliflozin  10 mg Oral Daily    levothyroxine  25 mcg Oral Daily    pantoprazole  40 mg Oral QAM AC    vitamin B-12  100 mcg Oral Daily    zinc sulfate  50 mg Oral Daily    sodium chloride flush  5-40 mL IntraVENous 2 times per day     Continuous Infusions:   dextrose      sodium chloride       PRN Meds:HYDROcodone-acetaminophen, glucose, dextrose bolus **OR** dextrose bolus, glucagon (rDNA), dextrose, butalbital-APAP-caffeine, Benzocaine-Menthol, fluticasone, sodium chloride flush, sodium chloride, potassium chloride **OR** potassium alternative oral replacement **OR** potassium chloride, magnesium sulfate, ondansetron **OR** ondansetron, melatonin, polyethylene glycol, bisacodyl, acetaminophen **OR** acetaminophen        PHYSICAL EXAMINATION:  BP (!) 147/90   Pulse 73   Temp 97.3 °F (36.3 °C) (Oral)   Resp 15   Ht 1.803 m (5' 10.98\")   Wt 133.5 kg (294 lb 5 oz)   SpO2 90%   BMI 41.07 kg/m²     General : Awake, alert, O2 RA  Neck - supple, 
   05/22/25 1525   Encounter Summary   Encounter Overview/Reason Spiritual/Emotional Needs   Service Provided For Patient   Referral/Consult From Palliative Care   Support System Spouse;Family members   Last Encounter  05/22/25   Complexity of Encounter Moderate   Begin Time 1525   End Time  1525   Total Time Calculated 0 min   Spiritual/Emotional needs   Type Spiritual Support   Palliative Care   Type Palliative Care, Follow-up   Assessment/Intervention/Outcome   Assessment Calm;Coping   Intervention Explored/Affirmed feelings, thoughts, concerns;Prayer (assurance of)/[place];Sustaining Presence/Ministry of presence   Outcome Comfort;Coping       
  NEPHROLOGY PROGRESS NOTE    Patient :  Taqueria Giblert; 80 y.o. MRN# [id number]  Location:  2072/2072-01  Attending:  Panfilo Steve MD  Admit Date:  5/10/2025   Hospital Day: 10    Reason for consultation: Management of hHypokalemia.    Requesting physician: Marco A Goodwin MD.    Chief Complaint: Generalized weakness and leg pain    Interval history: Patient was seen and examined today and is on oxygen 2 L/min and getting bronchodilator aerosols.  He is on IV Lasix 40 mg twice daily.  He is in negative fluid balance 13.5 L since admission.    History of Present Illness:    This is a 80 y.o. male with past medical history of nephrolithiasis, prostate adenocarcinoma, status post prostatectomy, essential hypertension, CVA 1996, history of atrial flutter requiring ablation, CKD stage IIIb with baseline serum creatinine of 1.2 to 1.7 mg/dL was following up with [facility].  Patient presented to the hospital with complaints of lower extremity weakness and pain.  Labs showed K 3.5  Patient had Hyperkalemia in the previous hospitalization and was on Lokelma. K has improved. Patient is on jardiance, lasix    Current Medications:    insulin lispro (HUMALOG,ADMELOG) injection vial 0-4 Units, 4x Daily AC & HS  insulin glargine (LANTUS) injection vial 10 Units, Daily  glucose chewable tablet 16 g, PRN  dextrose bolus 10% 125 mL, PRN   Or  dextrose bolus 10% 250 mL, PRN  glucagon injection 1 mg, PRN  dextrose 10 % infusion, Continuous PRN  acetylcysteine (MUCOMYST) 20 % solution 600 mg, BID RT  furosemide (LASIX) injection 40 mg, BID  methylPREDNISolone sodium succ (SOLU-MEDROL) 40 mg in sterile water 1 mL injection, Q8H  levoFLOXacin (LEVAQUIN) tablet 500 mg, Daily  guaiFENesin (MUCINEX) extended release tablet 600 mg, BID  ipratropium 0.5 mg-albuterol 2.5 mg (DUONEB) nebulizer solution 1 Dose, Q4H WA RT  cetirizine (ZYRTEC) tablet 5 mg, Daily  butalbital-APAP-caffeine -40 MG per capsule 1 
  NEPHROLOGY PROGRESS NOTE    Patient :  Taqueria Gilbert; 80 y.o. MRN# [id number]  Location:  2072/2072-01  Attending:  Panfilo Steve MD  Admit Date:  5/10/2025   Hospital Day: 7    Reason for consultation: Management of hHypokalemia.    Requesting physician: Marco A Goodwin MD.    Chief Complaint: Generalized weakness and leg pain    Interval history: Patient was seen and examined today and he does not have any complaints.  Legs are wrapped bilaterally in Ace wrap. He is nonoliguric.    History of Present Illness:    This is a 80 y.o. male with past medical history of nephrolithiasis, prostate adenocarcinoma, status post prostatectomy, essential hypertension, CVA 1996, history of atrial flutter requiring ablation, CKD stage IIIb with baseline serum creatinine of 1.2 to 1.7 mg/dL was following up with [facility].  Patient presented to the hospital with complaints of lower extremity weakness and pain.  Labs showed K 3.5  Patient had Hyperkalemia in the previous hospitalization and was on Lokelma. K has improved. Patient is on jardiance, lasix    Current Medications:    methylPREDNISolone sodium succ (SOLU-MEDROL) 40 mg in sterile water 1 mL injection, Q8H  levoFLOXacin (LEVAQUIN) tablet 500 mg, Daily  oseltamivir (TAMIFLU) capsule 75 mg, BID  guaiFENesin (MUCINEX) extended release tablet 600 mg, BID  ipratropium 0.5 mg-albuterol 2.5 mg (DUONEB) nebulizer solution 1 Dose, Q4H WA RT  cetirizine (ZYRTEC) tablet 5 mg, Daily  butalbital-APAP-caffeine -40 MG per capsule 1 capsule, Q6H PRN  Benzocaine-Menthol (CEPACOL) 1 lozenge, Q2H PRN  allopurinol (ZYLOPRIM) tablet 300 mg, BID  atorvastatin (LIPITOR) tablet 20 mg, Daily  budesonide (ENTOCORT EC) delayed release capsule 9 mg, Daily  apixaban (ELIQUIS) tablet 5 mg, BID  empagliflozin (JARDIANCE) tablet 10 mg, Daily  furosemide (LASIX) tablet 40 mg, Daily  levothyroxine (SYNTHROID) tablet 25 mcg, Daily  metoprolol tartrate (LOPRESSOR) 
  NEPHROLOGY PROGRESS NOTE    Patient :  Taqueria Gilbert; 80 y.o. MRN# [id number]  Location:  2072/2072-01  Attending:  Panfilo Steve MD  Admit Date:  5/10/2025   Hospital Day: 11    Reason for consultation: Management of hHypokalemia.    Requesting physician: Marco A Goodwin MD.    Chief Complaint: Generalized weakness and leg pain    Interval history: Patient was seen and examined today and he does not have any new complaints.  Furosemide has been changed from 40 mg IV twice daily to 40 mg p.o. daily.  He is on oxygen via nasal cannula.    History of Present Illness:    This is a 80 y.o. male with past medical history of nephrolithiasis, prostate adenocarcinoma, status post prostatectomy, essential hypertension, CVA 1996, history of atrial flutter requiring ablation, CKD stage IIIb with baseline serum creatinine of 1.2 to 1.7 mg/dL was following up with [facility].  Patient presented to the hospital with complaints of lower extremity weakness and pain.  Labs showed K 3.5  Patient had Hyperkalemia in the previous hospitalization and was on Lokelma. K has improved. Patient is on jardiance, lasix    Current Medications:    predniSONE (DELTASONE) tablet 40 mg, Daily  furosemide (LASIX) tablet 40 mg, Daily  insulin glargine (LANTUS) injection vial 15 Units, Daily  HYDROcodone-acetaminophen (NORCO) 5-325 MG per tablet 1 tablet, Q6H PRN  insulin lispro (HUMALOG,ADMELOG) injection vial 0-4 Units, 4x Daily AC & HS  glucose chewable tablet 16 g, PRN  dextrose bolus 10% 125 mL, PRN   Or  dextrose bolus 10% 250 mL, PRN  glucagon injection 1 mg, PRN  dextrose 10 % infusion, Continuous PRN  acetylcysteine (MUCOMYST) 20 % solution 600 mg, BID RT  guaiFENesin (MUCINEX) extended release tablet 600 mg, BID  ipratropium 0.5 mg-albuterol 2.5 mg (DUONEB) nebulizer solution 1 Dose, Q4H WA RT  cetirizine (ZYRTEC) tablet 5 mg, Daily  butalbital-APAP-caffeine -40 MG per capsule 1 capsule, Q6H 
  NEPHROLOGY PROGRESS NOTE    Patient :  Taqueria Gilbert; 80 y.o. MRN# [id number]  Location:  2072/2072-01  Attending:  Panfilo Steve MD  Admit Date:  5/10/2025   Hospital Day: 12    Reason for consultation: Management of hHypokalemia.    Requesting physician: Marco A Goodwin MD.    Chief Complaint: Generalized weakness and leg pain    Interval history:   Patient was seen and examined today complains of back pain, no nausea vomiting no chest pain shortness of breath   furosemide 40 mg p.o. daily.  He is on oxygen via nasal cannula.  Serum creatinine 1.1 mg/dL  Urine output 1 L yesterday in 24 hours  Blood pressure stable    History of Present Illness:    This is a 80 y.o. male with past medical history of nephrolithiasis, prostate adenocarcinoma, status post prostatectomy, essential hypertension, CVA 1996, history of atrial flutter requiring ablation, CKD stage IIIb with baseline serum creatinine of 1.2 to 1.7 mg/dL was following up with [facility].  Patient presented to the hospital with complaints of lower extremity weakness and pain.  Labs showed K 3.5  Patient had Hyperkalemia in the previous hospitalization and was on Lokelma. K has improved. Patient is on jardiance, lasix    Current Medications:    [START ON 5/23/2025] predniSONE (DELTASONE) tablet 40 mg, Daily  furosemide (LASIX) tablet 40 mg, Daily  metoprolol tartrate (LOPRESSOR) tablet 50 mg, BID  insulin glargine (LANTUS) injection vial 15 Units, Daily  HYDROcodone-acetaminophen (NORCO) 5-325 MG per tablet 1 tablet, Q6H PRN  insulin lispro (HUMALOG,ADMELOG) injection vial 0-4 Units, 4x Daily AC & HS  glucose chewable tablet 16 g, PRN  dextrose bolus 10% 125 mL, PRN   Or  dextrose bolus 10% 250 mL, PRN  glucagon injection 1 mg, PRN  dextrose 10 % infusion, Continuous PRN  acetylcysteine (MUCOMYST) 20 % solution 600 mg, BID RT  guaiFENesin (MUCINEX) extended release tablet 600 mg, BID  ipratropium 0.5 mg-albuterol 2.5 mg (DUONEB) 
  Physician Progress Note      PATIENT:               MOLINA DOE  CSN #:                  [id number]  :                       1945  ADMIT DATE:       5/10/2025 3:48 PM  DISCH DATE:  RESPONDING  PROVIDER #:        Alec Ravi MD          QUERY TEXT:    Based on your medical judgment, please clarify these findings and document if   any of the following are being evaluated and/or treated:    The clinical indicators include:  Patient is an 80-year-old male with hypertension and CHF, history of Atrial   fibrillation  maintained on Eliquis  Eliquis management, bleeding precautions  Options provided:  -- Secondary hypercoagulable state in a patient with atrial fibrillation  -- Other - I will add my own diagnosis  -- Disagree - Not applicable / Not valid  -- Disagree - Clinically unable to determine / Unknown  -- Refer to Clinical Documentation Reviewer    PROVIDER RESPONSE TEXT:    This patient has secondary hypercoagulable state in a patient with atrial   fibrillation.    Query created by: Suzanna Newsome on 2025 11:13 AM      Electronically signed by:  Alec Ravi MD 2025 11:35 AM          
..PALLIATIVE CARE TEAM    Patient: Taqueria Gilbert  Room: 2072/2072-01    Reason For Consult   Goals of care evaluation  Distress management  Symptom Management  Guidance and support  Facilitate communications  Assistance in coordinating care  Recommendations for the above    Impression: Taqueria Gilbert is a 80 y.o. year old male with  has a past medical history of Adenocarcinoma of prostate (HCC), Anemia, Anesthesia, Atrial flutter (HCC), Cellulitis of lower extremity, Cerebral artery occlusion with cerebral infarction (HCC), CHF (congestive heart failure) (HCC), Chronic acquired lymphedema, Clavicle fracture, Hernia, abdominal, History of blood transfusion, History of CVA (cerebrovascular accident), Hx of blood clots, Hyperglycemia, Hyperlipidemia, Hypertension, Hypothyroid, Ileus, postoperative (HCC), Mild renal insufficiency, Morbid obesity (HCC), Non-healing wound of lower extremity, Osteoarthritis, Phlebitis, Prediabetes, Prolonged emergence from general anesthesia, Prostate CA (HCC), Prostate cancer (HCC), PVD (peripheral vascular disease), Sleep apnea, SVT (supraventricular tachycardia), Uric acid kidney stone, Wears glasses, and Wrist fracture..  Currently hospitalized for the management of flu.  The Palliative Care Team is following to assist with goals of care and support.     Code Status  Full Code  PLAN:   - the patient is in bed and alert and oriented  - he is hopeful that he can go to the ARU   - we talked about resuscitation again and he states\" he wants a chance\" and no long term vent or trach   - he completed a new HCPOA, and his wife Rosita is primary decision maker   - he states that he would go to inpatient hospice when that time comes as his brother was there   - will follow for goals of care and support.  Vital Signs:  BP (!) 150/103 Comment: bp taken 2x, rn notified  Pulse 98   Temp 97.9 °F (36.6 °C) (Axillary)   Resp 20   Ht 1.803 m (5' 11\")   Wt (!) 138 kg (304 lb 3.8 oz)   SpO2 90%   
BRONCHOSPASM/BRONCHOCONSTRICTION     [x]         IMPROVE AERATION/BREATH SOUNDS  [x]   ADMINISTER BRONCHODILATOR THERAPY AS APPROPRIATE  [x]   ASSESS BREATH SOUNDS  []   IMPLEMENT AEROSOL/MDI PROTOCOL  [x]   PATIENT EDUCATION AS NEEDED    
[facility]   OCCUPATIONAL THERAPY MISSED TREATMENT NOTE   INPATIENT   Date: 25  Patient Name: Taqueria Gilbert       Room:   MRN: [id number]   Account #: [id number]    : 1945  (80 y.o.)  Gender: male   Referring Practitioner: Amina Rodriguez APRN - NP  Diagnosis: Hypokalemia             REASON FOR MISSED TREATMENT:     -    Refusal by Patient: Patient declined services stating he was having a difficult time breathing, LE's were too painful      Electronically signed by LESA Hercules on 25 at 3:16 PM EDT   
[facility]   Occupational Therapy Evaluation  Date: 25  Patient Name: Taqueria Gilbert       Room: -  MRN: [id number]  Account: [id number]   : 1945  (80 y.o.) Gender: male     Discharge Recommendations:  Further Occupational Therapy is recommended upon facility discharge.    OT Equipment Recommendations  Other: TBD    Referring Practitioner: Amina Rodriguez APRN - NP  Diagnosis: Hypokalemia        Treatment Diagnosis: impaired selfcare status    Past Medical History:  has a past medical history of Adenocarcinoma of prostate (HCC), Anemia, Anesthesia, Atrial flutter (HCC), Cellulitis of lower extremity, Cerebral artery occlusion with cerebral infarction (HCC), CHF (congestive heart failure) (HCC), Chronic acquired lymphedema, Clavicle fracture, Hernia, abdominal, History of blood transfusion, History of CVA (cerebrovascular accident), Hx of blood clots, Hyperglycemia, Hyperlipidemia, Hypertension, Hypothyroid, Ileus, postoperative (HCC), Mild renal insufficiency, Morbid obesity (HCC), Non-healing wound of lower extremity, Osteoarthritis, Phlebitis, Prediabetes, Prolonged emergence from general anesthesia, Prostate CA (HCC), Prostate cancer (HCC), PVD (peripheral vascular disease), Sleep apnea, SVT (supraventricular tachycardia), Uric acid kidney stone, Wears glasses, and Wrist fracture.    Past Surgical History:   has a past surgical history that includes tumor excision; knee surgery (Left, ); Hammer toe surgery (Bilateral); UPPP (90'S); Prostatectomy (10/21/2015); cyst removal (Left, 2016); Total knee arthroplasty (Bilateral, LT-, RT-); Varicose vein surgery (Bilateral, 80's); pr colon ca scrn not hi rsk ind (N/A, 2017); Cardioversion (2017); transesophageal echocardiogram (2017); ablation of dysrhythmic focus (2018); Study possible ablation (2018); Colonoscopy (, ); Colonoscopy (2016); pr arthrp 
[facility]   INPATIENT OCCUPATIONAL THERAPY  PROGRESS NOTE  Date: 2025  Patient Name: Taqueria Gilbert       Room:   MRN: [id number]    : 1945  (80 y.o.)  Gender: male   Referring Practitioner: Amina Rodriguez APRN - NP  Diagnosis: Hypokalemia      Discharge Recommendations:  Further Occupational Therapy is recommended upon facility discharge.    OT Equipment Recommendations  Other: TBD    Restrictions/Precautions  Restrictions/Precautions  Restrictions/Precautions: General Precautions;Fall Risk  Activity Level: Up as Tolerated;Up with Assist  Required Braces or Orthoses?: No  Implants Present? : Metal implants (etal implants   B TKR, B THR, left femur ORIF (corby), bilateral shoulder surgerie)    O2 Device: None (Room air)    Subjective  Subjective  Subjective: Pt supine in bed when JAIME arrived.  Pain  Pre-Pain: 6  Post-Pain: 5  Pain Location: Right  Pain Interventions: Repositioning;Rest  Comments: Skilled services okayed by ESTEFANIA Stallworth    Objective  Orientation  Overall Orientation Status: Within Functional Limits  Orientation Level: Oriented X4  Cognition  Overall Cognitive Status: WFL    Activities of Daily Living       Balance  Balance  Sitting Balance: Stand by assistance    Transfers/Mobility  Bed mobility  Rolling to Left: Contact guard assistance  Rolling to Right: Contact guard assistance  Supine to Sit: Contact guard assistance  Sit to Supine: Partial/Moderate assistance  Scooting: Minimal assistance  Bed Mobility Comments: HOB elevated to sit up, HOB flat when returning to supine         OT Exercises  Exercise Treatment: JAIME instructed pt on the completion of seated cardiac HEP with OCTA providing pt with education on activity pacing and breathing techniques to increase pt overall activity tolerance in participation with ADLs.    Patient Education  Patient Education  Education Given To: Patient  Education Provided: Role of Therapy, Plan of Care, Transfer 
Comprehensive Nutrition Assessment    Type and Reason for Visit:  DINO    Nutrition Recommendations/Plan:   Will continue current diet and add Ensure High Protein once daily for wound healing     Malnutrition Assessment:  Malnutrition Status:  At risk for malnutrition (05/16/25 6498)    Context:  Acute Illness     Findings of the 6 clinical characteristics of malnutrition:  Energy Intake:  No decrease in energy intake  Weight Loss:  No weight loss     Body Fat Loss:  Unable to assess     Muscle Mass Loss:  Unable to assess    Fluid Accumulation:  Mild Extremities   Strength:  Not Performed    Nutrition Assessment:    Pt admitted due to Hypokealemia (resolved) and Hypervolemia. He was found to have Flu.  Mx wounds noted. Wt has been stable since 10/24. Pt is consistently consuming more then 50% (mostly more than 75%) of food provided.    Nutrition Related Findings:    Mild edema to all extremities, Labs: Glu 133, Meds: Synthroid, Prednisone, BM 5/14, PMH: Ca Prostate, CVA, CHF, preDM Wound Type: Multiple, Pressure Injury, Venous Stasis (Trauma)       Current Nutrition Intake & Therapies:    Average Meal Intake: 51-75%, %     ADULT DIET; Regular; Low Fat/Low Chol/High Fiber/EDE; Low Sodium (2 gm)    Anthropometric Measures:  Height: 180.3 cm (5' 11\")  Ideal Body Weight (IBW): 172 lbs (78 kg)    Admission Body Weight: 139.3 kg (307 lb)  Current Body Weight: 137.9 kg (304 lb), 176.7 % IBW. Weight Source: Bed scale  Current BMI (kg/m2): 42.4  Usual Body Weight: 137.9 kg (304 lb)     % Weight Change (Calculated): 0                    BMI Categories: Obese Class 3 (BMI 40.0 or greater)    Estimated Daily Nutrient Needs:  Energy Requirements Based On: Formula  Weight Used for Energy Requirements: Ideal  Energy (kcal/day): [place] x 1.2= 1800 kcal  Weight Used for Protein Requirements: Ideal  Protein (g/day): 2g/kg= 155 g     Fluid (ml/day): at least 1500 ml    Nutrition Diagnosis:   Increased nutrient needs related 
Comprehensive Nutrition Assessment    Type and Reason for Visit:  Reassess    Nutrition Recommendations/Plan:   Continue current diet  Continue Ensure HP 2x/daily.      Malnutrition Assessment:  Malnutrition Status:  At risk for malnutrition (05/16/25 1285)    Context:  Acute Illness     Findings of the 6 clinical characteristics of malnutrition:  Energy Intake:  No decrease in energy intake  Weight Loss:  No weight loss     Body Fat Loss:  Unable to assess     Muscle Mass Loss:  Unable to assess    Fluid Accumulation:  Mild Extremities   Strength:  Not Performed    Nutrition Assessment:    Patient reports adequate appetite, eating % of meals and nutrition supplements. He states he ate almost all of his breakfast this morning. Reports no issues with nausea, vomiting or upset stomach.    Nutrition Related Findings:    Edema: Trace RUE/LUE. Labs: POC glucose 186, Glucose 181. BM 5/21. Other labs and meds reviewed. Wound Type: Multiple, Pressure Injury, Venous Stasis (Trauma)       Current Nutrition Intake & Therapies:    Average Meal Intake: %  Average Supplements Intake: %  ADULT DIET; Regular; Low Fat/Low Chol/High Fiber/EDE; Low Sodium (2 gm)  ADULT ORAL NUTRITION SUPPLEMENT; Dinner; Low Calorie/High Protein Oral Supplement    Anthropometric Measures:  Height: 180.3 cm (5' 10.98\")  Ideal Body Weight (IBW): 172 lbs (78 kg)    Admission Body Weight: 139.3 kg (307 lb)  Current Body Weight: 137.9 kg (304 lb), 176.7 % IBW. Weight Source: Bed scale  Current BMI (kg/m2): 42.4  Usual Body Weight: 137.9 kg (304 lb)     % Weight Change (Calculated): 0                    BMI Categories: Obese Class 3 (BMI 40.0 or greater)    Estimated Daily Nutrient Needs:  Energy Requirements Based On: Formula  Weight Used for Energy Requirements: Ideal  Energy (kcal/day): [place] x 1.2= 1800 kcal  Weight Used for Protein Requirements: Ideal  Protein (g/day): 2g/kg= 155 g  Method Used for Fluid Requirements: 1 
DATE: 2025    NAME: Taqueria Gilbert  MRN: [id number]   : 1945    Patient not seen this date for Physical Therapy due to:      [] Cancel by RN or physician due to:    [] Hemodialysis    [] Critical Lab Value Level     [] Blood transfusion in progress    [] Acute or unstable cardiovascular status   _MAP < 55 or more than >115  _HR < 40 or > 130    [] Acute or unstable pulmonary status   -FiO2 > 60%   _RR < 5 or >40    _O2 sats < 85%    [] Strict Bedrest    [] Off Unit for surgery or procedure    [] Off Unit for testing       [] Pending imaging to R/O fracture    [x] Refusal by Patient, stating he was on bedpan, declined offer to check back.     [] Other      [] PT being discontinued at this time. Patient independent. No further needs.     [] PT being discontinued at this time as the patient has been transferred to hospice care. No further needs.      Bria Sultana, PTA    
DATE: 2025    NAME: Taqueria Gilbert  MRN: [id number]   : 1945    Patient not seen this date for Physical Therapy due to:      [] Cancel by RN or physician due to:    [] Hemodialysis    [] Critical Lab Value Level     [] Blood transfusion in progress    [] Acute or unstable cardiovascular status   _MAP < 55 or more than >115  _HR < 40 or > 130    [] Acute or unstable pulmonary status   -FiO2 > 60%   _RR < 5 or >40    _O2 sats < 85%    [] Strict Bedrest    [] Off Unit for surgery or procedure    [] Off Unit for testing       [] Pending imaging to R/O fracture    [x] Refusal by Patient: Patient declined services stating he was having a difficult time breathing, LE's were too painful, nursing was notified.    [] Other      [] PT being discontinued at this time. Patient independent. No further needs.     [] PT being discontinued at this time as the patient has been transferred to hospice care. No further needs.      Bria Sultana, PTA    
Education given at this time regarding skin breakdown. Pt is refusing turns, insisting he can move himself in the bed. Education given regarding working with PT/OT since patient has refused multiple times.   
[facility]   OCCUPATIONAL THERAPY MISSED TREATMENT NOTE   INPATIENT   Date: 25  Patient Name: Taqueria Gilbert       Room:   MRN: [id number]   Account #: [id number]    : 1945  (80 y.o.)  Gender: male   Referring Practitioner: Amina Rodriguez APRN - NP  Diagnosis: Hypokalemia             REASON FOR MISSED TREATMENT:  Patient with another ancillary department   -    First attempt at 1105, patient with respiratory completing breathing treatment, OT rechecking on patient at 1122 with patient just receiving lunch. OT will continue to follow and reattempt as time permits.             Electronically signed by ALEJANDRA Thompson on 25 at 11:27 AM EDT   
[facility]   INPATIENT OCCUPATIONAL THERAPY  PROGRESS NOTE  Date: 2025  Patient Name: Taqueria Gilbert       Room:   MRN: [id number]    : 1945  (80 y.o.)  Gender: male   Referring Practitioner: Amina Rodriguez APRN - NP  Diagnosis: Hypokalemia      Discharge Recommendations:  Further Occupational Therapy is recommended upon facility discharge.    OT Equipment Recommendations  Other: TBD    Restrictions/Precautions  Restrictions/Precautions  Restrictions/Precautions: General Precautions;Fall Risk  Activity Level: Up as Tolerated;Up with Assist  Required Braces or Orthoses?: No  Implants Present? :  (B TKR, B THR, left femur ORIF (corby), bilateral shoulder surgeries)    O2 Device: None (Room air)    Subjective  Subjective  Subjective: patient pleasant and agreeable to engage in OT tx. Patient continues to c/o of head cold with nursing aware. Declining wanting to go to SNF for continued therapy due to reporting previous poor experiences. Reporting wife currently is limited due to having cold as well due to having COPD  Pain  Pre-Pain: 6  Post-Pain: 3  Pain Location: Back  Pain Interventions: Repositioning;Rest  Comments: Ok per ESTEFANIA Amador for OT tx    Objective  Orientation  Overall Orientation Status: Within Functional Limits  Orientation Level: Oriented X4  Cognition  Overall Cognitive Status: WFL    Activities of Daily Living  Additional Comments: Patient is currently limited by pain, decreased activity tolerance, generalized weakness and decreased balance impacting patients independence/safety with selfcare tasks    Balance  Balance  Sitting Balance: Stand by assistance  Standing Balance: Contact guard assistance    Transfers/Mobility  Bed mobility  Supine to Sit: Unable to assess (sitting in early mobility chair upon arrival)  Sit to Supine: Partial/Moderate assistance  Scooting: Dependent/Total (use of hercules for boosting in bed for positioning)  Bed Mobility 
Kirsten Conner MD   Patient:  MOLINA DOE   YOB: 1945  MRN:  [id number]  Location: [facility] Medical/Surgical    2072-01 5/21/25 5:23 PM   [phone] [place] [facility] [place]: MOLINA DOE fermin, are you going to be able to see patient today or will it be tomorrow? thanks!  Read 5:29 PM       5/21/25 5:29 PM  It will be tomorrow  
[facility]   Physical Therapy Treatment  Date: 25  Patient Name: Taqueria Gilbert       Room: -  MRN: [id number]  Account: [id number]   : 1945  (80 y.o.) Gender: male     Discharge Recommendations:  Discharge Recommendations: Patient would benefit from continued therapy after discharge, Therapy recommended at discharge               Past Medical History:  has a past medical history of Adenocarcinoma of prostate (HCC), Anemia, Anesthesia, Atrial flutter (HCC), Cellulitis of lower extremity, Cerebral artery occlusion with cerebral infarction (HCC), CHF (congestive heart failure) (HCC), Chronic acquired lymphedema, Clavicle fracture, Hernia, abdominal, History of blood transfusion, History of CVA (cerebrovascular accident), Hx of blood clots, Hyperglycemia, Hyperlipidemia, Hypertension, Hypothyroid, Ileus, postoperative (HCC), Mild renal insufficiency, Morbid obesity (HCC), Non-healing wound of lower extremity, Osteoarthritis, Phlebitis, Prediabetes, Prolonged emergence from general anesthesia, Prostate CA (HCC), Prostate cancer (HCC), PVD (peripheral vascular disease), Sleep apnea, SVT (supraventricular tachycardia), Uric acid kidney stone, Wears glasses, and Wrist fracture.  Past Surgical History:   has a past surgical history that includes tumor excision; knee surgery (Left, ); Hammer toe surgery (Bilateral); UPPP (90'S); Prostatectomy (10/21/2015); cyst removal (Left, 2016); Total knee arthroplasty (Bilateral, LT-, RT-); Varicose vein surgery (Bilateral, 80's); pr colon ca scrn not hi rsk ind (N/A, 2017); Cardioversion (2017); transesophageal echocardiogram (2017); ablation of dysrhythmic focus (2018); Study possible ablation (2018); Colonoscopy (, ); Colonoscopy (2016); pr arthrp acetblr/prox fem prostc agrft/algrft (Left, 05/15/2018); ablation of dysrhythmic focus (2019); transesophageal echocardiogram 
[facility]  Progress Note      NAME:  Taqueria Gilbert  MEDICAL RECORD NUMBER:  [id number]  AGE: 80 y.o.   GENDER: male  : 1945  TODAY'S DATE:  2025    [facility] nurse to bedside to assess fitting of unna boots. New dressings placed on Tuesday, 25. Dressings continue to fit well with the ability to slide two fingers between dressing and patient's skin. Dressings to be changed on Tuesday, 25, if still admitted. Will continue to monitor unna boot fitting while inpatient.    DAYANARA RochaN, RN  [facility]  Wound, Ostomy, and Continence Nursing  [phone]    
[facility]  Progress Note      NAME:  Taqueria Gilbert  MEDICAL RECORD NUMBER:  [id number]  AGE: 80 y.o.   GENDER: male  : 1945  TODAY'S DATE:  5/15/2025    [facility] nurse follow up visit to check on unna boots. Primary RN states that left dorsal foot wound was bleeding a little this morning. Bleeding had stopped by time of visit. Dressing left in place. Unna boots continue to fit well. Patient has outpatient wound care follow up visit on Tuesday. If he is still admitted on Tuesday, will change dressings at that time. Will continue to follow.     Chikis Irene, BSN, RN, CWOCN, [facility]  Wound, Ostomy, and Continence Nursing  [phone]           
[facility]  Progress Note      NAME:  Taqueria Gilbert  MEDICAL RECORD NUMBER:  [id number]  AGE: 80 y.o.   GENDER: male  : 1945  TODAY'S DATE:  2025      [facility] nurse to bedside to assess patient's unna boots. Unna boots were reapplied by [facility] nurse 25 due to sliding down patient's lower legs. Upon assessment, unna boots are fitting properly with no sliding, gaping or bulging. Two fingers are able to be slid between unna boot and skin. Patient denies any pain or discomfort to lower legs at this time. Will continue to follow up to ensure dressings are fitting properly while admitted.      DAYANARA RochaN, RN  [facility]  Wound, Ostomy, and Continence Nursing  [phone]    
[facility]  Progress Note      NAME:  Taqueria Gilbert  MEDICAL RECORD NUMBER:  [id number]  AGE: 80 y.o.   GENDER: male  : 1945  TODAY'S DATE:  2025    [facility] nurse follow up visit for dressing change to bilateral legs. Patient was scheduled for follow up in outpatient wound care today, but is still hospitalized. Appointment rescheduled for next week. Old dressings removed and legs cleansed with soap and water. Patient screaming that his legs were burning with dressing removal and cleansing, but no issues when dressings reapplied. Left dorsal foot and right pretibial wounds have closed. Blister to right dorsal foot/toes is still intact and has reabsorbed some. Blister padded with opticell ag and ABD under new unna boot in case it ruptures. Zinc unna boot applied to left leg. Spoke with primary RN regarding concern for purple discoloration to patient's buttocks and posterior thighs. He has chronic purple discoloration which has been present for at least 6 months, likely due to venous pooling. All areas of discoloration are blanchable- bilateral buttocks and bilateral posterior thighs- and there is no indication of pressure injury at this time.       Measurements:  Wound 25 Buttocks  /posterior thighs- purple discoloration (blanchable) (Active)   Wound Image    25 1127   Wound Etiology Other 25 1127   Dressing Status Clean;Dry;Intact 25 0941   Wound Cleansed Soap and water 25 1127   Dressing/Treatment Triad hydro/zinc oxide-based hydrophilic paste 25 1127   Wound Assessment Dusky;Purple/maroon 25 1127   Drainage Amount None (dry) 25 1127   Drainage Description Other (Comment) 05/15/25 1537   Odor None 25 1127   Natasha-wound Assessment Hyperpigmented 25 1127   Number of days: 42       Wound 25 Pretibial Right #1 (Active)   Wound Image   25 1127   Wound Etiology Venous 05/15/25 1537   Dressing Status Clean;Dry;Intact 
[facility]  Progress Note      NAME:  Taqueria Gilbert  MEDICAL RECORD NUMBER:  [id number]  AGE: 80 y.o.   GENDER: male  : 1945  TODAY'S DATE:  2025    [facility] nurse to bedside to assess patient's unna boots. Dressings are fitting properly with no sliding, gaping or bulging. Two fingers are able to be slid between unna boot and skin. Patient denies any pain or discomfort to lower legs at this time. Will continue to follow up to ensure dressings are fitting properly while admitted.     DAYANARA RochaN, RN  [facility]  Wound, Ostomy, and Continence Nursing  [phone]    
[facility]  Progress Note      NAME:  Taqueria Gilbert  MEDICAL RECORD NUMBER:  [id number]  AGE: 80 y.o.   GENDER: male  : 1945  TODAY'S DATE:  2025    [facility] nurse to bedside to reassess unna boots. Unna boots continue to fit well at time of visit. Patient has follow up visit with outpatient wound care tomorrow, . Will reschedule appointment and change dressings tomorrow if patient is still admitted. Will continue to follow.      DAYANARA RochaN, RN  [facility]  Wound, Ostomy, and Continence Nursing  [phone]    
New patient admission sent to [place], covering for Dr. Forrest.   
New patient admission, vital signs complete, admission questions complete, home medication list complete, dual skin assessment completed.   
Patient education given at this time regarding PT, patient attempted to refused stating \"he feels too weak\". Nursing educated on skin breakdown and asked if patient would be agreeable to bed exercises, patient agreed. PT notified at this time.   
Patient is refusing CPAP machine due to it keeping him up. Patient educated on the importance on wearing one at night. RN reached out to respiratory to let them know.   
Pentasa dosing discussed with Dr. Ravi and Will Pharmacist.  Plan to change order to Pentasa to 500 mg daily.  
Perfect serve sent to Dr. Conner in regards to ARU consult.   
Perfect serve sent to Dr. Steve regarding patient retaining urine. Orders to straight cath.   
Perfect serve sent to Dr. Vaca and Dr. Boogie, regarding ABG critical results   
Perfect serve sent to Dr. Vaca regarding consult per patient request. New consult for Keagan Claros sent via perfect serve.   
Perfect serve sent to wound regarding coming to see patient to assess his bottom, also the need for a different mattress.   
Physical Therapy    [facility]   Physical Therapy Evaluation  Date: 25  Patient Name: Taqueria Gilbert       Room:   MRN: [id number]  Account: [id number]   : 1945  (80 y.o.) Gender: male     Discharge Recommendations:  Discharge Recommendations: Patient would benefit from continued therapy after discharge, Therapy recommended at discharge           Past Medical History:  has a past medical history of Adenocarcinoma of prostate (HCC), Anemia, Anesthesia, Atrial flutter (HCC), Cellulitis of lower extremity, Cerebral artery occlusion with cerebral infarction (HCC), CHF (congestive heart failure) (HCC), Chronic acquired lymphedema, Clavicle fracture, Hernia, abdominal, History of blood transfusion, History of CVA (cerebrovascular accident), Hx of blood clots, Hyperglycemia, Hyperlipidemia, Hypertension, Hypothyroid, Ileus, postoperative (HCC), Mild renal insufficiency, Morbid obesity (HCC), Non-healing wound of lower extremity, Osteoarthritis, Phlebitis, Prediabetes, Prolonged emergence from general anesthesia, Prostate CA (HCC), Prostate cancer (HCC), PVD (peripheral vascular disease), Sleep apnea, SVT (supraventricular tachycardia), Uric acid kidney stone, Wears glasses, and Wrist fracture.  Past Surgical History:   has a past surgical history that includes tumor excision; knee surgery (Left, ); Hammer toe surgery (Bilateral); UPPP (90'S); Prostatectomy (10/21/2015); cyst removal (Left, 2016); Total knee arthroplasty (Bilateral, LT-, RT-); Varicose vein surgery (Bilateral, 80's); pr colon ca scrn not hi rsk ind (N/A, 2017); Cardioversion (2017); transesophageal echocardiogram (2017); ablation of dysrhythmic focus (2018); Study possible ablation (2018); Colonoscopy (, ); Colonoscopy (2016); pr arthrp acetblr/prox fem prostc agrft/algrft (Left, 05/15/2018); ablation of dysrhythmic focus (2019); 
Physical Therapy  [place]   Physical Therapy Treatment  Date: 25  Patient Name: Taqueria Gilbert       Room:   MRN: [id number]  Account: [id number]   : 1945  (80 y.o.) Gender: male     Discharge Recommendations:  Discharge Recommendations: Patient would benefit from continued therapy after discharge, Therapy recommended at discharge          General  Patient assessed for rehabilitation services?: Yes  Additional Pertinent Hx: Taqueria Gilbert is a 80 y.o. Declined male with a history of atrial fibrillation on Eliquis, chronic diastolic heart failure, prostate cancer, anemia, atrial flutter, right lower extremity cellulitis Adriana cerebral infarction, CHF, blood clots, hyperglycemia hyperlipidemia, hypertension, hypothyroidism, SVT, and prediabetes who presents with Extremity Weakness   and is admitted to the hospital for the management of Hypokalemia.     According to patient, he has been feeling dyspneic, weak, and report increased diuretic use.  He is urinating excessively and is concerned that his potassium is low.  He wears bilateral Unna boots and endorses constant purple discoloration of his toes.  There is concern for a blister on one of his feet.  He is concerned that he is too weak to ambulate.     Upon presentation to the ER purple toes were noted, but capillary refill was less than 2 seconds.  Ventral hernia noted on the abdomen, and a small 2 cm blister on the dorsal aspect of his right foot was noted as well.  He has an open blister on the dorsal aspect of his left foot.  His arm and leg strength were bilaterally equal.  He increased his daily dose of Lasix because his home health nurse was concerned with his weight gain. Chart review reveals that the patient was initially admitted to this facility for hyperkalemia, bilateral weeping edema, acute on chronic kidney failure, Bactrim and Aldactone use was contributing for hyperkalemia.  Vascular was consulted 
Physical Therapy  [facility]   Physical Therapy Treatment  Date: 25  Patient Name: Taqueria Gilbert       Room:   MRN: [id number]  Account: [id number]   : 1945  (80 y.o.) Gender: male     Discharge Recommendations:  Discharge Recommendations: Patient would benefit from continued therapy after discharge, Therapy recommended at discharge          General  Patient assessed for rehabilitation services?: Yes  Additional Pertinent Hx: Taqueria Gilbert is a 80 y.o. Declined male with a history of atrial fibrillation on Eliquis, chronic diastolic heart failure, prostate cancer, anemia, atrial flutter, right lower extremity cellulitis Adriana cerebral infarction, CHF, blood clots, hyperglycemia hyperlipidemia, hypertension, hypothyroidism, SVT, and prediabetes who presents with Extremity Weakness   and is admitted to the hospital for the management of Hypokalemia.     According to patient, he has been feeling dyspneic, weak, and report increased diuretic use.  He is urinating excessively and is concerned that his potassium is low.  He wears bilateral Unna boots and endorses constant purple discoloration of his toes.  There is concern for a blister on one of his feet.  He is concerned that he is too weak to ambulate.     Upon presentation to the ER purple toes were noted, but capillary refill was less than 2 seconds.  Ventral hernia noted on the abdomen, and a small 2 cm blister on the dorsal aspect of his right foot was noted as well.  He has an open blister on the dorsal aspect of his left foot.  His arm and leg strength were bilaterally equal.  He increased his daily dose of Lasix because his home health nurse was concerned with his weight gain. Chart review reveals that the patient was initially admitted to this facility for hyperkalemia, bilateral weeping edema, acute on chronic kidney failure, Bactrim and Aldactone use was contributing for hyperkalemia.  Vascular was consulted 
Physical Therapy  Facility/Department: [facility] MED SURG  Physical Therapy Progress Note    Name: Taqueria Gilbert  : 1945  MRN: [id number]  Date of Service: 2025    Discharge Recommendations:  Patient would benefit from continued therapy after discharge, Therapy recommended at discharge          Patient Diagnosis(es): The primary encounter diagnosis was Generalized weakness. Diagnoses of Other fatigue, Hypervolemia, unspecified hypervolemia type, and Hypokalemia were also pertinent to this visit.  Past Medical History:  has a past medical history of Adenocarcinoma of prostate (HCC), Anemia, Anesthesia, Atrial flutter (HCC), Cellulitis of lower extremity, Cerebral artery occlusion with cerebral infarction (HCC), CHF (congestive heart failure) (HCC), Chronic acquired lymphedema, Clavicle fracture, Hernia, abdominal, History of blood transfusion, History of CVA (cerebrovascular accident), Hx of blood clots, Hyperglycemia, Hyperlipidemia, Hypertension, Hypothyroid, Ileus, postoperative (HCC), Mild renal insufficiency, Morbid obesity (HCC), Non-healing wound of lower extremity, Osteoarthritis, Phlebitis, Prediabetes, Prolonged emergence from general anesthesia, Prostate CA (HCC), Prostate cancer (HCC), PVD (peripheral vascular disease), Sleep apnea, SVT (supraventricular tachycardia), Uric acid kidney stone, Wears glasses, and Wrist fracture.  Past Surgical History:  has a past surgical history that includes tumor excision; knee surgery (Left, ); Hammer toe surgery (Bilateral); UPPP (90'S); Prostatectomy (10/21/2015); cyst removal (Left, 2016); Total knee arthroplasty (Bilateral, LT-, RT-); Varicose vein surgery (Bilateral, 80's); pr colon ca scrn not hi rsk ind (N/A, 2017); Cardioversion (2017); transesophageal echocardiogram (2017); ablation of dysrhythmic focus (2018); Study possible ablation (2018); Colonoscopy (, ); Colonoscopy (2016); pr arthrp 
Physical Therapy  [facility]   Physical Therapy Treatment  Date: 5/15/25  Patient Name: Taqueria Gilbert       Room:   MRN: [id number]  Account: [id number]   : 1945  (80 y.o.) Gender: male     Discharge Recommendations:  Discharge Recommendations: Patient would benefit from continued therapy after discharge, Therapy recommended at discharge          General  Patient assessed for rehabilitation services?: Yes  Additional Pertinent Hx: Taqueria Gilbert is a 80 y.o. Declined male with a history of atrial fibrillation on Eliquis, chronic diastolic heart failure, prostate cancer, anemia, atrial flutter, right lower extremity cellulitis Adriana cerebral infarction, CHF, blood clots, hyperglycemia hyperlipidemia, hypertension, hypothyroidism, SVT, and prediabetes who presents with Extremity Weakness   and is admitted to the hospital for the management of Hypokalemia.     According to patient, he has been feeling dyspneic, weak, and report increased diuretic use.  He is urinating excessively and is concerned that his potassium is low.  He wears bilateral Unna boots and endorses constant purple discoloration of his toes.  There is concern for a blister on one of his feet.  He is concerned that he is too weak to ambulate.     Upon presentation to the ER purple toes were noted, but capillary refill was less than 2 seconds.  Ventral hernia noted on the abdomen, and a small 2 cm blister on the dorsal aspect of his right foot was noted as well.  He has an open blister on the dorsal aspect of his left foot.  His arm and leg strength were bilaterally equal.  He increased his daily dose of Lasix because his home health nurse was concerned with his weight gain. Chart review reveals that the patient was initially admitted to this facility for hyperkalemia, bilateral weeping edema, acute on chronic kidney failure, Bactrim and Aldactone use was contributing for hyperkalemia.  Vascular was consulted 
Physical Therapy  [facility]   Physical Therapy   Date: 25  Patient Name: Taqueria Gilbert       Room: -  MRN: [id number]  Account: [id number]   : 1945  (80 y.o.) Gender: male     Discharge Recommendations:  Discharge Recommendations: Patient would benefit from continued therapy after discharge, Therapy recommended at discharge           Past Medical History:  has a past medical history of Adenocarcinoma of prostate (HCC), Anemia, Anesthesia, Atrial flutter (HCC), Cellulitis of lower extremity, Cerebral artery occlusion with cerebral infarction (HCC), CHF (congestive heart failure) (HCC), Chronic acquired lymphedema, Clavicle fracture, Hernia, abdominal, History of blood transfusion, History of CVA (cerebrovascular accident), Hx of blood clots, Hyperglycemia, Hyperlipidemia, Hypertension, Hypothyroid, Ileus, postoperative (HCC), Mild renal insufficiency, Morbid obesity (HCC), Non-healing wound of lower extremity, Osteoarthritis, Phlebitis, Prediabetes, Prolonged emergence from general anesthesia, Prostate CA (HCC), Prostate cancer (HCC), PVD (peripheral vascular disease), Sleep apnea, SVT (supraventricular tachycardia), Uric acid kidney stone, Wears glasses, and Wrist fracture.  Past Surgical History:   has a past surgical history that includes tumor excision; knee surgery (Left, ); Hammer toe surgery (Bilateral); UPPP (90'S); Prostatectomy (10/21/2015); cyst removal (Left, 2016); Total knee arthroplasty (Bilateral, LT-, RT-); Varicose vein surgery (Bilateral, 80's); pr colon ca scrn not hi rsk ind (N/A, 2017); Cardioversion (2017); transesophageal echocardiogram (2017); ablation of dysrhythmic focus (2018); Study possible ablation (2018); Colonoscopy (, ); Colonoscopy (2016); pr arthrp acetblr/prox fem prostc agrft/algrft (Left, 05/15/2018); ablation of dysrhythmic focus (2019); transesophageal 
[facility]   Physical Therapy Treatment  Date: 25  Patient Name: Taqueria Gilbert       Room: -  MRN: [id number]  Account: [id number]   : 1945  (80 y.o.) Gender: male     Discharge Recommendations:  Patient would benefit from continued therapy after discharge, Therapy recommended at discharge               Past Medical History:  has a past medical history of Adenocarcinoma of prostate (HCC), Anemia, Anesthesia, Atrial flutter (HCC), Cellulitis of lower extremity, Cerebral artery occlusion with cerebral infarction (HCC), CHF (congestive heart failure) (HCC), Chronic acquired lymphedema, Clavicle fracture, Hernia, abdominal, History of blood transfusion, History of CVA (cerebrovascular accident), Hx of blood clots, Hyperglycemia, Hyperlipidemia, Hypertension, Hypothyroid, Ileus, postoperative (HCC), Mild renal insufficiency, Morbid obesity (HCC), Non-healing wound of lower extremity, Osteoarthritis, Phlebitis, Prediabetes, Prolonged emergence from general anesthesia, Prostate CA (HCC), Prostate cancer (HCC), PVD (peripheral vascular disease), Sleep apnea, SVT (supraventricular tachycardia), Uric acid kidney stone, Wears glasses, and Wrist fracture.  Past Surgical History:   has a past surgical history that includes tumor excision; knee surgery (Left, ); Hammer toe surgery (Bilateral); UPPP (90'S); Prostatectomy (10/21/2015); cyst removal (Left, 2016); Total knee arthroplasty (Bilateral, LT-, RT-); Varicose vein surgery (Bilateral, 80's); pr colon ca scrn not hi rsk ind (N/A, 2017); Cardioversion (2017); transesophageal echocardiogram (2017); ablation of dysrhythmic focus (2018); Study possible ablation (2018); Colonoscopy (, ); Colonoscopy (2016); pr arthrp acetblr/prox fem prostc agrft/algrft (Left, 05/15/2018); ablation of dysrhythmic focus (2019); transesophageal echocardiogram (2019); Cardiac 
RN attempted to turn patient again, patient became irritated at RN and said \"None else has been turning , why is this an issue now?\".  RN educated patient on the importance of turning while in bed. RN will attempt to turn again.   
RN looked back at previous wound care notes, RN cleansed patient legs with saline,  changed patients dressing with oil emulsion dressing, abd pads, roll gauze and ace wrap.   
This writer in room to give patient medications, seen waffle mattress was not inflated asked patient if writer could pump it up and he agreed. Writer inquired about turning and putting a pillow under either side to help prevent sores patient stated \"no the mattress is good enough and I shift my hips.\" Writer educated patient on importance of continual shifting and getting up to prevent pressure sores. Patient verbalized understanding.   
Wound care stated that the \"wounds are chronic and they wouldn't recommend any changes at this time. I inquired about a low air loss mattress, and they mentioned it might help improve his comfort.   
Writer asked patient if he would agreeable to switch into a low air loss, patient refused. More education regarding skin breakdown given at this time.   
Writer called [facility] lab in regards to urine culture sensitivity.  Writer notified additional testing was need, and sensitivity likely would not result until tomorrow.   
Writer called [facility] and spoke to Rochelle the receiving nurse. All questions were answered to her satisfaction. Patient will be transported by [facility].  
Writer called lab in regards to Rapid COVID test, was notified the order \"had not been released.\"   rechecked, will run COVID test now.   
Writer entered room due to patient yelling during a bed bath with staff. Nursing asked PCT's to help with turning patient to get a look at the patients coccyx. Nursing noticed redness, discoloration, and blistering. Media obtained at this time with patients consent. Manger notified at this time, instructed nursing perfect serve wound care to explore options to prevent further skin breakdown.   
Writer notified of shift change vitals SpO2 of 86% on room. Writer reassessed SpO2 of 88 % on room air. Place patient on 2L NC SpO2 up to 91%  
alert, O2 RA  Neck - supple, no lymphadenopathy, JVD not raised  Heart - regular rhythm, S1 and S2 normal; no additional sounds heard  Lungs - Air Entry- fair bilaterally; breath sounds : vesicular;   rales/crackles - absent  Abdomen - soft, no tenderness  Upper Extremities  - no cyanosis, mottling; edema : absent  Lower Extremities: no cyanosis, mottling; edema : absent    Current Laboratory, Radiologic, Microbiologic, and Diagnostic studies reviewed      ASSESSMENT / PLAN:      - acute hypoxic resp failure- O2, wean as tolerated   Check CXR - NAD  -radha- PAP  -a fib  -HFpEF  -BL LE cellulitis  - influenza - received tamiflu  - UTI - proteus  - atelectasis - IS  Stable pul wise  ? SNF - awaiting precert from insurance    Electronically signed by Keagan Claros MD on 05/22/25 at 4:45 PM.      
pain    Functional Mobility  Functional Mobility Comments: deferred due to B foot pain    OT Exercises  Exercise Treatment: OT engaged patient in B UE exercises with green theraband to increase UE strength/activity tolerance for increased independence with selfcare tasks with patient completing x15 reps in all available planes ,educated on purse lip breathing through with fair carryover, requiring rest breaks between sets due to fatigue/SOB, SP02 monitored throughout on 3L    Patient Education  Patient Education  Education Given To: Patient  Education Provided: Role of Therapy, Plan of Care, Home Exercise Program  Education Provided Comments: B UE HEP, importance of increasing OOB tolerance, energy conservation and purse lip breathing techniques  Education Method: Demonstration, Verbal  Barriers to Learning: None  Education Outcome: Verbalized understanding, Continued education needed, Demonstrated understanding    Goals  Short Term Goals  Time Frame for Short Term Goals: By discharge  Short Term Goal 1: patient to safely perform UB ADLs with mod I and LB ADLs with min A with use of AE/adaptive strategies as needed  Short Term Goal 2: patient to safely peform toileting task with SBA with use of DME as needed  Short Term Goal 3: patient to safely perform functional transfers/mobility with SBA with use of least restrictive device during selfcare task  Short Term Goal 4: patient to participate in 15+ min of therapeutic exercise/functional activity to increase activity tolerance for increased engagement in daily activities  Short Term Goal 5: patient to tolerate standing 5+ min with SBA during functional activity of choice to increase independence/safety with selfcare tasks  Short Term Goal 6: patient to verbalize/demonstrate good understanding of AE/adaptive strategies, energy conservation and fall prevention strategies to increase indpeendence/safety with selfcare tasks  Occupational Therapy Plan  Times Per Week: 
strength were bilaterally equal.  He increased his daily dose of Lasix because his home health nurse was concerned with his weight gain.     His EKG showed atrial fibrillation, chest x-ray was concerning for atelectasis and stable cardiomegaly, his potassium was 2.5 which was replaced in the ER.  Creatinine was 1.4 the patient's initial troponin was 74 uptrending to 75.  His baseline is noted to be between 75 and 55.  BNP was 1111; his base is noted to be between 6 and 800.  His COVID-19 and influenza are pending.     Chart review reveals that the patient was initially admitted to this facility for hyperkalemia, bilateral weeping edema, acute on chronic kidney failure, Bactrim and Aldactone use was contributing for hyperkalemia.  Vascular was consulted for venous stasis.  Juárez catheter was placed due to to urinary retention.  It was strongly recommended that patient consider skilled nursing facility, however patient insisted on going home     Plan to admit to medical surgical for concerns of CHF exacerbation, foot wounds generalized weakness, potassium replacement, trend troponin, PT, OT.  Consider podiatry consult.  Check CRP and sed rate for cellulitis rule out.  Consult to wound care.  5/17   Patient, has past medical CHF, atrial fibrillation on anticoagulation, has chronic wounds in both legs,  Patient evaluated by vascular surgery outpatient on 28th of last month, prescribed Unna boots  Urine culture positive   Evaluated by podiatry no surgery planned  Had hypokalemia which is improved      Patient complaining of shortness of breath wheezing today positive for flu started on p.o. steroids, on room air but continued to have bilateral expiratory wheezing,  Also patient wife is admitted in the hospital patient does not feel he is ready for discharge since significantly worse    Past Medical History:     Past Medical History:   Diagnosis Date    Adenocarcinoma of prostate (HCC) 10/30/2015    Anemia     Anesthesia  
to stand: Contact guard assistance  Stand to sit: Contact guard assistance  Transfer Comments: Pt completed sit to stand/stand to sit to and from elevated surface of bed with JAIME providing pt with CGA .         OT Exercises  Exercise Treatment: JAIME instructed pt on the completion of BUE HEP with the use of 3# weighted bar with pt completing 20 reps X 6 different ex with breaks after every 10 completed with JAIME providing pt with education on activity pacing and breathing technique to promote increased overall activity tolerance to promote safety and I with ADLs.    Patient Education  Patient Education  Education Given To: Patient  Education Provided: Role of Therapy, Plan of Care, Transfer Training, Mobility Training  Education Provided Comments: B UE HEP, importance of increasing OOB tolerance  Education Method: Verbal  Barriers to Learning: None  Education Outcome: Verbalized understanding, Continued education needed    Goals  Short Term Goals  Time Frame for Short Term Goals: By discharge  Short Term Goal 1: patient to safely perform UB ADLs with mod I and LB ADLs with min A with use of AE/adaptive strategies as needed  Short Term Goal 2: patient to safely peform toileting task with SBA with use of DME as needed  Short Term Goal 3: patient to safely perform functional transfers/mobility with SBA with use of least restrictive device during selfcare task  Short Term Goal 4: patient to participate in 15+ min of therapeutic exercise/functional activity to increase activity tolerance for increased engagement in daily activities  Short Term Goal 5: patient to tolerate standing 5+ min with SBA during functional activity of choice to increase independence/safety with selfcare tasks  Short Term Goal 6: patient to verbalize/demonstrate good understanding of AE/adaptive strategies, energy conservation and fall prevention strategies to increase indpeendence/safety with selfcare tasks  Occupational Therapy Plan  Times Per 
Exam:  GENERAL APPEARANCE: Alert and cooperative, and appears to be in no acute distress.  HEAD: normocephalic  EYES:  EOMI. Not pale, anicteric   NOSE:  No nasal discharge.    THROAT:  Oral cavity and pharynx normal. Moist  CARDIAC: Normal S1 and S2. No S3, S4 or murmurs. Rhythm is regular.  LUNGS:  diminished breath sounds. No wheezing, no accessory muscle use  ABDOMEN: Soft with normal bowel sounds.  MUSKULOSKELETAL: Adequately aligned spine. No joint erythema or tenderness.   EXTREMITIES:++ edema. Dressing noted bilaterally  NEURO: Non focal    Labs:   CBC:  No results for input(s): \"WBC\", \"RBC\", \"HGB\", \"HCT\", \"MCV\", \"MCH\", \"MCHC\", \"RDW\", \"PLT\", \"MPV\" in the last 72 hours.     BMP:   Recent Labs     05/16/25  0758      K 4.5   CL 97*   CO2 35*   BUN 26*   CREATININE 1.2   GLUCOSE 133*   CALCIUM 9.2      Assessment/plan:    1.  Hypokalemia - consequent to loop diuretics as well as use of Lokelma.  Lokelma has been discontinued serum potassium today is 4.5 mmol/L.    2.  Peripheral edema - secondary to heart failure with preserved ejection fraction. Patient is in negative fluid balance 5.2 L since admission.  Continue furosemide 40 mg p.o. daily.    3.  Systemic hypertension - blood pressure is adequately controlled.    Prognosis is guarded.    Electronically signed by Destiny Blackman MD on 5/16/2025 at 12:38 PM  
TONSILLECTOMY      TOTAL HIP ARTHROPLASTY Right 7/16/2024    HIP TOTAL ARTHROPLASTY ASI RIGHT performed by Kieran Nicholson MD at [facility] OR    TOTAL KNEE ARTHROPLASTY Bilateral LT-2003, RT-2006    TRANSESOPHAGEAL ECHOCARDIOGRAM  11/17/2017    TRANSESOPHAGEAL ECHOCARDIOGRAM  08/22/2019    TUMOR EXCISION      Throat/nose D/T benign tumor    UPPP UVULOPALATOPHARYGOPLASTY  90'S    VARICOSE VEIN SURGERY Bilateral 80's    x2       Current Medications:    cefTRIAXone (ROCEPHIN) 1,000 mg in sterile water 10 mL IV syringe, Q24H  cetirizine (ZYRTEC) tablet 5 mg, Daily  butalbital-APAP-caffeine -40 MG per capsule 1 capsule, Q6H PRN  allopurinol (ZYLOPRIM) tablet 300 mg, BID  atorvastatin (LIPITOR) tablet 20 mg, Daily  budesonide (ENTOCORT EC) delayed release capsule 9 mg, Daily  apixaban (ELIQUIS) tablet 5 mg, BID  empagliflozin (JARDIANCE) tablet 10 mg, Daily  furosemide (LASIX) tablet 40 mg, Daily  levothyroxine (SYNTHROID) tablet 25 mcg, Daily  metoprolol tartrate (LOPRESSOR) tablet 25 mg, BID  pantoprazole (PROTONIX) tablet 40 mg, QAM AC  [Held by provider] sodium zirconium cyclosilicate (LOKELMA) oral suspension 5 g, Daily  vitamin B-12 (CYANOCOBALAMIN) tablet 100 mcg, Daily  zinc sulfate (ZINCATE) 220 mg capsule - elemental zinc 50 mg, Daily  fluticasone (FLONASE) 50 MCG/ACT nasal spray 1 spray, Daily PRN  sodium chloride flush 0.9 % injection 5-40 mL, 2 times per day  sodium chloride flush 0.9 % injection 10 mL, PRN  0.9 % sodium chloride infusion, PRN  potassium chloride (KLOR-CON M) extended release tablet 40 mEq, PRN   Or  potassium bicarb-citric acid (EFFER-K) effervescent tablet 40 mEq, PRN   Or  potassium chloride 10 mEq/100 mL IVPB (Peripheral Line), PRN  magnesium sulfate 2000 mg in water 50 mL IVPB, PRN  ondansetron (ZOFRAN-ODT) disintegrating tablet 4 mg, Q8H PRN   Or  ondansetron (ZOFRAN) injection 4 mg, Q6H PRN  melatonin tablet 3 mg, Nightly PRN  polyethylene glycol (GLYCOLAX) packet 17 g, Daily 
CK     Goals  Patient Goals   Patient Goals : Figure out what's going home, before I go home.  Short Term Goals  Time Frame for Short Term Goals: 7 visits  Short Term Goal 1: Pt able to perform supine>sit withHOB elevated and use of bed rail, min A  Short Term Goal 2: Pt able to stand from raised bed height with RW, at CGA  Short Term Goal 3: Pt able to ambulate with RW distance of  80 ft x2, SBA  Short Term Goal 4: Pt able to perform 6\" step ups wth RW, 2 to 3 reps, min A x2.  Short Term Goal 5: Pt to tolerate standing activity for 3 to 5 minutes  with RW to improve B LE strength.      Plan  Physical Therapy Plan  General Plan: 5-7 times per week  Current Treatment Recommendations: Strengthening, Balance training, Functional mobility training, Transfer training, Endurance training, Safety education & training, Patient/Caregiver education & training, Therapeutic activities, Positioning, Neuromuscular re-education, Gait training, Stair training   Safety Devices  Type of Devices: All fall risk precautions in place, Call light within reach, Gait belt, Patient at risk for falls, Left in chair, Chair alarm in place       PT Individual Minutes  Time In: 0839  Time Out: 0923  Minutes: 44           Electronically signed by Deanne Dutton PTA on 5/15/25 at 7:57 AM EDT          
THE  MORNING BEFORE BREAKFAST 10/11/24  Yes Massimo Simon DO   ELIQUIS 5 MG TABS tablet TAKE 1 TABLET BY MOUTH TWICE  DAILY 10/3/24  Yes Massimo Simon DO   furosemide (LASIX) 40 MG tablet Take 1 tablet by mouth daily 9/9/24  Yes Gilma Barba MD   acetaminophen (TYLENOL) 500 MG tablet Take 1 tablet by mouth every 6 hours as needed for Pain   Yes Lizeth Davenport MD   vitamin B-12 (CYANOCOBALAMIN) 100 MCG tablet Take 1 tablet by mouth daily   Yes Lizeth Davenport MD   Ascorbic Acid (VITAMIN C) 250 MG tablet Take 1 tablet by mouth daily   Yes Lizeth Davenport MD   Melatonin 10 MG TABS Take 1 tablet by mouth nightly as needed   Yes Lizeth Davenport MD   zinc 50 MG CAPS Take 50 mg by mouth daily 3/29/21  Yes Gilma Barba MD   Cinnamon 500 MG CAPS Take 2 capsules by mouth daily   Yes Lizeth Davenport MD   Multiple Vitamins-Minerals (OCUVITE PO) Take 1 tablet by mouth daily   Yes Lizeth Davenport MD   Cholecalciferol (VITAMIN D-3) 25 MCG (1000 UT) CAPS Take 2 capsules by mouth daily   Yes Lizeth Davenport MD   glucose monitoring kit 1 kit by Does not apply route daily 3/20/25   Gilma Barba MD   blood glucose test strips (ASCENSIA AUTODISC VI;ONE TOUCH ULTRA TEST VI) strip 1 each by In Vitro route daily As needed. 3/20/25   Gilma Barba MD   PENTASA 250 MG extended release capsule TAKE 1 CAPSULE BY MOUTH 3 TIMES  DAILY 1/2/25   Lakshmi Glynn PA-C   Misc. Devices (CPAP MACHINE) MISC by Does not apply route    Lizeth Davenport MD   Elastic Bandages & Supports (JOBST KNEE HIGH COMPRESSION SM) MISC 1 each by Does not apply route daily as needed  Patient not taking: Reported on 5/7/2025    Lizeth Davenport MD   LYMPHEDEMA PUMP 1 Device as needed (lymphedema) Bilateral lower extremities  Patient not taking: Reported on 5/7/2025 4/6/23   Gilma Barba MD        Allergies:     Adhesive tape and Doxycycline    Social History: 
would benefit from continued therapy after discharge, Therapy recommended at discharge  Activity Tolerance  Activity Tolerance: Patient limited by endurance, Patient limited by fatigue, Patient limited by pain (self limiting)  Activity Tolerance Comments: patient puts forth increased effort & requires increased time to complete tasks     Patient Education  Patient Education  Education Given To: Patient  Education Provided: Role of Therapy, Plan of Care, Home Exercise Program, Transfer Training, Energy Conservation, Fall Prevention Strategies  Education Method: Demonstration, Verbal  Barriers to Learning: None  Education Outcome: Continued education needed     Functional Outcome Measures  AM-PAC Basic Mobility - Inpatient   How much help is needed turning from your back to your side while in a flat bed without using bedrails?: A Little  How much help is needed moving from lying on your back to sitting on the side of a flat bed without using bedrails?: A Little  How much help is needed moving to and from a bed to a chair?: A Little  How much help is needed standing up from a chair using your arms?: A Little  How much help is needed walking in hospital room?: A Little  How much help is needed climbing 3-5 steps with a railing?: Total  AM-PAC Inpatient Mobility Raw Score : 16  AM-PAC Inpatient T-Scale Score : 40.78  Mobility Inpatient CMS 0-100% Score: 54.16  Mobility Inpatient CMS G-Code Modifier : CK     Goals  Patient Goals   Patient Goals : Figure out what's going home, before I go home.  Short Term Goals  Time Frame for Short Term Goals: 7 visits  Short Term Goal 1: Pt able to perform supine>sit withHOB elevated and use of bed rail, min A  Short Term Goal 2: Pt able to stand from raised bed height with RW, at CGA  Short Term Goal 3: Pt able to ambulate with RW distance of  80 ft x2, SBA  Short Term Goal 4: Pt able to perform 6\" step ups wth RW, 2 to 3 reps, min A x2.  Short Term Goal 5: Pt to tolerate standing 
male with significant past medical history for A-fib on Eliquis, chronic diastolic heart failure, prostate cancer, CHF, hypertension, hyperlipidemia, hypothyroidism, chronic bilateral lower extremity venous stasis, CKD stage III with baseline creatinine around 1.3-1.7 is presenting with generalized fatigue and weakness, noted to be hypokalemic.  Patient was recently discharged from the hospital for hyperkalemia and ARF which has since resolved.  Aldactone and Bactrim were discontinued during that time.,  Nephrology has evaluated the  Hypokalemia.  Replace as per protocol.  Hold Richard, nephrology consult. Daily BMP  Chronic diastolic heart failure, does not seem to be in acute heart failure.  Continue lasix.  CKD. Stable  Chronic lymphedema.  Podiatry consulted, no intervention needed.  Patient needs to follow-up as outpatient with vascular surgery.  DVT prophylaxis.  On Eliquis.  Hypokalemia, improved  Will need outpatient follow-up with vascular  UTI, culture growing Proteus sensitive to Levaquin antibiotic changed to Levaquin, finished antibiotic  Patient, is having a lot of sinus issues, Mucinex  DuoNeb nebulization  COPD acute exacerbation, steroid to p.o. next chest x-ray seen, no concerning pneumonia, Pro-Glen is low  Finished Tamiflu with flu positive  Steroid-induced hyperglycemia, starting patient nondistended with sliding scale hypoglycemia protocol,   HbA1c 6.2, increasing insulin to 15 units, changing steroid to p.o.  [place] for pain control  Patient does not want to go to SNF  Palliative care consult  Wound care following  Very difficult situation, patient does not want to go home today, he told me that he is not ready yet  steroids and diuretics to p.o.  Awaiting placement to ARU    consultations:   IP CONSULT TO HOSPITALIST  IP CONSULT TO RESPIRATORY CARE  IP CONSULT TO PODIATRY  IP CONSULT TO NEPHROLOGY  IP CONSULT TO PULMONOLOGY  IP CONSULT TO PALLIATIVE CARE  IP CONSULT TO PHYSICAL MEDICINE 
consulted, no intervention needed.  Patient needs to follow-up as outpatient with vascular surgery.  DVT prophylaxis.  On Eliquis.  Hypokalemia, improved  Will need outpatient follow-up with vascular  UTI, culture growing Proteus sensitive to Levaquin antibiotic changed to Levaquin, finished antibiotic  Patient, is having a lot of sinus issues, Mucinex  DuoNeb nebulization  COPD acute exacerbation, steroid to p.o. next chest x-ray seen, no concerning pneumonia, Pro-Glen is low  Finished Tamiflu with flu positive  Steroid-induced hyperglycemia, starting patient nondistended with sliding scale hypoglycemia protocol,   HbA1c 6.2, increasing insulin to 15 units, changing steroid to p.o.  Slightly elevated potassium, will do PVR, will recheck BMP   Norco for pain control  changing diuretics to p.o.  Patient does not want to go to SNF, as per nursing report he has difficulty in  even returning, he told me last time SNF people almost killed him  Palliative care consult  Wound care following  Very difficult situation, patient does not want to go home today, he told me that he is not ready yet  steroids and diuretics to p.o.  Discussed with Dr. Conner  awaiting placement to ARU    5/24   ARU declined  Patient, feeling better  On room air  Getting discharged to SNF  Med rec reconciled    consultations:   IP CONSULT TO HOSPITALIST  IP CONSULT TO RESPIRATORY CARE  IP CONSULT TO PODIATRY  IP CONSULT TO NEPHROLOGY  IP CONSULT TO PULMONOLOGY  IP CONSULT TO PALLIATIVE CARE  IP CONSULT TO PHYSICAL MEDICINE REHAB     Patient is admitted as inpatient status because of co-morbidities listed above, severity of signs and symptoms as outlined, requirement for current medical therapies and most importantly because of direct risk to patient if care not provided in a hospital setting.  Expected length of stay > 48 hours.    Alec Ravi MD  5/24/2025  12:21 PM    Copy sent to Gilma Leon MD    Please note that this chart

## 2025-05-25 NOTE — PLAN OF CARE
Problem: Chronic Conditions and Co-morbidities  Goal: Patient's chronic conditions and co-morbidity symptoms are monitored and maintained or improved  5/11/2025 1436 by Iam Garcia RN  Outcome: Progressing  Flowsheets (Taken 5/11/2025 0800)  Care Plan - Patient's Chronic Conditions and Co-Morbidity Symptoms are Monitored and Maintained or Improved:   Monitor and assess patient's chronic conditions and comorbid symptoms for stability, deterioration, or improvement   Collaborate with multidisciplinary team to address chronic and comorbid conditions and prevent exacerbation or deterioration   Update acute care plan with appropriate goals if chronic or comorbid symptoms are exacerbated and prevent overall improvement and discharge  5/11/2025 0311 by Brit Basilio RN  Outcome: Progressing  Flowsheets (Taken 5/10/2025 2033)  Care Plan - Patient's Chronic Conditions and Co-Morbidity Symptoms are Monitored and Maintained or Improved: Monitor and assess patient's chronic conditions and comorbid symptoms for stability, deterioration, or improvement     Problem: Discharge Planning  Goal: Discharge to home or other facility with appropriate resources  5/11/2025 1436 by Iam Garcia RN  Outcome: Progressing  Flowsheets (Taken 5/11/2025 0800)  Discharge to home or other facility with appropriate resources:   Identify barriers to discharge with patient and caregiver   Arrange for needed discharge resources and transportation as appropriate   Identify discharge learning needs (meds, wound care, etc)  5/11/2025 0311 by Brit Basilio, RN  Outcome: Progressing  Flowsheets (Taken 5/10/2025 2033)  Discharge to home or other facility with appropriate resources: Identify barriers to discharge with patient and caregiver     Problem: Pain  Goal: Verbalizes/displays adequate comfort level or baseline comfort level  5/11/2025 1436 by Iam Garcia RN  Outcome: Progressing  Flowsheets (Taken 5/11/2025 
  Problem: Chronic Conditions and Co-morbidities  Goal: Patient's chronic conditions and co-morbidity symptoms are monitored and maintained or improved  5/12/2025 0131 by Brit Basilio RN  Outcome: Progressing  Flowsheets (Taken 5/11/2025 2016)  Care Plan - Patient's Chronic Conditions and Co-Morbidity Symptoms are Monitored and Maintained or Improved: Monitor and assess patient's chronic conditions and comorbid symptoms for stability, deterioration, or improvement     Problem: Discharge Planning  Goal: Discharge to home or other facility with appropriate resources  5/12/2025 0131 by Brit Basilio RN  Outcome: Progressing  Flowsheets (Taken 5/11/2025 2016)  Discharge to home or other facility with appropriate resources: Identify barriers to discharge with patient and caregiver     Problem: Pain  Goal: Verbalizes/displays adequate comfort level or baseline comfort level  5/12/2025 0131 by Brit Basilio RN  Outcome: Progressing     Problem: Safety - Adult  Goal: Free from fall injury  5/12/2025 0131 by Brit Basilio RN  Outcome: Progressing  Flowsheets (Taken 5/12/2025 0130)  Free From Fall Injury: Instruct family/caregiver on patient safety     Problem: ABCDS Injury Assessment  Goal: Absence of physical injury  5/12/2025 0131 by Brit Basilio RN  Outcome: Progressing  Flowsheets (Taken 5/12/2025 0130)  Absence of Physical Injury: Implement safety measures based on patient assessment     Problem: Respiratory - Adult  Goal: Achieves optimal ventilation and oxygenation  5/12/2025 0131 by Brit Basilio RN  Outcome: Progressing  Flowsheets (Taken 5/11/2025 2016)  Achieves optimal ventilation and oxygenation: Assess for changes in respiratory status     Problem: Skin/Tissue Integrity - Adult  Goal: Skin integrity remains intact  5/12/2025 0131 by Brit Basilio RN  Outcome: Progressing  Flowsheets  Taken 5/12/2025 0130  Skin Integrity Remains Intact: Monitor for areas 
  Problem: Chronic Conditions and Co-morbidities  Goal: Patient's chronic conditions and co-morbidity symptoms are monitored and maintained or improved  5/13/2025 0157 by Efrem Tamayo RN  Outcome: Progressing  Flowsheets (Taken 5/12/2025 2042)  Care Plan - Patient's Chronic Conditions and Co-Morbidity Symptoms are Monitored and Maintained or Improved:   Monitor and assess patient's chronic conditions and comorbid symptoms for stability, deterioration, or improvement   Collaborate with multidisciplinary team to address chronic and comorbid conditions and prevent exacerbation or deterioration     Problem: Discharge Planning  Goal: Discharge to home or other facility with appropriate resources  5/13/2025 0157 by Efrem Tamayo RN  Outcome: Progressing  Flowsheets (Taken 5/12/2025 2042)  Discharge to home or other facility with appropriate resources:   Identify barriers to discharge with patient and caregiver   Arrange for needed discharge resources and transportation as appropriate   Identify discharge learning needs (meds, wound care, etc)     Problem: Pain  Goal: Verbalizes/displays adequate comfort level or baseline comfort level  5/13/2025 0157 by Efrem Tamayo RN  Outcome: Progressing  Flowsheets (Taken 5/12/2025 2039)  Verbalizes/displays adequate comfort level or baseline comfort level:   Encourage patient to monitor pain and request assistance   Administer analgesics based on type and severity of pain and evaluate response   Assess pain using appropriate pain scale   Implement non-pharmacological measures as appropriate and evaluate response     Problem: Safety - Adult  Goal: Free from fall injury  5/13/2025 0157 by Efrem Tamayo RN  Outcome: Progressing  Flowsheets (Taken 5/13/2025 0016)  Free From Fall Injury: Instruct family/caregiver on patient safety     Problem: ABCDS Injury Assessment  Goal: Absence of physical injury  5/13/2025 0157 by Efrem Tamayo RN  Outcome: 
  Problem: Chronic Conditions and Co-morbidities  Goal: Patient's chronic conditions and co-morbidity symptoms are monitored and maintained or improved  5/16/2025 0159 by Antonio Monzon RN  Outcome: Progressing     Problem: Discharge Planning  Goal: Discharge to home or other facility with appropriate resources  5/16/2025 0159 by Antonio Monzon RN  Outcome: Progressing     Problem: Pain  Goal: Verbalizes/displays adequate comfort level or baseline comfort level  5/16/2025 0159 by Antonio Monzon RN  Outcome: Progressing     Problem: Safety - Adult  Goal: Free from fall injury  5/16/2025 0159 by Antonio Monzon RN  Outcome: Progressing     Problem: ABCDS Injury Assessment  Goal: Absence of physical injury  5/16/2025 0159 by Antonio Monzon RN  Outcome: Progressing     Problem: Skin/Tissue Integrity - Adult  Goal: Skin integrity remains intact  Description: 1.  Monitor for areas of redness and/or skin breakdown2.  Assess vascular access sites hourly3.  Every 4-6 hours minimum:  Change oxygen saturation probe site4.  Every 4-6 hours:  If on nasal continuous positive airway pressure, respiratory therapy assess nares and determine need for appliance change or resting period  5/16/2025 0159 by Antonio Monzon RN  Outcome: Progressing     Problem: Skin/Tissue Integrity - Adult  Goal: Incisions, wounds, or drain sites healing without S/S of infection  5/16/2025 0159 by Antonio Monzon RN  Outcome: Progressing     Problem: Musculoskeletal - Adult  Goal: Return mobility to safest level of function  5/16/2025 0159 by Antonio Monzon RN  Outcome: Progressing     Problem: Skin/Tissue Integrity  Goal: Skin integrity remains intact  Description: 1.  Monitor for areas of redness and/or skin breakdown2.  Assess vascular access sites hourly3.  Every 4-6 hours minimum:  Change oxygen saturation probe site4.  Every 4-6 hours:  If on nasal continuous positive airway pressure, respiratory therapy assess nares 
  Problem: Chronic Conditions and Co-morbidities  Goal: Patient's chronic conditions and co-morbidity symptoms are monitored and maintained or improved  5/17/2025 0454 by Riana Navas, RN  Outcome: Progressing  Flowsheets (Taken 5/16/2025 2134)  Care Plan - Patient's Chronic Conditions and Co-Morbidity Symptoms are Monitored and Maintained or Improved:   Monitor and assess patient's chronic conditions and comorbid symptoms for stability, deterioration, or improvement   Collaborate with multidisciplinary team to address chronic and comorbid conditions and prevent exacerbation or deterioration   Update acute care plan with appropriate goals if chronic or comorbid symptoms are exacerbated and prevent overall improvement and discharge     Problem: Discharge Planning  Goal: Discharge to home or other facility with appropriate resources  5/17/2025 0454 by Riana Navas, RN  Outcome: Progressing  Flowsheets (Taken 5/16/2025 2134)  Discharge to home or other facility with appropriate resources:   Identify barriers to discharge with patient and caregiver   Arrange for needed discharge resources and transportation as appropriate   Identify discharge learning needs (meds, wound care, etc)   Refer to discharge planning if patient needs post-hospital services based on physician order or complex needs related to functional status, cognitive ability or social support system     Problem: Pain  Goal: Verbalizes/displays adequate comfort level or baseline comfort level  5/17/2025 0454 by Riana Navas, RN  Outcome: Progressing  Flowsheets (Taken 5/17/2025 0454)  Verbalizes/displays adequate comfort level or baseline comfort level:   Encourage patient to monitor pain and request assistance   Assess pain using appropriate pain scale   Administer analgesics based on type and severity of pain and evaluate response     Problem: Safety - Adult  Goal: Free from fall injury  5/17/2025 0454 by Riana Navas, RN  Outcome: 
  Problem: Chronic Conditions and Co-morbidities  Goal: Patient's chronic conditions and co-morbidity symptoms are monitored and maintained or improved  5/17/2025 1531 by Federica Maldonado RN  Outcome: Progressing  5/17/2025 0454 by Riana Navas RN  Outcome: Progressing  Flowsheets (Taken 5/16/2025 2134)  Care Plan - Patient's Chronic Conditions and Co-Morbidity Symptoms are Monitored and Maintained or Improved:   Monitor and assess patient's chronic conditions and comorbid symptoms for stability, deterioration, or improvement   Collaborate with multidisciplinary team to address chronic and comorbid conditions and prevent exacerbation or deterioration   Update acute care plan with appropriate goals if chronic or comorbid symptoms are exacerbated and prevent overall improvement and discharge     Problem: Discharge Planning  Goal: Discharge to home or other facility with appropriate resources  5/17/2025 1531 by Federica Maldonado RN  Outcome: Progressing  5/17/2025 0454 by Riana Navas RN  Outcome: Progressing  Flowsheets (Taken 5/16/2025 2134)  Discharge to home or other facility with appropriate resources:   Identify barriers to discharge with patient and caregiver   Arrange for needed discharge resources and transportation as appropriate   Identify discharge learning needs (meds, wound care, etc)   Refer to discharge planning if patient needs post-hospital services based on physician order or complex needs related to functional status, cognitive ability or social support system     Problem: Pain  Goal: Verbalizes/displays adequate comfort level or baseline comfort level  5/17/2025 1531 by Federica Maldonado RN  Outcome: Progressing  5/17/2025 0454 by Riana Navas RN  Outcome: Progressing  Flowsheets (Taken 5/17/2025 0454)  Verbalizes/displays adequate comfort level or baseline comfort level:   Encourage patient to monitor pain and request assistance   Assess pain using appropriate pain scale   Administer 
  Problem: Chronic Conditions and Co-morbidities  Goal: Patient's chronic conditions and co-morbidity symptoms are monitored and maintained or improved  5/18/2025 0425 by Riana Navas, RN  Outcome: Progressing  Flowsheets (Taken 5/17/2025 1954)  Care Plan - Patient's Chronic Conditions and Co-Morbidity Symptoms are Monitored and Maintained or Improved:   Monitor and assess patient's chronic conditions and comorbid symptoms for stability, deterioration, or improvement   Collaborate with multidisciplinary team to address chronic and comorbid conditions and prevent exacerbation or deterioration   Update acute care plan with appropriate goals if chronic or comorbid symptoms are exacerbated and prevent overall improvement and discharge     Problem: Discharge Planning  Goal: Discharge to home or other facility with appropriate resources  5/18/2025 0425 by Riana Navas, RN  Outcome: Progressing  Flowsheets (Taken 5/17/2025 1954)  Discharge to home or other facility with appropriate resources:   Identify barriers to discharge with patient and caregiver   Arrange for needed discharge resources and transportation as appropriate   Identify discharge learning needs (meds, wound care, etc)   Refer to discharge planning if patient needs post-hospital services based on physician order or complex needs related to functional status, cognitive ability or social support system     Problem: Pain  Goal: Verbalizes/displays adequate comfort level or baseline comfort level  5/18/2025 0425 by Riana Navas, RN  Outcome: Progressing  Flowsheets (Taken 5/17/2025 0454)  Verbalizes/displays adequate comfort level or baseline comfort level:   Encourage patient to monitor pain and request assistance   Assess pain using appropriate pain scale   Administer analgesics based on type and severity of pain and evaluate response     Problem: Safety - Adult  Goal: Free from fall injury  5/18/2025 0425 by Riana Navas, RN  Outcome: 
  Problem: Chronic Conditions and Co-morbidities  Goal: Patient's chronic conditions and co-morbidity symptoms are monitored and maintained or improved  5/19/2025 1746 by Ita Wilson RN  Outcome: Progressing  5/19/2025 0520 by Riana Navas RN  Outcome: Progressing  Flowsheets (Taken 5/18/2025 1944)  Care Plan - Patient's Chronic Conditions and Co-Morbidity Symptoms are Monitored and Maintained or Improved:   Monitor and assess patient's chronic conditions and comorbid symptoms for stability, deterioration, or improvement   Collaborate with multidisciplinary team to address chronic and comorbid conditions and prevent exacerbation or deterioration   Update acute care plan with appropriate goals if chronic or comorbid symptoms are exacerbated and prevent overall improvement and discharge     Problem: Discharge Planning  Goal: Discharge to home or other facility with appropriate resources  5/19/2025 1746 by Ita Wilson RN  Outcome: Progressing  5/19/2025 0520 by Riana Navas RN  Outcome: Progressing  Flowsheets (Taken 5/18/2025 1944)  Discharge to home or other facility with appropriate resources:   Identify barriers to discharge with patient and caregiver   Arrange for needed discharge resources and transportation as appropriate   Identify discharge learning needs (meds, wound care, etc)   Refer to discharge planning if patient needs post-hospital services based on physician order or complex needs related to functional status, cognitive ability or social support system     Problem: Pain  Goal: Verbalizes/displays adequate comfort level or baseline comfort level  5/19/2025 1746 by Ita Wilson RN  Outcome: Progressing  5/19/2025 0520 by Riana Navas RN  Outcome: Progressing  Flowsheets (Taken 5/19/2025 0520)  Verbalizes/displays adequate comfort level or baseline comfort level:   Encourage patient to monitor pain and request assistance   Assess pain using appropriate pain scale   Implement 
  Problem: Chronic Conditions and Co-morbidities  Goal: Patient's chronic conditions and co-morbidity symptoms are monitored and maintained or improved  5/20/2025 1600 by Amina Buitrago RN  Outcome: Progressing  Flowsheets (Taken 5/20/2025 0941)  Care Plan - Patient's Chronic Conditions and Co-Morbidity Symptoms are Monitored and Maintained or Improved:   Monitor and assess patient's chronic conditions and comorbid symptoms for stability, deterioration, or improvement   Collaborate with multidisciplinary team to address chronic and comorbid conditions and prevent exacerbation or deterioration  5/20/2025 0423 by Riana Navas RN  Outcome: Progressing  Flowsheets (Taken 5/20/2025 0423)  Care Plan - Patient's Chronic Conditions and Co-Morbidity Symptoms are Monitored and Maintained or Improved:   Monitor and assess patient's chronic conditions and comorbid symptoms for stability, deterioration, or improvement   Collaborate with multidisciplinary team to address chronic and comorbid conditions and prevent exacerbation or deterioration   Update acute care plan with appropriate goals if chronic or comorbid symptoms are exacerbated and prevent overall improvement and discharge     Problem: Discharge Planning  Goal: Discharge to home or other facility with appropriate resources  5/20/2025 1600 by Amina Buitrago RN  Outcome: Progressing  Flowsheets (Taken 5/20/2025 0941)  Discharge to home or other facility with appropriate resources: Identify discharge learning needs (meds, wound care, etc)  5/20/2025 0423 by Riana Navas RN  Outcome: Progressing  Flowsheets (Taken 5/20/2025 0423)  Discharge to home or other facility with appropriate resources:   Identify barriers to discharge with patient and caregiver   Arrange for needed discharge resources and transportation as appropriate   Arrange for interpreters to assist at discharge as needed   Identify discharge learning needs (meds, wound care, etc)   Refer to discharge 
  Problem: Chronic Conditions and Co-morbidities  Goal: Patient's chronic conditions and co-morbidity symptoms are monitored and maintained or improved  5/21/2025 0106 by Cynthia Stack RN  Outcome: Progressing  Flowsheets (Taken 5/20/2025 2346)  Care Plan - Patient's Chronic Conditions and Co-Morbidity Symptoms are Monitored and Maintained or Improved:   Monitor and assess patient's chronic conditions and comorbid symptoms for stability, deterioration, or improvement   Collaborate with multidisciplinary team to address chronic and comorbid conditions and prevent exacerbation or deterioration   Update acute care plan with appropriate goals if chronic or comorbid symptoms are exacerbated and prevent overall improvement and discharge     Problem: Discharge Planning  Goal: Discharge to home or other facility with appropriate resources  5/21/2025 0106 by Cynthia Stack RN  Outcome: Progressing  Flowsheets (Taken 5/20/2025 2346)  Discharge to home or other facility with appropriate resources:   Identify barriers to discharge with patient and caregiver   Arrange for needed discharge resources and transportation as appropriate   Identify discharge learning needs (meds, wound care, etc)     Problem: Pain  Goal: Verbalizes/displays adequate comfort level or baseline comfort level  5/21/2025 0106 by yCnthia Stack RN  Outcome: Progressing     Problem: Safety - Adult  Goal: Free from fall injury  5/21/2025 0106 by Cynthia Stack RN  Outcome: Progressing     Problem: ABCDS Injury Assessment  Goal: Absence of physical injury  5/21/2025 0106 by Cynthia Stack RN  Outcome: Progressing     Problem: Neurosensory - Adult  Goal: Achieves maximal functionality and self care  5/21/2025 0106 by Cynthia Stack RN  Outcome: Progressing  Flowsheets (Taken 5/20/2025 2346)  Achieves maximal functionality and self care:   Monitor swallowing and airway patency with patient fatigue and changes in neurological status   Encourage and 
  Problem: Chronic Conditions and Co-morbidities  Goal: Patient's chronic conditions and co-morbidity symptoms are monitored and maintained or improved  5/21/2025 2332 by Inderjit Velásquez RN  Outcome: Progressing  Flowsheets (Taken 5/21/2025 2125)  Care Plan - Patient's Chronic Conditions and Co-Morbidity Symptoms are Monitored and Maintained or Improved: Monitor and assess patient's chronic conditions and comorbid symptoms for stability, deterioration, or improvement  5/21/2025 1807 by Amina Buitrago RN  Outcome: Progressing  5/21/2025 1757 by Amina Buitraog RN  Outcome: Progressing  Flowsheets (Taken 5/21/2025 0911)  Care Plan - Patient's Chronic Conditions and Co-Morbidity Symptoms are Monitored and Maintained or Improved: Monitor and assess patient's chronic conditions and comorbid symptoms for stability, deterioration, or improvement     Problem: Discharge Planning  Goal: Discharge to home or other facility with appropriate resources  5/21/2025 2332 by Inderjit Velásquez RN  Outcome: Progressing  Flowsheets (Taken 5/21/2025 2125)  Discharge to home or other facility with appropriate resources:   Identify barriers to discharge with patient and caregiver   Identify discharge learning needs (meds, wound care, etc)  5/21/2025 1807 by Amina Buitrago RN  Outcome: Progressing  5/21/2025 1757 by Amina Buitrago RN  Outcome: Progressing  Flowsheets (Taken 5/21/2025 0911)  Discharge to home or other facility with appropriate resources: Identify barriers to discharge with patient and caregiver     Problem: Pain  Goal: Verbalizes/displays adequate comfort level or baseline comfort level  5/21/2025 2332 by Inderjit Velásquez RN  Outcome: Progressing  Flowsheets (Taken 5/21/2025 2115)  Verbalizes/displays adequate comfort level or baseline comfort level:   Encourage patient to monitor pain and request assistance   Administer analgesics based on type and severity of pain and evaluate response  5/21/2025 1807 by Amina Buitrago RN  Outcome: 
  Problem: Chronic Conditions and Co-morbidities  Goal: Patient's chronic conditions and co-morbidity symptoms are monitored and maintained or improved  5/23/2025 0254 by Efrem Tamayo RN  Outcome: Progressing  Flowsheets (Taken 5/22/2025 2116)  Care Plan - Patient's Chronic Conditions and Co-Morbidity Symptoms are Monitored and Maintained or Improved:   Monitor and assess patient's chronic conditions and comorbid symptoms for stability, deterioration, or improvement   Collaborate with multidisciplinary team to address chronic and comorbid conditions and prevent exacerbation or deterioration     Problem: Discharge Planning  Goal: Discharge to home or other facility with appropriate resources  5/23/2025 0254 by Efrem Tamayo RN  Outcome: Progressing  Flowsheets (Taken 5/22/2025 2116)  Discharge to home or other facility with appropriate resources:   Identify barriers to discharge with patient and caregiver   Arrange for needed discharge resources and transportation as appropriate   Identify discharge learning needs (meds, wound care, etc)     Problem: Pain  Goal: Verbalizes/displays adequate comfort level or baseline comfort level  5/23/2025 0254 by Efrem Tamayo RN  Outcome: Progressing  Flowsheets (Taken 5/22/2025 2110)  Verbalizes/displays adequate comfort level or baseline comfort level:   Encourage patient to monitor pain and request assistance   Assess pain using appropriate pain scale   Administer analgesics based on type and severity of pain and evaluate response   Implement non-pharmacological measures as appropriate and evaluate response     Problem: Safety - Adult  Goal: Free from fall injury  5/23/2025 0254 by Efrem Tamayo RN  Outcome: Progressing  Flowsheets (Taken 5/22/2025 2116)  Free From Fall Injury: Instruct family/caregiver on patient safety     Problem: ABCDS Injury Assessment  Goal: Absence of physical injury  5/23/2025 0254 by Efrem Tamayo RN  Outcome: 
  Problem: Chronic Conditions and Co-morbidities  Goal: Patient's chronic conditions and co-morbidity symptoms are monitored and maintained or improved  5/24/2025 0243 by Efrem Tamayo RN  Outcome: Progressing  Flowsheets (Taken 5/23/2025 2127)  Care Plan - Patient's Chronic Conditions and Co-Morbidity Symptoms are Monitored and Maintained or Improved:   Monitor and assess patient's chronic conditions and comorbid symptoms for stability, deterioration, or improvement   Collaborate with multidisciplinary team to address chronic and comorbid conditions and prevent exacerbation or deterioration     Problem: Discharge Planning  Goal: Discharge to home or other facility with appropriate resources  5/24/2025 0243 by Efrem Tamayo RN  Outcome: Progressing  Flowsheets (Taken 5/23/2025 2127)  Discharge to home or other facility with appropriate resources:   Identify barriers to discharge with patient and caregiver   Identify discharge learning needs (meds, wound care, etc)   Arrange for needed discharge resources and transportation as appropriate     Problem: Pain  Goal: Verbalizes/displays adequate comfort level or baseline comfort level  5/24/2025 0243 by Efrem Tamayo RN  Outcome: Progressing  Flowsheets (Taken 5/23/2025 2125)  Verbalizes/displays adequate comfort level or baseline comfort level:   Encourage patient to monitor pain and request assistance   Assess pain using appropriate pain scale   Administer analgesics based on type and severity of pain and evaluate response   Implement non-pharmacological measures as appropriate and evaluate response     Problem: Safety - Adult  Goal: Free from fall injury  5/24/2025 0243 by Efrem Tamayo RN  Outcome: Progressing  Flowsheets (Taken 5/23/2025 2127)  Free From Fall Injury: Instruct family/caregiver on patient safety     Problem: ABCDS Injury Assessment  Goal: Absence of physical injury  5/24/2025 0243 by Efrem Tamayo RN  Outcome: 
  Problem: Chronic Conditions and Co-morbidities  Goal: Patient's chronic conditions and co-morbidity symptoms are monitored and maintained or improved  5/24/2025 1019 by Arelis Jarquin RN  Outcome: Progressing  5/24/2025 0243 by Efrem Tamayo RN  Outcome: Progressing  Flowsheets (Taken 5/23/2025 2127)  Care Plan - Patient's Chronic Conditions and Co-Morbidity Symptoms are Monitored and Maintained or Improved:   Monitor and assess patient's chronic conditions and comorbid symptoms for stability, deterioration, or improvement   Collaborate with multidisciplinary team to address chronic and comorbid conditions and prevent exacerbation or deterioration     Problem: Discharge Planning  Goal: Discharge to home or other facility with appropriate resources  5/24/2025 1019 by Arelis Jarquin RN  Outcome: Progressing  5/24/2025 0243 by Efrem Tamayo RN  Outcome: Progressing  Flowsheets (Taken 5/23/2025 2127)  Discharge to home or other facility with appropriate resources:   Identify barriers to discharge with patient and caregiver   Identify discharge learning needs (meds, wound care, etc)   Arrange for needed discharge resources and transportation as appropriate     Problem: Pain  Goal: Verbalizes/displays adequate comfort level or baseline comfort level  5/24/2025 1019 by Arelis Jarquin RN  Outcome: Progressing  5/24/2025 0243 by Efrem Tamayo RN  Outcome: Progressing  Flowsheets (Taken 5/23/2025 2125)  Verbalizes/displays adequate comfort level or baseline comfort level:   Encourage patient to monitor pain and request assistance   Assess pain using appropriate pain scale   Administer analgesics based on type and severity of pain and evaluate response   Implement non-pharmacological measures as appropriate and evaluate response     Problem: Safety - Adult  Goal: Free from fall injury  5/24/2025 1019 by Arelis Jarquin, ESTEFANIA  Outcome: Progressing  5/24/2025 0243 by 
  Problem: Chronic Conditions and Co-morbidities  Goal: Patient's chronic conditions and co-morbidity symptoms are monitored and maintained or improved  5/25/2025 0231 by Brit Basilio RN  Outcome: Progressing     Problem: Discharge Planning  Goal: Discharge to home or other facility with appropriate resources  5/25/2025 0231 by Brit Basilio RN  Outcome: Progressing  Flowsheets (Taken 5/24/2025 2143)  Discharge to home or other facility with appropriate resources: Identify barriers to discharge with patient and caregiver     Problem: Respiratory - Adult  Goal: Achieves optimal ventilation and oxygenation  5/25/2025 0231 by Brit Basilio RN  Flowsheets (Taken 5/24/2025 0800 by Arelis Jarquin RN)  Achieves optimal ventilation and oxygenation: Assess for changes in respiratory status     Problem: Skin/Tissue Integrity  Goal: Skin integrity remains intact  Description: 1.  Monitor for areas of redness and/or skin breakdown2.  Assess vascular access sites hourly3.  Every 4-6 hours minimum:  Change oxygen saturation probe site4.  Every 4-6 hours:  If on nasal continuous positive airway pressure, respiratory therapy assess nares and determine need for appliance change or resting period  5/25/2025 0231 by Brit Basilio RN    Problem: Musculoskeletal - Adult  Goal: Return mobility to safest level of function  Return Mobility to Safest Level of Function: Assess patient stability and activity tolerance for standing, transferring and ambulating with or without assistive devices   Skin Integrity Remains Intact: Monitor for areas of redness and/or skin breakdown     
  Problem: Chronic Conditions and Co-morbidities  Goal: Patient's chronic conditions and co-morbidity symptoms are monitored and maintained or improved  5/25/2025 1027 by Arelis Jarquin RN  Outcome: Progressing  5/25/2025 0231 by Brit Basilio RN  Outcome: Progressing  5/25/2025 0231 by Brit Basilio RN  Outcome: Progressing  Flowsheets (Taken 5/24/2025 2143)  Care Plan - Patient's Chronic Conditions and Co-Morbidity Symptoms are Monitored and Maintained or Improved: Monitor and assess patient's chronic conditions and comorbid symptoms for stability, deterioration, or improvement     Problem: Discharge Planning  Goal: Discharge to home or other facility with appropriate resources  5/25/2025 1027 by Arelis Jarquin RN  Outcome: Progressing  5/25/2025 0231 by Brit Basilio RN  Outcome: Progressing  Flowsheets (Taken 5/24/2025 2143)  Discharge to home or other facility with appropriate resources: Identify barriers to discharge with patient and caregiver  5/25/2025 0231 by Brit Basilio RN  Outcome: Progressing  Flowsheets (Taken 5/24/2025 2143)  Discharge to home or other facility with appropriate resources: Identify barriers to discharge with patient and caregiver     Problem: Pain  Goal: Verbalizes/displays adequate comfort level or baseline comfort level  5/25/2025 1027 by Arelis Jarquin RN  Outcome: Progressing  5/25/2025 0231 by Brit Basilio RN  Flowsheets (Taken 5/23/2025 2125 by Efrem Tamayo RN)  Verbalizes/displays adequate comfort level or baseline comfort level:   Encourage patient to monitor pain and request assistance   Assess pain using appropriate pain scale   Administer analgesics based on type and severity of pain and evaluate response   Implement non-pharmacological measures as appropriate and evaluate response  5/25/2025 0231 by Brit Basilio RN  Outcome: Progressing     Problem: Safety - Adult  Goal: Free from 
  Problem: Chronic Conditions and Co-morbidities  Goal: Patient's chronic conditions and co-morbidity symptoms are monitored and maintained or improved  Outcome: Progressing     Problem: Discharge Planning  Goal: Discharge to home or other facility with appropriate resources  Outcome: Progressing     Problem: Pain  Goal: Verbalizes/displays adequate comfort level or baseline comfort level  Outcome: Progressing     
  Problem: Chronic Conditions and Co-morbidities  Goal: Patient's chronic conditions and co-morbidity symptoms are monitored and maintained or improved  Outcome: Progressing     Problem: Discharge Planning  Goal: Discharge to home or other facility with appropriate resources  Outcome: Progressing     Problem: Pain  Goal: Verbalizes/displays adequate comfort level or baseline comfort level  Outcome: Progressing     Problem: Safety - Adult  Goal: Free from fall injury  Outcome: Progressing     Problem: ABCDS Injury Assessment  Goal: Absence of physical injury  Outcome: Progressing     Problem: Neurosensory - Adult  Goal: Achieves maximal functionality and self care  Outcome: Progressing     Problem: Respiratory - Adult  Goal: Achieves optimal ventilation and oxygenation  Outcome: Progressing     Problem: Skin/Tissue Integrity - Adult  Goal: Skin integrity remains intact  Description: 1.  Monitor for areas of redness and/or skin breakdown2.  Assess vascular access sites hourly3.  Every 4-6 hours minimum:  Change oxygen saturation probe site4.  Every 4-6 hours:  If on nasal continuous positive airway pressure, respiratory therapy assess nares and determine need for appliance change or resting period  Outcome: Progressing  Flowsheets (Taken 5/13/2025 1067)  Skin Integrity Remains Intact:   Monitor for areas of redness and/or skin breakdown   Turn and reposition as indicated   Positioning devices   Pressure redistribution bed/mattress (bed type)   Check visual cues for pain   Monitor skin under medical devices  Goal: Incisions, wounds, or drain sites healing without S/S of infection  Outcome: Progressing  Flowsheets (Taken 5/13/2025 6546)  Incisions, Wounds, or Drain Sites Healing Without Sign and Symptoms of Infection: TWICE DAILY: Assess and document skin integrity     Problem: Musculoskeletal - Adult  Goal: Return mobility to safest level of function  Outcome: Progressing     Problem: Gastrointestinal - Adult  Goal: 
  Problem: Chronic Conditions and Co-morbidities  Goal: Patient's chronic conditions and co-morbidity symptoms are monitored and maintained or improved  Outcome: Progressing     Problem: Discharge Planning  Goal: Discharge to home or other facility with appropriate resources  Outcome: Progressing     Problem: Pain  Goal: Verbalizes/displays adequate comfort level or baseline comfort level  Outcome: Progressing     Problem: Safety - Adult  Goal: Free from fall injury  Outcome: Progressing     Problem: ABCDS Injury Assessment  Goal: Absence of physical injury  Outcome: Progressing     Problem: Neurosensory - Adult  Goal: Achieves maximal functionality and self care  Outcome: Progressing     Problem: Respiratory - Adult  Goal: Achieves optimal ventilation and oxygenation  Outcome: Progressing     Problem: Skin/Tissue Integrity - Adult  Goal: Skin integrity remains intact  Description: 1.  Monitor for areas of redness and/or skin breakdown2.  Assess vascular access sites hourly3.  Every 4-6 hours minimum:  Change oxygen saturation probe site4.  Every 4-6 hours:  If on nasal continuous positive airway pressure, respiratory therapy assess nares and determine need for appliance change or resting period  Outcome: Progressing  Goal: Incisions, wounds, or drain sites healing without S/S of infection  Outcome: Progressing     Problem: Musculoskeletal - Adult  Goal: Return mobility to safest level of function  Outcome: Progressing     Problem: Gastrointestinal - Adult  Goal: Maintains or returns to baseline bowel function  Outcome: Progressing  Goal: Maintains adequate nutritional intake  Outcome: Progressing     Problem: Skin/Tissue Integrity  Goal: Skin integrity remains intact  Description: 1.  Monitor for areas of redness and/or skin breakdown2.  Assess vascular access sites hourly3.  Every 4-6 hours minimum:  Change oxygen saturation probe site4.  Every 4-6 hours:  If on nasal continuous positive airway pressure, 
  Problem: Chronic Conditions and Co-morbidities  Goal: Patient's chronic conditions and co-morbidity symptoms are monitored and maintained or improved  Outcome: Progressing     Problem: Discharge Planning  Goal: Discharge to home or other facility with appropriate resources  Outcome: Progressing     Problem: Pain  Goal: Verbalizes/displays adequate comfort level or baseline comfort level  Outcome: Progressing     Problem: Safety - Adult  Goal: Free from fall injury  Outcome: Progressing     Problem: ABCDS Injury Assessment  Goal: Absence of physical injury  Outcome: Progressing     Problem: Neurosensory - Adult  Goal: Achieves maximal functionality and self care  Outcome: Progressing     Problem: Respiratory - Adult  Goal: Achieves optimal ventilation and oxygenation  Outcome: Progressing     Problem: Skin/Tissue Integrity - Adult  Goal: Skin integrity remains intact  Description: 1.  Monitor for areas of redness and/or skin breakdown2.  Assess vascular access sites hourly3.  Every 4-6 hours minimum:  Change oxygen saturation probe site4.  Every 4-6 hours:  If on nasal continuous positive airway pressure, respiratory therapy assess nares and determine need for appliance change or resting period  Outcome: Progressing  Goal: Incisions, wounds, or drain sites healing without S/S of infection  Outcome: Progressing     Problem: Musculoskeletal - Adult  Goal: Return mobility to safest level of function  Outcome: Progressing     Problem: Gastrointestinal - Adult  Goal: Maintains or returns to baseline bowel function  Outcome: Progressing  Goal: Maintains adequate nutritional intake  Outcome: Progressing     Problem: Skin/Tissue Integrity  Goal: Skin integrity remains intact  Description: 1.  Monitor for areas of redness and/or skin breakdown2.  Assess vascular access sites hourly3.  Every 4-6 hours minimum:  Change oxygen saturation probe site4.  Every 4-6 hours:  If on nasal continuous positive airway pressure, 
  Problem: Chronic Conditions and Co-morbidities  Goal: Patient's chronic conditions and co-morbidity symptoms are monitored and maintained or improved  Outcome: Progressing     Problem: Discharge Planning  Goal: Discharge to home or other facility with appropriate resources  Outcome: Progressing     Problem: Pain  Goal: Verbalizes/displays adequate comfort level or baseline comfort level  Outcome: Progressing     Problem: Safety - Adult  Goal: Free from fall injury  Outcome: Progressing     Problem: ABCDS Injury Assessment  Goal: Absence of physical injury  Outcome: Progressing     Problem: Neurosensory - Adult  Goal: Achieves maximal functionality and self care  Outcome: Progressing     Problem: Respiratory - Adult  Goal: Achieves optimal ventilation and oxygenation  Outcome: Progressing     Problem: Skin/Tissue Integrity - Adult  Goal: Skin integrity remains intact  Outcome: Progressing  Goal: Incisions, wounds, or drain sites healing without S/S of infection  Outcome: Progressing     Problem: Musculoskeletal - Adult  Goal: Return mobility to safest level of function  Outcome: Progressing     Problem: Gastrointestinal - Adult  Goal: Maintains or returns to baseline bowel function  Outcome: Progressing  Goal: Maintains adequate nutritional intake  Outcome: Progressing     
  Problem: Chronic Conditions and Co-morbidities  Goal: Patient's chronic conditions and co-morbidity symptoms are monitored and maintained or improved  Outcome: Progressing     Problem: Discharge Planning  Goal: Discharge to home or other facility with appropriate resources  Outcome: Progressing     Problem: Pain  Goal: Verbalizes/displays adequate comfort level or baseline comfort level  Outcome: Progressing  Flowsheets (Taken 5/14/2025 2341)  Verbalizes/displays adequate comfort level or baseline comfort level:   Encourage patient to monitor pain and request assistance   Assess pain using appropriate pain scale   Administer analgesics based on type and severity of pain and evaluate response   Implement non-pharmacological measures as appropriate and evaluate response   Consider cultural and social influences on pain and pain management   Notify Licensed Independent Practitioner if interventions unsuccessful or patient reports new pain     Problem: Safety - Adult  Goal: Free from fall injury  Outcome: Progressing     Problem: ABCDS Injury Assessment  Goal: Absence of physical injury  Outcome: Progressing  Flowsheets (Taken 5/14/2025 2341)  Absence of Physical Injury: Implement safety measures based on patient assessment     Problem: Skin/Tissue Integrity - Adult  Goal: Skin integrity remains intact  Description: 1.  Monitor for areas of redness and/or skin breakdown2.  Assess vascular access sites hourly3.  Every 4-6 hours minimum:  Change oxygen saturation probe site4.  Every 4-6 hours:  If on nasal continuous positive airway pressure, respiratory therapy assess nares and determine need for appliance change or resting period  Outcome: Progressing  Goal: Incisions, wounds, or drain sites healing without S/S of infection  Outcome: Progressing     Problem: Musculoskeletal - Adult  Goal: Return mobility to safest level of function  Outcome: Progressing     Problem: Gastrointestinal - Adult  Goal: Maintains or returns 
  Problem: Chronic Conditions and Co-morbidities  Goal: Patient's chronic conditions and co-morbidity symptoms are monitored and maintained or improved  Outcome: Progressing  Flowsheets (Taken 5/10/2025 2033)  Care Plan - Patient's Chronic Conditions and Co-Morbidity Symptoms are Monitored and Maintained or Improved: Monitor and assess patient's chronic conditions and comorbid symptoms for stability, deterioration, or improvement     Problem: Discharge Planning  Goal: Discharge to home or other facility with appropriate resources  Outcome: Progressing  Flowsheets (Taken 5/10/2025 2033)  Discharge to home or other facility with appropriate resources: Identify barriers to discharge with patient and caregiver     Problem: Pain  Goal: Verbalizes/displays adequate comfort level or baseline comfort level  Outcome: Progressing     Problem: Safety - Adult  Goal: Free from fall injury  Outcome: Progressing  Flowsheets (Taken 5/10/2025 2036)  Free From Fall Injury: Instruct family/caregiver on patient safety     Problem: ABCDS Injury Assessment  Goal: Absence of physical injury  Outcome: Progressing  Flowsheets (Taken 5/10/2025 2036)  Absence of Physical Injury: Implement safety measures based on patient assessment     
  Problem: Chronic Conditions and Co-morbidities  Goal: Patient's chronic conditions and co-morbidity symptoms are monitored and maintained or improved  Outcome: Progressing  Flowsheets (Taken 5/13/2025 2035 by Efrem Tamayo, RN)  Care Plan - Patient's Chronic Conditions and Co-Morbidity Symptoms are Monitored and Maintained or Improved:   Monitor and assess patient's chronic conditions and comorbid symptoms for stability, deterioration, or improvement   Collaborate with multidisciplinary team to address chronic and comorbid conditions and prevent exacerbation or deterioration     Problem: Discharge Planning  Goal: Discharge to home or other facility with appropriate resources  Outcome: Progressing  Flowsheets (Taken 5/13/2025 2035 by Efrem Tamayo, RN)  Discharge to home or other facility with appropriate resources:   Identify barriers to discharge with patient and caregiver   Arrange for needed discharge resources and transportation as appropriate   Identify discharge learning needs (meds, wound care, etc)     Problem: Pain  Goal: Verbalizes/displays adequate comfort level or baseline comfort level  Outcome: Progressing  Flowsheets (Taken 5/14/2025 2341 by Antonio Monzon RN)  Verbalizes/displays adequate comfort level or baseline comfort level:   Encourage patient to monitor pain and request assistance   Assess pain using appropriate pain scale   Administer analgesics based on type and severity of pain and evaluate response   Implement non-pharmacological measures as appropriate and evaluate response   Consider cultural and social influences on pain and pain management   Notify Licensed Independent Practitioner if interventions unsuccessful or patient reports new pain     
  Problem: Chronic Conditions and Co-morbidities  Goal: Patient's chronic conditions and co-morbidity symptoms are monitored and maintained or improved  Outcome: Progressing  Flowsheets (Taken 5/18/2025 1944)  Care Plan - Patient's Chronic Conditions and Co-Morbidity Symptoms are Monitored and Maintained or Improved:   Monitor and assess patient's chronic conditions and comorbid symptoms for stability, deterioration, or improvement   Collaborate with multidisciplinary team to address chronic and comorbid conditions and prevent exacerbation or deterioration   Update acute care plan with appropriate goals if chronic or comorbid symptoms are exacerbated and prevent overall improvement and discharge     Problem: Discharge Planning  Goal: Discharge to home or other facility with appropriate resources  Outcome: Progressing  Flowsheets (Taken 5/18/2025 1944)  Discharge to home or other facility with appropriate resources:   Identify barriers to discharge with patient and caregiver   Arrange for needed discharge resources and transportation as appropriate   Identify discharge learning needs (meds, wound care, etc)   Refer to discharge planning if patient needs post-hospital services based on physician order or complex needs related to functional status, cognitive ability or social support system     Problem: Pain  Goal: Verbalizes/displays adequate comfort level or baseline comfort level  Outcome: Progressing  Flowsheets (Taken 5/19/2025 0520)  Verbalizes/displays adequate comfort level or baseline comfort level:   Encourage patient to monitor pain and request assistance   Assess pain using appropriate pain scale   Implement non-pharmacological measures as appropriate and evaluate response   Administer analgesics based on type and severity of pain and evaluate response     Problem: Safety - Adult  Goal: Free from fall injury  Outcome: Progressing  Flowsheets (Taken 5/19/2025 0217)  Free From Fall Injury:   Instruct 
  Problem: Chronic Conditions and Co-morbidities  Goal: Patient's chronic conditions and co-morbidity symptoms are monitored and maintained or improved  Outcome: Progressing  Flowsheets (Taken 5/21/2025 0911)  Care Plan - Patient's Chronic Conditions and Co-Morbidity Symptoms are Monitored and Maintained or Improved: Monitor and assess patient's chronic conditions and comorbid symptoms for stability, deterioration, or improvement     Problem: Discharge Planning  Goal: Discharge to home or other facility with appropriate resources  Outcome: Progressing  Flowsheets (Taken 5/21/2025 0911)  Discharge to home or other facility with appropriate resources: Identify barriers to discharge with patient and caregiver     Problem: Pain  Goal: Verbalizes/displays adequate comfort level or baseline comfort level  Outcome: Progressing     Problem: Safety - Adult  Goal: Free from fall injury  Outcome: Progressing     Problem: ABCDS Injury Assessment  Goal: Absence of physical injury  Outcome: Progressing     Problem: Neurosensory - Adult  Goal: Achieves maximal functionality and self care  Outcome: Progressing  Flowsheets (Taken 5/21/2025 0911)  Achieves maximal functionality and self care: Monitor swallowing and airway patency with patient fatigue and changes in neurological status     Problem: Respiratory - Adult  Goal: Achieves optimal ventilation and oxygenation  Outcome: Progressing     Problem: Skin/Tissue Integrity - Adult  Goal: Skin integrity remains intact  Description: 1.  Monitor for areas of redness and/or skin breakdown2.  Assess vascular access sites hourly3.  Every 4-6 hours minimum:  Change oxygen saturation probe site4.  Every 4-6 hours:  If on nasal continuous positive airway pressure, respiratory therapy assess nares and determine need for appliance change or resting period  Outcome: Progressing  Flowsheets (Taken 5/21/2025 0911)  Skin Integrity Remains Intact: Monitor for areas of redness and/or skin 
BRONCHOSPASM/BRONCHOCONSTRICTION   [x]  IMPROVE AERATION/BREATH SOUNDS  [x]   ADMINISTER BRONCHODILATOR THERAPY AS APPROPRIATE  [x]   ASSESS BREATH SOUNDS  []   IMPLEMENT AEROSOL/MDI PROTOCOL  [x]   PATIENT EDUCATION AS NEEDED      
Progressing  Flowsheets (Taken 5/14/2025 0017)  Absence of Physical Injury: Implement safety measures based on patient assessment     Problem: Neurosensory - Adult  Goal: Achieves maximal functionality and self care  5/14/2025 0223 by Efrem Tamayo RN  Outcome: Progressing     Problem: Respiratory - Adult  Goal: Achieves optimal ventilation and oxygenation  5/14/2025 0223 by Efrem Tamayo RN  Outcome: Progressing     
period  5/20/2025 0423 by Riana Navas RN  Outcome: Progressing  Flowsheets (Taken 5/20/2025 0422)  Skin Integrity Remains Intact:   Monitor for areas of redness and/or skin breakdown   Assess vascular access sites hourly     Problem: Skin/Tissue Integrity - Adult  Goal: Incisions, wounds, or drain sites healing without S/S of infection  5/20/2025 0423 by Riana Navas RN  Outcome: Progressing  Flowsheets (Taken 5/20/2025 0422)  Incisions, Wounds, or Drain Sites Healing Without Sign and Symptoms of Infection:   ADMISSION and DAILY: Assess and document risk factors for pressure ulcer development   TWICE DAILY: Assess and document skin integrity     Problem: Musculoskeletal - Adult  Goal: Return mobility to safest level of function  5/20/2025 0423 by Riana Navas RN  Outcome: Progressing  Flowsheets (Taken 5/19/2025 0520)  Return Mobility to Safest Level of Function:   Assess patient stability and activity tolerance for standing, transferring and ambulating with or without assistive devices   Assist with transfers and ambulation using safe patient handling equipment as needed   Ensure adequate protection for wounds/incisions during mobilization     Problem: Gastrointestinal - Adult  Goal: Maintains or returns to baseline bowel function  5/20/2025 0423 by Riana Navas RN  Outcome: Progressing  Flowsheets (Taken 5/20/2025 0423)  Maintains or returns to baseline bowel function:   Assess bowel function   Encourage oral fluids to ensure adequate hydration     Problem: Gastrointestinal - Adult  Goal: Maintains adequate nutritional intake  5/20/2025 0423 by Riana Navas RN  Outcome: Progressing  Flowsheets (Taken 5/20/2025 0423)  Maintains adequate nutritional intake:   Monitor percentage of each meal consumed   Identify factors contributing to decreased intake, treat as appropriate   Assist with meals as needed     Problem: Skin/Tissue Integrity  Goal: Skin integrity remains intact  Description: 1.

## 2025-05-25 NOTE — CARE COORDINATION
[facility] Encounter Date/Time: 5/10/2025 1548    Hospital Account: [id number]    MRN: [id number]    Patient: Taqueria Doe    Contact Serial #: [id number]      ENCOUNTER          Patient Class: I Private Enc?  No Unit RM BD: STCZ MED [facility]    Hospital Service: MED   Encounter DX: Hypokalemia [E87.6]   ADM Provider: Chris Paul MD   Procedure:     ATT Provider: Panfilo Steve MD   REF Provider:        Admission DX: Hypokalemia, Generalized weakness, Other fatigue, Hypervolemia, unspecified hypervolemia type and DX codes: E87.6, R53.1, R53.83, E87.70      PATIENT             Name: Taqueria MCCLAIN \"Srikanth\" Ofelia : 1945 (80 yrs)   Address: [address] [place] [address] Sex: Male   City: [place] [address]         Marital Status:    Employer: RETIRED         Restorationism: Religious   Primary Care Provider: Gilma Barba,*         Primary Phone: [phone]   EMERGENCY CONTACT   Name Home Phone Work Phone Mobile Phone Relationship Lgl Grd   RIC [phone] Spouse     XU SHUKLA [phone] Niece/Nephew           GUARANTOR            Guarantor: Taqueria Doe     : 1945   Address: [address] [place] [address] Sex: [place] [address]     Relation to Patient: Self       Home Phone: [phone]   Guarantor ID: [id number]       Work Phone:     Guarantor Employer: RETIRED         Status: RETIRED      COVERAGE        PRIMARY INSURANCE   Payor: [facility] MEDICARE Plan: [facility] MEDICARE COMPLETE   Payor Address: ,          Group Number: [id number] Insurance Type: INDEMNITY   Subscriber Name: TAQUERIA DOE Subscriber : 1945   Subscriber ID: [id number] Pat. Rel. to Sub: Self   SECONDARY INSURANCE   Payor:   Plan:     Payor Address:  ,           Group Number:   Insurance Type:     Subscriber Name:   Subscriber :     Subscriber ID:   Pat. Rel. to Sub:          CSN: [id number]   Medication List    START taking these medications    START 
  ACUTE INPATIENT REHABILITATION  Financial Disclosure Statement: Eligibility and Benefit Verification    Patient Name: Taqueria Gilbert MRN: [id number]     Disclosure Statement  [facility] Acute Inpatient Rehabilitation at [facility] provides 24 hour individualized service to patients with functional limitations due to but not limited to stroke, brain injury, spinal cord injury, major multiple trauma, hip fracture, amputation, and neurological disorders.  Acute Inpatient Rehabilitation provides rehabilitative nursing, physician coverage, as well as physical therapy, occupational therapy, speech therapy, recreational therapy and other services as deemed necessary by our Board Certified Physical Medicine and Rehabilitation Physicians Dr. Grzegorz Foreman, Dr. Estela Winchester, Dr. Kirsten Conner and Dr. Harjeet Brooks.    [facility] Acute Inpatient Rehabilitation at [facility] is fully accredited by the Commission on Accreditation of Rehabilitation Facilities (CARF) and The Joint Commission (TJC).    At a minimum, you will receive 5 days of therapy services totaling at least 15 hours per week. Your treatment program will consist of physical therapy at least 7.5 hours per week; occupational therapy 7.5 hours per week; and speech therapy 1.5 hours per week, as applicable.    Your estimated length of stay is currently:  Approximately 2 Weeks  Please Note: Estimated length of stay is based on individual condition and Acute Inpatient Rehabilitation specific needs. Length of stay may vary based upon interdisciplinary team assessment, insurance approval, and patient progression.    Your insurance coverage has been verified as follows:   Date Verified: 5/22/2025   Method:  Online Portal    Primary Insurance: [facility]  Member/Subscriber ID: [id number]   Coverage: Per Benefit Period  Plan Effective Date: 01/01/2025 Status: Active  Deductible: $0.00  Co-Pay: $0.00  Co-Insurance: 0%  Maximum Out of Pocket: 
Acute Inpatient Rehabilitation referral received via Fax    \"Inpatient Consult to PM&R - Physiatry [[id number]]\" Consult Order Status: Verified as ordered, patient confirmed on consult list    PM&R physiatrist Dr. Kirsten Conner on service for PM&R consult.    Updated Yesi ALCANTAR: Via Perfect Serve at this time  
Call received from Mary with [facility], facility can accept this pt Sunday 5/25. Facility accepts this pt. Rapid COVID needed day of discharge.    Phone number [phone]  Fax for NANDO: [phone]    Authorization submitted through [employer]. Auth ID: [id number]    Electronically signed by ANDREW Wynn on 5/23/2025 at 4:37 PM          
Case Management   Daily Progress Note       Patient Name: Taqueria Gilbert                   YOB: 1945  Diagnosis: Hypokalemia [E87.6]  Generalized weakness [R53.1]  Other fatigue [R53.83]  Hypervolemia, unspecified hypervolemia type [E87.70]                       GMLOS: 3.2 days  Length of Stay: 10  days    Readmission Risk (Low < 19, Mod (19-27), High > 27): Readmission Risk Score: 24.6      Patient is alert and oriented.    Spoke with patient, and Current Transitional Plan is:    [] Home Independently    [x] Home with HC; [facility].    [] Skilled Nursing Facility    [] Acute Rehabilitation    [] Long Term Acute Care (LTAC)    [] Other:     Medical Management: PO tamiflu, PO levaquin. IV solu-medrol 40 Q8H. IV lasix 40 mg BID.    Testing Ordered: Chest x-ray not concerning for pneumonia.    Additional Notes: Patient remains on 3L NC oxygen, saturation 92% Following for home O2 evaluation prior to discharge. Eliquis PTA. Patient adamantly refusing skilled nursing facility. Plan is to return home with spouse and visiting nurse services. Palliative care ordered.     Electronically signed by Yesi Ball RN on 5/20/2025 at 2:11 PM          
Case Management   Daily Progress Note       Patient Name: Taqueria Gilbert                   YOB: 1945  Diagnosis: Hypokalemia [E87.6]  Generalized weakness [R53.1]  Other fatigue [R53.83]  Hypervolemia, unspecified hypervolemia type [E87.70]                       GMLOS: 3.2 days  Length of Stay: 11  days    Readmission Risk (Low < 19, Mod (19-27), High > 27): Readmission Risk Score: 25.2      Patient is alert and oriented.    Spoke with patient, and Current Transitional Plan is:    [] Home Independently    [x] Home with HC; [facility]    [] Skilled Nursing Facility    [] Acute Rehabilitation    [] Long Term Acute Care (LTAC)    [] Other:     Medical Management: IV lasix switched to PO 40 mg daily. IV solu-medrol 40 Q12H switched to PO prednisone.     Testing Ordered: Currently on room air, saturation 93%, may need home O2 evaluation, however patient declining any oxygen at home due to \"too much stuff in our home.\"    Additional Notes: PM&R consult requested by patient. Patient adamantly refusing skilled nursing facility. Patient's spouse remains inpatient as well, +Flu. Following for recommendations.     Electronically signed by Yesi Ball RN on 5/21/2025 at 12:01 PM           
Case Management   Daily Progress Note       Patient Name: Taqueria Gilbert                   YOB: 1945  Diagnosis: Hypokalemia [E87.6]  Generalized weakness [R53.1]  Other fatigue [R53.83]  Hypervolemia, unspecified hypervolemia type [E87.70]                       GMLOS: 3.2 days  Length of Stay: 2  days    Readmission Risk (Low < 19, Mod (19-27), High > 27): Readmission Risk Score: 24.8      Patient is alert and oriented.    Spoke with Patient, and Current Transitional Plan is:    [] Home Independently    [x] Home with Home Health - [facility]. Pt REFUSES SNF.    [] Skilled Nursing Facility    [] Acute Rehabilitation    [] Long Term Acute Care (LTAC)    [] Other:     Medical Management: IV Rocephin, urine culture back - awaiting sensitivity.    Testing Ordered: n/a    Additional Notes: Anticipate d/c once urine culture finalized.    Electronically signed by Meagan Dumont RN on 5/12/2025 at 1:40 PM           
Case Management   Daily Progress Note       Patient Name: Taqueria Gilbert                   YOB: 1945  Diagnosis: Hypokalemia [E87.6]  Generalized weakness [R53.1]  Other fatigue [R53.83]  Hypervolemia, unspecified hypervolemia type [E87.70]                       GMLOS: 3.2 days  Length of Stay: 3  days    Readmission Risk (Low < 19, Mod (19-27), High > 27): Readmission Risk Score: 24.4      Patient is alert and oriented.    Spoke with Patient, and Current Transitional Plan is:    [] Home Independently    [x] Home with HC - [facility]. Pt continues to refuse SNF.    [] Skilled Nursing Facility    [] Acute Rehabilitation    [] Long Term Acute Care (LTAC)    [] Other:     Medical Management: IV Rocephin    Testing Ordered: Awaiting urine culture results    Additional Notes: Discharge once urine culture finalized.    Electronically signed by Meagan Dumont RN on 5/13/2025 at 11:39 AM           
Case Management   Daily Progress Note       Patient Name: Taqueria Gilbert                   YOB: 1945  Diagnosis: Hypokalemia [E87.6]  Generalized weakness [R53.1]  Other fatigue [R53.83]  Hypervolemia, unspecified hypervolemia type [E87.70]                       GMLOS: 3.2 days  Length of Stay: 5  days    Readmission Risk (Low < 19, Mod (19-27), High > 27): Readmission Risk Score: 24.2      Patient is alert and oriented.    Spoke with PT, and Current Transitional Plan is:    [] Home Independently    [x] Home with [facility], [facility]    [] Skilled Nursing Facility    [] Acute Rehabilitation    [] Long Term Acute Care (LTAC)    [] Other:     Medical Management: + FLU A, On Tamiflu, Remains on IV Rocephin. Urine Culture Sensitivities still pending. 91% on RA. PO Prednisone.     Testing Ordered: NA    Additional Notes: PT/OT on board. Will follow any rec.     Electronically signed by Marjorie Bonds RN on 5/15/2025 at 1:41 PM          
Case Management   Daily Progress Note       Patient Name: Taqueria Gilbert                   YOB: 1945  Diagnosis: Hypokalemia [E87.6]  Generalized weakness [R53.1]  Other fatigue [R53.83]  Hypervolemia, unspecified hypervolemia type [E87.70]                       GMLOS: 3.2 days  Length of Stay: 7  days    Readmission Risk (Low < 19, Mod (19-27), High > 27): Readmission Risk Score: 25.2      Patient is alert and oriented.    Spoke with Patient, and Current Transitional Plan is:    [] Home Independently    [x] Home with HC - [facility]. Pt is adamantly refusing SNF.    [] Skilled Nursing Facility    [] Acute Rehabilitation    [] Long Term Acute Care (LTAC)    [] Other:     Medical Management: PO Levaquin and IV Solu-medrol 40mg every 8 hours.    Testing Ordered: CXR ordered for today.    Additional Notes: +FLU, on Tamiflu.    Electronically signed by Meagan Dumont RN on 5/17/2025 at 11:00 AM           
Case Management   Daily Progress Note       Patient Name: Taqueria Gilbert                   YOB: 1945  Diagnosis: Hypokalemia [E87.6]  Generalized weakness [R53.1]  Other fatigue [R53.83]  Hypervolemia, unspecified hypervolemia type [E87.70]                       GMLOS: 3.2 days  Length of Stay: 8  days    Readmission Risk (Low < 19, Mod (19-27), High > 27): Readmission Risk Score: 25.1      Chart Reviewed. Current Transitional Plan is:    [] Home Independently    [x] Home with HC - [facility]. Spoke with pt yesterday and he adamantly refused SNF even though his wife is here in the hospital as well and will not be able to help him at home.    [] Skilled Nursing Facility    [] Acute Rehabilitation    [] Long Term Acute Care (LTAC)    [] Other:     Medical Management: PO Levaquin, PO Tamiflu, and IV Solu-medrol 40mg every 8 hours.    Testing Ordered: ABG's.    Additional Notes: Pt is not ready for d/c yet.    Electronically signed by Meagan Dumont RN on 5/18/2025 at 10:34 AM           
Case Management   Daily Progress Note       Patient Name: Taqueria Gilbert                   YOB: 1945  Diagnosis: Hypokalemia [E87.6]  Generalized weakness [R53.1]  Other fatigue [R53.83]  Hypervolemia, unspecified hypervolemia type [E87.70]                       GMLOS: 3.2 days  Length of Stay: 9  days    Readmission Risk (Low < 19, Mod (19-27), High > 27): Readmission Risk Score: 25.1      Patient is alert and oriented.    Spoke with patient, and Current Transitional Plan is:    [] Home Independently    [x] Home with HC; [facility].    [] Skilled Nursing Facility    [] Acute Rehabilitation    [] Long Term Acute Care (LTAC)    [] Other:     Medical Management: PO tamiflu, PO levaquin, IV solu-medrol 40 Q8H. Patient currently on 3L NC oxygen, saturation 91%    Testing Ordered: Will need home O2 eval.    Additional Notes: Patient refuses SNF, he is current with [facility]. Per the patient his wife is in 2094 and he believes she will be discharged soon and will be able to assist at home. Patient states that he will have friends transport when he is able to discharge.    IMM letter provided to patient.  Patient offered four hours to make informed decision regarding appeal process; patient agreeable to discharge.     Electronically signed by Yesi Ball RN on 5/19/2025 at 8:39 AM          
Continuity of Care Form    Patient Name: Taqueria Gilbert   :  1945  MRN:  [id number]    Admit date:  5/10/2025  Discharge date:  2025    Code Status Order: Full Code   Advance Directives:     Admitting Physician:  Chris Paul MD  PCP: Gilma Barba MD    Discharging Nurse: Valentine LYONS  Discharging Hospital Unit/Room#:   Discharging Unit Phone Number: [phone]    Emergency Contact:   Extended Emergency Contact Information  Primary Emergency Contact: Rosita Gilbert  Address: [address] [place] [address]  Home Phone: [phone]  Work Phone: [phone]  Mobile Phone: [phone]  Relation: Spouse  Secondary Emergency Contact: Dolores Flores  Mobile Phone: [phone]  Relation: Niece/Nephew    Past Surgical History:  Past Surgical History:   Procedure Laterality Date    ABLATION OF DYSRHYTHMIC FOCUS  2018    afib ablation    ABLATION OF DYSRHYTHMIC FOCUS  2019    ATRIAL FIB  /  DR BYRNE    CARDIAC CATHETERIZATION  2020    Dr. Seven Elder, [place]    CARDIOVERSION  2017    COLONOSCOPY  ,     COLONOSCOPY  2016    polyp,bx    COLONOSCOPY N/A 2022    COLONOSCOPY WITH RANDOM COLON BIOPSIES AND CLIPPING AT DISTAL TRANSVERSE COLON performed by Efrain Cabral MD at [facility] ENDO    COLONOSCOPY N/A 2022    COLONOSCOPY POLYPECTOMY SNARE/COLD BIOPSY performed by Isha Osorio MD at [facility] ENDO    CYST REMOVAL Left 2016    excision cyst anterior chest    FEMUR FRACTURE SURGERY Left 2023    RETROGRADE FEMORAL NAIL WITH CORTICOSTEROID INJECTION RIGHT HIP performed by Tad Danielle DO at [facility] OR    HAMMER TOE SURGERY Bilateral     KNEE SURGERY Left     repair of torn ligaments    FL ARTHRP ACETBLR/PROX FEM PROSTC AGRFT/ALGRFT Left 05/15/2018    HIP TOTAL ARTHROPLASTY MINIMALLY INVASIVE ASI WITH GPS performed by Kieran Nicholson MD at [facility] OR    FL COLON CA SCRN NOT HI RSK IND N/A 
DISCHARGE PLANNING NOTE:     Stretcher transportation set up with [facility] at 2:00 PM to go to [facility]. Facility notified. Bedside nurse and clinical lead notified. Patient and his wife were notified.     Number for report [phone]    Electronically signed by Meagan Bonner RN on 5/25/2025 at 10:39 AM    
DISCHARGE PLANNING NOTE:    Pt peer to peer denied. Writer met with pt spouse Rosita in room 2094 for update, pt declines SNF at this time. Rosita questions how much ARU is per day. 3,000-5,000 a day for 10-14 days per RN ORTIZ Key. Writer updated Rosita of this. Rosita places call to pt in room to discuss the need for this pt to go somewhere for therapy as she is also just getting out of the hospital.    Writer met with pt in room, agreeable to referral sent to [place] due to previous admission.     Electronically signed by ANDREW Wynn on 5/23/2025 at 3:43 PM      
DISCHARGE PLANNING NOTE:    Transport time is now 4:30-5:00 PM. [place] updated. Bedside nurse and clinical lead updated. Wife updated.    Electronically signed by Meagan Bonner RN on 5/25/2025 at 10:52 AM    
DISCHARGE PLANNING NOTE:    Writer is following for potential discharge placement. PM&R consult in place. Will continue to follow for recommendation.    Electronically signed by ANDREW Wynn on 5/22/2025 at 10:21 AM    
DISCHARGE PLANNING NOTE:    Writer is following for potential discharge to [facility]. Perfectserve message placed to ESTEFANIA Key with ARU to check authorization status. Pending at this time.       Electronically signed by ANDREW Wynn on 5/23/2025 at 10:32 AM    
DISCHARGE PLANNING NOTE:    Writer met with charge RN Yunior, pt is appropriate for ARU per Dr. Conner.    Writer met with pt to confirm.    Perfectserve message placed to Yesi with ARU to request authorization to be started for this pt.    Electronically signed by ANDREW Wynn on 5/22/2025 at 2:02 PM    
Mary from [place] is reviewing chart. Electronically signed by Linda Herman RN on 5/23/2025 at 4:19 PM   
ONGOING DISCHARGE PLAN:    Patient is alert and oriented x4.    Spoke with patient regarding discharge plan and patient confirms that plan is still [facility]. Can admit on 5/25 per Mary. Will need rapid covid and transport.     95% on room air  5/14 +FLU A   Oral steroids    PT/OT    Wound Care-bilateral lymphema boots        Will continue to follow for additional discharge needs.    If patient is discharged prior to next notation, then this note serves as note for discharge by case management.    Electronically signed by Linda Herman RN on 5/24/2025 at 11:14 AM   
ONGOING DISCHARGE PLAN:    Patient is alert and oriented x4.    Spoke with patient regarding discharge plan and patient confirms that plan is still home w/ [facility]. Refusing SNF.     C/O sinus congestion and wants addressed prior to discharge.   On room air    +UTI-proteus mirabilis  Sensitivities pending    IV rocephin    Chronic lymphedema  Wound Care-bilateral UNNA boots    PT/OT    Will continue to follow for additional discharge needs.    If patient is discharged prior to next notation, then this note serves as note for discharge by case management.    Electronically signed by Linda Herman RN on 5/14/2025 at 9:21 AM   
ONGOING DISCHARGE PLAN:    Writer reviewed chart Notes.     Plan remains for Pt. To return to home w/[facility].     Pt. Is + FLU A. Pt. On Tamiflu.    Remains on PO Prednisone.     Sating 94% on RA.    PT/OT on board.     Nephro following.     Anticipate DC paty.     Will continue to follow for additional discharge needs.    If patient is discharged prior to next notation, then this note serves as note for discharge by case management.    Electronically signed by Marjorie Bonds RN on 5/16/2025 at 5:24 PM   
Per PM&R physiatrist Dr. Kirsten Conner, patient appropriate for Acute Inpatient Rehabilitation level of care.    Eligibility & Benefits Verified - See Note    Prior authorization request for admission initiated with primary insurance [facility] Medicare via: Online Portal: [facility]    Pending Case Reference/Authorization # [id number].    Clinical documentation submitted via Online Portal: [facility]     Updated Kathryn ALCANTAR:  Ra[employer]l serve  at this time   
Received Telephone Call from payor [facility] Medicare  representative April. Call Ref#[id number].     Intent to deny admission to Acute Inpatient Rehabilitation Stay under Auth/CaseRef# [id number].      Rationale to deny Acute Inpatient Rehabilitation level of care: Does not meet criteria     Peer to Peer Deadline: 5/23/2025 5 pm     Peer to Peer Instructions: Call [phone] opt 5    Per PM&R physiatrist Dr. Kirsten Conner, Pursue peer to peer to attempt to overturn denial: Scheduled for MD to call.      P2P completed and denial was upheld.     Updated Kathryn SW:  Perfect Serve  at this time with read receipt.   
Writer reviewed [employer] Portal for insurance auth.     [id number]    Auth ID [id number]   Approved 5/23-5/27/2025    Electronically signed by Linda Herman RN on 5/25/2025 at 7:36 AM     HENS COMPLETED    Document ID : [id number]     Electronically signed by Linda Herman RN on 5/25/2025 at 8:02 AM   
to the following treatment goals of Hypokalemia [E87.6]  Generalized weakness [R53.1]  Other fatigue [R53.83]  Hypervolemia, unspecified hypervolemia type [E87.70]    IF APPLICABLE: The Patient and/or patient representative Taqueria and his family were provided with a choice of provider and agrees with the discharge plan. Freedom of choice list with basic dialogue that supports the patient's individualized plan of care/goals and shares the quality data associated with the providers was provided to: Patient   Patient Representative Name:       The Patient and/or Patient Representative Agree with the Discharge Plan? Yes    Marjorie Bonds RN  Case Management Department  Ph:  Fax:

## 2025-05-26 NOTE — DISCHARGE INSTRUCTIONS
[facility]OUND and HYPERBAR[facility]REATMENT  CENTER                            Visit  Discharge Instructions / Physician Orders  DATE: 5/29/25     Home Care: [facility]      SUPPLIES ORDERED THRU:       velcro compression stockings to be ordered next week             DATE LAST SUPPLIED     Wound Location:  Left lower leg     Cleanse with: Leave dry and intact     Dressing Orders:  fibracol, silvercel, zinc unna boot to bilateral lower legs     Frequency:  Leave dry and intact     Additional Orders: Increase protein to diet (meat, cheese, eggs, fish, peanut butter, nuts and beans)  Multivitamin daily  Elevate legs if swollen or wearing compression when sitting     Referral placed for lymphedema clinic today 5/6/25     OFFLOADING [] YES  TYPE:                  [] NA     Weekly wound care visits until determined otherwise.     Antibiotic therapy-wound care related YES [] NO [x] NA[]  DRUG-                                                             Duration-             Script sent to-                      on (date)  MY CHART []     Smart Device  []                         Your next appointment with the Wound Care Center is in 1 week                                                                                                   (Please note your next appointment above and if you are unable to keep, kindly give a 24 hour notice. Thank you.)  If more than 15 min late we cannot guarantee you will be seen due to clinician schedule     Per Policy,  3 or more cancellations or no show may result in dismissal from program  If you experience any of the following, please call the Wound Care Center during business hours:  [phone]     * Increase in Pain  * Temperature over 101  * Increase in drainage from your wound  * Drainage with a foul odor  * Bleeding  * Increase in swelling  * Need for compression bandage changes due to slippage, breakthrough drainage.     If you need medical attention outside of the business hours

## 2025-05-29 ENCOUNTER — HOSPITAL ENCOUNTER (OUTPATIENT)
Dept: WOUND CARE | Age: 80
Discharge: HOME | End: 2025-05-29

## 2025-05-31 NOTE — DISCHARGE SUMMARY
IN-PATIENT SERVICE   [facility] Internal Medicine    Discharge Summary     Patient ID: Taqueria Gilbert  :  1945   MRN: [id number]     ACCOUNT:  [id number]   Patient's PCP: Gilma Barba MD  Admit Date: 5/10/2025   Discharge Date: 2025    Length of Stay: 15  Code Status:  Prior  Admitting Physician: Chris Paul MD  Discharge Physician: Alec Ravi MD     Active Discharge Diagnoses:     Primary Problem  Hypokalemia      Hospital Problems  Active Hospital Problems    Diagnosis Date Noted    Generalized weakness [R53.1] 2025    Debility [R53.81] 2025    Hypokalemia [E87.6] 05/10/2025       Admission Condition:  fair     Discharged Condition: fair    Hospital Stay:     Hospital Course:  Taqueria Gilbert is a 80 y.o. male who was admitted for the management of Hypokalemia , presented to ER with Extremity Weakness  Patient, has past medical CHF, atrial fibrillation on anticoagulation, has chronic wounds in both legs,  Patient evaluated by vascular surgery outpatient on  of last month, prescribed Unna boots  Patient was evaluated by podiatry  Patient while in hospital developed UTI, treated with antibiotic  He also developed flu, treated with Tamiflu  Procalcitonin was low  Symptoms improved  Patient, refused to go to SNF despite medical advice  Patient, got discharged home      Significant therapeutic interventions:     Significant Diagnostic Studies:   Labs / Micro:        Radiology:    XR CHEST PORTABLE  Result Date: 2025  EXAMINATION: ONE XRAY VIEW OF THE CHEST 2025 11:27 am COMPARISON: May 10 and 2025. HISTORY: ORDERING SYSTEM PROVIDED HISTORY: cough TECHNOLOGIST PROVIDED HISTORY: cough Reason for Exam: cough FINDINGS: No pulmonary consolidation.There is no pleural effusion or pneumothorax.The cardiomediastinal silhouette is unchanged.No acute osseous abnormality.     No evidence of acute cardiopulmonary process.     XR CHEST PORTABLE  Result

## (undated) DEVICE — 3M™ IOBAN™ 2 ANTIMICROBIAL INCISE DRAPE 6651EZ: Brand: IOBAN™ 2

## (undated) DEVICE — GOWN,AURORA,NONREINFORCED,LARGE: Brand: MEDLINE

## (undated) DEVICE — 450 ML BOTTLE OF 0.05% CHLORHEXIDINE GLUCONATE IN 99.95% STERILE WATER FOR IRRIGATION, USP AND APPLICATOR.: Brand: IRRISEPT ANTIMICROBIAL WOUND LAVAGE

## (undated) DEVICE — SHEET, ORTHO, SPLIT, STERILE: Brand: MEDLINE

## (undated) DEVICE — DRAPE,REIN 53X77,STERILE: Brand: MEDLINE

## (undated) DEVICE — AIRLIFE™ NASAL OXYGEN CANNULA CURVED, FLARED TIP, WITH 7 FEET (2.1 M) CRUSH RESISTANT TUBING, OVER-THE-EAR STYLE: Brand: AIRLIFE™

## (undated) DEVICE — STOCKINETTE ORTH W6XL48IN OFF WHT SGL PLY UNBLEACHED COT RIB

## (undated) DEVICE — BIT DRL L145MM DIA4.2MM NONSTERILE 3 FLUT NDL PNT QUIK CPL

## (undated) DEVICE — SUTURE VCRL SZ 0 L36IN ABSRB UD CT-1 L36MM 1/2 CIR TAPR PNT VCP946H

## (undated) DEVICE — SUTURE STRATAFIX SYMMETRIC PDS + SZ 1 L18IN ABSRB VLT L48MM SXPP1A400

## (undated) DEVICE — DUAL CUT SAGITTAL BLADE

## (undated) DEVICE — KIT AUTOTRNS APPL AERO 2 SET SYR 2 TIP FOR PLT SEP SYS GPS

## (undated) DEVICE — PICO SINGLE USE NEGATIVE PRESSURE WOUND THERAPY SYSTEM 10CM X 20CM 4IN. X 8IN.: Brand: PICO

## (undated) DEVICE — FORCEPS BX L240CM WRK CHN 2.8MM STD CAP W/ NDL MIC MESH

## (undated) DEVICE — ZIPPERED TOGA, 2X LARGE: Brand: FLYTE, SURGICOOL

## (undated) DEVICE — SUTURE VCRL + SZ 2-0 L27IN ABSRB CLR CT-1 1/2 CIR TAPERCUT VCP259H

## (undated) DEVICE — SNARE ENDOSCP L240CM LOOP W13MM DIA2.4MM SHT THROW SM OVL

## (undated) DEVICE — SUTURE STRATAFIX SPRL MCRYL + SZ 2 0 L27IN ABSRB UD W NDL SXMP1B419

## (undated) DEVICE — SOLUTION IRRIG 1000ML 0.9% SOD CHL USP POUR PLAS BTL

## (undated) DEVICE — 2108 SERIES SAGITTAL BLADE, NO OFFSET  (24.8 X 1.32 X 87.3MM)

## (undated) DEVICE — DRESSING TRNSPAR W5XL4.5IN FLM SHT SEMIPERMEABLE WIND

## (undated) DEVICE — ST CHARLES TOTAL HIP: Brand: MEDLINE INDUSTRIES, INC.

## (undated) DEVICE — ENDO KIT W/SYRINGE: Brand: MEDLINE INDUSTRIES, INC.

## (undated) DEVICE — 4-PORT MANIFOLD: Brand: NEPTUNE 2

## (undated) DEVICE — COVER,TABLE,HEAVY DUTY,50"X90",STRL: Brand: MEDLINE

## (undated) DEVICE — Z INACTIVE USE 2660664 SOLUTION IRRIG 3000ML 0.9% SOD CHL USP UROMATIC PLAS CONT

## (undated) DEVICE — Device

## (undated) DEVICE — 1LYRTR 16FR10ML 100%SILI SNAP: Brand: MEDLINE INDUSTRIES, INC.

## (undated) DEVICE — C-ARM: Brand: UNBRANDED

## (undated) DEVICE — SUTURE MONOCRYL STRATAFIX SPRL + SZ 2 0 L27IN ABSRB UD W NDL SXMP1B419

## (undated) DEVICE — BIT DRILL CALIBRATED 4.2 EX-LONG

## (undated) DEVICE — BIT DRILL CANN 11.2 LRG QC

## (undated) DEVICE — ROD RMR L950MM DIA3MM W/ STR BALL TIP

## (undated) DEVICE — COVER,MAYO STAND,STERILE: Brand: MEDLINE

## (undated) DEVICE — DRAPE,U/ SHT,SPLIT,PLAS,STERIL: Brand: MEDLINE

## (undated) DEVICE — SKIN AFFIX SURG ADHESIVE 72/CS 0.55ML: Brand: MEDLINE

## (undated) DEVICE — GLOVE ORTHO 8   MSG9480

## (undated) DEVICE — APPLICATOR MEDICATED 26 CC SOLUTION HI LT ORNG CHLORAPREP

## (undated) DEVICE — BLANKET WRM W29.9XL79.1IN UP BODY FORC AIR MISTRAL-AIR

## (undated) DEVICE — KIT SEP W/ BLD DRAW TB SYR NDL TRNQT PD

## (undated) DEVICE — DRESSING HYDROCOLLOID BORDER 35X10 IN ALUM PRIMASEAL

## (undated) DEVICE — ELECTRODE PT RET AD L9FT HI MOIST COND ADH HYDRGEL CORDED

## (undated) DEVICE — DRAPE C ARM W104XL188CM FLD MOB XR

## (undated) DEVICE — C-ARMOR C-ARM EQUIPMENT COVERS CLEAR STERILE UNIVERSAL FIT 12 PER CASE: Brand: C-ARMOR

## (undated) DEVICE — TUBING, SUCTION, 9/32" X 12', STRAIGHT: Brand: MEDLINE INDUSTRIES, INC.

## (undated) DEVICE — IMPLANTABLE DEVICE: Type: IMPLANTABLE DEVICE | Site: FEMUR | Status: NON-FUNCTIONAL

## (undated) DEVICE — SYSTEM WND THER 14 DAY 150 CC CANSTR THER UNIT PREVENA + 125

## (undated) DEVICE — SET HNDPC W COAX BNE CLN TIP SUCT TB BTTRY PWR DISPOSABLE

## (undated) DEVICE — DEFENDO AIR WATER SUCTION AND BIOPSY VALVE KIT FOR  OLYMPUS: Brand: DEFENDO AIR/WATER/SUCTION AND BIOPSY VALVE

## (undated) DEVICE — GUIDEWIRE ORTH L400MM DIA3.2MM FOR TFN

## (undated) DEVICE — 3M™ STERI-STRIP™ ANTIMICROBIAL SKIN CLOSURES 1 IN X 5 IN, 25/CAR, 4 CAR/CASE A1848: Brand: 3M™ STERI-STRIP™

## (undated) DEVICE — BIT DRL QC 2.8X200 MM 110 MM CALIB NS

## (undated) DEVICE — GLOVE SURG SZ 8 L12IN FNGR THK13MIL BRN LTX SYN POLYMER W

## (undated) DEVICE — GLOVE ORANGE PI 8   MSG9080

## (undated) DEVICE — STAPLER SKIN SQ 30 ABSRB STPL DISP INSORB

## (undated) DEVICE — CLIP LIG L235CM RESOL 360 BX/20

## (undated) DEVICE — COVER,MAYO STAND,XL,STERILE: Brand: MEDLINE

## (undated) DEVICE — JELLY,LUBE,STERILE,FLIP TOP,TUBE,2-OZ: Brand: MEDLINE

## (undated) DEVICE — SOLUTION IRRIG 1000ML STRL H2O USP PLAS POUR BTL

## (undated) DEVICE — BANDAGE COBAN 6 IN WND 6INX5YD FOAM

## (undated) DEVICE — NEPTUNE E-SEP SMOKE EVACUATION PENCIL, COATED, 70MM BLADE, PUSH BUTTON SWITCH: Brand: NEPTUNE E-SEP

## (undated) DEVICE — DRESSING POSTOP AG PRISMASEAL 3.5X6IN

## (undated) DEVICE — BANDAGE,GAUZE,BULKEE II,4.5"X4.1YD,STRL: Brand: MEDLINE

## (undated) DEVICE — SOLUTION IRRIG 3000ML 0.9% SOD CHL USP UROMATIC PLAS CONT

## (undated) DEVICE — BANDAGE,SELF ADHRNT,COFLEX,4"X5YD,STRL: Brand: COLABEL

## (undated) DEVICE — BLADE ES L6IN ELASTOMERIC COAT EXT DURABLE BEND UPTO 90DEG

## (undated) DEVICE — SUTURE PDS + SZ 0 L36IN ABSRB VLT CT 1 L36MM 1 2 CIR PDP346H

## (undated) DEVICE — STOCKINETTE,IMPERVIOUS,12X48,STERILE: Brand: MEDLINE

## (undated) DEVICE — SUTURE ETHIBOND EXCEL SZ 1 L30IN NONABSORBABLE GRN L36MM CT-1 X425H

## (undated) DEVICE — ILLUMINATOR SURG YANKAUER MTL TIP STRL PHOTONSABER Y DISP

## (undated) DEVICE — GOWN,SIRUS,POLYRNF,BRTHSLV,XL,30/CS: Brand: MEDLINE

## (undated) DEVICE — 3M™ WARMING BLANKET, UPPER BODY, 10 PER CASE, 42268: Brand: BAIR HUGGER™

## (undated) DEVICE — SPONGE LAP W18XL18IN WHT COT 4 PLY FLD STRUNG RADPQ DISP ST

## (undated) DEVICE — GLOVE SURG SZ 85 STD WHT LTX SYN POLYMER BEAD REINF ANTI RL

## (undated) DEVICE — GLOVE SURG SZ 85 L12IN FNGR THK79MIL GRN LTX FREE

## (undated) DEVICE — GLOVE ORANGE PI 7 1/2   MSG9075

## (undated) DEVICE — SOLUTION IV IRRIG WATER 1000ML POUR BRL 2F7114

## (undated) DEVICE — MERCY HEALTH ST CHARLES: Brand: MEDLINE INDUSTRIES, INC.

## (undated) DEVICE — DRESSING,GAUZE,XEROFORM,CURAD,1"X8",ST: Brand: CURAD

## (undated) DEVICE — TOWEL,OR,DSP,ST,BLUE,STD,6/PK,12PK/CS: Brand: MEDLINE

## (undated) DEVICE — YANKAUER,SMOOTH HANDLE,HIGH CAPACITY: Brand: MEDLINE INDUSTRIES, INC.

## (undated) DEVICE — ZIP 16 SURGICAL SKIN CLOSURE DEVICE: Brand: ZIP 16 SURGICAL SKIN CLOSURE DEVICE

## (undated) DEVICE — SINGLE PORT MANIFOLD: Brand: NEPTUNE 2

## (undated) DEVICE — 3M™ IOBAN™ 2 ANTIMICROBIAL INCISE DRAPE 6650EZ: Brand: IOBAN™ 2

## (undated) DEVICE — SHEET,DRAPE,53X77,STERILE: Brand: MEDLINE

## (undated) DEVICE — DRESSING NEG PRSS 13CM PREVENA